# Patient Record
Sex: MALE | Race: WHITE | NOT HISPANIC OR LATINO | Employment: OTHER | URBAN - METROPOLITAN AREA
[De-identification: names, ages, dates, MRNs, and addresses within clinical notes are randomized per-mention and may not be internally consistent; named-entity substitution may affect disease eponyms.]

---

## 2018-01-01 ENCOUNTER — TELEPHONE (OUTPATIENT)
Dept: TRANSPLANT | Facility: CLINIC | Age: 78
End: 2018-01-01

## 2018-01-01 ENCOUNTER — RESEARCH ENCOUNTER (OUTPATIENT)
Dept: RESEARCH | Facility: HOSPITAL | Age: 78
End: 2018-01-01

## 2018-01-01 ENCOUNTER — OFFICE VISIT (OUTPATIENT)
Dept: TRANSPLANT | Facility: CLINIC | Age: 78
End: 2018-01-01
Payer: MEDICARE

## 2018-01-01 ENCOUNTER — LAB VISIT (OUTPATIENT)
Dept: LAB | Facility: HOSPITAL | Age: 78
End: 2018-01-01
Attending: INTERNAL MEDICINE
Payer: MEDICARE

## 2018-01-01 VITALS
HEART RATE: 70 BPM | HEIGHT: 73 IN | WEIGHT: 196.44 LBS | DIASTOLIC BLOOD PRESSURE: 53 MMHG | BODY MASS INDEX: 26.03 KG/M2 | SYSTOLIC BLOOD PRESSURE: 108 MMHG

## 2018-01-01 VITALS
HEIGHT: 74 IN | BODY MASS INDEX: 24.58 KG/M2 | WEIGHT: 191.56 LBS | HEART RATE: 68 BPM | SYSTOLIC BLOOD PRESSURE: 133 MMHG | DIASTOLIC BLOOD PRESSURE: 50 MMHG

## 2018-01-01 VITALS
HEIGHT: 74 IN | HEART RATE: 68 BPM | DIASTOLIC BLOOD PRESSURE: 51 MMHG | WEIGHT: 192.44 LBS | SYSTOLIC BLOOD PRESSURE: 104 MMHG | BODY MASS INDEX: 24.7 KG/M2

## 2018-01-01 DIAGNOSIS — Z95.810 ICD (IMPLANTABLE CARDIOVERTER-DEFIBRILLATOR) IN PLACE: ICD-10-CM

## 2018-01-01 DIAGNOSIS — I47.20 VT (VENTRICULAR TACHYCARDIA): ICD-10-CM

## 2018-01-01 DIAGNOSIS — I50.22 CHRONIC SYSTOLIC CONGESTIVE HEART FAILURE: Primary | ICD-10-CM

## 2018-01-01 DIAGNOSIS — E87.6 HYPOKALEMIA: ICD-10-CM

## 2018-01-01 DIAGNOSIS — I25.5 ISCHEMIC CARDIOMYOPATHY: ICD-10-CM

## 2018-01-01 DIAGNOSIS — I50.22 CHRONIC SYSTOLIC CONGESTIVE HEART FAILURE: ICD-10-CM

## 2018-01-01 DIAGNOSIS — I48.0 PAROXYSMAL ATRIAL FIBRILLATION: ICD-10-CM

## 2018-01-01 DIAGNOSIS — I50.9 ACUTE HEART FAILURE, UNSPECIFIED HEART FAILURE TYPE: Primary | ICD-10-CM

## 2018-01-01 LAB
ALBUMIN SERPL BCP-MCNC: 2.8 G/DL
ALBUMIN SERPL BCP-MCNC: 3.1 G/DL
ALP SERPL-CCNC: 246 U/L
ALP SERPL-CCNC: 327 U/L
ALT SERPL W/O P-5'-P-CCNC: 108 U/L
ALT SERPL W/O P-5'-P-CCNC: 31 U/L
ANION GAP SERPL CALC-SCNC: 10 MMOL/L
ANION GAP SERPL CALC-SCNC: 12 MMOL/L
ANION GAP SERPL CALC-SCNC: 13 MMOL/L
AST SERPL-CCNC: 39 U/L
AST SERPL-CCNC: 83 U/L
BILIRUB SERPL-MCNC: 0.9 MG/DL
BILIRUB SERPL-MCNC: 1.4 MG/DL
BNP SERPL-MCNC: 534 PG/ML
BNP SERPL-MCNC: 792 PG/ML
BUN SERPL-MCNC: 54 MG/DL
BUN SERPL-MCNC: 72 MG/DL
BUN SERPL-MCNC: 83 MG/DL
CALCIUM SERPL-MCNC: 9.2 MG/DL
CALCIUM SERPL-MCNC: 9.7 MG/DL
CALCIUM SERPL-MCNC: 9.7 MG/DL
CHLORIDE SERPL-SCNC: 102 MMOL/L
CHLORIDE SERPL-SCNC: 96 MMOL/L
CHLORIDE SERPL-SCNC: 99 MMOL/L
CO2 SERPL-SCNC: 26 MMOL/L
CO2 SERPL-SCNC: 26 MMOL/L
CO2 SERPL-SCNC: 30 MMOL/L
CREAT SERPL-MCNC: 1.8 MG/DL
CREAT SERPL-MCNC: 2.1 MG/DL
CREAT SERPL-MCNC: 2.6 MG/DL
EST. GFR  (AFRICAN AMERICAN): 26.5 ML/MIN/1.73 M^2
EST. GFR  (AFRICAN AMERICAN): 34.3 ML/MIN/1.73 M^2
EST. GFR  (AFRICAN AMERICAN): 41.3 ML/MIN/1.73 M^2
EST. GFR  (NON AFRICAN AMERICAN): 22.9 ML/MIN/1.73 M^2
EST. GFR  (NON AFRICAN AMERICAN): 29.7 ML/MIN/1.73 M^2
EST. GFR  (NON AFRICAN AMERICAN): 35.8 ML/MIN/1.73 M^2
GLUCOSE SERPL-MCNC: 117 MG/DL
GLUCOSE SERPL-MCNC: 259 MG/DL
GLUCOSE SERPL-MCNC: 335 MG/DL
POTASSIUM SERPL-SCNC: 3.5 MMOL/L
POTASSIUM SERPL-SCNC: 4.4 MMOL/L
POTASSIUM SERPL-SCNC: 4.9 MMOL/L
PROT SERPL-MCNC: 6.7 G/DL
PROT SERPL-MCNC: 7.2 G/DL
SODIUM SERPL-SCNC: 137 MMOL/L
SODIUM SERPL-SCNC: 138 MMOL/L
SODIUM SERPL-SCNC: 139 MMOL/L

## 2018-01-01 PROCEDURE — 36415 COLL VENOUS BLD VENIPUNCTURE: CPT

## 2018-01-01 PROCEDURE — 99214 OFFICE O/P EST MOD 30 MIN: CPT | Mod: S$PBB,,, | Performed by: INTERNAL MEDICINE

## 2018-01-01 PROCEDURE — 83880 ASSAY OF NATRIURETIC PEPTIDE: CPT

## 2018-01-01 PROCEDURE — 99999 PR PBB SHADOW E&M-EST. PATIENT-LVL III: CPT | Mod: PBBFAC,,, | Performed by: INTERNAL MEDICINE

## 2018-01-01 PROCEDURE — 80053 COMPREHEN METABOLIC PANEL: CPT

## 2018-01-01 PROCEDURE — 99213 OFFICE O/P EST LOW 20 MIN: CPT | Mod: PBBFAC | Performed by: INTERNAL MEDICINE

## 2018-01-01 PROCEDURE — 80048 BASIC METABOLIC PNL TOTAL CA: CPT

## 2018-01-01 RX ORDER — LEVOFLOXACIN 500 MG/1
1 TABLET, FILM COATED ORAL DAILY
COMMUNITY
Start: 2018-01-01 | End: 2018-01-01

## 2018-01-01 RX ORDER — POTASSIUM CHLORIDE 20 MEQ/1
TABLET, EXTENDED RELEASE ORAL
Qty: 90 TABLET | Refills: 3 | Status: ON HOLD | OUTPATIENT
Start: 2018-01-01 | End: 2019-01-01 | Stop reason: SDUPTHER

## 2018-01-01 RX ORDER — ALBUTEROL SULFATE 90 UG/1
2 AEROSOL, METERED RESPIRATORY (INHALATION) EVERY 4 HOURS PRN
COMMUNITY
Start: 2018-01-01

## 2018-01-01 RX ORDER — BUMETANIDE 1 MG/1
TABLET ORAL
Qty: 90 TABLET | Refills: 11 | Status: SHIPPED | OUTPATIENT
Start: 2018-01-01 | End: 2018-01-01 | Stop reason: SDUPTHER

## 2018-01-01 RX ORDER — FLUTICASONE FUROATE, UMECLIDINIUM BROMIDE AND VILANTEROL TRIFENATATE 100; 62.5; 25 UG/1; UG/1; UG/1
POWDER RESPIRATORY (INHALATION) NIGHTLY
COMMUNITY
Start: 2018-01-01

## 2018-01-01 RX ORDER — POTASSIUM CHLORIDE 20 MEQ/1
TABLET, EXTENDED RELEASE ORAL
Qty: 45 TABLET | Refills: 3 | Status: SHIPPED | OUTPATIENT
Start: 2018-01-01 | End: 2018-01-01 | Stop reason: SDUPTHER

## 2018-01-01 RX ORDER — BUMETANIDE 1 MG/1
TABLET ORAL
Qty: 450 TABLET | Refills: 3 | Status: ON HOLD | OUTPATIENT
Start: 2018-01-01 | End: 2019-01-01 | Stop reason: SDUPTHER

## 2018-03-16 DIAGNOSIS — I50.22 CHRONIC SYSTOLIC HEART FAILURE: Primary | ICD-10-CM

## 2018-03-22 ENCOUNTER — LAB VISIT (OUTPATIENT)
Dept: LAB | Facility: HOSPITAL | Age: 78
End: 2018-03-22
Attending: INTERNAL MEDICINE
Payer: MEDICARE

## 2018-03-22 ENCOUNTER — INITIAL CONSULT (OUTPATIENT)
Dept: TRANSPLANT | Facility: CLINIC | Age: 78
End: 2018-03-22
Payer: MEDICARE

## 2018-03-22 VITALS
DIASTOLIC BLOOD PRESSURE: 65 MMHG | WEIGHT: 218.5 LBS | BODY MASS INDEX: 28.04 KG/M2 | HEART RATE: 93 BPM | SYSTOLIC BLOOD PRESSURE: 148 MMHG | HEIGHT: 74 IN

## 2018-03-22 DIAGNOSIS — E11.8 TYPE 2 DIABETES MELLITUS WITH COMPLICATION, WITH LONG-TERM CURRENT USE OF INSULIN: ICD-10-CM

## 2018-03-22 DIAGNOSIS — Z79.4 TYPE 2 DIABETES MELLITUS WITH COMPLICATION, WITH LONG-TERM CURRENT USE OF INSULIN: ICD-10-CM

## 2018-03-22 DIAGNOSIS — E78.2 MIXED HYPERLIPIDEMIA: ICD-10-CM

## 2018-03-22 DIAGNOSIS — I25.5 ISCHEMIC CARDIOMYOPATHY: ICD-10-CM

## 2018-03-22 DIAGNOSIS — I25.10 CORONARY ARTERY DISEASE INVOLVING NATIVE CORONARY ARTERY OF NATIVE HEART WITHOUT ANGINA PECTORIS: ICD-10-CM

## 2018-03-22 DIAGNOSIS — I50.22 CHRONIC SYSTOLIC CONGESTIVE HEART FAILURE: Primary | ICD-10-CM

## 2018-03-22 DIAGNOSIS — I47.20 VT (VENTRICULAR TACHYCARDIA): ICD-10-CM

## 2018-03-22 DIAGNOSIS — Z95.810 ICD (IMPLANTABLE CARDIOVERTER-DEFIBRILLATOR) IN PLACE: ICD-10-CM

## 2018-03-22 DIAGNOSIS — I50.22 CHRONIC SYSTOLIC HEART FAILURE: ICD-10-CM

## 2018-03-22 LAB
ALBUMIN SERPL BCP-MCNC: 3.4 G/DL
ALP SERPL-CCNC: 125 U/L
ALT SERPL W/O P-5'-P-CCNC: 16 U/L
ANION GAP SERPL CALC-SCNC: 5 MMOL/L
AST SERPL-CCNC: 20 U/L
BASOPHILS # BLD AUTO: 0.06 K/UL
BASOPHILS NFR BLD: 0.6 %
BILIRUB SERPL-MCNC: 1.3 MG/DL
BUN SERPL-MCNC: 25 MG/DL
CALCIUM SERPL-MCNC: 8.9 MG/DL
CHLORIDE SERPL-SCNC: 109 MMOL/L
CO2 SERPL-SCNC: 29 MMOL/L
CREAT SERPL-MCNC: 1.2 MG/DL
DIFFERENTIAL METHOD: ABNORMAL
EOSINOPHIL # BLD AUTO: 0 K/UL
EOSINOPHIL NFR BLD: 0 %
ERYTHROCYTE [DISTWIDTH] IN BLOOD BY AUTOMATED COUNT: 15.8 %
EST. GFR  (AFRICAN AMERICAN): >60 ML/MIN/1.73 M^2
EST. GFR  (NON AFRICAN AMERICAN): 58.8 ML/MIN/1.73 M^2
GLUCOSE SERPL-MCNC: 184 MG/DL
HCT VFR BLD AUTO: 36.4 %
HGB BLD-MCNC: 11.6 G/DL
IMM GRANULOCYTES # BLD AUTO: 0.02 K/UL
IMM GRANULOCYTES NFR BLD AUTO: 0.2 %
LYMPHOCYTES # BLD AUTO: 1.4 K/UL
LYMPHOCYTES NFR BLD: 14.8 %
MAGNESIUM SERPL-MCNC: 2.2 MG/DL
MCH RBC QN AUTO: 28.3 PG
MCHC RBC AUTO-ENTMCNC: 31.9 G/DL
MCV RBC AUTO: 89 FL
MONOCYTES # BLD AUTO: 1.3 K/UL
MONOCYTES NFR BLD: 13 %
NEUTROPHILS # BLD AUTO: 6.9 K/UL
NEUTROPHILS NFR BLD: 71.4 %
NRBC BLD-RTO: 0 /100 WBC
PLATELET # BLD AUTO: 93 K/UL
PMV BLD AUTO: 13.8 FL
POTASSIUM SERPL-SCNC: 3.9 MMOL/L
PROT SERPL-MCNC: 6.7 G/DL
RBC # BLD AUTO: 4.1 M/UL
SODIUM SERPL-SCNC: 143 MMOL/L
WBC # BLD AUTO: 9.62 K/UL

## 2018-03-22 PROCEDURE — 99213 OFFICE O/P EST LOW 20 MIN: CPT | Mod: PBBFAC | Performed by: INTERNAL MEDICINE

## 2018-03-22 PROCEDURE — 80053 COMPREHEN METABOLIC PANEL: CPT

## 2018-03-22 PROCEDURE — 83880 ASSAY OF NATRIURETIC PEPTIDE: CPT

## 2018-03-22 PROCEDURE — 36415 COLL VENOUS BLD VENIPUNCTURE: CPT

## 2018-03-22 PROCEDURE — 99204 OFFICE O/P NEW MOD 45 MIN: CPT | Mod: S$PBB,,, | Performed by: INTERNAL MEDICINE

## 2018-03-22 PROCEDURE — 85025 COMPLETE CBC W/AUTO DIFF WBC: CPT

## 2018-03-22 PROCEDURE — 83735 ASSAY OF MAGNESIUM: CPT

## 2018-03-22 PROCEDURE — 99999 PR PBB SHADOW E&M-EST. PATIENT-LVL III: CPT | Mod: PBBFAC,,, | Performed by: INTERNAL MEDICINE

## 2018-03-22 RX ORDER — LEVOTHYROXINE SODIUM 25 UG/1
1 TABLET ORAL DAILY
Status: ON HOLD | COMMUNITY
Start: 2018-02-28 | End: 2019-01-01 | Stop reason: SDUPTHER

## 2018-03-22 RX ORDER — ROSUVASTATIN CALCIUM 20 MG/1
1 TABLET, COATED ORAL DAILY
COMMUNITY
Start: 2018-03-21 | End: 2018-07-27

## 2018-03-22 RX ORDER — FUROSEMIDE 40 MG/1
40 TABLET ORAL 2 TIMES DAILY
Qty: 90 TABLET | Refills: 3 | Status: ON HOLD | OUTPATIENT
Start: 2018-03-22 | End: 2018-05-21 | Stop reason: HOSPADM

## 2018-03-22 RX ORDER — METOLAZONE 5 MG/1
5 TABLET ORAL WEEKLY
Status: ON HOLD | COMMUNITY
End: 2018-04-09 | Stop reason: HOSPADM

## 2018-03-22 RX ORDER — INSULIN GLARGINE 100 [IU]/ML
50 INJECTION, SOLUTION SUBCUTANEOUS 2 TIMES DAILY
Status: ON HOLD | COMMUNITY
Start: 2018-03-06 | End: 2019-01-01 | Stop reason: HOSPADM

## 2018-03-22 RX ORDER — SACUBITRIL AND VALSARTAN 49; 51 MG/1; MG/1
1 TABLET, FILM COATED ORAL 2 TIMES DAILY
Status: ON HOLD | COMMUNITY
Start: 2018-03-19 | End: 2018-05-21 | Stop reason: HOSPADM

## 2018-03-22 RX ORDER — INSULIN ASPART 100 [IU]/ML
5 INJECTION, SOLUTION INTRAVENOUS; SUBCUTANEOUS 4 TIMES DAILY
Status: ON HOLD | COMMUNITY
Start: 2018-03-06 | End: 2019-01-01 | Stop reason: HOSPADM

## 2018-03-22 RX ORDER — APIXABAN 2.5 MG/1
1 TABLET, FILM COATED ORAL 2 TIMES DAILY
Status: ON HOLD | COMMUNITY
Start: 2018-02-12 | End: 2019-01-01 | Stop reason: HOSPADM

## 2018-03-22 RX ORDER — DIGOXIN 125 MCG
0.5 TABLET ORAL EVERY MORNING
Status: ON HOLD | COMMUNITY
Start: 2018-03-06 | End: 2019-01-01 | Stop reason: HOSPADM

## 2018-03-22 RX ORDER — MULTIVITAMIN
1 TABLET ORAL DAILY
COMMUNITY

## 2018-03-22 RX ORDER — OMEPRAZOLE 40 MG/1
40 CAPSULE, DELAYED RELEASE ORAL DAILY
COMMUNITY

## 2018-03-22 RX ORDER — FUROSEMIDE 40 MG/1
1 TABLET ORAL DAILY
COMMUNITY
Start: 2018-03-06 | End: 2018-03-22 | Stop reason: SDUPTHER

## 2018-03-22 RX ORDER — CLOPIDOGREL BISULFATE 75 MG/1
1 TABLET ORAL DAILY
COMMUNITY
Start: 2018-02-28 | End: 2018-06-11 | Stop reason: SDUPTHER

## 2018-03-22 RX ORDER — LORATADINE 10 MG/1
10 TABLET ORAL DAILY
COMMUNITY

## 2018-03-22 RX ORDER — OMEGA-3-ACID ETHYL ESTERS 1 G/1
2 CAPSULE, LIQUID FILLED ORAL 2 TIMES DAILY
COMMUNITY

## 2018-03-22 NOTE — PROGRESS NOTES
"Subjective:     HPI:  Mr. Solis is a 77 y.o. year old White male with ICMP (EF=20%), CAD s/p PCI, DM and HTN who is referred for evaluation and management of CHF. Clinically reports NYHA class III symptoms. PND and orthopnea.  Most recent angiogram showed patent stents. His HF regimen includes; Sacubitril 49/51 mg BID, digoxin 0.125 mg daily, Lasix 40 mg daily. Was on Sotalol but was discontinued by his local cardiologist.     Past Medical History:   Diagnosis Date    Chronic systolic congestive heart failure 3/22/2018    Coronary artery disease involving native coronary artery of native heart without angina pectoris 3/22/2018    Ischemic cardiomyopathy 3/22/2018    Mixed hyperlipidemia 3/22/2018    VT (ventricular tachycardia) 3/22/2018      History reviewed. No pertinent surgical history.    Social Hx; No smoking. No alcohol       Review of Systems   Constitution: Positive for weight gain. Negative for chills, decreased appetite, diaphoresis, fever, weakness, malaise/fatigue, night sweats and weight loss.   Eyes: Negative.    Cardiovascular: Positive for chest pain, dyspnea on exertion, leg swelling, orthopnea and paroxysmal nocturnal dyspnea. Negative for claudication, cyanosis, irregular heartbeat, near-syncope, palpitations and syncope.   Respiratory: Negative for cough, hemoptysis and shortness of breath.    Endocrine: Negative.    Hematologic/Lymphatic: Negative.    Skin: Negative for color change, dry skin and nail changes.   Musculoskeletal: Negative.    Gastrointestinal: Negative.    Genitourinary: Negative.        Objective:   Blood pressure (!) 148/65, pulse 93, height 6' 2" (1.88 m), weight 99.1 kg (218 lb 7.6 oz).body mass index is 28.05 kg/m².  Physical Exam   Constitutional: He appears well-developed.   BP (!) 148/65   Pulse 93   Ht 6' 2" (1.88 m)   Wt 99.1 kg (218 lb 7.6 oz)   BMI 28.05 kg/m²      HENT:   Head: Normocephalic.   Neck: JVD present. Carotid bruit is not present. "   Cardiovascular: Regular rhythm, normal heart sounds and normal pulses.  PMI is displaced.    No murmur heard.  Pulmonary/Chest: Effort normal and breath sounds normal. No respiratory distress. He has no wheezes. He has no rales.   Abdominal: Soft. Bowel sounds are normal. He exhibits distension. There is no tenderness. There is no rebound.   Musculoskeletal: He exhibits no edema.   Neurological: He is alert.   Skin: Skin is warm.   Vitals reviewed.      Labs:    Chemistry    No results found for: NA, K, CL, CO2, BUN, CREATININE, GLU No results found for: CALCIUM, ALKPHOS, AST, ALT, BILITOT, ESTGFRAFRICA, EGFRNONAA         Assessment:      1. Chronic systolic congestive heart failure    2. Ischemic cardiomyopathy    3. Coronary artery disease involving native coronary artery of native heart without angina pectoris    4. Mixed hyperlipidemia    5. VT (ventricular tachycardia)        Plan:   78 yo male with NYHA class III symptoms. In view of his elavated BP I recommend optimizing his HF regimen.  Recommend increasing his Lasix to 40 mg PO BID  Once euvolemic start Coreg 3.125 mg BID (will start at next visit in 2 weeks)  Continue current dose of Entresto  RHC to assess his hemodynamics and pulmonary pressures  2D echo to reassess his LV size function and RV size and function  Recommend 2 gram sodium restriction and 1500cc fluid restriction.  Encourage physical activity with graded exercise program.  Requested patient to weigh themselves daily, and to notify us if their weight increases by more than 3 lbs in 1 day or 5 lbs in 1 week.   RTC in 2 weeks to discuss RHC results future Tx plans.  Thank you for allowing me to participate in the care of this patient. Please do not hesitate to contact me should you have any questions.     Mela Nails MD

## 2018-03-23 LAB — BNP SERPL-MCNC: 345 PG/ML

## 2018-03-28 DIAGNOSIS — I50.9 ACUTE HEART FAILURE, UNSPECIFIED HEART FAILURE TYPE: Primary | ICD-10-CM

## 2018-03-28 RX ORDER — POTASSIUM CHLORIDE 20 MEQ/1
60 TABLET, EXTENDED RELEASE ORAL DAILY
Qty: 90 TABLET | Refills: 6 | Status: ON HOLD | OUTPATIENT
Start: 2018-03-28 | End: 2018-04-09 | Stop reason: HOSPADM

## 2018-03-28 NOTE — TELEPHONE ENCOUNTER
3/28/18 - Received call from patient stating when he was seen in clinic 3/22/18 his Lasix was increased to 40 mg twice a day.  Patient states he has not lost all of his weight and is still full of fluid and is still having to sleep sitting up in his chair.  Patient states his weight yesterday was 211 lb, but states he weighed last night (Weight at clinic visit was 218).  I informed patient that weight is not an accurate weight and I educated/instructed patient on the proper way to weigh himself from this point forward.  Patient verbalized understanding.  Discussed/Reviewed with NELIA Nails M.D.  VORB: NELIA Nails M.D./JV Gibson RN: Increase Lasix to 80 mg BID x 2 days only, start K-Dur 60 meq daily and repeat BMP Monday 4/2/18.  Above instructions given to patient and patient verbalized understanding to all instructions given.  Medication orders entered in to [employer] and routed to NELIA Nails M.D., for signature and [facility].  Patient request to have Labs drawn at Dr. Jason Hdz's office.  Called his office for fax number and lab order faxed with confirmation slip.  Phone review entered.

## 2018-04-04 DIAGNOSIS — I25.5 ISCHEMIC CARDIOMYOPATHY: ICD-10-CM

## 2018-04-04 DIAGNOSIS — I50.22 CHRONIC SYSTOLIC CHF (CONGESTIVE HEART FAILURE), NYHA CLASS 3: Primary | ICD-10-CM

## 2018-04-05 ENCOUNTER — HOSPITAL ENCOUNTER (INPATIENT)
Facility: HOSPITAL | Age: 78
LOS: 4 days | Discharge: HOME | DRG: 287 | End: 2018-04-09
Attending: INTERNAL MEDICINE | Admitting: INTERNAL MEDICINE
Payer: MEDICARE

## 2018-04-05 ENCOUNTER — DOCUMENTATION ONLY (OUTPATIENT)
Dept: CARDIOLOGY | Facility: CLINIC | Age: 78
End: 2018-04-05

## 2018-04-05 ENCOUNTER — HOSPITAL ENCOUNTER (OUTPATIENT)
Dept: CARDIOLOGY | Facility: CLINIC | Age: 78
Discharge: HOME | DRG: 287 | End: 2018-04-05
Attending: INTERNAL MEDICINE
Payer: MEDICARE

## 2018-04-05 ENCOUNTER — SURGERY (OUTPATIENT)
Age: 78
End: 2018-04-05

## 2018-04-05 DIAGNOSIS — Z95.810 PRESENCE OF AUTOMATIC IMPLANTABLE CARDIOVERTER-DEFIBRILLATOR: ICD-10-CM

## 2018-04-05 DIAGNOSIS — I50.22 CHRONIC SYSTOLIC CONGESTIVE HEART FAILURE: ICD-10-CM

## 2018-04-05 DIAGNOSIS — I50.23 ACUTE ON CHRONIC SYSTOLIC HEART FAILURE, NYHA CLASS 4: ICD-10-CM

## 2018-04-05 DIAGNOSIS — I25.5 ISCHEMIC CARDIOMYOPATHY: Primary | ICD-10-CM

## 2018-04-05 LAB
AORTIC VALVE REGURGITATION: ABNORMAL
ESTIMATED PA SYSTOLIC PRESSURE: 96
MITRAL VALVE REGURGITATION: ABNORMAL
POCT GLUCOSE: 108 MG/DL (ref 70–110)
POCT GLUCOSE: 202 MG/DL (ref 70–110)
RETIRED EF AND QEF - SEE NOTES: 20 (ref 55–65)
TRICUSPID VALVE REGURGITATION: ABNORMAL

## 2018-04-05 PROCEDURE — 4A023N6 MEASUREMENT OF CARDIAC SAMPLING AND PRESSURE, RIGHT HEART, PERCUTANEOUS APPROACH: ICD-10-PCS | Performed by: INTERNAL MEDICINE

## 2018-04-05 PROCEDURE — A4216 STERILE WATER/SALINE, 10 ML: HCPCS | Performed by: NURSE PRACTITIONER

## 2018-04-05 PROCEDURE — 93010 ELECTROCARDIOGRAM REPORT: CPT | Mod: ,,, | Performed by: INTERNAL MEDICINE

## 2018-04-05 PROCEDURE — 63600175 PHARM REV CODE 636 W HCPCS: Performed by: NURSE PRACTITIONER

## 2018-04-05 PROCEDURE — 93306 TTE W/DOPPLER COMPLETE: CPT | Mod: 26,S$PBB,, | Performed by: INTERNAL MEDICINE

## 2018-04-05 PROCEDURE — C8929 TTE W OR WO FOL WCON,DOPPLER: HCPCS | Mod: PBBFAC | Performed by: INTERNAL MEDICINE

## 2018-04-05 PROCEDURE — 93005 ELECTROCARDIOGRAM TRACING: CPT

## 2018-04-05 PROCEDURE — 20600001 HC STEP DOWN PRIVATE ROOM

## 2018-04-05 PROCEDURE — 25000003 PHARM REV CODE 250: Performed by: NURSE PRACTITIONER

## 2018-04-05 RX ORDER — LEVOTHYROXINE SODIUM 25 UG/1
25 TABLET ORAL
Status: DISCONTINUED | OUTPATIENT
Start: 2018-04-06 | End: 2018-04-09 | Stop reason: HOSPADM

## 2018-04-05 RX ORDER — CETIRIZINE HYDROCHLORIDE 5 MG/1
10 TABLET ORAL DAILY
Status: DISCONTINUED | OUTPATIENT
Start: 2018-04-06 | End: 2018-04-09 | Stop reason: HOSPADM

## 2018-04-05 RX ORDER — IBUPROFEN 200 MG
16 TABLET ORAL
Status: DISCONTINUED | OUTPATIENT
Start: 2018-04-05 | End: 2018-04-09 | Stop reason: HOSPADM

## 2018-04-05 RX ORDER — LANOLIN ALCOHOL/MO/W.PET/CERES
400 CREAM (GRAM) TOPICAL 2 TIMES DAILY
Status: DISCONTINUED | OUTPATIENT
Start: 2018-04-05 | End: 2018-04-08

## 2018-04-05 RX ORDER — IBUPROFEN 200 MG
24 TABLET ORAL
Status: DISCONTINUED | OUTPATIENT
Start: 2018-04-05 | End: 2018-04-09 | Stop reason: HOSPADM

## 2018-04-05 RX ORDER — GLUCAGON 1 MG
1 KIT INJECTION
Status: DISCONTINUED | OUTPATIENT
Start: 2018-04-05 | End: 2018-04-09 | Stop reason: HOSPADM

## 2018-04-05 RX ORDER — INSULIN ASPART 100 [IU]/ML
0-5 INJECTION, SOLUTION INTRAVENOUS; SUBCUTANEOUS
Status: DISCONTINUED | OUTPATIENT
Start: 2018-04-05 | End: 2018-04-09 | Stop reason: HOSPADM

## 2018-04-05 RX ORDER — POTASSIUM CHLORIDE 20 MEQ/1
20 TABLET, EXTENDED RELEASE ORAL 2 TIMES DAILY
Status: DISCONTINUED | OUTPATIENT
Start: 2018-04-05 | End: 2018-04-06

## 2018-04-05 RX ORDER — DIGOXIN 125 MCG
0.12 TABLET ORAL EVERY MORNING
Status: DISCONTINUED | OUTPATIENT
Start: 2018-04-06 | End: 2018-04-09 | Stop reason: HOSPADM

## 2018-04-05 RX ORDER — ROSUVASTATIN CALCIUM 20 MG/1
20 TABLET, COATED ORAL DAILY
Status: DISCONTINUED | OUTPATIENT
Start: 2018-04-06 | End: 2018-04-09 | Stop reason: HOSPADM

## 2018-04-05 RX ORDER — SODIUM CHLORIDE 0.9 % (FLUSH) 0.9 %
3 SYRINGE (ML) INJECTION EVERY 8 HOURS
Status: DISCONTINUED | OUTPATIENT
Start: 2018-04-05 | End: 2018-04-09 | Stop reason: HOSPADM

## 2018-04-05 RX ORDER — CLOPIDOGREL BISULFATE 75 MG/1
75 TABLET ORAL DAILY
Status: DISCONTINUED | OUTPATIENT
Start: 2018-04-06 | End: 2018-04-09 | Stop reason: HOSPADM

## 2018-04-05 RX ORDER — FUROSEMIDE 10 MG/ML
80 INJECTION INTRAMUSCULAR; INTRAVENOUS ONCE
Status: COMPLETED | OUTPATIENT
Start: 2018-04-05 | End: 2018-04-05

## 2018-04-05 RX ORDER — HEPARIN SODIUM 5000 [USP'U]/ML
5000 INJECTION, SOLUTION INTRAVENOUS; SUBCUTANEOUS EVERY 8 HOURS
Status: CANCELLED | OUTPATIENT
Start: 2018-04-05

## 2018-04-05 RX ORDER — OMEGA-3-ACID ETHYL ESTERS 1 G/1
2 CAPSULE, LIQUID FILLED ORAL 2 TIMES DAILY
Status: DISCONTINUED | OUTPATIENT
Start: 2018-04-05 | End: 2018-04-09 | Stop reason: HOSPADM

## 2018-04-05 RX ORDER — PANTOPRAZOLE SODIUM 40 MG/1
40 TABLET, DELAYED RELEASE ORAL DAILY
Status: DISCONTINUED | OUTPATIENT
Start: 2018-04-06 | End: 2018-04-09 | Stop reason: HOSPADM

## 2018-04-05 RX ADMIN — FUROSEMIDE 80 MG: 10 INJECTION, SOLUTION INTRAMUSCULAR; INTRAVENOUS at 06:04

## 2018-04-05 RX ADMIN — Medication 400 MG: at 09:04

## 2018-04-05 RX ADMIN — FUROSEMIDE 10 MG/HR: 10 INJECTION, SOLUTION INTRAVENOUS at 06:04

## 2018-04-05 RX ADMIN — POTASSIUM CHLORIDE 20 MEQ: 1500 TABLET, EXTENDED RELEASE ORAL at 09:04

## 2018-04-05 RX ADMIN — SACUBITRIL AND VALSARTAN 1 TABLET: 49; 51 TABLET, FILM COATED ORAL at 09:04

## 2018-04-05 RX ADMIN — Medication 3 ML: at 10:04

## 2018-04-05 RX ADMIN — OMEGA-3-ACID ETHYL ESTERS 2 G: 1 CAPSULE, LIQUID FILLED ORAL at 09:04

## 2018-04-05 NOTE — H&P (VIEW-ONLY)
"Subjective:     HPI:  Mr. Solis is a 77 y.o. year old White male with ICMP (EF=20%), CAD s/p PCI, DM and HTN who is referred for evaluation and management of CHF. Clinically reports NYHA class III symptoms. PND and orthopnea.  Most recent angiogram showed patent stents. His HF regimen includes; Sacubitril 49/51 mg BID, digoxin 0.125 mg daily, Lasix 40 mg daily. Was on Sotalol but was discontinued by his local cardiologist.     Past Medical History:   Diagnosis Date    Chronic systolic congestive heart failure 3/22/2018    Coronary artery disease involving native coronary artery of native heart without angina pectoris 3/22/2018    Ischemic cardiomyopathy 3/22/2018    Mixed hyperlipidemia 3/22/2018    VT (ventricular tachycardia) 3/22/2018      History reviewed. No pertinent surgical history.    Social Hx; No smoking. No alcohol       Review of Systems   Constitution: Positive for weight gain. Negative for chills, decreased appetite, diaphoresis, fever, weakness, malaise/fatigue, night sweats and weight loss.   Eyes: Negative.    Cardiovascular: Positive for chest pain, dyspnea on exertion, leg swelling, orthopnea and paroxysmal nocturnal dyspnea. Negative for claudication, cyanosis, irregular heartbeat, near-syncope, palpitations and syncope.   Respiratory: Negative for cough, hemoptysis and shortness of breath.    Endocrine: Negative.    Hematologic/Lymphatic: Negative.    Skin: Negative for color change, dry skin and nail changes.   Musculoskeletal: Negative.    Gastrointestinal: Negative.    Genitourinary: Negative.        Objective:   Blood pressure (!) 148/65, pulse 93, height 6' 2" (1.88 m), weight 99.1 kg (218 lb 7.6 oz).body mass index is 28.05 kg/m².  Physical Exam   Constitutional: He appears well-developed.   BP (!) 148/65   Pulse 93   Ht 6' 2" (1.88 m)   Wt 99.1 kg (218 lb 7.6 oz)   BMI 28.05 kg/m²      HENT:   Head: Normocephalic.   Neck: JVD present. Carotid bruit is not present. "   Cardiovascular: Regular rhythm, normal heart sounds and normal pulses.  PMI is displaced.    No murmur heard.  Pulmonary/Chest: Effort normal and breath sounds normal. No respiratory distress. He has no wheezes. He has no rales.   Abdominal: Soft. Bowel sounds are normal. He exhibits distension. There is no tenderness. There is no rebound.   Musculoskeletal: He exhibits no edema.   Neurological: He is alert.   Skin: Skin is warm.   Vitals reviewed.      Labs:    Chemistry    No results found for: NA, K, CL, CO2, BUN, CREATININE, GLU No results found for: CALCIUM, ALKPHOS, AST, ALT, BILITOT, ESTGFRAFRICA, EGFRNONAA         Assessment:      1. Chronic systolic congestive heart failure    2. Ischemic cardiomyopathy    3. Coronary artery disease involving native coronary artery of native heart without angina pectoris    4. Mixed hyperlipidemia    5. VT (ventricular tachycardia)        Plan:   76 yo male with NYHA class III symptoms. In view of his elavated BP I recommend optimizing his HF regimen.  Recommend increasing his Lasix to 40 mg PO BID  Once euvolemic start Coreg 3.125 mg BID (will start at next visit in 2 weeks)  Continue current dose of Entresto  RHC to assess his hemodynamics and pulmonary pressures  2D echo to reassess his LV size function and RV size and function  Recommend 2 gram sodium restriction and 1500cc fluid restriction.  Encourage physical activity with graded exercise program.  Requested patient to weigh themselves daily, and to notify us if their weight increases by more than 3 lbs in 1 day or 5 lbs in 1 week.   RTC in 2 weeks to discuss RHC results future Tx plans.  Thank you for allowing me to participate in the care of this patient. Please do not hesitate to contact me should you have any questions.     Mela Nails MD

## 2018-04-05 NOTE — PROGRESS NOTES
Patient identified by 2 identifiers. Denies previous reactions to blood transfusions and allergies reviewed.  Procedure explained & consent obtained.  22 g IV placed to Lt Hand, flushed w/ 10cc NS pre & post contrast administration.  3cc Optison administered, echo images obtained.  Pt tolerated procedure well.  IV D/C'ed, preasure dsg applied.  Pt D/C'ed to home.

## 2018-04-05 NOTE — INTERVAL H&P NOTE
The patient has been examined and the H&P has been reviewed:    I concur with the findings and changes have been noted since the H&P was written:       Mr. Solis is a 77 y.o. year old White male with ICMP (EF=20%), CAD s/p PCI, DM and HTN who is referred for evaluation and management of CHF. Clinically reports NYHA class III symptoms. PND and orthopnea.  Most recent angiogram showed patent stents. His HF regimen includes; Sacubitril 49/51 mg BID, digoxin 0.125 mg daily, Lasix 40 mg daily. Was on Sotalol but was discontinued by his local cardiologist.  He presented today, two weeks after HF clinic visit with Dr. Nails to have RHC.   Results are as follows    D. Hemodynamic Results     BP: 131/67  HR: 70  RSP: 12  PA: 82/26 (46)  AO_SAT: 96  PA_SAT: 58  PW: 40/52 (38)  PW_SAT: 87  RV: 83/5  RVEDP: 17     RA: 16/18 (17)    CONDITION 1 (4/5/2018 11:24:40):  FICKCI: 2.1600  FICKCO: 4.7800  PVR: 201.0000    He was recommended for admission to undergo diuresis and advanced option benjamin. He currently has no complain other than mild discomfort from access site  All his in patient orders including medications have been ordered by my colleague (dre). Pathway benjamin will be started tomorrow.  Plan.  1. Acute on chronic HErEF at 20% . Will continue with lasix bolus and 10mg gtt as ordered. Continue entresto and digoxin. Electrolytes and vital signs reviewed.  2. Mild acute renal failure- Pre- renal and secondary to CHF. Will monitor during stay. Will decide on holding entresto depending on status.        Active Hospital Problems    Diagnosis  POA    *Acute on chronic systolic heart failure, NYHA class 4 [I50.23]  Yes    Ischemic cardiomyopathy [I25.5]  Yes    VT (ventricular tachycardia) [I47.2]  Yes    ICD (implantable cardioverter-defibrillator) in place [Z95.810]  Yes    Coronary artery disease involving native coronary artery of native heart without angina pectoris [I25.10]  Yes    Type 2 diabetes mellitus with complication  [E11.8]  Yes    Mixed hyperlipidemia [E78.2]  Yes      Resolved Hospital Problems    Diagnosis Date Resolved POA   No resolved problems to display.

## 2018-04-06 ENCOUNTER — DOCUMENTATION ONLY (OUTPATIENT)
Dept: ELECTROPHYSIOLOGY | Facility: CLINIC | Age: 78
End: 2018-04-06

## 2018-04-06 PROBLEM — I25.10 CORONARY ARTERY DISEASE INVOLVING NATIVE CORONARY ARTERY OF NATIVE HEART WITHOUT ANGINA PECTORIS: Status: RESOLVED | Noted: 2018-03-22 | Resolved: 2018-04-06

## 2018-04-06 PROBLEM — I48.91 ATRIAL FIBRILLATION: Status: ACTIVE | Noted: 2018-04-06

## 2018-04-06 PROBLEM — E78.2 MIXED HYPERLIPIDEMIA: Status: RESOLVED | Noted: 2018-03-22 | Resolved: 2018-04-06

## 2018-04-06 LAB
ABO + RH BLD: NORMAL
ALBUMIN SERPL BCP-MCNC: 3.5 G/DL
ALP SERPL-CCNC: 114 U/L
ALT SERPL W/O P-5'-P-CCNC: 15 U/L
ANION GAP SERPL CALC-SCNC: 10 MMOL/L
ANION GAP SERPL CALC-SCNC: 10 MMOL/L
AST SERPL-CCNC: 21 U/L
BASOPHILS # BLD AUTO: 0.1 K/UL
BASOPHILS NFR BLD: 1.1 %
BILIRUB SERPL-MCNC: 1.1 MG/DL
BLD GP AB SCN CELLS X3 SERPL QL: NORMAL
BUN SERPL-MCNC: 33 MG/DL
BUN SERPL-MCNC: 33 MG/DL
CALCIUM SERPL-MCNC: 9.2 MG/DL
CALCIUM SERPL-MCNC: 9.2 MG/DL
CHLORIDE SERPL-SCNC: 106 MMOL/L
CHLORIDE SERPL-SCNC: 106 MMOL/L
CO2 SERPL-SCNC: 28 MMOL/L
CO2 SERPL-SCNC: 28 MMOL/L
CREAT SERPL-MCNC: 1.4 MG/DL
CREAT SERPL-MCNC: 1.4 MG/DL
DIFFERENTIAL METHOD: ABNORMAL
DIGOXIN SERPL-MCNC: 0.2 NG/ML
EOSINOPHIL # BLD AUTO: 0 K/UL
EOSINOPHIL NFR BLD: 0 %
ERYTHROCYTE [DISTWIDTH] IN BLOOD BY AUTOMATED COUNT: 15.9 %
EST. GFR  (AFRICAN AMERICAN): 56.4 ML/MIN/1.73 M^2
EST. GFR  (AFRICAN AMERICAN): 56.4 ML/MIN/1.73 M^2
EST. GFR  (NON AFRICAN AMERICAN): 48.8 ML/MIN/1.73 M^2
EST. GFR  (NON AFRICAN AMERICAN): 48.8 ML/MIN/1.73 M^2
ESTIMATED AVG GLUCOSE: 186 MG/DL
FACT X PPP CHRO-ACNC: 0.11 IU/ML
GLUCOSE SERPL-MCNC: 74 MG/DL
GLUCOSE SERPL-MCNC: 74 MG/DL
HBA1C MFR BLD HPLC: 8.1 %
HCT VFR BLD AUTO: 35.7 %
HGB BLD-MCNC: 11.4 G/DL
IMM GRANULOCYTES # BLD AUTO: 0.02 K/UL
IMM GRANULOCYTES NFR BLD AUTO: 0.2 %
LYMPHOCYTES # BLD AUTO: 1.7 K/UL
LYMPHOCYTES NFR BLD: 18.7 %
MAGNESIUM SERPL-MCNC: 2.4 MG/DL
MCH RBC QN AUTO: 28.4 PG
MCHC RBC AUTO-ENTMCNC: 31.9 G/DL
MCV RBC AUTO: 89 FL
MONOCYTES # BLD AUTO: 1.2 K/UL
MONOCYTES NFR BLD: 12.6 %
NEUTROPHILS # BLD AUTO: 6.2 K/UL
NEUTROPHILS NFR BLD: 67.4 %
NRBC BLD-RTO: 0 /100 WBC
PLATELET # BLD AUTO: 110 K/UL
PMV BLD AUTO: 13.9 FL
POCT GLUCOSE: 201 MG/DL (ref 70–110)
POCT GLUCOSE: 230 MG/DL (ref 70–110)
POCT GLUCOSE: 275 MG/DL (ref 70–110)
POCT GLUCOSE: 296 MG/DL (ref 70–110)
POCT GLUCOSE: 80 MG/DL (ref 70–110)
POTASSIUM SERPL-SCNC: 3.7 MMOL/L
POTASSIUM SERPL-SCNC: 3.7 MMOL/L
PROT SERPL-MCNC: 6.7 G/DL
RBC # BLD AUTO: 4.01 M/UL
SODIUM SERPL-SCNC: 144 MMOL/L
SODIUM SERPL-SCNC: 144 MMOL/L
T4 FREE SERPL-MCNC: 0.93 NG/DL
TSH SERPL DL<=0.005 MIU/L-ACNC: 2.46 UIU/ML
WBC # BLD AUTO: 9.26 K/UL

## 2018-04-06 PROCEDURE — 80053 COMPREHEN METABOLIC PANEL: CPT

## 2018-04-06 PROCEDURE — 84439 ASSAY OF FREE THYROXINE: CPT

## 2018-04-06 PROCEDURE — 83735 ASSAY OF MAGNESIUM: CPT

## 2018-04-06 PROCEDURE — 97165 OT EVAL LOW COMPLEX 30 MIN: CPT

## 2018-04-06 PROCEDURE — 36415 COLL VENOUS BLD VENIPUNCTURE: CPT

## 2018-04-06 PROCEDURE — 63600175 PHARM REV CODE 636 W HCPCS: Performed by: NURSE PRACTITIONER

## 2018-04-06 PROCEDURE — 86901 BLOOD TYPING SEROLOGIC RH(D): CPT

## 2018-04-06 PROCEDURE — 85520 HEPARIN ASSAY: CPT

## 2018-04-06 PROCEDURE — 99233 SBSQ HOSP IP/OBS HIGH 50: CPT | Mod: ,,, | Performed by: INTERNAL MEDICINE

## 2018-04-06 PROCEDURE — 25000003 PHARM REV CODE 250: Performed by: NURSE PRACTITIONER

## 2018-04-06 PROCEDURE — 80162 ASSAY OF DIGOXIN TOTAL: CPT

## 2018-04-06 PROCEDURE — 83036 HEMOGLOBIN GLYCOSYLATED A1C: CPT

## 2018-04-06 PROCEDURE — 93284 PRGRMG EVAL IMPLANTABLE DFB: CPT

## 2018-04-06 PROCEDURE — 93284 PRGRMG EVAL IMPLANTABLE DFB: CPT | Mod: 26,,, | Performed by: INTERNAL MEDICINE

## 2018-04-06 PROCEDURE — 20600001 HC STEP DOWN PRIVATE ROOM

## 2018-04-06 PROCEDURE — 85025 COMPLETE CBC W/AUTO DIFF WBC: CPT

## 2018-04-06 PROCEDURE — 84443 ASSAY THYROID STIM HORMONE: CPT

## 2018-04-06 PROCEDURE — A4216 STERILE WATER/SALINE, 10 ML: HCPCS | Performed by: NURSE PRACTITIONER

## 2018-04-06 RX ORDER — POTASSIUM CHLORIDE 20 MEQ/1
40 TABLET, EXTENDED RELEASE ORAL 2 TIMES DAILY
Status: DISCONTINUED | OUTPATIENT
Start: 2018-04-06 | End: 2018-04-08

## 2018-04-06 RX ORDER — HEPARIN SODIUM,PORCINE/D5W 25000/250
18 INTRAVENOUS SOLUTION INTRAVENOUS CONTINUOUS
Status: DISPENSED | OUTPATIENT
Start: 2018-04-06 | End: 2018-04-09

## 2018-04-06 RX ADMIN — FUROSEMIDE 10 MG/HR: 10 INJECTION, SOLUTION INTRAVENOUS at 07:04

## 2018-04-06 RX ADMIN — DIGOXIN 0.12 MG: 125 TABLET ORAL at 06:04

## 2018-04-06 RX ADMIN — SACUBITRIL AND VALSARTAN 1 TABLET: 49; 51 TABLET, FILM COATED ORAL at 09:04

## 2018-04-06 RX ADMIN — POTASSIUM CHLORIDE 40 MEQ: 1500 TABLET, EXTENDED RELEASE ORAL at 09:04

## 2018-04-06 RX ADMIN — Medication 3 ML: at 04:04

## 2018-04-06 RX ADMIN — Medication 400 MG: at 09:04

## 2018-04-06 RX ADMIN — INSULIN ASPART 1 UNITS: 100 INJECTION, SOLUTION INTRAVENOUS; SUBCUTANEOUS at 10:04

## 2018-04-06 RX ADMIN — INSULIN ASPART 2 UNITS: 100 INJECTION, SOLUTION INTRAVENOUS; SUBCUTANEOUS at 11:04

## 2018-04-06 RX ADMIN — HEPARIN SODIUM 18 UNITS/KG/HR: 10000 INJECTION, SOLUTION INTRAVENOUS at 04:04

## 2018-04-06 RX ADMIN — FUROSEMIDE 10 MG/HR: 10 INJECTION, SOLUTION INTRAVENOUS at 10:04

## 2018-04-06 RX ADMIN — ROSUVASTATIN CALCIUM 20 MG: 20 TABLET, FILM COATED ORAL at 09:04

## 2018-04-06 RX ADMIN — CLOPIDOGREL 75 MG: 75 TABLET, FILM COATED ORAL at 09:04

## 2018-04-06 RX ADMIN — CETIRIZINE HYDROCHLORIDE 10 MG: 5 TABLET, FILM COATED ORAL at 09:04

## 2018-04-06 RX ADMIN — LEVOTHYROXINE SODIUM 25 MCG: 25 TABLET ORAL at 06:04

## 2018-04-06 RX ADMIN — OMEGA-3-ACID ETHYL ESTERS 2 G: 1 CAPSULE, LIQUID FILLED ORAL at 07:04

## 2018-04-06 RX ADMIN — INSULIN ASPART 2 UNITS: 100 INJECTION, SOLUTION INTRAVENOUS; SUBCUTANEOUS at 06:04

## 2018-04-06 RX ADMIN — PANTOPRAZOLE SODIUM 40 MG: 40 TABLET, DELAYED RELEASE ORAL at 09:04

## 2018-04-06 RX ADMIN — SPIRONOLACTONE 12.5 MG: 25 TABLET, FILM COATED ORAL at 09:04

## 2018-04-06 RX ADMIN — THERA TABS 1 TABLET: TAB at 09:04

## 2018-04-06 RX ADMIN — OMEGA-3-ACID ETHYL ESTERS 2 G: 1 CAPSULE, LIQUID FILLED ORAL at 09:04

## 2018-04-06 NOTE — NURSING TRANSFER
Nursing Transfer Note      4/6/2018     Transfer To: CT Scan    Transfer via stretcher    Transfer with cardiac monitoring    Transported by Kerrie Love    Medicines sent: Furosemide    Chart send with patient: No    Notified: spouse

## 2018-04-06 NOTE — PLAN OF CARE
Problem: Patient Care Overview  Goal: Plan of Care Review  Outcome: Ongoing (interventions implemented as appropriate)  Patient verbalizes no complaints overnight; denies chest pain, SOB, or other discomfort. Lasix gtt infusing as ordered. Pt remains free of falls or injuries. Pt verbalizes complete understanding of plan of care. Will continue to monitor.

## 2018-04-06 NOTE — ASSESSMENT & PLAN NOTE
- Has [employer] ICD  - V paces with LBBB. Consider seeing what underlying QRS is without pacing - maybe benefit from CRT upgrade?

## 2018-04-06 NOTE — PLAN OF CARE
Problem: Patient Care Overview  Goal: Plan of Care Review  Outcome: Ongoing (interventions implemented as appropriate)  Reviewed plan of care with patient and spouse.  Lasix gtt @ 10 mg/hr.  Heparin gtt @ 18 u/kg/hr.  Anti-Xa will be drawn q6h until therapeutic or discontinued.  Timed Anti-Xa for 2230 hours.  CT Head without contrast and CT Head Abd Pelvis without contrast completed pending results.  ICD programming to interrogate device is completed. Type and screen completed pending results.  Blood glucose monitoring will be completed 4 times daily before meals and at bedtime.  Patient will remain free of fall/trauma/injury by using appropriate lighting, nonskid socks, and by keeping area free of debris.  Patient and family verbalized understanding of all instructions.

## 2018-04-06 NOTE — ASSESSMENT & PLAN NOTE
- Eliquis on hold 2/2 VAD w/u (on SC Heparin for DVT prophylaxis)  - Will have ICD interrogated for A fib burden

## 2018-04-06 NOTE — ASSESSMENT & PLAN NOTE
- Net -ve 3.4L since admit, but remains volume overloaded. Continue Lasix at 10 mg/hr  - GDMT: Continue Dig, Entresto. Will add low dose Aldactone  - Pathway for VAD only initiated. Advance as appropriate  - Also interested in Cardiomems

## 2018-04-06 NOTE — NURSING
Patient arrived to floor as a direct admit from the Cath Lab.  Patient arrived via wheelchair.  Placed on cardiac monitoring (TTX 0074) and called telemetry to add to continuous monitoring system.  ANASTASIA Ospina. notified of patient's arrival.  Vitals and admit assessment completed.  VSS.  BS= 202.  Furosemide gtt @ 10 mg/hr.  Furosemide 80 mg INJ once administered at 1805 hours.  Patient and spouse oriented to room.  Will continue to monitor.

## 2018-04-06 NOTE — PLAN OF CARE
Problem: Occupational Therapy Goal  Goal: Occupational Therapy Goal  Pt is not currently displaying a need for acute OT services. D/C acute OT services and recommend pt D/C home.  D/C acute OT services     Comments: Ajay Sanchez OTR/L  4/6/2018

## 2018-04-06 NOTE — HPI
Mr. Solis is a 77 y.o. year old White male with ICMP (EF=20%), CAD s/p PCI, S/P St. Kelvin ICD, PAF, on Eliquis, was on Sotalol but was discontinued by his local cardiologist DM and HTN who is referred for evaluation and management of CHF. Clinically reports NYHA class III symptoms. PND and orthopnea.  Most recent angiogram showed patent stents. His HF regimen includes; Sacubitril 49/51 mg BID, digoxin 0.125 mg daily, Lasix 40 mg daily.  He was admitted after RHC 4/5/18 showed increased right and left-sided filling pressures, severe Pulmonary Hypertension in the setting of markedly increased PCWP and normal Cardiac output / index for diuresis and consideration for VAD/Cardiomems

## 2018-04-06 NOTE — PROGRESS NOTES
[employer] ICD interrogation shows that he is actually BiV paced already, and AF burden 100%. Will stop SC Heparin and start Heparin gtt. Continue holding Eliquis (last dose 3 days ago) while VAD w/u in progress

## 2018-04-06 NOTE — NURSING
Called [facility] [phone], to inquire about CT head without contrast and CT Chest Abd Pelvis without contrast appointment.  She will call Radiologist to determine contrast protocol and call back.

## 2018-04-06 NOTE — PT/OT/SLP EVAL
Occupational Therapy   Evaluation    Name: Jerry Solis  MRN: [id number]  Admitting Diagnosis:  Acute on chronic systolic heart failure, NYHA class 4      Recommendations:     Discharge Recommendations: home  Discharge Equipment Recommendations:  none  Barriers to discharge:  None    History:     Occupational Profile:  Living Environment: Pt lives in a ssh w/ spouse w/ 4 steps to enter w/ B/L HR and pt has ramp access.   Previous level of function: Indep   Roles and Routines: N/A  Equipment Owned:  none  Assistance upon Discharge: Pt has assistance upon D/C.    Past Medical History:   Diagnosis Date    Chronic systolic congestive heart failure 3/22/2018    Coronary artery disease involving native coronary artery of native heart without angina pectoris 3/22/2018    ICD (implantable cardioverter-defibrillator) in place 3/22/2018    Ischemic cardiomyopathy 3/22/2018    Mixed hyperlipidemia 3/22/2018    Type 2 diabetes mellitus with complication 3/22/2018    VT (ventricular tachycardia) 3/22/2018       History reviewed. No pertinent surgical history.    Subjective     Chief Complaint: no complaints   Patient/Family stated goals: return home  Communicated with: RN prior to session.  Pain/Comfort:  · Pain Rating 1: 0/10  · Pain Rating Post-Intervention 1: 0/10    Patients cultural, spiritual, Amish conflicts given the current situation:      Objective:     Patient found with: peripheral IV    General Precautions: Standard, fall   Orthopedic Precautions:N/A   Braces: N/A     Occupational Performance:    · Pt received seated EOB eating.    Functional Mobility/Transfers:  · Patient completed Sit <> Stand Transfer with supervision  with  no assistive device   · Patient completed Toilet Transfer Stand Pivot technique with supervision with  no AD  · Functional Mobility: Pt ambulated ~100 ft at sba w/o AD.    Activities of Daily Living:  · Feeding:  independence  · Toileting: independence vodining in  "standing    Cognitive/Visual Perceptual:  Cognitive/Psychosocial Skills:     -       Oriented to: Person, Place, Time and Situation   -       Follows Commands/attention:Follows multistep  commands  -       Communication: clear/fluent  -       Memory: No Deficits noted  -       Safety awareness/insight to disability: intact   -       Mood/Affect/Coping skills/emotional control: Appropriate to situation  Visual/Perceptual:      -Intact    Physical Exam:  Balance:    -       Pt displayed good overall balance   Postural examination/scapula alignment:    -       No postural abnormalities identified  Upper Extremity Range of Motion:     -       Right Upper Extremity: WFL  -       Left Upper Extremity: WFL  Upper Extremity Strength:    -       Right Upper Extremity: WFL  -       Left Upper Extremity: WFL   Strength:    -       Right Upper Extremity: WFL  -       Left Upper Extremity: WFL  Fine Motor Coordination:    -       Intact  Gross motor coordination:   WFL    Patient left seated EOB with all lines intact, call button in reach and RN notified    AMPA 6 Click:  AMPA Total Score: 24    Treatment & Education:  Pt educated on POC.  Education:    Assessment:     Jerry Solis is a 77 y.o. male with a medical diagnosis of Acute on chronic systolic heart failure, NYHA class 4. Pt is not currently displaying a need for acute OT services. D/C acute OT services and recommend pt D/C *home.    Rehab Prognosis:  good; patient would benefit from acute skilled OT services to address these deficits and reach maximum level of function.         Clinical Decision Makin.  OT Low:  "Pt evaluation falls under low complexity for evaluation coding due to performance deficits noted in 1-3 areas as stated above and 0 co-morbities affecting current functional status. Data obtained from problem focused assessments. No modifications or assistance was required for completion of evaluation. Only brief occupational profile and " "history review completed."     Plan:     Patient to be seen  (D/C acute OT services ) to address the above listed problems via    · Plan of Care Expires: 05/06/18  · Plan of Care Reviewed with: patient    This Plan of care has been discussed with the patient who was involved in its development and understands and is in agreement with the identified goals and treatment plan    GOALS:    Occupational Therapy Goals        Problem: Occupational Therapy Goal    Goal Priority Disciplines Outcome Interventions   Occupational Therapy Goal     OT, PT/OT     Description:  Pt is not currently displaying a need for acute OT services. D/C acute OT services and recommend pt D/C home.                    Time Tracking:     OT Date of Treatment: 04/06/18  OT Start Time: 1517  OT Stop Time: 1527  OT Total Time (min): 10 min    Billable Minutes:Evaluation 10 mintues    Ajay Sanchez, OT  4/6/2018    "

## 2018-04-06 NOTE — PROGRESS NOTES
At the request of Dr. Guerin, I have been asked to meet patient and provide VAD education. Introduced self and reason for visit. Pt AAAO, no family at bedside.  Provided written VAD education (red folder). Included in folder is the following: VAD education, wellness contract, acknowledgement of being evaluated for VAD, support group flier, ICU visitation hours, VAD newsletter, Intermacs information, picture of VAD  In body, [employer] pamphlet, Living with HM II DVD, There is hope pamphlet, Tomorrow depends on the decision we make today patient education pack (HM II), [employer] information, and business cards for all VAD coordinators. Explained that we use 3 different types of pumps here and information on both pumps is in the red folder. I explained the work up process as well.     Explained to look over the entire contents and read the wellness contract, caregiver agreement and acknowledgement form.  Also explained they should bring this folder with them to all clinic visits and if they are admitted to the hospital so we can continue education as needed. Should there be any questions, please write them down and bring with you or feel free to call and we can talk on the phone. All questions answered to his satisfaction as evidence by verbal acknowledgement.

## 2018-04-06 NOTE — PROGRESS NOTES
Arrhythmia clinic  Order received to interrogate ICD for AF burden.   Programming done at bedside.  AF with slow ventricular response  BiV paces 91%  AF burden 100%. Pt states he was prescribed Eliquis. Has not taken for 2 days.  No vetnricular arrhythmias.  Device and leads wnl.  2.5 years on [employer].  Pt is followed Dr. Dr. Willis

## 2018-04-06 NOTE — PT/OT/SLP PROGRESS
Occupational Therapy      Patient Name:  Jerry Solis   MRN:  [id number]    Patient not seen today secondary to CT/MRI. Writing therapist attempted pt in PM, but pt off the floor at CT scan. Will follow-up as scheduled.    Ajay Sanchez, OT  4/6/2018

## 2018-04-06 NOTE — SUBJECTIVE & OBJECTIVE
Interval History: Describes gradual decline over the past 6 months, more SOB when volume overloaded. For a long time, his fluid was controlled with monthly outpatient outpatient IVP or Lasix gtts, but more recently this has not worked. Still works full time as an  in the oil field. Wants to return to an active lifestyle. Wants more info about LVAD. Also interested in Cardiomems    Continuous Infusions:   furosemide (LASIX) 2 mg/mL infusion (non-titrating) 10 mg/hr (04/06/18 0741)     Scheduled Meds:   cetirizine  10 mg Oral Daily    clopidogrel  75 mg Oral Daily    digoxin  0.125 mg Oral QAM    levothyroxine  25 mcg Oral Before breakfast    magnesium oxide  400 mg Oral BID    multivitamin  1 tablet Oral Daily    omega-3 acid ethyl esters  2 g Oral BID    pantoprazole  40 mg Oral Daily    potassium chloride SA  40 mEq Oral BID    rosuvastatin  20 mg Oral Daily    sacubitril-valsartan  1 tablet Oral BID    sodium chloride 0.9%  3 mL Intravenous Q8H     PRN Meds:dextrose 50%, dextrose 50%, glucagon (human recombinant), glucose, glucose, insulin aspart U-100    Review of patient's allergies indicates:   Allergen Reactions    Ace inhibitors     Adhesive     Codeine     Lipitor [atorvastatin]      Objective:     Vital Signs (Most Recent):  Temp: 98.1 °F (36.7 °C) (04/06/18 0743)  Pulse: 74 (04/06/18 0800)  Resp: 18 (04/06/18 0743)  BP: (!) 99/56 (04/06/18 0743)  SpO2: (!) 93 % (04/06/18 0743) Vital Signs (24h Range):  Temp:  [97.7 °F (36.5 °C)-98.1 °F (36.7 °C)] 98.1 °F (36.7 °C)  Pulse:  [65-74] 74  Resp:  [16-18] 18  SpO2:  [93 %-96 %] 93 %  BP: ()/(54-60) 99/56     Patient Vitals for the past 72 hrs (Last 3 readings):   Weight   04/06/18 0800 88.1 kg (194 lb 3.6 oz)   04/05/18 1701 91.7 kg (202 lb 2.6 oz)     Body mass index is 25.62 kg/m².      Intake/Output Summary (Last 24 hours) at 04/06/18 0918  Last data filed at 04/06/18 0600   Gross per 24 hour   Intake              240 ml    Output             4275 ml   Net            -4035 ml       Hemodynamic Parameters:       Telemetry: V paced with LBBB    Physical Exam   Constitutional: He is oriented to person, place, and time. He appears well-developed and well-nourished.   HENT:   Head: Normocephalic and atraumatic.   Eyes: Conjunctivae and EOM are normal. Pupils are equal, round, and reactive to light.   Neck: Normal range of motion. Neck supple. No thyromegaly present.   JVP at jaw   Cardiovascular: Normal rate and regular rhythm.    Frequent ectopi   Pulmonary/Chest: Effort normal and breath sounds normal.   Abdominal: Soft. Bowel sounds are normal.   Musculoskeletal: Normal range of motion. He exhibits no edema.   Neurological: He is alert and oriented to person, place, and time.   Skin: Skin is warm and dry. Capillary refill takes 2 to 3 seconds.   Psychiatric: He has a normal mood and affect. His behavior is normal. Judgment and thought content normal.       Significant Labs:  CBC:    Recent Labs  Lab 04/05/18  0750 04/06/18  0449   WBC 8.61 9.26   RBC 4.29* 4.01*   HGB 11.9* 11.4*   HCT 39.0* 35.7*   * 110*   MCV 91 89   MCH 27.7 28.4   MCHC 30.5* 31.9*     BNP:  No results for input(s): BNP in the last 168 hours.    Invalid input(s): BNPTRIAGELBLO  CMP:    Recent Labs  Lab 04/05/18  0750 04/06/18  0449   * 74  74   CALCIUM 9.4 9.2  9.2   ALBUMIN  --  3.5   PROT  --  6.7    144  144   K 4.5 3.7  3.7   CO2 28 28  28    106  106   BUN 34* 33*  33*   CREATININE 1.5* 1.4  1.4   ALKPHOS  --  114   ALT  --  15   AST  --  21   BILITOT  --  1.1*      Coagulation:     Recent Labs  Lab 04/05/18  0750   INR 1.1     LDH:  No results for input(s): LDH in the last 72 hours.  Microbiology:  Microbiology Results (last 7 days)     ** No results found for the last 168 hours. **          I have reviewed all pertinent labs within the past 24 hours.    Estimated Creatinine Clearance: 51.5 mL/min (based on SCr of 1.4  mg/dL).    Diagnostic Results:  I have reviewed all pertinent imaging results/findings within the past 24 hours.

## 2018-04-06 NOTE — PROGRESS NOTES
[facility]  Heart Transplant  Progress Note    Patient Name: Jerry Solis  MRN: [id number]  Admission Date: 4/5/2018  Hospital Length of Stay: 1 days  Attending Physician: Mela Nails MD  Primary Care Provider: Primary Doctor No  Principal Problem:Acute on chronic systolic heart failure, NYHA class 4    Subjective:     Interval History: Describes gradual decline over the past 6 months, more SOB when volume overloaded. For a long time, his fluid was controlled with monthly outpatient outpatient IVP or Lasix gtts, but more recently this has not worked. Still works full time as an  in the oil field. Wants to return to an active lifestyle. Wants more info about LVAD. Also interested in Cardiomems    Continuous Infusions:   furosemide (LASIX) 2 mg/mL infusion (non-titrating) 10 mg/hr (04/06/18 0741)     Scheduled Meds:   cetirizine  10 mg Oral Daily    clopidogrel  75 mg Oral Daily    digoxin  0.125 mg Oral QAM    levothyroxine  25 mcg Oral Before breakfast    magnesium oxide  400 mg Oral BID    multivitamin  1 tablet Oral Daily    omega-3 acid ethyl esters  2 g Oral BID    pantoprazole  40 mg Oral Daily    potassium chloride SA  40 mEq Oral BID    rosuvastatin  20 mg Oral Daily    sacubitril-valsartan  1 tablet Oral BID    sodium chloride 0.9%  3 mL Intravenous Q8H     PRN Meds:dextrose 50%, dextrose 50%, glucagon (human recombinant), glucose, glucose, insulin aspart U-100    Review of patient's allergies indicates:   Allergen Reactions    Ace inhibitors     Adhesive     Codeine     Lipitor [atorvastatin]      Objective:     Vital Signs (Most Recent):  Temp: 98.1 °F (36.7 °C) (04/06/18 0743)  Pulse: 74 (04/06/18 0800)  Resp: 18 (04/06/18 0743)  BP: (!) 99/56 (04/06/18 0743)  SpO2: (!) 93 % (04/06/18 0743) Vital Signs (24h Range):  Temp:  [97.7 °F (36.5 °C)-98.1 °F (36.7 °C)] 98.1 °F (36.7 °C)  Pulse:  [65-74] 74  Resp:  [16-18] 18  SpO2:  [93 %-96 %] 93 %  BP:  ()/(54-60) 99/56     Patient Vitals for the past 72 hrs (Last 3 readings):   Weight   04/06/18 0800 88.1 kg (194 lb 3.6 oz)   04/05/18 1701 91.7 kg (202 lb 2.6 oz)     Body mass index is 25.62 kg/m².      Intake/Output Summary (Last 24 hours) at 04/06/18 0918  Last data filed at 04/06/18 0600   Gross per 24 hour   Intake              240 ml   Output             4275 ml   Net            -4035 ml       Hemodynamic Parameters:       Telemetry: V paced with LBBB    Physical Exam   Constitutional: He is oriented to person, place, and time. He appears well-developed and well-nourished.   HENT:   Head: Normocephalic and atraumatic.   Eyes: Conjunctivae and EOM are normal. Pupils are equal, round, and reactive to light.   Neck: Normal range of motion. Neck supple. No thyromegaly present.   JVP at jaw   Cardiovascular: Normal rate and regular rhythm.    Frequent ectopi   Pulmonary/Chest: Effort normal and breath sounds normal.   Abdominal: Soft. Bowel sounds are normal.   Musculoskeletal: Normal range of motion. He exhibits no edema.   Neurological: He is alert and oriented to person, place, and time.   Skin: Skin is warm and dry. Capillary refill takes 2 to 3 seconds.   Psychiatric: He has a normal mood and affect. His behavior is normal. Judgment and thought content normal.       Significant Labs:  CBC:    Recent Labs  Lab 04/05/18 0750 04/06/18 0449   WBC 8.61 9.26   RBC 4.29* 4.01*   HGB 11.9* 11.4*   HCT 39.0* 35.7*   * 110*   MCV 91 89   MCH 27.7 28.4   MCHC 30.5* 31.9*     BNP:  No results for input(s): BNP in the last 168 hours.    Invalid input(s): BNPTRIAGELBLO  CMP:    Recent Labs  Lab 04/05/18  0750 04/06/18  0449   * 74  74   CALCIUM 9.4 9.2  9.2   ALBUMIN  --  3.5   PROT  --  6.7    144  144   K 4.5 3.7  3.7   CO2 28 28  28    106  106   BUN 34* 33*  33*   CREATININE 1.5* 1.4  1.4   ALKPHOS  --  114   ALT  --  15   AST  --  21   BILITOT  --  1.1*      Coagulation:      Recent Labs  Lab 04/05/18  0750   INR 1.1     LDH:  No results for input(s): LDH in the last 72 hours.  Microbiology:  Microbiology Results (last 7 days)     ** No results found for the last 168 hours. **          I have reviewed all pertinent labs within the past 24 hours.    Estimated Creatinine Clearance: 51.5 mL/min (based on SCr of 1.4 mg/dL).    Diagnostic Results:  I have reviewed all pertinent imaging results/findings within the past 24 hours.    Assessment and Plan:     Mr. Solis is a 77 y.o. year old White male with ICMP (EF=20%), CAD s/p PCI, S/P [employer] Kelvin ICD, PAF, on Eliquis, was on Sotalol but was discontinued by his local cardiologist DM and HTN who is referred for evaluation and management of CHF. Clinically reports NYHA class III symptoms. PND and orthopnea.  Most recent angiogram showed patent stents. His HF regimen includes; Sacubitril 49/51 mg BID, digoxin 0.125 mg daily, Lasix 40 mg daily.  He  was admitted after [facility] 4/5/18 showed increased right and left-sided filling pressures, severe Pulmonary Hypertension in the setting of markedly increased PCWP and normal Cardiac output / index for diuresis and consideration for VAD/Cardiomems    * Acute on chronic systolic heart failure, NYHA class 4    - Net -ve 3.4L since admit, but remains volume overloaded. Continue Lasix at 10 mg/hr  - GDMT: Continue Dig, Entresto. Will add low dose Aldactone  - Pathway for VAD only initiated. Advance as appropriate  - Also interested in Cardiomems        Ischemic cardiomyopathy    - See above  - Continue Plavix, Crestor. Not on ASA as he is also on Eliquis        Atrial fibrillation    - Eliquis on hold 2/2 VAD w/u (on SC Heparin for DVT prophylaxis)  - Will have ICD interrogated for A fib burden          VT (ventricular tachycardia)    - Has ICD  - Keep K+ > 4.0 and Mg+ > 2.0        ICD (implantable cardioverter-defibrillator) in place    - Has St. Kelvin ICD  - V paces with LBBB. Consider seeing what underlying  QRS is without pacing - maybe benefit from CRT upgrade?        Type 2 diabetes mellitus with complication    - HgA1C 8.1  - Continue SSI  - Likely consult Endocrine with w/u            Dionna Lora NP [id number]  Heart Transplant  [facility]

## 2018-04-06 NOTE — PROGRESS NOTES
Admit Note     Met with patient and wife to assess needs. Patient is a 77 y.o.  male, admitted for Acute on chronic systolic heart failure, NYHA class 4 [I50.23] per medical record.      Patient admitted from procedure room on 4/5/2018.  At this time, patient presents as alert and oriented x 4, pleasant, good eye contact, calm, communicative, cooperative and asking and answering questions appropriatelyAt this time, patients caregiver presents as alert and oriented x 4, pleasant, good eye contact, calm, communicative, cooperative and asking and answering questions appropriately.    Household/Family Systems     Patient resides with patient's wife, at     [address]  [place] [address]    [address]  [place] [address]    Pt cell [phone]  Wife cell [phone]   Home [phone]    Support system includes wife and 3 adult children.  Patient does not have dependents that are need of being cared for.     Patients primary caregiver is wife Mague.    During admission, patient's caregiver plans to stay in patient's room. Confirmed patient and patients caregivers do have access to reliable transportation.    Cognitive Status/Learning     Patient reports reading ability as 12th grade and states patient does have difficulty with blurry vision, hearing loss (does not have hearing aids due to cost), and short term memory loss. Patient reports patient learns best by hands-on.   Needed: No.   Highest education level: High School (9-12) or GED    Vocation/Disability   .  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    Adherence     Patient reports a medium level of adherence to patients health care regimen. Pt reports did not pay attention to dietary restrictions but plans to do better in the future. Adherence counseling and education provided. Patient verbalizes  understanding.    Substance Use    Patient reports the following substance usage.    Tobacco: pt reports quit smoking in  and reports use was 2-3 ppd.  Alcohol: pt reports no use for around 10 years.  Illicit Drugs/Non-prescribed Medications: none, patient denies any use.  Patient states clear understanding of the potential impact of substance use.  Substance abstinence/cessation counseling, education and resources provided and reviewed.     Services Utilizing/ADLS    Infusion Service: Prior to admission, patient utilizing? no  Home Health: Prior to admission, patient utilizing? no  DME: Prior to admission, no  Pulmonary/Cardiac Rehab: Prior to admission, no  Dialysis:  Prior to admission, no  Transplant Specialty Pharmacy:  Prior to admission, no.    Prior to admission, patient reports patient was independent with ADLS and was driving.  Patient reports patient is able to care for self at this time..  Patient indicates a willingness to care for self once medically cleared to do so.    Insurance/Medications    Insured by   Payor/Plan Subscr  Sex Relation Sub. Ins. ID Effective Group Num   1. [employer]* IVY LUGO 1940 Male  [id number]                                    [address]   2. [place]* IVY LUGO 1940 Male  [id number] 10[birth year]/07                                    [address]      Primary Insurance (for UNOS reporting): Private Insurance  Secondary Insurance (for UNOS reporting): Public Insurance - Medicare FFS (Fee For Service)    Patient reports patient is able to obtain and afford medications at this time and at time of discharge.    Living Will/Healthcare Power of     Patient states patient does not have a LW and/or HCPA.   provided education regarding LW and HCPA and the completion of forms.    Coping/Mental Health    Patient is coping well with the aid of  family members and hillary. Patient denies mental health difficulties.      Discharge Planning    At time of discharge, patient plans to return to patient's home under the care of self and wife.  Patients wife will transport patient.  Per rounds today, expected discharge date has not been medically determined at this time. Patient and patients caregiver verbalize understanding and are involved in treatment planning and discharge process.    Additional Concerns    Patient is being followed for needs, education, resources, information, emotional support, supportive counseling, and for supportive and skilled discharge plan of care.  providing ongoing psychosocial support, education, resources and d/c planning as needed.  SW remains available. Patient denies additional needs and/or concerns at this time. Patient verbalizes understanding and agreement with information reviewed, social work availability, and how to access available resources as needed.

## 2018-04-07 PROBLEM — N17.9 AKI (ACUTE KIDNEY INJURY): Status: ACTIVE | Noted: 2018-04-07

## 2018-04-07 LAB
ALBUMIN SERPL BCP-MCNC: 3.4 G/DL
ALP SERPL-CCNC: 124 U/L
ALT SERPL W/O P-5'-P-CCNC: 13 U/L
ANION GAP SERPL CALC-SCNC: 12 MMOL/L
AST SERPL-CCNC: 18 U/L
BASOPHILS # BLD AUTO: 0.1 K/UL
BASOPHILS NFR BLD: 1.1 %
BILIRUB SERPL-MCNC: 1.2 MG/DL
BILIRUB UR QL STRIP: NEGATIVE
BUN SERPL-MCNC: 38 MG/DL
CALCIUM SERPL-MCNC: 9.3 MG/DL
CHLORIDE SERPL-SCNC: 101 MMOL/L
CLARITY UR REFRACT.AUTO: CLEAR
CO2 SERPL-SCNC: 28 MMOL/L
COLOR UR AUTO: YELLOW
CREAT SERPL-MCNC: 1.9 MG/DL
DIFFERENTIAL METHOD: ABNORMAL
EOSINOPHIL # BLD AUTO: 0 K/UL
EOSINOPHIL NFR BLD: 0 %
ERYTHROCYTE [DISTWIDTH] IN BLOOD BY AUTOMATED COUNT: 15.7 %
EST. GFR  (AFRICAN AMERICAN): 39 ML/MIN/1.73 M^2
EST. GFR  (NON AFRICAN AMERICAN): 33.7 ML/MIN/1.73 M^2
FACT X PPP CHRO-ACNC: 0.39 IU/ML
FACT X PPP CHRO-ACNC: 0.66 IU/ML
GLUCOSE SERPL-MCNC: 283 MG/DL
GLUCOSE UR QL STRIP: NEGATIVE
HCT VFR BLD AUTO: 38 %
HGB BLD-MCNC: 12.1 G/DL
HGB UR QL STRIP: NEGATIVE
IMM GRANULOCYTES # BLD AUTO: 0.03 K/UL
IMM GRANULOCYTES NFR BLD AUTO: 0.3 %
KETONES UR QL STRIP: NEGATIVE
LEUKOCYTE ESTERASE UR QL STRIP: NEGATIVE
LYMPHOCYTES # BLD AUTO: 2.1 K/UL
LYMPHOCYTES NFR BLD: 22.7 %
MAGNESIUM SERPL-MCNC: 2.4 MG/DL
MCH RBC QN AUTO: 28.3 PG
MCHC RBC AUTO-ENTMCNC: 31.8 G/DL
MCV RBC AUTO: 89 FL
MONOCYTES # BLD AUTO: 1.3 K/UL
MONOCYTES NFR BLD: 13.9 %
NEUTROPHILS # BLD AUTO: 5.8 K/UL
NEUTROPHILS NFR BLD: 62 %
NITRITE UR QL STRIP: NEGATIVE
NRBC BLD-RTO: 0 /100 WBC
PH UR STRIP: 6 [PH] (ref 5–8)
PLATELET # BLD AUTO: 116 K/UL
PMV BLD AUTO: 14.2 FL
POCT GLUCOSE: 266 MG/DL (ref 70–110)
POCT GLUCOSE: 330 MG/DL (ref 70–110)
POCT GLUCOSE: 346 MG/DL (ref 70–110)
POCT GLUCOSE: 421 MG/DL (ref 70–110)
POCT GLUCOSE: 444 MG/DL (ref 70–110)
POTASSIUM SERPL-SCNC: 4.4 MMOL/L
PROT SERPL-MCNC: 6.8 G/DL
PROT UR QL STRIP: NEGATIVE
RBC # BLD AUTO: 4.27 M/UL
SODIUM SERPL-SCNC: 141 MMOL/L
SP GR UR STRIP: 1 (ref 1–1.03)
URN SPEC COLLECT METH UR: NORMAL
UROBILINOGEN UR STRIP-ACNC: NEGATIVE EU/DL
WBC # BLD AUTO: 9.4 K/UL

## 2018-04-07 PROCEDURE — A4216 STERILE WATER/SALINE, 10 ML: HCPCS | Performed by: NURSE PRACTITIONER

## 2018-04-07 PROCEDURE — 25000003 PHARM REV CODE 250: Performed by: NURSE PRACTITIONER

## 2018-04-07 PROCEDURE — 99233 SBSQ HOSP IP/OBS HIGH 50: CPT | Mod: ,,, | Performed by: INTERNAL MEDICINE

## 2018-04-07 PROCEDURE — 81003 URINALYSIS AUTO W/O SCOPE: CPT

## 2018-04-07 PROCEDURE — 63600175 PHARM REV CODE 636 W HCPCS: Performed by: NURSE PRACTITIONER

## 2018-04-07 PROCEDURE — 36415 COLL VENOUS BLD VENIPUNCTURE: CPT

## 2018-04-07 PROCEDURE — 83735 ASSAY OF MAGNESIUM: CPT

## 2018-04-07 PROCEDURE — 85520 HEPARIN ASSAY: CPT

## 2018-04-07 PROCEDURE — 85025 COMPLETE CBC W/AUTO DIFF WBC: CPT

## 2018-04-07 PROCEDURE — 20600001 HC STEP DOWN PRIVATE ROOM

## 2018-04-07 PROCEDURE — 80053 COMPREHEN METABOLIC PANEL: CPT

## 2018-04-07 RX ADMIN — OMEGA-3-ACID ETHYL ESTERS 2 G: 1 CAPSULE, LIQUID FILLED ORAL at 08:04

## 2018-04-07 RX ADMIN — OMEGA-3-ACID ETHYL ESTERS 2 G: 1 CAPSULE, LIQUID FILLED ORAL at 09:04

## 2018-04-07 RX ADMIN — LEVOTHYROXINE SODIUM 25 MCG: 25 TABLET ORAL at 06:04

## 2018-04-07 RX ADMIN — PANTOPRAZOLE SODIUM 40 MG: 40 TABLET, DELAYED RELEASE ORAL at 09:04

## 2018-04-07 RX ADMIN — Medication 3 ML: at 02:04

## 2018-04-07 RX ADMIN — CLOPIDOGREL 75 MG: 75 TABLET, FILM COATED ORAL at 09:04

## 2018-04-07 RX ADMIN — THERA TABS 1 TABLET: TAB at 09:04

## 2018-04-07 RX ADMIN — Medication 3 ML: at 06:04

## 2018-04-07 RX ADMIN — ROSUVASTATIN CALCIUM 20 MG: 20 TABLET, FILM COATED ORAL at 09:04

## 2018-04-07 RX ADMIN — POTASSIUM CHLORIDE 40 MEQ: 1500 TABLET, EXTENDED RELEASE ORAL at 09:04

## 2018-04-07 RX ADMIN — INSULIN ASPART 4 UNITS: 100 INJECTION, SOLUTION INTRAVENOUS; SUBCUTANEOUS at 06:04

## 2018-04-07 RX ADMIN — DIGOXIN 0.12 MG: 125 TABLET ORAL at 06:04

## 2018-04-07 RX ADMIN — INSULIN ASPART 5 UNITS: 100 INJECTION, SOLUTION INTRAVENOUS; SUBCUTANEOUS at 10:04

## 2018-04-07 RX ADMIN — INSULIN ASPART 4 UNITS: 100 INJECTION, SOLUTION INTRAVENOUS; SUBCUTANEOUS at 01:04

## 2018-04-07 RX ADMIN — POTASSIUM CHLORIDE 40 MEQ: 1500 TABLET, EXTENDED RELEASE ORAL at 08:04

## 2018-04-07 RX ADMIN — CETIRIZINE HYDROCHLORIDE 10 MG: 5 TABLET, FILM COATED ORAL at 09:04

## 2018-04-07 RX ADMIN — INSULIN ASPART 3 UNITS: 100 INJECTION, SOLUTION INTRAVENOUS; SUBCUTANEOUS at 09:04

## 2018-04-07 RX ADMIN — HEPARIN SODIUM 18 UNITS/KG/HR: 10000 INJECTION, SOLUTION INTRAVENOUS at 06:04

## 2018-04-07 RX ADMIN — Medication 400 MG: at 09:04

## 2018-04-07 RX ADMIN — Medication 400 MG: at 08:04

## 2018-04-07 NOTE — ASSESSMENT & PLAN NOTE
- Net -ve 6.7L since admit, but appears hypovolemic. Discontinue lasix  - GDMT: Continue Dig. Hold entresto and spirinolactone due to TREY  - Pathway for VAD only initiated. Advance as appropriate  - Also interested in Cardiomems

## 2018-04-07 NOTE — HPI
Mr. Solis is a 77 y.o. year old White male with ICMP (EF=20%), CAD s/p PCI, DM and HTN who is referred for evaluation and management of CHF. Clinically reports NYHA class III symptoms. PND and orthopnea.  Most recent angiogram showed patent stents. His HF regimen includes; Sacubitril 49/51 mg BID, digoxin 0.125 mg daily, Lasix 40 mg daily. Was on Sotalol but was discontinued by his local cardiologist.  CTS is consulted for evaluation of advanced options for heart failure.

## 2018-04-07 NOTE — PLAN OF CARE
Problem: Patient Care Overview  Goal: Plan of Care Review  Outcome: Revised  Pt free of falls/trauma/injuries.  Denies c/o SOB, CP, or discomfort.  Generalized skin remains CDI; Mild edema noted.  Pt being diuresed with IV  Lasix that will change to PO on 4/8/18 ; diuresing well.   Wt decreased since yesterday. BG monitoring and treated with supplement insulin.  Pt tolerating plan of care.

## 2018-04-07 NOTE — PLAN OF CARE
Problem: Patient Care Overview  Goal: Plan of Care Review  Outcome: Ongoing (interventions implemented as appropriate)  Patient verbalizes no complaints overnight; denies chest pain, SOB, or other discomfort. Lasix and Heparin gtts infusing as ordered. Pt remains free of falls or injuries. Pt verbalizes complete understanding of plan of care. Will continue to monitor.

## 2018-04-07 NOTE — ASSESSMENT & PLAN NOTE
This is a 78yo male with ischemic cardiomyopathy and acute on chronic systolic heart failure with an EF of 20 and LVEDD of 6.7cm and TAPSE of 1.2.  He can proceed with workup for further advanced mechanical options, but he will need additional medical optimization because as of now he may very well need RVAD support as well.  Given age, he is not a candidate for transplant.

## 2018-04-07 NOTE — HPI
Mr. Solis is a 77 y.o. year old White male with ICMP (EF=20%), CAD s/p PCI, DM and HTN who is referred for evaluation and management of CHF. Clinically reports NYHA class III symptoms. PND and orthopnea.  Most recent angiogram showed patent stents. His HF regimen includes; Sacubitril 49/51 mg BID, digoxin 0.125 mg daily, Lasix 40 mg daily. Was on Sotalol but was discontinued by his local cardiologist.  He notes increasing shortness of breath with exertion over the last month.  Prior to that he was able to walk 1/2 mile.  He states he can still do some house and yard work.  CTS is consulted for advanced surgical options for heart failure.

## 2018-04-07 NOTE — SUBJECTIVE & OBJECTIVE
Interval History: patient has no complaints. He has been ambulating through the halls independently. Renal function worsened today.    Continuous Infusions:   heparin (porcine) in D5W 18 Units/kg/hr (04/07/18 0619)     Scheduled Meds:   cetirizine  10 mg Oral Daily    clopidogrel  75 mg Oral Daily    digoxin  0.125 mg Oral QAM    levothyroxine  25 mcg Oral Before breakfast    magnesium oxide  400 mg Oral BID    multivitamin  1 tablet Oral Daily    omega-3 acid ethyl esters  2 g Oral BID    pantoprazole  40 mg Oral Daily    potassium chloride SA  40 mEq Oral BID    rosuvastatin  20 mg Oral Daily    sodium chloride 0.9%  3 mL Intravenous Q8H     PRN Meds:dextrose 50%, dextrose 50%, glucagon (human recombinant), glucose, glucose, insulin aspart U-100    Review of patient's allergies indicates:   Allergen Reactions    Ace inhibitors     Adhesive     Codeine     Lipitor [atorvastatin]      Objective:     Vital Signs (Most Recent):  Temp: 97.4 °F (36.3 °C) (04/07/18 0914)  Pulse: 70 (04/07/18 0914)  Resp: 16 (04/07/18 0914)  BP: (!) 108/53 (04/07/18 0914)  SpO2: 96 % (04/07/18 0914) Vital Signs (24h Range):  Temp:  [96.9 °F (36.1 °C)-98.2 °F (36.8 °C)] 97.4 °F (36.3 °C)  Pulse:  [62-74] 70  Resp:  [16-18] 16  SpO2:  [92 %-98 %] 96 %  BP: (101-132)/(47-75) 108/53     Patient Vitals for the past 72 hrs (Last 3 readings):   Weight   04/07/18 0700 86.2 kg (190 lb 0.6 oz)   04/06/18 0800 88.1 kg (194 lb 3.6 oz)   04/05/18 1701 91.7 kg (202 lb 2.6 oz)     Body mass index is 25.07 kg/m².      Intake/Output Summary (Last 24 hours) at 04/07/18 1017  Last data filed at 04/07/18 0900   Gross per 24 hour   Intake          1373.18 ml   Output             4300 ml   Net         -2926.82 ml       Hemodynamic Parameters:       Telemetry: no events    Physical Exam   Constitutional: Vital signs are normal. He appears well-developed and well-nourished. He is active and cooperative. No distress.   HENT:   Head: Normocephalic  and atraumatic.   Right Ear: Hearing and external ear normal.   Left Ear: Hearing and external ear normal.   Nose: Nose normal.   Neck: Trachea normal. No tracheal tenderness present. No thyroid mass and no thyromegaly present.   Cardiovascular: Normal rate, regular rhythm, S1 normal, S2 normal, normal heart sounds and normal pulses.  Exam reveals no gallop.    No murmur heard.  Pulmonary/Chest: Effort normal and breath sounds normal. No respiratory distress. He has no decreased breath sounds. He has no wheezes. He has no rhonchi. He has no rales.   Abdominal: Soft. Normal appearance.   Skin: Skin is warm, dry and intact.       Significant Labs:  CBC:    Recent Labs  Lab 04/05/18 0750 04/06/18 0449 04/07/18 0451   WBC 8.61 9.26 9.40   RBC 4.29* 4.01* 4.27*   HGB 11.9* 11.4* 12.1*   HCT 39.0* 35.7* 38.0*   * 110* 116*   MCV 91 89 89   MCH 27.7 28.4 28.3   MCHC 30.5* 31.9* 31.8*     BNP:  No results for input(s): BNP in the last 168 hours.    Invalid input(s): BNPTRIAGELBLO  CMP:    Recent Labs  Lab 04/05/18 0750 04/06/18 0449 04/07/18 0451   * 74  74 283*   CALCIUM 9.4 9.2  9.2 9.3   ALBUMIN  --  3.5 3.4*   PROT  --  6.7 6.8    144  144 141   K 4.5 3.7  3.7 4.4   CO2 28 28  28 28    106  106 101   BUN 34* 33*  33* 38*   CREATININE 1.5* 1.4  1.4 1.9*   ALKPHOS  --  114 124   ALT  --  15 13   AST  --  21 18   BILITOT  --  1.1* 1.2*      Coagulation:     Recent Labs  Lab 04/05/18 0750   INR 1.1     LDH:  No results for input(s): LDH in the last 72 hours.  Microbiology:  Microbiology Results (last 7 days)     ** No results found for the last 168 hours. **          I have reviewed all pertinent labs within the past 24 hours.    Estimated Creatinine Clearance: 38 mL/min (A) (based on SCr of 1.9 mg/dL (H)).    Diagnostic Results:  I have reviewed all pertinent imaging results/findings within the past 24 hours.

## 2018-04-07 NOTE — CONSULTS
[facility]  Cardiothoracic Surgery  Consult Note    Patient Name: Jerry Solis  MRN: [id number]  Admission Date: 4/5/2018  Attending Physician: Mela Nails MD  Referring Provider: Mela Nails MD    Patient information was obtained from patient and past medical records.     Inpatient consult to Cardiothoracic Surgery  Consult performed by: LUIS DURANT  Consult ordered by: DONG JUAREZ        Subjective:     Principal Problem: Acute on chronic systolic heart failure, NYHA class 4    History of Present Illness: Mr. Solis is a 77 y.o. year old White male with ICMP (EF=20%), CAD s/p PCI, DM and HTN who is referred for evaluation and management of CHF. Clinically reports NYHA class III symptoms. PND and orthopnea.  Most recent angiogram showed patent stents. His HF regimen includes; Sacubitril 49/51 mg BID, digoxin 0.125 mg daily, Lasix 40 mg daily. Was on Sotalol but was discontinued by his local cardiologist.  He notes increasing shortness of breath with exertion over the last month.  Prior to that he was able to walk 1/2 mile.  He states he can still do some house and yard work.  [facility] is consulted for advanced surgical options for heart failure.    No current facility-administered medications on file prior to encounter.      Current Outpatient Prescriptions on File Prior to Encounter   Medication Sig    clopidogrel (PLAVIX) 75 mg tablet Take 1 tablet by mouth once daily.    digoxin (LANOXIN) 125 mcg tablet Take 0.5 tablets by mouth every morning.    ELIQUIS 2.5 mg Tab Take 1 tablet by mouth 2 (two) times daily.    ENTRESTO 49-51 mg per tablet Take 1 tablet by mouth 2 (two) times daily.    furosemide (LASIX) 40 MG tablet Take 1 tablet (40 mg total) by mouth 2 (two) times daily.    LANTUS U-100 INSULIN 100 unit/mL injection Inject 50 Units into the skin 2 (two) times daily.    levothyroxine (SYNTHROID) 25 MCG tablet Take 1 tablet by mouth once daily.    loratadine  (CLARITIN) 10 mg tablet Take 10 mg by mouth once daily.    metOLazone (ZAROXOLYN) 5 MG tablet Take 5 mg by mouth once a week.    multivitamin (THERAGRAN) per tablet Take 1 tablet by mouth once daily.    NOVOLOG U-100 INSULIN ASPART 100 unit/mL injection Inject 5 Units into the skin 4 (four) times daily.    omega-3 acid ethyl esters (LOVAZA) 1 gram capsule Take 2 g by mouth 2 (two) times daily.    omeprazole (PRILOSEC) 40 MG capsule Take 40 mg by mouth once daily.    potassium chloride SA (K-DUR,KLOR-CON) 20 MEQ tablet Take 3 tablets (60 mEq total) by mouth once daily.    rosuvastatin (CRESTOR) 20 MG tablet Take 1 tablet by mouth once daily.       Review of patient's allergies indicates:   Allergen Reactions    Ace inhibitors     Adhesive     Codeine     Lipitor [atorvastatin]        Past Medical History:   Diagnosis Date    Chronic systolic congestive heart failure 3/22/2018    Coronary artery disease involving native coronary artery of native heart without angina pectoris 3/22/2018    ICD (implantable cardioverter-defibrillator) in place 3/22/2018    Ischemic cardiomyopathy 3/22/2018    Mixed hyperlipidemia 3/22/2018    Type 2 diabetes mellitus with complication 3/22/2018    VT (ventricular tachycardia) 3/22/2018     History reviewed. No pertinent surgical history.  Family History     None        Social History Main Topics    Smoking status: Former Smoker    Smokeless tobacco: Never Used    Alcohol use No    Drug use: No    Sexual activity: Not on file     Review of Systems   Constitutional: Positive for unexpected weight change.   HENT: Negative.    Eyes: Negative.    Respiratory: Positive for shortness of breath.    Cardiovascular:        Dyspnea on exertion, PND, orthopnea   Gastrointestinal: Negative.    Endocrine: Negative.    Genitourinary: Negative.    Allergic/Immunologic: Negative.    Neurological: Negative.    Psychiatric/Behavioral: Negative.      Objective:     Vital Signs (Most  Recent):  Temp: 96.9 °F (36.1 °C) (04/07/18 0335)  Pulse: 73 (04/07/18 0700)  Resp: 16 (04/07/18 0335)  BP: (!) 101/51 (04/07/18 0335)  SpO2: 95 % (04/07/18 0335) Vital Signs (24h Range):  Temp:  [96.9 °F (36.1 °C)-98.2 °F (36.8 °C)] 96.9 °F (36.1 °C)  Pulse:  [62-74] 73  Resp:  [16-18] 16  SpO2:  [92 %-98 %] 95 %  BP: (101-132)/(47-75) 101/51     Weight: 86.2 kg (190 lb 0.6 oz)  Body mass index is 25.07 kg/m².    SpO2: 95 %  O2 Device (Oxygen Therapy): room air     Intake/Output - Last 3 Shifts       04/05 0700 - 04/06 0659 04/06 0700 - 04/07 0659 04/07 0700 - 04/08 0659    P.O. 240 900 230    I.V. (mL/kg)  329.8 (3.7) 93.4 (1.1)    Total Intake(mL/kg) 240 (2.6) 1229.8 (14) 323.4 (3.8)    Urine (mL/kg/hr) 4275 3900 (1.8) 400 (2.2)    Stool 0 0 (0)     Total Output 4275 3900 400    Net -4035 -2670.2 -76.7           Stool Occurrence 1 x 0 x            Lines/Drains/Airways          No matching active lines, drains, or airways           STS Risk Score: N/A    Physical Exam   Constitutional: He is oriented to person, place, and time. He appears well-developed and well-nourished. No distress.   HENT:   Head: Normocephalic and atraumatic.   Neck: Normal range of motion.   Cardiovascular: Normal rate.    Pulmonary/Chest: Effort normal.   Abdominal: He exhibits no distension.   Musculoskeletal: Normal range of motion.   Neurological: He is alert and oriented to person, place, and time.   Skin: He is not diaphoretic.   Psychiatric: He has a normal mood and affect. His behavior is normal. Thought content normal.       Significant Labs:  ABGs: No results for input(s): PH, PCO2, PO2, HCO3, POCSATURATED, BE in the last 48 hours.  Amylase: No results for input(s): AMYLASE in the last 48 hours.  BMP:   Recent Labs  Lab 04/07/18  0451   *      K 4.4      CO2 28   BUN 38*   CREATININE 1.9*   CALCIUM 9.3   MG 2.4     Cardiac markers: No results for input(s): CKMB, CPKMB, TROPONINT, TROPONINI, MYOGLOBIN in the last  48 hours.  CBC:   Recent Labs  Lab 04/07/18 0451   WBC 9.40   RBC 4.27*   HGB 12.1*   HCT 38.0*   *   MCV 89   MCH 28.3   MCHC 31.8*     CMP:   Recent Labs  Lab 04/07/18  0451   *   CALCIUM 9.3   ALBUMIN 3.4*   PROT 6.8      K 4.4   CO2 28      BUN 38*   CREATININE 1.9*   ALKPHOS 124   ALT 13   AST 18   BILITOT 1.2*     Coagulation: No results for input(s): PT, INR, APTT in the last 48 hours.  Lactic Acid: No results for input(s): LACTATE in the last 48 hours.  LFTs:   Recent Labs  Lab 04/07/18  0451   ALT 13   AST 18   ALKPHOS 124   BILITOT 1.2*   PROT 6.8   ALBUMIN 3.4*     Lipase: No results for input(s): LIPASE in the last 48 hours.    Significant Diagnostics:  I have reviewed and interpreted all pertinent imaging results/findings within the past 24 hours.  CT scan without findings to prevent advanced options.     Echo with LVEDD of 6.7cm, EF of 20%, TAPSE of 1.2, RVEDD of 5.1cm, trivial AI, moderate MR, moderate TR, PA pressure of 96        Assessment/Plan:     NYHA Score: NYHA IV: inability to carry on any physical activity without discomfort    * Acute on chronic systolic heart failure, NYHA class 4    This is a 78yo male with ischemic cardiomyopathy and acute on chronic systolic heart failure with an EF of 20 and LVEDD of 6.7cm and TAPSE of 1.2.  He can proceed with workup for further advanced mechanical options, but he will need additional medical optimization because as of now he may very well need RVAD support as well.  Given age, he is not a candidate for transplant.          Tyrone Cooper MD  Thoracic Surgery Resident, PGY6  Cardiothoracic Surgery  [facility]  [facility] Attending Note:    I have personally taken the history and agree with the resident's note as stated above. Concerns for MCS include age, Bi ventricular failure and advanced kidney disease.

## 2018-04-07 NOTE — SUBJECTIVE & OBJECTIVE
No current facility-administered medications on file prior to encounter.      Current Outpatient Prescriptions on File Prior to Encounter   Medication Sig    clopidogrel (PLAVIX) 75 mg tablet Take 1 tablet by mouth once daily.    digoxin (LANOXIN) 125 mcg tablet Take 0.5 tablets by mouth every morning.    ELIQUIS 2.5 mg Tab Take 1 tablet by mouth 2 (two) times daily.    ENTRESTO 49-51 mg per tablet Take 1 tablet by mouth 2 (two) times daily.    furosemide (LASIX) 40 MG tablet Take 1 tablet (40 mg total) by mouth 2 (two) times daily.    LANTUS U-100 INSULIN 100 unit/mL injection Inject 50 Units into the skin 2 (two) times daily.    levothyroxine (SYNTHROID) 25 MCG tablet Take 1 tablet by mouth once daily.    loratadine (CLARITIN) 10 mg tablet Take 10 mg by mouth once daily.    metOLazone (ZAROXOLYN) 5 MG tablet Take 5 mg by mouth once a week.    multivitamin (THERAGRAN) per tablet Take 1 tablet by mouth once daily.    NOVOLOG U-100 INSULIN ASPART 100 unit/mL injection Inject 5 Units into the skin 4 (four) times daily.    omega-3 acid ethyl esters (LOVAZA) 1 gram capsule Take 2 g by mouth 2 (two) times daily.    omeprazole (PRILOSEC) 40 MG capsule Take 40 mg by mouth once daily.    potassium chloride SA (K-DUR,KLOR-CON) 20 MEQ tablet Take 3 tablets (60 mEq total) by mouth once daily.    rosuvastatin (CRESTOR) 20 MG tablet Take 1 tablet by mouth once daily.       Review of patient's allergies indicates:   Allergen Reactions    Ace inhibitors     Adhesive     Codeine     Lipitor [atorvastatin]        Past Medical History:   Diagnosis Date    Chronic systolic congestive heart failure 3/22/2018    Coronary artery disease involving native coronary artery of native heart without angina pectoris 3/22/2018    ICD (implantable cardioverter-defibrillator) in place 3/22/2018    Ischemic cardiomyopathy 3/22/2018    Mixed hyperlipidemia 3/22/2018    Type 2 diabetes mellitus with complication 3/22/2018     VT (ventricular tachycardia) 3/22/2018     History reviewed. No pertinent surgical history.  Family History     None        Social History Main Topics    Smoking status: Former Smoker    Smokeless tobacco: Never Used    Alcohol use No    Drug use: No    Sexual activity: Not on file     Review of Systems   Constitutional: Positive for unexpected weight change.   HENT: Negative.    Eyes: Negative.    Respiratory: Positive for shortness of breath.    Cardiovascular:        Dyspnea on exertion, PND, orthopnea   Gastrointestinal: Negative.    Endocrine: Negative.    Genitourinary: Negative.    Allergic/Immunologic: Negative.    Neurological: Negative.    Psychiatric/Behavioral: Negative.      Objective:     Vital Signs (Most Recent):  Temp: 96.9 °F (36.1 °C) (04/07/18 0335)  Pulse: 73 (04/07/18 0700)  Resp: 16 (04/07/18 0335)  BP: (!) 101/51 (04/07/18 0335)  SpO2: 95 % (04/07/18 0335) Vital Signs (24h Range):  Temp:  [96.9 °F (36.1 °C)-98.2 °F (36.8 °C)] 96.9 °F (36.1 °C)  Pulse:  [62-74] 73  Resp:  [16-18] 16  SpO2:  [92 %-98 %] 95 %  BP: (101-132)/(47-75) 101/51     Weight: 86.2 kg (190 lb 0.6 oz)  Body mass index is 25.07 kg/m².    SpO2: 95 %  O2 Device (Oxygen Therapy): room air     Intake/Output - Last 3 Shifts       04/05 0700 - 04/06 0659 04/06 0700 - 04/07 0659 04/07 0700 - 04/08 0659    P.O. 240 900 230    I.V. (mL/kg)  329.8 (3.7) 93.4 (1.1)    Total Intake(mL/kg) 240 (2.6) 1229.8 (14) 323.4 (3.8)    Urine (mL/kg/hr) 4275 3900 (1.8) 400 (2.2)    Stool 0 0 (0)     Total Output 4275 3900 400    Net -4035 -2670.2 -76.7           Stool Occurrence 1 x 0 x            Lines/Drains/Airways          No matching active lines, drains, or airways           STS Risk Score: N/A    Physical Exam   Constitutional: He is oriented to person, place, and time. He appears well-developed and well-nourished. No distress.   HENT:   Head: Normocephalic and atraumatic.   Neck: Normal range of motion.   Cardiovascular: Normal rate.     Pulmonary/Chest: Effort normal.   Abdominal: He exhibits no distension.   Musculoskeletal: Normal range of motion.   Neurological: He is alert and oriented to person, place, and time.   Skin: He is not diaphoretic.   Psychiatric: He has a normal mood and affect. His behavior is normal. Thought content normal.       Significant Labs:  ABGs: No results for input(s): PH, PCO2, PO2, HCO3, POCSATURATED, BE in the last 48 hours.  Amylase: No results for input(s): AMYLASE in the last 48 hours.  BMP:   Recent Labs  Lab 04/07/18  0451   *      K 4.4      CO2 28   BUN 38*   CREATININE 1.9*   CALCIUM 9.3   MG 2.4     Cardiac markers: No results for input(s): CKMB, CPKMB, TROPONINT, TROPONINI, MYOGLOBIN in the last 48 hours.  CBC:   Recent Labs  Lab 04/07/18 0451   WBC 9.40   RBC 4.27*   HGB 12.1*   HCT 38.0*   *   MCV 89   MCH 28.3   MCHC 31.8*     CMP:   Recent Labs  Lab 04/07/18  0451   *   CALCIUM 9.3   ALBUMIN 3.4*   PROT 6.8      K 4.4   CO2 28      BUN 38*   CREATININE 1.9*   ALKPHOS 124   ALT 13   AST 18   BILITOT 1.2*     Coagulation: No results for input(s): PT, INR, APTT in the last 48 hours.  Lactic Acid: No results for input(s): LACTATE in the last 48 hours.  LFTs:   Recent Labs  Lab 04/07/18  0451   ALT 13   AST 18   ALKPHOS 124   BILITOT 1.2*   PROT 6.8   ALBUMIN 3.4*     Lipase: No results for input(s): LIPASE in the last 48 hours.    Significant Diagnostics:  I have reviewed and interpreted all pertinent imaging results/findings within the past 24 hours.  CT scan without findings to prevent advanced options.     Echo with LVEDD of 6.7cm, EF of 20%, TAPSE of 1.2, RVEDD of 5.1cm, trivial AI, moderate MR, moderate TR, PA pressure of 96

## 2018-04-07 NOTE — ASSESSMENT & PLAN NOTE
- Creatinine jumped to 1.9 from 1.4 yesterday  - likely due to hypovolemia and overdiuresis with lasix  - will hold entresto, spirinolactone and lasix

## 2018-04-08 LAB
ALBUMIN SERPL BCP-MCNC: 3.5 G/DL
ALP SERPL-CCNC: 124 U/L
ALT SERPL W/O P-5'-P-CCNC: 15 U/L
ANION GAP SERPL CALC-SCNC: 7 MMOL/L
AST SERPL-CCNC: 14 U/L
BASOPHILS # BLD AUTO: 0.11 K/UL
BASOPHILS NFR BLD: 1.1 %
BILIRUB SERPL-MCNC: 1.1 MG/DL
BUN SERPL-MCNC: 39 MG/DL
CALCIUM SERPL-MCNC: 9.4 MG/DL
CHLORIDE SERPL-SCNC: 101 MMOL/L
CO2 SERPL-SCNC: 30 MMOL/L
CREAT SERPL-MCNC: 1.7 MG/DL
DIFFERENTIAL METHOD: ABNORMAL
EOSINOPHIL # BLD AUTO: 0 K/UL
EOSINOPHIL NFR BLD: 0.1 %
ERYTHROCYTE [DISTWIDTH] IN BLOOD BY AUTOMATED COUNT: 15.6 %
EST. GFR  (AFRICAN AMERICAN): 44.6 ML/MIN/1.73 M^2
EST. GFR  (NON AFRICAN AMERICAN): 38.6 ML/MIN/1.73 M^2
FACT X PPP CHRO-ACNC: 0.81 IU/ML
FACT X PPP CHRO-ACNC: 0.85 IU/ML
GLUCOSE SERPL-MCNC: 389 MG/DL
HCT VFR BLD AUTO: 39.2 %
HGB BLD-MCNC: 12.2 G/DL
IMM GRANULOCYTES # BLD AUTO: 0.02 K/UL
IMM GRANULOCYTES NFR BLD AUTO: 0.2 %
LYMPHOCYTES # BLD AUTO: 2.1 K/UL
LYMPHOCYTES NFR BLD: 21.5 %
MAGNESIUM SERPL-MCNC: 2.8 MG/DL
MCH RBC QN AUTO: 27.7 PG
MCHC RBC AUTO-ENTMCNC: 31.1 G/DL
MCV RBC AUTO: 89 FL
MONOCYTES # BLD AUTO: 1.3 K/UL
MONOCYTES NFR BLD: 13.8 %
NEUTROPHILS # BLD AUTO: 6.2 K/UL
NEUTROPHILS NFR BLD: 63.3 %
NRBC BLD-RTO: 0 /100 WBC
PLATELET # BLD AUTO: 108 K/UL
PMV BLD AUTO: 13.7 FL
POCT GLUCOSE: 329 MG/DL (ref 70–110)
POCT GLUCOSE: 331 MG/DL (ref 70–110)
POCT GLUCOSE: 349 MG/DL (ref 70–110)
POCT GLUCOSE: 408 MG/DL (ref 70–110)
POTASSIUM SERPL-SCNC: 5 MMOL/L
PROT SERPL-MCNC: 6.7 G/DL
RBC # BLD AUTO: 4.4 M/UL
SODIUM SERPL-SCNC: 138 MMOL/L
WBC # BLD AUTO: 9.74 K/UL

## 2018-04-08 PROCEDURE — G8978 MOBILITY CURRENT STATUS: HCPCS | Mod: CH

## 2018-04-08 PROCEDURE — G8979 MOBILITY GOAL STATUS: HCPCS | Mod: CH

## 2018-04-08 PROCEDURE — A4216 STERILE WATER/SALINE, 10 ML: HCPCS | Performed by: NURSE PRACTITIONER

## 2018-04-08 PROCEDURE — G8980 MOBILITY D/C STATUS: HCPCS | Mod: CH

## 2018-04-08 PROCEDURE — 83735 ASSAY OF MAGNESIUM: CPT

## 2018-04-08 PROCEDURE — 20600001 HC STEP DOWN PRIVATE ROOM

## 2018-04-08 PROCEDURE — 97161 PT EVAL LOW COMPLEX 20 MIN: CPT

## 2018-04-08 PROCEDURE — 63600175 PHARM REV CODE 636 W HCPCS: Performed by: INTERNAL MEDICINE

## 2018-04-08 PROCEDURE — 25000003 PHARM REV CODE 250: Performed by: NURSE PRACTITIONER

## 2018-04-08 PROCEDURE — 63600175 PHARM REV CODE 636 W HCPCS: Performed by: NURSE PRACTITIONER

## 2018-04-08 PROCEDURE — 85520 HEPARIN ASSAY: CPT | Mod: 91

## 2018-04-08 PROCEDURE — 25000003 PHARM REV CODE 250: Performed by: INTERNAL MEDICINE

## 2018-04-08 PROCEDURE — 85025 COMPLETE CBC W/AUTO DIFF WBC: CPT

## 2018-04-08 PROCEDURE — 99233 SBSQ HOSP IP/OBS HIGH 50: CPT | Mod: ,,, | Performed by: INTERNAL MEDICINE

## 2018-04-08 PROCEDURE — 80053 COMPREHEN METABOLIC PANEL: CPT

## 2018-04-08 PROCEDURE — 36415 COLL VENOUS BLD VENIPUNCTURE: CPT

## 2018-04-08 RX ORDER — INSULIN ASPART 100 [IU]/ML
7 INJECTION, SOLUTION INTRAVENOUS; SUBCUTANEOUS ONCE
Status: COMPLETED | OUTPATIENT
Start: 2018-04-08 | End: 2018-04-08

## 2018-04-08 RX ORDER — METOPROLOL TARTRATE 25 MG/1
12.5 TABLET ORAL 2 TIMES DAILY
Status: DISCONTINUED | OUTPATIENT
Start: 2018-04-08 | End: 2018-04-09 | Stop reason: HOSPADM

## 2018-04-08 RX ADMIN — INSULIN ASPART 5 UNITS: 100 INJECTION, SOLUTION INTRAVENOUS; SUBCUTANEOUS at 06:04

## 2018-04-08 RX ADMIN — HEPARIN SODIUM 18 UNITS/KG/HR: 10000 INJECTION, SOLUTION INTRAVENOUS at 12:04

## 2018-04-08 RX ADMIN — THERA TABS 1 TABLET: TAB at 09:04

## 2018-04-08 RX ADMIN — ROSUVASTATIN CALCIUM 20 MG: 20 TABLET, FILM COATED ORAL at 09:04

## 2018-04-08 RX ADMIN — Medication 12.5 MG: at 09:04

## 2018-04-08 RX ADMIN — INSULIN ASPART 2 UNITS: 100 INJECTION, SOLUTION INTRAVENOUS; SUBCUTANEOUS at 10:04

## 2018-04-08 RX ADMIN — INSULIN ASPART 4 UNITS: 100 INJECTION, SOLUTION INTRAVENOUS; SUBCUTANEOUS at 09:04

## 2018-04-08 RX ADMIN — INSULIN ASPART 4 UNITS: 100 INJECTION, SOLUTION INTRAVENOUS; SUBCUTANEOUS at 12:04

## 2018-04-08 RX ADMIN — Medication 3 ML: at 02:04

## 2018-04-08 RX ADMIN — PANTOPRAZOLE SODIUM 40 MG: 40 TABLET, DELAYED RELEASE ORAL at 09:04

## 2018-04-08 RX ADMIN — Medication 12.5 MG: at 08:04

## 2018-04-08 RX ADMIN — HEPARIN SODIUM 14 UNITS/KG/HR: 10000 INJECTION, SOLUTION INTRAVENOUS at 04:04

## 2018-04-08 RX ADMIN — DIGOXIN 0.12 MG: 125 TABLET ORAL at 06:04

## 2018-04-08 RX ADMIN — INSULIN ASPART 7 UNITS: 100 INJECTION, SOLUTION INTRAVENOUS; SUBCUTANEOUS at 06:04

## 2018-04-08 RX ADMIN — LEVOTHYROXINE SODIUM 25 MCG: 25 TABLET ORAL at 06:04

## 2018-04-08 RX ADMIN — OMEGA-3-ACID ETHYL ESTERS 2 G: 1 CAPSULE, LIQUID FILLED ORAL at 09:04

## 2018-04-08 RX ADMIN — OMEGA-3-ACID ETHYL ESTERS 2 G: 1 CAPSULE, LIQUID FILLED ORAL at 08:04

## 2018-04-08 RX ADMIN — POTASSIUM CHLORIDE 40 MEQ: 1500 TABLET, EXTENDED RELEASE ORAL at 09:04

## 2018-04-08 RX ADMIN — HEPARIN SODIUM 14 UNITS/KG/HR: 10000 INJECTION, SOLUTION INTRAVENOUS at 07:04

## 2018-04-08 RX ADMIN — INSULIN DETEMIR 25 UNITS: 100 INJECTION, SOLUTION SUBCUTANEOUS at 06:04

## 2018-04-08 RX ADMIN — CLOPIDOGREL 75 MG: 75 TABLET, FILM COATED ORAL at 09:04

## 2018-04-08 RX ADMIN — CETIRIZINE HYDROCHLORIDE 10 MG: 5 TABLET, FILM COATED ORAL at 09:04

## 2018-04-08 RX ADMIN — Medication 400 MG: at 09:04

## 2018-04-08 NOTE — PLAN OF CARE
Problem: Patient Care Overview  Goal: Plan of Care Review  Outcome: Ongoing (interventions implemented as appropriate)  Patient verbalizes no complaints overnight; denies chest pain, SOB, or other discomfort. Heparin gtt infusing as ordered. Pt remains free of falls or injuries. Pt verbalizes complete understanding of plan of care. Will continue to monitor

## 2018-04-08 NOTE — ASSESSMENT & PLAN NOTE
Acute on Chronic Combined Systolic and Diastolic Heart Failure  -ICM  -NYHA Class III  -LVEF 20-25%  -Last 2D Echo:04/05/2018, LVEDD 6.7 cm  -Euvolemic on examination today  -Current diuretic regimen: no diuretics. Net negative 291cc over the last 24 hours, net 7.3L over hospital stay. Will continue current regimen.  -GDMT with entresto and spirinolactone (held due to TREY). will start metoprolol 12.5 BID today  -Patient is currently being evaluated for OHTx/VAD vs cardiomems  -Pathway Step 2  -2g Na dietary restriction, 1500 mL fluid restriction, strict I/Os

## 2018-04-08 NOTE — PROGRESS NOTES
Results for IVY LUGO (MRN [id number]) as of 4/8/2018 18:30   Ref. Range 4/8/2018 17:31   POCT Glucose Latest Ref Range: 70 - 110 mg/dL 408 (H)       BG continues to be elevated.  Dr. Ospina notified; instructed to administer a total of 12 units Novolog and 25 units Levemir now with dinner.  Levemir added BID per MD.  Orders implemented as instructed.  Will continue to monitor.

## 2018-04-08 NOTE — PROGRESS NOTES
Pt nighttime accucheck read >421. MD Hansen notified. Instructed to give 5 units of Insulin aspart. Orders to be carried out. Will continue to monitor.

## 2018-04-08 NOTE — PROGRESS NOTES
Patient glucose not controlled. He takes lantus 50 BID and meal bolus at home. I started on detemir 25BID. Adjust tomorrow as needed due or call Endocrine

## 2018-04-08 NOTE — ASSESSMENT & PLAN NOTE
- Creatinine improving with holding diuresis. 1.7 today  - continue to hold entresto, spirinolactone and lasix

## 2018-04-08 NOTE — SUBJECTIVE & OBJECTIVE
Interval History: patient has no complaints. He has been ambulating through the halls independently. Lasix was held and renal function is improving.    Continuous Infusions:   heparin (porcine) in D5W 16 Units/kg/hr (04/08/18 0615)     Scheduled Meds:   cetirizine  10 mg Oral Daily    clopidogrel  75 mg Oral Daily    digoxin  0.125 mg Oral QAM    levothyroxine  25 mcg Oral Before breakfast    magnesium oxide  400 mg Oral BID    metoprolol tartrate  12.5 mg Oral BID    multivitamin  1 tablet Oral Daily    omega-3 acid ethyl esters  2 g Oral BID    pantoprazole  40 mg Oral Daily    potassium chloride SA  40 mEq Oral BID    rosuvastatin  20 mg Oral Daily    sodium chloride 0.9%  3 mL Intravenous Q8H     PRN Meds:dextrose 50%, dextrose 50%, glucagon (human recombinant), glucose, glucose, insulin aspart U-100    Review of patient's allergies indicates:   Allergen Reactions    Ace inhibitors     Adhesive     Codeine     Lipitor [atorvastatin]      Objective:     Vital Signs (Most Recent):  Temp: 97.5 °F (36.4 °C) (04/08/18 0510)  Pulse: 69 (04/08/18 0700)  Resp: 16 (04/08/18 0510)  BP: (!) 108/57 (04/08/18 0510)  SpO2: (!) 92 % (04/08/18 0510) Vital Signs (24h Range):  Temp:  [97.3 °F (36.3 °C)-98.1 °F (36.7 °C)] 97.5 °F (36.4 °C)  Pulse:  [] 69  Resp:  [16-18] 16  SpO2:  [92 %-98 %] 92 %  BP: ()/(50-63) 108/57     Patient Vitals for the past 72 hrs (Last 3 readings):   Weight   04/08/18 0700 86.4 kg (190 lb 7.6 oz)   04/07/18 0700 86.2 kg (190 lb 0.6 oz)   04/06/18 0800 88.1 kg (194 lb 3.6 oz)     Body mass index is 25.13 kg/m².      Intake/Output Summary (Last 24 hours) at 04/08/18 0857  Last data filed at 04/08/18 0615   Gross per 24 hour   Intake              950 ml   Output             1326 ml   Net             -376 ml       Hemodynamic Parameters:       Telemetry: no events    Physical Exam   Constitutional: Vital signs are normal. He appears well-developed and well-nourished. He is  active and cooperative. No distress.   HENT:   Head: Normocephalic and atraumatic.   Right Ear: Hearing and external ear normal.   Left Ear: Hearing and external ear normal.   Nose: Nose normal.   Neck: Trachea normal. No JVD present. No tracheal tenderness present. No thyroid mass and no thyromegaly present.   Cardiovascular: Normal rate, regular rhythm, S1 normal, S2 normal, normal heart sounds and normal pulses.  Exam reveals no gallop.    No murmur heard.  Pulmonary/Chest: Effort normal and breath sounds normal. No respiratory distress. He has no decreased breath sounds. He has no wheezes. He has no rhonchi. He has no rales.   Abdominal: Soft. Normal appearance.   Skin: Skin is warm, dry and intact.       Significant Labs:  CBC:    Recent Labs  Lab 04/06/18 0449 04/07/18 0451 04/08/18 0458   WBC 9.26 9.40 9.74   RBC 4.01* 4.27* 4.40*   HGB 11.4* 12.1* 12.2*   HCT 35.7* 38.0* 39.2*   * 116* 108*   MCV 89 89 89   MCH 28.4 28.3 27.7   MCHC 31.9* 31.8* 31.1*     BNP:  No results for input(s): BNP in the last 168 hours.    Invalid input(s): BNPTRIAGELBLO  CMP:    Recent Labs  Lab 04/06/18 0449 04/07/18 0451 04/08/18 0458   GLU 74  74 283* 389*   CALCIUM 9.2  9.2 9.3 9.4   ALBUMIN 3.5 3.4* 3.5   PROT 6.7 6.8 6.7     144 141 138   K 3.7  3.7 4.4 5.0   CO2 28  28 28 30*     106 101 101   BUN 33*  33* 38* 39*   CREATININE 1.4  1.4 1.9* 1.7*   ALKPHOS 114 124 124   ALT 15 13 15   AST 21 18 14   BILITOT 1.1* 1.2* 1.1*      Coagulation:     Recent Labs  Lab 04/05/18  0750   INR 1.1     LDH:  No results for input(s): LDH in the last 72 hours.  Microbiology:  Microbiology Results (last 7 days)     ** No results found for the last 168 hours. **          I have reviewed all pertinent labs within the past 24 hours.    Estimated Creatinine Clearance: 42.4 mL/min (A) (based on SCr of 1.7 mg/dL (H)).    Diagnostic Results:  I have reviewed all pertinent imaging results/findings within the past 24  hours.

## 2018-04-08 NOTE — PT/OT/SLP EVAL
Physical Therapy Evaluation/Discharge    Patient Name:  Jerry Solis   MRN:  [id number]    Recommendations:     Discharge Recommendations:   (home, no needs)   Discharge Equipment Recommendations: none   Barriers to discharge: None    Assessment:     Jerry Solis is a 77 y.o. male admitted with a medical diagnosis of Acute on chronic systolic heart failure, NYHA class 4.  He presents with the following impairments/functional limitations:   (no rehab needs.) pt castillo treatment well and is Independent with mobility and gait. Pt does not need skilled PT and is safe to ambulate in hallways. Pt will be able to discharge home with no therapy or DME needs..    Rehab Prognosis:  great; patient would benefit from acute skilled PT services to address these deficits and reach maximum level of function.      Recent Surgery: * No surgery found *      Plan:     During this hospitalization, patient to be seen  (pt is being discharged from PT) to address the above listed problems via    · Plan of Care Expires:  05/05/18   Plan of Care Reviewed with: patient    Subjective     Communicated with nurse prior to session.  Patient found supine upon PT entry to room, agreeable to evaluation.      Chief Complaint: pt stated that he wanted to go home.   Patient comments/goals: to go home.   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   Prior to admission, patients level of function was Independent .  Patient has the following equipment: none.  DME owned (not currently used): none.  Upon discharge, patient will have assistance from wife..    Objective:     Patient found with: peripheral IV, telemetry     General Precautions: Standard, fall   Orthopedic Precautions:    Braces:       Exams:  · Cognitive Exam:   Patient is oriented to Person, Place, Time and Situation  ·   · RLE ROM: WFL  · RLE Strength: WFL  · LLE ROM: WFL  · LLE Strength: WFL    Functional Mobility:  · Bed Mobility:     · Rolling Right: independence  · Supine to Sit: independence  ·   · Transfers:     · Sit to Stand:  independence with no AD  ·   · Gait: 500 ft Independent  ·   · Stairs:  Pt ascended/descended 4 stair(s) with No Assistive Device with left handrail with Supervision or Set-up Assistance.     AM-PAC 6 CLICK MOBILITY  Total Score:24       Patient left up in chair with all lines intact and call button in reach.    GOALS:    Physical Therapy Goals     Not on file                History:     Past Medical History:   Diagnosis Date    Chronic systolic congestive heart failure 3/22/2018    Coronary artery disease involving native coronary artery of native heart without angina pectoris 3/22/2018    ICD (implantable cardioverter-defibrillator) in place 3/22/2018    Ischemic cardiomyopathy 3/22/2018    Mixed hyperlipidemia 3/22/2018    Type 2 diabetes mellitus with complication 3/22/2018    VT (ventricular tachycardia) 3/22/2018       History reviewed. No pertinent surgical history.    Clinical Decision Making:     History  Co-morbidities and personal factors that may impact the plan of care Examination  Body Structures and Functions, activity limitations and participation restrictions that may impact the plan of care Clinical Presentation   Decision Making/ Complexity Score   Co-morbidities:   [] Time since onset of injury / illness / exacerbation  [] Status of current condition  []Patient's cognitive status and safety concerns    [] Multiple Medical Problems (see med hx)  Personal Factors:   [] Patient's age  [] Prior Level of function   [] Patient's home situation (environment and family support)  [] Patient's level of motivation  [] Expected progression of patient      HISTORY:(criteria)    [] 21304 - no personal factors/history    [] 31696  - has 1-2 personal factor/comorbidity     [] 19124 - has >3 personal factor/comorbidity     Body Regions:  [] Objective examination findings  [] Head     []  Neck  [] Trunk   [] Upper Extremity  [] Lower Extremity    Body Systems:  [] For communication ability, affect, cognition, language, and learning style: the assessment of the ability to make needs known, consciousness, orientation (person, place, and time), expected emotional /behavioral responses, and learning preferences (eg, learning barriers, education  needs)  [] For the neuromuscular system: a general assessment of gross coordinated movement (eg, balance, gait, locomotion, transfers, and transitions) and motor function  (motor control and motor learning)  [] For the musculoskeletal system: the assessment of gross symmetry, gross range of motion, gross strength, height, and weight  [] For the integumentary system: the assessment of pliability(texture), presence of scar formation, skin color, and skin integrity  [] For cardiovascular/pulmonary system: the assessment of heart rate, respiratory rate, blood pressure, and edema     Activity limitations:    [] Patient's cognitive status and saf ety concerns          [] Status of current condition      [] Weight bearing restriction  [] Cardiopulmunary Restriction    Participation Restrictions:   [] Goals and goal agreement with the patient     [] Rehab potential (prognosis) and probable outcome      Examination of Body System: (criteria)    [] 66343 - addressing 1-2 elements    [] 38945 - addressing a total of 3 or more elements     [] 27363 -  Addressing a total of 4 or more elements         Clinical Presentation: (criteria)  Choose one     On examination of body system using standardized tests and measures patient presents with (CHOOSE ONE) elements from any of the following: body structures and functions, activity limitations, and/or participation restrictions.  Leading to a clinical presentation that is  considered (CHOOSE ONE)                              Clinical Decision Making  (Eval Complexity):  Choose One     Time Tracking:     PT Received On: 04/08/18  PT Start Time: 1033     PT Stop Time: 1041  PT Total Time (min): 8 min     Billable Minutes: Evaluation 8 min      Magy Sanderson, PT  04/08/2018

## 2018-04-08 NOTE — PROGRESS NOTES
[facility]  Heart Transplant  Progress Note    Patient Name: Jerry Solis  MRN: [id number]  Admission Date: 4/5/2018  Hospital Length of Stay: 3 days  Attending Physician: Mela Nails MD  Primary Care Provider: Primary Doctor No  Principal Problem:Acute on chronic systolic heart failure, NYHA class 4    Subjective:     Interval History: patient has no complaints. He has been ambulating through the halls independently. Lasix was held and renal function is improving.    Continuous Infusions:   heparin (porcine) in D5W 16 Units/kg/hr (04/08/18 0615)     Scheduled Meds:   cetirizine  10 mg Oral Daily    clopidogrel  75 mg Oral Daily    digoxin  0.125 mg Oral QAM    levothyroxine  25 mcg Oral Before breakfast    magnesium oxide  400 mg Oral BID    metoprolol tartrate  12.5 mg Oral BID    multivitamin  1 tablet Oral Daily    omega-3 acid ethyl esters  2 g Oral BID    pantoprazole  40 mg Oral Daily    potassium chloride SA  40 mEq Oral BID    rosuvastatin  20 mg Oral Daily    sodium chloride 0.9%  3 mL Intravenous Q8H     PRN Meds:dextrose 50%, dextrose 50%, glucagon (human recombinant), glucose, glucose, insulin aspart U-100    Review of patient's allergies indicates:   Allergen Reactions    Ace inhibitors     Adhesive     Codeine     Lipitor [atorvastatin]      Objective:     Vital Signs (Most Recent):  Temp: 97.5 °F (36.4 °C) (04/08/18 0510)  Pulse: 69 (04/08/18 0700)  Resp: 16 (04/08/18 0510)  BP: (!) 108/57 (04/08/18 0510)  SpO2: (!) 92 % (04/08/18 0510) Vital Signs (24h Range):  Temp:  [97.3 °F (36.3 °C)-98.1 °F (36.7 °C)] 97.5 °F (36.4 °C)  Pulse:  [] 69  Resp:  [16-18] 16  SpO2:  [92 %-98 %] 92 %  BP: ()/(50-63) 108/57     Patient Vitals for the past 72 hrs (Last 3 readings):   Weight   04/08/18 0700 86.4 kg (190 lb 7.6 oz)   04/07/18 0700 86.2 kg (190 lb 0.6 oz)   04/06/18 0800 88.1 kg (194 lb 3.6 oz)     Body mass index is 25.13 kg/m².      Intake/Output  Summary (Last 24 hours) at 04/08/18 0857  Last data filed at 04/08/18 0615   Gross per 24 hour   Intake              950 ml   Output             1326 ml   Net             -376 ml       Hemodynamic Parameters:       Telemetry: no events    Physical Exam   Constitutional: Vital signs are normal. He appears well-developed and well-nourished. He is active and cooperative. No distress.   HENT:   Head: Normocephalic and atraumatic.   Right Ear: Hearing and external ear normal.   Left Ear: Hearing and external ear normal.   Nose: Nose normal.   Neck: Trachea normal. No JVD present. No tracheal tenderness present. No thyroid mass and no thyromegaly present.   Cardiovascular: Normal rate, regular rhythm, S1 normal, S2 normal, normal heart sounds and normal pulses.  Exam reveals no gallop.    No murmur heard.  Pulmonary/Chest: Effort normal and breath sounds normal. No respiratory distress. He has no decreased breath sounds. He has no wheezes. He has no rhonchi. He has no rales.   Abdominal: Soft. Normal appearance.   Skin: Skin is warm, dry and intact.       Significant Labs:  CBC:    Recent Labs  Lab 04/06/18 0449 04/07/18 0451 04/08/18 0458   WBC 9.26 9.40 9.74   RBC 4.01* 4.27* 4.40*   HGB 11.4* 12.1* 12.2*   HCT 35.7* 38.0* 39.2*   * 116* 108*   MCV 89 89 89   MCH 28.4 28.3 27.7   MCHC 31.9* 31.8* 31.1*     BNP:  No results for input(s): BNP in the last 168 hours.    Invalid input(s): BNPTRIAGELBLO  CMP:    Recent Labs  Lab 04/06/18 0449 04/07/18 0451 04/08/18 0458   GLU 74  74 283* 389*   CALCIUM 9.2  9.2 9.3 9.4   ALBUMIN 3.5 3.4* 3.5   PROT 6.7 6.8 6.7     144 141 138   K 3.7  3.7 4.4 5.0   CO2 28  28 28 30*     106 101 101   BUN 33*  33* 38* 39*   CREATININE 1.4  1.4 1.9* 1.7*   ALKPHOS 114 124 124   ALT 15 13 15   AST 21 18 14   BILITOT 1.1* 1.2* 1.1*      Coagulation:     Recent Labs  Lab 04/05/18  0750   INR 1.1     LDH:  No results for input(s): LDH in the last 72  hours.  Microbiology:  Microbiology Results (last 7 days)     ** No results found for the last 168 hours. **          I have reviewed all pertinent labs within the past 24 hours.    Estimated Creatinine Clearance: 42.4 mL/min (A) (based on SCr of 1.7 mg/dL (H)).    Diagnostic Results:  I have reviewed all pertinent imaging results/findings within the past 24 hours.    Assessment and Plan:     Mr. Solis is a 77 y.o. year old White male with ICMP (EF=20%), CAD s/p PCI, S/P [employer] Kelvin ICD, PAF, on Eliquis, was on Sotalol but was discontinued by his local cardiologist DM and HTN who is referred for evaluation and management of CHF. Clinically reports NYHA class III symptoms. PND and orthopnea.  Most recent angiogram showed patent stents. His HF regimen includes; Sacubitril 49/51 mg BID, digoxin 0.125 mg daily, Lasix 40 mg daily.  He  was admitted after RHC 4/5/18 showed increased right and left-sided filling pressures, severe Pulmonary Hypertension in the setting of markedly increased PCWP and normal Cardiac output / index for diuresis and consideration for VAD/Cardiomems    * Acute on chronic systolic heart failure, NYHA class 4    Acute on Chronic Combined Systolic and Diastolic Heart Failure  -ICM  -NYHA Class III  -LVEF 20-25%  -Last 2D Echo:04/05/2018, LVEDD 6.7 cm  -Euvolemic on examination today  -Current diuretic regimen: no diuretics. Net negative 291cc over the last 24 hours, net 7.3L over hospital stay. Will continue current regimen.  -GDMT with entresto and spirinolactone (held due to TREY). will start metoprolol 12.5 BID today  -Patient is currently being evaluated for OHTx/VAD vs cardiomems  -Pathway Step 2  -2g Na dietary restriction, 1500 mL fluid restriction, strict I/Os        TREY (acute kidney injury)    - Creatinine improving with holding diuresis. 1.7 today  - continue to hold entresto, spirinolactone and lasix        Atrial fibrillation    - [id number] of 5 on heparin gtt  - Eliquis on hold 2/2 VAD  workup          Type 2 diabetes mellitus with complication    - HgA1C 8.1  - Continue SSI  - Likely consult Endocrine with w/u        ICD (implantable cardioverter-defibrillator) in place    - Has [employer] Kelvin ICD        VT (ventricular tachycardia)    - Has ICD  - Keep K+ > 4.0 and Mg+ > 2.0        Ischemic cardiomyopathy    - See above  - Continue Plavix, Crestor. Not on ASA as he is also on Eliquraghu Lundberg MD  Heart Transplant  [facility]

## 2018-04-09 VITALS
DIASTOLIC BLOOD PRESSURE: 58 MMHG | TEMPERATURE: 98 F | BODY MASS INDEX: 25.27 KG/M2 | HEART RATE: 70 BPM | OXYGEN SATURATION: 96 % | SYSTOLIC BLOOD PRESSURE: 122 MMHG | HEIGHT: 73 IN | WEIGHT: 190.69 LBS | RESPIRATION RATE: 18 BRPM

## 2018-04-09 DIAGNOSIS — I50.9 ACUTE ON CHRONIC CONGESTIVE HEART FAILURE, UNSPECIFIED CONGESTIVE HEART FAILURE TYPE: Primary | ICD-10-CM

## 2018-04-09 LAB
ALBUMIN SERPL BCP-MCNC: 3.4 G/DL
ALP SERPL-CCNC: 116 U/L
ALT SERPL W/O P-5'-P-CCNC: 14 U/L
ANION GAP SERPL CALC-SCNC: 10 MMOL/L
AST SERPL-CCNC: 17 U/L
BASOPHILS # BLD AUTO: 0.12 K/UL
BASOPHILS NFR BLD: 1.1 %
BILIRUB SERPL-MCNC: 1 MG/DL
BNP SERPL-MCNC: 366 PG/ML
BUN SERPL-MCNC: 34 MG/DL
CALCIUM SERPL-MCNC: 9 MG/DL
CHLORIDE SERPL-SCNC: 105 MMOL/L
CO2 SERPL-SCNC: 24 MMOL/L
CREAT SERPL-MCNC: 1.5 MG/DL
DIFFERENTIAL METHOD: ABNORMAL
EOSINOPHIL # BLD AUTO: 0 K/UL
EOSINOPHIL NFR BLD: 0 %
ERYTHROCYTE [DISTWIDTH] IN BLOOD BY AUTOMATED COUNT: 15.6 %
EST. GFR  (AFRICAN AMERICAN): 51.9 ML/MIN/1.73 M^2
EST. GFR  (NON AFRICAN AMERICAN): 44.9 ML/MIN/1.73 M^2
FACT X PPP CHRO-ACNC: 0.51 IU/ML
FACT X PPP CHRO-ACNC: 0.59 IU/ML
GLUCOSE SERPL-MCNC: 211 MG/DL
HCT VFR BLD AUTO: 38.6 %
HGB BLD-MCNC: 12.2 G/DL
IMM GRANULOCYTES # BLD AUTO: 0.04 K/UL
IMM GRANULOCYTES NFR BLD AUTO: 0.4 %
LYMPHOCYTES # BLD AUTO: 2 K/UL
LYMPHOCYTES NFR BLD: 18.9 %
MAGNESIUM SERPL-MCNC: 2.4 MG/DL
MCH RBC QN AUTO: 28 PG
MCHC RBC AUTO-ENTMCNC: 31.6 G/DL
MCV RBC AUTO: 89 FL
MONOCYTES # BLD AUTO: 1.4 K/UL
MONOCYTES NFR BLD: 12.9 %
NEUTROPHILS # BLD AUTO: 7.1 K/UL
NEUTROPHILS NFR BLD: 66.7 %
NRBC BLD-RTO: 0 /100 WBC
PLATELET # BLD AUTO: 110 K/UL
PMV BLD AUTO: ABNORMAL FL
POCT GLUCOSE: 227 MG/DL (ref 70–110)
POTASSIUM SERPL-SCNC: 4.4 MMOL/L
PROT SERPL-MCNC: 6.7 G/DL
RBC # BLD AUTO: 4.36 M/UL
SODIUM SERPL-SCNC: 139 MMOL/L
WBC # BLD AUTO: 10.66 K/UL

## 2018-04-09 PROCEDURE — 36415 COLL VENOUS BLD VENIPUNCTURE: CPT

## 2018-04-09 PROCEDURE — 83880 ASSAY OF NATRIURETIC PEPTIDE: CPT

## 2018-04-09 PROCEDURE — 83735 ASSAY OF MAGNESIUM: CPT

## 2018-04-09 PROCEDURE — 85520 HEPARIN ASSAY: CPT

## 2018-04-09 PROCEDURE — 99238 HOSP IP/OBS DSCHRG MGMT 30/<: CPT | Mod: ,,, | Performed by: INTERNAL MEDICINE

## 2018-04-09 PROCEDURE — 63600175 PHARM REV CODE 636 W HCPCS: Performed by: NURSE PRACTITIONER

## 2018-04-09 PROCEDURE — 80053 COMPREHEN METABOLIC PANEL: CPT

## 2018-04-09 PROCEDURE — A4216 STERILE WATER/SALINE, 10 ML: HCPCS | Performed by: NURSE PRACTITIONER

## 2018-04-09 PROCEDURE — 25000003 PHARM REV CODE 250: Performed by: NURSE PRACTITIONER

## 2018-04-09 PROCEDURE — 85025 COMPLETE CBC W/AUTO DIFF WBC: CPT

## 2018-04-09 PROCEDURE — 25000003 PHARM REV CODE 250: Performed by: INTERNAL MEDICINE

## 2018-04-09 RX ORDER — INSULIN ASPART 100 [IU]/ML
5 INJECTION, SOLUTION INTRAVENOUS; SUBCUTANEOUS
Status: DISCONTINUED | OUTPATIENT
Start: 2018-04-09 | End: 2018-04-09 | Stop reason: HOSPADM

## 2018-04-09 RX ORDER — METOPROLOL TARTRATE 25 MG/1
12.5 TABLET, FILM COATED ORAL 2 TIMES DAILY
Qty: 30 TABLET | Refills: 11 | Status: SHIPPED | OUTPATIENT
Start: 2018-04-09 | End: 2018-04-20 | Stop reason: ALTCHOICE

## 2018-04-09 RX ADMIN — CLOPIDOGREL 75 MG: 75 TABLET, FILM COATED ORAL at 08:04

## 2018-04-09 RX ADMIN — LEVOTHYROXINE SODIUM 25 MCG: 25 TABLET ORAL at 06:04

## 2018-04-09 RX ADMIN — OMEGA-3-ACID ETHYL ESTERS 2 G: 1 CAPSULE, LIQUID FILLED ORAL at 08:04

## 2018-04-09 RX ADMIN — PANTOPRAZOLE SODIUM 40 MG: 40 TABLET, DELAYED RELEASE ORAL at 08:04

## 2018-04-09 RX ADMIN — Medication 3 ML: at 06:04

## 2018-04-09 RX ADMIN — Medication 12.5 MG: at 08:04

## 2018-04-09 RX ADMIN — ROSUVASTATIN CALCIUM 20 MG: 20 TABLET, FILM COATED ORAL at 08:04

## 2018-04-09 RX ADMIN — THERA TABS 1 TABLET: TAB at 08:04

## 2018-04-09 RX ADMIN — CETIRIZINE HYDROCHLORIDE 10 MG: 5 TABLET, FILM COATED ORAL at 08:04

## 2018-04-09 RX ADMIN — INSULIN ASPART 2 UNITS: 100 INJECTION, SOLUTION INTRAVENOUS; SUBCUTANEOUS at 08:04

## 2018-04-09 RX ADMIN — INSULIN ASPART 5 UNITS: 100 INJECTION, SOLUTION INTRAVENOUS; SUBCUTANEOUS at 08:04

## 2018-04-09 RX ADMIN — DIGOXIN 0.12 MG: 125 TABLET ORAL at 06:04

## 2018-04-09 NOTE — ASSESSMENT & PLAN NOTE
Acute on Chronic Combined Systolic and Diastolic Heart Failure  -ICM  -NYHA Class III  -LVEF 20-25%  -Last 2D Echo:04/05/2018, LVEDD 6.7 cm  -Euvolemic on examination today  -Current diuretic regimen: Will resume Lasix at 40 mg po bid upon discharge today. To be seen in the CHF Clinic jeison Woodward tomorrow  -GDMT: Resume Entreso, continue Toprol and Dig. Continue holding Aldactone for now  -Pathway discontinued yesterday at patient's request. He wants Cardiomems - will have him f/u in clinic in 1 week for consideration  -2g Na dietary restriction, 1500 mL fluid restriction

## 2018-04-09 NOTE — PROGRESS NOTES
[facility]  Heart Transplant  Progress Note    Patient Name: Jerry Solis  MRN: [id number]  Admission Date: 4/5/2018  Hospital Length of Stay: 4 days  Attending Physician: No att. providers found  Primary Care Provider: Primary Doctor No  Principal Problem:Acute on chronic systolic heart failure, NYHA class 4    Subjective:     Interval History: Feels great, back to how he felt 6 months ago. Wants to go home.    Continuous Infusions:  Scheduled Meds:   apixaban  2.5 mg Oral BID    cetirizine  10 mg Oral Daily    clopidogrel  75 mg Oral Daily    digoxin  0.125 mg Oral QAM    insulin aspart U-100  5 Units Subcutaneous TIDWM    insulin detemir U-100  50 Units Subcutaneous BID    levothyroxine  25 mcg Oral Before breakfast    metoprolol tartrate  12.5 mg Oral BID    multivitamin  1 tablet Oral Daily    omega-3 acid ethyl esters  2 g Oral BID    pantoprazole  40 mg Oral Daily    rosuvastatin  20 mg Oral Daily    sodium chloride 0.9%  3 mL Intravenous Q8H     PRN Meds:dextrose 50%, dextrose 50%, glucagon (human recombinant), glucose, glucose, insulin aspart U-100    Review of patient's allergies indicates:   Allergen Reactions    Ace inhibitors     Adhesive     Codeine     Lipitor [atorvastatin]      Objective:     Vital Signs (Most Recent):  Temp: 97.9 °F (36.6 °C) (04/09/18 0810)  Pulse: 70 (04/09/18 0810)  Resp: 18 (04/09/18 0810)  BP: (!) 122/58 (04/09/18 0810)  SpO2: 96 % (04/09/18 0810) Vital Signs (24h Range):  Temp:  [97.8 °F (36.6 °C)-98.1 °F (36.7 °C)] 97.9 °F (36.6 °C)  Pulse:  [68-73] 70  Resp:  [16-18] 18  SpO2:  [91 %-96 %] 96 %  BP: (104-122)/(53-58) 122/58     Patient Vitals for the past 72 hrs (Last 3 readings):   Weight   04/09/18 0700 86.5 kg (190 lb 11.2 oz)   04/08/18 0700 86.4 kg (190 lb 7.6 oz)   04/07/18 0700 86.2 kg (190 lb 0.6 oz)     Body mass index is 25.16 kg/m².      Intake/Output Summary (Last 24 hours) at 04/09/18 1039  Last data filed at 04/09/18  0900   Gross per 24 hour   Intake              900 ml   Output             1551 ml   Net             -651 ml       Hemodynamic Parameters:       Telemetry: BiV paced    Physical Exam   Constitutional: He is oriented to person, place, and time. He appears well-developed and well-nourished.   HENT:   Head: Normocephalic and atraumatic.   Eyes: Conjunctivae and EOM are normal. Pupils are equal, round, and reactive to light.   Neck: Normal range of motion. Neck supple. No JVD present. No thyromegaly present.   Cardiovascular: Normal rate, regular rhythm and normal heart sounds.    Pulmonary/Chest: Effort normal and breath sounds normal.   Abdominal: Soft. Bowel sounds are normal.   Musculoskeletal: Normal range of motion. He exhibits no edema.   Neurological: He is alert and oriented to person, place, and time.   Skin: Skin is warm and dry. Capillary refill takes 2 to 3 seconds.   Psychiatric: He has a normal mood and affect. His behavior is normal. Judgment and thought content normal.       Significant Labs:  CBC:    Recent Labs  Lab 04/07/18  0451 04/08/18  0458 04/09/18  0450   WBC 9.40 9.74 10.66   RBC 4.27* 4.40* 4.36*   HGB 12.1* 12.2* 12.2*   HCT 38.0* 39.2* 38.6*   * 108* 110*   MCV 89 89 89   MCH 28.3 27.7 28.0   MCHC 31.8* 31.1* 31.6*     BNP:    Recent Labs  Lab 04/09/18  0739   *     CMP:    Recent Labs  Lab 04/07/18  0451 04/08/18  0458 04/09/18  0450   * 389* 211*   CALCIUM 9.3 9.4 9.0   ALBUMIN 3.4* 3.5 3.4*   PROT 6.8 6.7 6.7    138 139   K 4.4 5.0 4.4   CO2 28 30* 24    101 105   BUN 38* 39* 34*   CREATININE 1.9* 1.7* 1.5*   ALKPHOS 124 124 116   ALT 13 15 14   AST 18 14 17   BILITOT 1.2* 1.1* 1.0      Coagulation:     Recent Labs  Lab 04/05/18  0750   INR 1.1     LDH:  No results for input(s): LDH in the last 72 hours.  Microbiology:  Microbiology Results (last 7 days)     ** No results found for the last 168 hours. **          I have reviewed all pertinent labs within  the past 24 hours.    Estimated Creatinine Clearance: 48.1 mL/min (A) (based on SCr of 1.5 mg/dL (H)).    Diagnostic Results:  I have reviewed all pertinent imaging results/findings within the past 24 hours.    Assessment and Plan:     Mr. Solis is a 77 y.o. year old White male with ICMP (EF=20%), CAD s/p PCI, S/P [employer] Kelvin ICD, PAF, on Eliquis, was on Sotalol but was discontinued by his local cardiologist DM and HTN who is referred for evaluation and management of CHF. Clinically reports NYHA class III symptoms. PND and orthopnea.  Most recent angiogram showed patent stents. His HF regimen includes; Sacubitril 49/51 mg BID, digoxin 0.125 mg daily, Lasix 40 mg daily.  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 To be seen in the CHF Clinic jeison Woodward tomorrow  -GDMT: Resume Entreso, continue Toprol and Dig. Continue holding Aldactone for now  -Pathway discontinued yesterday at patient's request. He wants Cardiomems - will have him f/u in clinic in 1 week for consideration  -2g Na dietary restriction, 1500 mL fluid restriction        Ischemic cardiomyopathy    - See above  - Continue Plavix, Crestor. Not on ASA as he is also on Eliquis        Atrial fibrillation    - [id number] of 5 on heparin gtt. Heparin gtt D/C'd at 9 AM this morning, to resume Eliquis tonight at 9 PM          VT (ventricular tachycardia)    - Has ICD  - Keep K+ > 4.0 and Mg+ > 2.0        ICD (implantable  cardioverter-defibrillator) in place    - Has [employer] Kelvin ICD        Type 2 diabetes mellitus with complication    - HgA1C 8.1  - Resume home insulin regimen        TREY (acute kidney injury)    - Creatinine improving with holding diuresis. 1.5 today  - See above. To have labs repeated tomorrow at the CHF Clinic [place] [id number]  Heart Transplant  [facility]

## 2018-04-09 NOTE — PROGRESS NOTES
DISCHARGED    Pt discharged with no needs before SW could get to pt's room. Unable to assess for coping. Providing psychosocial and counseling support, education, resources, assistance and discharge planning as indicated. SW remains available.

## 2018-04-09 NOTE — ASSESSMENT & PLAN NOTE
- Creatinine improving with holding diuresis. 1.5 today  - See above. To have labs repeated tomorrow at the CHF Clinic in [place]

## 2018-04-09 NOTE — ASSESSMENT & PLAN NOTE
- [id number] of 5 on heparin gtt. Heparin gtt D/C'd at 9 AM this morning, to resume Eliquis tonight at 9 PM

## 2018-04-09 NOTE — DISCHARGE SUMMARY
[facility]  Heart Transplant  Discharge Summary      Patient Name: Jerry Solis  MRN: [id number]  Admission Date: 4/5/2018  Hospital Length of Stay: 4 days  Discharge Date and Time: 04/09/2018 10:49 AM  Attending Physician: Linda att. providers found   Discharging Provider: Dionna Lora NP  Primary Care Provider: Primary Doctor Linda     HPI: Mr. Solis is a 77 y.o. year old White male with ICMP (EF=20%), CAD s/p PCI, S/P St. Kelvin BiV ICD, chronic A fib, DM and HTN who is referred for evaluation and management of CHF. Clinically reports NYHA class III symptoms. PND and orthopnea.  Most recent angiogram showed patent stents. His HF regimen includes; Sacubitril 49/51 mg BID, digoxin 0.125 mg daily, Lasix 40 mg daily. Was on Sotalol but was discontinued by his local cardiologist.  He presented today, two weeks after HF clinic visit with Dr. Nails to have RHC.   Results were as follows     D. Hemodynamic Results     BP: 131/67  HR: 70  RSP: 12  PA: 82/26 (46)  AO_SAT: 96  PA_SAT: 58  PW: 40/52 (38)  PW_SAT: 87  RV: 83/5  RVEDP: 17     RA: 16/18 (17)    CONDITION 1 (4/5/2018 11:24:40):  FICKCI: 2.1600  FICKCO: 4.7800  PVR: 201.0000     * No surgery found *     Hospital Course: He was admitted for diuresis, and pathway for VAD only initiated. He ultimately decided against LVED, so the pathway was discontinued. Cardiomems was discussed with patient and family, and he would like to proceed with same. Creatinine bumped with diuresis to 1.9, so the Lasix gtt was held along with his Entresto and Aldactone, and creatinine upon discharge had come down to 1.5. He was net -ve 7.5L by discharge, with weight upon discharge 86.5 kg. Low dose Toprol was initiated, Entresto and Dig resumed, but Aldactone remains on hold. He was sent home on Lasix 40 mg po bid. He has f/u in the CHF clinic at [facility] in [place] tomorrow (he attends monthly for labs +/- IV Lasix). Advised patient that he will likely not  need IVP Lasix tomorrow.  Eliquis was held and bridged with Heparin for possible w/u studies, but the Heparin was stopped at 9 AM on the day of discharge with plan for patient to resume Eliquis at 9 PM. He reported feeling much better, back to how he felt about 6 months ago. He will f/u with Dr. Nails for Cardiomems consideration within a week.    Consults         Status Ordering Provider     Inpatient consult to Cardiothoracic Surgery  Once     Provider:  (Not yet assigned)    Completed DONG JUAREZ     Inpatient Consult to Pre-VAD Coordinator  Once     Provider:  (Not yet assigned)    Completed DEBBIE BRADFORD     Inpatient Consult to Transplant Coordinator  Once     Provider:  (Not yet assigned)    Completed DEBBIE BRADFORD          Significant Diagnostic Studies: See above    Pending Diagnostic Studies:     None        Final Active Diagnoses:    Diagnosis Date Noted POA    PRINCIPAL PROBLEM:  Acute on chronic systolic heart failure, NYHA class 4 [I50.23] 03/22/2018 Yes    Ischemic cardiomyopathy [I25.5] 03/22/2018 Yes    Atrial fibrillation [I48.91] 04/06/2018 Unknown    VT (ventricular tachycardia) [I47.2] 03/22/2018 Yes    ICD (implantable cardioverter-defibrillator) in place [Z95.810] 03/22/2018 Yes    Type 2 diabetes mellitus with complication [E11.8] 03/22/2018 Yes    TREY (acute kidney injury) [N17.9] 04/07/2018 Unknown      Problems Resolved During this Admission:    Diagnosis Date Noted Date Resolved POA    Coronary artery disease involving native coronary artery of native heart without angina pectoris [I25.10] 03/22/2018 04/06/2018 Yes    Mixed hyperlipidemia [E78.2] 03/22/2018 04/06/2018 Yes      Discharged Condition: stable    Disposition: Home or Self Care    Follow Up:  Follow-up Information     HEART, FAILURE CLINIC In 1 week.    Specialty:  Transplant               Patient Instructions:     Diet Cardiac   Order Comments: 1500 cc fluid restriction     Activity as tolerated      Notify your health care provider if you experience any of the following:  temperature >100.4     Notify your health care provider if you experience any of the following:  persistent nausea and vomiting or diarrhea     Notify your health care provider if you experience any of the following:  severe uncontrolled pain     Notify your health care provider if you experience any of the following:  redness, tenderness, or signs of infection (pain, swelling, redness, odor or green/yellow discharge around incision site)     Notify your health care provider if you experience any of the following:  difficulty breathing or increased cough     Notify your health care provider if you experience any of the following:  severe persistent headache     Notify your health care provider if you experience any of the following:  worsening rash     Notify your health care provider if you experience any of the following:  persistent dizziness, light-headedness, or visual disturbances     Notify your health care provider if you experience any of the following:  increased confusion or weakness       Medications:  Reconciled Home Medications:      Medication List      START taking these medications    metoprolol tartrate 25 MG tablet  Commonly known as:  LOPRESSOR  Take 0.5 tablets (12.5 mg total) by mouth 2 (two) times daily.        CONTINUE taking these medications    clopidogrel 75 mg tablet  Commonly known as:  PLAVIX     digoxin 125 mcg tablet  Commonly known as:  LANOXIN     ELIQUIS 2.5 mg Tab  Generic drug:  apixaban     ENTRESTO 49-51 mg per tablet  Generic drug:  sacubitril-valsartan     furosemide 40 MG tablet  Commonly known as:  LASIX  Take 1 tablet (40 mg total) by mouth 2 (two) times daily.     LANTUS U-100 INSULIN 100 unit/mL injection  Generic drug:  insulin glargine     levothyroxine 25 MCG tablet  Commonly known as:  SYNTHROID     loratadine 10 mg tablet  Commonly known as:  CLARITIN     multivitamin per tablet  Commonly  known as:  THERAGRAN     NovoLOG U-100 Insulin aspart 100 unit/mL injection  Generic drug:  insulin aspart U-100     omega-3 acid ethyl esters 1 gram capsule  Commonly known as:  LOVAZA     omeprazole 40 MG capsule  Commonly known as:  PRILOSEC     rosuvastatin 20 MG tablet  Commonly known as:  CRESTOR        STOP taking these medications    metOLazone 5 MG tablet  Commonly known as:  ZAROXOLYN     potassium chloride SA 20 MEQ tablet  Commonly known as:  K-DUR,KLOR-CON           Where to Get Your Medications      These medications were sent to [facility] [place] - [address] [address]    Phone:  [phone]   · metoprolol tartrate 25 MG tablet         Dionna Lora NP [id number]  Heart Transplant  [facility]

## 2018-04-09 NOTE — SUBJECTIVE & OBJECTIVE
Interval History: Feels great, back to how he felt 6 months ago. Wants to go home.    Continuous Infusions:  Scheduled Meds:   apixaban  2.5 mg Oral BID    cetirizine  10 mg Oral Daily    clopidogrel  75 mg Oral Daily    digoxin  0.125 mg Oral QAM    insulin aspart U-100  5 Units Subcutaneous TIDWM    insulin detemir U-100  50 Units Subcutaneous BID    levothyroxine  25 mcg Oral Before breakfast    metoprolol tartrate  12.5 mg Oral BID    multivitamin  1 tablet Oral Daily    omega-3 acid ethyl esters  2 g Oral BID    pantoprazole  40 mg Oral Daily    rosuvastatin  20 mg Oral Daily    sodium chloride 0.9%  3 mL Intravenous Q8H     PRN Meds:dextrose 50%, dextrose 50%, glucagon (human recombinant), glucose, glucose, insulin aspart U-100    Review of patient's allergies indicates:   Allergen Reactions    Ace inhibitors     Adhesive     Codeine     Lipitor [atorvastatin]      Objective:     Vital Signs (Most Recent):  Temp: 97.9 °F (36.6 °C) (04/09/18 0810)  Pulse: 70 (04/09/18 0810)  Resp: 18 (04/09/18 0810)  BP: (!) 122/58 (04/09/18 0810)  SpO2: 96 % (04/09/18 0810) Vital Signs (24h Range):  Temp:  [97.8 °F (36.6 °C)-98.1 °F (36.7 °C)] 97.9 °F (36.6 °C)  Pulse:  [68-73] 70  Resp:  [16-18] 18  SpO2:  [91 %-96 %] 96 %  BP: (104-122)/(53-58) 122/58     Patient Vitals for the past 72 hrs (Last 3 readings):   Weight   04/09/18 0700 86.5 kg (190 lb 11.2 oz)   04/08/18 0700 86.4 kg (190 lb 7.6 oz)   04/07/18 0700 86.2 kg (190 lb 0.6 oz)     Body mass index is 25.16 kg/m².      Intake/Output Summary (Last 24 hours) at 04/09/18 1039  Last data filed at 04/09/18 0900   Gross per 24 hour   Intake              900 ml   Output             1551 ml   Net             -651 ml       Hemodynamic Parameters:       Telemetry: BiV paced    Physical Exam   Constitutional: He is oriented to person, place, and time. He appears well-developed and well-nourished.   HENT:   Head: Normocephalic and atraumatic.   Eyes: Conjunctivae  and EOM are normal. Pupils are equal, round, and reactive to light.   Neck: Normal range of motion. Neck supple. No JVD present. No thyromegaly present.   Cardiovascular: Normal rate, regular rhythm and normal heart sounds.    Pulmonary/Chest: Effort normal and breath sounds normal.   Abdominal: Soft. Bowel sounds are normal.   Musculoskeletal: Normal range of motion. He exhibits no edema.   Neurological: He is alert and oriented to person, place, and time.   Skin: Skin is warm and dry. Capillary refill takes 2 to 3 seconds.   Psychiatric: He has a normal mood and affect. His behavior is normal. Judgment and thought content normal.       Significant Labs:  CBC:    Recent Labs  Lab 04/07/18  0451 04/08/18  0458 04/09/18  0450   WBC 9.40 9.74 10.66   RBC 4.27* 4.40* 4.36*   HGB 12.1* 12.2* 12.2*   HCT 38.0* 39.2* 38.6*   * 108* 110*   MCV 89 89 89   MCH 28.3 27.7 28.0   MCHC 31.8* 31.1* 31.6*     BNP:    Recent Labs  Lab 04/09/18  0739   *     CMP:    Recent Labs  Lab 04/07/18  0451 04/08/18  0458 04/09/18  0450   * 389* 211*   CALCIUM 9.3 9.4 9.0   ALBUMIN 3.4* 3.5 3.4*   PROT 6.8 6.7 6.7    138 139   K 4.4 5.0 4.4   CO2 28 30* 24    101 105   BUN 38* 39* 34*   CREATININE 1.9* 1.7* 1.5*   ALKPHOS 124 124 116   ALT 13 15 14   AST 18 14 17   BILITOT 1.2* 1.1* 1.0      Coagulation:     Recent Labs  Lab 04/05/18  0750   INR 1.1     LDH:  No results for input(s): LDH in the last 72 hours.  Microbiology:  Microbiology Results (last 7 days)     ** No results found for the last 168 hours. **          I have reviewed all pertinent labs within the past 24 hours.    Estimated Creatinine Clearance: 48.1 mL/min (A) (based on SCr of 1.5 mg/dL (H)).    Diagnostic Results:  I have reviewed all pertinent imaging results/findings within the past 24 hours.

## 2018-04-09 NOTE — PROGRESS NOTES
Pt and wife JOVAN.  Both verbalize they are not interested in the VAD at all.  He thanked me for my time and was very pleasant.  He verbalized in the implant helps for a few years then he is good with that, but he does not want the VAD.  I asked which implant, he said transplant.  I explained I may see him when he RTC to say hello and see if he has any questions.

## 2018-04-09 NOTE — PLAN OF CARE
Problem: Patient Care Overview  Goal: Plan of Care Review  Outcome: Revised  Pt free of falls/trauma/injuries.  Denies c/o SOB, CP, or discomfort.  Generalized skin remains CDI; no edema noted.  Diuretics on hold at this time; pt continues to diurese well.  Wt remains stable. Heparin drip continues to infuse. Pt BG managed with supplemental insulin; Levemir added BID.  Pt tolerating plan of care.

## 2018-04-10 NOTE — PHYSICIAN QUERY
"PT Name: Jerry Solis  MR #: [id number]    Physician Query Form - Heart  Condition Clarification     CDS/: Brandie Peck RN, CCDS              Contact information: [email or web address]  This form is a permanent document in the medical record.     Query Date: April 10, 2018    By submitting this query, we are merely seeking further clarification of documentation. Please utilize your independent clinical judgment when addressing the question(s) below.    The medical record contains the following   Indicators     Supporting Clinical Findings Location in Medical Record   X  4/9 lab   X EF 20-25% 4/5 echo    Radiology findings     X Echo Results Left ventricular   systolic function appears severely depressed. Visually estimated ejection fraction is 20-25%. 4/5 echo    "Ascites" documented      "SOB" or "CORDOBA" documented      "Hypoxia" documented     X Heart Failure documented Acute on chronic systolic HF    Acute on chronic combined systolic/diastolic HF 4/5 h/p, 4/6- 4/9 prog notes    4/9 prog note    "Edema" documented     X Diuretics/Meds Furosemide 80 mg IV x1  Furosemide gtt 4/5 mar  4/5- 4/7 mar    Treatment:      Other:          Provider, please specify diagnosis or diagnoses associated with above clinical findings.                               [  ] Acute on Chronic Systolic Heart Failure ( EF < 40)*  [ x ] Acute on Chronic Combined Systolic and Diastolic Heart Failure  [  ] Other Type of Heart Failure (please specify type): _________________________  [  ] Other (please specify): ___________________________________  [  ] Clinically Undetermined            *American Heart Association                                                                                                          Please document in your progress notes daily for the duration of treatment until resolved and include in your discharge summary.    "

## 2018-04-10 NOTE — PHYSICIAN QUERY
PT Name: Jerry Solis  MR #: [id number]     Physician Query Form - Documentation Clarification      CDS/: Brandie Peck RN, CCDS             Contact information: [email or web address]    This form is a permanent document in the medical record.     Query Date: April 10, 2018    By submitting this query, we are merely seeking further clarification of documentation. Please utilize your independent clinical judgment when addressing the question(s) below.    The Medical record reflects the following:    Supporting Clinical Findings Location in Medical Record     admitted after RHC 4/5/18 showed increased right and left-sided filling pressures, severe Pulmonary Hypertension in the setting of markedly increased PCWP and normal Cardiac output / index for diuresis and consideration for VAD/Cardiomems    Acute on chronic systolic HF   4/6 prog note     Severe Pulmonary Hypertension in the setting of markedly increased PCWP.    Left ventricular   systolic function appears severely depressed. Visually estimated ejection fraction is 20-25%.  The estimated PA systolic pressure is 96 mmHg.    4/5 cath procedure note      4/5 echo                                                                            Doctor, Please specify diagnosis or diagnoses associated with above clinical findings.    Provider Use Only        Please specify the type of Pulmonary Hypertension:    [     ] Primary pulmonary hypertension (Group 1)  [     ] Other Secondary pulmonary hypertension  (Group 5)  [     x] Pulmonary hypertension due to Left  heart disease (Group 2)  [     ] Pulmonary hypertension, unspecified   [     ] Other: ___________________                                                                                                                       [  ] Clinically undetermined

## 2018-04-20 ENCOUNTER — OFFICE VISIT (OUTPATIENT)
Dept: TRANSPLANT | Facility: CLINIC | Age: 78
End: 2018-04-20
Payer: MEDICARE

## 2018-04-20 ENCOUNTER — HOSPITAL ENCOUNTER (OUTPATIENT)
Dept: PULMONOLOGY | Facility: CLINIC | Age: 78
Discharge: HOME | End: 2018-04-20
Payer: MEDICARE

## 2018-04-20 VITALS — WEIGHT: 192 LBS | HEIGHT: 72 IN | BODY MASS INDEX: 26.01 KG/M2

## 2018-04-20 VITALS
WEIGHT: 199.75 LBS | SYSTOLIC BLOOD PRESSURE: 137 MMHG | BODY MASS INDEX: 25.64 KG/M2 | HEIGHT: 74 IN | HEART RATE: 70 BPM | DIASTOLIC BLOOD PRESSURE: 63 MMHG

## 2018-04-20 DIAGNOSIS — I50.23 ACUTE ON CHRONIC SYSTOLIC HEART FAILURE, NYHA CLASS 4: Primary | ICD-10-CM

## 2018-04-20 DIAGNOSIS — I50.22 CHRONIC SYSTOLIC CHF, NYHA CLASS 2 AND ACA/AHA STAGE C: ICD-10-CM

## 2018-04-20 DIAGNOSIS — I50.23 ACUTE ON CHRONIC SYSTOLIC HEART FAILURE, NYHA CLASS 4: ICD-10-CM

## 2018-04-20 PROCEDURE — 99999 PR PBB SHADOW E&M-EST. PATIENT-LVL III: CPT | Mod: PBBFAC,,, | Performed by: INTERNAL MEDICINE

## 2018-04-20 PROCEDURE — 99214 OFFICE O/P EST MOD 30 MIN: CPT | Mod: S$PBB,,, | Performed by: INTERNAL MEDICINE

## 2018-04-20 PROCEDURE — 99213 OFFICE O/P EST LOW 20 MIN: CPT | Mod: PBBFAC,25 | Performed by: INTERNAL MEDICINE

## 2018-04-20 PROCEDURE — 94618 PULMONARY STRESS TESTING: CPT | Mod: 26,S$PBB,, | Performed by: INTERNAL MEDICINE

## 2018-04-20 PROCEDURE — 94618 PULMONARY STRESS TESTING: CPT | Mod: PBBFAC | Performed by: INTERNAL MEDICINE

## 2018-04-20 RX ORDER — METOPROLOL SUCCINATE 25 MG/1
25 TABLET, EXTENDED RELEASE ORAL NIGHTLY
Qty: 30 TABLET | Refills: 11 | Status: ON HOLD | OUTPATIENT
Start: 2018-04-20 | End: 2019-01-01 | Stop reason: HOSPADM

## 2018-04-20 NOTE — PROCEDURES
Jerry Solis is a 77 y.o.  male patient, who presents for a 6 minute walk test ordered by Shara Baron MD.  The diagnosis is Congestive Heart Failure.  The patient's BMI is 26.1 kg/m2.  Predicted distance (lower limit of normal) is 320.08 meters.      Test Results:    The test was completed without stopping.  The total time walked was 360 seconds.  During walking, the patient reported:  Lightheadedness.  The patient used no assistive devices during testing.     04/20/2018---------Distance: 390.14 meters (1280 feet)     O2 Sat % Supplemental Oxygen Heart Rate Blood Pressure Nick Scale   Pre-exercise  (Resting) 97 % Room Air 70 bpm 120/66 mmHg 0   During Exercise 98 % Room Air 88 bpm 120/58 mmHg 3   Post-exercise  (Recovery) 97 % Room Air  73 bpm       Recovery Time:  62 seconds    Performing nurse/tech:  MOHINDER Felipe RRT      PREVIOUS STUDY:   The patient has not had a previous study.      CLINICAL INTERPRETATION:  Six minute walk distance is 390.14 meters (1280 feet) with moderate dyspnea.  During exercise, there was no desaturation while breathing room air.  Both blood pressure and heart rate remained stable with walking.  The patient reported non-pulmonary symptoms during exercise.  No previous study performed.  Based upon age and body mass index, exercise capacity is normal.

## 2018-04-22 PROBLEM — I50.23 ACUTE ON CHRONIC SYSTOLIC HEART FAILURE, NYHA CLASS 4: Status: RESOLVED | Noted: 2018-03-22 | Resolved: 2018-04-22

## 2018-04-22 NOTE — PROGRESS NOTES
"Subjective: class 2 after optimization     Patient ID:  Jerry Solis is a 77 y.o. male who presents for follow-up of Congestive Heart Failure      HPI   ICMP (EF=20%), CAD s/p PCI, S/P St. Kelvin BiV ICD, chronic A fib, DM and HTN who is referred for evaluation and management of CHF  Comes today s/p discharge early April 2018 after CHF optimization / Cardiomems consideration    Interval history  Since discharge seems to be doing well.  Able to walk two blocks.  No edema today with a 9 kg decrease in clinic weight.  Able to carry 40 lb of feed from truck to house ( 100 ft)  Gets fatigued after eight hrs of work.    Six minute walk  04/20/2018---------Distance: 390.14 meters (1280 feet)  Based upon age and body mass index, exercise capacity is normal.     O2 Sat % Supplemental Oxygen Heart Rate Blood Pressure Nick   Scale   Pre-exercise  (Resting) 97 % Room Air 70 bpm 120/66 mmHg 0   During Exercise 98 % Room Air 88 bpm 120/58 mmHg 3   Post-exercise  (Recovery) 97 % Room Air  73 bpm         Review of Systems   Constitution: Negative for decreased appetite, weight gain and weight loss.   Cardiovascular: Negative for chest pain, dyspnea on exertion, leg swelling, near-syncope, orthopnea and palpitations.   Respiratory: Negative for cough and shortness of breath.    Musculoskeletal: Negative for myalgias.   Gastrointestinal: Negative for jaundice.        Objective:    Physical Exam   Constitutional: He is oriented to person, place, and time. He appears well-developed and well-nourished. He is active. He is not intubated.   /63 (BP Location: Left arm, Patient Position: Sitting, BP Method: Medium (Automatic))   Pulse 70   Ht 6' 2" (1.88 m)   Wt 90.6 kg (199 lb 11.8 oz)   BMI 25.64 kg/m²      HENT:   Head: Normocephalic and atraumatic. Hair is normal.   Right Ear: External ear normal.   Left Ear: External ear normal.   Nose: Nose normal. No nasal deformity. No epistaxis.  No foreign bodies.   Mouth/Throat: Mucous " membranes are normal. Mucous membranes are not cyanotic. No oropharyngeal exudate.   Eyes: Conjunctivae and EOM are normal. Pupils are equal, round, and reactive to light.   Neck: Neck supple. No hepatojugular reflux and no JVD (JVP < 5 ) present.   Cardiovascular: Normal rate, regular rhythm, normal heart sounds and normal pulses.  Exam reveals no gallop.    Pulmonary/Chest: Effort normal and breath sounds normal. No apnea and no tachypnea. He is not intubated. No respiratory distress. He exhibits no tenderness.   Abdominal: Soft. Normal appearance and bowel sounds are normal. There is no tenderness. No hernia.   Musculoskeletal: Normal range of motion.   Neurological: He is alert and oriented to person, place, and time. He displays no seizure activity.   Skin: Skin is warm, dry and intact. No rash noted. No pallor.   Psychiatric: He has a normal mood and affect. His speech is normal and behavior is normal. Thought content normal. Cognition and memory are normal.     Lab Results   Component Value Date     (H) 04/09/2018     04/20/2018    K 4.3 04/20/2018    MG 2.4 04/20/2018     04/20/2018    CO2 27 04/20/2018    BUN 42 (H) 04/20/2018    CREATININE 1.6 (H) 04/20/2018     (H) 04/20/2018    HGBA1C 8.1 (H) 04/06/2018    AST 17 04/09/2018    ALT 14 04/09/2018    ALBUMIN 3.4 (L) 04/09/2018    PROT 6.7 04/09/2018    BILITOT 1.0 04/09/2018         BNP   Date Value Ref Range Status   04/09/2018 366 (H) 0 - 99 pg/mL Final     Comment:     Values of less than 100 pg/ml are consistent with non-CHF populations.   03/22/2018 345 (H) 0 - 99 pg/mL Final     Comment:     Values of less than 100 pg/ml are consistent with non-CHF populations.               Assessment:           2. Chronic systolic CHF, NYHA class 2 and FABIAN/AHA stage C         Plan:   Chronic systolic CHF, NYHA class 2 and FABIAN/AHA stage C  Continue Lasix at 40 mg po bid .   To be seen in the CHF Clinic at [facility] monthly Will ask  them to help with titratation GDMT   -GDMT: on Entreso and Dig.  Change lopressor 12.5 BID  to toprol 25 qhs Suspect he can get up to 100 mg qhs   Not clear that hemoptysis previously reported related to entresto 97/ 103 [place] rechallenging once details of prior issues cleared up   Continue holding Aldactone til creatine reaches kiko (hopefully around 1.4 as this what is was previously)      Follow-up in about 1 month (around 5/20/2018).  Orders Placed This Encounter    Basic metabolic panel    Brain natriuretic peptide    Stress test, pulmonary    metoprolol succinate (TOPROL-XL) 25 MG 24 hr tablet

## 2018-04-22 NOTE — ASSESSMENT & PLAN NOTE
Continue Lasix at 40 mg po bid .   To be seen in the CHF Clinic at [facility] monthly Will ask them to help with titratation GDMT   -GDMT: on Entreso and Dig.  Change lopressor 12.5 BID  to toprol 25 qhs Suspect he can get up to 100 mg qhs   Not clear that hemoptysis previously reported related to entresto 97/ 103 [place] rechallenging once details of prior issues cleared up   Continue holding Aldactone til creatine reaches kiko (hopefully around 1.4 as this what is was previously)

## 2018-04-27 ENCOUNTER — RESEARCH ENCOUNTER (OUTPATIENT)
Dept: RESEARCH | Facility: HOSPITAL | Age: 78
End: 2018-04-27

## 2018-04-27 NOTE — PROGRESS NOTES
Patient contacted per Dr. Nails's request due to patient's interest in participating in the GUIDE-HF research study. Discussed device, trial requirements, randomization, and implant procedure briefly with patient. Patient expressed continued interest in participating after discussing study information. Instructed patient that next steps would be to come into clinic to review and sign consent and perform screening/baseline procedures. Understanding verbalized. Patient in agreement with signing informed consent and performing visit on Thursday 5/10/2018 starting at 7:30 AM. Instructed patient that informed consent will be signed before any study procedures are performed. Following consent process, inclusion/exclusion will be verified and QOL questionnaires, labs, heart failure exam with NYHA assessment, and 6 minute juarez walk test will be performed. Patient verbalized complete understanding and in agreement with all discussed plans. Contact information given to patient, and patient instructed to call if any questions arise before visit. Understanding verbalized. Will follow.

## 2018-05-09 ENCOUNTER — RESEARCH ENCOUNTER (OUTPATIENT)
Dept: RESEARCH | Facility: HOSPITAL | Age: 78
End: 2018-05-09

## 2018-05-09 NOTE — PROGRESS NOTES
Patient reached to confirm appointments tomorrow 5/10/2018 starting at 8:00 AM for the GUIDE-HF research study consent and baseline visit. Patient instructed that ICF will be reviewed and signed first followed by QOL questionnaires, VS/HF assessment with NYHA, medication review, labs, and 6MW test. Patient in agreement with plan and denies any questions or concerns at this time. Patient instructed to call if any issues arise. Understanding verbalized. Will follow.

## 2018-05-10 ENCOUNTER — RESEARCH ENCOUNTER (OUTPATIENT)
Dept: RESEARCH | Facility: HOSPITAL | Age: 78
End: 2018-05-10

## 2018-05-10 ENCOUNTER — OFFICE VISIT (OUTPATIENT)
Dept: TRANSPLANT | Facility: CLINIC | Age: 78
End: 2018-05-10
Payer: MEDICARE

## 2018-05-10 VITALS
SYSTOLIC BLOOD PRESSURE: 123 MMHG | WEIGHT: 207.44 LBS | DIASTOLIC BLOOD PRESSURE: 59 MMHG | BODY MASS INDEX: 27.49 KG/M2 | HEIGHT: 73 IN | HEART RATE: 68 BPM

## 2018-05-10 DIAGNOSIS — I25.10 CORONARY ARTERY DISEASE INVOLVING NATIVE CORONARY ARTERY OF NATIVE HEART WITHOUT ANGINA PECTORIS: ICD-10-CM

## 2018-05-10 DIAGNOSIS — I50.22 CHRONIC SYSTOLIC CHF, NYHA CLASS 2 AND ACA/AHA STAGE C: Primary | ICD-10-CM

## 2018-05-10 DIAGNOSIS — Z95.810 ICD (IMPLANTABLE CARDIOVERTER-DEFIBRILLATOR) IN PLACE: ICD-10-CM

## 2018-05-10 DIAGNOSIS — I25.5 ISCHEMIC CARDIOMYOPATHY: ICD-10-CM

## 2018-05-10 PROCEDURE — 99999 PR PBB SHADOW E&M-EST. PATIENT-LVL III: CPT | Mod: PBBFAC,,, | Performed by: INTERNAL MEDICINE

## 2018-05-10 PROCEDURE — 99214 OFFICE O/P EST MOD 30 MIN: CPT | Mod: S$PBB,,, | Performed by: INTERNAL MEDICINE

## 2018-05-10 PROCEDURE — 99213 OFFICE O/P EST LOW 20 MIN: CPT | Mod: PBBFAC | Performed by: INTERNAL MEDICINE

## 2018-05-10 NOTE — PROGRESS NOTES
"Subjective:     HPI:  Mr. Solis is a 77 y.o. year old White male with ICMP (EF=20%), CAD s/p PCI, DM and HTN who comes for a F/U visit. Doing well today. Clinically reports NYHA class II symptoms with a recent HF admission. Denies any PND and orthopnea.  Most recent angiogram showed patent stents. Labs are pending.     Past Medical History:   Diagnosis Date    Chronic systolic congestive heart failure 3/22/2018    Coronary artery disease involving native coronary artery of native heart without angina pectoris 3/22/2018    ICD (implantable cardioverter-defibrillator) in place 3/22/2018    Ischemic cardiomyopathy 3/22/2018    Mixed hyperlipidemia 3/22/2018    Type 2 diabetes mellitus with complication 3/22/2018    VT (ventricular tachycardia) 3/22/2018       Review of Systems   Constitution: Negative. Negative for chills, decreased appetite, diaphoresis, fever, weakness, malaise/fatigue, night sweats, weight gain and weight loss.   Eyes: Negative.    Cardiovascular: Positive for dyspnea on exertion. Negative for chest pain, claudication, cyanosis, irregular heartbeat, leg swelling, near-syncope, orthopnea, palpitations, paroxysmal nocturnal dyspnea and syncope.   Respiratory: Negative for cough, hemoptysis and shortness of breath.    Endocrine: Negative.    Hematologic/Lymphatic: Negative.    Skin: Negative for color change, dry skin and nail changes.   Musculoskeletal: Negative.    Gastrointestinal: Negative.    Genitourinary: Negative.        Objective:   Blood pressure (!) 123/59, pulse 68, height 6' 1" (1.854 m), weight 94.1 kg (207 lb 7.3 oz).    Physical Exam   Constitutional: He appears well-developed.   BP (!) 123/59 (BP Location: Left arm, Patient Position: Sitting, BP Method: Medium (Automatic))   Pulse 68      HENT:   Head: Normocephalic.   Neck: No JVD present. Carotid bruit is not present.   Cardiovascular: Regular rhythm, normal heart sounds and normal pulses.  PMI is displaced.  Exam reveals no " S3.    No murmur heard.  Pulmonary/Chest: Effort normal and breath sounds normal. No respiratory distress. He has no wheezes. He has no rales.   Abdominal: Soft. Bowel sounds are normal. He exhibits no distension. There is no tenderness. There is no rebound.   Musculoskeletal: He exhibits no edema.   Warm extremities   Neurological: He is alert.   Skin: Skin is warm.   Vitals reviewed.    Labs:    Chemistry        Component Value Date/Time     04/20/2018 1135    K 4.3 04/20/2018 1135     04/20/2018 1135    CO2 27 04/20/2018 1135    BUN 42 (H) 04/20/2018 1135    CREATININE 1.6 (H) 04/20/2018 1135     (H) 04/20/2018 1135        Component Value Date/Time    CALCIUM 9.5 04/20/2018 1135    ALKPHOS 116 04/09/2018 0450    AST 17 04/09/2018 0450    ALT 14 04/09/2018 0450    BILITOT 1.0 04/09/2018 0450    ESTGFRAFRICA 48.0 (A) 04/20/2018 1135    EGFRNONAA 41.5 (A) 04/20/2018 1135          Magnesium   Date Value Ref Range Status   04/20/2018 2.4 1.6 - 2.6 mg/dL Final     Lab Results   Component Value Date    WBC 10.66 04/09/2018    HGB 12.2 (L) 04/09/2018    HCT 38.6 (L) 04/09/2018     (L) 04/09/2018     Lab Results   Component Value Date    INR 1.1 04/05/2018     BNP   Date Value Ref Range Status   04/09/2018 366 (H) 0 - 99 pg/mL Final     Comment:     Values of less than 100 pg/ml are consistent with non-CHF populations.   03/22/2018 345 (H) 0 - 99 pg/mL Final     Comment:     Values of less than 100 pg/ml are consistent with non-CHF populations.       Assessment:      1. Chronic systolic CHF, NYHA class 2 and FABIAN/AHA stage C    2. Coronary artery disease involving native coronary artery of native heart without angina pectoris    3. Ischemic cardiomyopathy    4. ICD (implantable cardioverter-defibrillator) in place        Plan:   NYHA class II symptoms. Had one admission recently. Remains at high risk for HF readmissions.   He does meet criteria for the Guide-HF study.   Had a long conversation  with him about the risks and benefits of the Cardiomems device in view of his NYHA class II symptoms and recent HF admission. He is interested.   Our research coordinator Bisi Chris has talked to him today. Consents were signed.  He is scheduled for his procedure on Thursday May 17 at 8 am. Hold Eliquis on Tuesday (last dose on Monday)  Recommend 2 gram sodium restriction and 1500cc fluid restriction.  Encourage physical activity with graded exercise program.  Requested patient to weigh themselves daily, and to notify us if their weight increases by more than 3 lbs in 1 day or 5 lbs in 1 week.   RTC in 1 month with labs.      Mela Nails MD

## 2018-05-10 NOTE — PROGRESS NOTES
Date Consent signed: 5/10/2018    Sponsor: [employer]    Study Title/IRB Number: [id number]    : Dr. Mela Nails    Present for Discussion: Patient, patient's wife, CRC     Is LAR Consenting for Subject: No    Prior to the Informed Consent (IC) being signed, or any study protocol required data collection, testing, procedure, or intervention being performed, the following was done and/or discussed:   Patient was given a copy of the IC for review    Purpose of the study and qualifications to participate    Study design, Follow up schedule, and tests or procedures done at each visit   Confidentiality and HIPAA Authorization for Release of Medical Records for the research trial/ subject's rights/research related injury   Risk, Benefits, Alternative Treatments, Compensation and Costs   Participation in the research trial is voluntary and patient may withdraw at anytime   Contact information for study related questions    Patient verbalizes understanding of the above: Yes  Contact information for CRC and PI given to patient: Yes  Patient able to adequately summarize: the purpose of the study, the risks associated with the study, and all procedures, testing, and follow-ups associated with the study: Yes    Mr. Solis signed the informed consent form for the GUIDE-HF research study with an IRB approval date of 4/3/2018.  Each page of the consent form was reviewed with patient and patient's wife and all questions answered satisfactorily. He signed the consent form and received a copy of same. The original consent was scanned into electronic medical records (EPIC) and filed into the subject's research study binder.    Screening/baseline procedures performed per protocol following obtaining consent. EQ-5D-5L and KCCQ questionnaires completer prior to all screening/baseline procedures per protocol. Inclusion/exclusion reviewed and confirmed. Physical exam and NYHA assessment performed per   Jaqui. Medical history and heart failure medications reviewed and confirmed. Vital signs and labs performed per protocol. 6 minute juarez walk test conducted by CRC per protocol. Patient walked a total of 357 meters with no stops noted. Instructed patient that screening results as well as inclusion/exclusion criteria will be reviewed by Rutledge to determine if patient will participate in study. Understanding verbalized. Patient instructed that CRC will contact patient once Rutledge reviews all information and scheduled implant procedure as appropriate. Patient in agreement with plan and understanding verbalized. CRC contact information given to patient. Instructed patient to call with any questions or concerns. Understanding verbalized. Will follow.

## 2018-05-11 DIAGNOSIS — I50.9 ACUTE HEART FAILURE, UNSPECIFIED HEART FAILURE TYPE: Primary | ICD-10-CM

## 2018-05-16 ENCOUNTER — RESEARCH ENCOUNTER (OUTPATIENT)
Dept: RESEARCH | Facility: HOSPITAL | Age: 78
End: 2018-05-16

## 2018-05-16 DIAGNOSIS — I50.22 CHRONIC SYSTOLIC CHF, NYHA CLASS 2 AND ACA/AHA STAGE C: Primary | ICD-10-CM

## 2018-05-16 DIAGNOSIS — I25.5 ISCHEMIC CARDIOMYOPATHY: ICD-10-CM

## 2018-05-16 NOTE — PROGRESS NOTES
Contacted patient regarding GUIDE-HF CardioMEMS implant scheduled for tomorrow 5/17/2018. Patient instructions given per Dr. Nails's orders. Patient instructed to check in for SSCU at the desk in front of the hospital on the 3rd floor at 10:00 AM for RHC/CardioMEMS implant scheduled for 2:00 PM. Per Dr. Nails, patient may have a light breakfast then must fast starting at 5:00 AM before the procedure. Instructed patient to continue holding Eliquis (also confirmed that patient has held Eliquis on 5/15/2018 and 5/16/2018 as previously instructed by Dr. Nails) and to take all other medications per his normal routine. Patient is diabetic and was instructed to hold all diabetes medication since he will be fasting after 5:00 AM. Labs to be obtained in SSCU before procedure. Patient verbalized complete understanding of all of the above instructions. Patient denies any questions or concerns at this time. Instructed patient to contact CRC if any questions arise. Understanding verbalized. Will follow.

## 2018-05-17 ENCOUNTER — HOSPITAL ENCOUNTER (INPATIENT)
Facility: HOSPITAL | Age: 78
LOS: 4 days | Discharge: HOME | DRG: 264 | End: 2018-05-21
Attending: INTERNAL MEDICINE | Admitting: INTERNAL MEDICINE
Payer: MEDICARE

## 2018-05-17 ENCOUNTER — RESEARCH ENCOUNTER (OUTPATIENT)
Dept: RESEARCH | Facility: HOSPITAL | Age: 78
End: 2018-05-17

## 2018-05-17 DIAGNOSIS — I50.9 CHF (CONGESTIVE HEART FAILURE): ICD-10-CM

## 2018-05-17 DIAGNOSIS — I50.23 ACUTE ON CHRONIC SYSTOLIC CHF, NYHA CLASS 2 AND ACC/AHA STAGE C: ICD-10-CM

## 2018-05-17 LAB
ANION GAP SERPL CALC-SCNC: 8 MMOL/L
BASOPHILS # BLD AUTO: 0.08 K/UL
BASOPHILS NFR BLD: 0.9 %
BNP SERPL-MCNC: 1170 PG/ML
BUN SERPL-MCNC: 34 MG/DL
CALCIUM SERPL-MCNC: 9.6 MG/DL
CHLORIDE SERPL-SCNC: 103 MMOL/L
CO2 SERPL-SCNC: 29 MMOL/L
CREAT SERPL-MCNC: 1.6 MG/DL
DIFFERENTIAL METHOD: ABNORMAL
EOSINOPHIL # BLD AUTO: 0 K/UL
EOSINOPHIL NFR BLD: 0 %
ERYTHROCYTE [DISTWIDTH] IN BLOOD BY AUTOMATED COUNT: 16.8 %
EST. GFR  (AFRICAN AMERICAN): 48 ML/MIN/1.73 M^2
EST. GFR  (NON AFRICAN AMERICAN): 41.5 ML/MIN/1.73 M^2
GLUCOSE SERPL-MCNC: 381 MG/DL
HCT VFR BLD AUTO: 38.7 %
HGB BLD-MCNC: 12 G/DL
IMM GRANULOCYTES # BLD AUTO: 0.04 K/UL
IMM GRANULOCYTES NFR BLD AUTO: 0.5 %
INR PPP: 1.1
LYMPHOCYTES # BLD AUTO: 1.3 K/UL
LYMPHOCYTES NFR BLD: 14.8 %
MAGNESIUM SERPL-MCNC: 2.2 MG/DL
MCH RBC QN AUTO: 28.4 PG
MCHC RBC AUTO-ENTMCNC: 31 G/DL
MCV RBC AUTO: 92 FL
MONOCYTES # BLD AUTO: 1 K/UL
MONOCYTES NFR BLD: 11.8 %
NEUTROPHILS # BLD AUTO: 6.2 K/UL
NEUTROPHILS NFR BLD: 72 %
NRBC BLD-RTO: 0 /100 WBC
PLATELET # BLD AUTO: 95 K/UL
PMV BLD AUTO: ABNORMAL FL
POCT GLUCOSE: 312 MG/DL (ref 70–110)
POCT GLUCOSE: 318 MG/DL (ref 70–110)
POCT GLUCOSE: 358 MG/DL (ref 70–110)
POTASSIUM SERPL-SCNC: 4.7 MMOL/L
PROTHROMBIN TIME: 11.7 SEC
RBC # BLD AUTO: 4.23 M/UL
SODIUM SERPL-SCNC: 140 MMOL/L
WBC # BLD AUTO: 8.59 K/UL

## 2018-05-17 PROCEDURE — C2624 WIRELESS PRESSURE SENSOR: HCPCS

## 2018-05-17 PROCEDURE — 63600175 PHARM REV CODE 636 W HCPCS: Performed by: PHYSICIAN ASSISTANT

## 2018-05-17 PROCEDURE — 02HR30Z INSERTION OF PRESSURE SENSOR MONITORING DEVICE INTO LEFT PULMONARY ARTERY, PERCUTANEOUS APPROACH: ICD-10-PCS | Performed by: INTERNAL MEDICINE

## 2018-05-17 PROCEDURE — 25000003 PHARM REV CODE 250: Performed by: PHYSICIAN ASSISTANT

## 2018-05-17 PROCEDURE — 85025 COMPLETE CBC W/AUTO DIFF WBC: CPT

## 2018-05-17 PROCEDURE — 80048 BASIC METABOLIC PNL TOTAL CA: CPT

## 2018-05-17 PROCEDURE — A4216 STERILE WATER/SALINE, 10 ML: HCPCS | Performed by: PHYSICIAN ASSISTANT

## 2018-05-17 PROCEDURE — 93451 RIGHT HEART CATH: CPT | Mod: 26,Q0,, | Performed by: INTERNAL MEDICINE

## 2018-05-17 PROCEDURE — 63600175 PHARM REV CODE 636 W HCPCS

## 2018-05-17 PROCEDURE — 25000003 PHARM REV CODE 250

## 2018-05-17 PROCEDURE — 25000003 PHARM REV CODE 250: Performed by: INTERNAL MEDICINE

## 2018-05-17 PROCEDURE — 83735 ASSAY OF MAGNESIUM: CPT

## 2018-05-17 PROCEDURE — 20600001 HC STEP DOWN PRIVATE ROOM

## 2018-05-17 PROCEDURE — 82962 GLUCOSE BLOOD TEST: CPT

## 2018-05-17 PROCEDURE — 99152 MOD SED SAME PHYS/QHP 5/>YRS: CPT | Mod: ,,, | Performed by: INTERNAL MEDICINE

## 2018-05-17 PROCEDURE — 99152 MOD SED SAME PHYS/QHP 5/>YRS: CPT

## 2018-05-17 PROCEDURE — 4A033B3 MEASUREMENT OF ARTERIAL PRESSURE, PULMONARY, PERCUTANEOUS APPROACH: ICD-10-PCS | Performed by: INTERNAL MEDICINE

## 2018-05-17 PROCEDURE — 85610 PROTHROMBIN TIME: CPT

## 2018-05-17 PROCEDURE — 36415 COLL VENOUS BLD VENIPUNCTURE: CPT

## 2018-05-17 PROCEDURE — 93568 NJX CAR CTH NSLC P-ART ANGRP: CPT | Mod: Q0,,, | Performed by: INTERNAL MEDICINE

## 2018-05-17 PROCEDURE — 4A023N6 MEASUREMENT OF CARDIAC SAMPLING AND PRESSURE, RIGHT HEART, PERCUTANEOUS APPROACH: ICD-10-PCS | Performed by: INTERNAL MEDICINE

## 2018-05-17 PROCEDURE — 93799 UNLISTED CV SVC/PROCEDURE: CPT | Mod: Q0,,, | Performed by: INTERNAL MEDICINE

## 2018-05-17 PROCEDURE — B31TYZZ FLUOROSCOPY OF LEFT PULMONARY ARTERY USING OTHER CONTRAST: ICD-10-PCS | Performed by: INTERNAL MEDICINE

## 2018-05-17 PROCEDURE — 83880 ASSAY OF NATRIURETIC PEPTIDE: CPT

## 2018-05-17 RX ORDER — GLUCAGON 1 MG
1 KIT INJECTION
Status: DISCONTINUED | OUTPATIENT
Start: 2018-05-17 | End: 2018-05-21 | Stop reason: HOSPADM

## 2018-05-17 RX ORDER — SODIUM CHLORIDE 0.9 % (FLUSH) 0.9 %
3 SYRINGE (ML) INJECTION EVERY 8 HOURS
Status: DISCONTINUED | OUTPATIENT
Start: 2018-05-17 | End: 2018-05-21 | Stop reason: HOSPADM

## 2018-05-17 RX ORDER — IBUPROFEN 200 MG
24 TABLET ORAL
Status: DISCONTINUED | OUTPATIENT
Start: 2018-05-17 | End: 2018-05-21 | Stop reason: HOSPADM

## 2018-05-17 RX ORDER — CLOPIDOGREL BISULFATE 75 MG/1
75 TABLET ORAL DAILY
Status: DISCONTINUED | OUTPATIENT
Start: 2018-05-18 | End: 2018-05-21 | Stop reason: HOSPADM

## 2018-05-17 RX ORDER — METOPROLOL SUCCINATE 25 MG/1
25 TABLET, EXTENDED RELEASE ORAL NIGHTLY
Status: DISCONTINUED | OUTPATIENT
Start: 2018-05-17 | End: 2018-05-21 | Stop reason: HOSPADM

## 2018-05-17 RX ORDER — LEVOTHYROXINE SODIUM 25 UG/1
25 TABLET ORAL DAILY
Status: DISCONTINUED | OUTPATIENT
Start: 2018-05-18 | End: 2018-05-21 | Stop reason: HOSPADM

## 2018-05-17 RX ORDER — INSULIN ASPART 100 [IU]/ML
0-5 INJECTION, SOLUTION INTRAVENOUS; SUBCUTANEOUS
Status: DISCONTINUED | OUTPATIENT
Start: 2018-05-17 | End: 2018-05-21 | Stop reason: HOSPADM

## 2018-05-17 RX ORDER — INSULIN ASPART 100 [IU]/ML
3 INJECTION, SOLUTION INTRAVENOUS; SUBCUTANEOUS
Status: DISCONTINUED | OUTPATIENT
Start: 2018-05-17 | End: 2018-05-21 | Stop reason: HOSPADM

## 2018-05-17 RX ORDER — ROSUVASTATIN CALCIUM 20 MG/1
20 TABLET, COATED ORAL DAILY
Status: DISCONTINUED | OUTPATIENT
Start: 2018-05-18 | End: 2018-05-21 | Stop reason: HOSPADM

## 2018-05-17 RX ORDER — FUROSEMIDE 10 MG/ML
80 INJECTION INTRAMUSCULAR; INTRAVENOUS ONCE
Status: COMPLETED | OUTPATIENT
Start: 2018-05-17 | End: 2018-05-17

## 2018-05-17 RX ORDER — IBUPROFEN 200 MG
16 TABLET ORAL
Status: DISCONTINUED | OUTPATIENT
Start: 2018-05-17 | End: 2018-05-21 | Stop reason: HOSPADM

## 2018-05-17 RX ORDER — OMEGA-3-ACID ETHYL ESTERS 1 G/1
2 CAPSULE, LIQUID FILLED ORAL 2 TIMES DAILY
Status: DISCONTINUED | OUTPATIENT
Start: 2018-05-17 | End: 2018-05-21 | Stop reason: HOSPADM

## 2018-05-17 RX ORDER — PANTOPRAZOLE SODIUM 40 MG/1
40 TABLET, DELAYED RELEASE ORAL DAILY
Status: DISCONTINUED | OUTPATIENT
Start: 2018-05-18 | End: 2018-05-21 | Stop reason: HOSPADM

## 2018-05-17 RX ORDER — DIGOXIN 0.05 MG/ML
0.06 SOLUTION ORAL EVERY MORNING
Status: DISCONTINUED | OUTPATIENT
Start: 2018-05-18 | End: 2018-05-21 | Stop reason: HOSPADM

## 2018-05-17 RX ORDER — DIPHENHYDRAMINE HCL 50 MG
50 CAPSULE ORAL ONCE
Status: COMPLETED | OUTPATIENT
Start: 2018-05-17 | End: 2018-05-17

## 2018-05-17 RX ADMIN — INSULIN ASPART 3 UNITS: 100 INJECTION, SOLUTION INTRAVENOUS; SUBCUTANEOUS at 05:05

## 2018-05-17 RX ADMIN — INSULIN ASPART 3 UNITS: 100 INJECTION, SOLUTION INTRAVENOUS; SUBCUTANEOUS at 10:05

## 2018-05-17 RX ADMIN — Medication 3 ML: at 10:05

## 2018-05-17 RX ADMIN — FUROSEMIDE 80 MG: 10 INJECTION, SOLUTION INTRAMUSCULAR; INTRAVENOUS at 05:05

## 2018-05-17 RX ADMIN — FUROSEMIDE 10 MG/HR: 10 INJECTION, SOLUTION INTRAMUSCULAR; INTRAVENOUS at 05:05

## 2018-05-17 RX ADMIN — OMEGA-3-ACID ETHYL ESTERS 2 G: 1 CAPSULE, LIQUID FILLED ORAL at 10:05

## 2018-05-17 RX ADMIN — DIPHENHYDRAMINE HYDROCHLORIDE 50 MG: 50 CAPSULE ORAL at 01:05

## 2018-05-17 RX ADMIN — INSULIN DETEMIR 35 UNITS: 100 INJECTION, SOLUTION SUBCUTANEOUS at 10:05

## 2018-05-17 RX ADMIN — INSULIN ASPART 4 UNITS: 100 INJECTION, SOLUTION INTRAVENOUS; SUBCUTANEOUS at 05:05

## 2018-05-17 RX ADMIN — METOPROLOL SUCCINATE 25 MG: 25 TABLET, EXTENDED RELEASE ORAL at 10:05

## 2018-05-17 NOTE — PROGRESS NOTES
Met with patient today 5/17/2018 after successful GUIDE-HF CardioMEMS implant. Patient entered into [facility] [employer] [facility] during implant and was successfully uploaded into the [employer] [facility] site. Clinic assignment changed to study protocol monitoring. Patient education regarding the [employer] performed post-implant by Mohsen García with [employer]. Initial patient transmission from [employer] successfully transmitted to GUIDE-HF [facility] site. CRC performed education regarding the study protocol requirements. Information regarding site contact (phone follow-ups and clinic visits) was reviewed. Informed patient to contact CRC or other study personnel if any questions, concerns, or symptoms arise during the study as well as if any medication changes occur (if patient is unable to take medication, forgets to take medication, or medications are changed by another provider outside of the study). Patient instructed to bring Patient [employer] System with them to the hospital if they are ever admitted as the [facility] [employer] [facility] will not be able to be used. Study ID card given to patient with device information and all study contacts' information (implanting physician, CRC, and FCE), and patient instructed to present study ID card whenever presenting to the ED, outpatient appointments, or if any medications are being performed by a provider. Patient's contact information verified, and patient prefers a call to the number provided for communication. Patient verbalized complete understanding of all study instructions and education provided. Patient denies any questions or concerns at this time. Patient to perform readings in the morning while hospitalized. CRC to follow-up with patient before discharge to assure successful transmissions, answer any questions, and confirm discharge medications. Understanding verbalized. Following patient education, patient randomized and randomization  arm entered into the GUIDE-HF [facility] site. Will follow up with patient tomorrow morning 5/18/2018.

## 2018-05-17 NOTE — INTERVAL H&P NOTE
Patient seen and examined. No interval change since last H&P. Here for a RHC and cardioMeMs implant  Access via the right CFV, left CFV  11 Fr venous sheath  Risks and benefits of the procedure were explained to the patient. All questions were answered.  Consents were signed and in the chart.     Mela Nails MD

## 2018-05-17 NOTE — BRIEF OP NOTE
[facility]  Brief Operative Note  Cardiology    SUMMARY     Surgery Date: 5/17/2018     Surgeon(s) and Role:     * Mela Nails MD - Primary    Assisting Surgeon: None    Pre-op Diagnosis:  Acute on chronic combined systolic and diastolic CHF, NYHA class 3 [I50.43]     Post-op Diagnosis: Post-Op Diagnosis Codes:     * Acute on chronic combined systolic and diastolic CHF, NYHA class 3 [I50.43]     Procedure Performed: RHC, PA angiogram and CardioMeMs implant     Description of the findings of the procedure: RHC and CardioMeMs implant performed via the right CFV.  Significantly elevated left and right sided filling pressures. Severe pulmonary HTN. Mildly decreased cardiac index and output. Pulmonary angiogram performed in the left pulmonary artery. Under fluoro guidance a cardioMeMs device was deployed. Position confirmed under fluoro.  Patient tolerated the procedure well. Device calibrated in the cathlab.      Etimated Blood Loss: < 10 cc     Plan:   - Routine post-cath care  - Bed rest for 4 hours  - Start Lasix drip  - S/p Cardiomems implant. Continue monitoring his hemodynamics via the cardiomems and tailor his diuretic therapy based on his PA diastolic.   Post implant monitoring will be done by Claudia Mendoza PA-C and Chris Seals  - Patient is enrolled in the Guide-HF study     Mela Nails MD

## 2018-05-17 NOTE — H&P (VIEW-ONLY)
"Subjective: class 2 after optimization     Patient ID:  Jerry Solis is a 77 y.o. male who presents for follow-up of Congestive Heart Failure      HPI   ICMP (EF=20%), CAD s/p PCI, S/P [place] [place] BiV ICD, chronic A fib, DM and HTN who is referred for evaluation and management of CHF  Comes today s/p discharge early April 2018 after CHF optimization / Cardiomems consideration    Interval history  Since discharge seems to be doing well.  Able to walk two blocks.  No edema today with a 9 kg decrease in clinic weight.  Able to carry 40 lb of feed from truck to house ( 100 ft)  Gets fatigued after eight hrs of work.    Six minute walk  04/20/2018---------Distance: 390.14 meters (1280 feet)  Based upon age and body mass index, exercise capacity is normal.     O2 Sat % Supplemental Oxygen Heart Rate Blood Pressure Nick   Scale   Pre-exercise  (Resting) 97 % Room Air 70 bpm 120/66 mmHg 0   During Exercise 98 % Room Air 88 bpm 120/58 mmHg 3   Post-exercise  (Recovery) 97 % Room Air  73 bpm         Review of Systems   Constitution: Negative for decreased appetite, weight gain and weight loss.   Cardiovascular: Negative for chest pain, dyspnea on exertion, leg swelling, near-syncope, orthopnea and palpitations.   Respiratory: Negative for cough and shortness of breath.    Musculoskeletal: Negative for myalgias.   Gastrointestinal: Negative for jaundice.        Objective:    Physical Exam   Constitutional: He is oriented to person, place, and time. He appears well-developed and well-nourished. He is active. He is not intubated.   /63 (BP Location: Left arm, Patient Position: Sitting, BP Method: Medium (Automatic))   Pulse 70   Ht 6' 2" (1.88 m)   Wt 90.6 kg (199 lb 11.8 oz)   BMI 25.64 kg/m²      HENT:   Head: Normocephalic and atraumatic. Hair is normal.   Right Ear: External ear normal.   Left Ear: External ear normal.   Nose: Nose normal. No nasal deformity. No epistaxis.  No foreign bodies.   Mouth/Throat: Mucous " membranes are normal. Mucous membranes are not cyanotic. No oropharyngeal exudate.   Eyes: Conjunctivae and EOM are normal. Pupils are equal, round, and reactive to light.   Neck: Neck supple. No hepatojugular reflux and no JVD (JVP < 5 ) present.   Cardiovascular: Normal rate, regular rhythm, normal heart sounds and normal pulses.  Exam reveals no gallop.    Pulmonary/Chest: Effort normal and breath sounds normal. No apnea and no tachypnea. He is not intubated. No respiratory distress. He exhibits no tenderness.   Abdominal: Soft. Normal appearance and bowel sounds are normal. There is no tenderness. No hernia.   Musculoskeletal: Normal range of motion.   Neurological: He is alert and oriented to person, place, and time. He displays no seizure activity.   Skin: Skin is warm, dry and intact. No rash noted. No pallor.   Psychiatric: He has a normal mood and affect. His speech is normal and behavior is normal. Thought content normal. Cognition and memory are normal.     Lab Results   Component Value Date     (H) 04/09/2018     04/20/2018    K 4.3 04/20/2018    MG 2.4 04/20/2018     04/20/2018    CO2 27 04/20/2018    BUN 42 (H) 04/20/2018    CREATININE 1.6 (H) 04/20/2018     (H) 04/20/2018    HGBA1C 8.1 (H) 04/06/2018    AST 17 04/09/2018    ALT 14 04/09/2018    ALBUMIN 3.4 (L) 04/09/2018    PROT 6.7 04/09/2018    BILITOT 1.0 04/09/2018         BNP   Date Value Ref Range Status   04/09/2018 366 (H) 0 - 99 pg/mL Final     Comment:     Values of less than 100 pg/ml are consistent with non-CHF populations.   03/22/2018 345 (H) 0 - 99 pg/mL Final     Comment:     Values of less than 100 pg/ml are consistent with non-CHF populations.               Assessment:           2. Chronic systolic CHF, NYHA class 2 and FABIAN/AHA stage C         Plan:   Chronic systolic CHF, NYHA class 2 and FABIAN/AHA stage C  Continue Lasix at 40 mg po bid .   To be seen in the CHF Clinic at [facility] monthly Will ask  them to help with titratation GDMT   -GDMT: on Entreso and Dig.  Change lopressor 12.5 BID  to toprol 25 qhs Suspect he can get up to 100 mg qhs   Not clear that hemoptysis previously reported related to entresto 97/ 103 [place] rechallenging once details of prior issues cleared up   Continue holding Aldactone til creatine reaches kiko (hopefully around 1.4 as this what is was previously)      Follow-up in about 1 month (around 5/20/2018).  Orders Placed This Encounter    Basic metabolic panel    Brain natriuretic peptide    Stress test, pulmonary    metoprolol succinate (TOPROL-XL) 25 MG 24 hr tablet

## 2018-05-18 ENCOUNTER — RESEARCH ENCOUNTER (OUTPATIENT)
Dept: RESEARCH | Facility: HOSPITAL | Age: 78
End: 2018-05-18

## 2018-05-18 PROBLEM — I50.23: Status: ACTIVE | Noted: 2018-05-18

## 2018-05-18 PROBLEM — E87.6 HYPOKALEMIA: Status: ACTIVE | Noted: 2018-05-18

## 2018-05-18 LAB
ALBUMIN SERPL BCP-MCNC: 3.5 G/DL
ALP SERPL-CCNC: 133 U/L
ALT SERPL W/O P-5'-P-CCNC: 18 U/L
ANION GAP SERPL CALC-SCNC: 11 MMOL/L
AST SERPL-CCNC: 17 U/L
BASOPHILS # BLD AUTO: 0.11 K/UL
BASOPHILS NFR BLD: 1 %
BILIRUB SERPL-MCNC: 1.5 MG/DL
BUN SERPL-MCNC: 31 MG/DL
CALCIUM SERPL-MCNC: 9.8 MG/DL
CHLORIDE SERPL-SCNC: 101 MMOL/L
CO2 SERPL-SCNC: 30 MMOL/L
CREAT SERPL-MCNC: 1.5 MG/DL
DIFFERENTIAL METHOD: ABNORMAL
EOSINOPHIL # BLD AUTO: 0 K/UL
EOSINOPHIL NFR BLD: 0.1 %
ERYTHROCYTE [DISTWIDTH] IN BLOOD BY AUTOMATED COUNT: 16.4 %
EST. GFR  (AFRICAN AMERICAN): 51.9 ML/MIN/1.73 M^2
EST. GFR  (NON AFRICAN AMERICAN): 44.9 ML/MIN/1.73 M^2
GLUCOSE SERPL-MCNC: 307 MG/DL
HCT VFR BLD AUTO: 39.3 %
HGB BLD-MCNC: 12.6 G/DL
IMM GRANULOCYTES # BLD AUTO: 0.03 K/UL
IMM GRANULOCYTES NFR BLD AUTO: 0.3 %
LYMPHOCYTES # BLD AUTO: 1.5 K/UL
LYMPHOCYTES NFR BLD: 13.7 %
MCH RBC QN AUTO: 28.9 PG
MCHC RBC AUTO-ENTMCNC: 32.1 G/DL
MCV RBC AUTO: 90 FL
MONOCYTES # BLD AUTO: 1.3 K/UL
MONOCYTES NFR BLD: 12 %
NEUTROPHILS # BLD AUTO: 8.1 K/UL
NEUTROPHILS NFR BLD: 72.9 %
NRBC BLD-RTO: 0 /100 WBC
PLATELET # BLD AUTO: 113 K/UL
PMV BLD AUTO: ABNORMAL FL
POCT GLUCOSE: 241 MG/DL (ref 70–110)
POCT GLUCOSE: 317 MG/DL (ref 70–110)
POCT GLUCOSE: 336 MG/DL (ref 70–110)
POCT GLUCOSE: 374 MG/DL (ref 70–110)
POTASSIUM SERPL-SCNC: 3.7 MMOL/L
PROT SERPL-MCNC: 7.1 G/DL
RBC # BLD AUTO: 4.36 M/UL
SODIUM SERPL-SCNC: 142 MMOL/L
WBC # BLD AUTO: 11.13 K/UL

## 2018-05-18 PROCEDURE — 85025 COMPLETE CBC W/AUTO DIFF WBC: CPT

## 2018-05-18 PROCEDURE — 63600175 PHARM REV CODE 636 W HCPCS: Performed by: PHYSICIAN ASSISTANT

## 2018-05-18 PROCEDURE — 36415 COLL VENOUS BLD VENIPUNCTURE: CPT

## 2018-05-18 PROCEDURE — 25000003 PHARM REV CODE 250: Performed by: PHYSICIAN ASSISTANT

## 2018-05-18 PROCEDURE — 80053 COMPREHEN METABOLIC PANEL: CPT

## 2018-05-18 PROCEDURE — A4216 STERILE WATER/SALINE, 10 ML: HCPCS | Performed by: PHYSICIAN ASSISTANT

## 2018-05-18 PROCEDURE — 99232 SBSQ HOSP IP/OBS MODERATE 35: CPT | Mod: ,,, | Performed by: INTERNAL MEDICINE

## 2018-05-18 PROCEDURE — 20600001 HC STEP DOWN PRIVATE ROOM

## 2018-05-18 PROCEDURE — 25000003 PHARM REV CODE 250: Performed by: INTERNAL MEDICINE

## 2018-05-18 RX ORDER — POTASSIUM CHLORIDE 750 MG/1
40 CAPSULE, EXTENDED RELEASE ORAL ONCE
Status: COMPLETED | OUTPATIENT
Start: 2018-05-18 | End: 2018-05-18

## 2018-05-18 RX ORDER — FUROSEMIDE 10 MG/ML
80 INJECTION INTRAMUSCULAR; INTRAVENOUS ONCE
Status: COMPLETED | OUTPATIENT
Start: 2018-05-18 | End: 2018-05-18

## 2018-05-18 RX ADMIN — FUROSEMIDE 20 MG/HR: 10 INJECTION, SOLUTION INTRAMUSCULAR; INTRAVENOUS at 06:05

## 2018-05-18 RX ADMIN — OMEGA-3-ACID ETHYL ESTERS 2 G: 1 CAPSULE, LIQUID FILLED ORAL at 09:05

## 2018-05-18 RX ADMIN — INSULIN ASPART 3 UNITS: 100 INJECTION, SOLUTION INTRAVENOUS; SUBCUTANEOUS at 09:05

## 2018-05-18 RX ADMIN — DIGOXIN 0.06 MG: 0.05 SOLUTION ORAL at 11:05

## 2018-05-18 RX ADMIN — INSULIN ASPART 4 UNITS: 100 INJECTION, SOLUTION INTRAVENOUS; SUBCUTANEOUS at 06:05

## 2018-05-18 RX ADMIN — Medication 3 ML: at 02:05

## 2018-05-18 RX ADMIN — PANTOPRAZOLE SODIUM 40 MG: 40 TABLET, DELAYED RELEASE ORAL at 09:05

## 2018-05-18 RX ADMIN — FUROSEMIDE 80 MG: 10 INJECTION, SOLUTION INTRAMUSCULAR; INTRAVENOUS at 09:05

## 2018-05-18 RX ADMIN — Medication 3 ML: at 06:05

## 2018-05-18 RX ADMIN — FUROSEMIDE 10 MG/HR: 10 INJECTION, SOLUTION INTRAMUSCULAR; INTRAVENOUS at 08:05

## 2018-05-18 RX ADMIN — LEVOTHYROXINE SODIUM 25 MCG: 25 TABLET ORAL at 09:05

## 2018-05-18 RX ADMIN — APIXABAN 2.5 MG: 2.5 TABLET, FILM COATED ORAL at 09:05

## 2018-05-18 RX ADMIN — INSULIN DETEMIR 35 UNITS: 100 INJECTION, SOLUTION SUBCUTANEOUS at 09:05

## 2018-05-18 RX ADMIN — POTASSIUM CHLORIDE 40 MEQ: 750 CAPSULE, EXTENDED RELEASE ORAL at 09:05

## 2018-05-18 RX ADMIN — INSULIN ASPART 2 UNITS: 100 INJECTION, SOLUTION INTRAVENOUS; SUBCUTANEOUS at 09:05

## 2018-05-18 RX ADMIN — ROSUVASTATIN CALCIUM 20 MG: 20 TABLET, FILM COATED ORAL at 09:05

## 2018-05-18 RX ADMIN — INSULIN ASPART 4 UNITS: 100 INJECTION, SOLUTION INTRAVENOUS; SUBCUTANEOUS at 02:05

## 2018-05-18 RX ADMIN — METOPROLOL SUCCINATE 25 MG: 25 TABLET, EXTENDED RELEASE ORAL at 09:05

## 2018-05-18 RX ADMIN — INSULIN DETEMIR 35 UNITS: 100 INJECTION, SOLUTION SUBCUTANEOUS at 11:05

## 2018-05-18 RX ADMIN — INSULIN ASPART 3 UNITS: 100 INJECTION, SOLUTION INTRAVENOUS; SUBCUTANEOUS at 02:05

## 2018-05-18 RX ADMIN — INSULIN ASPART 3 UNITS: 100 INJECTION, SOLUTION INTRAVENOUS; SUBCUTANEOUS at 06:05

## 2018-05-18 RX ADMIN — CLOPIDOGREL 75 MG: 75 TABLET, FILM COATED ORAL at 09:05

## 2018-05-18 NOTE — PROGRESS NOTES
Met with patient today 5/18/2018 post-CardioMEMS implant yesterday 5/17/2018 for the GUIDE-HF trial. No issues or complaints noted overnight. Initial DARIEN report sent to [employer] due to prolonged hospitalization for the treatment of CHF with IV diuresis. CRC met with patient to address any questions or concerns. Additional teaching regarding CardioMEMS transmissions provided. After troubleshooting, patient successfully transmitted reading this morning and again this afternoon with some positioning assistance. Patient restarted on Eliquis this morning and remains on Plavix at this time. Patient instructed that blinded CRC will begin bi-monthly phone contacts for the first three months post-implant. Also instructed patient to contact this CRC with any symptoms, questions, concerns, or changes in medications or medical care throughout study. Back-up CRC contact information given as well. Patient verbalized complete understanding of all above instructions. Patient to remain hospitalized over the weekend for diuresis. CRC to follow-up with patient next week prior to discharge to confirm discharge medications and assure successful transmissions over the weekend. Will follow.

## 2018-05-18 NOTE — ASSESSMENT & PLAN NOTE
-ICM  -Last 2D Echo 4/5/2018: LVEF 20-25%, LVEDD 6.7 cm   -Elevated filling pressures on admit: RA: 25, PCWP: 46  -More euvolemic on exam today and creat bum from 1.5-1.9.  I/O's not accurate and weight is down significantly.  -Diuresed on Lasix drip; will stop drip today and try Bumex 2 mg BID starting tonight   -GDMT with BB; Entresto held on admit secondary to fluctuating renal function; will resume when renal function stable (baseline appears to be 1.4-1.5)  -Patient is not currently being evaluated for OHTx/VAD  -Heart failure pathway was not initiated   -s/p CardioMEMS implant 5/17/2018 as part of GUIDE-HF trial. Patient to lay on his pillow to transmit readings daily.   -2g Na dietary restriction, 1500 mL fluid restriction, strict I/Os

## 2018-05-18 NOTE — PLAN OF CARE
Problem: Patient Care Overview  Goal: Plan of Care Review  Outcome: Ongoing (interventions implemented as appropriate)  Patient remains free from falls and injuries through out shift. Patient AAO and VSS. Patient denies chest pain and SOB. Patient's family at bedside. Patient Rt groin site is CDI and has no swelling nor hematoma formation. Lasix gtt 10mg/hr infusing at 5mL/hr. Patient maintained bedrest until 2100 and then HOB was gradually elevated. Patient is accucheck ACHS that is managed through insulin and meals, last . Patient tolerated gradual HOB elevation well.Post op frequents completed and documented. Plan of care reviewed with patient. Patient verbalizes understanding of plan.  Will continue to monitor.

## 2018-05-18 NOTE — ASSESSMENT & PLAN NOTE
-ICM  -Last 2D Echo 4/5/2018: LVEF 20-25%, LVEDD 6.7 cm  -Hypervolemic on examination today  -Current diuretic regimen: Furosemide gtt. Net negative over the last 24 hours. Will increase gtt today.  -GDMT with BB  -Patient is not currently being evaluated for OHTx/VAD  -Heart failure pathway was not initiated   -s/p CardioMEMS implant 5/17/2018 as part of GUIDE-HF trial. Patient to lay on his pillow to transmit readings daily.   -2g Na dietary restriction, 1500 mL fluid restriction, strict I/Os

## 2018-05-18 NOTE — PROGRESS NOTES
[facility]  Heart Transplant  Progress Note    Patient Name: Jerry Solis  MRN: [id number]  Admission Date: 5/17/2018  Hospital Length of Stay: 1 days  Attending Physician: Mela Nails MD  Primary Care Provider: Primary Doctor No  Principal Problem:Acute on chronic systolic CHF, NYHA class 2 and ACC/AHA stage C    Subjective:     Interval History: No complaints this morning. Wife at bedside. Reports he did not have hemoptysis, only tiny amount of blood tinged expectorated when coughing last night. This has happened at home before he reports and resolved.    Continuous Infusions:   furosemide (LASIX) 2 mg/mL infusion (non-titrating) 20 mg/hr (05/18/18 0931)     Scheduled Meds:   apixaban  2.5 mg Oral BID    clopidogrel  75 mg Oral Daily    digoxin  0.0625 mg Oral QAM    insulin aspart U-100  3 Units Subcutaneous TIDWM    insulin detemir U-100  35 Units Subcutaneous BID    levothyroxine  25 mcg Oral Daily    metoprolol succinate  25 mg Oral QHS    omega-3 acid ethyl esters  2 g Oral BID    pantoprazole  40 mg Oral Daily    rosuvastatin  20 mg Oral Daily    sodium chloride 0.9%  3 mL Intravenous Q8H     PRN Meds:dextrose 50%, dextrose 50%, glucagon (human recombinant), glucose, glucose, insulin aspart U-100    Review of patient's allergies indicates:   Allergen Reactions    Adhesive Other (See Comments)     blisters    Codeine Other (See Comments)     Blurred vision    Latex Hives    Ace inhibitors     Lipitor [atorvastatin] Other (See Comments)     Muscle spasms     Objective:     Vital Signs (Most Recent):  Temp: 98 °F (36.7 °C) (05/18/18 0756)  Pulse: 68 (05/18/18 0756)  Resp: 14 (05/18/18 0756)  BP: (!) 109/55 (05/18/18 0756)  SpO2: (!) 91 % (05/18/18 0756) Vital Signs (24h Range):  Temp:  [96.8 °F (36 °C)-98.2 °F (36.8 °C)] 98 °F (36.7 °C)  Pulse:  [68-72] 68  Resp:  [14-18] 14  SpO2:  [90 %-95 %] 91 %  BP: (109-141)/(55-83) 109/55     Patient Vitals for the past 72 hrs  (Last 3 readings):   Weight   05/18/18 0500 85.9 kg (189 lb 6 oz)   05/17/18 1658 91.4 kg (201 lb 6.6 oz)   05/17/18 1050 94.1 kg (207 lb 7.3 oz)     Body mass index is 24.99 kg/m².      Intake/Output Summary (Last 24 hours) at 05/18/18 1058  Last data filed at 05/18/18 0500   Gross per 24 hour   Intake              480 ml   Output             1900 ml   Net            -1420 ml       Hemodynamic Parameters:       Telemetry: NAEO    Physical Exam   Constitutional: He is oriented to person, place, and time. He appears well-developed and well-nourished.   HENT:   Head: Normocephalic and atraumatic.   Eyes: EOM are normal. Pupils are equal, round, and reactive to light.   Neck: Normal range of motion. Neck supple. JVD present.   Cardiovascular: Normal rate and regular rhythm.    Right groin site dressed which is C/D/I and no hematoma present    Pulmonary/Chest: Breath sounds normal. No respiratory distress. He has no wheezes.   Abdominal: Soft. Bowel sounds are normal. He exhibits no distension. There is no tenderness.   Musculoskeletal: He exhibits no edema.   Neurological: He is alert and oriented to person, place, and time.   Skin: Skin is warm and dry.   Nursing note and vitals reviewed.      Significant Labs:  CBC:    Recent Labs  Lab 05/17/18  1117 05/18/18  0428   WBC 8.59 11.13   RBC 4.23* 4.36*   HGB 12.0* 12.6*   HCT 38.7* 39.3*   PLT 95* 113*   MCV 92 90   MCH 28.4 28.9   MCHC 31.0* 32.1     BNP:    Recent Labs  Lab 05/17/18  1827   BNP 1,170*     CMP:    Recent Labs  Lab 05/17/18  1117 05/18/18  0428   * 307*   CALCIUM 9.6 9.8   ALBUMIN  --  3.5   PROT  --  7.1    142   K 4.7 3.7   CO2 29 30*    101   BUN 34* 31*   CREATININE 1.6* 1.5*   ALKPHOS  --  133   ALT  --  18   AST  --  17   BILITOT  --  1.5*      Coagulation:     Recent Labs  Lab 05/17/18  1154   INR 1.1     LDH:  No results for input(s): LDH in the last 72 hours.  Microbiology:  Microbiology Results (last 7 days)     ** No  results found for the last 168 hours. **          I have reviewed all pertinent labs within the past 24 hours.    Estimated Creatinine Clearance: 48.1 mL/min (A) (based on SCr of 1.5 mg/dL (H)).    Diagnostic Results:  I have reviewed and interpreted all pertinent imaging results/findings within the past 24 hours.    Assessment and Plan:     No notes on file    * Acute on chronic systolic CHF, NYHA class 2 and ACC/AHA stage C    -ICM  -Last 2D Echo 4/5/2018: LVEF 20-25%, LVEDD 6.7 cm  -Hypervolemic on examination today  -Current diuretic regimen: Furosemide gtt. Net negative over the last 24 hours. Will increase gtt today.  -GDMT with BB  -Patient is not currently being evaluated for OHTx/VAD  -Heart failure pathway was not initiated   -s/p CardioMEMS implant 5/17/2018 as part of GUIDE-HF trial. Patient to lay on his pillow to transmit readings daily.   -2g Na dietary restriction, 1500 mL fluid restriction, strict I/Os          Hypokalemia    -Replete K PRN to maintain K>4.0        Atrial fibrillation    -continue apixaban        Ischemic cardiomyopathy    -continue home medications            Claudia Mendoza PA-C  Heart Transplant  [facility]

## 2018-05-18 NOTE — SUBJECTIVE & OBJECTIVE
Interval History: No complaints this morning. Wife at bedside. Reports he did not have hemoptysis, only tiny amount of blood tinged expectorated when coughing last night. This has happened at home before he reports and resolved.    Continuous Infusions:   furosemide (LASIX) 2 mg/mL infusion (non-titrating) 20 mg/hr (05/18/18 0931)     Scheduled Meds:   apixaban  2.5 mg Oral BID    clopidogrel  75 mg Oral Daily    digoxin  0.0625 mg Oral QAM    insulin aspart U-100  3 Units Subcutaneous TIDWM    insulin detemir U-100  35 Units Subcutaneous BID    levothyroxine  25 mcg Oral Daily    metoprolol succinate  25 mg Oral QHS    omega-3 acid ethyl esters  2 g Oral BID    pantoprazole  40 mg Oral Daily    rosuvastatin  20 mg Oral Daily    sodium chloride 0.9%  3 mL Intravenous Q8H     PRN Meds:dextrose 50%, dextrose 50%, glucagon (human recombinant), glucose, glucose, insulin aspart U-100    Review of patient's allergies indicates:   Allergen Reactions    Adhesive Other (See Comments)     blisters    Codeine Other (See Comments)     Blurred vision    Latex Hives    Ace inhibitors     Lipitor [atorvastatin] Other (See Comments)     Muscle spasms     Objective:     Vital Signs (Most Recent):  Temp: 98 °F (36.7 °C) (05/18/18 0756)  Pulse: 68 (05/18/18 0756)  Resp: 14 (05/18/18 0756)  BP: (!) 109/55 (05/18/18 0756)  SpO2: (!) 91 % (05/18/18 0756) Vital Signs (24h Range):  Temp:  [96.8 °F (36 °C)-98.2 °F (36.8 °C)] 98 °F (36.7 °C)  Pulse:  [68-72] 68  Resp:  [14-18] 14  SpO2:  [90 %-95 %] 91 %  BP: (109-141)/(55-83) 109/55     Patient Vitals for the past 72 hrs (Last 3 readings):   Weight   05/18/18 0500 85.9 kg (189 lb 6 oz)   05/17/18 1658 91.4 kg (201 lb 6.6 oz)   05/17/18 1050 94.1 kg (207 lb 7.3 oz)     Body mass index is 24.99 kg/m².      Intake/Output Summary (Last 24 hours) at 05/18/18 1058  Last data filed at 05/18/18 0500   Gross per 24 hour   Intake              480 ml   Output             1900 ml   Net             -1420 ml       Hemodynamic Parameters:       Telemetry: NAEO    Physical Exam   Constitutional: He is oriented to person, place, and time. He appears well-developed and well-nourished.   HENT:   Head: Normocephalic and atraumatic.   Eyes: EOM are normal. Pupils are equal, round, and reactive to light.   Neck: Normal range of motion. Neck supple. JVD present.   Cardiovascular: Normal rate and regular rhythm.    Right groin site dressed which is C/D/I and no hematoma present    Pulmonary/Chest: Breath sounds normal. No respiratory distress. He has no wheezes.   Abdominal: Soft. Bowel sounds are normal. He exhibits no distension. There is no tenderness.   Musculoskeletal: He exhibits no edema.   Neurological: He is alert and oriented to person, place, and time.   Skin: Skin is warm and dry.   Nursing note and vitals reviewed.      Significant Labs:  CBC:    Recent Labs  Lab 05/17/18  1117 05/18/18  0428   WBC 8.59 11.13   RBC 4.23* 4.36*   HGB 12.0* 12.6*   HCT 38.7* 39.3*   PLT 95* 113*   MCV 92 90   MCH 28.4 28.9   MCHC 31.0* 32.1     BNP:    Recent Labs  Lab 05/17/18  1827   BNP 1,170*     CMP:    Recent Labs  Lab 05/17/18  1117 05/18/18  0428   * 307*   CALCIUM 9.6 9.8   ALBUMIN  --  3.5   PROT  --  7.1    142   K 4.7 3.7   CO2 29 30*    101   BUN 34* 31*   CREATININE 1.6* 1.5*   ALKPHOS  --  133   ALT  --  18   AST  --  17   BILITOT  --  1.5*      Coagulation:     Recent Labs  Lab 05/17/18  1154   INR 1.1     LDH:  No results for input(s): LDH in the last 72 hours.  Microbiology:  Microbiology Results (last 7 days)     ** No results found for the last 168 hours. **          I have reviewed all pertinent labs within the past 24 hours.    Estimated Creatinine Clearance: 48.1 mL/min (A) (based on SCr of 1.5 mg/dL (H)).    Diagnostic Results:  I have reviewed and interpreted all pertinent imaging results/findings within the past 24 hours.

## 2018-05-18 NOTE — PROGRESS NOTES
"                                                              Post-OP follow-up note      Surgery Date: 5/17/2018         Procedure Performed: RHC, PA angiogram and CardioMeMs implant      Patient had a CardioMeMs implant yesterday. Tolerated the procedure well. No acute events overnight. Has no complaint. Left and right filling pressures were significantly elevated. Started on Lasix drip at 10 mg/Hr. Net negative ~1.4 L. Access site (right groin) soft to palpation with no hematoma.      Physical Exam   Constitutional: He is oriented to person, place, and time. He appears well-developed and well-nourished. He is active. He is not intubated.   BP (!) 109/55 (BP Location: Left arm, Patient Position: Lying)   Pulse 68   Temp 98 °F (36.7 °C) (Oral)   Resp 14   Ht 6' 1" (1.854 m)   Wt 85.9 kg (189 lb 6 oz)   SpO2 (!) 91%   BMI 24.99 kg/m²      HENT:   Head: Normocephalic and atraumatic. Hair is normal.   Neck: JVD  Cardiovascular: Normal rate, regular rhythm, normal heart sounds and normal pulses.  Exam reveals no gallop.    Pulmonary/Chest: Effort normal and breath sounds normal. No apnea and no tachypnea. He is not intubated. No respiratory distress. He exhibits no tenderness.   Abdominal: Soft. Normal appearance and bowel sounds are normal. There is no tenderness. No hernia.   Musculoskeletal: Normal range of motion.   Neurological: He is alert and oriented to person, place, and time. He displays no seizure activity.   Skin: Skin is warm, dry and intact. No rash noted. No pallor.   Psychiatric: He has a normal mood and affect. His speech is normal and behavior is normal. Thought content normal. Cognition and memory are normal.   Ext: Edema    Plan:   - Continue IV diuresis as per HTS team  - S/p Cardiomems implant.  - Patient is enrolled in the Guide-HF study  - Post implant monitoring will be done by Bisi Chris (CRC for Guide-HF), Claudia Mendoza PA-C and FARIDEH Malagon MD       "

## 2018-05-18 NOTE — PLAN OF CARE
Problem: Patient Care Overview  Goal: Plan of Care Review  Outcome: Ongoing (interventions implemented as appropriate)  Pt AA0x3 and VSS.  Two side rails up, bed locked, call light within reach. No falls noted as these precautions remain. Pt free of skin breakdown as the pt moves well independently. Hourly rounds made and no complaints at this time noted. No adverse events during shift,  will resume with plan of care and continue to monitor.

## 2018-05-19 LAB
ALBUMIN SERPL BCP-MCNC: 3.6 G/DL
ALP SERPL-CCNC: 147 U/L
ALT SERPL W/O P-5'-P-CCNC: 16 U/L
ANION GAP SERPL CALC-SCNC: 15 MMOL/L
AST SERPL-CCNC: 16 U/L
BASOPHILS # BLD AUTO: 0.12 K/UL
BASOPHILS NFR BLD: 1.1 %
BILIRUB SERPL-MCNC: 1.2 MG/DL
BUN SERPL-MCNC: 41 MG/DL
CALCIUM SERPL-MCNC: 10.1 MG/DL
CHLORIDE SERPL-SCNC: 95 MMOL/L
CO2 SERPL-SCNC: 32 MMOL/L
CREAT SERPL-MCNC: 1.9 MG/DL
DIFFERENTIAL METHOD: ABNORMAL
EOSINOPHIL # BLD AUTO: 0 K/UL
EOSINOPHIL NFR BLD: 0 %
ERYTHROCYTE [DISTWIDTH] IN BLOOD BY AUTOMATED COUNT: 16 %
EST. GFR  (AFRICAN AMERICAN): 39 ML/MIN/1.73 M^2
EST. GFR  (NON AFRICAN AMERICAN): 33.7 ML/MIN/1.73 M^2
GLUCOSE SERPL-MCNC: 393 MG/DL
HCT VFR BLD AUTO: 42.3 %
HGB BLD-MCNC: 13.4 G/DL
IMM GRANULOCYTES # BLD AUTO: 0.03 K/UL
IMM GRANULOCYTES NFR BLD AUTO: 0.3 %
LYMPHOCYTES # BLD AUTO: 1.7 K/UL
LYMPHOCYTES NFR BLD: 16.3 %
MCH RBC QN AUTO: 28.3 PG
MCHC RBC AUTO-ENTMCNC: 31.7 G/DL
MCV RBC AUTO: 89 FL
MONOCYTES # BLD AUTO: 1.4 K/UL
MONOCYTES NFR BLD: 13.3 %
NEUTROPHILS # BLD AUTO: 7.2 K/UL
NEUTROPHILS NFR BLD: 69 %
NRBC BLD-RTO: 0 /100 WBC
PLATELET # BLD AUTO: 113 K/UL
PMV BLD AUTO: 13.7 FL
POCT GLUCOSE: 294 MG/DL (ref 70–110)
POCT GLUCOSE: 349 MG/DL (ref 70–110)
POCT GLUCOSE: 366 MG/DL (ref 70–110)
POCT GLUCOSE: 420 MG/DL (ref 70–110)
POTASSIUM SERPL-SCNC: 3.8 MMOL/L
PROT SERPL-MCNC: 7.4 G/DL
RBC # BLD AUTO: 4.73 M/UL
SODIUM SERPL-SCNC: 142 MMOL/L
WBC # BLD AUTO: 10.47 K/UL

## 2018-05-19 PROCEDURE — 20600001 HC STEP DOWN PRIVATE ROOM

## 2018-05-19 PROCEDURE — 80053 COMPREHEN METABOLIC PANEL: CPT

## 2018-05-19 PROCEDURE — 94761 N-INVAS EAR/PLS OXIMETRY MLT: CPT

## 2018-05-19 PROCEDURE — 25000003 PHARM REV CODE 250: Performed by: INTERNAL MEDICINE

## 2018-05-19 PROCEDURE — 99232 SBSQ HOSP IP/OBS MODERATE 35: CPT | Mod: ,,, | Performed by: INTERNAL MEDICINE

## 2018-05-19 PROCEDURE — 25000003 PHARM REV CODE 250: Performed by: PHYSICIAN ASSISTANT

## 2018-05-19 PROCEDURE — A4216 STERILE WATER/SALINE, 10 ML: HCPCS | Performed by: PHYSICIAN ASSISTANT

## 2018-05-19 PROCEDURE — 36415 COLL VENOUS BLD VENIPUNCTURE: CPT

## 2018-05-19 PROCEDURE — 85025 COMPLETE CBC W/AUTO DIFF WBC: CPT

## 2018-05-19 PROCEDURE — 63600175 PHARM REV CODE 636 W HCPCS: Performed by: PHYSICIAN ASSISTANT

## 2018-05-19 RX ORDER — BUMETANIDE 1 MG/1
2 TABLET ORAL 2 TIMES DAILY
Status: DISCONTINUED | OUTPATIENT
Start: 2018-05-19 | End: 2018-05-20

## 2018-05-19 RX ORDER — POTASSIUM CHLORIDE 20 MEQ/1
40 TABLET, EXTENDED RELEASE ORAL ONCE
Status: COMPLETED | OUTPATIENT
Start: 2018-05-19 | End: 2018-05-19

## 2018-05-19 RX ADMIN — CLOPIDOGREL 75 MG: 75 TABLET, FILM COATED ORAL at 09:05

## 2018-05-19 RX ADMIN — OMEGA-3-ACID ETHYL ESTERS 2 G: 1 CAPSULE, LIQUID FILLED ORAL at 09:05

## 2018-05-19 RX ADMIN — INSULIN DETEMIR 50 UNITS: 100 INJECTION, SOLUTION SUBCUTANEOUS at 05:05

## 2018-05-19 RX ADMIN — INSULIN ASPART 3 UNITS: 100 INJECTION, SOLUTION INTRAVENOUS; SUBCUTANEOUS at 09:05

## 2018-05-19 RX ADMIN — INSULIN ASPART 10 UNITS: 100 INJECTION, SOLUTION INTRAVENOUS; SUBCUTANEOUS at 05:05

## 2018-05-19 RX ADMIN — OMEGA-3-ACID ETHYL ESTERS 2 G: 1 CAPSULE, LIQUID FILLED ORAL at 08:05

## 2018-05-19 RX ADMIN — INSULIN ASPART 4 UNITS: 100 INJECTION, SOLUTION INTRAVENOUS; SUBCUTANEOUS at 01:05

## 2018-05-19 RX ADMIN — PANTOPRAZOLE SODIUM 40 MG: 40 TABLET, DELAYED RELEASE ORAL at 09:05

## 2018-05-19 RX ADMIN — POTASSIUM CHLORIDE 40 MEQ: 1500 TABLET, EXTENDED RELEASE ORAL at 01:05

## 2018-05-19 RX ADMIN — INSULIN ASPART 5 UNITS: 100 INJECTION, SOLUTION INTRAVENOUS; SUBCUTANEOUS at 11:05

## 2018-05-19 RX ADMIN — METOPROLOL SUCCINATE 25 MG: 25 TABLET, EXTENDED RELEASE ORAL at 08:05

## 2018-05-19 RX ADMIN — INSULIN ASPART 3 UNITS: 100 INJECTION, SOLUTION INTRAVENOUS; SUBCUTANEOUS at 01:05

## 2018-05-19 RX ADMIN — INSULIN DETEMIR 35 UNITS: 100 INJECTION, SOLUTION SUBCUTANEOUS at 09:05

## 2018-05-19 RX ADMIN — ROSUVASTATIN CALCIUM 20 MG: 20 TABLET, FILM COATED ORAL at 09:05

## 2018-05-19 RX ADMIN — Medication 3 ML: at 01:05

## 2018-05-19 RX ADMIN — LEVOTHYROXINE SODIUM 25 MCG: 25 TABLET ORAL at 09:05

## 2018-05-19 RX ADMIN — BUMETANIDE 2 MG: 1 TABLET ORAL at 08:05

## 2018-05-19 RX ADMIN — APIXABAN 2.5 MG: 2.5 TABLET, FILM COATED ORAL at 08:05

## 2018-05-19 RX ADMIN — FUROSEMIDE 20 MG/HR: 10 INJECTION, SOLUTION INTRAMUSCULAR; INTRAVENOUS at 04:05

## 2018-05-19 RX ADMIN — APIXABAN 2.5 MG: 2.5 TABLET, FILM COATED ORAL at 09:05

## 2018-05-19 RX ADMIN — DIGOXIN 0.06 MG: 0.05 SOLUTION ORAL at 07:05

## 2018-05-19 NOTE — SUBJECTIVE & OBJECTIVE
Interval History: Patient with no complaints this morning.  I/O's are not accurate because he was not measuring for most of the day yesterday.  His weight is down to 184lbs from 207 on admission.    Continuous Infusions:    Scheduled Meds:   apixaban  2.5 mg Oral BID    bumetanide  2 mg Oral BID    clopidogrel  75 mg Oral Daily    digoxin  0.0625 mg Oral QAM    insulin aspart U-100  3 Units Subcutaneous TIDWM    insulin detemir U-100  35 Units Subcutaneous BID    levothyroxine  25 mcg Oral Daily    metoprolol succinate  25 mg Oral QHS    omega-3 acid ethyl esters  2 g Oral BID    pantoprazole  40 mg Oral Daily    rosuvastatin  20 mg Oral Daily    sodium chloride 0.9%  3 mL Intravenous Q8H     PRN Meds:dextrose 50%, dextrose 50%, glucagon (human recombinant), glucose, glucose, insulin aspart U-100    Review of patient's allergies indicates:   Allergen Reactions    Adhesive Other (See Comments)     blisters    Codeine Other (See Comments)     Blurred vision    Latex Hives    Ace inhibitors     Lipitor [atorvastatin] Other (See Comments)     Muscle spasms     Objective:     Vital Signs (Most Recent):  Temp: 96.5 °F (35.8 °C) (05/19/18 0910)  Pulse: 70 (05/19/18 0910)  Resp: 16 (05/19/18 0910)  BP: (!) 91/53 (05/19/18 0910)  SpO2: 97 % (05/19/18 0910) Vital Signs (24h Range):  Temp:  [96.5 °F (35.8 °C)-97.9 °F (36.6 °C)] 96.5 °F (35.8 °C)  Pulse:  [66-78] 70  Resp:  [14-18] 16  SpO2:  [91 %-97 %] 97 %  BP: ()/(48-55) 91/53     Patient Vitals for the past 72 hrs (Last 3 readings):   Weight   05/19/18 0600 83.6 kg (184 lb 4.9 oz)   05/18/18 0500 85.9 kg (189 lb 6 oz)   05/17/18 1658 91.4 kg (201 lb 6.6 oz)     Body mass index is 24.32 kg/m².      Intake/Output Summary (Last 24 hours) at 05/19/18 1114  Last data filed at 05/19/18 0600   Gross per 24 hour   Intake              970 ml   Output             1975 ml   Net            -1005 ml       Hemodynamic Parameters:       Telemetry: NAEO    Physical  Exam   Constitutional: He is oriented to person, place, and time. He appears well-developed and well-nourished.   HENT:   Head: Normocephalic and atraumatic.   Eyes: EOM are normal. Pupils are equal, round, and reactive to light.   Neck: Normal range of motion. Neck supple. JVD (just above the clavicle with patient sitting on side of bed ) present.   Cardiovascular: Normal rate and regular rhythm.    Right groin site dressed which is C/D/I and no hematoma present    Pulmonary/Chest: Breath sounds normal. No respiratory distress. He has no wheezes.   Abdominal: Soft. Bowel sounds are normal. He exhibits no distension. There is no tenderness.   Musculoskeletal: He exhibits no edema.   Neurological: He is alert and oriented to person, place, and time.   Skin: Skin is warm and dry.   Nursing note and vitals reviewed.      Significant Labs:  CBC:    Recent Labs  Lab 05/17/18  1117 05/18/18  0428 05/19/18  0531   WBC 8.59 11.13 10.47   RBC 4.23* 4.36* 4.73   HGB 12.0* 12.6* 13.4*   HCT 38.7* 39.3* 42.3   PLT 95* 113* 113*   MCV 92 90 89   MCH 28.4 28.9 28.3   MCHC 31.0* 32.1 31.7*     BNP:    Recent Labs  Lab 05/17/18  1827   BNP 1,170*     CMP:    Recent Labs  Lab 05/17/18  1117 05/18/18  0428 05/19/18  0531   * 307* 393*   CALCIUM 9.6 9.8 10.1   ALBUMIN  --  3.5 3.6   PROT  --  7.1 7.4    142 142   K 4.7 3.7 3.8   CO2 29 30* 32*    101 95   BUN 34* 31* 41*   CREATININE 1.6* 1.5* 1.9*   ALKPHOS  --  133 147*   ALT  --  18 16   AST  --  17 16   BILITOT  --  1.5* 1.2*      Coagulation:     Recent Labs  Lab 05/17/18  1154   INR 1.1     LDH:  No results for input(s): LDH in the last 72 hours.  Microbiology:  Microbiology Results (last 7 days)     ** No results found for the last 168 hours. **          I have reviewed all pertinent labs within the past 24 hours.    Estimated Creatinine Clearance: 38 mL/min (A) (based on SCr of 1.9 mg/dL (H)).    Diagnostic Results:  I have reviewed and interpreted all  pertinent imaging results/findings within the past 24 hours.

## 2018-05-19 NOTE — PROGRESS NOTES
[facility]  Heart Transplant  Progress Note    Patient Name: Jerry Solis  MRN: [id number]  Admission Date: 5/17/2018  Hospital Length of Stay: 2 days  Attending Physician: Mela Nails MD  Primary Care Provider: Primary Doctor No  Principal Problem:Acute on chronic systolic CHF, NYHA class 2 and ACC/AHA stage C    Subjective:     Interval History: Patient with no complaints this morning.  I/O's are not accurate because he was not measuring for most of the day yesterday.  His weight is down to 184lbs from 207 on admission.    Continuous Infusions:    Scheduled Meds:   apixaban  2.5 mg Oral BID    bumetanide  2 mg Oral BID    clopidogrel  75 mg Oral Daily    digoxin  0.0625 mg Oral QAM    insulin aspart U-100  3 Units Subcutaneous TIDWM    insulin detemir U-100  35 Units Subcutaneous BID    levothyroxine  25 mcg Oral Daily    metoprolol succinate  25 mg Oral QHS    omega-3 acid ethyl esters  2 g Oral BID    pantoprazole  40 mg Oral Daily    rosuvastatin  20 mg Oral Daily    sodium chloride 0.9%  3 mL Intravenous Q8H     PRN Meds:dextrose 50%, dextrose 50%, glucagon (human recombinant), glucose, glucose, insulin aspart U-100    Review of patient's allergies indicates:   Allergen Reactions    Adhesive Other (See Comments)     blisters    Codeine Other (See Comments)     Blurred vision    Latex Hives    Ace inhibitors     Lipitor [atorvastatin] Other (See Comments)     Muscle spasms     Objective:     Vital Signs (Most Recent):  Temp: 96.5 °F (35.8 °C) (05/19/18 0910)  Pulse: 70 (05/19/18 0910)  Resp: 16 (05/19/18 0910)  BP: (!) 91/53 (05/19/18 0910)  SpO2: 97 % (05/19/18 0910) Vital Signs (24h Range):  Temp:  [96.5 °F (35.8 °C)-97.9 °F (36.6 °C)] 96.5 °F (35.8 °C)  Pulse:  [66-78] 70  Resp:  [14-18] 16  SpO2:  [91 %-97 %] 97 %  BP: ()/(48-55) 91/53     Patient Vitals for the past 72 hrs (Last 3 readings):   Weight   05/19/18 0600 83.6 kg (184 lb 4.9 oz)   05/18/18 0500 85.9  kg (189 lb 6 oz)   05/17/18 1658 91.4 kg (201 lb 6.6 oz)     Body mass index is 24.32 kg/m².      Intake/Output Summary (Last 24 hours) at 05/19/18 1114  Last data filed at 05/19/18 0600   Gross per 24 hour   Intake              970 ml   Output             1975 ml   Net            -1005 ml       Hemodynamic Parameters:       Telemetry: NAEO    Physical Exam   Constitutional: He is oriented to person, place, and time. He appears well-developed and well-nourished.   HENT:   Head: Normocephalic and atraumatic.   Eyes: EOM are normal. Pupils are equal, round, and reactive to light.   Neck: Normal range of motion. Neck supple. JVD (just above the clavicle with patient sitting on side of bed ) present.   Cardiovascular: Normal rate and regular rhythm.    Right groin site dressed which is C/D/I and no hematoma present    Pulmonary/Chest: Breath sounds normal. No respiratory distress. He has no wheezes.   Abdominal: Soft. Bowel sounds are normal. He exhibits no distension. There is no tenderness.   Musculoskeletal: He exhibits no edema.   Neurological: He is alert and oriented to person, place, and time.   Skin: Skin is warm and dry.   Nursing note and vitals reviewed.      Significant Labs:  CBC:    Recent Labs  Lab 05/17/18 1117 05/18/18  0428 05/19/18  0531   WBC 8.59 11.13 10.47   RBC 4.23* 4.36* 4.73   HGB 12.0* 12.6* 13.4*   HCT 38.7* 39.3* 42.3   PLT 95* 113* 113*   MCV 92 90 89   MCH 28.4 28.9 28.3   MCHC 31.0* 32.1 31.7*     BNP:    Recent Labs  Lab 05/17/18  1827   BNP 1,170*     CMP:    Recent Labs  Lab 05/17/18 1117 05/18/18  0428 05/19/18  0531   * 307* 393*   CALCIUM 9.6 9.8 10.1   ALBUMIN  --  3.5 3.6   PROT  --  7.1 7.4    142 142   K 4.7 3.7 3.8   CO2 29 30* 32*    101 95   BUN 34* 31* 41*   CREATININE 1.6* 1.5* 1.9*   ALKPHOS  --  133 147*   ALT  --  18 16   AST  --  17 16   BILITOT  --  1.5* 1.2*      Coagulation:     Recent Labs  Lab 05/17/18  1154   INR 1.1     LDH:  No results for  input(s): LDH in the last 72 hours.  Microbiology:  Microbiology Results (last 7 days)     ** No results found for the last 168 hours. **          I have reviewed all pertinent labs within the past 24 hours.    Estimated Creatinine Clearance: 38 mL/min (A) (based on SCr of 1.9 mg/dL (H)).    Diagnostic Results:  I have reviewed and interpreted all pertinent imaging results/findings within the past 24 hours.    Assessment and Plan:     No notes on file    * Acute on chronic systolic CHF, NYHA class 2 and ACC/AHA stage C    -ICM  -Last 2D Echo 4/5/2018: LVEF 20-25%, LVEDD 6.7 cm   -Elevated filling pressures on admit: RA: 25, PCWP: 46  -More euvolemic on exam today and creat bum from 1.5-1.9.  I/O's not accurate and weight is down significantly.  -Diuresed on Lasix drip; will stop drip today and try Bumex 2 mg BID starting tonight   -GDMT with BB; Entresto held on admit secondary to fluctuating renal function; will resume when renal function stable (baseline appears to be 1.4-1.5)  -Patient is not currently being evaluated for OHTx/VAD  -Heart failure pathway was not initiated   -s/p CardioMEMS implant 5/17/2018 as part of GUIDE-HF trial. Patient to lay on his pillow to transmit readings daily.   -2g Na dietary restriction, 1500 mL fluid restriction, strict I/Os          Atrial fibrillation    -continue apixaban        Ischemic cardiomyopathy    -continue home medications        Hypokalemia    -Replete K PRN to maintain K>4.0        Type 2 diabetes mellitus with complication    -coverage with insulin and ISS            STAR Mahan  Heart Transplant  [facility]

## 2018-05-19 NOTE — PLAN OF CARE
Problem: Patient Care Overview  Goal: Plan of Care Review  Outcome: Ongoing (interventions implemented as appropriate)  Lasix drip infusing at MD order rate, pt. Continues to diurese. Pt remained free from falls/trauma/injury overnight. Turns/repositions independently in bed. POC reviewed with Pt, all questions answered. Pt denies SOB, CP, and palpitations. VSS, NADN. Will continue to monitor.

## 2018-05-19 NOTE — PLAN OF CARE
Problem: Patient Care Overview  Goal: Plan of Care Review  Lasix gtt was stopped this AM, patient was able to remove the groin dressing per doctor, patient up and ambulated in the hallway, No adverse events during shift, will continue to monitor

## 2018-05-20 LAB
ALBUMIN SERPL BCP-MCNC: 3.6 G/DL
ALP SERPL-CCNC: 137 U/L
ALT SERPL W/O P-5'-P-CCNC: 15 U/L
ANION GAP SERPL CALC-SCNC: 15 MMOL/L
AST SERPL-CCNC: 16 U/L
BASOPHILS # BLD AUTO: 0.12 K/UL
BASOPHILS NFR BLD: 1 %
BILIRUB SERPL-MCNC: 1 MG/DL
BUN SERPL-MCNC: 47 MG/DL
CALCIUM SERPL-MCNC: 9.9 MG/DL
CHLORIDE SERPL-SCNC: 96 MMOL/L
CO2 SERPL-SCNC: 31 MMOL/L
CREAT SERPL-MCNC: 1.9 MG/DL
DIFFERENTIAL METHOD: ABNORMAL
EOSINOPHIL # BLD AUTO: 0 K/UL
EOSINOPHIL NFR BLD: 0 %
ERYTHROCYTE [DISTWIDTH] IN BLOOD BY AUTOMATED COUNT: 16 %
EST. GFR  (AFRICAN AMERICAN): 39 ML/MIN/1.73 M^2
EST. GFR  (NON AFRICAN AMERICAN): 33.7 ML/MIN/1.73 M^2
GLUCOSE SERPL-MCNC: 137 MG/DL
HCT VFR BLD AUTO: 43.1 %
HGB BLD-MCNC: 14.1 G/DL
IMM GRANULOCYTES # BLD AUTO: 0.04 K/UL
IMM GRANULOCYTES NFR BLD AUTO: 0.3 %
LYMPHOCYTES # BLD AUTO: 2.4 K/UL
LYMPHOCYTES NFR BLD: 19.4 %
MCH RBC QN AUTO: 28.9 PG
MCHC RBC AUTO-ENTMCNC: 32.7 G/DL
MCV RBC AUTO: 88 FL
MONOCYTES # BLD AUTO: 1.4 K/UL
MONOCYTES NFR BLD: 11.6 %
NEUTROPHILS # BLD AUTO: 8.4 K/UL
NEUTROPHILS NFR BLD: 67.7 %
NRBC BLD-RTO: 0 /100 WBC
PLATELET # BLD AUTO: 130 K/UL
PMV BLD AUTO: 13.6 FL
POCT GLUCOSE: 119 MG/DL (ref 70–110)
POCT GLUCOSE: 155 MG/DL (ref 70–110)
POCT GLUCOSE: 289 MG/DL (ref 70–110)
POTASSIUM SERPL-SCNC: 3.5 MMOL/L
PROT SERPL-MCNC: 7.4 G/DL
RBC # BLD AUTO: 4.88 M/UL
SODIUM SERPL-SCNC: 142 MMOL/L
WBC # BLD AUTO: 12.37 K/UL

## 2018-05-20 PROCEDURE — 63600175 PHARM REV CODE 636 W HCPCS: Performed by: INTERNAL MEDICINE

## 2018-05-20 PROCEDURE — 25000003 PHARM REV CODE 250: Performed by: PHYSICIAN ASSISTANT

## 2018-05-20 PROCEDURE — 25000003 PHARM REV CODE 250: Performed by: INTERNAL MEDICINE

## 2018-05-20 PROCEDURE — A4216 STERILE WATER/SALINE, 10 ML: HCPCS | Performed by: PHYSICIAN ASSISTANT

## 2018-05-20 PROCEDURE — 99232 SBSQ HOSP IP/OBS MODERATE 35: CPT | Mod: ,,, | Performed by: INTERNAL MEDICINE

## 2018-05-20 PROCEDURE — 85025 COMPLETE CBC W/AUTO DIFF WBC: CPT

## 2018-05-20 PROCEDURE — 36415 COLL VENOUS BLD VENIPUNCTURE: CPT

## 2018-05-20 PROCEDURE — 80053 COMPREHEN METABOLIC PANEL: CPT

## 2018-05-20 PROCEDURE — 20600001 HC STEP DOWN PRIVATE ROOM

## 2018-05-20 RX ORDER — POTASSIUM CHLORIDE 20 MEQ/1
60 TABLET, EXTENDED RELEASE ORAL ONCE
Status: COMPLETED | OUTPATIENT
Start: 2018-05-20 | End: 2018-05-20

## 2018-05-20 RX ORDER — BUMETANIDE 1 MG/1
1 TABLET ORAL 2 TIMES DAILY
Status: DISCONTINUED | OUTPATIENT
Start: 2018-05-20 | End: 2018-05-21 | Stop reason: HOSPADM

## 2018-05-20 RX ADMIN — BUMETANIDE 2 MG: 1 TABLET ORAL at 09:05

## 2018-05-20 RX ADMIN — BUMETANIDE 1 MG: 1 TABLET ORAL at 09:05

## 2018-05-20 RX ADMIN — ROSUVASTATIN CALCIUM 20 MG: 20 TABLET, FILM COATED ORAL at 09:05

## 2018-05-20 RX ADMIN — Medication 3 ML: at 09:05

## 2018-05-20 RX ADMIN — INSULIN ASPART 3 UNITS: 100 INJECTION, SOLUTION INTRAVENOUS; SUBCUTANEOUS at 06:05

## 2018-05-20 RX ADMIN — LEVOTHYROXINE SODIUM 25 MCG: 25 TABLET ORAL at 09:05

## 2018-05-20 RX ADMIN — DIGOXIN 0.06 MG: 0.05 SOLUTION ORAL at 06:05

## 2018-05-20 RX ADMIN — OMEGA-3-ACID ETHYL ESTERS 2 G: 1 CAPSULE, LIQUID FILLED ORAL at 09:05

## 2018-05-20 RX ADMIN — CLOPIDOGREL 75 MG: 75 TABLET, FILM COATED ORAL at 09:05

## 2018-05-20 RX ADMIN — INSULIN ASPART 5 UNITS: 100 INJECTION, SOLUTION INTRAVENOUS; SUBCUTANEOUS at 10:05

## 2018-05-20 RX ADMIN — POTASSIUM CHLORIDE 60 MEQ: 1500 TABLET, EXTENDED RELEASE ORAL at 09:05

## 2018-05-20 RX ADMIN — INSULIN DETEMIR 50 UNITS: 100 INJECTION, SOLUTION SUBCUTANEOUS at 10:05

## 2018-05-20 RX ADMIN — Medication 3 ML: at 01:05

## 2018-05-20 RX ADMIN — METOPROLOL SUCCINATE 25 MG: 25 TABLET, EXTENDED RELEASE ORAL at 09:05

## 2018-05-20 RX ADMIN — INSULIN ASPART 3 UNITS: 100 INJECTION, SOLUTION INTRAVENOUS; SUBCUTANEOUS at 09:05

## 2018-05-20 RX ADMIN — INSULIN ASPART 3 UNITS: 100 INJECTION, SOLUTION INTRAVENOUS; SUBCUTANEOUS at 01:05

## 2018-05-20 RX ADMIN — INSULIN DETEMIR 50 UNITS: 100 INJECTION, SOLUTION SUBCUTANEOUS at 09:05

## 2018-05-20 RX ADMIN — APIXABAN 2.5 MG: 2.5 TABLET, FILM COATED ORAL at 09:05

## 2018-05-20 RX ADMIN — PANTOPRAZOLE SODIUM 40 MG: 40 TABLET, DELAYED RELEASE ORAL at 09:05

## 2018-05-20 NOTE — ASSESSMENT & PLAN NOTE
-ICM  -Last 2D Echo 4/5/2018: LVEF 20-25%, LVEDD 6.7 cm   -Elevated filling pressures on admit: RA: 25, PCWP: 46  -Euvolemic on exam today. Net negative 1.2L over last 24 hours.  IV Lasix drip changed to po Bumex yesterday.   -GDMT with BB; Entresto held on admit secondary to fluctuating renal function; will resume when renal function stable (baseline appears to be 1.4-1.5)  -Patient is not currently being evaluated for OHTx/VAD  -Heart failure pathway was not initiated   -s/p CardioMEMS implant 5/17/2018 as part of GUIDE-HF trial. Patient to lay on his pillow to transmit readings daily.   -2g Na dietary restriction, 1500 mL fluid restriction, strict I/Os

## 2018-05-20 NOTE — PROGRESS NOTES
Results for IVY LUGO (MRN [id number]) as of 5/19/2018 23:09   Ref. Range 5/19/2018 23:02   POCT Glucose Latest Ref Range: 70 - 110 mg/dL 366 (H)     Pt has had elevated CBGs during admit. Insulin adjusted during day shift. Per sliding scale, Pt to receive 3U Novolog SQ. MD Christiano notified of CBG. Ordered to give 5U of Novolog SQ. TORB confirmed. Will follow orders, and continue to monitor.

## 2018-05-20 NOTE — PLAN OF CARE
Problem: Patient Care Overview  Goal: Plan of Care Review  Outcome: Ongoing (interventions implemented as appropriate)  CBG elevated. PO Bumex admin per MD order. Pt voiding without difficulty. Pt remained free from falls/trauma/injury overnight. Turns/repositions independently in bed. POC reviewed with Pt, all questions answered. Pt denies SOB, CP, and palpitations. VSS, NADN. Will continue to monitor.

## 2018-05-20 NOTE — SUBJECTIVE & OBJECTIVE
Interval History: Changed IV Lasix to po Bumex yesterday.  He had no complaints this morning.  He is net negative 1.2L over the last 24 hours.     Continuous Infusions:    Scheduled Meds:   apixaban  2.5 mg Oral BID    bumetanide  2 mg Oral BID    clopidogrel  75 mg Oral Daily    digoxin  0.0625 mg Oral QAM    insulin aspart U-100  3 Units Subcutaneous TIDWM    insulin detemir U-100  50 Units Subcutaneous BID    levothyroxine  25 mcg Oral Daily    metoprolol succinate  25 mg Oral QHS    omega-3 acid ethyl esters  2 g Oral BID    pantoprazole  40 mg Oral Daily    potassium chloride  60 mEq Oral Once    rosuvastatin  20 mg Oral Daily    sodium chloride 0.9%  3 mL Intravenous Q8H     PRN Meds:dextrose 50%, dextrose 50%, glucagon (human recombinant), glucose, glucose, insulin aspart U-100    Review of patient's allergies indicates:   Allergen Reactions    Adhesive Other (See Comments)     blisters    Codeine Other (See Comments)     Blurred vision    Latex Hives    Ace inhibitors     Lipitor [atorvastatin] Other (See Comments)     Muscle spasms     Objective:     Vital Signs (Most Recent):  Temp: 98.1 °F (36.7 °C) (05/20/18 0527)  Pulse: 69 (05/20/18 0700)  Resp: 20 (05/20/18 0527)  BP: (!) 109/58 (05/20/18 0527)  SpO2: (!) 91 % (05/20/18 0527) Vital Signs (24h Range):  Temp:  [96.5 °F (35.8 °C)-98.2 °F (36.8 °C)] 98.1 °F (36.7 °C)  Pulse:  [60-81] 69  Resp:  [16-20] 20  SpO2:  [90 %-98 %] 91 %  BP: ()/(51-60) 109/58     Patient Vitals for the past 72 hrs (Last 3 readings):   Weight   05/19/18 0600 83.6 kg (184 lb 4.9 oz)   05/18/18 0500 85.9 kg (189 lb 6 oz)   05/17/18 1658 91.4 kg (201 lb 6.6 oz)     Body mass index is 24.32 kg/m².      Intake/Output Summary (Last 24 hours) at 05/20/18 0832  Last data filed at 05/20/18 0715   Gross per 24 hour   Intake              640 ml   Output             3025 ml   Net            -2385 ml       Hemodynamic Parameters:       Telemetry: NAEO    Physical Exam    Constitutional: He is oriented to person, place, and time. He appears well-developed and well-nourished.   HENT:   Head: Normocephalic and atraumatic.   Eyes: EOM are normal. Pupils are equal, round, and reactive to light.   Neck: Normal range of motion. Neck supple. JVD (just above the clavicle with patient sitting in chair) present.   Cardiovascular: Normal rate and regular rhythm.    Right groin site dressed which is C/D/I and no hematoma present    Pulmonary/Chest: Breath sounds normal. No respiratory distress. He has no wheezes.   Abdominal: Soft. Bowel sounds are normal. He exhibits no distension. There is no tenderness.   Musculoskeletal: He exhibits no edema.   Neurological: He is alert and oriented to person, place, and time.   Skin: Skin is warm and dry.   Nursing note and vitals reviewed.      Significant Labs:  CBC:    Recent Labs  Lab 05/18/18  0428 05/19/18  0531 05/20/18  0448   WBC 11.13 10.47 12.37   RBC 4.36* 4.73 4.88   HGB 12.6* 13.4* 14.1   HCT 39.3* 42.3 43.1   * 113* 130*   MCV 90 89 88   MCH 28.9 28.3 28.9   MCHC 32.1 31.7* 32.7     BNP:    Recent Labs  Lab 05/17/18  1827   BNP 1,170*     CMP:    Recent Labs  Lab 05/18/18  0428 05/19/18  0531 05/20/18  0448   * 393* 137*   CALCIUM 9.8 10.1 9.9   ALBUMIN 3.5 3.6 3.6   PROT 7.1 7.4 7.4    142 142   K 3.7 3.8 3.5   CO2 30* 32* 31*    95 96   BUN 31* 41* 47*   CREATININE 1.5* 1.9* 1.9*   ALKPHOS 133 147* 137*   ALT 18 16 15   AST 17 16 16   BILITOT 1.5* 1.2* 1.0      Coagulation:     Recent Labs  Lab 05/17/18  1154   INR 1.1     LDH:  No results for input(s): LDH in the last 72 hours.  Microbiology:  Microbiology Results (last 7 days)     ** No results found for the last 168 hours. **          I have reviewed all pertinent labs within the past 24 hours.    Estimated Creatinine Clearance: 38 mL/min (A) (based on SCr of 1.9 mg/dL (H)).    Diagnostic Results:  I have reviewed and interpreted all pertinent imaging  results/findings within the past 24 hours.

## 2018-05-20 NOTE — PROGRESS NOTES
[facility]  Heart Transplant  Progress Note    Patient Name: Jerry Solis  MRN: [id number]  Admission Date: 5/17/2018  Hospital Length of Stay: 3 days  Attending Physician: Mela Nails MD  Primary Care Provider: Primary Doctor No  Principal Problem:Acute on chronic systolic CHF, NYHA class 2 and ACC/AHA stage C    Subjective:     Interval History: Changed IV Lasix to po Bumex yesterday.  He had no complaints this morning.  He is net negative 1.2L over the last 24 hours.     Continuous Infusions:    Scheduled Meds:   apixaban  2.5 mg Oral BID    bumetanide  2 mg Oral BID    clopidogrel  75 mg Oral Daily    digoxin  0.0625 mg Oral QAM    insulin aspart U-100  3 Units Subcutaneous TIDWM    insulin detemir U-100  50 Units Subcutaneous BID    levothyroxine  25 mcg Oral Daily    metoprolol succinate  25 mg Oral QHS    omega-3 acid ethyl esters  2 g Oral BID    pantoprazole  40 mg Oral Daily    potassium chloride  60 mEq Oral Once    rosuvastatin  20 mg Oral Daily    sodium chloride 0.9%  3 mL Intravenous Q8H     PRN Meds:dextrose 50%, dextrose 50%, glucagon (human recombinant), glucose, glucose, insulin aspart U-100    Review of patient's allergies indicates:   Allergen Reactions    Adhesive Other (See Comments)     blisters    Codeine Other (See Comments)     Blurred vision    Latex Hives    Ace inhibitors     Lipitor [atorvastatin] Other (See Comments)     Muscle spasms     Objective:     Vital Signs (Most Recent):  Temp: 98.1 °F (36.7 °C) (05/20/18 0527)  Pulse: 69 (05/20/18 0700)  Resp: 20 (05/20/18 0527)  BP: (!) 109/58 (05/20/18 0527)  SpO2: (!) 91 % (05/20/18 0527) Vital Signs (24h Range):  Temp:  [96.5 °F (35.8 °C)-98.2 °F (36.8 °C)] 98.1 °F (36.7 °C)  Pulse:  [60-81] 69  Resp:  [16-20] 20  SpO2:  [90 %-98 %] 91 %  BP: ()/(51-60) 109/58     Patient Vitals for the past 72 hrs (Last 3 readings):   Weight   05/19/18 0600 83.6 kg (184 lb 4.9 oz)   05/18/18 0500 85.9 kg  (189 lb 6 oz)   05/17/18 1658 91.4 kg (201 lb 6.6 oz)     Body mass index is 24.32 kg/m².      Intake/Output Summary (Last 24 hours) at 05/20/18 0832  Last data filed at 05/20/18 0715   Gross per 24 hour   Intake              640 ml   Output             3025 ml   Net            -2385 ml       Hemodynamic Parameters:       Telemetry: NAEO    Physical Exam   Constitutional: He is oriented to person, place, and time. He appears well-developed and well-nourished.   HENT:   Head: Normocephalic and atraumatic.   Eyes: EOM are normal. Pupils are equal, round, and reactive to light.   Neck: Normal range of motion. Neck supple. JVD (just above the clavicle with patient sitting in chair) present.   Cardiovascular: Normal rate and regular rhythm.    Right groin site dressed which is C/D/I and no hematoma present    Pulmonary/Chest: Breath sounds normal. No respiratory distress. He has no wheezes.   Abdominal: Soft. Bowel sounds are normal. He exhibits no distension. There is no tenderness.   Musculoskeletal: He exhibits no edema.   Neurological: He is alert and oriented to person, place, and time.   Skin: Skin is warm and dry.   Nursing note and vitals reviewed.      Significant Labs:  CBC:    Recent Labs  Lab 05/18/18 0428 05/19/18  0531 05/20/18  0448   WBC 11.13 10.47 12.37   RBC 4.36* 4.73 4.88   HGB 12.6* 13.4* 14.1   HCT 39.3* 42.3 43.1   * 113* 130*   MCV 90 89 88   MCH 28.9 28.3 28.9   MCHC 32.1 31.7* 32.7     BNP:    Recent Labs  Lab 05/17/18  1827   BNP 1,170*     CMP:    Recent Labs  Lab 05/18/18 0428 05/19/18  0531 05/20/18  0448   * 393* 137*   CALCIUM 9.8 10.1 9.9   ALBUMIN 3.5 3.6 3.6   PROT 7.1 7.4 7.4    142 142   K 3.7 3.8 3.5   CO2 30* 32* 31*    95 96   BUN 31* 41* 47*   CREATININE 1.5* 1.9* 1.9*   ALKPHOS 133 147* 137*   ALT 18 16 15   AST 17 16 16   BILITOT 1.5* 1.2* 1.0      Coagulation:     Recent Labs  Lab 05/17/18  1154   INR 1.1     LDH:  No results for input(s): LDH in  the last 72 hours.  Microbiology:  Microbiology Results (last 7 days)     ** No results found for the last 168 hours. **          I have reviewed all pertinent labs within the past 24 hours.    Estimated Creatinine Clearance: 38 mL/min (A) (based on SCr of 1.9 mg/dL (H)).    Diagnostic Results:  I have reviewed and interpreted all pertinent imaging results/findings within the past 24 hours.    Assessment and Plan:     No notes on file    * Acute on chronic systolic CHF, NYHA class 2 and ACC/AHA stage C    -ICM  -Last 2D Echo 4/5/2018: LVEF 20-25%, LVEDD 6.7 cm   -Elevated filling pressures on admit: RA: 25, PCWP: 46  -Euvolemic on exam today. Net negative 1.2L over last 24 hours.  IV Lasix drip changed to po Bumex yesterday.   -GDMT with BB; Entresto held on admit secondary to fluctuating renal function; will resume when renal function stable (baseline appears to be 1.4-1.5)  -Patient is not currently being evaluated for OHTx/VAD  -Heart failure pathway was not initiated   -s/p CardioMEMS implant 5/17/2018 as part of GUIDE-HF trial. Patient to lay on his pillow to transmit readings daily.   -2g Na dietary restriction, 1500 mL fluid restriction, strict I/Os          Atrial fibrillation    -continue apixaban        Ischemic cardiomyopathy    -continue home medications        Hypokalemia    -Replete K PRN to maintain K>4.0        Type 2 diabetes mellitus with complication    -coverage with insulin and ISS            STAR Mahan  Heart Transplant  [facility]

## 2018-05-21 ENCOUNTER — RESEARCH ENCOUNTER (OUTPATIENT)
Dept: RESEARCH | Facility: HOSPITAL | Age: 78
End: 2018-05-21

## 2018-05-21 VITALS
SYSTOLIC BLOOD PRESSURE: 117 MMHG | OXYGEN SATURATION: 95 % | RESPIRATION RATE: 18 BRPM | DIASTOLIC BLOOD PRESSURE: 56 MMHG | HEIGHT: 73 IN | HEART RATE: 69 BPM | BODY MASS INDEX: 24.72 KG/M2 | TEMPERATURE: 97 F | WEIGHT: 186.5 LBS

## 2018-05-21 LAB
ALBUMIN SERPL BCP-MCNC: 3.4 G/DL
ALP SERPL-CCNC: 127 U/L
ALT SERPL W/O P-5'-P-CCNC: 16 U/L
ANION GAP SERPL CALC-SCNC: 15 MMOL/L
AST SERPL-CCNC: 20 U/L
BASOPHILS # BLD AUTO: 0.1 K/UL
BASOPHILS NFR BLD: 0.9 %
BILIRUB SERPL-MCNC: 0.9 MG/DL
BUN SERPL-MCNC: 49 MG/DL
CALCIUM SERPL-MCNC: 9.5 MG/DL
CHLORIDE SERPL-SCNC: 95 MMOL/L
CO2 SERPL-SCNC: 28 MMOL/L
CREAT SERPL-MCNC: 1.9 MG/DL
DIFFERENTIAL METHOD: ABNORMAL
EOSINOPHIL # BLD AUTO: 0 K/UL
EOSINOPHIL NFR BLD: 0 %
ERYTHROCYTE [DISTWIDTH] IN BLOOD BY AUTOMATED COUNT: 15.8 %
EST. GFR  (AFRICAN AMERICAN): 39 ML/MIN/1.73 M^2
EST. GFR  (NON AFRICAN AMERICAN): 33.7 ML/MIN/1.73 M^2
GLUCOSE SERPL-MCNC: 237 MG/DL
HCT VFR BLD AUTO: 41.5 %
HGB BLD-MCNC: 13.4 G/DL
IMM GRANULOCYTES # BLD AUTO: 0.02 K/UL
IMM GRANULOCYTES NFR BLD AUTO: 0.2 %
LYMPHOCYTES # BLD AUTO: 2.2 K/UL
LYMPHOCYTES NFR BLD: 19.7 %
MCH RBC QN AUTO: 28.6 PG
MCHC RBC AUTO-ENTMCNC: 32.3 G/DL
MCV RBC AUTO: 89 FL
MONOCYTES # BLD AUTO: 1.5 K/UL
MONOCYTES NFR BLD: 13.7 %
NEUTROPHILS # BLD AUTO: 7.2 K/UL
NEUTROPHILS NFR BLD: 65.5 %
NRBC BLD-RTO: 0 /100 WBC
PLATELET # BLD AUTO: 118 K/UL
PMV BLD AUTO: 13.5 FL
POCT GLUCOSE: 160 MG/DL (ref 70–110)
POCT GLUCOSE: 319 MG/DL (ref 70–110)
POCT GLUCOSE: 387 MG/DL (ref 70–110)
POTASSIUM SERPL-SCNC: 3.7 MMOL/L
PROT SERPL-MCNC: 7.1 G/DL
RBC # BLD AUTO: 4.68 M/UL
SODIUM SERPL-SCNC: 138 MMOL/L
WBC # BLD AUTO: 10.99 K/UL

## 2018-05-21 PROCEDURE — 25000003 PHARM REV CODE 250: Performed by: PHYSICIAN ASSISTANT

## 2018-05-21 PROCEDURE — 80053 COMPREHEN METABOLIC PANEL: CPT

## 2018-05-21 PROCEDURE — 85025 COMPLETE CBC W/AUTO DIFF WBC: CPT

## 2018-05-21 PROCEDURE — 99233 SBSQ HOSP IP/OBS HIGH 50: CPT | Mod: ,,, | Performed by: INTERNAL MEDICINE

## 2018-05-21 PROCEDURE — 36415 COLL VENOUS BLD VENIPUNCTURE: CPT

## 2018-05-21 PROCEDURE — 25000003 PHARM REV CODE 250: Performed by: INTERNAL MEDICINE

## 2018-05-21 PROCEDURE — 63600175 PHARM REV CODE 636 W HCPCS: Performed by: INTERNAL MEDICINE

## 2018-05-21 RX ORDER — BUMETANIDE 1 MG/1
1 TABLET ORAL 2 TIMES DAILY
Qty: 60 TABLET | Refills: 11 | Status: SHIPPED | OUTPATIENT
Start: 2018-05-21 | End: 2018-06-11 | Stop reason: SDUPTHER

## 2018-05-21 RX ORDER — POTASSIUM CHLORIDE 750 MG/1
30 CAPSULE, EXTENDED RELEASE ORAL ONCE
Status: COMPLETED | OUTPATIENT
Start: 2018-05-21 | End: 2018-05-21

## 2018-05-21 RX ADMIN — BUMETANIDE 1 MG: 1 TABLET ORAL at 10:05

## 2018-05-21 RX ADMIN — APIXABAN 2.5 MG: 2.5 TABLET, FILM COATED ORAL at 09:05

## 2018-05-21 RX ADMIN — INSULIN DETEMIR 50 UNITS: 100 INJECTION, SOLUTION SUBCUTANEOUS at 09:05

## 2018-05-21 RX ADMIN — INSULIN ASPART 4 UNITS: 100 INJECTION, SOLUTION INTRAVENOUS; SUBCUTANEOUS at 12:05

## 2018-05-21 RX ADMIN — CLOPIDOGREL 75 MG: 75 TABLET, FILM COATED ORAL at 09:05

## 2018-05-21 RX ADMIN — INSULIN ASPART 3 UNITS: 100 INJECTION, SOLUTION INTRAVENOUS; SUBCUTANEOUS at 09:05

## 2018-05-21 RX ADMIN — OMEGA-3-ACID ETHYL ESTERS 2 G: 1 CAPSULE, LIQUID FILLED ORAL at 09:05

## 2018-05-21 RX ADMIN — ROSUVASTATIN CALCIUM 20 MG: 20 TABLET, FILM COATED ORAL at 09:05

## 2018-05-21 RX ADMIN — POTASSIUM CHLORIDE 30 MEQ: 750 CAPSULE, EXTENDED RELEASE ORAL at 09:05

## 2018-05-21 RX ADMIN — LEVOTHYROXINE SODIUM 25 MCG: 25 TABLET ORAL at 09:05

## 2018-05-21 RX ADMIN — DIGOXIN 0.06 MG: 0.05 SOLUTION ORAL at 06:05

## 2018-05-21 RX ADMIN — PANTOPRAZOLE SODIUM 40 MG: 40 TABLET, DELAYED RELEASE ORAL at 09:05

## 2018-05-21 NOTE — NURSING
Patient dc per md orders. IV and tele removed. Spouse at beside, patient verbalized complete understanding of home care instructions and follow up care. Pamphlet on home care instructions given to patient. Informed patient to start taking Bumex tomorrow. Awaiting escort.

## 2018-05-21 NOTE — PLAN OF CARE
Problem: Patient Care Overview  Goal: Plan of Care Review  Outcome: Ongoing (interventions implemented as appropriate)   mg/dl at HS see note for details.  Pt remained free from falls/trauma/injury overnight. Turns/repositions independently in bed.Ambulates  POC reviewed with Pt, all questions answered. Pt denies SOB, CP, and palpitations. VSS, NADN.Uneventful shift. Will continue to monitor.

## 2018-05-21 NOTE — NURSING
Pt AA0x3 and VSS.  Two side rails up, bed locked, call light within reach, No falls noted, Pt free of skin breakdown as the pt moves well independently.  Hourly rounds made and no complaints at this time, will resume with Plan of Care

## 2018-05-21 NOTE — ASSESSMENT & PLAN NOTE
-ICM  -Last 2D Echo 4/5/2018: LVEF 20-25%, LVEDD 6.7 cm   -Elevated filling pressures on admit: RA: 25, PCWP: 46  -Euvolemic on exam today. Net negative 1.9L over last 24 hours.  IV Lasix drip changed to po Bumex and will discharge on 1mg BID.  Will get labs done at primary cardiologist in 3 days.    -GDMT with BB; Entresto held on admit secondary to fluctuating renal function; will resume when renal function stable (baseline appears to be 1.4-1.5)  -Patient is not currently being evaluated for OHTx/VAD  -Heart failure pathway was not initiated   -s/p CardioMEMS implant 5/17/2018 as part of GUIDE-HF trial. Patient to lay on his pillow to transmit readings daily.   -2g Na dietary restriction, 1500 mL fluid restriction, strict I/Os

## 2018-05-21 NOTE — DISCHARGE SUMMARY
[facility]  Heart Transplant  Discharge Summary      Patient Name: Jerry Solis  MRN: [id number]  Admission Date: 5/17/2018  Hospital Length of Stay: 4 days  Discharge Date and Time: 05/21/2018 11:33 AM  Attending Physician: Mela Nails MD   Discharging Provider: STAR Mahan  Primary Care Provider: Primary Doctor No     HPI: ICMP (EF=20%), CAD s/p PCI, S/P [place] BiV ICD, chronic A fib, DM and HTN who is referred for evaluation and management of CHF  Comes today s/p discharge early April 2018 after CHF optimization / Cardiomems consideration.  Patient was admitted for Cardiomems insertion and remained post procedure for diuresis.        Procedure(s) (LRB):  HEART CATH-RIGHT - Cardio Mems (Right)     Hospital Course: Patient was admitted to the [facility] [place] service and placed on 24 hour telemetry  Patient presented for Cardiomems inplant; RHC done at time of procedure demonstrated significantly elevated left and right Filling pressures; severe pulm HTN and mildly decreased cardiac output and index.  RA: (25), PW:(46), CO: (3.35) CI: (2.15).  Prior to admission; patient was treated with Lasix, Digoxin and Entrestro.  Given RHC resulted; patient was started on Lasix infusion for diuresis and Entresto was stopped secondary to fluctuating renal function.  He had good response on IV Lasix and was transitioned to po Bumes at 2mg BID when his creatinine elevated to 1.9 from 1.6.  His creat was 1.9 on day of discharge so we dropped the Bumex dose to 1mg BID prior to discharge.     He is net negative 5.2L throughout hospital stay and weight on day of discharge is 186lbs down from 207 on admission.  He will be discharged on Bumex 1mg BID for diuretic therapy.  We did not resume Entresto prior to discharge secondary to fluctuating renal function.  He is scheduled to get labs done with Dr. Perez in 3 days and if renal function stable would resume Entresto and titrate as tolerated.  Furthermore, We  recommended increasing his infusion visits from once a month to every 2 weeks.     He is discharged in stable condition.  He will have CMP and BNP drawn on 5/24 with results faxed to our office.  He has scheduled follow up with Dr. Nails on 6/11 and with Dr. Perze in August.  A copy of the discharge summary has been faxed to Dr. Perez's office [phone]    Significant Diagnostic Studies: See report for full details:  RHC and Cardiomems insertion: 5/17/18  D. Hemodynamic Results  BP: 140/80  HR: 70  CONDITION 1:  RA:  (25)  CONDITION 1:  LPW:  (46)  CONDITION 1:  LPA: 88/33 (55)  CONDITION 2:  FICKCI_POST: 2.15  CONDITION 2:  FICKCO_POST: 3.35  CXR: 5/17/18  There are perihilar lung opacities with indistinct pulmonary vasculature.  The left cardiac pacer lead tips unchanged.  No gross pneumothorax or pleural effusions.    Pending Diagnostic Studies:     None        Final Active Diagnoses:    Diagnosis Date Noted POA    PRINCIPAL PROBLEM:  Acute on chronic systolic CHF, NYHA class 2 and ACC/AHA stage C [I50.23] 05/18/2018 Yes    Atrial fibrillation [I48.91] 04/06/2018 Yes    Ischemic cardiomyopathy [I25.5] 03/22/2018 Yes    Hypokalemia [E87.6] 05/18/2018 No    Type 2 diabetes mellitus with complication [E11.8] 03/22/2018 Yes    CHF (congestive heart failure) [I50.9] 05/17/2018 Yes    VT (ventricular tachycardia) [I47.2] 03/22/2018 Yes      Problems Resolved During this Admission:    Diagnosis Date Noted Date Resolved POA      Discharged Condition: stable    Disposition:     Follow Up:  Follow-up Information     cmp, bnp with Dr. Jagjit Perez On 5/24/2018.           Mela Nails MD On 6/11/2018.    Specialties:  Transplant, Cardiology  Why:  as scheduled at 1300  Contact information:  [address] [place] [id number] [phone]                 Patient Instructions:     Diet Cardiac     Activity as tolerated     Notify your health care provider if you experience any of the following:   temperature >100.4     Notify your health care provider if you experience any of the following:  redness, tenderness, or signs of infection (pain, swelling, redness, odor or green/yellow discharge around incision site)     No dressing needed       Medications:  Reconciled Home Medications:      Medication List      START taking these medications    bumetanide 1 MG tablet  Commonly known as:  BUMEX  Take 1 tablet (1 mg total) by mouth 2 (two) times daily.        CONTINUE taking these medications    clopidogrel 75 mg tablet  Commonly known as:  PLAVIX  Take 1 tablet by mouth once daily.     digoxin 125 mcg tablet  Commonly known as:  LANOXIN  Take 0.5 tablets by mouth every morning.     ELIQUIS 2.5 mg Tab  Generic drug:  apixaban  Take 1 tablet by mouth 2 (two) times daily.     LANTUS U-100 INSULIN 100 unit/mL injection  Generic drug:  insulin glargine  Inject 50 Units into the skin 2 (two) times daily.     levothyroxine 25 MCG tablet  Commonly known as:  SYNTHROID  Take 1 tablet by mouth once daily.     loratadine 10 mg tablet  Commonly known as:  CLARITIN  Take 10 mg by mouth once daily.     metoprolol succinate 25 MG 24 hr tablet  Commonly known as:  TOPROL-XL  Take 1 tablet (25 mg total) by mouth every evening.     multivitamin per tablet  Commonly known as:  THERAGRAN  Take 1 tablet by mouth once daily.     NovoLOG U-100 Insulin aspart 100 unit/mL injection  Generic drug:  insulin aspart U-100  Inject 5 Units into the skin 4 (four) times daily.     omega-3 acid ethyl esters 1 gram capsule  Commonly known as:  LOVAZA  Take 2 g by mouth 2 (two) times daily.     omeprazole 40 MG capsule  Commonly known as:  PRILOSEC  Take 40 mg by mouth once daily.     rosuvastatin 20 MG tablet  Commonly known as:  CRESTOR  Take 1 tablet by mouth once daily.        STOP taking these medications    ENTRESTO 49-51 mg per tablet  Generic drug:  sacubitril-valsartan     furosemide 40 MG tablet  Commonly known as:  LASIX             STAR Mahan  Heart Transplant  [facility]

## 2018-05-21 NOTE — PROGRESS NOTES
Pt  mg/dl. Per sliding scale, pt to recieve 3 units Novolog SQ. MD Christiano notified of CBG. Ordered to give 5 Units of Novolog SQ, TORB confirmed. Will follow orders, and continue to monitor.

## 2018-05-21 NOTE — PHYSICIAN QUERY
PT Name: Jerry Solis  MR #: [id number]     Physician Query Form - Documentation Clarification      CDS/: Brandie Peck RN, CCDS              Contact information: [email or web address]    This form is a permanent document in the medical record.     Query Date: May 21, 2018    By submitting this query, we are merely seeking further clarification of documentation. Please utilize your independent clinical judgment when addressing the question(s) below.    The Medical record reflects the following:    Supporting Clinical Findings Location in Medical Record     Significantly elevated left and right sided filling pressures. Severe pulmonary HTN. Mildly decreased cardiac index and output    Acute on chronic systolic HF  ICM   5/17 RHC           5/18 prog note                                                                                      Doctor, Please specify diagnosis or diagnoses associated with above clinical findings.    Provider Use Only      Please specify the type of Pulmonary Hypertension:    [     ] Primary pulmonary hypertension (Group 1)  [     ] Other Secondary pulmonary hypertension  (Group 5)  [  x   ] Pulmonary hypertension due to Left  heart disease (Group 2)  [     ] Pulmonary hypertension, unspecified   [     ] Other: ___________________                                                                                                                         [  ] Clinically undetermined

## 2018-05-21 NOTE — SUBJECTIVE & OBJECTIVE
Interval History: Bumex was decreased yesterday to 1mg BID as creat remained elevated.  Patient reports he is still having good urine output.  Her reports he feels well and is ready to go home.  He is laying on his Cardiomems pillow daily.      Continuous Infusions:    Scheduled Meds:   apixaban  2.5 mg Oral BID    bumetanide  1 mg Oral BID    clopidogrel  75 mg Oral Daily    digoxin  0.0625 mg Oral QAM    insulin aspart U-100  3 Units Subcutaneous TIDWM    insulin detemir U-100  50 Units Subcutaneous BID    levothyroxine  25 mcg Oral Daily    metoprolol succinate  25 mg Oral QHS    omega-3 acid ethyl esters  2 g Oral BID    pantoprazole  40 mg Oral Daily    rosuvastatin  20 mg Oral Daily    sodium chloride 0.9%  3 mL Intravenous Q8H     PRN Meds:dextrose 50%, dextrose 50%, glucagon (human recombinant), glucose, glucose, insulin aspart U-100    Review of patient's allergies indicates:   Allergen Reactions    Adhesive Other (See Comments)     blisters    Codeine Other (See Comments)     Blurred vision    Latex Hives    Ace inhibitors     Lipitor [atorvastatin] Other (See Comments)     Muscle spasms     Objective:     Vital Signs (Most Recent):  Temp: 97.6 °F (36.4 °C) (05/21/18 0807)  Pulse: 70 (05/21/18 0807)  Resp: 18 (05/21/18 0601)  BP: (!) 103/55 (05/21/18 0807)  SpO2: (!) 93 % (05/21/18 0807) Vital Signs (24h Range):  Temp:  [97.5 °F (36.4 °C)-97.9 °F (36.6 °C)] 97.6 °F (36.4 °C)  Pulse:  [68-81] 70  Resp:  [16-18] 18  SpO2:  [93 %-100 %] 93 %  BP: ()/(53-62) 103/55     Patient Vitals for the past 72 hrs (Last 3 readings):   Weight   05/21/18 0600 84.6 kg (186 lb 8.2 oz)   05/19/18 0600 83.6 kg (184 lb 4.9 oz)     Body mass index is 24.61 kg/m².      Intake/Output Summary (Last 24 hours) at 05/21/18 1121  Last data filed at 05/21/18 0600   Gross per 24 hour   Intake              780 ml   Output             2101 ml   Net            -1321 ml       Telemetry: NAEO    Physical Exam    Constitutional: He is oriented to person, place, and time. He appears well-developed and well-nourished.   HENT:   Head: Normocephalic and atraumatic.   Eyes: EOM are normal. Pupils are equal, round, and reactive to light.   Neck: Normal range of motion. Neck supple. JVD (just above the clavicle with patient sitting in chair) present.   Cardiovascular: Normal rate and regular rhythm.    Right groin site dressed which is C/D/I and no hematoma present    Pulmonary/Chest: Breath sounds normal. No respiratory distress. He has no wheezes.   Abdominal: Soft. Bowel sounds are normal. He exhibits no distension. There is no tenderness.   Musculoskeletal: He exhibits no edema.   Neurological: He is alert and oriented to person, place, and time.   Skin: Skin is warm and dry.   Nursing note and vitals reviewed.      Significant Labs:  CBC:    Recent Labs  Lab 05/19/18  0531 05/20/18  0448 05/21/18  0441   WBC 10.47 12.37 10.99   RBC 4.73 4.88 4.68   HGB 13.4* 14.1 13.4*   HCT 42.3 43.1 41.5   * 130* 118*   MCV 89 88 89   MCH 28.3 28.9 28.6   MCHC 31.7* 32.7 32.3     BNP:    Recent Labs  Lab 05/17/18  1827   BNP 1,170*     CMP:    Recent Labs  Lab 05/19/18  0531 05/20/18 0448 05/21/18  0441   * 137* 237*   CALCIUM 10.1 9.9 9.5   ALBUMIN 3.6 3.6 3.4*   PROT 7.4 7.4 7.1    142 138   K 3.8 3.5 3.7   CO2 32* 31* 28   CL 95 96 95   BUN 41* 47* 49*   CREATININE 1.9* 1.9* 1.9*   ALKPHOS 147* 137* 127   ALT 16 15 16   AST 16 16 20   BILITOT 1.2* 1.0 0.9      Coagulation:     Recent Labs  Lab 05/17/18  1154   INR 1.1     LDH:  No results for input(s): LDH in the last 72 hours.  Microbiology:  Microbiology Results (last 7 days)     ** No results found for the last 168 hours. **          I have reviewed all pertinent labs within the past 24 hours.    Estimated Creatinine Clearance: 38 mL/min (A) (based on SCr of 1.9 mg/dL (H)).    Diagnostic Results:  I have reviewed and interpreted all pertinent imaging  results/findings within the past 24 hours.

## 2018-05-21 NOTE — PROGRESS NOTES
D/C note:    SW to pt's room for D/C. Pt aaox4, calm, and pleasant. Multiple family members present. Pt reports in agreement with plan to dc home today with no needs from SW. Pt reprots family to provide transport. Pt reports coping well at this time. SW providing psychosocial and counseling support, education, assistance, resources, and D/C planning as indicated. SW remains available.

## 2018-05-21 NOTE — PHYSICIAN QUERY
PT Name: Jerry Solis  MR #: [id number]  Physician Query Form - Renal Clarification     CDS/: Brandie Peck RN, CCDS             Contact information: [email or web address]    This form is a permanent document in the medical record.     QueryDate: May 21, 2018    By submitting this query, we are merely seeking further clarification of documentation. Please utilize your independent clinical judgment when addressing the question(s) below.    The Medical record contains the following:   Indicator Supporting Clinical Findings Location in Medical Record    Kidney (Renal) Insufficiency      Kidney (Renal) Failure / Injury      Nephrotoxic Agents     X BUN/Creatinine GFR Cr 1.6-->1.5-->1.9  gfr 41.5-->44.9-->33.7 5/17- 5/21 lab    Urine: Casts         Eosinophils      Dehydration      Nausea/Vomiting      Dialysis/CRRT     X Treatment: Will reduce bumex to 1 mg BID from 2 mg BID since Cr 2 again today and reassess tomorrow his dosing needs-- 5/20 prog note   X Other:  Entresto held on admit secondary to fluctuating renal function; will resume when renal function stable (baseline appears to be 1.4-1.5) 5/18-5/20 prog notes       Provider, please specify the diagnosis or diagnoses associated with above clinical findings.      [x ] Acute on Chronic Renal Insufficiency (please specify stage*): _____________  [ ] Acute on Chronic Renal Failure (please specify stage*): _____________  [ ] Chronic Renal Insufficiency (please specify stage*): _____________  [ ] Chronic Kidney Disease (CKD) (please specify stage* below):      *National Kid[employer] Foundation Definitions:   Stage Description      eGFR (mL/min)   [ ] I  Slight kidney damage with normal or increased filtration  90+   [ ] II  Mildly reduced kidney function     60-89   [ ] III  Moderately reduced kidney function    30-59           [ ] Other (please specify): _______________________________  [ ] Clinically Undetermined    Please document in your progress notes daily for  the duration of treatment, until resolved, and include in your discharge summary.

## 2018-05-21 NOTE — PROGRESS NOTES
Met with patient this morning 5/21/2018 prior to hospital discharge. CRC met with patient to address any questions or concerns before discharge. Patient reports successful transmissions over the weekend with no difficulties noted. Patient continues to take Eliquis and Plavix and will continue these medications after discharge. Changes made to patient's medication regimen due to renal function and diuresis adjustments. Changes noted in patient's medical record and confirmed. Patient instructed that blinded CRC will begin bi-monthly phone contacts for the first three months post-implant. Also instructed patient to contact this CRC with any symptoms, questions, concerns, or changes in medications or medical care throughout study. Back-up CRC contact information given as well. Patient verbalized complete understanding of all above instructions and is comfortable with discharge at this time. Will follow.

## 2018-05-21 NOTE — PROGRESS NOTES
[facility]  Heart Transplant  Progress Note    Patient Name: Jerry Solis  MRN: [id number]  Admission Date: 5/17/2018  Hospital Length of Stay: 4 days  Attending Physician: Mela Nails MD  Primary Care Provider: Primary Doctor No  Principal Problem:Acute on chronic systolic CHF, NYHA class 2 and ACC/AHA stage C    Subjective:     Interval History: Bumex was decreased yesterday to 1mg BID as creat remained elevated.  Patient reports he is still having good urine output.  Her reports he feels well and is ready to go home.  He is laying on his Cardiomems pillow daily.      Continuous Infusions:    Scheduled Meds:   apixaban  2.5 mg Oral BID    bumetanide  1 mg Oral BID    clopidogrel  75 mg Oral Daily    digoxin  0.0625 mg Oral QAM    insulin aspart U-100  3 Units Subcutaneous TIDWM    insulin detemir U-100  50 Units Subcutaneous BID    levothyroxine  25 mcg Oral Daily    metoprolol succinate  25 mg Oral QHS    omega-3 acid ethyl esters  2 g Oral BID    pantoprazole  40 mg Oral Daily    rosuvastatin  20 mg Oral Daily    sodium chloride 0.9%  3 mL Intravenous Q8H     PRN Meds:dextrose 50%, dextrose 50%, glucagon (human recombinant), glucose, glucose, insulin aspart U-100    Review of patient's allergies indicates:   Allergen Reactions    Adhesive Other (See Comments)     blisters    Codeine Other (See Comments)     Blurred vision    Latex Hives    Ace inhibitors     Lipitor [atorvastatin] Other (See Comments)     Muscle spasms     Objective:     Vital Signs (Most Recent):  Temp: 97.6 °F (36.4 °C) (05/21/18 0807)  Pulse: 70 (05/21/18 0807)  Resp: 18 (05/21/18 0601)  BP: (!) 103/55 (05/21/18 0807)  SpO2: (!) 93 % (05/21/18 0807) Vital Signs (24h Range):  Temp:  [97.5 °F (36.4 °C)-97.9 °F (36.6 °C)] 97.6 °F (36.4 °C)  Pulse:  [68-81] 70  Resp:  [16-18] 18  SpO2:  [93 %-100 %] 93 %  BP: ()/(53-62) 103/55     Patient Vitals for the past 72 hrs (Last 3 readings):   Weight    05/21/18 0600 84.6 kg (186 lb 8.2 oz)   05/19/18 0600 83.6 kg (184 lb 4.9 oz)     Body mass index is 24.61 kg/m².      Intake/Output Summary (Last 24 hours) at 05/21/18 1121  Last data filed at 05/21/18 0600   Gross per 24 hour   Intake              780 ml   Output             2101 ml   Net            -1321 ml       Telemetry: NAEO    Physical Exam   Constitutional: He is oriented to person, place, and time. He appears well-developed and well-nourished.   HENT:   Head: Normocephalic and atraumatic.   Eyes: EOM are normal. Pupils are equal, round, and reactive to light.   Neck: Normal range of motion. Neck supple. JVD (just above the clavicle with patient sitting in chair) present.   Cardiovascular: Normal rate and regular rhythm.    Right groin site dressed which is C/D/I and no hematoma present    Pulmonary/Chest: Breath sounds normal. No respiratory distress. He has no wheezes.   Abdominal: Soft. Bowel sounds are normal. He exhibits no distension. There is no tenderness.   Musculoskeletal: He exhibits no edema.   Neurological: He is alert and oriented to person, place, and time.   Skin: Skin is warm and dry.   Nursing note and vitals reviewed.      Significant Labs:  CBC:    Recent Labs  Lab 05/19/18  0531 05/20/18 0448 05/21/18 0441   WBC 10.47 12.37 10.99   RBC 4.73 4.88 4.68   HGB 13.4* 14.1 13.4*   HCT 42.3 43.1 41.5   * 130* 118*   MCV 89 88 89   MCH 28.3 28.9 28.6   MCHC 31.7* 32.7 32.3     BNP:    Recent Labs  Lab 05/17/18  1827   BNP 1,170*     CMP:    Recent Labs  Lab 05/19/18  0531 05/20/18 0448 05/21/18  0441   * 137* 237*   CALCIUM 10.1 9.9 9.5   ALBUMIN 3.6 3.6 3.4*   PROT 7.4 7.4 7.1    142 138   K 3.8 3.5 3.7   CO2 32* 31* 28   CL 95 96 95   BUN 41* 47* 49*   CREATININE 1.9* 1.9* 1.9*   ALKPHOS 147* 137* 127   ALT 16 15 16   AST 16 16 20   BILITOT 1.2* 1.0 0.9      Coagulation:     Recent Labs  Lab 05/17/18  1154   INR 1.1     LDH:  No results for input(s): LDH in the last  72 hours.  Microbiology:  Microbiology Results (last 7 days)     ** No results found for the last 168 hours. **          I have reviewed all pertinent labs within the past 24 hours.    Estimated Creatinine Clearance: 38 mL/min (A) (based on SCr of 1.9 mg/dL (H)).    Diagnostic Results:  I have reviewed and interpreted all pertinent imaging results/findings within the past 24 hours.    Assessment and Plan:     No notes on file    * Acute on chronic systolic CHF, NYHA class 2 and ACC/AHA stage C    -ICM  -Last 2D Echo 4/5/2018: LVEF 20-25%, LVEDD 6.7 cm   -Elevated filling pressures on admit: RA: 25, PCWP: 46  -Euvolemic on exam today. Net negative 1.9L over last 24 hours.  IV Lasix drip changed to po Bumex and will discharge on 1mg BID.  Will get labs done at primary cardiologist in 3 days.    -GDMT with BB; Entresto held on admit secondary to fluctuating renal function; will resume when renal function stable (baseline appears to be 1.4-1.5)  -Patient is not currently being evaluated for OHTx/VAD  -Heart failure pathway was not initiated   -s/p CardioMEMS implant 5/17/2018 as part of GUIDE-HF trial. Patient to lay on his pillow to transmit readings daily.   -2g Na dietary restriction, 1500 mL fluid restriction, strict I/Os          Atrial fibrillation    -continue apixaban        Ischemic cardiomyopathy    -continue home medications        Hypokalemia    -Replete K PRN to maintain K>4.0        Type 2 diabetes mellitus with complication    -coverage with insulin and ISS            [facility]  Heart Transplant  [facility]

## 2018-05-21 NOTE — PLAN OF CARE
Problem: Patient Care Overview  Goal: Plan of Care Review  Outcome: Ongoing (interventions implemented as appropriate)  No falls, patient on po Bumex, pending dc today.

## 2018-05-24 ENCOUNTER — RESEARCH ENCOUNTER (OUTPATIENT)
Dept: RESEARCH | Facility: HOSPITAL | Age: 78
End: 2018-05-24

## 2018-05-24 NOTE — PROGRESS NOTES
Study: GUIDE-HF  Sponsor: [employer]  Follow-up Visit: Bi-Monthly Phone Contact #1  Date of Visit: 5/24/2018    Patient wishes to continue in study: Yes  All study protocol required CRFs completed: Yes    Mr. Soils was contacted today 5/24/2018 by the blinded  Lamont Hayes for his first bi-monthly phone contact post-implant for the GUIDE-HF study. Subject contact sheet completed by this CRC and given to blinded coordinator. Patient has been compliant with daily pressure transmissions. Patient reviewed by Claudia Mendoza PA-C, and no medication changes are being made at this time. No additional instructions were given by study staff. Patient encouraged to continue taking daily pressure measurements. Patient denied any questions, concerns, or symptoms at this time. Will follow.

## 2018-06-04 ENCOUNTER — RESEARCH ENCOUNTER (OUTPATIENT)
Dept: RESEARCH | Facility: HOSPITAL | Age: 78
End: 2018-06-04

## 2018-06-04 NOTE — PROGRESS NOTES
"Contacted by patient today 6/4/2018 to report worsening CHF symptoms. Patient is currently participating in the GUIDE-HF research study. Patient reported weight gain with weight up to 195 lbs per the patient's home scale (patient reports not knowing his home weight on recent hospital discharge, but knew he was about 185 lbs on the hospital scale). Patient also reported increasing shortness of breath ("not too bad, but it's more than usual"). Patient noted to be taking Bumex 1 mg PO BID at this time. Dr. Nails notified of patient's symptoms and current medication regimen. Per Dr. Nails's orders, patient is to increase Bumex to 2 mg Monday (6/4/2018) evening, Tuesday (6/5/2018) morning and evening, and Wednesday (6/6/2018) morning. BMP and BNP to be drawn on Wednesday 6/6/2018 at patient's local lab. Once results are reviewed, medication instructions will be given. CHF nurse Lila Son notified of Dr. Nails's orders for labs per MD request, and patient will be contacted to schedule lab appointment. Per GUIDE-HF guidance, Subject Contact Worksheet completed with all MD instructions regarding medication changes and ordered labs. Blinded coordinator Lamont Hayes contacted patient and relayed information to patient via Subject Contact Worksheet. Patient denied any additional questions or concerns at this time, and complete understanding of information given verbalized. Will follow-up with patient Wednesday 6/6/2018 following lab work review. Will follow.   "

## 2018-06-06 ENCOUNTER — RESEARCH ENCOUNTER (OUTPATIENT)
Dept: RESEARCH | Facility: HOSPITAL | Age: 78
End: 2018-06-06

## 2018-06-06 NOTE — PROGRESS NOTES
6/6/2018 4:00 PM: Patient's labs not received from outside hospital at this time. Notified Dr. Nails that patient is awaiting diuretic medication instructions pending lab results.VORB: Mela Nails M.D./Bisi Chris RN: Patient to continue taking Bumex 2 mg PO this evening (6/6/2018) and tomorrow morning (6/7/2018). Lab results to be reviewed and further instructions to be given to patient upon receipt. Patient contacted and given medication instructions. Patient repeated back correct dosing instructions and denied any questions or concerns at this time. Instructed patient that HF clinic representative or CRC will call patient once labs are received and reviewed. Complete understanding verbalized. Will follow.      6/7/2018 10:30 AM: BMP and BNP results received and reviewed by Dr. Nails. Patient's creatinine much improved to 1.37 mg/dL from 1.9 mg/dL following hospital discharge at the end of May. BNP elevated at 3229 pg/mL. VORB: Mela Nails M.D./Bisi Chris RN: Patient to continue taking Bumex 2 mg PO BID at this time. Repeat BMP and BNP on Friday (6/8/2018) at patient's local lab. SAM Mayesia in HF clinic notified of Dr. Nails's orders for labs per MD request, and patient will be contacted to schedule lab appointment. Patient contacted and notified of all of the above instructions. Patient correctly repeated back all instructions given. Patient denied any additional questions or concerns at this time, and complete understanding of information given verbalized. Will follow-up with patient Friday 6/8/2018 following lab work review. Will follow.     6/8/2018 3:30 PM: BMP and BNP results received and reviewed by Dr. Nails. Patient's creatinine increased from 1.37 mg/dL on 6/6/2018 to 1.60 mg/dL today 6/8/2018. BNP also increased from 3229 pg/mL 6/6/2018 to 3585 pg/mL today 6/8/2018. Potassium level is also low normal at 3.4 mmol/L. Per patient report, patient was experiencing shortness of breath yesterday and had had  abdominal swelling earlier in the week. Patient reported that both shortness of breath and abdominal swelling have resolved today. Patient unable to report exact daily weights, but reports weight has stayed around 195 lbs this week, with about a 2 pound fluctuation up or down. All lab results and symptoms reviewed by Dr. Nails. VORB: Mela Nails M.D./Bisi Chris RN: Patient to take K Dur 80 meq PO x1 today then K Dur 20 meq PO daily starting tomorrow 6/9/2018. Patient also to take Metolozone 5 mg PO x1 today only. Continue Bumex 2 mg PO BID. Patient to get labs as previously scheduled before clinic visit with Dr. Nails Monday 6/11/2018. Patient and patient's son contacted and notified of all of the above instructions. Patient correctly repeated back all instructions given. Patient and patient's son notified that new prescriptions for K Dur and Metolozone were being sent to [facility] in [place]. Patient confirmed that this is correct and knows to  prescription as soon as possible so medications can be taken today. Patient denied any additional questions or concerns at this time, and complete understanding of information given verbalized. Patient to follow-up in CHF clinic on Monday 6/11/2018.

## 2018-06-08 DIAGNOSIS — I50.22 CHRONIC SYSTOLIC CONGESTIVE HEART FAILURE: ICD-10-CM

## 2018-06-08 DIAGNOSIS — E87.6 HYPOKALEMIA: Primary | ICD-10-CM

## 2018-06-08 RX ORDER — POTASSIUM CHLORIDE 20 MEQ/1
TABLET, EXTENDED RELEASE ORAL
Qty: 45 TABLET | Refills: 3 | Status: SHIPPED | OUTPATIENT
Start: 2018-06-08 | End: 2018-01-01 | Stop reason: SDUPTHER

## 2018-06-08 RX ORDER — METOLAZONE 5 MG/1
TABLET ORAL
Qty: 20 TABLET | Refills: 1 | Status: ON HOLD | OUTPATIENT
Start: 2018-06-08 | End: 2019-01-01 | Stop reason: HOSPADM

## 2018-06-11 ENCOUNTER — OFFICE VISIT (OUTPATIENT)
Dept: TRANSPLANT | Facility: CLINIC | Age: 78
End: 2018-06-11
Payer: MEDICARE

## 2018-06-11 ENCOUNTER — LAB VISIT (OUTPATIENT)
Dept: LAB | Facility: HOSPITAL | Age: 78
End: 2018-06-11
Attending: INTERNAL MEDICINE
Payer: MEDICARE

## 2018-06-11 VITALS
SYSTOLIC BLOOD PRESSURE: 132 MMHG | HEART RATE: 69 BPM | WEIGHT: 189.63 LBS | DIASTOLIC BLOOD PRESSURE: 76 MMHG | BODY MASS INDEX: 25.13 KG/M2 | HEIGHT: 73 IN

## 2018-06-11 DIAGNOSIS — I50.22 CHRONIC SYSTOLIC CONGESTIVE HEART FAILURE: Primary | ICD-10-CM

## 2018-06-11 DIAGNOSIS — I48.0 PAROXYSMAL ATRIAL FIBRILLATION: ICD-10-CM

## 2018-06-11 DIAGNOSIS — I25.5 ISCHEMIC CARDIOMYOPATHY: ICD-10-CM

## 2018-06-11 DIAGNOSIS — I50.9 ACUTE HEART FAILURE, UNSPECIFIED HEART FAILURE TYPE: ICD-10-CM

## 2018-06-11 DIAGNOSIS — I50.22 CHRONIC SYSTOLIC CONGESTIVE HEART FAILURE: ICD-10-CM

## 2018-06-11 DIAGNOSIS — Z95.810 ICD (IMPLANTABLE CARDIOVERTER-DEFIBRILLATOR) IN PLACE: ICD-10-CM

## 2018-06-11 LAB
ALBUMIN SERPL BCP-MCNC: 3.8 G/DL
ALP SERPL-CCNC: 153 U/L
ALT SERPL W/O P-5'-P-CCNC: 22 U/L
ANION GAP SERPL CALC-SCNC: 14 MMOL/L
AST SERPL-CCNC: 22 U/L
BILIRUB SERPL-MCNC: 1 MG/DL
BNP SERPL-MCNC: 295 PG/ML
BUN SERPL-MCNC: 56 MG/DL
CALCIUM SERPL-MCNC: 10.4 MG/DL
CHLORIDE SERPL-SCNC: 91 MMOL/L
CO2 SERPL-SCNC: 34 MMOL/L
CREAT SERPL-MCNC: 1.9 MG/DL
EST. GFR  (AFRICAN AMERICAN): 39 ML/MIN/1.73 M^2
EST. GFR  (NON AFRICAN AMERICAN): 33.7 ML/MIN/1.73 M^2
GLUCOSE SERPL-MCNC: 339 MG/DL
MAGNESIUM SERPL-MCNC: 2.2 MG/DL
POTASSIUM SERPL-SCNC: 3.9 MMOL/L
PROT SERPL-MCNC: 8 G/DL
SODIUM SERPL-SCNC: 139 MMOL/L

## 2018-06-11 PROCEDURE — 99214 OFFICE O/P EST MOD 30 MIN: CPT | Mod: S$PBB,,, | Performed by: INTERNAL MEDICINE

## 2018-06-11 PROCEDURE — 99999 PR PBB SHADOW E&M-EST. PATIENT-LVL III: CPT | Mod: PBBFAC,,, | Performed by: INTERNAL MEDICINE

## 2018-06-11 PROCEDURE — 36415 COLL VENOUS BLD VENIPUNCTURE: CPT

## 2018-06-11 PROCEDURE — 99213 OFFICE O/P EST LOW 20 MIN: CPT | Mod: PBBFAC | Performed by: INTERNAL MEDICINE

## 2018-06-11 PROCEDURE — 83735 ASSAY OF MAGNESIUM: CPT

## 2018-06-11 PROCEDURE — 83880 ASSAY OF NATRIURETIC PEPTIDE: CPT

## 2018-06-11 PROCEDURE — 80053 COMPREHEN METABOLIC PANEL: CPT

## 2018-06-11 RX ORDER — CLOPIDOGREL BISULFATE 75 MG/1
75 TABLET ORAL DAILY
Qty: 90 TABLET | Refills: 1 | Status: SHIPPED | OUTPATIENT
Start: 2018-06-11 | End: 2018-08-17 | Stop reason: SDUPTHER

## 2018-06-11 RX ORDER — BUMETANIDE 1 MG/1
TABLET ORAL
Qty: 60 TABLET | Refills: 11 | Status: SHIPPED | OUTPATIENT
Start: 2018-06-11 | End: 2018-06-11 | Stop reason: SDUPTHER

## 2018-06-11 RX ORDER — BUMETANIDE 1 MG/1
TABLET ORAL
Qty: 90 TABLET | Refills: 11 | Status: SHIPPED | OUTPATIENT
Start: 2018-06-11 | End: 2018-01-01 | Stop reason: SDUPTHER

## 2018-06-11 NOTE — PROGRESS NOTES
"Subjective:     HPI:  Mr. Solis is a 77 y.o. year old White male with ICMP (EF=20%), CAD s/p PCI, DM and HTN who comes for a F/U visit. Underwent CardioMeMs implant last month. Was enrolled in the Guide-HF study. Had to increase his diuretic dose last week. Feels better now. Clinically reports NYHA class II-III symptoms. Denies any PND and orthopnea.  Labs reviewed.     Past Medical History:   Diagnosis Date    Chronic systolic congestive heart failure 3/22/2018    Coronary artery disease involving native coronary artery of native heart without angina pectoris 3/22/2018    ICD (implantable cardioverter-defibrillator) in place 3/22/2018    Ischemic cardiomyopathy 3/22/2018    Mixed hyperlipidemia 3/22/2018    Type 2 diabetes mellitus with complication 3/22/2018    VT (ventricular tachycardia) 3/22/2018     History reviewed. No pertinent surgical history.    Review of Systems   Constitution: Negative. Negative for chills, decreased appetite, diaphoresis, fever, weakness, malaise/fatigue, night sweats, weight gain and weight loss.   Eyes: Negative.    Cardiovascular: Positive for dyspnea on exertion and leg swelling. Negative for chest pain, claudication, cyanosis, irregular heartbeat, near-syncope, orthopnea, palpitations, paroxysmal nocturnal dyspnea and syncope.   Respiratory: Negative for cough, hemoptysis and shortness of breath.    Endocrine: Negative.    Hematologic/Lymphatic: Negative.    Skin: Negative for color change, dry skin and nail changes.   Musculoskeletal: Negative.    Gastrointestinal: Negative.    Genitourinary: Negative.        Objective:   Blood pressure 132/76, pulse 69, height 6' 1" (1.854 m), weight 86 kg (189 lb 9.5 oz).body mass index is 25.01 kg/m².  Physical Exam   Constitutional: He appears well-developed.   /76   Pulse 69   Ht 6' 1" (1.854 m)   Wt 86 kg (189 lb 9.5 oz)   BMI 25.01 kg/m²      HENT:   Head: Normocephalic.   Neck: No JVD present. Carotid bruit is not " present.   Cardiovascular: Regular rhythm, normal heart sounds and normal pulses.  PMI is displaced.    No murmur heard.  Pulmonary/Chest: Effort normal and breath sounds normal. No respiratory distress. He has no wheezes. He has no rales.   Abdominal: Soft. Bowel sounds are normal. He exhibits no distension. There is no tenderness. There is no rebound.   Musculoskeletal: He exhibits edema.   Neurological: He is alert.   Skin: Skin is warm.   Vitals reviewed.      Labs:    Chemistry        Component Value Date/Time     06/11/2018 1225    K 3.9 06/11/2018 1225    CL 91 (L) 06/11/2018 1225    CO2 34 (H) 06/11/2018 1225    BUN 56 (H) 06/11/2018 1225    CREATININE 1.9 (H) 06/11/2018 1225     (H) 06/11/2018 1225        Component Value Date/Time    CALCIUM 10.4 06/11/2018 1225    ALKPHOS 153 (H) 06/11/2018 1225    AST 22 06/11/2018 1225    ALT 22 06/11/2018 1225    BILITOT 1.0 06/11/2018 1225    ESTGFRAFRICA 39.0 (A) 06/11/2018 1225    EGFRNONAA 33.7 (A) 06/11/2018 1225          Magnesium   Date Value Ref Range Status   06/11/2018 2.2 1.6 - 2.6 mg/dL Final     Lab Results   Component Value Date    WBC 10.99 05/21/2018    HGB 13.4 (L) 05/21/2018    HCT 41.5 05/21/2018     (L) 05/21/2018     Lab Results   Component Value Date    INR 1.1 05/17/2018    INR 1.1 04/05/2018     BNP   Date Value Ref Range Status   06/11/2018 295 (H) 0 - 99 pg/mL Final     Comment:     Values of less than 100 pg/ml are consistent with non-CHF populations.   05/17/2018 1,170 (H) 0 - 99 pg/mL Final     Comment:     Values of less than 100 pg/ml are consistent with non-CHF populations.   05/10/2018 864 (H) 0 - 99 pg/mL Final     Comment:     Values of less than 100 pg/ml are consistent with non-CHF populations.       Assessment:      1. Chronic systolic congestive heart failure    2. Ischemic cardiomyopathy    3. Paroxysmal atrial fibrillation    4. ICD (implantable cardioverter-defibrillator) in place        Plan:   NYHA class II  symptoms. Had one admission recently. Remains at high risk for HF readmissions.   Patient enrolled in the Guide-HF study.    Resume Entresto at a lower dose 21/24 mg BID  Change Bumex to 2 mg q am and 1 mg q pm  BMP next Thursday  Met with our research coordinator Bisi Chris today.   Recommend 2 gram sodium restriction and 1500cc fluid restriction.  Encourage physical activity with graded exercise program.  Requested patient to weigh themselves daily, and to notify us if their weight increases by more than 3 lbs in 1 day or 5 lbs in 1 week.   RTC in 2 months with doug Nails MD

## 2018-06-11 NOTE — TELEPHONE ENCOUNTER
You did the refill for QTY. Of 60 tablets and it should have been 90, to cover the 2 qam and 1 qpm

## 2018-06-14 ENCOUNTER — TELEPHONE (OUTPATIENT)
Dept: TRANSPLANT | Facility: CLINIC | Age: 78
End: 2018-06-14

## 2018-06-26 ENCOUNTER — RESEARCH ENCOUNTER (OUTPATIENT)
Dept: RESEARCH | Facility: HOSPITAL | Age: 78
End: 2018-06-26

## 2018-06-26 NOTE — PROGRESS NOTES
Contacted by patient today 6/26/2018 regarding weight gain. Per patient, he has gained about 8 pounds since last Metolazone dose (6/8/2018). Reports that he has been working outside in the heat all day and has been drinking more water due to this. Patient denies any other symptoms at this time. Claudia Mendoza PA-C notified of the above patient complaints. VORB: Claudia Mendoza PA-C/Bisi Chris RN: Take Metolazone 5 mg PO x1 and K Dur 80 meq PO x1 today only. Get labs (BMP, BNP, Magnesium) drawn on Thursday 6/28/2018 at local lab. CHF nurse Lila Son notified of labs ordered by RANDY and will schedule patient. Patient contacted and notified of all of the above instructions. Patient correctly repeated back all instructions given. Patient denied any additional questions or concerns at this time, and complete understanding of information given verbalized. Will follow.

## 2018-06-28 ENCOUNTER — TELEPHONE (OUTPATIENT)
Dept: TRANSPLANT | Facility: CLINIC | Age: 78
End: 2018-06-28

## 2018-06-28 DIAGNOSIS — I50.22 CHRONIC SYSTOLIC CONGESTIVE HEART FAILURE: Primary | ICD-10-CM

## 2018-06-28 NOTE — TELEPHONE ENCOUNTER
6/28/18 - Received lab results Cr - 1.9, BUN - 37, K+ 3.6, Na - 139.  Patient is currently taking Bumex 2 mg AM and 1 mg PM.  Discussed/Reviewed with NELIA Nails M.D.  VORZURDO: NELIA Nails M.D./JV Gibson RN: Continue same medical regimen with Bumex 2 mg AM 1 mg PM and repeat BMP, BNP Monday.  Called patient and instructed patient of above.  Patient verbalized understanding to all instructions given.  Lab slip will be faxed to [facility] and phone review entered.

## 2018-06-29 ENCOUNTER — RESEARCH ENCOUNTER (OUTPATIENT)
Dept: RESEARCH | Facility: HOSPITAL | Age: 78
End: 2018-06-29

## 2018-06-30 NOTE — PROGRESS NOTES
Study: GUIDE-HF  Sponsor: [employer]  Follow-up Visit: Bi-Monthly Phone Contact #3  Date of Visit: 6/29/2018     Patient wishes to continue in study: Yes  All study protocol required CRFs completed: Yes     Mr. Solis was contacted today 6/29/2018 by the blinded  Lamont Hayes for his third bi-monthly phone contact post-implant for the GUIDE-HF study. Subject contact sheet completed by this CRC and given to blinded coordinator. Patient has been compliant with daily pressure transmissions. Patient reviewed by Mela Nails MD and no medication changes are being made at this time. No additional instructions were given by study staff. Patient encouraged to continue taking daily pressure measurements. Patient denied any questions, concerns, or symptoms at this time. Will follow.

## 2018-07-02 ENCOUNTER — RESEARCH ENCOUNTER (OUTPATIENT)
Dept: RESEARCH | Facility: HOSPITAL | Age: 78
End: 2018-07-02

## 2018-07-02 ENCOUNTER — LAB VISIT (OUTPATIENT)
Dept: LAB | Facility: HOSPITAL | Age: 78
End: 2018-07-02
Attending: INTERNAL MEDICINE
Payer: MEDICARE

## 2018-07-02 DIAGNOSIS — I50.22 CHRONIC SYSTOLIC CONGESTIVE HEART FAILURE: ICD-10-CM

## 2018-07-02 LAB
ANION GAP SERPL CALC-SCNC: 11 MMOL/L
BNP SERPL-MCNC: 341 PG/ML
BUN SERPL-MCNC: 55 MG/DL
CALCIUM SERPL-MCNC: 9.8 MG/DL
CHLORIDE SERPL-SCNC: 99 MMOL/L
CO2 SERPL-SCNC: 31 MMOL/L
CREAT SERPL-MCNC: 1.7 MG/DL
EST. GFR  (AFRICAN AMERICAN): 44.6 ML/MIN/1.73 M^2
EST. GFR  (NON AFRICAN AMERICAN): 38.6 ML/MIN/1.73 M^2
GLUCOSE SERPL-MCNC: 195 MG/DL
MAGNESIUM SERPL-MCNC: 2.1 MG/DL
POTASSIUM SERPL-SCNC: 3.2 MMOL/L
SODIUM SERPL-SCNC: 141 MMOL/L

## 2018-07-02 PROCEDURE — 36415 COLL VENOUS BLD VENIPUNCTURE: CPT

## 2018-07-02 PROCEDURE — 80048 BASIC METABOLIC PNL TOTAL CA: CPT

## 2018-07-02 PROCEDURE — 83880 ASSAY OF NATRIURETIC PEPTIDE: CPT

## 2018-07-02 PROCEDURE — 83735 ASSAY OF MAGNESIUM: CPT

## 2018-07-03 NOTE — PROGRESS NOTES
Contacted patient on 6/29/2018 regarding [facility] Error 5 Urgent Medical Device Recall involving GUIDE-HF patients at [facility]. Explained to patient the reason for the recall and that this recall does not affect the patient's sensor nor is a safety concern. Patient instructed that due to the low possibility of receiving an Error 5 message, all affected units are being replaced by [employer]. Patient reports that he has not had any issues with this error or transmissions. Instructed patient to notify CRC immediately if any issues occur so that replacement could be expedited, as it will take several weeks to replace all affected pillows. Complete understanding verbalized. Patient reported that he will be at [facility] on Monday 7/2/2018 for his son's surgery and would be willing to meet CRC to review recall letters and sign acknowledgement form for electronics replacement. CRC met with patient on 7/2/2018. Urgent Medical Device Recall Patient Letter as well as Error 5 Patient FAQ given and explained to patient. Patient verbalized complete understanding. [facility] Error 5 Urgent Medical Device Recall Acknowledgement and Contact Form completed with patient. Serial number and all patient contact information confirmed with patient. Patient signed form acknowledging that he has received [employer] communications and letters regarding recall. Patient informed that CRC will send form to [employer]. Patient will be contacted by [employer] to discuss replacement and return of electronics system. Patient verbalized complete understanding. Patient denies any questions or concerns at this time. Completed form e-mailed to [employer] by CRC. Will follow.

## 2018-07-05 ENCOUNTER — TELEPHONE (OUTPATIENT)
Dept: TRANSPLANT | Facility: CLINIC | Age: 78
End: 2018-07-05

## 2018-07-05 NOTE — TELEPHONE ENCOUNTER
1230 pm:  F/U on today's nurse lab phone review:  See NN by FLORENTINO Ahn 6/28/18.  Just received lab resutls BMP: from today's [facility] collection:  K+ has improved to 3.8  Cr stable @ 1.7 glucose is 340 which seems from previous lab draws glucose runs high.  Called pt at this time for HF assessment -NA, LVM asking pt return my call. Left my name, reason for call, office with, day, date, time and call back phone number  1235 pm: Called and spoke with FARIDEH Gongora as per Nurse review instructions. Informed him pts K+ today of 3.8 -was 3.2 last week . Also informed him there were med changes and I called pt but na for HF assessment. No new orders at this time.

## 2018-07-10 ENCOUNTER — TELEPHONE (OUTPATIENT)
Dept: TRANSPLANT | Facility: CLINIC | Age: 78
End: 2018-07-10

## 2018-07-10 ENCOUNTER — DOCUMENTATION ONLY (OUTPATIENT)
Dept: TRANSPLANT | Facility: CLINIC | Age: 78
End: 2018-07-10

## 2018-07-10 DIAGNOSIS — I50.43 ACUTE ON CHRONIC COMBINED SYSTOLIC AND DIASTOLIC CHF, NYHA CLASS 3: Primary | ICD-10-CM

## 2018-07-10 NOTE — PROGRESS NOTES
Weight up per voicemail left by patient to Bisi Chris, research coordinator.     Will have patient take metolazone 5 mg PO x 1 and extra 80 mEq of potassium x 1. Will order BMP for patient to have drawn Thursday to ensure potassium adequately supplemented.     Discussed with Dr. Nails.    MARIO Mendoza PA-C

## 2018-07-10 NOTE — TELEPHONE ENCOUNTER
7/10/18 - Received message that patient reported a weight increase via voicemail to ALBINA Chris RN Research Coordinator.  Written orders received from STAR Lazaro, to have patient take Metolazone 5 mg x 1 and extra Potassium Chloride 80 meq x 1 and draw BMP Thursday.  Called and instructed patient of above.  Patient stated he already took Metolazone 5 mg this morning before he left a message for ALBINA Chris RN, but will take Potassium Chloride 80 meq now and will have labs drawn Thursday morning at [facility].  Orders for lab draw faxed over and phone review entered.

## 2018-07-12 ENCOUNTER — RESEARCH ENCOUNTER (OUTPATIENT)
Dept: RESEARCH | Facility: HOSPITAL | Age: 78
End: 2018-07-12

## 2018-07-12 NOTE — PROGRESS NOTES
Study: GUIDE-HF  Sponsor: [employer]  Follow-up Visit: Bi-Monthly Phone Contact #4  Date of Visit: 7/12/2018     Patient wishes to continue in study: Yes  All study protocol required CRFs completed: Yes     Mr. Solis was contacted today 7/12/2018 by the blinded  Lamont Hayes for his fourth bi-monthly phone contact post-implant for the GUIDE-HF study. Subject contact sheet completed by this CRC and given to blinded coordinator. Patient has been compliant with daily pressure transmissions. Patient reviewed by Claudia Mendoza PA-C, and no medication changes are being made at this time. No additional instructions were given by study staff. Patient encouraged to continue taking daily pressure measurements. Patient denied any questions, concerns, or symptoms at this time. Will follow.

## 2018-07-26 ENCOUNTER — RESEARCH ENCOUNTER (OUTPATIENT)
Dept: RESEARCH | Facility: HOSPITAL | Age: 78
End: 2018-07-26

## 2018-07-26 ENCOUNTER — TELEPHONE (OUTPATIENT)
Dept: TRANSPLANT | Facility: CLINIC | Age: 78
End: 2018-07-26

## 2018-07-26 DIAGNOSIS — I50.43 ACUTE ON CHRONIC COMBINED SYSTOLIC AND DIASTOLIC CHF, NYHA CLASS 3: Primary | ICD-10-CM

## 2018-07-26 DIAGNOSIS — I50.9 ACUTE HEART FAILURE, UNSPECIFIED HEART FAILURE TYPE: Primary | ICD-10-CM

## 2018-07-26 NOTE — TELEPHONE ENCOUNTER
"7/26/18 - Patient's wife contacted ALBINA Chris RN, stating patient had a 15 lb weight gain, is SOB and not feeling very well.  STAR Lazaro contacted me with EZEKIEL: to schedule patient for appt tomorrow and have patient take Metolazone 5mg today and Potassium Chloride 80 meq today only.  Called patient who stated he made a mistake on his weight.  He is now 196 which is 6 lb up from his "normal" 190.  Patient did state his has been drinking more water due to working outside.  Patient and wife agreed to come in for appt tomorrow for 2:00 and with labs at 1:00.  Instructed patient to take Metolazone 5 mg today and Potassium Chloride 80 meq today.  Patient and wife verbalized understanding to all instructions given.    "

## 2018-07-27 ENCOUNTER — DOCUMENTATION ONLY (OUTPATIENT)
Dept: TRANSPLANT | Facility: CLINIC | Age: 78
End: 2018-07-27
Payer: MEDICARE

## 2018-07-27 ENCOUNTER — OFFICE VISIT (OUTPATIENT)
Dept: TRANSPLANT | Facility: CLINIC | Age: 78
End: 2018-07-27
Payer: MEDICARE

## 2018-07-27 ENCOUNTER — LAB VISIT (OUTPATIENT)
Dept: LAB | Facility: HOSPITAL | Age: 78
End: 2018-07-27
Payer: MEDICARE

## 2018-07-27 VITALS
SYSTOLIC BLOOD PRESSURE: 116 MMHG | WEIGHT: 201.94 LBS | DIASTOLIC BLOOD PRESSURE: 66 MMHG | HEIGHT: 74 IN | BODY MASS INDEX: 25.92 KG/M2 | HEART RATE: 68 BPM

## 2018-07-27 DIAGNOSIS — I50.43 ACUTE ON CHRONIC COMBINED SYSTOLIC AND DIASTOLIC CHF, NYHA CLASS 3: Primary | ICD-10-CM

## 2018-07-27 DIAGNOSIS — Z95.810 ICD (IMPLANTABLE CARDIOVERTER-DEFIBRILLATOR) IN PLACE: ICD-10-CM

## 2018-07-27 DIAGNOSIS — I25.5 ISCHEMIC CARDIOMYOPATHY: ICD-10-CM

## 2018-07-27 DIAGNOSIS — I48.0 PAROXYSMAL ATRIAL FIBRILLATION: ICD-10-CM

## 2018-07-27 DIAGNOSIS — I50.43 ACUTE ON CHRONIC COMBINED SYSTOLIC AND DIASTOLIC CHF, NYHA CLASS 3: ICD-10-CM

## 2018-07-27 LAB
ALBUMIN SERPL BCP-MCNC: 3.6 G/DL
ALP SERPL-CCNC: 139 U/L
ALT SERPL W/O P-5'-P-CCNC: 28 U/L
ANION GAP SERPL CALC-SCNC: 10 MMOL/L
AST SERPL-CCNC: 29 U/L
BILIRUB SERPL-MCNC: 1.2 MG/DL
BNP SERPL-MCNC: 580 PG/ML
BUN SERPL-MCNC: 42 MG/DL
CALCIUM SERPL-MCNC: 10.1 MG/DL
CHLORIDE SERPL-SCNC: 98 MMOL/L
CO2 SERPL-SCNC: 30 MMOL/L
CREAT SERPL-MCNC: 1.7 MG/DL
EST. GFR  (AFRICAN AMERICAN): 44.6 ML/MIN/1.73 M^2
EST. GFR  (NON AFRICAN AMERICAN): 38.6 ML/MIN/1.73 M^2
GLUCOSE SERPL-MCNC: 225 MG/DL
MAGNESIUM SERPL-MCNC: 2.3 MG/DL
POTASSIUM SERPL-SCNC: 3.8 MMOL/L
PROT SERPL-MCNC: 7.3 G/DL
SODIUM SERPL-SCNC: 138 MMOL/L

## 2018-07-27 PROCEDURE — 36415 COLL VENOUS BLD VENIPUNCTURE: CPT

## 2018-07-27 PROCEDURE — 83880 ASSAY OF NATRIURETIC PEPTIDE: CPT

## 2018-07-27 PROCEDURE — 83735 ASSAY OF MAGNESIUM: CPT

## 2018-07-27 PROCEDURE — 99214 OFFICE O/P EST MOD 30 MIN: CPT | Mod: S$PBB,,, | Performed by: INTERNAL MEDICINE

## 2018-07-27 PROCEDURE — 99213 OFFICE O/P EST LOW 20 MIN: CPT | Mod: PBBFAC | Performed by: INTERNAL MEDICINE

## 2018-07-27 PROCEDURE — 80053 COMPREHEN METABOLIC PANEL: CPT

## 2018-07-27 PROCEDURE — 99999 PR PBB SHADOW E&M-EST. PATIENT-LVL III: CPT | Mod: PBBFAC,,, | Performed by: INTERNAL MEDICINE

## 2018-07-27 RX ORDER — FUROSEMIDE 10 MG/ML
60 INJECTION INTRAMUSCULAR; INTRAVENOUS
Status: COMPLETED | OUTPATIENT
Start: 2018-07-27 | End: 2018-07-27

## 2018-07-27 RX ORDER — NYSTATIN 100000 [USP'U]/ML
SUSPENSION ORAL
COMMUNITY
Start: 2018-07-25 | End: 2018-01-01

## 2018-07-27 RX ADMIN — FUROSEMIDE 60 MG: 10 INJECTION INTRAMUSCULAR; INTRAVENOUS at 02:07

## 2018-07-27 NOTE — PROGRESS NOTES
Study: GUIDE-HF  Sponsor: [employer]  Follow-up Visit: Bi-Monthly Phone Contact #5  Date of Visit: 7/26/2018     Patient wishes to continue in study: Yes  All study protocol required CRFs completed: Yes     Mr. Solis was contacted today 7/26/2018 by the blinded  Lamont Hayes for his fifth bi-monthly phone contact post-implant for the GUIDE-HF study. Subject contact sheet completed by this CRC and given to blinded coordinator. Patient has been compliant with daily pressure transmissions. Patient reviewed by Claudia Mendoza PA-C, and no medication changes are being made at this time. No additional instructions were given by study staff. Patient encouraged to continue taking daily pressure measurements. Patient denied any questions, concerns, or symptoms at this time. Will follow.

## 2018-07-27 NOTE — PATIENT INSTRUCTIONS
Take 60 meq of potassium (tonight)  Take 5 mg of Metolazone 30 min before pm dose of Bumex tomorrow night along with 80 meq of KDUR   BMP on Monday  Weight yourself every morning

## 2018-07-27 NOTE — PROGRESS NOTES
"Subjective:     HPI:  Mr. Solis is a very pleasant 78y/o male with HFrEF, ICMP (EF=20%), NYHA class III symptoms  S/p cardioMEMS implant (enrolled in GUIDE-HF study) who comes for an urgent clinic visit. Has merly reporting worsening CORDOBA, 15 LBS weight gain.  Was given 5mg of Metolazone yesterday which helped according the patient. His dry weight is ~190 LBS. Today, he weighs 201 LBS. Labs today were reviewed.     Past Medical History:   Diagnosis Date    Chronic systolic congestive heart failure 3/22/2018    Coronary artery disease involving native coronary artery of native heart without angina pectoris 3/22/2018    ICD (implantable cardioverter-defibrillator) in place 3/22/2018    Ischemic cardiomyopathy 3/22/2018    Mixed hyperlipidemia 3/22/2018    Type 2 diabetes mellitus with complication 3/22/2018    VT (ventricular tachycardia) 3/22/2018       Review of Systems   Constitution: Positive for weight gain. Negative for chills, decreased appetite, diaphoresis, fever, weakness, malaise/fatigue, night sweats and weight loss.   Eyes: Negative.    Cardiovascular: Positive for dyspnea on exertion, leg swelling, orthopnea and paroxysmal nocturnal dyspnea. Negative for chest pain, claudication, cyanosis, irregular heartbeat, near-syncope, palpitations and syncope.   Respiratory: Negative for cough, hemoptysis and shortness of breath.    Endocrine: Negative.    Hematologic/Lymphatic: Negative.    Skin: Negative for color change, dry skin and nail changes.   Musculoskeletal: Negative.    Gastrointestinal: Negative.    Genitourinary: Negative.        Objective:   Blood pressure 116/66, pulse 68, height 6' 2" (1.88 m), weight 91.6 kg (201 lb 15.1 oz).body mass index is 25.93 kg/m².  Physical Exam   Constitutional: He appears well-developed.   /66 (BP Location: Left arm, Patient Position: Sitting, BP Method: Medium (Automatic))   Pulse 68   Ht 6' 2" (1.88 m)   Wt 91.6 kg (201 lb 15.1 oz)   BMI 25.93 kg/m²    "   HENT:   Head: Normocephalic.   Neck: JVD present. Carotid bruit is not present.   Cardiovascular: Regular rhythm, normal heart sounds and normal pulses.  PMI is displaced.    No murmur heard.  Pulmonary/Chest: Effort normal and breath sounds normal. No respiratory distress. He has no wheezes. He has no rales.   Abdominal: Soft. Bowel sounds are normal. He exhibits no distension. There is no tenderness. There is no rebound.   Musculoskeletal: He exhibits edema.   Neurological: He is alert.   Skin: Skin is warm.   Vitals reviewed.      Labs:    Chemistry        Component Value Date/Time     07/27/2018 1235    K 3.8 07/27/2018 1235    CL 98 07/27/2018 1235    CO2 30 (H) 07/27/2018 1235    BUN 42 (H) 07/27/2018 1235    CREATININE 1.7 (H) 07/27/2018 1235     (H) 07/27/2018 1235        Component Value Date/Time    CALCIUM 10.1 07/27/2018 1235    ALKPHOS 139 (H) 07/27/2018 1235    AST 29 07/27/2018 1235    ALT 28 07/27/2018 1235    BILITOT 1.2 (H) 07/27/2018 1235    ESTGFRAFRICA 44.6 (A) 07/27/2018 1235    EGFRNONAA 38.6 (A) 07/27/2018 1235          Magnesium   Date Value Ref Range Status   07/27/2018 2.3 1.6 - 2.6 mg/dL Final     Lab Results   Component Value Date    WBC 10.99 05/21/2018    HGB 13.4 (L) 05/21/2018    HCT 41.5 05/21/2018     (L) 05/21/2018     Lab Results   Component Value Date    INR 1.1 05/17/2018    INR 1.1 04/05/2018     BNP   Date Value Ref Range Status   07/27/2018 580 (H) 0 - 99 pg/mL Final     Comment:     Values of less than 100 pg/ml are consistent with non-CHF populations.   07/02/2018 341 (H) 0 - 99 pg/mL Final     Comment:     Values of less than 100 pg/ml are consistent with non-CHF populations.   06/11/2018 295 (H) 0 - 99 pg/mL Final     Comment:     Values of less than 100 pg/ml are consistent with non-CHF populations.         Assessment:      1. Acute on chronic combined systolic and diastolic CHF, NYHA class 3    2. Ischemic cardiomyopathy    3. Paroxysmal atrial  fibrillation    4. ICD (implantable cardioverter-defibrillator) in place        Plan:   NYHA class III symptoms with 15 LBS weight gain.  S/p Cardiomems implant. Enrolled in our GUIDE-HF study.   Will give 60 mg IV lasix x1 in clinic with 60 meq of KDUR  Also take 5 mg of Metolazone 30 min before pm dose of Bumex tomorrow night along with 80 meq of KDUR   BMP on Monday  Recommend 2 gram sodium restriction and 1500cc fluid restriction.  Encourage physical activity with graded exercise program.  Requested patient to weigh themselves daily, and to notify us if their weight increases by more than 3 lbs in 1 day or 5 lbs in 1 week.   RTC in 1 month with labs.      Mela Nails MD

## 2018-08-02 ENCOUNTER — TELEPHONE (OUTPATIENT)
Dept: TRANSPLANT | Facility: CLINIC | Age: 78
End: 2018-08-02

## 2018-08-02 NOTE — TELEPHONE ENCOUNTER
Reviewed labs received today that were drawn this am [facility] to recheck K. K today is 3.0. I confirmed with pt and wife that pt did take 80meq of potassium 2 days ago as instructed, and pt is taking k 20meq daily. Per Dr. NICKI Baron pts to take k 80meq, now then K 40 meq daily, repeat bmp in am. Pts only complaint at this time is unusual fatigue, but denies any other complaints. Pt says he's out eating with his wife , and will go home to take potassium as soon as they are done and has a 50 mile ride. I asked pt to have a banana or 2 now, and stat BMP in am at [facility]. Pt verbalizes understanding.

## 2018-08-03 ENCOUNTER — TELEPHONE (OUTPATIENT)
Dept: TRANSPLANT | Facility: CLINIC | Age: 78
End: 2018-08-03

## 2018-08-03 NOTE — TELEPHONE ENCOUNTER
Received pts labs today. K is now 3.3. Pt confirms that the did take kcl 80meq as instructed yesterday. Asked pt to take 60meq today total, then 40meq(2 of the 20meq tabs) once daily. Pt verbalized understanding

## 2018-08-09 ENCOUNTER — RESEARCH ENCOUNTER (OUTPATIENT)
Dept: RESEARCH | Facility: HOSPITAL | Age: 78
End: 2018-08-09

## 2018-08-10 ENCOUNTER — TELEPHONE (OUTPATIENT)
Dept: TRANSPLANT | Facility: CLINIC | Age: 78
End: 2018-08-10

## 2018-08-10 NOTE — TELEPHONE ENCOUNTER
"Per message from MARIO GRAVES. I contacted patient and after reviewing weights pts has recorded from 8/1/18. pts weights up from 186lbs to 196lbs as of today. Pt report bad leg cramping last night and says began having leg cramping 2 days ago.  CHF phones assess: pt does report abdominal distention"pants fitting tighter at waist", increase in sob, no nocturnal dyspnea, or orthopnea, no coughing or wheezing, and denies any distress. Per Dr. Nails pts to take bumex 2mg in am and 2mg in pm 2 days, take an extra 80meq of potassium in addition to daily 40meq of potassium x 2 days, also take metolazone 5mg today, tomorrow am only (30min before am dose of bumex). Pt denies eating increased sodium but admits he's drinking "lots more fluid than I should have", and says he drinks more than 2000ml, because he's thirsty working out in the heat daily. Informed pt that legs cramps arent due to low potassium and suggested he see his pcp about the leg cramping, but informed him that I'd inform Dr. Nails. Bmp Bnp Monday at [facility].   "

## 2018-08-15 DIAGNOSIS — I50.9 ACUTE HEART FAILURE, UNSPECIFIED HEART FAILURE TYPE: Primary | ICD-10-CM

## 2018-08-17 ENCOUNTER — LAB VISIT (OUTPATIENT)
Dept: LAB | Facility: HOSPITAL | Age: 78
End: 2018-08-17
Attending: INTERNAL MEDICINE
Payer: MEDICARE

## 2018-08-17 ENCOUNTER — OFFICE VISIT (OUTPATIENT)
Dept: TRANSPLANT | Facility: CLINIC | Age: 78
End: 2018-08-17
Payer: MEDICARE

## 2018-08-17 VITALS
BODY MASS INDEX: 24.1 KG/M2 | DIASTOLIC BLOOD PRESSURE: 69 MMHG | WEIGHT: 187.81 LBS | HEIGHT: 74 IN | HEART RATE: 69 BPM | SYSTOLIC BLOOD PRESSURE: 112 MMHG

## 2018-08-17 DIAGNOSIS — I25.5 ISCHEMIC CARDIOMYOPATHY: ICD-10-CM

## 2018-08-17 DIAGNOSIS — I25.10 CORONARY ARTERY DISEASE INVOLVING NATIVE CORONARY ARTERY OF NATIVE HEART WITHOUT ANGINA PECTORIS: ICD-10-CM

## 2018-08-17 DIAGNOSIS — Z95.810 ICD (IMPLANTABLE CARDIOVERTER-DEFIBRILLATOR) IN PLACE: ICD-10-CM

## 2018-08-17 DIAGNOSIS — E78.2 MIXED HYPERLIPIDEMIA: ICD-10-CM

## 2018-08-17 DIAGNOSIS — I50.9 ACUTE HEART FAILURE, UNSPECIFIED HEART FAILURE TYPE: ICD-10-CM

## 2018-08-17 DIAGNOSIS — I48.0 PAROXYSMAL ATRIAL FIBRILLATION: ICD-10-CM

## 2018-08-17 DIAGNOSIS — I50.22 CHRONIC SYSTOLIC CONGESTIVE HEART FAILURE: Primary | ICD-10-CM

## 2018-08-17 LAB
ALBUMIN SERPL BCP-MCNC: 3.9 G/DL
ALP SERPL-CCNC: 130 U/L
ALT SERPL W/O P-5'-P-CCNC: 17 U/L
ANION GAP SERPL CALC-SCNC: 16 MMOL/L
AST SERPL-CCNC: 25 U/L
BILIRUB SERPL-MCNC: 1.2 MG/DL
BNP SERPL-MCNC: 431 PG/ML
BUN SERPL-MCNC: 88 MG/DL
CALCIUM SERPL-MCNC: 10.3 MG/DL
CHLORIDE SERPL-SCNC: 89 MMOL/L
CO2 SERPL-SCNC: 34 MMOL/L
CREAT SERPL-MCNC: 2.1 MG/DL
EST. GFR  (AFRICAN AMERICAN): 34.6 ML/MIN/1.73 M^2
EST. GFR  (NON AFRICAN AMERICAN): 29.9 ML/MIN/1.73 M^2
GLUCOSE SERPL-MCNC: 356 MG/DL
MAGNESIUM SERPL-MCNC: 2.2 MG/DL
POTASSIUM SERPL-SCNC: 3.4 MMOL/L
PROT SERPL-MCNC: 8.1 G/DL
SODIUM SERPL-SCNC: 139 MMOL/L

## 2018-08-17 PROCEDURE — 83880 ASSAY OF NATRIURETIC PEPTIDE: CPT

## 2018-08-17 PROCEDURE — 80053 COMPREHEN METABOLIC PANEL: CPT

## 2018-08-17 PROCEDURE — 99999 PR PBB SHADOW E&M-EST. PATIENT-LVL III: CPT | Mod: PBBFAC,,, | Performed by: INTERNAL MEDICINE

## 2018-08-17 PROCEDURE — 99214 OFFICE O/P EST MOD 30 MIN: CPT | Mod: S$PBB,,, | Performed by: INTERNAL MEDICINE

## 2018-08-17 PROCEDURE — 99213 OFFICE O/P EST LOW 20 MIN: CPT | Mod: PBBFAC | Performed by: INTERNAL MEDICINE

## 2018-08-17 PROCEDURE — 36415 COLL VENOUS BLD VENIPUNCTURE: CPT

## 2018-08-17 PROCEDURE — 83735 ASSAY OF MAGNESIUM: CPT

## 2018-08-17 RX ORDER — CLOPIDOGREL BISULFATE 75 MG/1
75 TABLET ORAL DAILY
Qty: 90 TABLET | Refills: 1 | Status: SHIPPED | OUTPATIENT
Start: 2018-08-17

## 2018-08-17 RX ORDER — ROSUVASTATIN CALCIUM 20 MG/1
20 TABLET, COATED ORAL NIGHTLY
Qty: 30 TABLET | Refills: 11 | Status: SHIPPED | OUTPATIENT
Start: 2018-08-17

## 2018-08-17 NOTE — PROGRESS NOTES
"Subjective:     HPI:  Mr. Solis is a very pleasant 76y/o male with HFrEF, ICMP (EF=20%), NYHA class III symptoms  S/p cardioMEMS implant (enrolled in GUIDE-HF study) who comes for an urgent clinic visit. Had merly reporting worsening CORDOBA, 8 LBS weight gain.  His bumex was increased to 2 mg BID and was also given 5mg of Metolazone last week x2 which helped according the patient. His dry weight is ~188 LBS. Today, he weighs 187 LBS. Labs today are pending. Clinically feels much better. Reporst NYHA class II with occasional III symptoms.     Past Medical History:   Diagnosis Date    Chronic systolic congestive heart failure 3/22/2018    Coronary artery disease involving native coronary artery of native heart without angina pectoris 3/22/2018    ICD (implantable cardioverter-defibrillator) in place 3/22/2018    Ischemic cardiomyopathy 3/22/2018    Mixed hyperlipidemia 3/22/2018    Type 2 diabetes mellitus with complication 3/22/2018    VT (ventricular tachycardia) 3/22/2018     History reviewed. No pertinent surgical history.    Review of Systems   Constitution: Positive for malaise/fatigue. Negative for chills, decreased appetite, diaphoresis, fever, weakness, night sweats, weight gain and weight loss.   Eyes: Negative.    Cardiovascular: Positive for dyspnea on exertion. Negative for chest pain, claudication, cyanosis, irregular heartbeat, leg swelling, near-syncope, orthopnea, palpitations, paroxysmal nocturnal dyspnea and syncope.   Respiratory: Negative for cough, hemoptysis and shortness of breath.    Endocrine: Negative.    Hematologic/Lymphatic: Negative.    Skin: Negative for color change, dry skin and nail changes.   Musculoskeletal: Negative.    Gastrointestinal: Negative.    Genitourinary: Negative.        Objective:   Blood pressure 112/69, pulse 69, height 6' 2" (1.88 m), weight 85.2 kg (187 lb 13.3 oz).body mass index is 24.12 kg/m².  Physical Exam   Constitutional: He appears well-developed.   BP " "112/69 (BP Location: Left arm, Patient Position: Sitting, BP Method: Large (Automatic))   Pulse 69   Ht 6' 2" (1.88 m)   Wt 85.2 kg (187 lb 13.3 oz)   BMI 24.12 kg/m²      HENT:   Head: Normocephalic.   Neck: No JVD present. Carotid bruit is not present.   Cardiovascular: Regular rhythm, normal heart sounds and normal pulses. PMI is displaced.   No murmur heard.  Pulmonary/Chest: Effort normal and breath sounds normal. No respiratory distress. He has no wheezes. He has no rales.   Abdominal: Soft. Bowel sounds are normal. He exhibits no distension. There is no tenderness. There is no rebound.   Musculoskeletal: He exhibits no edema.   Neurological: He is alert.   Skin: Skin is warm.   Vitals reviewed.      Labs:    Chemistry        Component Value Date/Time     07/27/2018 1235    K 3.8 07/27/2018 1235    CL 98 07/27/2018 1235    CO2 30 (H) 07/27/2018 1235    BUN 42 (H) 07/27/2018 1235    CREATININE 1.7 (H) 07/27/2018 1235     (H) 07/27/2018 1235        Component Value Date/Time    CALCIUM 10.1 07/27/2018 1235    ALKPHOS 139 (H) 07/27/2018 1235    AST 29 07/27/2018 1235    ALT 28 07/27/2018 1235    BILITOT 1.2 (H) 07/27/2018 1235    ESTGFRAFRICA 44.6 (A) 07/27/2018 1235    EGFRNONAA 38.6 (A) 07/27/2018 1235          Magnesium   Date Value Ref Range Status   07/27/2018 2.3 1.6 - 2.6 mg/dL Final     Lab Results   Component Value Date    WBC 10.99 05/21/2018    HGB 13.4 (L) 05/21/2018    HCT 41.5 05/21/2018     (L) 05/21/2018     Lab Results   Component Value Date    INR 1.1 05/17/2018    INR 1.1 04/05/2018     BNP   Date Value Ref Range Status   07/27/2018 580 (H) 0 - 99 pg/mL Final     Comment:     Values of less than 100 pg/ml are consistent with non-CHF populations.   07/02/2018 341 (H) 0 - 99 pg/mL Final     Comment:     Values of less than 100 pg/ml are consistent with non-CHF populations.   06/11/2018 295 (H) 0 - 99 pg/mL Final     Comment:     Values of less than 100 pg/ml are consistent " with non-CHF populations.       Assessment:      1. Chronic systolic congestive heart failure    2. Ischemic cardiomyopathy    3. Coronary artery disease involving native coronary artery of native heart without angina pectoris    4. ICD (implantable cardioverter-defibrillator) in place    5. Paroxysmal atrial fibrillation        Plan:   NYHA class II-III symptoms with recent 8 LBS weight gain but responded well to increase in Bumex and 2 doses of Metolazone.  S/p Cardiomems implant. Enrolled in our GUIDE-HF study.   Will review his labs today and give further recs. I have asked him not to take Metolazone for now.   Recommend 2 gram sodium restriction and 1500cc fluid restriction.  Encourage physical activity with graded exercise program.  Requested patient to weigh themselves daily, and to notify us if their weight increases by more than 3 lbs in 1 day or 5 lbs in 1 week.   RTC in 2 month with labs.      Mela Nails MD

## 2018-08-27 NOTE — PROGRESS NOTES
Study: GUIDE-HF  Sponsor: [employer]  Follow-up Visit: Bi-Monthly Phone Contact #6  Date of Visit: 8/09/2018     Patient wishes to continue in study: Yes  All study protocol required CRFs completed: Yes     Mr. Solis was contacted today 8/09/2018 by the blinded  Lamont Hayes for his sixth bi-monthly phone contact post-implant for the GUIDE-HF study. Subject contact sheet completed by this CRC and given to blinded coordinator. Patient has been compliant with daily pressure transmissions, however, patient reminded to take readings at the same time every day. Patient reviewed by Claudia Mendoza PA-C, and no medication changes are being made at this time. No additional instructions were given by study staff. Patient encouraged to continue taking daily pressure measurements. Patient reported a 5-6 pound weight gain today 8/9/2018 and that patient has an appointment in clinic on 8/17/2018. Patient also reported that the time of his readings vary due to patient's daily schedule variations. Patient denied any questions, concerns, or other symptoms at this time. Claudia Mendoza PA-C notified of patient reported symptoms. Care to be managed per CHF nursing.  Will follow.

## 2018-09-06 ENCOUNTER — TELEPHONE (OUTPATIENT)
Dept: TRANSPLANT | Facility: CLINIC | Age: 78
End: 2018-09-06

## 2018-09-06 NOTE — TELEPHONE ENCOUNTER
9/6/18 - Received call from patient stating he has had a 4 lb weight gain over past 4 days.  Patient weighed 189 lb 9/2 and today's weight 193.8 lb.  Patient stated he has increasing SOB and some edema.  Patient currently takes Bumex 2 mg twice a day.  Discussed/Reviewed with NELIA Nails M.D.  VORB: NELIA Nails M.D./JV Gibson RN: Get STAT BMP, if labs ok will have patient take Metolazone 5 mg thirty minutes before Bumex today only and take extra Potassium Chloride 60 meq today only.  Called and instructed patient we will need to get STAT BMP.  Patient verbalized understanding and went to [facility] to have labs drawn.  Orders faxed to [facility].  Instructed patient once we receive labs we will contact him back.  Patient verbalized understanding.    1310 - Received lab results Na - 141, K+ 4.3, BUN - 33, Cr - 1.5.  Reviewed with NELIA Nails M.D.  Will proceed with above orders for patient to take Metolazone 5mg today only and extra Potassium Chloride 60 meq today only.  Notified patient of lab results and orders.  Patient verbalized understanding to all instructions given.

## 2018-09-07 ENCOUNTER — RESEARCH ENCOUNTER (OUTPATIENT)
Dept: RESEARCH | Facility: HOSPITAL | Age: 78
End: 2018-09-07

## 2018-09-17 NOTE — PROGRESS NOTES
Study: GUIDE-HF  Sponsor: [employer]  Follow-up Visit: Monthly Phone Contact #1  Date of Visit: 9/7/2018     Patient wishes to continue in study: Yes  All study protocol required CRFs completed: Yes     Mr. Solis was contacted today 9/7/2018 by the blinded  Lamont Hayes for his first monthly phone contact post-implant for the GUIDE-HF study. Subject contact sheet completed by this CRC and given to blinded coordinator. Patient has been compliant with daily pressure transmissions. Patient reviewed by Mela Nails MD, and no medication changes are being made at this time. No additional instructions were given by study staff. Patient encouraged to continue taking daily pressure measurements. Patient denied any questions, concerns, or symptoms at this time. Will follow.

## 2018-09-18 NOTE — TELEPHONE ENCOUNTER
9/18/18 - Received call from patient stating his weight is up 6 lb's in 4 days and he is more SOB.  Patient admits eating gumbo over the weekend and has been drinking extra water to stay hydrated, as he still works outdoors.  Patient currently takes Bumex 2 mg twice a day and Potassium Chloride 20 meq two tablets (40 meq) daily.  Discussed/Reviewed with NELIA Nails M.D.  VORB: NELIA Nails M.D./JV Gibson RN: Take Metolazone 5 mg and extra 60 meq Potassium Chloride today and tomorrow and repeat BMP Thursday.  Above instructions given to patient along with importance of following a low sodium diet, fluid restriction and staying out of the heat during the hottest part of the day.  Patient verbalized understanding to all instructions given.  Lab order faxed to [facility] per DEUCE Butler MA with phone review entered.

## 2018-10-02 NOTE — TELEPHONE ENCOUNTER
10/1/18 - Received call from patient stating he is more SOB and his weight is up to 195 lb.  Patient stated he thinks what got him in to trouble was having to do a CT scan with contrast.  He was instructed to drink plenty of water to flush out the contrast afterwards.  Patient currently takes Bumex 2 mg BID and Potassium Chloride 20 meq once a day.  Discussed/Reviewed with VICENTE AmaroB: NELIA Nails M.D./JV Gibson RN: Take Metolazone 5 mg today and extra 80 meq Potassium Chloride and contact us in the morning.  Above instructions given to patient and patient verbalized understanding.    10/2/18 - Patient called to report weight today 193, but is still SOB.  Discussed/Reviewed with VICENTE Amaro: NELIA Nails M.D./JV Gibson RN: STAT labs BMP and BNP and review results.  Instructed patient to have labs drawn now and patient verbalized understanding.  Orders faxed to [facility].  Received lab results and reviewed with VICENTE AmaroB: NELIA Nails M.D./JV Gibson RN: Take Bumex 3 mg AM and 2 mg PM along with extra 80 meq Potassium Chloride today, tomorrow and Thursday and call office Thursday morning with weight.  Instruct patient goal weight is 188.  Above instructions called to patient and patient's wife.  Patient wrote all instructions down and verbalized understanding.

## 2018-10-09 NOTE — TELEPHONE ENCOUNTER
10/9/18 - Received call from patient who stated his weight was up to 191 lb today and is SOB.  Discussed with patient and patient's wife about the foods patient has been eating.  Patient's wife states she uses dry brown gravy mix in a packet and she makes mustard greens with 1 slice of roa.  Instructed wife that patient is getting too much salt and instructed them on a low sodium diet along with fluid restrictions.  Patient is currently taking Bumex 2 mg BID and Potassium Chloride 40 meq daily.  Discussed above with NELIA Nails M.D.  VORB: NELIA Nails M.D./JV Gibson RN: Take extra Bumex 3 mg AM and 2 mg PM along with extra 20 meq Potassium Chloride x 3 days and recheck BMP Friday.  Above instructions given to patient and patient's wife.  Wife wrote all instructions down and repeated them back.

## 2018-10-09 NOTE — TELEPHONE ENCOUNTER
10/5/18  Received message stating pt was returning Janells call. Spoke to pt and pt says he's currently taking Bumex 2mg in am and 2mg in pm  that as of 10/4 his wt was 187lbs and over night he gained 3lbs so that on 10/5 wt was 190lbs.  Pt stated he was a bit more sob than usual. Reviewed low na diet and fluid restrictions with patient and wife. Per Dr. Mela Nails on 10/5 pm dose patients to take bumex 3mg, Saturday Bumex 3mg in am 2mg in pm, Sunday  m gin am 2mg in pm, and Monday 3mg in am. Wife repeated instructions correctly, and stated she made note of instructions.  Bmp in am at local lab. Await CHF to receive lab results, and review them with MD. Informed pt that chf will contact him with to phone assess. Pt verbalized understanding.

## 2018-10-16 NOTE — TELEPHONE ENCOUNTER
10/15/18 - Received lab results yesterday Cr - 2.1 which is up from 10/4/18 Cr - 1.7.  Called and spoke with patient and asked how he was feeling and how much Bumex he was taking.  Patient stated he is taking Bumex 3 mg AM and 2 mg PM and Potassium Chloride 60 meq daily.  I instructed patient he was only suppose to take the increased dose x 3 days.  Patient stated he didn't realize it was for only 3 days despite writing instructions down.  Patient also stated he is still SOB.  I asked patient his weight today and patient stated he weighs 193 lb.  Patient is still up 5 lb from his baseline despite continuing the increased dose of Bumex 3 mg AM and 2 mg PM.  Discussed/Reveiwed with VICENTE Amaro: NELIA Nails M.D./JV Gibson RN: Take Metolazone 5 mg tonight and extra Potassium Chloride 40 meq tonight and continue with Bumex 3 mg AM and 2 mg PM and draw BMP and BNP Wednesday.  Have patient call tomorrow morning to report weight.  Above instructions given to patient and patient wrote all orders down and read them back correctly. Will fax lab order to [facility] with phone review for Wednesday.    10/16/18 - Received message from patient this morning to call.  Returned call and spoke with patient's wife who stated patient's weight today is 189 lb, down 4 lb's from yesterday and patient stated he is breathing better (I heard patient answer his wife in the background).  Discussed with VICENTE Amaro: NELIA Nails M.D./JV Gibson, FLORENTINO: Continue taking Bumex 3 mg AM and 2 mg PM along with Potassium Chloride 60 meq daily and will review lab results tomorrow.  Patient also scheduled for appt Friday 10/19/18 with Dr. Nails along with labs.  Above instructions given to patient's wife and patient.  Patient also requested an appt with a dietician.  NELIA Nails M.D., will put referral in Epic.

## 2018-10-18 NOTE — TELEPHONE ENCOUNTER
10/17/18 - Received labs Cr - 1.7.  Patient taking Bumex 3 mg AM and 2 mg PM and Potassium Chloride 20 meq 3 tablets daily.  Discussed/Reveiwed with VICENTE Amaro: Continue taking Bumex 3 mg AM and 2 mg PM along with Potassium Chloride 20 meq 3 tablets daily and will follow up at clinic visit this Friday with labs.  Above instructions called to patient and patient wrote all instructions down and verbalized understanding.

## 2018-10-19 NOTE — PROGRESS NOTES
"Subjective:     HPI:  Mr. Solis is a very pleasant 78y/o male with HFrEF, ICMP (EF=20%), NYHA class III symptoms  S/p cardioMEMS implant (enrolled in GUIDE-HF study) who comes for a follow-up visit. Had been reporting worsening CORDOBA, 8 LBS weight gain.  His bumex was increased to 3 mg in am and 2 mg at night which helped according the patient. His dry weight is ~188 LBS. Today, he weighs 191 LBS (however this am at home he weighed 187 LBS). Labs today were reviewed. Reporst NYHA class II with occasional III symptoms.      Past Medical History:   Diagnosis Date    Chronic systolic congestive heart failure 3/22/2018    Coronary artery disease involving native coronary artery of native heart without angina pectoris 3/22/2018    ICD (implantable cardioverter-defibrillator) in place 3/22/2018    Ischemic cardiomyopathy 3/22/2018    Mixed hyperlipidemia 3/22/2018    Type 2 diabetes mellitus with complication 3/22/2018    VT (ventricular tachycardia) 3/22/2018     Past Surgical History:   Procedure Laterality Date    HEART CATH-RIGHT Right 4/5/2018    Performed by Elizabeth Wise MD at [facility] CATH LAB       Review of Systems   Constitution: Negative. Negative for chills, decreased appetite, diaphoresis, fever, weakness, malaise/fatigue, night sweats, weight gain and weight loss.   Eyes: Negative.    Cardiovascular: Positive for dyspnea on exertion and orthopnea. Negative for chest pain, claudication, cyanosis, irregular heartbeat, leg swelling, near-syncope, palpitations, paroxysmal nocturnal dyspnea and syncope.   Respiratory: Negative for cough, hemoptysis and shortness of breath.    Endocrine: Negative.    Hematologic/Lymphatic: Negative.    Skin: Negative for color change, dry skin and nail changes.   Musculoskeletal: Negative.    Gastrointestinal: Negative.    Genitourinary: Negative.        Objective:   Blood pressure (!) 133/50, pulse 68, height 6' 2" (1.88 m), weight 86.9 kg (191 lb 9.3 oz).body mass index " "is 24.6 kg/m².  Physical Exam   Constitutional: He appears well-developed.   BP (!) 133/50 (BP Location: Left arm, Patient Position: Sitting, BP Method: Medium (Automatic))   Pulse 68   Ht 6' 2" (1.88 m)   Wt 86.9 kg (191 lb 9.3 oz)   BMI 24.60 kg/m²      HENT:   Head: Normocephalic.   Neck: No JVD present. Carotid bruit is not present.   Cardiovascular: Regular rhythm, normal heart sounds and normal pulses. PMI is displaced.   No murmur heard.  Pulmonary/Chest: Effort normal and breath sounds normal. No respiratory distress. He has no wheezes. He has no rales.   Abdominal: Soft. Bowel sounds are normal. He exhibits no distension. There is no tenderness. There is no rebound.   Musculoskeletal: He exhibits no edema.   Neurological: He is alert.   Skin: Skin is warm.   Vitals reviewed.      Labs:    Chemistry        Component Value Date/Time     10/19/2018 1145    K 3.5 10/19/2018 1145    CL 96 10/19/2018 1145    CO2 30 (H) 10/19/2018 1145    BUN 54 (H) 10/19/2018 1145    CREATININE 1.8 (H) 10/19/2018 1145     (H) 10/19/2018 1145        Component Value Date/Time    CALCIUM 9.7 10/19/2018 1145    ALKPHOS 130 08/17/2018 1057    AST 25 08/17/2018 1057    ALT 17 08/17/2018 1057    BILITOT 1.2 (H) 08/17/2018 1057    ESTGFRAFRICA 41.3 (A) 10/19/2018 1145    EGFRNONAA 35.8 (A) 10/19/2018 1145          Magnesium   Date Value Ref Range Status   08/17/2018 2.2 1.6 - 2.6 mg/dL Final     Lab Results   Component Value Date    WBC 10.99 05/21/2018    HGB 13.4 (L) 05/21/2018    HCT 41.5 05/21/2018     (L) 05/21/2018     Lab Results   Component Value Date    INR 1.1 05/17/2018    INR 1.1 04/05/2018     BNP   Date Value Ref Range Status   10/19/2018 534 (H) 0 - 99 pg/mL Final     Comment:     Values of less than 100 pg/ml are consistent with non-CHF populations.   08/17/2018 431 (H) 0 - 99 pg/mL Final     Comment:     Values of less than 100 pg/ml are consistent with non-CHF populations.   07/27/2018 580 (H) 0 " - 99 pg/mL Final     Comment:     Values of less than 100 pg/ml are consistent with non-CHF populations.     Assessment:      1. Chronic systolic congestive heart failure    2. Ischemic cardiomyopathy    3. ICD (implantable cardioverter-defibrillator) in place    4. Paroxysmal atrial fibrillation    5. Hypokalemia        Plan:   NYHA class II-III symptoms with recent 8 LBS weight gain but responded well to increase in Bumex. Back to his dry weight now.  S/p Cardiomems implant. Enrolled in our GUIDE-HF study.   In view of his improved creatinine, I will resume his Entresto today  BMP next week.   Recommend 2 gram sodium restriction and 1500cc fluid restriction.  Encourage physical activity with graded exercise program.  Requested patient to weigh themselves daily, and to notify us if their weight increases by more than 3 lbs in 1 day or 5 lbs in 1 week.   RTC in 3 weeks with labs.      Mela Nails MD

## 2018-10-22 NOTE — PROGRESS NOTES
Study: GUIDE-HF  Sponsor: [employer]  Follow-up Visit: Monthly Phone Contact #2  Date of Visit: 10/17/2018     Patient wishes to continue in study: Yes  All study protocol required CRFs completed: Yes     Mr. Solis was contacted today 10/17/2018 by the blinded  Bossman Kent for his second monthly phone contact post-implant for the GUIDE-HF study. Subject contact sheet completed by this CRC and given to blinded coordinator. Patient has been compliant with daily pressure transmissions, but reminded to take readings every day. Patient reviewed by Mela Nails MD, and no medication changes are being made at this time. Patient instructed to continue to follow the instructions of the Heart Failure Clinic. Patient encouraged to continue taking daily pressure measurements. Patient denied any questions, concerns, or symptoms at this time. Will follow.

## 2018-10-25 NOTE — TELEPHONE ENCOUNTER
10/24/18 - Received lab results Cr - 1.7, K+ 4.8.  Attempted to contact patient left v-mail to return call.     10/25/18 - Called patient this am.  Discussed lab results with patient.  Patient stated he has been eating banana's and drinking OJ consistently, weight today 193 lb, but stated he feels his SOB has improved since starting Entresto.  Patient currently taking Bumex 3 mg AM and 2 mg PM and Potassium Chloride 60 meq daily.  Patient stated he is leaving to go to [place] tomorrow to visit his brother for a week or two.  Discussed/Reveiwed with NELIA Nails M.D.  VORB: NELIA Nails M.D./JV Gibson RN: Increase Bumex to 3 mg AM and 3 mg PM today, tomorrow and Saturday, then go back to Bumex 3 mg AM and 2 mg PM, continue with Potassium Chloride 60 meq daily and limit banana's and OJ, repeat BMP Monday.  Above instructions given to patient.  Patient read back and verbalized understanding.  Instructed patient to call me Monday morning so we can set up his blood work.  Patient verbalized understanding.

## 2018-10-30 NOTE — TELEPHONE ENCOUNTER
10/30/18 - Received lab results from yesterday K+ 4.0, Cr - 1.4.  Patient's weight yesterday was 188 lb and today is 189 lb.  Patient stated he did go back to taking Bumex 3 mg AM and 2 mg PM Sunday.  Reviewed/Discussed with NELIA Nails M.D.  VORB: NELIA Nails M.D./JV Gibson RN: Continue taking Bumex 3 mg AM and 2 mg PM.  Instructed patient of above orders and for patient to contact our office if he should develop any issues.  Patient verbalized understanding to all instructions given.

## 2018-11-09 NOTE — PROGRESS NOTES
Study: GUIDE-HF  Sponsor: [employer]  Follow-up Visit: Month 6 Visit  Date of Visit: 11/9/2018     Patient wishes to continue in study: Yes  All study protocol required CRFs completed: Yes     Mr. Solis is here for the Month 6 follow up visit. EQ-5D-5L and KCCQ questionnaires completed prior to all other study procedures per protocol. Physical exam and NYHA assessment performed per Dr. Nails. Heart failure medications reviewed and confirmed. Labs performed per protocol. 6 minute juarez walk test conducted by CRC per protocol. Patient walked a total of 318 meters with no stops noted. Vital signs remained stable at patient's baseline (Before: /67 HR 71 SaO2 97% RA; After: BP 117/70 HR 70 SaO2 98% RA). Patient denies any hospitalizations, ED visits, or any other adverse events since previous study follow-up. Patient has been compliant with daily pressure transmissions. Patient encouraged to continue taking daily pressure measurements. All questions answered to patient's satisfaction and patient will contact research staff if he has any questions or concerns. Next follow up visit is in 6 months, however, patient instructed that monthly phone call visits will occur until Month 12 follow up visit. Complete understanding verbalized. Patient denied any questions, concerns, or symptoms at this time. Will follow.

## 2018-11-09 NOTE — PROGRESS NOTES
Subjective:     HPI:  Mr. Solis is a very pleasant 76y/o male with HFrEF, ICMP (EF=20%), NYHA class III symptoms  S/p cardioMEMS implant (enrolled in GUIDE-HF study) who comes for a follow-up visit. Today it's his 6th month post-cardioMEMS implant check-up. He just came back from a trip in [place]. Unfortunately got ill with GI symptoms including diarrhea and vomiting. States that his symptoms lasted about a week after eating at [employer]. He was given Levaquin by his PCP. Now his symptoms have resolved since Wednesday (2 days ago).   His diuretic regimen includes Bumex 3 mg in am and 2 mg at night. Today he reports NYHA class III symptoms. No PND or orthopnea. Labs were reviewed today and his creatinine has bumped to 2.6 (baseline is 1.6)     Past Medical History:   Diagnosis Date    Chronic systolic congestive heart failure 3/22/2018    Coronary artery disease involving native coronary artery of native heart without angina pectoris 3/22/2018    ICD (implantable cardioverter-defibrillator) in place 3/22/2018    Ischemic cardiomyopathy 3/22/2018    Mixed hyperlipidemia 3/22/2018    Type 2 diabetes mellitus with complication 3/22/2018    VT (ventricular tachycardia) 3/22/2018     Past Surgical History:   Procedure Laterality Date    HEART CATH-RIGHT Right 4/5/2018    Performed by Elizabeth Wise MD at [facility] CATH LAB       Review of Systems   Constitution: Negative. Negative for chills, decreased appetite, diaphoresis, fever, weakness, malaise/fatigue, night sweats, weight gain and weight loss.   Eyes: Negative.    Cardiovascular: Positive for dyspnea on exertion. Negative for chest pain, claudication, cyanosis, irregular heartbeat, leg swelling, near-syncope, orthopnea, palpitations, paroxysmal nocturnal dyspnea and syncope.   Respiratory: Negative for cough, hemoptysis and shortness of breath.    Endocrine: Negative.    Hematologic/Lymphatic: Negative.    Skin: Negative for color change, dry skin and nail  "changes.   Musculoskeletal: Negative.    Gastrointestinal: Positive for abdominal pain, diarrhea and vomiting.   Genitourinary: Negative.        Objective:   Blood pressure (!) 104/51, pulse 68, height 6' 2" (1.88 m), weight 87.3 kg (192 lb 7.4 oz).body mass index is 24.71 kg/m².  Physical Exam   Constitutional: He appears well-developed.   BP (!) 104/51   Pulse 68   Ht 6' 2" (1.88 m)   Wt 87.3 kg (192 lb 7.4 oz)   BMI 24.71 kg/m²      HENT:   Head: Normocephalic.   Neck: No JVD present. Carotid bruit is not present.   Cardiovascular: Regular rhythm, normal heart sounds and normal pulses. PMI is displaced.   No murmur heard.  Pulmonary/Chest: Effort normal and breath sounds normal. No respiratory distress. He has no wheezes. He has no rales.   Abdominal: Soft. Bowel sounds are normal. He exhibits no distension. There is no tenderness. There is no rebound.   Musculoskeletal: He exhibits no edema.   Neurological: He is alert.   Skin: Skin is warm.   Vitals reviewed.      Labs:    Chemistry        Component Value Date/Time     11/09/2018 1149    K 4.4 11/09/2018 1149     11/09/2018 1149    CO2 26 11/09/2018 1149    BUN 83 (H) 11/09/2018 1149    CREATININE 2.6 (H) 11/09/2018 1149     (H) 11/09/2018 1149        Component Value Date/Time    CALCIUM 9.2 11/09/2018 1149    ALKPHOS 327 (H) 11/09/2018 1149    AST 83 (H) 11/09/2018 1149     (H) 11/09/2018 1149    BILITOT 0.9 11/09/2018 1149    ESTGFRAFRICA 26.5 (A) 11/09/2018 1149    EGFRNONAA 22.9 (A) 11/09/2018 1149          Magnesium   Date Value Ref Range Status   08/17/2018 2.2 1.6 - 2.6 mg/dL Final     Lab Results   Component Value Date    WBC 10.99 05/21/2018    HGB 13.4 (L) 05/21/2018    HCT 41.5 05/21/2018     (L) 05/21/2018     Lab Results   Component Value Date    INR 1.1 05/17/2018    INR 1.1 04/05/2018     BNP   Date Value Ref Range Status   11/09/2018 792 (H) 0 - 99 pg/mL Final     Comment:     Values of less than 100 pg/ml " are consistent with non-CHF populations.   10/19/2018 534 (H) 0 - 99 pg/mL Final     Comment:     Values of less than 100 pg/ml are consistent with non-CHF populations.   08/17/2018 431 (H) 0 - 99 pg/mL Final     Comment:     Values of less than 100 pg/ml are consistent with non-CHF populations.       Assessment:      1. Chronic systolic congestive heart failure    2. Ischemic cardiomyopathy    3. ICD (implantable cardioverter-defibrillator) in place    4. Paroxysmal atrial fibrillation        Plan:   NYHA class III. Euvolemic on exam. In view of his worsening creatinine and recent diarrhea and vomiting, will decrease his Bumex to 3 mg daily x3 days. Repeat BMP on Tuesday  S/p Cardiomems implant. Enrolled in our GUIDE-HF study. Completed his 6 month questionnaire today.  Recommend 2 gram sodium restriction and 1500cc fluid restriction.  Encourage physical activity with graded exercise program.  Requested patient to weigh themselves daily, and to notify us if their weight increases by more than 3 lbs in 1 day or 5 lbs in 1 week.   RTC in 1 month with labs.      Mela Nails MD

## 2018-11-12 NOTE — TELEPHONE ENCOUNTER
11/12/18 - Received lab results Cr - 1.7, K+ 4.8 and reviewed with NELIA Nails M.D.  Called and spoke with patient who stated his weight is 187 lb today and patient states his SOB is no worse than usual.  Discussed above with NELIA Nails M.D.  VORB: NELIA Nails M.D./JV Gibson RN: Continue taking Bumex 3 mg daily and decrease Potassium Chloride to 40 meq daily.  Above instructions given to patient and patient repeated all instructions back correctly and verbalized understanding.

## 2018-11-27 NOTE — PROGRESS NOTES
Study: GUIDE-HF  Sponsor: [employer]  Follow-up Visit: Monthly Phone Contact #3  Date of Visit: 11/27/2018     Patient wishes to continue in study: Yes  All study protocol required CRFs completed: Yes     Mr. Solis was contacted today 11/27/2018 by the blinded  Bossman Kent for his third monthly phone contact post-implant for the GUIDE-HF study. Subject contact sheet completed by this CRC and given to blinded coordinator. Patient has been compliant with daily pressure transmissions. Patient reviewed by Mela Nails MD, and no medication changes are being made at this time. No additional instructions were given by study staff. Patient encouraged to continue taking daily pressure measurements. Patient reported weight gain today 11/27/2018 as well as some shortness of breath. He believes that this may be due to recent holiday and dietary non compliance. Patient denied any questions, concerns, or other symptoms at this time. Mela Nails MD notified of patient reported symptoms. Care to be managed per CHF nursing.  Will follow.

## 2018-11-27 NOTE — TELEPHONE ENCOUNTER
11/27/18 - Received call from patient's wife stating patient has had an 8 lb weight gain and is more SOB.  Patient's wife confirmed patient is taking Bumex 3 mg daily and Potassium Chloride 40 meq daily.  Last Cr - 1.7 on 11/12/18.  Discussed/Reveiwed with NELIA Nails M.D.  VORB: NELIA Nails M.D./JV Gibson RN: Take Bumex 3 mg AM, 2 mg PM and Potassium Chloride 60 meq daily x 3 days only and BMP Friday.  Above instructions given to patient's wife.  Wife wrote all instructions down and repeated all instructions back correctly.  Order for BMP faxed to [facility] and phone review entered.

## 2018-12-03 NOTE — TELEPHONE ENCOUNTER
12/3/18 - Returned patient's call.  Patient states his weight is up 10 lb's from baseline.  Today's weight is 197 lb.  Patient stated he had been taking Bumex 3 mg daily.  I instructed patient he was suppose to be taking Bumex 3 mg AM and 2 mg PM (See note 11/30/18).  Patient states his wife must have gotten the instructions wrong.  Discussed/Reviewed with NELIA Nails M.D.  VORB: NELIA Nails M.D./JV Gibson RN: Increase Bumex 3 mg AM 2 mg PM with Potassium Chloride 60 meq daily today through Thursday and repeat BMP Thursday morning.  Above instructions given to patient and patient verbalized understanding to all instructions given.  DEUCE Butler M.A., to send lab order to [facility] and enter phone review.

## 2018-12-07 NOTE — TELEPHONE ENCOUNTER
Received labs on 12/6/18. K 4.3, bun/cr 68/2.0. Pt hadnt weighed and said his wife wasn't homes and he didn't really remember what  His weight was they day before. Pt says he wasn't feeling much better, and denies feeling worse, says he notices by the creases in his socks that he still has the swelling, and sob is the same. Pt says he'd just got finished washing a horse. Asked pt to weigh in the am and told him I'd call him to get his weight.     Called pt this am, pt says wt this am is 194lbs. Therefore wt is down 3lbs. Informed Dr. Nails. Per Dr. Nails I instructed pt to continue bumex 3mg in am/ 2mg in pm, and potassium 60meq daily with bmp Monday. Pt verbalized understanding. Message sent to [facility] to schedule labs.

## 2018-12-10 NOTE — PROGRESS NOTES
Subjective:     HPI:  Mr. Solis is a very pleasant 76y/o male with HFrEF, ICMP (EF=20%), NYHA class III symptoms  S/p cardioMEMS implant (enrolled in GUIDE-HF study) who comes for a follow-up visit. Today it's his 6th month post-cardioMEMS implant check-up. He just came back from a trip in [place]. Unfortunately got ill with GI symptoms including diarrhea and vomiting. States that his symptoms lasted about a week after eating at [employer]. He was given Levaquin by his PCP. Now his symptoms have resolved since Wednesday (2 days ago).   His diuretic regimen includes Bumex 3 mg in am and 2 mg at night. Today he reports NYHA class III symptoms. No PND or orthopnea. Labs were reviewed today and his creatinine is 2.1.     Past Medical History:   Diagnosis Date    Chronic systolic congestive heart failure 3/22/2018    Coronary artery disease involving native coronary artery of native heart without angina pectoris 3/22/2018    ICD (implantable cardioverter-defibrillator) in place 3/22/2018    Ischemic cardiomyopathy 3/22/2018    Mixed hyperlipidemia 3/22/2018    Type 2 diabetes mellitus with complication 3/22/2018    VT (ventricular tachycardia) 3/22/2018     Past Surgical History:   Procedure Laterality Date    HEART CATH-RIGHT Right 4/5/2018    Performed by Elizabeth Wise MD at [facility] CATH LAB       Review of Systems   Constitution: Negative. Negative for chills, decreased appetite, diaphoresis, fever, weakness, malaise/fatigue, night sweats, weight gain and weight loss.   Eyes: Negative.    Cardiovascular: Positive for dyspnea on exertion. Negative for chest pain, claudication, cyanosis, irregular heartbeat, leg swelling, near-syncope, orthopnea, palpitations, paroxysmal nocturnal dyspnea and syncope.   Respiratory: Negative for cough, hemoptysis and shortness of breath.    Endocrine: Negative.    Hematologic/Lymphatic: Negative.    Skin: Negative for color change, dry skin and nail changes.   Musculoskeletal:  "Negative.    Gastrointestinal: Negative.    Genitourinary: Negative.        Objective:   Blood pressure (!) 108/53, pulse 70, height 6' 1" (1.854 m), weight 89.1 kg (196 lb 6.9 oz).body mass index is 25.92 kg/m².  Physical Exam   Constitutional: He appears well-developed.   BP (!) 108/53 (BP Location: Left arm, Patient Position: Sitting, BP Method: Medium (Automatic))   Pulse 70   Ht 6' 1" (1.854 m)   Wt 89.1 kg (196 lb 6.9 oz)   BMI 25.92 kg/m²      HENT:   Head: Normocephalic.   Neck: No JVD present. Carotid bruit is not present.   Cardiovascular: Regular rhythm and normal heart sounds. PMI is displaced.   No murmur heard.  Pulmonary/Chest: Effort normal and breath sounds normal. No respiratory distress. He has no wheezes. He has no rales.   Abdominal: Soft. Bowel sounds are normal. He exhibits no distension. There is no tenderness. There is no rebound.   Musculoskeletal: He exhibits no edema.   Neurological: He is alert.   Skin: Skin is warm.   Vitals reviewed.      Labs:    Chemistry        Component Value Date/Time     12/10/2018 1215    K 4.9 12/10/2018 1215    CL 99 12/10/2018 1215    CO2 26 12/10/2018 1215    BUN 72 (H) 12/10/2018 1215    CREATININE 2.1 (H) 12/10/2018 1215     (H) 12/10/2018 1215        Component Value Date/Time    CALCIUM 9.7 12/10/2018 1215    ALKPHOS 246 (H) 12/10/2018 1215    AST 39 12/10/2018 1215    ALT 31 12/10/2018 1215    BILITOT 1.4 (H) 12/10/2018 1215    ESTGFRAFRICA 34.3 (A) 12/10/2018 1215    EGFRNONAA 29.7 (A) 12/10/2018 1215          Magnesium   Date Value Ref Range Status   08/17/2018 2.2 1.6 - 2.6 mg/dL Final     Lab Results   Component Value Date    WBC 10.99 05/21/2018    HGB 13.4 (L) 05/21/2018    HCT 41.5 05/21/2018     (L) 05/21/2018     Lab Results   Component Value Date    INR 1.1 05/17/2018    INR 1.1 04/05/2018     BNP   Date Value Ref Range Status   11/09/2018 792 (H) 0 - 99 pg/mL Final     Comment:     Values of less than 100 pg/ml are " consistent with non-CHF populations.   10/19/2018 534 (H) 0 - 99 pg/mL Final     Comment:     Values of less than 100 pg/ml are consistent with non-CHF populations.   08/17/2018 431 (H) 0 - 99 pg/mL Final     Comment:     Values of less than 100 pg/ml are consistent with non-CHF populations.       Assessment:      1. Chronic systolic congestive heart failure    2. Ischemic cardiomyopathy    3. ICD (implantable cardioverter-defibrillator) in place    4. Paroxysmal atrial fibrillation    5. VT (ventricular tachycardia)        Plan   NYHA class III. Euvolemic on exam. Continue current HF regimen.   S/p Cardiomems implant. Enrolled in our GUIDE-HF study.   Recommend 2 gram sodium restriction and 1500cc fluid restriction.  Encourage physical activity with graded exercise program.  Requested patient to weigh themselves daily, and to notify us if their weight increases by more than 3 lbs in 1 day or 5 lbs in 1 week.   RTC in 1 month with labs.      Mela Nails MD

## 2019-01-01 ENCOUNTER — DOCUMENTATION ONLY (OUTPATIENT)
Dept: TRANSPLANT | Facility: HOSPITAL | Age: 79
End: 2019-01-01

## 2019-01-01 ENCOUNTER — TELEPHONE (OUTPATIENT)
Dept: TRANSPLANT | Facility: CLINIC | Age: 79
End: 2019-01-01

## 2019-01-01 ENCOUNTER — RESEARCH ENCOUNTER (OUTPATIENT)
Dept: RESEARCH | Facility: HOSPITAL | Age: 79
End: 2019-01-01

## 2019-01-01 ENCOUNTER — NURSE TRIAGE (OUTPATIENT)
Dept: ADMINISTRATIVE | Facility: CLINIC | Age: 79
End: 2019-01-01

## 2019-01-01 ENCOUNTER — OFFICE VISIT (OUTPATIENT)
Dept: TRANSPLANT | Facility: CLINIC | Age: 79
End: 2019-01-01
Payer: MEDICARE

## 2019-01-01 ENCOUNTER — INFUSION (OUTPATIENT)
Dept: INFECTIOUS DISEASES | Facility: HOSPITAL | Age: 79
End: 2019-01-01
Attending: INTERNAL MEDICINE
Payer: MEDICARE

## 2019-01-01 ENCOUNTER — TELEPHONE (OUTPATIENT)
Dept: INFECTIOUS DISEASES | Facility: CLINIC | Age: 79
End: 2019-01-01

## 2019-01-01 ENCOUNTER — ANESTHESIA (OUTPATIENT)
Dept: MEDSURG UNIT | Facility: HOSPITAL | Age: 79
DRG: 226 | End: 2019-01-01
Payer: MEDICARE

## 2019-01-01 ENCOUNTER — TELEPHONE (OUTPATIENT)
Dept: ENDOSCOPY | Facility: HOSPITAL | Age: 79
End: 2019-01-01

## 2019-01-01 ENCOUNTER — PATIENT OUTREACH (OUTPATIENT)
Dept: ADMINISTRATIVE | Facility: CLINIC | Age: 79
End: 2019-01-01

## 2019-01-01 ENCOUNTER — HOSPITAL ENCOUNTER (INPATIENT)
Facility: HOSPITAL | Age: 79
LOS: 31 days | Discharge: HOME HEALTH | DRG: 226 | End: 2019-02-06
Attending: INTERNAL MEDICINE | Admitting: INTERNAL MEDICINE
Payer: MEDICARE

## 2019-01-01 ENCOUNTER — HOSPITAL ENCOUNTER (INPATIENT)
Facility: HOSPITAL | Age: 79
LOS: 5 days | Discharge: HOME | DRG: 292 | End: 2019-04-20
Attending: INTERNAL MEDICINE | Admitting: INTERNAL MEDICINE
Payer: MEDICARE

## 2019-01-01 ENCOUNTER — LAB VISIT (OUTPATIENT)
Dept: LAB | Facility: HOSPITAL | Age: 79
End: 2019-01-01
Attending: INTERNAL MEDICINE
Payer: MEDICARE

## 2019-01-01 ENCOUNTER — DOCUMENTATION ONLY (OUTPATIENT)
Dept: CARDIOLOGY | Facility: HOSPITAL | Age: 79
End: 2019-01-01

## 2019-01-01 ENCOUNTER — ANESTHESIA EVENT (OUTPATIENT)
Dept: MEDSURG UNIT | Facility: HOSPITAL | Age: 79
DRG: 226 | End: 2019-01-01
Payer: MEDICARE

## 2019-01-01 ENCOUNTER — HOSPITAL ENCOUNTER (INPATIENT)
Facility: HOSPITAL | Age: 79
LOS: 6 days | Discharge: HOME | DRG: 683 | End: 2019-05-12
Attending: INTERNAL MEDICINE | Admitting: INTERNAL MEDICINE
Payer: MEDICARE

## 2019-01-01 ENCOUNTER — OFFICE VISIT (OUTPATIENT)
Dept: TRANSPLANT | Facility: CLINIC | Age: 79
DRG: 292 | End: 2019-01-01
Payer: MEDICARE

## 2019-01-01 ENCOUNTER — OFFICE VISIT (OUTPATIENT)
Dept: INFECTIOUS DISEASES | Facility: CLINIC | Age: 79
End: 2019-01-01
Payer: MEDICARE

## 2019-01-01 ENCOUNTER — DOCUMENTATION ONLY (OUTPATIENT)
Dept: TRANSPLANT | Facility: CLINIC | Age: 79
End: 2019-01-01

## 2019-01-01 ENCOUNTER — DOCUMENTATION ONLY (OUTPATIENT)
Dept: ELECTROPHYSIOLOGY | Facility: CLINIC | Age: 79
End: 2019-01-01

## 2019-01-01 ENCOUNTER — CLINICAL SUPPORT (OUTPATIENT)
Dept: CARDIOLOGY | Facility: HOSPITAL | Age: 79
End: 2019-01-01
Attending: INTERNAL MEDICINE
Payer: MEDICARE

## 2019-01-01 ENCOUNTER — TELEPHONE (OUTPATIENT)
Dept: ELECTROPHYSIOLOGY | Facility: CLINIC | Age: 79
End: 2019-01-01

## 2019-01-01 ENCOUNTER — OFFICE VISIT (OUTPATIENT)
Dept: ELECTROPHYSIOLOGY | Facility: CLINIC | Age: 79
End: 2019-01-01
Payer: MEDICARE

## 2019-01-01 ENCOUNTER — DOCUMENTATION ONLY (OUTPATIENT)
Dept: TRANSPLANT | Facility: CLINIC | Age: 79
End: 2019-01-01
Payer: MEDICARE

## 2019-01-01 VITALS
DIASTOLIC BLOOD PRESSURE: 57 MMHG | BODY MASS INDEX: 25.1 KG/M2 | WEIGHT: 189.38 LBS | HEART RATE: 60 BPM | SYSTOLIC BLOOD PRESSURE: 94 MMHG | HEIGHT: 73 IN

## 2019-01-01 VITALS
RESPIRATION RATE: 18 BRPM | BODY MASS INDEX: 23.62 KG/M2 | OXYGEN SATURATION: 98 % | HEART RATE: 80 BPM | HEIGHT: 74 IN | WEIGHT: 184.06 LBS | DIASTOLIC BLOOD PRESSURE: 50 MMHG | SYSTOLIC BLOOD PRESSURE: 92 MMHG | TEMPERATURE: 97 F

## 2019-01-01 VITALS
HEIGHT: 73 IN | BODY MASS INDEX: 24.95 KG/M2 | WEIGHT: 188.25 LBS | HEART RATE: 59 BPM | SYSTOLIC BLOOD PRESSURE: 92 MMHG | DIASTOLIC BLOOD PRESSURE: 50 MMHG

## 2019-01-01 VITALS
HEART RATE: 62 BPM | DIASTOLIC BLOOD PRESSURE: 58 MMHG | OXYGEN SATURATION: 97 % | HEIGHT: 73 IN | TEMPERATURE: 98 F | RESPIRATION RATE: 18 BRPM | SYSTOLIC BLOOD PRESSURE: 104 MMHG | WEIGHT: 174.38 LBS | BODY MASS INDEX: 23.11 KG/M2

## 2019-01-01 VITALS
HEIGHT: 73 IN | BODY MASS INDEX: 24.92 KG/M2 | SYSTOLIC BLOOD PRESSURE: 94 MMHG | DIASTOLIC BLOOD PRESSURE: 50 MMHG | WEIGHT: 188 LBS

## 2019-01-01 VITALS
HEART RATE: 62 BPM | SYSTOLIC BLOOD PRESSURE: 110 MMHG | HEIGHT: 73 IN | WEIGHT: 191.81 LBS | BODY MASS INDEX: 25.42 KG/M2 | DIASTOLIC BLOOD PRESSURE: 60 MMHG

## 2019-01-01 VITALS
HEART RATE: 80 BPM | DIASTOLIC BLOOD PRESSURE: 52 MMHG | HEIGHT: 73 IN | BODY MASS INDEX: 27.49 KG/M2 | WEIGHT: 207.44 LBS | SYSTOLIC BLOOD PRESSURE: 81 MMHG

## 2019-01-01 VITALS
HEIGHT: 73 IN | HEART RATE: 80 BPM | BODY MASS INDEX: 27.26 KG/M2 | DIASTOLIC BLOOD PRESSURE: 52 MMHG | SYSTOLIC BLOOD PRESSURE: 98 MMHG | WEIGHT: 205.69 LBS

## 2019-01-01 VITALS
WEIGHT: 179.88 LBS | DIASTOLIC BLOOD PRESSURE: 57 MMHG | BODY MASS INDEX: 24.36 KG/M2 | HEART RATE: 80 BPM | TEMPERATURE: 96 F | OXYGEN SATURATION: 93 % | SYSTOLIC BLOOD PRESSURE: 111 MMHG | HEIGHT: 72 IN | RESPIRATION RATE: 20 BRPM

## 2019-01-01 VITALS
HEART RATE: 59 BPM | DIASTOLIC BLOOD PRESSURE: 51 MMHG | BODY MASS INDEX: 26 KG/M2 | WEIGHT: 196.19 LBS | SYSTOLIC BLOOD PRESSURE: 101 MMHG | HEIGHT: 73 IN

## 2019-01-01 VITALS
HEIGHT: 73 IN | HEART RATE: 59 BPM | WEIGHT: 177.25 LBS | SYSTOLIC BLOOD PRESSURE: 105 MMHG | DIASTOLIC BLOOD PRESSURE: 62 MMHG | BODY MASS INDEX: 23.49 KG/M2

## 2019-01-01 DIAGNOSIS — I50.9 CONGESTIVE HEART FAILURE, UNSPECIFIED HF CHRONICITY, UNSPECIFIED HEART FAILURE TYPE: Primary | ICD-10-CM

## 2019-01-01 DIAGNOSIS — I25.5 ISCHEMIC CARDIOMYOPATHY: ICD-10-CM

## 2019-01-01 DIAGNOSIS — N17.9 AKI (ACUTE KIDNEY INJURY): ICD-10-CM

## 2019-01-01 DIAGNOSIS — I50.22 CHRONIC SYSTOLIC CHF (CONGESTIVE HEART FAILURE), NYHA CLASS 3: Primary | ICD-10-CM

## 2019-01-01 DIAGNOSIS — K80.20 GALLSTONES: Primary | ICD-10-CM

## 2019-01-01 DIAGNOSIS — I48.91 ATRIAL FIBRILLATION, UNSPECIFIED TYPE: Primary | ICD-10-CM

## 2019-01-01 DIAGNOSIS — I39 ENDOCARDITIS DUE TO OTHER ORGANISM, UNSPECIFIED CHRONICITY: ICD-10-CM

## 2019-01-01 DIAGNOSIS — Z95.810 PRESENCE OF AUTOMATIC CARDIOVERTER/DEFIBRILLATOR (AICD): Primary | ICD-10-CM

## 2019-01-01 DIAGNOSIS — T82.7XXS INFECTION OF IMPLANTABLE CARDIOVERTER-DEFIBRILLATOR (ICD) LEAD, SEQUELA: ICD-10-CM

## 2019-01-01 DIAGNOSIS — T82.7XXA INFECTION ASSOCIATED WITH CARDIAC DEVICE, INITIAL ENCOUNTER: ICD-10-CM

## 2019-01-01 DIAGNOSIS — E87.6 HYPOKALEMIA: ICD-10-CM

## 2019-01-01 DIAGNOSIS — I48.91 ATRIAL FIBRILLATION: ICD-10-CM

## 2019-01-01 DIAGNOSIS — I50.43 ACUTE ON CHRONIC COMBINED SYSTOLIC AND DIASTOLIC CHF, NYHA CLASS 3: ICD-10-CM

## 2019-01-01 DIAGNOSIS — I50.9 ACUTE DECOMPENSATED HEART FAILURE: ICD-10-CM

## 2019-01-01 DIAGNOSIS — I48.19 PERSISTENT ATRIAL FIBRILLATION: Primary | ICD-10-CM

## 2019-01-01 DIAGNOSIS — T82.7XXD INFECTION ASSOCIATED WITH CARDIAC DEVICE, SUBSEQUENT ENCOUNTER: ICD-10-CM

## 2019-01-01 DIAGNOSIS — T82.7XXA: ICD-10-CM

## 2019-01-01 DIAGNOSIS — B95.2 BACTEREMIA DUE TO ENTEROCOCCUS: Primary | ICD-10-CM

## 2019-01-01 DIAGNOSIS — E03.9 HYPOTHYROIDISM, UNSPECIFIED TYPE: ICD-10-CM

## 2019-01-01 DIAGNOSIS — I49.9 ARRHYTHMIA: ICD-10-CM

## 2019-01-01 DIAGNOSIS — I50.9 CONGESTIVE HEART FAILURE, UNSPECIFIED HF CHRONICITY, UNSPECIFIED HEART FAILURE TYPE: ICD-10-CM

## 2019-01-01 DIAGNOSIS — D72.829 LEUKOCYTOSIS, UNSPECIFIED TYPE: ICD-10-CM

## 2019-01-01 DIAGNOSIS — R78.81 BACTEREMIA DUE TO ENTEROCOCCUS: ICD-10-CM

## 2019-01-01 DIAGNOSIS — N17.9 ACUTE KIDNEY INJURY: ICD-10-CM

## 2019-01-01 DIAGNOSIS — I47.20 VT (VENTRICULAR TACHYCARDIA): Primary | ICD-10-CM

## 2019-01-01 DIAGNOSIS — I50.22 CHRONIC SYSTOLIC CONGESTIVE HEART FAILURE: ICD-10-CM

## 2019-01-01 DIAGNOSIS — I48.19 PERSISTENT ATRIAL FIBRILLATION: ICD-10-CM

## 2019-01-01 DIAGNOSIS — B95.2 BACTEREMIA DUE TO ENTEROCOCCUS: ICD-10-CM

## 2019-01-01 DIAGNOSIS — I50.42 CHRONIC COMBINED SYSTOLIC AND DIASTOLIC CONGESTIVE HEART FAILURE: Primary | ICD-10-CM

## 2019-01-01 DIAGNOSIS — N18.3 ACUTE RENAL FAILURE SUPERIMPOSED ON STAGE 3 CHRONIC KIDNEY DISEASE, UNSPECIFIED ACUTE RENAL FAILURE TYPE: ICD-10-CM

## 2019-01-01 DIAGNOSIS — E11.8 TYPE 2 DIABETES MELLITUS WITH COMPLICATION, WITH LONG-TERM CURRENT USE OF INSULIN: ICD-10-CM

## 2019-01-01 DIAGNOSIS — T82.7XXA INFECTED PACEMAKER: ICD-10-CM

## 2019-01-01 DIAGNOSIS — I47.20 VT (VENTRICULAR TACHYCARDIA): ICD-10-CM

## 2019-01-01 DIAGNOSIS — I50.9 ACUTE HEART FAILURE, UNSPECIFIED HEART FAILURE TYPE: Primary | ICD-10-CM

## 2019-01-01 DIAGNOSIS — I50.43 ACUTE ON CHRONIC COMBINED SYSTOLIC AND DIASTOLIC CHF, NYHA CLASS 3: Primary | ICD-10-CM

## 2019-01-01 DIAGNOSIS — R78.81 BACTEREMIA: ICD-10-CM

## 2019-01-01 DIAGNOSIS — Z95.810 BIVENTRICULAR ICD (IMPLANTABLE CARDIOVERTER-DEFIBRILLATOR) IN PLACE: ICD-10-CM

## 2019-01-01 DIAGNOSIS — R30.0 DYSURIA: ICD-10-CM

## 2019-01-01 DIAGNOSIS — I50.22 CHRONIC SYSTOLIC CHF (CONGESTIVE HEART FAILURE), NYHA CLASS 3: ICD-10-CM

## 2019-01-01 DIAGNOSIS — I48.0 PAROXYSMAL ATRIAL FIBRILLATION: ICD-10-CM

## 2019-01-01 DIAGNOSIS — R00.1 JUNCTIONAL BRADYCARDIA: ICD-10-CM

## 2019-01-01 DIAGNOSIS — Z95.810 ICD (IMPLANTABLE CARDIOVERTER-DEFIBRILLATOR) IN PLACE: Primary | ICD-10-CM

## 2019-01-01 DIAGNOSIS — R78.81 BACTEREMIA DUE TO ENTEROCOCCUS: Primary | ICD-10-CM

## 2019-01-01 DIAGNOSIS — T82.7XXD INFECTION OF IMPLANTABLE CARDIOVERTER-DEFIBRILLATOR (ICD) LEAD, SUBSEQUENT ENCOUNTER: ICD-10-CM

## 2019-01-01 DIAGNOSIS — N17.9 ACUTE RENAL FAILURE SUPERIMPOSED ON STAGE 3 CHRONIC KIDNEY DISEASE, UNSPECIFIED ACUTE RENAL FAILURE TYPE: ICD-10-CM

## 2019-01-01 DIAGNOSIS — I49.8 JUNCTIONAL RHYTHM: ICD-10-CM

## 2019-01-01 DIAGNOSIS — Z95.810 BIVENTRICULAR IMPLANTABLE CARDIOVERTER-DEFIBRILLATOR (ICD) IN SITU: ICD-10-CM

## 2019-01-01 DIAGNOSIS — R53.81 DEBILITY: ICD-10-CM

## 2019-01-01 DIAGNOSIS — B33.21: Primary | ICD-10-CM

## 2019-01-01 DIAGNOSIS — I48.91 ATRIAL FIBRILLATION, UNSPECIFIED TYPE: ICD-10-CM

## 2019-01-01 DIAGNOSIS — I45.9 HEART BLOCK: ICD-10-CM

## 2019-01-01 DIAGNOSIS — Z51.5 PALLIATIVE CARE ENCOUNTER: Primary | ICD-10-CM

## 2019-01-01 DIAGNOSIS — Z95.810 ICD (IMPLANTABLE CARDIOVERTER-DEFIBRILLATOR) IN PLACE: ICD-10-CM

## 2019-01-01 DIAGNOSIS — Z95.810 PRESENCE OF AUTOMATIC CARDIOVERTER/DEFIBRILLATOR (AICD): ICD-10-CM

## 2019-01-01 DIAGNOSIS — R10.84 GENERALIZED ABDOMINAL PAIN: ICD-10-CM

## 2019-01-01 DIAGNOSIS — I38 ENDOCARDITIS: ICD-10-CM

## 2019-01-01 DIAGNOSIS — I48.91 A-FIB: ICD-10-CM

## 2019-01-01 DIAGNOSIS — R13.10 DYSPHAGIA, UNSPECIFIED TYPE: ICD-10-CM

## 2019-01-01 DIAGNOSIS — I50.9 CHF (CONGESTIVE HEART FAILURE): ICD-10-CM

## 2019-01-01 DIAGNOSIS — I50.22 CHRONIC SYSTOLIC CONGESTIVE HEART FAILURE: Primary | ICD-10-CM

## 2019-01-01 DIAGNOSIS — I95.81 POSTPROCEDURAL HYPOTENSION: ICD-10-CM

## 2019-01-01 DIAGNOSIS — T82.7XXA INFECTION OF IMPLANTABLE CARDIOVERTER-DEFIBRILLATOR (ICD) LEAD, INITIAL ENCOUNTER: ICD-10-CM

## 2019-01-01 DIAGNOSIS — R94.31 ABNORMAL EKG: ICD-10-CM

## 2019-01-01 DIAGNOSIS — I38 ENDOCARDITIS, UNSPECIFIED CHRONICITY, UNSPECIFIED ENDOCARDITIS TYPE: ICD-10-CM

## 2019-01-01 DIAGNOSIS — T82.7XXS PACEMAKER INFECTION, SEQUELA: ICD-10-CM

## 2019-01-01 DIAGNOSIS — I25.10 CORONARY ARTERY DISEASE INVOLVING NATIVE CORONARY ARTERY OF NATIVE HEART WITHOUT ANGINA PECTORIS: ICD-10-CM

## 2019-01-01 DIAGNOSIS — I95.9 HYPOTENSION: ICD-10-CM

## 2019-01-01 DIAGNOSIS — I48.21 PERMANENT ATRIAL FIBRILLATION: ICD-10-CM

## 2019-01-01 DIAGNOSIS — E44.0 MODERATE MALNUTRITION: Primary | ICD-10-CM

## 2019-01-01 DIAGNOSIS — D64.9 ANEMIA, UNSPECIFIED TYPE: Primary | ICD-10-CM

## 2019-01-01 DIAGNOSIS — Z79.4 TYPE 2 DIABETES MELLITUS WITH COMPLICATION, WITH LONG-TERM CURRENT USE OF INSULIN: ICD-10-CM

## 2019-01-01 LAB
ABO + RH BLD: NORMAL
ALBUMIN SERPL BCP-MCNC: 1.7 G/DL
ALBUMIN SERPL BCP-MCNC: 1.8 G/DL
ALBUMIN SERPL BCP-MCNC: 1.9 G/DL
ALBUMIN SERPL BCP-MCNC: 2 G/DL
ALBUMIN SERPL BCP-MCNC: 2.2 G/DL
ALBUMIN SERPL BCP-MCNC: 2.3 G/DL
ALBUMIN SERPL BCP-MCNC: 2.4 G/DL
ALBUMIN SERPL BCP-MCNC: 2.4 G/DL (ref 3.5–5.2)
ALBUMIN SERPL BCP-MCNC: 2.5 G/DL
ALBUMIN SERPL BCP-MCNC: 2.5 G/DL
ALBUMIN SERPL BCP-MCNC: 2.5 G/DL (ref 3.5–5.2)
ALBUMIN SERPL BCP-MCNC: 2.6 G/DL
ALBUMIN SERPL BCP-MCNC: 2.7 G/DL
ALBUMIN SERPL BCP-MCNC: 2.7 G/DL (ref 3.5–5.2)
ALBUMIN SERPL BCP-MCNC: 2.7 G/DL (ref 3.5–5.2)
ALBUMIN SERPL BCP-MCNC: 3 G/DL (ref 3.5–5.2)
ALBUMIN SERPL BCP-MCNC: 3 G/DL (ref 3.5–5.2)
ALBUMIN SERPL BCP-MCNC: 3.1 G/DL (ref 3.5–5.2)
ALBUMIN SERPL BCP-MCNC: 3.5 G/DL (ref 3.5–5.2)
ALLENS TEST: ABNORMAL
ALP SERPL-CCNC: 103 U/L (ref 55–135)
ALP SERPL-CCNC: 108 U/L (ref 55–135)
ALP SERPL-CCNC: 113 U/L (ref 55–135)
ALP SERPL-CCNC: 117 U/L
ALP SERPL-CCNC: 117 U/L (ref 55–135)
ALP SERPL-CCNC: 121 U/L (ref 55–135)
ALP SERPL-CCNC: 122 U/L (ref 55–135)
ALP SERPL-CCNC: 125 U/L (ref 55–135)
ALP SERPL-CCNC: 126 U/L
ALP SERPL-CCNC: 127 U/L
ALP SERPL-CCNC: 128 U/L
ALP SERPL-CCNC: 129 U/L
ALP SERPL-CCNC: 129 U/L (ref 55–135)
ALP SERPL-CCNC: 130 U/L
ALP SERPL-CCNC: 132 U/L
ALP SERPL-CCNC: 133 U/L
ALP SERPL-CCNC: 137 U/L
ALP SERPL-CCNC: 138 U/L
ALP SERPL-CCNC: 139 U/L (ref 55–135)
ALP SERPL-CCNC: 140 U/L
ALP SERPL-CCNC: 142 U/L
ALP SERPL-CCNC: 144 U/L
ALP SERPL-CCNC: 145 U/L
ALP SERPL-CCNC: 145 U/L (ref 55–135)
ALP SERPL-CCNC: 146 U/L
ALP SERPL-CCNC: 147 U/L
ALP SERPL-CCNC: 148 U/L
ALP SERPL-CCNC: 152 U/L
ALP SERPL-CCNC: 153 U/L
ALP SERPL-CCNC: 155 U/L
ALP SERPL-CCNC: 155 U/L
ALP SERPL-CCNC: 158 U/L
ALP SERPL-CCNC: 158 U/L (ref 55–135)
ALP SERPL-CCNC: 162 U/L
ALP SERPL-CCNC: 165 U/L
ALP SERPL-CCNC: 169 U/L
ALP SERPL-CCNC: 169 U/L
ALP SERPL-CCNC: 170 U/L
ALP SERPL-CCNC: 172 U/L
ALP SERPL-CCNC: 174 U/L
ALP SERPL-CCNC: 175 U/L
ALP SERPL-CCNC: 176 U/L
ALP SERPL-CCNC: 177 U/L
ALP SERPL-CCNC: 179 U/L
ALP SERPL-CCNC: 179 U/L
ALP SERPL-CCNC: 181 U/L
ALP SERPL-CCNC: 181 U/L (ref 55–135)
ALP SERPL-CCNC: 182 U/L
ALP SERPL-CCNC: 97 U/L (ref 55–135)
ALP SERPL-CCNC: 98 U/L (ref 55–135)
ALT SERPL W/O P-5'-P-CCNC: 10 U/L (ref 10–44)
ALT SERPL W/O P-5'-P-CCNC: 10 U/L (ref 10–44)
ALT SERPL W/O P-5'-P-CCNC: 11 U/L (ref 10–44)
ALT SERPL W/O P-5'-P-CCNC: 12 U/L (ref 10–44)
ALT SERPL W/O P-5'-P-CCNC: 13 U/L
ALT SERPL W/O P-5'-P-CCNC: 13 U/L
ALT SERPL W/O P-5'-P-CCNC: 14 U/L
ALT SERPL W/O P-5'-P-CCNC: 15 U/L
ALT SERPL W/O P-5'-P-CCNC: 15 U/L (ref 10–44)
ALT SERPL W/O P-5'-P-CCNC: 16 U/L
ALT SERPL W/O P-5'-P-CCNC: 17 U/L
ALT SERPL W/O P-5'-P-CCNC: 18 U/L
ALT SERPL W/O P-5'-P-CCNC: 18 U/L
ALT SERPL W/O P-5'-P-CCNC: 18 U/L (ref 10–44)
ALT SERPL W/O P-5'-P-CCNC: 18 U/L (ref 10–44)
ALT SERPL W/O P-5'-P-CCNC: 19 U/L
ALT SERPL W/O P-5'-P-CCNC: 19 U/L
ALT SERPL W/O P-5'-P-CCNC: 19 U/L (ref 10–44)
ALT SERPL W/O P-5'-P-CCNC: 20 U/L
ALT SERPL W/O P-5'-P-CCNC: 20 U/L
ALT SERPL W/O P-5'-P-CCNC: 21 U/L
ALT SERPL W/O P-5'-P-CCNC: 21 U/L (ref 10–44)
ALT SERPL W/O P-5'-P-CCNC: 22 U/L
ALT SERPL W/O P-5'-P-CCNC: 23 U/L
ALT SERPL W/O P-5'-P-CCNC: 25 U/L (ref 10–44)
ALT SERPL W/O P-5'-P-CCNC: 29 U/L
ALT SERPL W/O P-5'-P-CCNC: 37 U/L
ALT SERPL W/O P-5'-P-CCNC: 9 U/L (ref 10–44)
ALT SERPL W/O P-5'-P-CCNC: 9 U/L (ref 10–44)
AMYLASE SERPL-CCNC: 20 U/L (ref 20–110)
ANION GAP SERPL CALC-SCNC: 10 MMOL/L
ANION GAP SERPL CALC-SCNC: 10 MMOL/L (ref 8–16)
ANION GAP SERPL CALC-SCNC: 11 MMOL/L
ANION GAP SERPL CALC-SCNC: 11 MMOL/L (ref 8–16)
ANION GAP SERPL CALC-SCNC: 12 MMOL/L
ANION GAP SERPL CALC-SCNC: 12 MMOL/L (ref 8–16)
ANION GAP SERPL CALC-SCNC: 13 MMOL/L
ANION GAP SERPL CALC-SCNC: 13 MMOL/L (ref 8–16)
ANION GAP SERPL CALC-SCNC: 13 MMOL/L (ref 8–16)
ANION GAP SERPL CALC-SCNC: 14 MMOL/L
ANION GAP SERPL CALC-SCNC: 14 MMOL/L (ref 8–16)
ANION GAP SERPL CALC-SCNC: 14 MMOL/L (ref 8–16)
ANION GAP SERPL CALC-SCNC: 15 MMOL/L
ANION GAP SERPL CALC-SCNC: 15 MMOL/L
ANION GAP SERPL CALC-SCNC: 15 MMOL/L (ref 8–16)
ANION GAP SERPL CALC-SCNC: 16 MMOL/L
ANION GAP SERPL CALC-SCNC: 16 MMOL/L (ref 8–16)
ANION GAP SERPL CALC-SCNC: 16 MMOL/L (ref 8–16)
ANION GAP SERPL CALC-SCNC: 17 MMOL/L
ANION GAP SERPL CALC-SCNC: 17 MMOL/L (ref 8–16)
ANION GAP SERPL CALC-SCNC: 7 MMOL/L
ANION GAP SERPL CALC-SCNC: 8 MMOL/L
ANION GAP SERPL CALC-SCNC: 9 MMOL/L
ANION GAP SERPL CALC-SCNC: 9 MMOL/L (ref 8–16)
ANISOCYTOSIS BLD QL SMEAR: ABNORMAL
ANISOCYTOSIS BLD QL SMEAR: SLIGHT
ANISOCYTOSIS BLD QL SMEAR: SLIGHT
APTT BLDCRRT: 27.4 SEC
APTT BLDCRRT: 28.3 SEC
APTT BLDCRRT: 43.2 SEC
APTT BLDCRRT: 44.2 SEC
APTT BLDCRRT: 46.6 SEC
APTT BLDCRRT: 48.6 SEC
APTT BLDCRRT: 49.2 SEC
APTT BLDCRRT: 52.8 SEC
APTT BLDCRRT: 55 SEC
APTT BLDCRRT: 55 SEC
ASCENDING AORTA: 2.94 CM
ASCENDING AORTA: 3.25 CM
ASCENDING AORTA: 3.73 CM
AST SERPL-CCNC: 15 U/L
AST SERPL-CCNC: 15 U/L
AST SERPL-CCNC: 15 U/L (ref 10–40)
AST SERPL-CCNC: 17 U/L
AST SERPL-CCNC: 17 U/L (ref 10–40)
AST SERPL-CCNC: 17 U/L (ref 10–40)
AST SERPL-CCNC: 18 U/L (ref 10–40)
AST SERPL-CCNC: 19 U/L
AST SERPL-CCNC: 19 U/L
AST SERPL-CCNC: 21 U/L
AST SERPL-CCNC: 22 U/L
AST SERPL-CCNC: 22 U/L
AST SERPL-CCNC: 22 U/L (ref 10–40)
AST SERPL-CCNC: 23 U/L
AST SERPL-CCNC: 24 U/L
AST SERPL-CCNC: 24 U/L
AST SERPL-CCNC: 24 U/L (ref 10–40)
AST SERPL-CCNC: 25 U/L
AST SERPL-CCNC: 26 U/L
AST SERPL-CCNC: 27 U/L
AST SERPL-CCNC: 27 U/L (ref 10–40)
AST SERPL-CCNC: 28 U/L
AST SERPL-CCNC: 28 U/L
AST SERPL-CCNC: 29 U/L
AST SERPL-CCNC: 31 U/L
AST SERPL-CCNC: 32 U/L
AST SERPL-CCNC: 35 U/L
AST SERPL-CCNC: 36 U/L
AST SERPL-CCNC: 37 U/L
AST SERPL-CCNC: 37 U/L (ref 10–40)
AST SERPL-CCNC: 39 U/L
AST SERPL-CCNC: 39 U/L (ref 10–40)
AST SERPL-CCNC: 43 U/L (ref 10–40)
AST SERPL-CCNC: 49 U/L (ref 10–40)
AV INDEX (PROSTH): 0.51
AV INDEX (PROSTH): 0.77
AV INDEX (PROSTH): 0.93
AV MEAN GRADIENT: 2.43 MMHG
AV MEAN GRADIENT: 2.97 MMHG
AV MEAN GRADIENT: 2.97 MMHG
AV PEAK GRADIENT: 3.76 MMHG
AV PEAK GRADIENT: 4.49 MMHG
AV PEAK GRADIENT: 4.58 MMHG
AV VALVE AREA: 1.7 CM2
AV VALVE AREA: 2.7 CM2
AV VALVE AREA: 3.46 CM2
AV VELOCITY RATIO: 0.41
AV VELOCITY RATIO: 0.88
BACTERIA #/AREA URNS AUTO: ABNORMAL /HPF
BACTERIA #/AREA URNS AUTO: ABNORMAL /HPF
BACTERIA #/AREA URNS AUTO: NORMAL /HPF
BACTERIA BLD CULT: NORMAL
BACTERIA CATH TIP CULT: NO GROWTH
BACTERIA SPEC AEROBE CULT: NO GROWTH
BACTERIA SPEC AEROBE CULT: NORMAL
BACTERIA SPEC ANAEROBE CULT: NORMAL
BACTERIA STL CULT: NORMAL
BACTERIA UR CULT: NO GROWTH
BASO STIPL BLD QL SMEAR: ABNORMAL
BASOPHILS # BLD AUTO: 0.03 K/UL
BASOPHILS # BLD AUTO: 0.03 K/UL (ref 0–0.2)
BASOPHILS # BLD AUTO: 0.03 K/UL (ref 0–0.2)
BASOPHILS # BLD AUTO: 0.04 K/UL
BASOPHILS # BLD AUTO: 0.04 K/UL (ref 0–0.2)
BASOPHILS # BLD AUTO: 0.05 K/UL
BASOPHILS # BLD AUTO: 0.05 K/UL (ref 0–0.2)
BASOPHILS # BLD AUTO: 0.05 K/UL (ref 0–0.2)
BASOPHILS # BLD AUTO: 0.06 K/UL
BASOPHILS # BLD AUTO: 0.06 K/UL (ref 0–0.2)
BASOPHILS # BLD AUTO: 0.06 K/UL (ref 0–0.2)
BASOPHILS # BLD AUTO: 0.07 K/UL
BASOPHILS # BLD AUTO: 0.07 K/UL
BASOPHILS # BLD AUTO: 0.08 K/UL
BASOPHILS # BLD AUTO: 0.09 K/UL
BASOPHILS # BLD AUTO: 0.09 K/UL
BASOPHILS # BLD AUTO: 0.1 K/UL
BASOPHILS # BLD AUTO: 0.1 K/UL
BASOPHILS # BLD AUTO: 0.11 K/UL
BASOPHILS # BLD AUTO: 0.13 K/UL
BASOPHILS # BLD AUTO: 0.19 K/UL
BASOPHILS NFR BLD: 0.2 % (ref 0–1.9)
BASOPHILS NFR BLD: 0.3 %
BASOPHILS NFR BLD: 0.3 % (ref 0–1.9)
BASOPHILS NFR BLD: 0.4 %
BASOPHILS NFR BLD: 0.4 % (ref 0–1.9)
BASOPHILS NFR BLD: 0.5 %
BASOPHILS NFR BLD: 0.5 % (ref 0–1.9)
BASOPHILS NFR BLD: 0.5 % (ref 0–1.9)
BASOPHILS NFR BLD: 0.6 %
BASOPHILS NFR BLD: 0.7 %
BASOPHILS NFR BLD: 0.7 % (ref 0–1.9)
BASOPHILS NFR BLD: 0.7 % (ref 0–1.9)
BASOPHILS NFR BLD: 0.8 %
BASOPHILS NFR BLD: 0.8 % (ref 0–1.9)
BASOPHILS NFR BLD: 0.8 % (ref 0–1.9)
BASOPHILS NFR BLD: 0.9 %
BASOPHILS NFR BLD: 0.9 %
BASOPHILS NFR BLD: 1 %
BASOPHILS NFR BLD: 1 % (ref 0–1.9)
BASOPHILS NFR BLD: 1.1 %
BASOPHILS NFR BLD: 1.2 %
BILIRUB DIRECT SERPL-MCNC: 0.7 MG/DL (ref 0.1–0.3)
BILIRUB SERPL-MCNC: 0.4 MG/DL
BILIRUB SERPL-MCNC: 0.5 MG/DL
BILIRUB SERPL-MCNC: 0.6 MG/DL
BILIRUB SERPL-MCNC: 0.6 MG/DL (ref 0.1–1)
BILIRUB SERPL-MCNC: 0.7 MG/DL
BILIRUB SERPL-MCNC: 0.7 MG/DL (ref 0.1–1)
BILIRUB SERPL-MCNC: 0.8 MG/DL
BILIRUB SERPL-MCNC: 0.8 MG/DL (ref 0.1–1)
BILIRUB SERPL-MCNC: 0.9 MG/DL
BILIRUB SERPL-MCNC: 0.9 MG/DL (ref 0.1–1)
BILIRUB SERPL-MCNC: 1 MG/DL
BILIRUB SERPL-MCNC: 1 MG/DL (ref 0.1–1)
BILIRUB SERPL-MCNC: 1.3 MG/DL (ref 0.1–1)
BILIRUB UR QL STRIP: NEGATIVE
BLD GP AB SCN CELLS X3 SERPL QL: NORMAL
BLD PROD TYP BPU: NORMAL
BLOOD UNIT EXPIRATION DATE: NORMAL
BLOOD UNIT TYPE CODE: 5100
BLOOD UNIT TYPE: NORMAL
BNP SERPL-MCNC: 1185 PG/ML (ref 0–99)
BNP SERPL-MCNC: 1219 PG/ML
BNP SERPL-MCNC: 1357 PG/ML
BNP SERPL-MCNC: 1522 PG/ML
BNP SERPL-MCNC: 1607 PG/ML
BNP SERPL-MCNC: 1793 PG/ML (ref 0–99)
BNP SERPL-MCNC: 2076 PG/ML
BNP SERPL-MCNC: 490 PG/ML
BSA FOR ECHO PROCEDURE: 2.09 M2
BSA FOR ECHO PROCEDURE: 2.14 M2
BSA FOR ECHO PROCEDURE: 2.14 M2
BSA FOR ECHO PROCEDURE: 2.15 M2
BSA FOR ECHO PROCEDURE: 2.15 M2
BSA FOR ECHO PROCEDURE: 2.16 M2
BUN SERPL-MCNC: 103 MG/DL (ref 8–23)
BUN SERPL-MCNC: 22 MG/DL
BUN SERPL-MCNC: 24 MG/DL
BUN SERPL-MCNC: 28 MG/DL
BUN SERPL-MCNC: 29 MG/DL
BUN SERPL-MCNC: 31 MG/DL
BUN SERPL-MCNC: 33 MG/DL
BUN SERPL-MCNC: 34 MG/DL
BUN SERPL-MCNC: 35 MG/DL
BUN SERPL-MCNC: 36 MG/DL
BUN SERPL-MCNC: 36 MG/DL
BUN SERPL-MCNC: 37 MG/DL
BUN SERPL-MCNC: 38 MG/DL
BUN SERPL-MCNC: 39 MG/DL
BUN SERPL-MCNC: 40 MG/DL
BUN SERPL-MCNC: 41 MG/DL
BUN SERPL-MCNC: 41 MG/DL
BUN SERPL-MCNC: 42 MG/DL
BUN SERPL-MCNC: 43 MG/DL
BUN SERPL-MCNC: 44 MG/DL
BUN SERPL-MCNC: 45 MG/DL
BUN SERPL-MCNC: 45 MG/DL
BUN SERPL-MCNC: 47 MG/DL
BUN SERPL-MCNC: 48 MG/DL
BUN SERPL-MCNC: 49 MG/DL
BUN SERPL-MCNC: 50 MG/DL
BUN SERPL-MCNC: 53 MG/DL
BUN SERPL-MCNC: 53 MG/DL
BUN SERPL-MCNC: 54 MG/DL
BUN SERPL-MCNC: 55 MG/DL
BUN SERPL-MCNC: 60 MG/DL
BUN SERPL-MCNC: 60 MG/DL (ref 8–23)
BUN SERPL-MCNC: 61 MG/DL
BUN SERPL-MCNC: 66 MG/DL
BUN SERPL-MCNC: 67 MG/DL
BUN SERPL-MCNC: 70 MG/DL
BUN SERPL-MCNC: 71 MG/DL
BUN SERPL-MCNC: 71 MG/DL (ref 8–23)
BUN SERPL-MCNC: 72 MG/DL
BUN SERPL-MCNC: 73 MG/DL
BUN SERPL-MCNC: 74 MG/DL
BUN SERPL-MCNC: 79 MG/DL (ref 8–23)
BUN SERPL-MCNC: 80 MG/DL (ref 8–23)
BUN SERPL-MCNC: 81 MG/DL (ref 8–23)
BUN SERPL-MCNC: 83 MG/DL (ref 8–23)
BUN SERPL-MCNC: 84 MG/DL (ref 8–23)
BUN SERPL-MCNC: 87 MG/DL (ref 8–23)
BUN SERPL-MCNC: 88 MG/DL (ref 8–23)
BUN SERPL-MCNC: 96 MG/DL (ref 8–23)
BUN SERPL-MCNC: 96 MG/DL (ref 8–23)
BUN SERPL-MCNC: 97 MG/DL (ref 8–23)
BUN SERPL-MCNC: 97 MG/DL (ref 8–23)
C3 SERPL-MCNC: 106 MG/DL
C4 SERPL-MCNC: 19 MG/DL
CALCIUM SERPL-MCNC: 7.8 MG/DL
CALCIUM SERPL-MCNC: 8 MG/DL
CALCIUM SERPL-MCNC: 8.2 MG/DL
CALCIUM SERPL-MCNC: 8.2 MG/DL (ref 8.7–10.5)
CALCIUM SERPL-MCNC: 8.3 MG/DL
CALCIUM SERPL-MCNC: 8.3 MG/DL (ref 8.7–10.5)
CALCIUM SERPL-MCNC: 8.3 MG/DL (ref 8.7–10.5)
CALCIUM SERPL-MCNC: 8.4 MG/DL
CALCIUM SERPL-MCNC: 8.4 MG/DL
CALCIUM SERPL-MCNC: 8.4 MG/DL (ref 8.7–10.5)
CALCIUM SERPL-MCNC: 8.5 MG/DL
CALCIUM SERPL-MCNC: 8.6 MG/DL
CALCIUM SERPL-MCNC: 8.7 MG/DL
CALCIUM SERPL-MCNC: 8.7 MG/DL (ref 8.7–10.5)
CALCIUM SERPL-MCNC: 8.8 MG/DL
CALCIUM SERPL-MCNC: 8.9 MG/DL
CALCIUM SERPL-MCNC: 8.9 MG/DL (ref 8.7–10.5)
CALCIUM SERPL-MCNC: 8.9 MG/DL (ref 8.7–10.5)
CALCIUM SERPL-MCNC: 9 MG/DL
CALCIUM SERPL-MCNC: 9 MG/DL (ref 8.7–10.5)
CALCIUM SERPL-MCNC: 9 MG/DL (ref 8.7–10.5)
CALCIUM SERPL-MCNC: 9.1 MG/DL
CALCIUM SERPL-MCNC: 9.1 MG/DL (ref 8.7–10.5)
CALCIUM SERPL-MCNC: 9.2 MG/DL
CALCIUM SERPL-MCNC: 9.2 MG/DL (ref 8.7–10.5)
CALCIUM SERPL-MCNC: 9.2 MG/DL (ref 8.7–10.5)
CALCIUM SERPL-MCNC: 9.3 MG/DL (ref 8.7–10.5)
CALCIUM SERPL-MCNC: 9.4 MG/DL (ref 8.7–10.5)
CALCIUM SERPL-MCNC: 9.5 MG/DL
CALCIUM SERPL-MCNC: 9.5 MG/DL
CHLORIDE SERPL-SCNC: 100 MMOL/L
CHLORIDE SERPL-SCNC: 101 MMOL/L
CHLORIDE SERPL-SCNC: 101 MMOL/L (ref 95–110)
CHLORIDE SERPL-SCNC: 102 MMOL/L
CHLORIDE SERPL-SCNC: 102 MMOL/L (ref 95–110)
CHLORIDE SERPL-SCNC: 103 MMOL/L
CHLORIDE SERPL-SCNC: 104 MMOL/L
CHLORIDE SERPL-SCNC: 104 MMOL/L (ref 95–110)
CHLORIDE SERPL-SCNC: 105 MMOL/L
CHLORIDE SERPL-SCNC: 105 MMOL/L (ref 95–110)
CHLORIDE SERPL-SCNC: 105 MMOL/L (ref 95–110)
CHLORIDE SERPL-SCNC: 107 MMOL/L
CHLORIDE SERPL-SCNC: 107 MMOL/L (ref 95–110)
CHLORIDE SERPL-SCNC: 107 MMOL/L (ref 95–110)
CHLORIDE SERPL-SCNC: 109 MMOL/L (ref 95–110)
CHLORIDE SERPL-SCNC: 110 MMOL/L
CHLORIDE SERPL-SCNC: 112 MMOL/L
CHLORIDE SERPL-SCNC: 89 MMOL/L
CHLORIDE SERPL-SCNC: 91 MMOL/L
CHLORIDE SERPL-SCNC: 92 MMOL/L
CHLORIDE SERPL-SCNC: 93 MMOL/L
CHLORIDE SERPL-SCNC: 94 MMOL/L
CHLORIDE SERPL-SCNC: 94 MMOL/L (ref 95–110)
CHLORIDE SERPL-SCNC: 95 MMOL/L
CHLORIDE SERPL-SCNC: 95 MMOL/L
CHLORIDE SERPL-SCNC: 96 MMOL/L
CHLORIDE SERPL-SCNC: 97 MMOL/L
CHLORIDE SERPL-SCNC: 97 MMOL/L (ref 95–110)
CHLORIDE SERPL-SCNC: 98 MMOL/L
CHLORIDE SERPL-SCNC: 98 MMOL/L (ref 95–110)
CHLORIDE SERPL-SCNC: 99 MMOL/L
CHLORIDE SERPL-SCNC: 99 MMOL/L (ref 95–110)
CLARITY UR REFRACT.AUTO: ABNORMAL
CLARITY UR REFRACT.AUTO: CLEAR
CO2 SERPL-SCNC: 16 MMOL/L (ref 23–29)
CO2 SERPL-SCNC: 17 MMOL/L
CO2 SERPL-SCNC: 18 MMOL/L (ref 23–29)
CO2 SERPL-SCNC: 19 MMOL/L (ref 23–29)
CO2 SERPL-SCNC: 20 MMOL/L (ref 23–29)
CO2 SERPL-SCNC: 21 MMOL/L (ref 23–29)
CO2 SERPL-SCNC: 22 MMOL/L
CO2 SERPL-SCNC: 23 MMOL/L
CO2 SERPL-SCNC: 23 MMOL/L (ref 23–29)
CO2 SERPL-SCNC: 23 MMOL/L (ref 23–29)
CO2 SERPL-SCNC: 24 MMOL/L
CO2 SERPL-SCNC: 24 MMOL/L (ref 23–29)
CO2 SERPL-SCNC: 24 MMOL/L (ref 23–29)
CO2 SERPL-SCNC: 25 MMOL/L
CO2 SERPL-SCNC: 25 MMOL/L (ref 23–29)
CO2 SERPL-SCNC: 26 MMOL/L
CO2 SERPL-SCNC: 26 MMOL/L (ref 23–29)
CO2 SERPL-SCNC: 26 MMOL/L (ref 23–29)
CO2 SERPL-SCNC: 27 MMOL/L
CO2 SERPL-SCNC: 28 MMOL/L
CO2 SERPL-SCNC: 29 MMOL/L
CO2 SERPL-SCNC: 30 MMOL/L
CO2 SERPL-SCNC: 30 MMOL/L
CO2 SERPL-SCNC: 31 MMOL/L
CO2 SERPL-SCNC: 32 MMOL/L
CO2 SERPL-SCNC: 33 MMOL/L
CO2 SERPL-SCNC: 34 MMOL/L
CO2 SERPL-SCNC: 35 MMOL/L
CO2 SERPL-SCNC: 36 MMOL/L
CO2 SERPL-SCNC: 37 MMOL/L
CO2 SERPL-SCNC: 38 MMOL/L
CO2 SERPL-SCNC: 40 MMOL/L
CO2 SERPL-SCNC: 42 MMOL/L
CODING SYSTEM: NORMAL
COLOR UR AUTO: ABNORMAL
COLOR UR AUTO: YELLOW
CREAT SERPL-MCNC: 1.2 MG/DL
CREAT SERPL-MCNC: 1.3 MG/DL
CREAT SERPL-MCNC: 1.4 MG/DL
CREAT SERPL-MCNC: 1.5 MG/DL
CREAT SERPL-MCNC: 1.6 MG/DL
CREAT SERPL-MCNC: 1.7 MG/DL
CREAT SERPL-MCNC: 1.8 MG/DL
CREAT SERPL-MCNC: 1.9 MG/DL
CREAT SERPL-MCNC: 2 MG/DL
CREAT SERPL-MCNC: 2.1 MG/DL
CREAT SERPL-MCNC: 2.2 MG/DL
CREAT SERPL-MCNC: 2.2 MG/DL
CREAT SERPL-MCNC: 2.3 MG/DL
CREAT SERPL-MCNC: 2.5 MG/DL (ref 0.5–1.4)
CREAT SERPL-MCNC: 2.7 MG/DL (ref 0.5–1.4)
CREAT SERPL-MCNC: 2.8 MG/DL (ref 0.5–1.4)
CREAT SERPL-MCNC: 2.9 MG/DL (ref 0.5–1.4)
CREAT SERPL-MCNC: 2.9 MG/DL (ref 0.5–1.4)
CREAT SERPL-MCNC: 3 MG/DL (ref 0.5–1.4)
CREAT SERPL-MCNC: 3.2 MG/DL (ref 0.5–1.4)
CREAT SERPL-MCNC: 3.2 MG/DL (ref 0.5–1.4)
CREAT SERPL-MCNC: 3.3 MG/DL (ref 0.5–1.4)
CREAT SERPL-MCNC: 3.3 MG/DL (ref 0.5–1.4)
CREAT SERPL-MCNC: 3.5 MG/DL (ref 0.5–1.4)
CREAT SERPL-MCNC: 3.5 MG/DL (ref 0.5–1.4)
CREAT SERPL-MCNC: 3.7 MG/DL (ref 0.5–1.4)
CREAT SERPL-MCNC: 4.1 MG/DL
CREAT SERPL-MCNC: 4.6 MG/DL (ref 0.5–1.4)
CREAT UR-MCNC: 84 MG/DL
CRP SERPL-MCNC: 27.9 MG/L
CV ECHO LV RWT: 0.24 CM
CV ECHO LV RWT: 0.24 CM
CV ECHO LV RWT: 0.33 CM
DELSYS: ABNORMAL
DIFFERENTIAL METHOD: ABNORMAL
DIGOXIN SERPL-MCNC: 0.1 NG/ML
DISPENSE STATUS: NORMAL
DOP CALC AO PEAK VEL: 0.97 M/S
DOP CALC AO PEAK VEL: 1.06 M/S
DOP CALC AO PEAK VEL: 1.07 M/S
DOP CALC AO VTI: 17.21 CM
DOP CALC AO VTI: 18.79 CM
DOP CALC AO VTI: 19.75 CM
DOP CALC LVOT AREA: 3.33 CM2
DOP CALC LVOT AREA: 3.49 CM2
DOP CALC LVOT AREA: 3.73 CM2
DOP CALC LVOT DIAMETER: 2.06 CM
DOP CALC LVOT DIAMETER: 2.11 CM
DOP CALC LVOT DIAMETER: 2.18 CM
DOP CALC LVOT PEAK VEL: 0.4 M/S
DOP CALC LVOT PEAK VEL: 0.94 M/S
DOP CALC LVOT STROKE VOLUME: 33.51 CM3
DOP CALC LVOT STROKE VOLUME: 50.82 CM3
DOP CALC LVOT STROKE VOLUME: 59.5 CM3
DOP CALCLVOT PEAK VEL VTI: 10.06 CM
DOP CALCLVOT PEAK VEL VTI: 14.54 CM
DOP CALCLVOT PEAK VEL VTI: 15.95 CM
E COLI SXT1 STL QL IA: NEGATIVE
E COLI SXT2 STL QL IA: NEGATIVE
E/E' RATIO: 27.56
ECHO LV POSTERIOR WALL: 0.81 CM (ref 0.6–1.1)
ECHO LV POSTERIOR WALL: 0.87 CM (ref 0.6–1.1)
ECHO LV POSTERIOR WALL: 1.05 CM (ref 0.6–1.1)
EOSINOPHIL # BLD AUTO: 0 K/UL
EOSINOPHIL # BLD AUTO: 0 K/UL (ref 0–0.5)
EOSINOPHIL # BLD AUTO: 0.1 K/UL
EOSINOPHIL # BLD AUTO: 0.1 K/UL
EOSINOPHIL # BLD AUTO: 0.4 K/UL
EOSINOPHIL NFR BLD: 0 %
EOSINOPHIL NFR BLD: 0 % (ref 0–8)
EOSINOPHIL NFR BLD: 0.1 %
EOSINOPHIL NFR BLD: 0.2 %
EOSINOPHIL NFR BLD: 0.3 %
EOSINOPHIL NFR BLD: 0.3 %
EOSINOPHIL NFR BLD: 0.4 %
EOSINOPHIL NFR BLD: 0.4 %
EOSINOPHIL NFR BLD: 0.6 %
EOSINOPHIL NFR BLD: 0.6 %
EOSINOPHIL NFR BLD: 5.2 %
EP: 5
ERYTHROCYTE [DISTWIDTH] IN BLOOD BY AUTOMATED COUNT: 15.7 % (ref 11.5–14.5)
ERYTHROCYTE [DISTWIDTH] IN BLOOD BY AUTOMATED COUNT: 15.8 % (ref 11.5–14.5)
ERYTHROCYTE [DISTWIDTH] IN BLOOD BY AUTOMATED COUNT: 15.9 % (ref 11.5–14.5)
ERYTHROCYTE [DISTWIDTH] IN BLOOD BY AUTOMATED COUNT: 15.9 % (ref 11.5–14.5)
ERYTHROCYTE [DISTWIDTH] IN BLOOD BY AUTOMATED COUNT: 16 % (ref 11.5–14.5)
ERYTHROCYTE [DISTWIDTH] IN BLOOD BY AUTOMATED COUNT: 17 % (ref 11.5–14.5)
ERYTHROCYTE [DISTWIDTH] IN BLOOD BY AUTOMATED COUNT: 17 % (ref 11.5–14.5)
ERYTHROCYTE [DISTWIDTH] IN BLOOD BY AUTOMATED COUNT: 17.1 % (ref 11.5–14.5)
ERYTHROCYTE [DISTWIDTH] IN BLOOD BY AUTOMATED COUNT: 17.2 % (ref 11.5–14.5)
ERYTHROCYTE [DISTWIDTH] IN BLOOD BY AUTOMATED COUNT: 17.2 % (ref 11.5–14.5)
ERYTHROCYTE [DISTWIDTH] IN BLOOD BY AUTOMATED COUNT: 17.4 % (ref 11.5–14.5)
ERYTHROCYTE [DISTWIDTH] IN BLOOD BY AUTOMATED COUNT: 17.7 % (ref 11.5–14.5)
ERYTHROCYTE [DISTWIDTH] IN BLOOD BY AUTOMATED COUNT: 18.7 %
ERYTHROCYTE [DISTWIDTH] IN BLOOD BY AUTOMATED COUNT: 18.7 %
ERYTHROCYTE [DISTWIDTH] IN BLOOD BY AUTOMATED COUNT: 18.8 %
ERYTHROCYTE [DISTWIDTH] IN BLOOD BY AUTOMATED COUNT: 18.9 %
ERYTHROCYTE [DISTWIDTH] IN BLOOD BY AUTOMATED COUNT: 18.9 %
ERYTHROCYTE [DISTWIDTH] IN BLOOD BY AUTOMATED COUNT: 19 %
ERYTHROCYTE [DISTWIDTH] IN BLOOD BY AUTOMATED COUNT: 19 %
ERYTHROCYTE [DISTWIDTH] IN BLOOD BY AUTOMATED COUNT: 19.1 %
ERYTHROCYTE [DISTWIDTH] IN BLOOD BY AUTOMATED COUNT: 19.4 %
ERYTHROCYTE [DISTWIDTH] IN BLOOD BY AUTOMATED COUNT: 19.5 %
ERYTHROCYTE [DISTWIDTH] IN BLOOD BY AUTOMATED COUNT: 19.6 %
ERYTHROCYTE [DISTWIDTH] IN BLOOD BY AUTOMATED COUNT: 19.7 %
ERYTHROCYTE [DISTWIDTH] IN BLOOD BY AUTOMATED COUNT: 19.8 %
ERYTHROCYTE [DISTWIDTH] IN BLOOD BY AUTOMATED COUNT: 19.8 %
ERYTHROCYTE [DISTWIDTH] IN BLOOD BY AUTOMATED COUNT: 19.9 %
ERYTHROCYTE [DISTWIDTH] IN BLOOD BY AUTOMATED COUNT: 19.9 %
ERYTHROCYTE [DISTWIDTH] IN BLOOD BY AUTOMATED COUNT: 20 %
ERYTHROCYTE [DISTWIDTH] IN BLOOD BY AUTOMATED COUNT: 20.2 %
ERYTHROCYTE [DISTWIDTH] IN BLOOD BY AUTOMATED COUNT: 20.3 %
ERYTHROCYTE [DISTWIDTH] IN BLOOD BY AUTOMATED COUNT: 20.3 %
ERYTHROCYTE [DISTWIDTH] IN BLOOD BY AUTOMATED COUNT: 20.5 %
ERYTHROCYTE [DISTWIDTH] IN BLOOD BY AUTOMATED COUNT: 20.5 %
ERYTHROCYTE [DISTWIDTH] IN BLOOD BY AUTOMATED COUNT: 20.6 %
ERYTHROCYTE [DISTWIDTH] IN BLOOD BY AUTOMATED COUNT: 20.7 %
ERYTHROCYTE [DISTWIDTH] IN BLOOD BY AUTOMATED COUNT: 20.9 %
ERYTHROCYTE [DISTWIDTH] IN BLOOD BY AUTOMATED COUNT: 21 %
ERYTHROCYTE [DISTWIDTH] IN BLOOD BY AUTOMATED COUNT: 21.1 %
ERYTHROCYTE [DISTWIDTH] IN BLOOD BY AUTOMATED COUNT: 21.2 %
ERYTHROCYTE [DISTWIDTH] IN BLOOD BY AUTOMATED COUNT: 21.2 %
ERYTHROCYTE [DISTWIDTH] IN BLOOD BY AUTOMATED COUNT: 21.3 %
ERYTHROCYTE [DISTWIDTH] IN BLOOD BY AUTOMATED COUNT: 21.4 %
ERYTHROCYTE [SEDIMENTATION RATE] IN BLOOD BY WESTERGREN METHOD: 14 MM/H
ERYTHROCYTE [SEDIMENTATION RATE] IN BLOOD BY WESTERGREN METHOD: 16 MM/H
ERYTHROCYTE [SEDIMENTATION RATE] IN BLOOD BY WESTERGREN METHOD: 17 MM/H
ERYTHROCYTE [SEDIMENTATION RATE] IN BLOOD BY WESTERGREN METHOD: 18 MM/H
ERYTHROCYTE [SEDIMENTATION RATE] IN BLOOD BY WESTERGREN METHOD: 54 MM/HR
EST. GFR  (AFRICAN AMERICAN): 13.3 ML/MIN/1.73 M^2
EST. GFR  (AFRICAN AMERICAN): 15.3 ML/MIN/1.73 M^2
EST. GFR  (AFRICAN AMERICAN): 17.3 ML/MIN/1.73 M^2
EST. GFR  (AFRICAN AMERICAN): 18.5 ML/MIN/1.73 M^2
EST. GFR  (AFRICAN AMERICAN): 18.5 ML/MIN/1.73 M^2
EST. GFR  (AFRICAN AMERICAN): 19.9 ML/MIN/1.73 M^2
EST. GFR  (AFRICAN AMERICAN): 19.9 ML/MIN/1.73 M^2
EST. GFR  (AFRICAN AMERICAN): 20.6 ML/MIN/1.73 M^2
EST. GFR  (AFRICAN AMERICAN): 20.6 ML/MIN/1.73 M^2
EST. GFR  (AFRICAN AMERICAN): 22.3 ML/MIN/1.73 M^2
EST. GFR  (AFRICAN AMERICAN): 23.2 ML/MIN/1.73 M^2
EST. GFR  (AFRICAN AMERICAN): 23.2 ML/MIN/1.73 M^2
EST. GFR  (AFRICAN AMERICAN): 24.2 ML/MIN/1.73 M^2
EST. GFR  (AFRICAN AMERICAN): 25.3 ML/MIN/1.73 M^2
EST. GFR  (AFRICAN AMERICAN): 27.8 ML/MIN/1.73 M^2
EST. GFR  (AFRICAN AMERICAN): 30.7 ML/MIN/1.73 M^2
EST. GFR  (AFRICAN AMERICAN): 32.4 ML/MIN/1.73 M^2
EST. GFR  (AFRICAN AMERICAN): 32.4 ML/MIN/1.73 M^2
EST. GFR  (AFRICAN AMERICAN): 34.3 ML/MIN/1.73 M^2
EST. GFR  (AFRICAN AMERICAN): 36.4 ML/MIN/1.73 M^2
EST. GFR  (AFRICAN AMERICAN): 38.7 ML/MIN/1.73 M^2
EST. GFR  (AFRICAN AMERICAN): 41.3 ML/MIN/1.73 M^2
EST. GFR  (AFRICAN AMERICAN): 44.3 ML/MIN/1.73 M^2
EST. GFR  (AFRICAN AMERICAN): 47.7 ML/MIN/1.73 M^2
EST. GFR  (AFRICAN AMERICAN): 51.5 ML/MIN/1.73 M^2
EST. GFR  (AFRICAN AMERICAN): 56 ML/MIN/1.73 M^2
EST. GFR  (AFRICAN AMERICAN): >60 ML/MIN/1.73 M^2
EST. GFR  (NON AFRICAN AMERICAN): 11.5 ML/MIN/1.73 M^2
EST. GFR  (NON AFRICAN AMERICAN): 13.2 ML/MIN/1.73 M^2
EST. GFR  (NON AFRICAN AMERICAN): 15 ML/MIN/1.73 M^2
EST. GFR  (NON AFRICAN AMERICAN): 16 ML/MIN/1.73 M^2
EST. GFR  (NON AFRICAN AMERICAN): 16 ML/MIN/1.73 M^2
EST. GFR  (NON AFRICAN AMERICAN): 17.2 ML/MIN/1.73 M^2
EST. GFR  (NON AFRICAN AMERICAN): 17.2 ML/MIN/1.73 M^2
EST. GFR  (NON AFRICAN AMERICAN): 17.8 ML/MIN/1.73 M^2
EST. GFR  (NON AFRICAN AMERICAN): 17.8 ML/MIN/1.73 M^2
EST. GFR  (NON AFRICAN AMERICAN): 19.3 ML/MIN/1.73 M^2
EST. GFR  (NON AFRICAN AMERICAN): 20.1 ML/MIN/1.73 M^2
EST. GFR  (NON AFRICAN AMERICAN): 20.1 ML/MIN/1.73 M^2
EST. GFR  (NON AFRICAN AMERICAN): 21 ML/MIN/1.73 M^2
EST. GFR  (NON AFRICAN AMERICAN): 21.9 ML/MIN/1.73 M^2
EST. GFR  (NON AFRICAN AMERICAN): 24 ML/MIN/1.73 M^2
EST. GFR  (NON AFRICAN AMERICAN): 26.6 ML/MIN/1.73 M^2
EST. GFR  (NON AFRICAN AMERICAN): 28.1 ML/MIN/1.73 M^2
EST. GFR  (NON AFRICAN AMERICAN): 28.1 ML/MIN/1.73 M^2
EST. GFR  (NON AFRICAN AMERICAN): 29.7 ML/MIN/1.73 M^2
EST. GFR  (NON AFRICAN AMERICAN): 31.5 ML/MIN/1.73 M^2
EST. GFR  (NON AFRICAN AMERICAN): 33.5 ML/MIN/1.73 M^2
EST. GFR  (NON AFRICAN AMERICAN): 35.8 ML/MIN/1.73 M^2
EST. GFR  (NON AFRICAN AMERICAN): 38.3 ML/MIN/1.73 M^2
EST. GFR  (NON AFRICAN AMERICAN): 41.2 ML/MIN/1.73 M^2
EST. GFR  (NON AFRICAN AMERICAN): 44.6 ML/MIN/1.73 M^2
EST. GFR  (NON AFRICAN AMERICAN): 48.5 ML/MIN/1.73 M^2
EST. GFR  (NON AFRICAN AMERICAN): 53 ML/MIN/1.73 M^2
EST. GFR  (NON AFRICAN AMERICAN): 58.4 ML/MIN/1.73 M^2
ESTIMATED AVG GLUCOSE: 186 MG/DL
ESTIMATED AVG GLUCOSE: 214 MG/DL (ref 68–131)
FERRITIN SERPL-MCNC: 465 NG/ML
FIO2: 100
FIO2: 100
FIO2: 20
FIO2: 21
FIO2: 21
FIO2: 32
FIO2: 32
FIO2: 36
FIO2: 40
FIO2: 50
FLOW: 15
FLOW: 15
FLOW: 2
FLOW: 3
FLOW: 4
FLOW: 5
FRACTIONAL SHORTENING: 11 % (ref 28–44)
FRACTIONAL SHORTENING: 12 % (ref 28–44)
FRACTIONAL SHORTENING: 8 % (ref 28–44)
GIANT PLATELETS BLD QL SMEAR: PRESENT
GLUCOSE SERPL-MCNC: 100 MG/DL
GLUCOSE SERPL-MCNC: 105 MG/DL
GLUCOSE SERPL-MCNC: 105 MG/DL (ref 70–110)
GLUCOSE SERPL-MCNC: 110 MG/DL
GLUCOSE SERPL-MCNC: 110 MG/DL
GLUCOSE SERPL-MCNC: 111 MG/DL
GLUCOSE SERPL-MCNC: 112 MG/DL
GLUCOSE SERPL-MCNC: 116 MG/DL
GLUCOSE SERPL-MCNC: 119 MG/DL
GLUCOSE SERPL-MCNC: 123 MG/DL
GLUCOSE SERPL-MCNC: 126 MG/DL (ref 70–110)
GLUCOSE SERPL-MCNC: 127 MG/DL
GLUCOSE SERPL-MCNC: 127 MG/DL
GLUCOSE SERPL-MCNC: 131 MG/DL
GLUCOSE SERPL-MCNC: 132 MG/DL
GLUCOSE SERPL-MCNC: 134 MG/DL (ref 70–110)
GLUCOSE SERPL-MCNC: 135 MG/DL
GLUCOSE SERPL-MCNC: 135 MG/DL
GLUCOSE SERPL-MCNC: 138 MG/DL
GLUCOSE SERPL-MCNC: 141 MG/DL
GLUCOSE SERPL-MCNC: 145 MG/DL
GLUCOSE SERPL-MCNC: 146 MG/DL
GLUCOSE SERPL-MCNC: 149 MG/DL (ref 70–110)
GLUCOSE SERPL-MCNC: 153 MG/DL
GLUCOSE SERPL-MCNC: 160 MG/DL
GLUCOSE SERPL-MCNC: 162 MG/DL
GLUCOSE SERPL-MCNC: 162 MG/DL
GLUCOSE SERPL-MCNC: 163 MG/DL (ref 70–110)
GLUCOSE SERPL-MCNC: 164 MG/DL
GLUCOSE SERPL-MCNC: 164 MG/DL
GLUCOSE SERPL-MCNC: 165 MG/DL
GLUCOSE SERPL-MCNC: 167 MG/DL
GLUCOSE SERPL-MCNC: 172 MG/DL
GLUCOSE SERPL-MCNC: 173 MG/DL
GLUCOSE SERPL-MCNC: 174 MG/DL
GLUCOSE SERPL-MCNC: 175 MG/DL
GLUCOSE SERPL-MCNC: 175 MG/DL
GLUCOSE SERPL-MCNC: 177 MG/DL
GLUCOSE SERPL-MCNC: 178 MG/DL
GLUCOSE SERPL-MCNC: 178 MG/DL (ref 70–110)
GLUCOSE SERPL-MCNC: 180 MG/DL
GLUCOSE SERPL-MCNC: 183 MG/DL
GLUCOSE SERPL-MCNC: 190 MG/DL
GLUCOSE SERPL-MCNC: 191 MG/DL (ref 70–110)
GLUCOSE SERPL-MCNC: 193 MG/DL
GLUCOSE SERPL-MCNC: 193 MG/DL
GLUCOSE SERPL-MCNC: 194 MG/DL
GLUCOSE SERPL-MCNC: 194 MG/DL
GLUCOSE SERPL-MCNC: 195 MG/DL (ref 70–110)
GLUCOSE SERPL-MCNC: 196 MG/DL
GLUCOSE SERPL-MCNC: 200 MG/DL
GLUCOSE SERPL-MCNC: 203 MG/DL
GLUCOSE SERPL-MCNC: 204 MG/DL
GLUCOSE SERPL-MCNC: 207 MG/DL
GLUCOSE SERPL-MCNC: 209 MG/DL
GLUCOSE SERPL-MCNC: 209 MG/DL
GLUCOSE SERPL-MCNC: 212 MG/DL (ref 70–110)
GLUCOSE SERPL-MCNC: 213 MG/DL (ref 70–110)
GLUCOSE SERPL-MCNC: 215 MG/DL
GLUCOSE SERPL-MCNC: 223 MG/DL (ref 70–110)
GLUCOSE SERPL-MCNC: 225 MG/DL (ref 70–110)
GLUCOSE SERPL-MCNC: 230 MG/DL
GLUCOSE SERPL-MCNC: 230 MG/DL
GLUCOSE SERPL-MCNC: 234 MG/DL
GLUCOSE SERPL-MCNC: 234 MG/DL
GLUCOSE SERPL-MCNC: 239 MG/DL
GLUCOSE SERPL-MCNC: 239 MG/DL
GLUCOSE SERPL-MCNC: 242 MG/DL (ref 70–110)
GLUCOSE SERPL-MCNC: 243 MG/DL
GLUCOSE SERPL-MCNC: 243 MG/DL (ref 70–110)
GLUCOSE SERPL-MCNC: 245 MG/DL
GLUCOSE SERPL-MCNC: 245 MG/DL
GLUCOSE SERPL-MCNC: 247 MG/DL
GLUCOSE SERPL-MCNC: 247 MG/DL
GLUCOSE SERPL-MCNC: 250 MG/DL (ref 70–110)
GLUCOSE SERPL-MCNC: 254 MG/DL
GLUCOSE SERPL-MCNC: 257 MG/DL (ref 70–110)
GLUCOSE SERPL-MCNC: 260 MG/DL (ref 70–110)
GLUCOSE SERPL-MCNC: 264 MG/DL
GLUCOSE SERPL-MCNC: 270 MG/DL (ref 70–110)
GLUCOSE SERPL-MCNC: 270 MG/DL (ref 70–110)
GLUCOSE SERPL-MCNC: 305 MG/DL (ref 70–110)
GLUCOSE SERPL-MCNC: 327 MG/DL
GLUCOSE SERPL-MCNC: 62 MG/DL
GLUCOSE SERPL-MCNC: 72 MG/DL
GLUCOSE SERPL-MCNC: 73 MG/DL
GLUCOSE SERPL-MCNC: 78 MG/DL (ref 70–110)
GLUCOSE SERPL-MCNC: 82 MG/DL
GLUCOSE SERPL-MCNC: 83 MG/DL
GLUCOSE SERPL-MCNC: 99 MG/DL
GLUCOSE SERPL-MCNC: 99 MG/DL (ref 70–110)
GLUCOSE UR QL STRIP: ABNORMAL
GLUCOSE UR QL STRIP: NEGATIVE
GRAM STN SPEC: NORMAL
HBA1C MFR BLD HPLC: 8.1 %
HBA1C MFR BLD HPLC: 9.1 % (ref 4–5.6)
HCO3 UR-SCNC: 21.6 MMOL/L (ref 24–28)
HCO3 UR-SCNC: 23.5 MMOL/L (ref 24–28)
HCO3 UR-SCNC: 25.3 MMOL/L (ref 24–28)
HCO3 UR-SCNC: 25.5 MMOL/L (ref 24–28)
HCO3 UR-SCNC: 25.7 MMOL/L (ref 24–28)
HCO3 UR-SCNC: 26.3 MMOL/L (ref 24–28)
HCO3 UR-SCNC: 26.7 MMOL/L (ref 24–28)
HCO3 UR-SCNC: 26.7 MMOL/L (ref 24–28)
HCO3 UR-SCNC: 27.6 MMOL/L (ref 24–28)
HCO3 UR-SCNC: 27.6 MMOL/L (ref 24–28)
HCO3 UR-SCNC: 28 MMOL/L (ref 24–28)
HCO3 UR-SCNC: 28.3 MMOL/L (ref 24–28)
HCO3 UR-SCNC: 28.3 MMOL/L (ref 24–28)
HCO3 UR-SCNC: 29.8 MMOL/L (ref 24–28)
HCO3 UR-SCNC: 31.1 MMOL/L (ref 24–28)
HCO3 UR-SCNC: 31.6 MMOL/L (ref 24–28)
HCO3 UR-SCNC: 31.6 MMOL/L (ref 24–28)
HCO3 UR-SCNC: 33.9 MMOL/L (ref 24–28)
HCO3 UR-SCNC: 33.9 MMOL/L (ref 24–28)
HCO3 UR-SCNC: 34.7 MMOL/L (ref 24–28)
HCO3 UR-SCNC: 35.5 MMOL/L (ref 24–28)
HCO3 UR-SCNC: 35.7 MMOL/L (ref 24–28)
HCO3 UR-SCNC: 35.8 MMOL/L (ref 24–28)
HCO3 UR-SCNC: 36.3 MMOL/L (ref 24–28)
HCO3 UR-SCNC: 36.4 MMOL/L (ref 24–28)
HCO3 UR-SCNC: 36.9 MMOL/L (ref 24–28)
HCO3 UR-SCNC: 37 MMOL/L (ref 24–28)
HCO3 UR-SCNC: 37.7 MMOL/L (ref 24–28)
HCO3 UR-SCNC: 38.1 MMOL/L (ref 24–28)
HCO3 UR-SCNC: 38.3 MMOL/L (ref 24–28)
HCO3 UR-SCNC: 38.4 MMOL/L (ref 24–28)
HCO3 UR-SCNC: 38.5 MMOL/L (ref 24–28)
HCO3 UR-SCNC: 38.6 MMOL/L (ref 24–28)
HCO3 UR-SCNC: 38.7 MMOL/L (ref 24–28)
HCO3 UR-SCNC: 39.7 MMOL/L (ref 24–28)
HCO3 UR-SCNC: 40 MMOL/L (ref 24–28)
HCO3 UR-SCNC: 41.5 MMOL/L (ref 24–28)
HCO3 UR-SCNC: 42.4 MMOL/L (ref 24–28)
HCO3 UR-SCNC: 43.3 MMOL/L (ref 24–28)
HCO3 UR-SCNC: 44.6 MMOL/L (ref 24–28)
HCT VFR BLD AUTO: 21.3 %
HCT VFR BLD AUTO: 22.9 %
HCT VFR BLD AUTO: 23 %
HCT VFR BLD AUTO: 23.1 %
HCT VFR BLD AUTO: 23.6 %
HCT VFR BLD AUTO: 24.3 %
HCT VFR BLD AUTO: 24.7 %
HCT VFR BLD AUTO: 24.7 %
HCT VFR BLD AUTO: 24.8 %
HCT VFR BLD AUTO: 24.9 %
HCT VFR BLD AUTO: 25.1 %
HCT VFR BLD AUTO: 25.2 %
HCT VFR BLD AUTO: 25.5 %
HCT VFR BLD AUTO: 25.6 %
HCT VFR BLD AUTO: 25.8 %
HCT VFR BLD AUTO: 25.9 %
HCT VFR BLD AUTO: 26.2 %
HCT VFR BLD AUTO: 26.2 %
HCT VFR BLD AUTO: 26.4 %
HCT VFR BLD AUTO: 26.5 %
HCT VFR BLD AUTO: 27.3 %
HCT VFR BLD AUTO: 27.5 % (ref 40–54)
HCT VFR BLD AUTO: 27.6 %
HCT VFR BLD AUTO: 28 %
HCT VFR BLD AUTO: 28.4 % (ref 40–54)
HCT VFR BLD AUTO: 28.6 % (ref 40–54)
HCT VFR BLD AUTO: 28.7 %
HCT VFR BLD AUTO: 28.8 %
HCT VFR BLD AUTO: 29.1 % (ref 40–54)
HCT VFR BLD AUTO: 29.3 %
HCT VFR BLD AUTO: 29.5 % (ref 40–54)
HCT VFR BLD AUTO: 29.6 %
HCT VFR BLD AUTO: 29.8 %
HCT VFR BLD AUTO: 30 % (ref 40–54)
HCT VFR BLD AUTO: 30.2 % (ref 40–54)
HCT VFR BLD AUTO: 30.2 % (ref 40–54)
HCT VFR BLD AUTO: 30.3 % (ref 40–54)
HCT VFR BLD AUTO: 30.6 %
HCT VFR BLD AUTO: 30.7 % (ref 40–54)
HCT VFR BLD AUTO: 31.2 %
HCT VFR BLD AUTO: 31.6 %
HCT VFR BLD AUTO: 31.9 % (ref 40–54)
HCT VFR BLD AUTO: 31.9 % (ref 40–54)
HCT VFR BLD CALC: 24 %PCV (ref 36–54)
HCT VFR BLD CALC: 27 %PCV (ref 36–54)
HGB BLD-MCNC: 10.1 G/DL
HGB BLD-MCNC: 10.1 G/DL (ref 14–18)
HGB BLD-MCNC: 10.2 G/DL (ref 14–18)
HGB BLD-MCNC: 10.2 G/DL (ref 14–18)
HGB BLD-MCNC: 10.3 G/DL (ref 14–18)
HGB BLD-MCNC: 10.7 G/DL (ref 14–18)
HGB BLD-MCNC: 10.8 G/DL (ref 14–18)
HGB BLD-MCNC: 10.9 G/DL (ref 14–18)
HGB BLD-MCNC: 6.5 G/DL
HGB BLD-MCNC: 6.8 G/DL
HGB BLD-MCNC: 6.8 G/DL
HGB BLD-MCNC: 6.9 G/DL
HGB BLD-MCNC: 7 G/DL
HGB BLD-MCNC: 7.3 G/DL
HGB BLD-MCNC: 7.3 G/DL
HGB BLD-MCNC: 7.4 G/DL
HGB BLD-MCNC: 7.5 G/DL
HGB BLD-MCNC: 7.6 G/DL
HGB BLD-MCNC: 7.7 G/DL
HGB BLD-MCNC: 7.8 G/DL
HGB BLD-MCNC: 7.8 G/DL
HGB BLD-MCNC: 7.9 G/DL
HGB BLD-MCNC: 8 G/DL
HGB BLD-MCNC: 8 G/DL
HGB BLD-MCNC: 8.1 G/DL
HGB BLD-MCNC: 8.3 G/DL
HGB BLD-MCNC: 8.4 G/DL
HGB BLD-MCNC: 8.7 G/DL
HGB BLD-MCNC: 8.7 G/DL
HGB BLD-MCNC: 8.9 G/DL
HGB BLD-MCNC: 9 G/DL (ref 14–18)
HGB BLD-MCNC: 9.2 G/DL
HGB BLD-MCNC: 9.2 G/DL
HGB BLD-MCNC: 9.3 G/DL
HGB BLD-MCNC: 9.3 G/DL (ref 14–18)
HGB BLD-MCNC: 9.4 G/DL
HGB BLD-MCNC: 9.4 G/DL
HGB BLD-MCNC: 9.4 G/DL (ref 14–18)
HGB BLD-MCNC: 9.7 G/DL
HGB BLD-MCNC: 9.7 G/DL (ref 14–18)
HGB BLD-MCNC: 9.9 G/DL (ref 14–18)
HGB UR QL STRIP: ABNORMAL
HYALINE CASTS UR QL AUTO: 0 /LPF
HYALINE CASTS UR QL AUTO: 1 /LPF
HYALINE CASTS UR QL AUTO: 33 /LPF
HYALINE CASTS UR QL AUTO: 7 /LPF
HYPOCHROMIA BLD QL SMEAR: ABNORMAL
IMM GRANULOCYTES # BLD AUTO: 0.01 K/UL
IMM GRANULOCYTES # BLD AUTO: 0.01 K/UL (ref 0–0.04)
IMM GRANULOCYTES # BLD AUTO: 0.02 K/UL
IMM GRANULOCYTES # BLD AUTO: 0.02 K/UL (ref 0–0.04)
IMM GRANULOCYTES # BLD AUTO: 0.02 K/UL (ref 0–0.04)
IMM GRANULOCYTES # BLD AUTO: 0.03 K/UL
IMM GRANULOCYTES # BLD AUTO: 0.03 K/UL (ref 0–0.04)
IMM GRANULOCYTES # BLD AUTO: 0.04 K/UL
IMM GRANULOCYTES # BLD AUTO: 0.04 K/UL (ref 0–0.04)
IMM GRANULOCYTES # BLD AUTO: 0.04 K/UL (ref 0–0.04)
IMM GRANULOCYTES # BLD AUTO: 0.05 K/UL
IMM GRANULOCYTES # BLD AUTO: 0.05 K/UL (ref 0–0.04)
IMM GRANULOCYTES # BLD AUTO: 0.06 K/UL
IMM GRANULOCYTES # BLD AUTO: 0.06 K/UL (ref 0–0.04)
IMM GRANULOCYTES # BLD AUTO: 0.07 K/UL
IMM GRANULOCYTES # BLD AUTO: 0.1 K/UL
IMM GRANULOCYTES # BLD AUTO: 0.1 K/UL
IMM GRANULOCYTES # BLD AUTO: 0.11 K/UL
IMM GRANULOCYTES # BLD AUTO: 0.11 K/UL
IMM GRANULOCYTES # BLD AUTO: 0.11 K/UL (ref 0–0.04)
IMM GRANULOCYTES # BLD AUTO: 0.16 K/UL
IMM GRANULOCYTES # BLD AUTO: 0.23 K/UL
IMM GRANULOCYTES # BLD AUTO: 0.24 K/UL
IMM GRANULOCYTES # BLD AUTO: 0.4 K/UL
IMM GRANULOCYTES NFR BLD AUTO: 0.1 %
IMM GRANULOCYTES NFR BLD AUTO: 0.1 % (ref 0–0.5)
IMM GRANULOCYTES NFR BLD AUTO: 0.2 %
IMM GRANULOCYTES NFR BLD AUTO: 0.2 % (ref 0–0.5)
IMM GRANULOCYTES NFR BLD AUTO: 0.3 %
IMM GRANULOCYTES NFR BLD AUTO: 0.3 % (ref 0–0.5)
IMM GRANULOCYTES NFR BLD AUTO: 0.3 % (ref 0–0.5)
IMM GRANULOCYTES NFR BLD AUTO: 0.4 %
IMM GRANULOCYTES NFR BLD AUTO: 0.4 % (ref 0–0.5)
IMM GRANULOCYTES NFR BLD AUTO: 0.5 %
IMM GRANULOCYTES NFR BLD AUTO: 0.6 %
IMM GRANULOCYTES NFR BLD AUTO: 0.6 % (ref 0–0.5)
IMM GRANULOCYTES NFR BLD AUTO: 0.6 % (ref 0–0.5)
IMM GRANULOCYTES NFR BLD AUTO: 0.7 %
IMM GRANULOCYTES NFR BLD AUTO: 0.8 %
IMM GRANULOCYTES NFR BLD AUTO: 1 %
IMM GRANULOCYTES NFR BLD AUTO: 1.1 %
IMM GRANULOCYTES NFR BLD AUTO: 1.7 %
IMM GRANULOCYTES NFR BLD AUTO: 1.8 %
INR PPP: 1.2
INR PPP: 1.4
INR PPP: 1.5
INR PPP: 1.5
INR PPP: 1.7
INR PPP: 1.9
INR PPP: 2.1
INR PPP: 2.2
INR PPP: 2.3
INR PPP: 2.4
INR PPP: 2.4
INR PPP: 2.6
INR PPP: 2.6
INR PPP: 2.8
INR PPP: 2.8
INR PPP: 2.9
INR PPP: 2.9
INR PPP: 3.5
INR PPP: 3.6
INR PPP: 3.8
INR PPP: 4.1
INR PPP: 4.2
INR PPP: 4.3
INR PPP: 4.8
INTERVENTRICULAR SEPTUM: 0.74 CM (ref 0.6–1.1)
INTERVENTRICULAR SEPTUM: 0.82 CM (ref 0.6–1.1)
INTERVENTRICULAR SEPTUM: 1.07 CM (ref 0.6–1.1)
IP: 10
IRON SERPL-MCNC: 33 UG/DL
KETONES UR QL STRIP: ABNORMAL
KETONES UR QL STRIP: NEGATIVE
LA MAJOR: 5.84 CM
LA MAJOR: 6.21 CM
LA MAJOR: 6.85 CM
LA MINOR: 5.2 CM
LA MINOR: 5.84 CM
LA MINOR: 5.87 CM
LA WIDTH: 4.66 CM
LA WIDTH: 4.93 CM
LA WIDTH: 5.1 CM
LACTATE SERPL-SCNC: 1 MMOL/L
LACTATE SERPL-SCNC: 1.1 MMOL/L
LACTATE SERPL-SCNC: 1.1 MMOL/L
LACTATE SERPL-SCNC: 1.2 MMOL/L
LACTATE SERPL-SCNC: 1.3 MMOL/L
LACTATE SERPL-SCNC: 1.4 MMOL/L
LACTATE SERPL-SCNC: 1.6 MMOL/L
LDH SERPL L TO P-CCNC: 0.78 MMOL/L (ref 0.36–1.25)
LEFT ATRIUM SIZE: 4.23 CM
LEFT ATRIUM SIZE: 4.35 CM
LEFT ATRIUM SIZE: 4.89 CM
LEFT ATRIUM VOLUME INDEX: 51.1 ML/M2
LEFT ATRIUM VOLUME INDEX: 52.4 ML/M2
LEFT ATRIUM VOLUME INDEX: 52.5 ML/M2
LEFT ATRIUM VOLUME: 108.41 CM3
LEFT ATRIUM VOLUME: 110.01 CM3
LEFT ATRIUM VOLUME: 113.12 CM3
LEFT INTERNAL DIMENSION IN SYSTOLE: 5.93 CM (ref 2.1–4)
LEFT INTERNAL DIMENSION IN SYSTOLE: 6.01 CM (ref 2.1–4)
LEFT INTERNAL DIMENSION IN SYSTOLE: 6.46 CM (ref 2.1–4)
LEFT VENTRICLE DIASTOLIC VOLUME INDEX: 115.98 ML/M2
LEFT VENTRICLE DIASTOLIC VOLUME INDEX: 127.59 ML/M2
LEFT VENTRICLE DIASTOLIC VOLUME INDEX: 99.73 ML/M2
LEFT VENTRICLE DIASTOLIC VOLUME: 211.56 ML
LEFT VENTRICLE DIASTOLIC VOLUME: 243.26 ML
LEFT VENTRICLE DIASTOLIC VOLUME: 275.5 ML
LEFT VENTRICLE MASS INDEX: 107.9 G/M2
LEFT VENTRICLE MASS INDEX: 128.4 G/M2
LEFT VENTRICLE MASS INDEX: 141.5 G/M2
LEFT VENTRICLE SYSTOLIC VOLUME INDEX: 82.5 ML/M2
LEFT VENTRICLE SYSTOLIC VOLUME INDEX: 86 ML/M2
LEFT VENTRICLE SYSTOLIC VOLUME INDEX: 98.6 ML/M2
LEFT VENTRICLE SYSTOLIC VOLUME: 175.05 ML
LEFT VENTRICLE SYSTOLIC VOLUME: 180.4 ML
LEFT VENTRICLE SYSTOLIC VOLUME: 212.87 ML
LEFT VENTRICULAR INTERNAL DIMENSION IN DIASTOLE: 6.44 CM (ref 3.5–6)
LEFT VENTRICULAR INTERNAL DIMENSION IN DIASTOLE: 6.85 CM (ref 3.5–6)
LEFT VENTRICULAR INTERNAL DIMENSION IN DIASTOLE: 7.24 CM (ref 3.5–6)
LEFT VENTRICULAR MASS: 226.31 G
LEFT VENTRICULAR MASS: 277.34 G
LEFT VENTRICULAR MASS: 300.23 G
LEUKOCYTE ESTERASE UR QL STRIP: NEGATIVE
LIPASE SERPL-CCNC: 3 U/L (ref 4–60)
LV LATERAL E/E' RATIO: 24.8
LV SEPTAL E/E' RATIO: 31
LYMPHOCYTES # BLD AUTO: 0.6 K/UL (ref 1–4.8)
LYMPHOCYTES # BLD AUTO: 0.6 K/UL (ref 1–4.8)
LYMPHOCYTES # BLD AUTO: 0.7 K/UL (ref 1–4.8)
LYMPHOCYTES # BLD AUTO: 0.7 K/UL (ref 1–4.8)
LYMPHOCYTES # BLD AUTO: 0.8 K/UL (ref 1–4.8)
LYMPHOCYTES # BLD AUTO: 0.9 K/UL
LYMPHOCYTES # BLD AUTO: 0.9 K/UL
LYMPHOCYTES # BLD AUTO: 0.9 K/UL (ref 1–4.8)
LYMPHOCYTES # BLD AUTO: 1 K/UL
LYMPHOCYTES # BLD AUTO: 1 K/UL (ref 1–4.8)
LYMPHOCYTES # BLD AUTO: 1 K/UL (ref 1–4.8)
LYMPHOCYTES # BLD AUTO: 1.1 K/UL
LYMPHOCYTES # BLD AUTO: 1.1 K/UL (ref 1–4.8)
LYMPHOCYTES # BLD AUTO: 1.2 K/UL
LYMPHOCYTES # BLD AUTO: 1.2 K/UL (ref 1–4.8)
LYMPHOCYTES # BLD AUTO: 1.3 K/UL
LYMPHOCYTES # BLD AUTO: 1.4 K/UL
LYMPHOCYTES # BLD AUTO: 1.5 K/UL
LYMPHOCYTES # BLD AUTO: 1.6 K/UL
LYMPHOCYTES # BLD AUTO: 1.7 K/UL
LYMPHOCYTES # BLD AUTO: 1.7 K/UL
LYMPHOCYTES # BLD AUTO: 1.8 K/UL
LYMPHOCYTES NFR BLD: 10.2 % (ref 18–48)
LYMPHOCYTES NFR BLD: 10.3 %
LYMPHOCYTES NFR BLD: 10.3 % (ref 18–48)
LYMPHOCYTES NFR BLD: 10.6 %
LYMPHOCYTES NFR BLD: 10.7 %
LYMPHOCYTES NFR BLD: 11 %
LYMPHOCYTES NFR BLD: 11.2 %
LYMPHOCYTES NFR BLD: 11.4 %
LYMPHOCYTES NFR BLD: 11.5 %
LYMPHOCYTES NFR BLD: 11.5 %
LYMPHOCYTES NFR BLD: 11.6 %
LYMPHOCYTES NFR BLD: 12.2 %
LYMPHOCYTES NFR BLD: 12.2 %
LYMPHOCYTES NFR BLD: 12.4 % (ref 18–48)
LYMPHOCYTES NFR BLD: 12.7 % (ref 18–48)
LYMPHOCYTES NFR BLD: 12.8 % (ref 18–48)
LYMPHOCYTES NFR BLD: 13 %
LYMPHOCYTES NFR BLD: 13.2 %
LYMPHOCYTES NFR BLD: 13.2 %
LYMPHOCYTES NFR BLD: 13.4 %
LYMPHOCYTES NFR BLD: 13.8 %
LYMPHOCYTES NFR BLD: 14 %
LYMPHOCYTES NFR BLD: 14.6 %
LYMPHOCYTES NFR BLD: 14.6 %
LYMPHOCYTES NFR BLD: 14.8 %
LYMPHOCYTES NFR BLD: 14.8 %
LYMPHOCYTES NFR BLD: 15.1 % (ref 18–48)
LYMPHOCYTES NFR BLD: 15.5 %
LYMPHOCYTES NFR BLD: 16.3 %
LYMPHOCYTES NFR BLD: 17.1 % (ref 18–48)
LYMPHOCYTES NFR BLD: 18.4 %
LYMPHOCYTES NFR BLD: 21.5 %
LYMPHOCYTES NFR BLD: 3.8 % (ref 18–48)
LYMPHOCYTES NFR BLD: 5 %
LYMPHOCYTES NFR BLD: 6.1 % (ref 18–48)
LYMPHOCYTES NFR BLD: 6.4 %
LYMPHOCYTES NFR BLD: 6.5 %
LYMPHOCYTES NFR BLD: 7 %
LYMPHOCYTES NFR BLD: 7 % (ref 18–48)
LYMPHOCYTES NFR BLD: 7.6 %
LYMPHOCYTES NFR BLD: 7.6 %
LYMPHOCYTES NFR BLD: 7.6 % (ref 18–48)
LYMPHOCYTES NFR BLD: 7.9 %
LYMPHOCYTES NFR BLD: 8.1 % (ref 18–48)
LYMPHOCYTES NFR BLD: 8.3 %
LYMPHOCYTES NFR BLD: 9.7 %
MAGNESIUM SERPL-MCNC: 1.5 MG/DL
MAGNESIUM SERPL-MCNC: 1.6 MG/DL
MAGNESIUM SERPL-MCNC: 1.7 MG/DL
MAGNESIUM SERPL-MCNC: 1.7 MG/DL
MAGNESIUM SERPL-MCNC: 1.7 MG/DL (ref 1.6–2.6)
MAGNESIUM SERPL-MCNC: 1.8 MG/DL (ref 1.6–2.6)
MAGNESIUM SERPL-MCNC: 1.9 MG/DL
MAGNESIUM SERPL-MCNC: 1.9 MG/DL (ref 1.6–2.6)
MAGNESIUM SERPL-MCNC: 1.9 MG/DL (ref 1.6–2.6)
MAGNESIUM SERPL-MCNC: 2 MG/DL
MAGNESIUM SERPL-MCNC: 2 MG/DL (ref 1.6–2.6)
MAGNESIUM SERPL-MCNC: 2.1 MG/DL
MAGNESIUM SERPL-MCNC: 2.2 MG/DL
MAGNESIUM SERPL-MCNC: 2.2 MG/DL (ref 1.6–2.6)
MAGNESIUM SERPL-MCNC: 2.3 MG/DL
MAGNESIUM SERPL-MCNC: 2.3 MG/DL (ref 1.6–2.6)
MAGNESIUM SERPL-MCNC: 2.4 MG/DL
MAGNESIUM SERPL-MCNC: 2.5 MG/DL
MAGNESIUM SERPL-MCNC: 2.5 MG/DL (ref 1.6–2.6)
MAGNESIUM SERPL-MCNC: 2.6 MG/DL
MAGNESIUM SERPL-MCNC: 2.7 MG/DL
MCH RBC QN AUTO: 27.6 PG
MCH RBC QN AUTO: 27.7 PG
MCH RBC QN AUTO: 27.8 PG
MCH RBC QN AUTO: 27.9 PG
MCH RBC QN AUTO: 27.9 PG
MCH RBC QN AUTO: 28.1 PG
MCH RBC QN AUTO: 28.1 PG
MCH RBC QN AUTO: 28.2 PG
MCH RBC QN AUTO: 28.3 PG
MCH RBC QN AUTO: 28.3 PG
MCH RBC QN AUTO: 28.4 PG
MCH RBC QN AUTO: 28.4 PG
MCH RBC QN AUTO: 28.5 PG
MCH RBC QN AUTO: 28.5 PG (ref 27–31)
MCH RBC QN AUTO: 28.6 PG
MCH RBC QN AUTO: 28.7 PG
MCH RBC QN AUTO: 28.7 PG
MCH RBC QN AUTO: 28.8 PG
MCH RBC QN AUTO: 28.9 PG
MCH RBC QN AUTO: 28.9 PG (ref 27–31)
MCH RBC QN AUTO: 28.9 PG (ref 27–31)
MCH RBC QN AUTO: 29 PG
MCH RBC QN AUTO: 29 PG
MCH RBC QN AUTO: 29 PG (ref 27–31)
MCH RBC QN AUTO: 29.1 PG
MCH RBC QN AUTO: 29.1 PG (ref 27–31)
MCH RBC QN AUTO: 29.2 PG (ref 27–31)
MCH RBC QN AUTO: 29.2 PG (ref 27–31)
MCH RBC QN AUTO: 29.3 PG (ref 27–31)
MCH RBC QN AUTO: 29.3 PG (ref 27–31)
MCH RBC QN AUTO: 29.4 PG (ref 27–31)
MCH RBC QN AUTO: 29.5 PG
MCH RBC QN AUTO: 29.5 PG (ref 27–31)
MCH RBC QN AUTO: 29.7 PG
MCH RBC QN AUTO: 30.3 PG (ref 27–31)
MCHC RBC AUTO-ENTMCNC: 29.1 G/DL
MCHC RBC AUTO-ENTMCNC: 29.4 G/DL
MCHC RBC AUTO-ENTMCNC: 29.4 G/DL
MCHC RBC AUTO-ENTMCNC: 29.5 G/DL
MCHC RBC AUTO-ENTMCNC: 29.5 G/DL
MCHC RBC AUTO-ENTMCNC: 29.7 G/DL
MCHC RBC AUTO-ENTMCNC: 29.7 G/DL
MCHC RBC AUTO-ENTMCNC: 29.8 G/DL
MCHC RBC AUTO-ENTMCNC: 29.8 G/DL
MCHC RBC AUTO-ENTMCNC: 30 G/DL
MCHC RBC AUTO-ENTMCNC: 30.2 G/DL
MCHC RBC AUTO-ENTMCNC: 30.2 G/DL
MCHC RBC AUTO-ENTMCNC: 30.3 G/DL
MCHC RBC AUTO-ENTMCNC: 30.4 G/DL
MCHC RBC AUTO-ENTMCNC: 30.4 G/DL
MCHC RBC AUTO-ENTMCNC: 30.5 G/DL
MCHC RBC AUTO-ENTMCNC: 30.6 G/DL
MCHC RBC AUTO-ENTMCNC: 30.7 G/DL
MCHC RBC AUTO-ENTMCNC: 30.8 G/DL
MCHC RBC AUTO-ENTMCNC: 30.9 G/DL
MCHC RBC AUTO-ENTMCNC: 31 G/DL
MCHC RBC AUTO-ENTMCNC: 31.1 G/DL
MCHC RBC AUTO-ENTMCNC: 31.1 G/DL
MCHC RBC AUTO-ENTMCNC: 31.3 G/DL
MCHC RBC AUTO-ENTMCNC: 31.4 G/DL
MCHC RBC AUTO-ENTMCNC: 31.5 G/DL
MCHC RBC AUTO-ENTMCNC: 31.8 G/DL
MCHC RBC AUTO-ENTMCNC: 32 G/DL
MCHC RBC AUTO-ENTMCNC: 32.3 G/DL
MCHC RBC AUTO-ENTMCNC: 32.3 G/DL
MCHC RBC AUTO-ENTMCNC: 32.4 G/DL
MCHC RBC AUTO-ENTMCNC: 32.5 G/DL (ref 32–36)
MCHC RBC AUTO-ENTMCNC: 32.7 G/DL (ref 32–36)
MCHC RBC AUTO-ENTMCNC: 32.7 G/DL (ref 32–36)
MCHC RBC AUTO-ENTMCNC: 33.1 G/DL (ref 32–36)
MCHC RBC AUTO-ENTMCNC: 33.3 G/DL (ref 32–36)
MCHC RBC AUTO-ENTMCNC: 33.5 G/DL (ref 32–36)
MCHC RBC AUTO-ENTMCNC: 33.6 G/DL (ref 32–36)
MCHC RBC AUTO-ENTMCNC: 33.8 G/DL (ref 32–36)
MCHC RBC AUTO-ENTMCNC: 34 G/DL (ref 32–36)
MCHC RBC AUTO-ENTMCNC: 34.2 G/DL (ref 32–36)
MCHC RBC AUTO-ENTMCNC: 34.2 G/DL (ref 32–36)
MCHC RBC AUTO-ENTMCNC: 35.8 G/DL (ref 32–36)
MCV RBC AUTO: 85 FL (ref 82–98)
MCV RBC AUTO: 86 FL (ref 82–98)
MCV RBC AUTO: 86 FL (ref 82–98)
MCV RBC AUTO: 87 FL
MCV RBC AUTO: 87 FL (ref 82–98)
MCV RBC AUTO: 88 FL
MCV RBC AUTO: 88 FL (ref 82–98)
MCV RBC AUTO: 89 FL
MCV RBC AUTO: 89 FL
MCV RBC AUTO: 89 FL (ref 82–98)
MCV RBC AUTO: 90 FL
MCV RBC AUTO: 90 FL (ref 82–98)
MCV RBC AUTO: 90 FL (ref 82–98)
MCV RBC AUTO: 91 FL
MCV RBC AUTO: 92 FL
MCV RBC AUTO: 93 FL
MCV RBC AUTO: 94 FL
MCV RBC AUTO: 94 FL
MCV RBC AUTO: 95 FL
MCV RBC AUTO: 96 FL
MCV RBC AUTO: 97 FL
MCV RBC AUTO: 98 FL
MCV RBC AUTO: 99 FL
METHEMOGLOBIN: 0.4 % (ref 0–3)
METHEMOGLOBIN: 0.5 % (ref 0–3)
MICROSCOPIC COMMENT: ABNORMAL
MICROSCOPIC COMMENT: ABNORMAL
MICROSCOPIC COMMENT: NORMAL
MICROSCOPIC COMMENT: NORMAL
MIN VOL: 12.5
MODE: ABNORMAL
MONOCYTES # BLD AUTO: 0.8 K/UL (ref 0.3–1)
MONOCYTES # BLD AUTO: 0.9 K/UL
MONOCYTES # BLD AUTO: 0.9 K/UL (ref 0.3–1)
MONOCYTES # BLD AUTO: 0.9 K/UL (ref 0.3–1)
MONOCYTES # BLD AUTO: 1 K/UL
MONOCYTES # BLD AUTO: 1 K/UL (ref 0.3–1)
MONOCYTES # BLD AUTO: 1.1 K/UL
MONOCYTES # BLD AUTO: 1.1 K/UL (ref 0.3–1)
MONOCYTES # BLD AUTO: 1.2 K/UL
MONOCYTES # BLD AUTO: 1.2 K/UL (ref 0.3–1)
MONOCYTES # BLD AUTO: 1.3 K/UL
MONOCYTES # BLD AUTO: 1.3 K/UL (ref 0.3–1)
MONOCYTES # BLD AUTO: 1.4 K/UL
MONOCYTES # BLD AUTO: 1.4 K/UL
MONOCYTES # BLD AUTO: 1.4 K/UL (ref 0.3–1)
MONOCYTES # BLD AUTO: 1.4 K/UL (ref 0.3–1)
MONOCYTES # BLD AUTO: 1.6 K/UL
MONOCYTES # BLD AUTO: 1.6 K/UL (ref 0.3–1)
MONOCYTES # BLD AUTO: 1.7 K/UL
MONOCYTES # BLD AUTO: 1.8 K/UL
MONOCYTES # BLD AUTO: 2 K/UL
MONOCYTES # BLD AUTO: 2.1 K/UL
MONOCYTES # BLD AUTO: 2.2 K/UL
MONOCYTES NFR BLD: 10.2 % (ref 4–15)
MONOCYTES NFR BLD: 10.4 %
MONOCYTES NFR BLD: 10.7 %
MONOCYTES NFR BLD: 10.8 %
MONOCYTES NFR BLD: 10.8 % (ref 4–15)
MONOCYTES NFR BLD: 10.9 %
MONOCYTES NFR BLD: 10.9 %
MONOCYTES NFR BLD: 11.1 %
MONOCYTES NFR BLD: 11.4 %
MONOCYTES NFR BLD: 11.5 %
MONOCYTES NFR BLD: 11.7 %
MONOCYTES NFR BLD: 11.7 % (ref 4–15)
MONOCYTES NFR BLD: 11.9 % (ref 4–15)
MONOCYTES NFR BLD: 12.1 %
MONOCYTES NFR BLD: 12.1 %
MONOCYTES NFR BLD: 12.4 %
MONOCYTES NFR BLD: 12.4 % (ref 4–15)
MONOCYTES NFR BLD: 12.4 % (ref 4–15)
MONOCYTES NFR BLD: 12.5 %
MONOCYTES NFR BLD: 12.6 %
MONOCYTES NFR BLD: 12.8 %
MONOCYTES NFR BLD: 12.8 % (ref 4–15)
MONOCYTES NFR BLD: 12.9 %
MONOCYTES NFR BLD: 13.1 %
MONOCYTES NFR BLD: 13.2 %
MONOCYTES NFR BLD: 13.3 %
MONOCYTES NFR BLD: 14 %
MONOCYTES NFR BLD: 14.1 %
MONOCYTES NFR BLD: 14.6 % (ref 4–15)
MONOCYTES NFR BLD: 14.8 %
MONOCYTES NFR BLD: 16.4 % (ref 4–15)
MONOCYTES NFR BLD: 16.6 %
MONOCYTES NFR BLD: 16.9 % (ref 4–15)
MONOCYTES NFR BLD: 17.5 % (ref 4–15)
MONOCYTES NFR BLD: 7.8 %
MONOCYTES NFR BLD: 8.3 %
MONOCYTES NFR BLD: 8.9 % (ref 4–15)
MONOCYTES NFR BLD: 9 %
MONOCYTES NFR BLD: 9 %
MONOCYTES NFR BLD: 9.1 %
MONOCYTES NFR BLD: 9.6 %
MONOCYTES NFR BLD: 9.7 %
MONOCYTES NFR BLD: 9.8 %
MONOCYTES NFR BLD: 9.9 %
MV PEAK E VEL: 1.17 M/S
MV PEAK E VEL: 1.24 M/S
NEUTROPHILS # BLD AUTO: 10 K/UL
NEUTROPHILS # BLD AUTO: 10.2 K/UL
NEUTROPHILS # BLD AUTO: 10.2 K/UL
NEUTROPHILS # BLD AUTO: 10.3 K/UL
NEUTROPHILS # BLD AUTO: 10.4 K/UL
NEUTROPHILS # BLD AUTO: 10.5 K/UL
NEUTROPHILS # BLD AUTO: 10.6 K/UL
NEUTROPHILS # BLD AUTO: 10.8 K/UL
NEUTROPHILS # BLD AUTO: 11.4 K/UL (ref 1.8–7.7)
NEUTROPHILS # BLD AUTO: 11.8 K/UL
NEUTROPHILS # BLD AUTO: 12.5 K/UL
NEUTROPHILS # BLD AUTO: 13.6 K/UL
NEUTROPHILS # BLD AUTO: 14 K/UL
NEUTROPHILS # BLD AUTO: 14.5 K/UL
NEUTROPHILS # BLD AUTO: 15.2 K/UL (ref 1.8–7.7)
NEUTROPHILS # BLD AUTO: 17.6 K/UL
NEUTROPHILS # BLD AUTO: 19.8 K/UL
NEUTROPHILS # BLD AUTO: 3.9 K/UL (ref 1.8–7.7)
NEUTROPHILS # BLD AUTO: 4.1 K/UL
NEUTROPHILS # BLD AUTO: 4.1 K/UL (ref 1.8–7.7)
NEUTROPHILS # BLD AUTO: 4.3 K/UL (ref 1.8–7.7)
NEUTROPHILS # BLD AUTO: 4.5 K/UL
NEUTROPHILS # BLD AUTO: 4.5 K/UL (ref 1.8–7.7)
NEUTROPHILS # BLD AUTO: 5.1 K/UL
NEUTROPHILS # BLD AUTO: 5.2 K/UL
NEUTROPHILS # BLD AUTO: 5.5 K/UL
NEUTROPHILS # BLD AUTO: 5.5 K/UL (ref 1.8–7.7)
NEUTROPHILS # BLD AUTO: 6.1 K/UL (ref 1.8–7.7)
NEUTROPHILS # BLD AUTO: 6.2 K/UL
NEUTROPHILS # BLD AUTO: 6.4 K/UL (ref 1.8–7.7)
NEUTROPHILS # BLD AUTO: 6.5 K/UL
NEUTROPHILS # BLD AUTO: 6.9 K/UL
NEUTROPHILS # BLD AUTO: 6.9 K/UL
NEUTROPHILS # BLD AUTO: 7.1 K/UL
NEUTROPHILS # BLD AUTO: 7.2 K/UL (ref 1.8–7.7)
NEUTROPHILS # BLD AUTO: 7.5 K/UL
NEUTROPHILS # BLD AUTO: 7.6 K/UL
NEUTROPHILS # BLD AUTO: 7.6 K/UL
NEUTROPHILS # BLD AUTO: 7.7 K/UL
NEUTROPHILS # BLD AUTO: 7.8 K/UL
NEUTROPHILS # BLD AUTO: 8 K/UL
NEUTROPHILS # BLD AUTO: 8 K/UL
NEUTROPHILS # BLD AUTO: 8.2 K/UL
NEUTROPHILS # BLD AUTO: 8.4 K/UL
NEUTROPHILS # BLD AUTO: 9 K/UL
NEUTROPHILS # BLD AUTO: 9 K/UL (ref 1.8–7.7)
NEUTROPHILS # BLD AUTO: 9.1 K/UL
NEUTROPHILS # BLD AUTO: 9.1 K/UL (ref 1.8–7.7)
NEUTROPHILS NFR BLD: 62.6 %
NEUTROPHILS NFR BLD: 65.1 % (ref 38–73)
NEUTROPHILS NFR BLD: 65.6 %
NEUTROPHILS NFR BLD: 67.8 %
NEUTROPHILS NFR BLD: 68.3 %
NEUTROPHILS NFR BLD: 69.1 % (ref 38–73)
NEUTROPHILS NFR BLD: 69.8 %
NEUTROPHILS NFR BLD: 69.9 %
NEUTROPHILS NFR BLD: 70.5 %
NEUTROPHILS NFR BLD: 71.6 % (ref 38–73)
NEUTROPHILS NFR BLD: 71.8 % (ref 38–73)
NEUTROPHILS NFR BLD: 72.1 % (ref 38–73)
NEUTROPHILS NFR BLD: 72.3 %
NEUTROPHILS NFR BLD: 73 %
NEUTROPHILS NFR BLD: 73.4 %
NEUTROPHILS NFR BLD: 73.5 %
NEUTROPHILS NFR BLD: 73.8 % (ref 38–73)
NEUTROPHILS NFR BLD: 73.9 %
NEUTROPHILS NFR BLD: 74.2 %
NEUTROPHILS NFR BLD: 74.4 %
NEUTROPHILS NFR BLD: 74.7 %
NEUTROPHILS NFR BLD: 74.9 %
NEUTROPHILS NFR BLD: 75.1 %
NEUTROPHILS NFR BLD: 75.1 %
NEUTROPHILS NFR BLD: 75.4 %
NEUTROPHILS NFR BLD: 75.5 %
NEUTROPHILS NFR BLD: 75.9 %
NEUTROPHILS NFR BLD: 75.9 %
NEUTROPHILS NFR BLD: 76.1 %
NEUTROPHILS NFR BLD: 76.2 %
NEUTROPHILS NFR BLD: 76.4 %
NEUTROPHILS NFR BLD: 76.4 % (ref 38–73)
NEUTROPHILS NFR BLD: 76.5 %
NEUTROPHILS NFR BLD: 76.7 %
NEUTROPHILS NFR BLD: 78.6 %
NEUTROPHILS NFR BLD: 78.8 %
NEUTROPHILS NFR BLD: 79.4 % (ref 38–73)
NEUTROPHILS NFR BLD: 80.3 %
NEUTROPHILS NFR BLD: 80.5 %
NEUTROPHILS NFR BLD: 80.5 % (ref 38–73)
NEUTROPHILS NFR BLD: 80.6 %
NEUTROPHILS NFR BLD: 80.6 % (ref 38–73)
NEUTROPHILS NFR BLD: 81.1 %
NEUTROPHILS NFR BLD: 82.6 %
NEUTROPHILS NFR BLD: 83.1 % (ref 38–73)
NEUTROPHILS NFR BLD: 83.8 %
NEUTROPHILS NFR BLD: 85.8 %
NEUTROPHILS NFR BLD: 86.4 % (ref 38–73)
NITRITE UR QL STRIP: NEGATIVE
NRBC BLD-RTO: 0 /100 WBC
O+P STL TRI STN: NORMAL
OVALOCYTES BLD QL SMEAR: ABNORMAL
PCO2 BLDA: 33.5 MMHG (ref 35–45)
PCO2 BLDA: 38.7 MMHG (ref 35–45)
PCO2 BLDA: 41 MMHG (ref 35–45)
PCO2 BLDA: 41.3 MMHG (ref 35–45)
PCO2 BLDA: 42.1 MMHG (ref 35–45)
PCO2 BLDA: 42.6 MMHG (ref 35–45)
PCO2 BLDA: 42.6 MMHG (ref 35–45)
PCO2 BLDA: 44.4 MMHG (ref 35–45)
PCO2 BLDA: 45.2 MMHG (ref 35–45)
PCO2 BLDA: 46.3 MMHG (ref 35–45)
PCO2 BLDA: 47 MMHG (ref 35–45)
PCO2 BLDA: 47.3 MMHG (ref 35–45)
PCO2 BLDA: 47.3 MMHG (ref 35–45)
PCO2 BLDA: 48.2 MMHG (ref 35–45)
PCO2 BLDA: 48.2 MMHG (ref 35–45)
PCO2 BLDA: 48.3 MMHG (ref 35–45)
PCO2 BLDA: 49 MMHG (ref 35–45)
PCO2 BLDA: 51.3 MMHG (ref 35–45)
PCO2 BLDA: 51.5 MMHG (ref 35–45)
PCO2 BLDA: 52.1 MMHG (ref 35–45)
PCO2 BLDA: 52.9 MMHG (ref 35–45)
PCO2 BLDA: 52.9 MMHG (ref 35–45)
PCO2 BLDA: 53 MMHG (ref 35–45)
PCO2 BLDA: 53.8 MMHG (ref 35–45)
PCO2 BLDA: 53.8 MMHG (ref 35–45)
PCO2 BLDA: 54.7 MMHG (ref 35–45)
PCO2 BLDA: 55 MMHG (ref 35–45)
PCO2 BLDA: 55.1 MMHG (ref 35–45)
PCO2 BLDA: 55.4 MMHG (ref 35–45)
PCO2 BLDA: 55.5 MMHG (ref 35–45)
PCO2 BLDA: 55.7 MMHG (ref 35–45)
PCO2 BLDA: 56.5 MMHG (ref 35–45)
PCO2 BLDA: 56.6 MMHG (ref 35–45)
PCO2 BLDA: 56.6 MMHG (ref 35–45)
PCO2 BLDA: 59.2 MMHG (ref 35–45)
PCO2 BLDA: 59.4 MMHG (ref 35–45)
PCO2 BLDA: 59.5 MMHG (ref 35–45)
PCO2 BLDA: 59.5 MMHG (ref 35–45)
PH SMN: 7.33 [PH] (ref 7.35–7.45)
PH SMN: 7.36 [PH] (ref 7.35–7.45)
PH SMN: 7.36 [PH] (ref 7.35–7.45)
PH SMN: 7.37 [PH] (ref 7.35–7.45)
PH SMN: 7.37 [PH] (ref 7.35–7.45)
PH SMN: 7.38 [PH] (ref 7.35–7.45)
PH SMN: 7.39 [PH] (ref 7.35–7.45)
PH SMN: 7.4 [PH] (ref 7.35–7.45)
PH SMN: 7.4 [PH] (ref 7.35–7.45)
PH SMN: 7.41 [PH] (ref 7.35–7.45)
PH SMN: 7.42 [PH] (ref 7.35–7.45)
PH SMN: 7.43 [PH] (ref 7.35–7.45)
PH SMN: 7.44 [PH] (ref 7.35–7.45)
PH SMN: 7.45 [PH] (ref 7.35–7.45)
PH SMN: 7.46 [PH] (ref 7.35–7.45)
PH SMN: 7.47 [PH] (ref 7.35–7.45)
PH SMN: 7.48 [PH] (ref 7.35–7.45)
PH SMN: 7.48 [PH] (ref 7.35–7.45)
PH SMN: 7.49 [PH] (ref 7.35–7.45)
PH SMN: 7.5 [PH] (ref 7.35–7.45)
PH SMN: 7.51 [PH] (ref 7.35–7.45)
PH SMN: 7.53 [PH] (ref 7.35–7.45)
PH UR STRIP: 5 [PH] (ref 5–8)
PH UR STRIP: 7 [PH] (ref 5–8)
PHOSPHATE SERPL-MCNC: 2.4 MG/DL
PHOSPHATE SERPL-MCNC: 2.5 MG/DL
PHOSPHATE SERPL-MCNC: 2.6 MG/DL
PHOSPHATE SERPL-MCNC: 2.7 MG/DL
PHOSPHATE SERPL-MCNC: 2.9 MG/DL
PHOSPHATE SERPL-MCNC: 3 MG/DL
PHOSPHATE SERPL-MCNC: 3.2 MG/DL
PHOSPHATE SERPL-MCNC: 3.2 MG/DL
PHOSPHATE SERPL-MCNC: 3.4 MG/DL
PHOSPHATE SERPL-MCNC: 3.5 MG/DL
PHOSPHATE SERPL-MCNC: 3.8 MG/DL
PHOSPHATE SERPL-MCNC: 3.8 MG/DL
PHOSPHATE SERPL-MCNC: 3.9 MG/DL
PHOSPHATE SERPL-MCNC: 4.1 MG/DL
PHOSPHATE SERPL-MCNC: 4.1 MG/DL
PHOSPHATE SERPL-MCNC: 4.2 MG/DL
PHOSPHATE SERPL-MCNC: 4.4 MG/DL
PHOSPHATE SERPL-MCNC: 4.5 MG/DL
PHOSPHATE SERPL-MCNC: 4.5 MG/DL
PHOSPHATE SERPL-MCNC: 4.9 MG/DL
PHOSPHATE SERPL-MCNC: 5 MG/DL
PHOSPHATE SERPL-MCNC: 5.7 MG/DL
PISA TR MAX VEL: 3.51 M/S
PISA TR MAX VEL: 3.52 M/S
PISA TR MAX VEL: 4.01 M/S
PLATELET # BLD AUTO: 101 K/UL
PLATELET # BLD AUTO: 101 K/UL
PLATELET # BLD AUTO: 104 K/UL
PLATELET # BLD AUTO: 104 K/UL
PLATELET # BLD AUTO: 105 K/UL
PLATELET # BLD AUTO: 105 K/UL (ref 150–350)
PLATELET # BLD AUTO: 108 K/UL
PLATELET # BLD AUTO: 109 K/UL
PLATELET # BLD AUTO: 109 K/UL
PLATELET # BLD AUTO: 110 K/UL
PLATELET # BLD AUTO: 110 K/UL
PLATELET # BLD AUTO: 117 K/UL
PLATELET # BLD AUTO: 120 K/UL
PLATELET # BLD AUTO: 125 K/UL
PLATELET # BLD AUTO: 126 K/UL
PLATELET # BLD AUTO: 128 K/UL
PLATELET # BLD AUTO: 141 K/UL
PLATELET # BLD AUTO: 145 K/UL
PLATELET # BLD AUTO: 155 K/UL
PLATELET # BLD AUTO: 157 K/UL
PLATELET # BLD AUTO: 166 K/UL
PLATELET # BLD AUTO: 168 K/UL
PLATELET # BLD AUTO: 170 K/UL
PLATELET # BLD AUTO: 185 K/UL
PLATELET # BLD AUTO: 196 K/UL
PLATELET # BLD AUTO: 211 K/UL
PLATELET # BLD AUTO: 230 K/UL
PLATELET # BLD AUTO: 231 K/UL
PLATELET # BLD AUTO: 250 K/UL
PLATELET # BLD AUTO: 256 K/UL
PLATELET # BLD AUTO: 258 K/UL
PLATELET # BLD AUTO: 273 K/UL
PLATELET # BLD AUTO: 280 K/UL
PLATELET # BLD AUTO: 280 K/UL
PLATELET # BLD AUTO: 78 K/UL (ref 150–350)
PLATELET # BLD AUTO: 79 K/UL (ref 150–350)
PLATELET # BLD AUTO: 79 K/UL (ref 150–350)
PLATELET # BLD AUTO: 82 K/UL (ref 150–350)
PLATELET # BLD AUTO: 84 K/UL (ref 150–350)
PLATELET # BLD AUTO: 90 K/UL (ref 150–350)
PLATELET # BLD AUTO: 91 K/UL (ref 150–350)
PLATELET # BLD AUTO: 92 K/UL (ref 150–350)
PLATELET # BLD AUTO: 93 K/UL
PLATELET # BLD AUTO: 93 K/UL (ref 150–350)
PLATELET # BLD AUTO: 93 K/UL (ref 150–350)
PLATELET # BLD AUTO: 98 K/UL (ref 150–350)
PLATELET BLD QL SMEAR: ABNORMAL
PLATELET BLD QL SMEAR: ABNORMAL
PMV BLD AUTO: 11.4 FL
PMV BLD AUTO: 11.4 FL
PMV BLD AUTO: 11.7 FL
PMV BLD AUTO: 11.8 FL
PMV BLD AUTO: 11.9 FL
PMV BLD AUTO: 11.9 FL
PMV BLD AUTO: 12 FL
PMV BLD AUTO: 12 FL
PMV BLD AUTO: 12.1 FL
PMV BLD AUTO: 12.2 FL
PMV BLD AUTO: 12.2 FL
PMV BLD AUTO: 12.3 FL
PMV BLD AUTO: 12.3 FL
PMV BLD AUTO: 12.4 FL
PMV BLD AUTO: 12.5 FL
PMV BLD AUTO: 12.5 FL
PMV BLD AUTO: 12.6 FL
PMV BLD AUTO: 12.8 FL
PMV BLD AUTO: 12.9 FL
PMV BLD AUTO: 12.9 FL
PMV BLD AUTO: 13 FL
PMV BLD AUTO: 13.2 FL
PMV BLD AUTO: 13.3 FL
PMV BLD AUTO: 13.3 FL
PMV BLD AUTO: 13.6 FL
PMV BLD AUTO: 13.6 FL
PMV BLD AUTO: 13.7 FL
PMV BLD AUTO: 13.8 FL
PMV BLD AUTO: ABNORMAL FL (ref 9.2–12.9)
PO2 BLDA: 103 MMHG (ref 80–100)
PO2 BLDA: 108 MMHG (ref 80–100)
PO2 BLDA: 128 MMHG (ref 80–100)
PO2 BLDA: 22 MMHG (ref 40–60)
PO2 BLDA: 23 MMHG (ref 40–60)
PO2 BLDA: 24 MMHG (ref 40–60)
PO2 BLDA: 25 MMHG (ref 40–60)
PO2 BLDA: 26 MMHG (ref 40–60)
PO2 BLDA: 26 MMHG (ref 40–60)
PO2 BLDA: 27 MMHG (ref 40–60)
PO2 BLDA: 27 MMHG (ref 40–60)
PO2 BLDA: 28 MMHG (ref 40–60)
PO2 BLDA: 28 MMHG (ref 40–60)
PO2 BLDA: 29 MMHG (ref 40–60)
PO2 BLDA: 30 MMHG (ref 40–60)
PO2 BLDA: 31 MMHG (ref 40–60)
PO2 BLDA: 32 MMHG (ref 40–60)
PO2 BLDA: 32 MMHG (ref 40–60)
PO2 BLDA: 33 MMHG (ref 40–60)
PO2 BLDA: 35 MMHG (ref 40–60)
PO2 BLDA: 37 MMHG (ref 40–60)
PO2 BLDA: 40 MMHG (ref 40–60)
PO2 BLDA: 50 MMHG (ref 80–100)
PO2 BLDA: 84 MMHG (ref 80–100)
PO2 BLDA: 88 MMHG (ref 80–100)
POC BE: -2 MMOL/L
POC BE: -3 MMOL/L
POC BE: 1 MMOL/L
POC BE: 10 MMOL/L
POC BE: 11 MMOL/L
POC BE: 12 MMOL/L
POC BE: 13 MMOL/L
POC BE: 14 MMOL/L
POC BE: 15 MMOL/L
POC BE: 17 MMOL/L
POC BE: 18 MMOL/L
POC BE: 19 MMOL/L
POC BE: 2 MMOL/L
POC BE: 20 MMOL/L
POC BE: 22 MMOL/L
POC BE: 3 MMOL/L
POC BE: 3 MMOL/L
POC BE: 4 MMOL/L
POC BE: 6 MMOL/L
POC BE: 7 MMOL/L
POC BE: 9 MMOL/L
POC IONIZED CALCIUM: 0.98 MMOL/L (ref 1.06–1.42)
POC IONIZED CALCIUM: 1.16 MMOL/L (ref 1.06–1.42)
POC PCO2 TEMP: 42.6 MMHG
POC PCO2 TEMP: 55.5 MMHG
POC PH TEMP: 7.42
POC PH TEMP: 7.45
POC PO2 TEMP: 33 MMHG
POC PO2 TEMP: 50 MMHG
POC SATURATED O2: 40 % (ref 95–100)
POC SATURATED O2: 40 % (ref 95–100)
POC SATURATED O2: 44 % (ref 95–100)
POC SATURATED O2: 44 % (ref 95–100)
POC SATURATED O2: 46 % (ref 95–100)
POC SATURATED O2: 46 % (ref 95–100)
POC SATURATED O2: 48 % (ref 95–100)
POC SATURATED O2: 49 % (ref 95–100)
POC SATURATED O2: 52 % (ref 95–100)
POC SATURATED O2: 54 % (ref 95–100)
POC SATURATED O2: 55 % (ref 95–100)
POC SATURATED O2: 56 % (ref 95–100)
POC SATURATED O2: 58 % (ref 95–100)
POC SATURATED O2: 58 % (ref 95–100)
POC SATURATED O2: 59 % (ref 95–100)
POC SATURATED O2: 60 % (ref 95–100)
POC SATURATED O2: 61 % (ref 95–100)
POC SATURATED O2: 61 % (ref 95–100)
POC SATURATED O2: 62 % (ref 95–100)
POC SATURATED O2: 63 % (ref 95–100)
POC SATURATED O2: 67 % (ref 95–100)
POC SATURATED O2: 67 % (ref 95–100)
POC SATURATED O2: 68 % (ref 95–100)
POC SATURATED O2: 68 % (ref 95–100)
POC SATURATED O2: 70 % (ref 95–100)
POC SATURATED O2: 71 % (ref 95–100)
POC SATURATED O2: 75 % (ref 95–100)
POC SATURATED O2: 75 % (ref 95–100)
POC SATURATED O2: 85 % (ref 95–100)
POC SATURATED O2: 96 % (ref 95–100)
POC SATURATED O2: 97 % (ref 95–100)
POC SATURATED O2: 98 % (ref 95–100)
POC SATURATED O2: 98 % (ref 95–100)
POC SATURATED O2: 99 % (ref 95–100)
POC TCO2: 23 MMOL/L (ref 23–27)
POC TCO2: 25 MMOL/L (ref 24–29)
POC TCO2: 26 MMOL/L (ref 24–29)
POC TCO2: 27 MMOL/L (ref 23–27)
POC TCO2: 27 MMOL/L (ref 24–29)
POC TCO2: 28 MMOL/L (ref 24–29)
POC TCO2: 29 MMOL/L (ref 24–29)
POC TCO2: 30 MMOL/L (ref 24–29)
POC TCO2: 30 MMOL/L (ref 24–29)
POC TCO2: 31 MMOL/L (ref 24–29)
POC TCO2: 33 MMOL/L (ref 24–29)
POC TCO2: 35 MMOL/L (ref 24–29)
POC TCO2: 35 MMOL/L (ref 24–29)
POC TCO2: 36 MMOL/L (ref 24–29)
POC TCO2: 37 MMOL/L (ref 23–27)
POC TCO2: 37 MMOL/L (ref 24–29)
POC TCO2: 37 MMOL/L (ref 24–29)
POC TCO2: 38 MMOL/L (ref 23–27)
POC TCO2: 38 MMOL/L (ref 24–29)
POC TCO2: 39 MMOL/L (ref 23–27)
POC TCO2: 39 MMOL/L (ref 24–29)
POC TCO2: 39 MMOL/L (ref 24–29)
POC TCO2: 40 MMOL/L (ref 23–27)
POC TCO2: 40 MMOL/L (ref 24–29)
POC TCO2: 41 MMOL/L (ref 24–29)
POC TCO2: 42 MMOL/L (ref 24–29)
POC TCO2: 43 MMOL/L (ref 24–29)
POC TCO2: 44 MMOL/L (ref 24–29)
POC TCO2: 45 MMOL/L (ref 24–29)
POC TCO2: 46 MMOL/L (ref 24–29)
POC TEMPERATURE: ABNORMAL
POC TEMPERATURE: ABNORMAL
POCT GLUCOSE: 100 MG/DL (ref 70–110)
POCT GLUCOSE: 103 MG/DL (ref 70–110)
POCT GLUCOSE: 104 MG/DL (ref 70–110)
POCT GLUCOSE: 105 MG/DL (ref 70–110)
POCT GLUCOSE: 107 MG/DL (ref 70–110)
POCT GLUCOSE: 107 MG/DL (ref 70–110)
POCT GLUCOSE: 108 MG/DL (ref 70–110)
POCT GLUCOSE: 114 MG/DL (ref 70–110)
POCT GLUCOSE: 114 MG/DL (ref 70–110)
POCT GLUCOSE: 115 MG/DL (ref 70–110)
POCT GLUCOSE: 120 MG/DL (ref 70–110)
POCT GLUCOSE: 121 MG/DL (ref 70–110)
POCT GLUCOSE: 121 MG/DL (ref 70–110)
POCT GLUCOSE: 122 MG/DL (ref 70–110)
POCT GLUCOSE: 125 MG/DL (ref 70–110)
POCT GLUCOSE: 126 MG/DL (ref 70–110)
POCT GLUCOSE: 128 MG/DL (ref 70–110)
POCT GLUCOSE: 129 MG/DL (ref 70–110)
POCT GLUCOSE: 129 MG/DL (ref 70–110)
POCT GLUCOSE: 130 MG/DL (ref 70–110)
POCT GLUCOSE: 131 MG/DL (ref 70–110)
POCT GLUCOSE: 133 MG/DL (ref 70–110)
POCT GLUCOSE: 135 MG/DL (ref 70–110)
POCT GLUCOSE: 136 MG/DL (ref 70–110)
POCT GLUCOSE: 138 MG/DL (ref 70–110)
POCT GLUCOSE: 139 MG/DL (ref 70–110)
POCT GLUCOSE: 143 MG/DL (ref 70–110)
POCT GLUCOSE: 144 MG/DL (ref 70–110)
POCT GLUCOSE: 144 MG/DL (ref 70–110)
POCT GLUCOSE: 146 MG/DL (ref 70–110)
POCT GLUCOSE: 146 MG/DL (ref 70–110)
POCT GLUCOSE: 147 MG/DL (ref 70–110)
POCT GLUCOSE: 149 MG/DL (ref 70–110)
POCT GLUCOSE: 150 MG/DL (ref 70–110)
POCT GLUCOSE: 152 MG/DL (ref 70–110)
POCT GLUCOSE: 154 MG/DL (ref 70–110)
POCT GLUCOSE: 155 MG/DL (ref 70–110)
POCT GLUCOSE: 158 MG/DL (ref 70–110)
POCT GLUCOSE: 158 MG/DL (ref 70–110)
POCT GLUCOSE: 161 MG/DL (ref 70–110)
POCT GLUCOSE: 161 MG/DL (ref 70–110)
POCT GLUCOSE: 162 MG/DL (ref 70–110)
POCT GLUCOSE: 165 MG/DL (ref 70–110)
POCT GLUCOSE: 165 MG/DL (ref 70–110)
POCT GLUCOSE: 167 MG/DL (ref 70–110)
POCT GLUCOSE: 168 MG/DL (ref 70–110)
POCT GLUCOSE: 169 MG/DL (ref 70–110)
POCT GLUCOSE: 170 MG/DL (ref 70–110)
POCT GLUCOSE: 171 MG/DL (ref 70–110)
POCT GLUCOSE: 171 MG/DL (ref 70–110)
POCT GLUCOSE: 174 MG/DL (ref 70–110)
POCT GLUCOSE: 178 MG/DL (ref 70–110)
POCT GLUCOSE: 179 MG/DL (ref 70–110)
POCT GLUCOSE: 180 MG/DL (ref 70–110)
POCT GLUCOSE: 182 MG/DL (ref 70–110)
POCT GLUCOSE: 183 MG/DL (ref 70–110)
POCT GLUCOSE: 184 MG/DL (ref 70–110)
POCT GLUCOSE: 185 MG/DL (ref 70–110)
POCT GLUCOSE: 185 MG/DL (ref 70–110)
POCT GLUCOSE: 186 MG/DL (ref 70–110)
POCT GLUCOSE: 187 MG/DL (ref 70–110)
POCT GLUCOSE: 188 MG/DL (ref 70–110)
POCT GLUCOSE: 193 MG/DL (ref 70–110)
POCT GLUCOSE: 193 MG/DL (ref 70–110)
POCT GLUCOSE: 197 MG/DL (ref 70–110)
POCT GLUCOSE: 197 MG/DL (ref 70–110)
POCT GLUCOSE: 198 MG/DL (ref 70–110)
POCT GLUCOSE: 199 MG/DL (ref 70–110)
POCT GLUCOSE: 200 MG/DL (ref 70–110)
POCT GLUCOSE: 202 MG/DL (ref 70–110)
POCT GLUCOSE: 202 MG/DL (ref 70–110)
POCT GLUCOSE: 204 MG/DL (ref 70–110)
POCT GLUCOSE: 207 MG/DL (ref 70–110)
POCT GLUCOSE: 208 MG/DL (ref 70–110)
POCT GLUCOSE: 209 MG/DL (ref 70–110)
POCT GLUCOSE: 210 MG/DL (ref 70–110)
POCT GLUCOSE: 211 MG/DL (ref 70–110)
POCT GLUCOSE: 211 MG/DL (ref 70–110)
POCT GLUCOSE: 212 MG/DL (ref 70–110)
POCT GLUCOSE: 213 MG/DL (ref 70–110)
POCT GLUCOSE: 215 MG/DL (ref 70–110)
POCT GLUCOSE: 217 MG/DL (ref 70–110)
POCT GLUCOSE: 218 MG/DL (ref 70–110)
POCT GLUCOSE: 223 MG/DL (ref 70–110)
POCT GLUCOSE: 225 MG/DL (ref 70–110)
POCT GLUCOSE: 227 MG/DL (ref 70–110)
POCT GLUCOSE: 230 MG/DL (ref 70–110)
POCT GLUCOSE: 232 MG/DL (ref 70–110)
POCT GLUCOSE: 234 MG/DL (ref 70–110)
POCT GLUCOSE: 235 MG/DL (ref 70–110)
POCT GLUCOSE: 236 MG/DL (ref 70–110)
POCT GLUCOSE: 240 MG/DL (ref 70–110)
POCT GLUCOSE: 241 MG/DL (ref 70–110)
POCT GLUCOSE: 244 MG/DL (ref 70–110)
POCT GLUCOSE: 245 MG/DL (ref 70–110)
POCT GLUCOSE: 248 MG/DL (ref 70–110)
POCT GLUCOSE: 252 MG/DL (ref 70–110)
POCT GLUCOSE: 254 MG/DL (ref 70–110)
POCT GLUCOSE: 255 MG/DL (ref 70–110)
POCT GLUCOSE: 255 MG/DL (ref 70–110)
POCT GLUCOSE: 257 MG/DL (ref 70–110)
POCT GLUCOSE: 260 MG/DL (ref 70–110)
POCT GLUCOSE: 260 MG/DL (ref 70–110)
POCT GLUCOSE: 262 MG/DL (ref 70–110)
POCT GLUCOSE: 266 MG/DL (ref 70–110)
POCT GLUCOSE: 267 MG/DL (ref 70–110)
POCT GLUCOSE: 268 MG/DL (ref 70–110)
POCT GLUCOSE: 268 MG/DL (ref 70–110)
POCT GLUCOSE: 273 MG/DL (ref 70–110)
POCT GLUCOSE: 276 MG/DL (ref 70–110)
POCT GLUCOSE: 280 MG/DL (ref 70–110)
POCT GLUCOSE: 286 MG/DL (ref 70–110)
POCT GLUCOSE: 287 MG/DL (ref 70–110)
POCT GLUCOSE: 292 MG/DL (ref 70–110)
POCT GLUCOSE: 294 MG/DL (ref 70–110)
POCT GLUCOSE: 297 MG/DL (ref 70–110)
POCT GLUCOSE: 297 MG/DL (ref 70–110)
POCT GLUCOSE: 318 MG/DL (ref 70–110)
POCT GLUCOSE: 320 MG/DL (ref 70–110)
POCT GLUCOSE: 325 MG/DL (ref 70–110)
POCT GLUCOSE: 327 MG/DL (ref 70–110)
POCT GLUCOSE: 327 MG/DL (ref 70–110)
POCT GLUCOSE: 336 MG/DL (ref 70–110)
POCT GLUCOSE: 340 MG/DL (ref 70–110)
POCT GLUCOSE: 347 MG/DL (ref 70–110)
POCT GLUCOSE: 353 MG/DL (ref 70–110)
POCT GLUCOSE: 363 MG/DL (ref 70–110)
POCT GLUCOSE: 365 MG/DL (ref 70–110)
POCT GLUCOSE: 366 MG/DL (ref 70–110)
POCT GLUCOSE: 52 MG/DL (ref 70–110)
POCT GLUCOSE: 61 MG/DL (ref 70–110)
POCT GLUCOSE: 66 MG/DL (ref 70–110)
POCT GLUCOSE: 69 MG/DL (ref 70–110)
POCT GLUCOSE: 73 MG/DL (ref 70–110)
POCT GLUCOSE: 77 MG/DL (ref 70–110)
POCT GLUCOSE: 78 MG/DL (ref 70–110)
POCT GLUCOSE: 84 MG/DL (ref 70–110)
POCT GLUCOSE: 84 MG/DL (ref 70–110)
POCT GLUCOSE: 85 MG/DL (ref 70–110)
POCT GLUCOSE: 86 MG/DL (ref 70–110)
POCT GLUCOSE: 90 MG/DL (ref 70–110)
POCT GLUCOSE: 90 MG/DL (ref 70–110)
POCT GLUCOSE: 95 MG/DL (ref 70–110)
POCT GLUCOSE: 97 MG/DL (ref 70–110)
POIKILOCYTOSIS BLD QL SMEAR: SLIGHT
POLYCHROMASIA BLD QL SMEAR: ABNORMAL
POLYCHROMASIA BLD QL SMEAR: ABNORMAL
POTASSIUM BLD-SCNC: 2.7 MMOL/L (ref 3.5–5.1)
POTASSIUM BLD-SCNC: 4.4 MMOL/L (ref 3.5–5.1)
POTASSIUM SERPL-SCNC: 3.2 MMOL/L
POTASSIUM SERPL-SCNC: 3.2 MMOL/L (ref 3.5–5.1)
POTASSIUM SERPL-SCNC: 3.3 MMOL/L
POTASSIUM SERPL-SCNC: 3.3 MMOL/L (ref 3.5–5.1)
POTASSIUM SERPL-SCNC: 3.4 MMOL/L
POTASSIUM SERPL-SCNC: 3.4 MMOL/L
POTASSIUM SERPL-SCNC: 3.4 MMOL/L (ref 3.5–5.1)
POTASSIUM SERPL-SCNC: 3.5 MMOL/L
POTASSIUM SERPL-SCNC: 3.5 MMOL/L (ref 3.5–5.1)
POTASSIUM SERPL-SCNC: 3.6 MMOL/L
POTASSIUM SERPL-SCNC: 3.6 MMOL/L (ref 3.5–5.1)
POTASSIUM SERPL-SCNC: 3.6 MMOL/L (ref 3.5–5.1)
POTASSIUM SERPL-SCNC: 3.7 MMOL/L
POTASSIUM SERPL-SCNC: 3.7 MMOL/L (ref 3.5–5.1)
POTASSIUM SERPL-SCNC: 3.8 MMOL/L
POTASSIUM SERPL-SCNC: 3.8 MMOL/L (ref 3.5–5.1)
POTASSIUM SERPL-SCNC: 3.8 MMOL/L (ref 3.5–5.1)
POTASSIUM SERPL-SCNC: 3.9 MMOL/L
POTASSIUM SERPL-SCNC: 3.9 MMOL/L (ref 3.5–5.1)
POTASSIUM SERPL-SCNC: 4 MMOL/L
POTASSIUM SERPL-SCNC: 4 MMOL/L (ref 3.5–5.1)
POTASSIUM SERPL-SCNC: 4 MMOL/L (ref 3.5–5.1)
POTASSIUM SERPL-SCNC: 4.1 MMOL/L
POTASSIUM SERPL-SCNC: 4.1 MMOL/L (ref 3.5–5.1)
POTASSIUM SERPL-SCNC: 4.2 MMOL/L
POTASSIUM SERPL-SCNC: 4.2 MMOL/L
POTASSIUM SERPL-SCNC: 4.3 MMOL/L
POTASSIUM SERPL-SCNC: 4.3 MMOL/L (ref 3.5–5.1)
POTASSIUM SERPL-SCNC: 4.4 MMOL/L
POTASSIUM SERPL-SCNC: 4.5 MMOL/L (ref 3.5–5.1)
POTASSIUM SERPL-SCNC: 4.6 MMOL/L
POTASSIUM SERPL-SCNC: 4.7 MMOL/L
POTASSIUM SERPL-SCNC: 4.7 MMOL/L (ref 3.5–5.1)
POTASSIUM SERPL-SCNC: 4.9 MMOL/L
POTASSIUM SERPL-SCNC: 5.7 MMOL/L (ref 3.5–5.1)
POTASSIUM SERPL-SCNC: 5.9 MMOL/L
PROCALCITONIN SERPL IA-MCNC: 0.16 NG/ML
PROT SERPL-MCNC: 5 G/DL
PROT SERPL-MCNC: 5.1 G/DL
PROT SERPL-MCNC: 5.2 G/DL
PROT SERPL-MCNC: 5.5 G/DL
PROT SERPL-MCNC: 5.6 G/DL
PROT SERPL-MCNC: 5.9 G/DL (ref 6–8.4)
PROT SERPL-MCNC: 6 G/DL
PROT SERPL-MCNC: 6 G/DL (ref 6–8.4)
PROT SERPL-MCNC: 6.1 G/DL
PROT SERPL-MCNC: 6.1 G/DL (ref 6–8.4)
PROT SERPL-MCNC: 6.2 G/DL
PROT SERPL-MCNC: 6.2 G/DL
PROT SERPL-MCNC: 6.2 G/DL (ref 6–8.4)
PROT SERPL-MCNC: 6.3 G/DL
PROT SERPL-MCNC: 6.3 G/DL
PROT SERPL-MCNC: 6.3 G/DL (ref 6–8.4)
PROT SERPL-MCNC: 6.3 G/DL (ref 6–8.4)
PROT SERPL-MCNC: 6.4 G/DL
PROT SERPL-MCNC: 6.4 G/DL
PROT SERPL-MCNC: 6.4 G/DL (ref 6–8.4)
PROT SERPL-MCNC: 6.4 G/DL (ref 6–8.4)
PROT SERPL-MCNC: 6.5 G/DL
PROT SERPL-MCNC: 6.5 G/DL (ref 6–8.4)
PROT SERPL-MCNC: 6.5 G/DL (ref 6–8.4)
PROT SERPL-MCNC: 6.6 G/DL
PROT SERPL-MCNC: 6.6 G/DL
PROT SERPL-MCNC: 6.7 G/DL
PROT SERPL-MCNC: 6.8 G/DL
PROT SERPL-MCNC: 6.9 G/DL
PROT SERPL-MCNC: 7 G/DL
PROT SERPL-MCNC: 7 G/DL
PROT SERPL-MCNC: 7.2 G/DL (ref 6–8.4)
PROT UR QL STRIP: ABNORMAL
PROT UR QL STRIP: ABNORMAL
PROT UR QL STRIP: NEGATIVE
PROT UR QL STRIP: NEGATIVE
PROT UR-MCNC: 67 MG/DL
PROT/CREAT UR: 0.8 MG/G{CREAT}
PROTHROMBIN TIME: 12 SEC
PROTHROMBIN TIME: 12.1 SEC
PROTHROMBIN TIME: 12.2 SEC
PROTHROMBIN TIME: 12.4 SEC
PROTHROMBIN TIME: 13.5 SEC
PROTHROMBIN TIME: 14.8 SEC
PROTHROMBIN TIME: 15 SEC
PROTHROMBIN TIME: 17.1 SEC
PROTHROMBIN TIME: 18.2 SEC
PROTHROMBIN TIME: 20.4 SEC
PROTHROMBIN TIME: 21.2 SEC
PROTHROMBIN TIME: 21.9 SEC
PROTHROMBIN TIME: 23 SEC
PROTHROMBIN TIME: 23.6 SEC
PROTHROMBIN TIME: 25.2 SEC
PROTHROMBIN TIME: 25.5 SEC
PROTHROMBIN TIME: 27.2 SEC
PROTHROMBIN TIME: 27.4 SEC
PROTHROMBIN TIME: 28.1 SEC
PROTHROMBIN TIME: 28.2 SEC
PROTHROMBIN TIME: 34.4 SEC
PROTHROMBIN TIME: 35.6 SEC
PROTHROMBIN TIME: 36.9 SEC
PROTHROMBIN TIME: 40.4 SEC
PROTHROMBIN TIME: 40.8 SEC
PROTHROMBIN TIME: 42.1 SEC
PROTHROMBIN TIME: 47.2 SEC
PULM VEIN S/D RATIO: 0.3
PULM VEIN S/D RATIO: 0.42
PV PEAK D VEL: 0.45 M/S
PV PEAK D VEL: 0.93 M/S
PV PEAK S VEL: 0.19 M/S
PV PEAK S VEL: 0.28 M/S
RA MAJOR: 5.76 CM
RA MAJOR: 5.79 CM
RA MAJOR: 5.79 CM
RA PRESSURE: 15 MMHG
RA PRESSURE: 8 MMHG
RA WIDTH: 3.97 CM
RA WIDTH: 4.98 CM
RA WIDTH: 5.3 CM
RBC # BLD AUTO: 2.2 M/UL
RBC # BLD AUTO: 2.34 M/UL
RBC # BLD AUTO: 2.34 M/UL
RBC # BLD AUTO: 2.38 M/UL
RBC # BLD AUTO: 2.46 M/UL
RBC # BLD AUTO: 2.6 M/UL
RBC # BLD AUTO: 2.62 M/UL
RBC # BLD AUTO: 2.62 M/UL
RBC # BLD AUTO: 2.63 M/UL
RBC # BLD AUTO: 2.63 M/UL
RBC # BLD AUTO: 2.66 M/UL
RBC # BLD AUTO: 2.66 M/UL
RBC # BLD AUTO: 2.68 M/UL
RBC # BLD AUTO: 2.69 M/UL
RBC # BLD AUTO: 2.7 M/UL
RBC # BLD AUTO: 2.71 M/UL
RBC # BLD AUTO: 2.72 M/UL
RBC # BLD AUTO: 2.74 M/UL
RBC # BLD AUTO: 2.74 M/UL
RBC # BLD AUTO: 2.75 M/UL
RBC # BLD AUTO: 2.76 M/UL
RBC # BLD AUTO: 2.84 M/UL
RBC # BLD AUTO: 2.84 M/UL
RBC # BLD AUTO: 2.88 M/UL
RBC # BLD AUTO: 2.92 M/UL
RBC # BLD AUTO: 2.98 M/UL
RBC # BLD AUTO: 3.05 M/UL
RBC # BLD AUTO: 3.05 M/UL
RBC # BLD AUTO: 3.07 M/UL (ref 4.6–6.2)
RBC # BLD AUTO: 3.18 M/UL (ref 4.6–6.2)
RBC # BLD AUTO: 3.19 M/UL
RBC # BLD AUTO: 3.22 M/UL
RBC # BLD AUTO: 3.24 M/UL
RBC # BLD AUTO: 3.25 M/UL (ref 4.6–6.2)
RBC # BLD AUTO: 3.28 M/UL
RBC # BLD AUTO: 3.29 M/UL
RBC # BLD AUTO: 3.35 M/UL
RBC # BLD AUTO: 3.35 M/UL (ref 4.6–6.2)
RBC # BLD AUTO: 3.42 M/UL (ref 4.6–6.2)
RBC # BLD AUTO: 3.46 M/UL (ref 4.6–6.2)
RBC # BLD AUTO: 3.47 M/UL (ref 4.6–6.2)
RBC # BLD AUTO: 3.49 M/UL (ref 4.6–6.2)
RBC # BLD AUTO: 3.51 M/UL
RBC # BLD AUTO: 3.52 M/UL
RBC # BLD AUTO: 3.57 M/UL (ref 4.6–6.2)
RBC # BLD AUTO: 3.61 M/UL (ref 4.6–6.2)
RBC # BLD AUTO: 3.64 M/UL (ref 4.6–6.2)
RBC # BLD AUTO: 3.72 M/UL (ref 4.6–6.2)
RBC #/AREA URNS AUTO: 1 /HPF (ref 0–4)
RBC #/AREA URNS AUTO: 1 /HPF (ref 0–4)
RBC #/AREA URNS AUTO: 11 /HPF (ref 0–4)
RBC #/AREA URNS AUTO: 19 /HPF (ref 0–4)
RBC CASTS UR QL COMP ASSIST: 5 /LPF
RIGHT VENTRICULAR END-DIASTOLIC DIMENSION: 2.43 CM
RIGHT VENTRICULAR END-DIASTOLIC DIMENSION: 4.18 CM
RIGHT VENTRICULAR END-DIASTOLIC DIMENSION: 4.76 CM
SAMPLE: ABNORMAL
SATURATED IRON: 12 %
SINUS: 3.01 CM
SINUS: 3.61 CM
SINUS: 3.69 CM
SITE: ABNORMAL
SMUDGE CELLS BLD QL SMEAR: PRESENT
SODIUM BLD-SCNC: 142 MMOL/L (ref 136–145)
SODIUM BLD-SCNC: 145 MMOL/L (ref 136–145)
SODIUM SERPL-SCNC: 129 MMOL/L (ref 136–145)
SODIUM SERPL-SCNC: 129 MMOL/L (ref 136–145)
SODIUM SERPL-SCNC: 132 MMOL/L (ref 136–145)
SODIUM SERPL-SCNC: 133 MMOL/L (ref 136–145)
SODIUM SERPL-SCNC: 133 MMOL/L (ref 136–145)
SODIUM SERPL-SCNC: 134 MMOL/L
SODIUM SERPL-SCNC: 134 MMOL/L (ref 136–145)
SODIUM SERPL-SCNC: 134 MMOL/L (ref 136–145)
SODIUM SERPL-SCNC: 135 MMOL/L
SODIUM SERPL-SCNC: 135 MMOL/L (ref 136–145)
SODIUM SERPL-SCNC: 135 MMOL/L (ref 136–145)
SODIUM SERPL-SCNC: 136 MMOL/L (ref 136–145)
SODIUM SERPL-SCNC: 137 MMOL/L
SODIUM SERPL-SCNC: 137 MMOL/L (ref 136–145)
SODIUM SERPL-SCNC: 138 MMOL/L
SODIUM SERPL-SCNC: 138 MMOL/L (ref 136–145)
SODIUM SERPL-SCNC: 139 MMOL/L
SODIUM SERPL-SCNC: 139 MMOL/L (ref 136–145)
SODIUM SERPL-SCNC: 139 MMOL/L (ref 136–145)
SODIUM SERPL-SCNC: 140 MMOL/L
SODIUM SERPL-SCNC: 141 MMOL/L
SODIUM SERPL-SCNC: 142 MMOL/L
SODIUM SERPL-SCNC: 143 MMOL/L
SODIUM SERPL-SCNC: 145 MMOL/L
SP GR UR STRIP: 1.01 (ref 1–1.03)
SP GR UR STRIP: 1.02 (ref 1–1.03)
SP02: 100
SP02: 94
SP02: 95
SP02: 96
SP02: 97
SP02: 98
SPONT RATE: 24
SQUAMOUS #/AREA URNS AUTO: 0 /HPF
SQUAMOUS #/AREA URNS AUTO: 0 /HPF
STJ: 2.75 CM
STJ: 2.91 CM
STJ: 2.95 CM
T4 FREE SERPL-MCNC: 0.7 NG/DL (ref 0.71–1.51)
TDI LATERAL: 0.05
TDI SEPTAL: 0.04
TDI: 0.05
TOTAL IRON BINDING CAPACITY: 269 UG/DL
TR MAX PG: 49.28 MMHG
TR MAX PG: 49.56 MMHG
TR MAX PG: 64.32 MMHG
TRANS ERYTHROCYTES VOL PATIENT: NORMAL ML
TRANSFERRIN SERPL-MCNC: 182 MG/DL
TRICUSPID ANNULAR PLANE SYSTOLIC EXCURSION: 0.75 CM
TRICUSPID ANNULAR PLANE SYSTOLIC EXCURSION: 0.96 CM
TRICUSPID ANNULAR PLANE SYSTOLIC EXCURSION: 1.37 CM
TSH SERPL DL<=0.005 MIU/L-ACNC: 4.98 UIU/ML (ref 0.4–4)
TV REST PULMONARY ARTERY PRESSURE: 58 MMHG
TV REST PULMONARY ARTERY PRESSURE: 64 MMHG
TV REST PULMONARY ARTERY PRESSURE: 79 MMHG
URN SPEC COLLECT METH UR: ABNORMAL
VANCOMYCIN SERPL-MCNC: 15.4 UG/ML
VANCOMYCIN SERPL-MCNC: 7.4 UG/ML
VANCOMYCIN SERPL-MCNC: <1.1 UG/ML
WBC # BLD AUTO: 10.07 K/UL
WBC # BLD AUTO: 10.08 K/UL
WBC # BLD AUTO: 10.27 K/UL
WBC # BLD AUTO: 10.5 K/UL
WBC # BLD AUTO: 10.51 K/UL
WBC # BLD AUTO: 10.53 K/UL
WBC # BLD AUTO: 10.67 K/UL
WBC # BLD AUTO: 11.21 K/UL (ref 3.9–12.7)
WBC # BLD AUTO: 11.47 K/UL (ref 3.9–12.7)
WBC # BLD AUTO: 11.97 K/UL
WBC # BLD AUTO: 12.05 K/UL
WBC # BLD AUTO: 12.05 K/UL
WBC # BLD AUTO: 12.66 K/UL
WBC # BLD AUTO: 13.22 K/UL
WBC # BLD AUTO: 13.51 K/UL
WBC # BLD AUTO: 13.55 K/UL
WBC # BLD AUTO: 13.57 K/UL
WBC # BLD AUTO: 13.67 K/UL (ref 3.9–12.7)
WBC # BLD AUTO: 13.76 K/UL
WBC # BLD AUTO: 14.37 K/UL
WBC # BLD AUTO: 14.4 K/UL
WBC # BLD AUTO: 15.48 K/UL
WBC # BLD AUTO: 15.97 K/UL
WBC # BLD AUTO: 16.67 K/UL
WBC # BLD AUTO: 16.81 K/UL
WBC # BLD AUTO: 17.57 K/UL (ref 3.9–12.7)
WBC # BLD AUTO: 17.97 K/UL
WBC # BLD AUTO: 20.54 K/UL
WBC # BLD AUTO: 23.91 K/UL
WBC # BLD AUTO: 5.9 K/UL (ref 3.9–12.7)
WBC # BLD AUTO: 5.92 K/UL (ref 3.9–12.7)
WBC # BLD AUTO: 5.92 K/UL (ref 3.9–12.7)
WBC # BLD AUTO: 6.08 K/UL
WBC # BLD AUTO: 6.23 K/UL (ref 3.9–12.7)
WBC # BLD AUTO: 7.2 K/UL
WBC # BLD AUTO: 7.35 K/UL
WBC # BLD AUTO: 7.52 K/UL (ref 3.9–12.7)
WBC # BLD AUTO: 7.67 K/UL (ref 3.9–12.7)
WBC # BLD AUTO: 7.91 K/UL
WBC # BLD AUTO: 7.98 K/UL
WBC # BLD AUTO: 8.52 K/UL
WBC # BLD AUTO: 8.6 K/UL (ref 3.9–12.7)
WBC # BLD AUTO: 9.05 K/UL
WBC # BLD AUTO: 9.23 K/UL
WBC # BLD AUTO: 9.48 K/UL (ref 3.9–12.7)
WBC # BLD AUTO: 9.5 K/UL
WBC # BLD AUTO: 9.5 K/UL
WBC # BLD AUTO: 9.98 K/UL
WBC #/AREA STL HPF: NORMAL /[HPF]
WBC #/AREA URNS AUTO: 1 /HPF (ref 0–5)
WBC #/AREA URNS AUTO: 1 /HPF (ref 0–5)
WBC #/AREA URNS AUTO: 8 /HPF (ref 0–5)

## 2019-01-01 PROCEDURE — 84100 ASSAY OF PHOSPHORUS: CPT

## 2019-01-01 PROCEDURE — 63600367 HC NITRIC OXIDE PER HOUR

## 2019-01-01 PROCEDURE — 25000003 PHARM REV CODE 250: Performed by: NURSE PRACTITIONER

## 2019-01-01 PROCEDURE — 80053 COMPREHEN METABOLIC PANEL: CPT

## 2019-01-01 PROCEDURE — 93005 ELECTROCARDIOGRAM TRACING: CPT

## 2019-01-01 PROCEDURE — 99024 POSTOP FOLLOW-UP VISIT: CPT | Mod: ,,, | Performed by: INTERNAL MEDICINE

## 2019-01-01 PROCEDURE — 99291 CRITICAL CARE FIRST HOUR: CPT | Mod: ,,, | Performed by: NURSE PRACTITIONER

## 2019-01-01 PROCEDURE — 99233 PR SUBSEQUENT HOSPITAL CARE,LEVL III: ICD-10-PCS | Mod: GC,,, | Performed by: NURSE PRACTITIONER

## 2019-01-01 PROCEDURE — 83735 ASSAY OF MAGNESIUM: CPT | Mod: 91

## 2019-01-01 PROCEDURE — 99233 SBSQ HOSP IP/OBS HIGH 50: CPT | Mod: ,,, | Performed by: INTERNAL MEDICINE

## 2019-01-01 PROCEDURE — 27201423 OPTIME MED/SURG SUP & DEVICES STERILE SUPPLY: Performed by: INTERNAL MEDICINE

## 2019-01-01 PROCEDURE — 99233 PR SUBSEQUENT HOSPITAL CARE,LEVL III: ICD-10-PCS | Mod: ,,, | Performed by: INTERNAL MEDICINE

## 2019-01-01 PROCEDURE — 85610 PROTHROMBIN TIME: CPT

## 2019-01-01 PROCEDURE — D9220A PRA ANESTHESIA: ICD-10-PCS | Mod: ANES,,, | Performed by: ANESTHESIOLOGY

## 2019-01-01 PROCEDURE — 93010 ELECTROCARDIOGRAM REPORT: CPT | Mod: ,,, | Performed by: INTERNAL MEDICINE

## 2019-01-01 PROCEDURE — 20000000 HC ICU ROOM

## 2019-01-01 PROCEDURE — 99233 SBSQ HOSP IP/OBS HIGH 50: CPT | Mod: ,,, | Performed by: NURSE PRACTITIONER

## 2019-01-01 PROCEDURE — 63600175 PHARM REV CODE 636 W HCPCS: Performed by: INTERNAL MEDICINE

## 2019-01-01 PROCEDURE — 83605 ASSAY OF LACTIC ACID: CPT

## 2019-01-01 PROCEDURE — 97535 SELF CARE MNGMENT TRAINING: CPT

## 2019-01-01 PROCEDURE — 25000003 PHARM REV CODE 250: Performed by: INTERNAL MEDICINE

## 2019-01-01 PROCEDURE — 99291 PR CRITICAL CARE, E/M 30-74 MINUTES: ICD-10-PCS | Mod: ,,, | Performed by: INTERNAL MEDICINE

## 2019-01-01 PROCEDURE — 86920 COMPATIBILITY TEST SPIN: CPT

## 2019-01-01 PROCEDURE — 99214 OFFICE O/P EST MOD 30 MIN: CPT | Mod: S$PBB,,, | Performed by: INTERNAL MEDICINE

## 2019-01-01 PROCEDURE — 99239 PR HOSPITAL DISCHARGE DAY,>30 MIN: ICD-10-PCS | Mod: GC,,, | Performed by: INTERNAL MEDICINE

## 2019-01-01 PROCEDURE — 83735 ASSAY OF MAGNESIUM: CPT

## 2019-01-01 PROCEDURE — 85730 THROMBOPLASTIN TIME PARTIAL: CPT

## 2019-01-01 PROCEDURE — 25000003 PHARM REV CODE 250: Performed by: PHYSICIAN ASSISTANT

## 2019-01-01 PROCEDURE — 83050 HGB METHEMOGLOBIN QUAN: CPT

## 2019-01-01 PROCEDURE — C1894 INTRO/SHEATH, NON-LASER: HCPCS | Performed by: INTERNAL MEDICINE

## 2019-01-01 PROCEDURE — 80048 BASIC METABOLIC PNL TOTAL CA: CPT | Mod: 91

## 2019-01-01 PROCEDURE — C1725 CATH, TRANSLUMIN NON-LASER: HCPCS | Performed by: INTERNAL MEDICINE

## 2019-01-01 PROCEDURE — 36415 COLL VENOUS BLD VENIPUNCTURE: CPT

## 2019-01-01 PROCEDURE — 36430 TRANSFUSION BLD/BLD COMPNT: CPT

## 2019-01-01 PROCEDURE — 25000003 PHARM REV CODE 250: Performed by: STUDENT IN AN ORGANIZED HEALTH CARE EDUCATION/TRAINING PROGRAM

## 2019-01-01 PROCEDURE — 92960 CARDIOVERSION ELECTRIC EXT: CPT | Performed by: INTERNAL MEDICINE

## 2019-01-01 PROCEDURE — 85025 COMPLETE CBC W/AUTO DIFF WBC: CPT

## 2019-01-01 PROCEDURE — 99024 PR POST-OP FOLLOW-UP VISIT: ICD-10-PCS | Mod: S$PBB,,, | Performed by: NURSE PRACTITIONER

## 2019-01-01 PROCEDURE — 63600175 PHARM REV CODE 636 W HCPCS: Performed by: NURSE PRACTITIONER

## 2019-01-01 PROCEDURE — 99291 PR CRITICAL CARE, E/M 30-74 MINUTES: ICD-10-PCS | Mod: ,,, | Performed by: PHYSICIAN ASSISTANT

## 2019-01-01 PROCEDURE — 99239 HOSP IP/OBS DSCHRG MGMT >30: CPT | Mod: GC,,, | Performed by: INTERNAL MEDICINE

## 2019-01-01 PROCEDURE — 97530 THERAPEUTIC ACTIVITIES: CPT

## 2019-01-01 PROCEDURE — 99024 PR POST-OP FOLLOW-UP VISIT: ICD-10-PCS | Mod: ,,, | Performed by: INTERNAL MEDICINE

## 2019-01-01 PROCEDURE — 99291 CRITICAL CARE FIRST HOUR: CPT | Mod: ,,, | Performed by: INTERNAL MEDICINE

## 2019-01-01 PROCEDURE — 63600175 PHARM REV CODE 636 W HCPCS: Performed by: PHYSICIAN ASSISTANT

## 2019-01-01 PROCEDURE — 99233 PR SUBSEQUENT HOSPITAL CARE,LEVL III: ICD-10-PCS | Mod: GC,,, | Performed by: PHYSICIAN ASSISTANT

## 2019-01-01 PROCEDURE — 82803 BLOOD GASES ANY COMBINATION: CPT

## 2019-01-01 PROCEDURE — 51798 US URINE CAPACITY MEASURE: CPT

## 2019-01-01 PROCEDURE — 94660 CPAP INITIATION&MGMT: CPT

## 2019-01-01 PROCEDURE — 99999 PR PBB SHADOW E&M-EST. PATIENT-LVL II: ICD-10-PCS | Mod: PBBFAC,,, | Performed by: INTERNAL MEDICINE

## 2019-01-01 PROCEDURE — 99214 PR OFFICE/OUTPT VISIT, EST, LEVL IV, 30-39 MIN: ICD-10-PCS | Mod: S$PBB,,, | Performed by: INTERNAL MEDICINE

## 2019-01-01 PROCEDURE — 97116 GAIT TRAINING THERAPY: CPT

## 2019-01-01 PROCEDURE — 97162 PT EVAL MOD COMPLEX 30 MIN: CPT

## 2019-01-01 PROCEDURE — 93284 PRGRMG EVAL IMPLANTABLE DFB: CPT

## 2019-01-01 PROCEDURE — 93010 EKG 12-LEAD: ICD-10-PCS | Mod: 76,,, | Performed by: INTERNAL MEDICINE

## 2019-01-01 PROCEDURE — S5571 INSULIN DISPOS PEN 3 ML: HCPCS | Performed by: NURSE PRACTITIONER

## 2019-01-01 PROCEDURE — 99900035 HC TECH TIME PER 15 MIN (STAT)

## 2019-01-01 PROCEDURE — 99204 PR OFFICE/OUTPT VISIT, NEW, LEVL IV, 45-59 MIN: ICD-10-PCS | Mod: S$PBB,,, | Performed by: INTERNAL MEDICINE

## 2019-01-01 PROCEDURE — 99213 OFFICE O/P EST LOW 20 MIN: CPT | Mod: PBBFAC,27 | Performed by: INTERNAL MEDICINE

## 2019-01-01 PROCEDURE — 99999 PR PBB SHADOW E&M-EST. PATIENT-LVL III: CPT | Mod: PBBFAC,,, | Performed by: NURSE PRACTITIONER

## 2019-01-01 PROCEDURE — 20600001 HC STEP DOWN PRIVATE ROOM

## 2019-01-01 PROCEDURE — 99233 SBSQ HOSP IP/OBS HIGH 50: CPT | Mod: GC,,, | Performed by: PHYSICIAN ASSISTANT

## 2019-01-01 PROCEDURE — P9021 RED BLOOD CELLS UNIT: HCPCS

## 2019-01-01 PROCEDURE — 99212 OFFICE O/P EST SF 10 MIN: CPT | Mod: PBBFAC | Performed by: INTERNAL MEDICINE

## 2019-01-01 PROCEDURE — S0020 INJECTION, BUPIVICAINE HYDRO: HCPCS | Performed by: INTERNAL MEDICINE

## 2019-01-01 PROCEDURE — 80048 BASIC METABOLIC PNL TOTAL CA: CPT

## 2019-01-01 PROCEDURE — 99292 CRITICAL CARE ADDL 30 MIN: CPT | Mod: ,,, | Performed by: INTERNAL MEDICINE

## 2019-01-01 PROCEDURE — 36620 INSERTION CATHETER ARTERY: CPT | Mod: 59,,, | Performed by: ANESTHESIOLOGY

## 2019-01-01 PROCEDURE — 87186 SC STD MICRODIL/AGAR DIL: CPT

## 2019-01-01 PROCEDURE — 63600175 PHARM REV CODE 636 W HCPCS: Performed by: NURSE ANESTHETIST, CERTIFIED REGISTERED

## 2019-01-01 PROCEDURE — 87209 SMEAR COMPLEX STAIN: CPT

## 2019-01-01 PROCEDURE — 86850 RBC ANTIBODY SCREEN: CPT

## 2019-01-01 PROCEDURE — C1730 CATH, EP, 19 OR FEW ELECT: HCPCS | Performed by: INTERNAL MEDICINE

## 2019-01-01 PROCEDURE — C2629 INTRO/SHEATH, LASER: HCPCS | Performed by: INTERNAL MEDICINE

## 2019-01-01 PROCEDURE — 87070 CULTURE OTHR SPECIMN AEROBIC: CPT

## 2019-01-01 PROCEDURE — 27000221 HC OXYGEN, UP TO 24 HOURS

## 2019-01-01 PROCEDURE — 83605 ASSAY OF LACTIC ACID: CPT | Mod: 91

## 2019-01-01 PROCEDURE — 36556 INSERT NON-TUNNEL CV CATH: CPT

## 2019-01-01 PROCEDURE — 85730 THROMBOPLASTIN TIME PARTIAL: CPT | Mod: 91

## 2019-01-01 PROCEDURE — 63600175 PHARM REV CODE 636 W HCPCS: Performed by: STUDENT IN AN ORGANIZED HEALTH CARE EDUCATION/TRAINING PROGRAM

## 2019-01-01 PROCEDURE — 25500020 PHARM REV CODE 255: Performed by: STUDENT IN AN ORGANIZED HEALTH CARE EDUCATION/TRAINING PROGRAM

## 2019-01-01 PROCEDURE — 51702 INSERT TEMP BLADDER CATH: CPT

## 2019-01-01 PROCEDURE — 85025 COMPLETE CBC W/AUTO DIFF WBC: CPT | Mod: 91

## 2019-01-01 PROCEDURE — 93010 EKG 12-LEAD: ICD-10-PCS | Mod: ,,, | Performed by: INTERNAL MEDICINE

## 2019-01-01 PROCEDURE — C1882 AICD, OTHER THAN SING/DUAL: HCPCS | Performed by: INTERNAL MEDICINE

## 2019-01-01 PROCEDURE — 99233 PR SUBSEQUENT HOSPITAL CARE,LEVL III: ICD-10-PCS | Mod: 24,,, | Performed by: INTERNAL MEDICINE

## 2019-01-01 PROCEDURE — 99999 PR PBB SHADOW E&M-EST. PATIENT-LVL III: CPT | Mod: PBBFAC,,, | Performed by: INTERNAL MEDICINE

## 2019-01-01 PROCEDURE — 80053 COMPREHEN METABOLIC PANEL: CPT | Mod: 91

## 2019-01-01 PROCEDURE — 86140 C-REACTIVE PROTEIN: CPT

## 2019-01-01 PROCEDURE — D9220A PRA ANESTHESIA: ICD-10-PCS | Mod: CRNA,,, | Performed by: NURSE ANESTHETIST, CERTIFIED REGISTERED

## 2019-01-01 PROCEDURE — 99024 POSTOP FOLLOW-UP VISIT: CPT | Mod: ,,, | Performed by: PHYSICIAN ASSISTANT

## 2019-01-01 PROCEDURE — 94761 N-INVAS EAR/PLS OXIMETRY MLT: CPT

## 2019-01-01 PROCEDURE — 82150 ASSAY OF AMYLASE: CPT

## 2019-01-01 PROCEDURE — 99024 POSTOP FOLLOW-UP VISIT: CPT | Mod: S$PBB,,, | Performed by: NURSE PRACTITIONER

## 2019-01-01 PROCEDURE — 99232 PR SUBSEQUENT HOSPITAL CARE,LEVL II: ICD-10-PCS | Mod: ,,, | Performed by: NURSE PRACTITIONER

## 2019-01-01 PROCEDURE — 25000003 PHARM REV CODE 250: Performed by: NURSE ANESTHETIST, CERTIFIED REGISTERED

## 2019-01-01 PROCEDURE — 27200099 HC FEMSTOP

## 2019-01-01 PROCEDURE — 99213 OFFICE O/P EST LOW 20 MIN: CPT | Mod: PBBFAC,25 | Performed by: NURSE PRACTITIONER

## 2019-01-01 PROCEDURE — 99232 PR SUBSEQUENT HOSPITAL CARE,LEVL II: ICD-10-PCS | Mod: ,,, | Performed by: INTERNAL MEDICINE

## 2019-01-01 PROCEDURE — 99232 SBSQ HOSP IP/OBS MODERATE 35: CPT | Mod: ,,, | Performed by: NURSE PRACTITIONER

## 2019-01-01 PROCEDURE — 99292 PR CRITICAL CARE, ADDL 30 MIN: ICD-10-PCS | Mod: ,,, | Performed by: INTERNAL MEDICINE

## 2019-01-01 PROCEDURE — 33244 PR RMV PACER/ELECTRD,TRANSVEN EXTRCT: ICD-10-PCS | Mod: 58,,, | Performed by: INTERNAL MEDICINE

## 2019-01-01 PROCEDURE — 99233 PR SUBSEQUENT HOSPITAL CARE,LEVL III: ICD-10-PCS | Mod: ,,, | Performed by: PHYSICIAN ASSISTANT

## 2019-01-01 PROCEDURE — 82962 GLUCOSE BLOOD TEST: CPT | Performed by: INTERNAL MEDICINE

## 2019-01-01 PROCEDURE — 84132 ASSAY OF SERUM POTASSIUM: CPT

## 2019-01-01 PROCEDURE — 99231 PR SUBSEQUENT HOSPITAL CARE,LEVL I: ICD-10-PCS | Mod: ,,, | Performed by: NURSE PRACTITIONER

## 2019-01-01 PROCEDURE — 99291 PR CRITICAL CARE, E/M 30-74 MINUTES: ICD-10-PCS | Mod: ,,, | Performed by: NURSE PRACTITIONER

## 2019-01-01 PROCEDURE — 27100025 HC TUBING, SET FLUID WARMER: Performed by: NURSE ANESTHETIST, CERTIFIED REGISTERED

## 2019-01-01 PROCEDURE — 87045 FECES CULTURE AEROBIC BACT: CPT

## 2019-01-01 PROCEDURE — 99223 1ST HOSP IP/OBS HIGH 75: CPT | Mod: ,,, | Performed by: INTERNAL MEDICINE

## 2019-01-01 PROCEDURE — 99233 PR SUBSEQUENT HOSPITAL CARE,LEVL III: ICD-10-PCS | Mod: ,,, | Performed by: NURSE PRACTITIONER

## 2019-01-01 PROCEDURE — 99233 SBSQ HOSP IP/OBS HIGH 50: CPT | Mod: GC,,, | Performed by: NURSE PRACTITIONER

## 2019-01-01 PROCEDURE — 99999 PR PBB SHADOW E&M-EST. PATIENT-LVL III: ICD-10-PCS | Mod: PBBFAC,,, | Performed by: INTERNAL MEDICINE

## 2019-01-01 PROCEDURE — 80202 ASSAY OF VANCOMYCIN: CPT

## 2019-01-01 PROCEDURE — S5571 INSULIN DISPOS PEN 3 ML: HCPCS | Performed by: INTERNAL MEDICINE

## 2019-01-01 PROCEDURE — 93005 ELECTROCARDIOGRAM TRACING: CPT | Mod: PBBFAC | Performed by: INTERNAL MEDICINE

## 2019-01-01 PROCEDURE — 99233 SBSQ HOSP IP/OBS HIGH 50: CPT | Mod: 24,,, | Performed by: INTERNAL MEDICINE

## 2019-01-01 PROCEDURE — 99215 OFFICE O/P EST HI 40 MIN: CPT | Mod: PBBFAC | Performed by: INTERNAL MEDICINE

## 2019-01-01 PROCEDURE — 51701 INSERT BLADDER CATHETER: CPT

## 2019-01-01 PROCEDURE — 33225 L VENTRIC PACING LEAD ADD-ON: CPT | Mod: ,,, | Performed by: INTERNAL MEDICINE

## 2019-01-01 PROCEDURE — 99223 PR INITIAL HOSPITAL CARE,LEVL III: ICD-10-PCS | Mod: ,,, | Performed by: INTERNAL MEDICINE

## 2019-01-01 PROCEDURE — 36620 INSERTION CATHETER ARTERY: CPT

## 2019-01-01 PROCEDURE — D9220A PRA ANESTHESIA: Mod: ANES,,, | Performed by: ANESTHESIOLOGY

## 2019-01-01 PROCEDURE — 87086 URINE CULTURE/COLONY COUNT: CPT

## 2019-01-01 PROCEDURE — 99999 PR PBB SHADOW E&M-EST. PATIENT-LVL V: ICD-10-PCS | Mod: PBBFAC,,, | Performed by: INTERNAL MEDICINE

## 2019-01-01 PROCEDURE — 80076 HEPATIC FUNCTION PANEL: CPT

## 2019-01-01 PROCEDURE — 93010 ELECTROCARDIOGRAM REPORT: CPT | Mod: S$PBB,,, | Performed by: INTERNAL MEDICINE

## 2019-01-01 PROCEDURE — 99232 SBSQ HOSP IP/OBS MODERATE 35: CPT | Mod: ,,, | Performed by: INTERNAL MEDICINE

## 2019-01-01 PROCEDURE — 99223 1ST HOSP IP/OBS HIGH 75: CPT | Mod: ,,, | Performed by: NURSE PRACTITIONER

## 2019-01-01 PROCEDURE — 33241 REMOVE PULSE GENERATOR: CPT | Performed by: INTERNAL MEDICINE

## 2019-01-01 PROCEDURE — 33241 PR RMV PULSE GENERATOR,SNGL/DUAL: ICD-10-PCS | Mod: 58,51,, | Performed by: INTERNAL MEDICINE

## 2019-01-01 PROCEDURE — 92960 CARDIOVERSION ELECTRIC EXT: CPT

## 2019-01-01 PROCEDURE — 25000242 PHARM REV CODE 250 ALT 637 W/ HCPCS: Performed by: INTERNAL MEDICINE

## 2019-01-01 PROCEDURE — 37799 UNLISTED PX VASCULAR SURGERY: CPT

## 2019-01-01 PROCEDURE — 99223 PR INITIAL HOSPITAL CARE,LEVL III: ICD-10-PCS | Mod: ,,, | Performed by: NURSE PRACTITIONER

## 2019-01-01 PROCEDURE — 83880 ASSAY OF NATRIURETIC PEPTIDE: CPT

## 2019-01-01 PROCEDURE — P9047 ALBUMIN (HUMAN), 25%, 50ML: HCPCS | Mod: JG | Performed by: INTERNAL MEDICINE

## 2019-01-01 PROCEDURE — 87040 BLOOD CULTURE FOR BACTERIA: CPT

## 2019-01-01 PROCEDURE — C1751 CATH, INF, PER/CENT/MIDLINE: HCPCS

## 2019-01-01 PROCEDURE — 83690 ASSAY OF LIPASE: CPT

## 2019-01-01 PROCEDURE — 99239 HOSP IP/OBS DSCHRG MGMT >30: CPT | Mod: GC,,, | Performed by: PHYSICIAN ASSISTANT

## 2019-01-01 PROCEDURE — 99231 SBSQ HOSP IP/OBS SF/LOW 25: CPT | Mod: ,,, | Performed by: NURSE PRACTITIONER

## 2019-01-01 PROCEDURE — 33249 PR ICD INSERT SNGL/DUAL W/LEADS: ICD-10-PCS | Mod: 22,Q0,78, | Performed by: INTERNAL MEDICINE

## 2019-01-01 PROCEDURE — 25500020 PHARM REV CODE 255: Performed by: INTERNAL MEDICINE

## 2019-01-01 PROCEDURE — 87077 CULTURE AEROBIC IDENTIFY: CPT

## 2019-01-01 PROCEDURE — 27000190 HC CPAP FULL FACE MASK W/VALVE

## 2019-01-01 PROCEDURE — 37000009 HC ANESTHESIA EA ADD 15 MINS: Performed by: INTERNAL MEDICINE

## 2019-01-01 PROCEDURE — 27100171 HC OXYGEN HIGH FLOW UP TO 24 HOURS

## 2019-01-01 PROCEDURE — 99222 1ST HOSP IP/OBS MODERATE 55: CPT | Mod: ,,, | Performed by: INTERNAL MEDICINE

## 2019-01-01 PROCEDURE — 97161 PT EVAL LOW COMPLEX 20 MIN: CPT

## 2019-01-01 PROCEDURE — 33225 L VENTRIC PACING LEAD ADD-ON: CPT | Performed by: INTERNAL MEDICINE

## 2019-01-01 PROCEDURE — 27100092 HC HIGH FLOW DELIVERY CANNULA

## 2019-01-01 PROCEDURE — 27201037 HC PRESSURE MONITORING SET UP

## 2019-01-01 PROCEDURE — 93010 ELECTROCARDIOGRAM REPORT: CPT | Mod: 76,,, | Performed by: INTERNAL MEDICINE

## 2019-01-01 PROCEDURE — C1894 INTRO/SHEATH, NON-LASER: HCPCS

## 2019-01-01 PROCEDURE — 86901 BLOOD TYPING SEROLOGIC RH(D): CPT

## 2019-01-01 PROCEDURE — 33241 REMOVE PULSE GENERATOR: CPT | Mod: 58,51,, | Performed by: INTERNAL MEDICINE

## 2019-01-01 PROCEDURE — 93264 REM MNTR WRLS P-ART PRS SNR: CPT | Mod: S$PBB,,, | Performed by: PHYSICIAN ASSISTANT

## 2019-01-01 PROCEDURE — 99291 CRITICAL CARE FIRST HOUR: CPT | Mod: ,,, | Performed by: PHYSICIAN ASSISTANT

## 2019-01-01 PROCEDURE — 84145 PROCALCITONIN (PCT): CPT

## 2019-01-01 PROCEDURE — 99999 PR PBB SHADOW E&M-EST. PATIENT-LVL II: CPT | Mod: PBBFAC,,, | Performed by: INTERNAL MEDICINE

## 2019-01-01 PROCEDURE — 81001 URINALYSIS AUTO W/SCOPE: CPT

## 2019-01-01 PROCEDURE — 63600175 PHARM REV CODE 636 W HCPCS: Mod: JG | Performed by: INTERNAL MEDICINE

## 2019-01-01 PROCEDURE — 36620 ARTERIAL: ICD-10-PCS | Mod: 59,,, | Performed by: ANESTHESIOLOGY

## 2019-01-01 PROCEDURE — 25000003 PHARM REV CODE 250

## 2019-01-01 PROCEDURE — 97110 THERAPEUTIC EXERCISES: CPT

## 2019-01-01 PROCEDURE — 36556 CENTRAL LINE: ICD-10-PCS | Mod: 59,,, | Performed by: ANESTHESIOLOGY

## 2019-01-01 PROCEDURE — 99213 OFFICE O/P EST LOW 20 MIN: CPT | Mod: PBBFAC | Performed by: INTERNAL MEDICINE

## 2019-01-01 PROCEDURE — 99239 PR HOSPITAL DISCHARGE DAY,>30 MIN: ICD-10-PCS | Mod: GC,,, | Performed by: PHYSICIAN ASSISTANT

## 2019-01-01 PROCEDURE — 97165 OT EVAL LOW COMPLEX 30 MIN: CPT

## 2019-01-01 PROCEDURE — 87040 BLOOD CULTURE FOR BACTERIA: CPT | Mod: 59

## 2019-01-01 PROCEDURE — D9220A PRA ANESTHESIA: Mod: CRNA,,, | Performed by: NURSE ANESTHETIST, CERTIFIED REGISTERED

## 2019-01-01 PROCEDURE — 92526 ORAL FUNCTION THERAPY: CPT

## 2019-01-01 PROCEDURE — 80162 ASSAY OF DIGOXIN TOTAL: CPT

## 2019-01-01 PROCEDURE — 27200188 HC TRANSDUCER, ART ADULT/PEDS

## 2019-01-01 PROCEDURE — 85652 RBC SED RATE AUTOMATED: CPT

## 2019-01-01 PROCEDURE — A4216 STERILE WATER/SALINE, 10 ML: HCPCS | Performed by: NURSE ANESTHETIST, CERTIFIED REGISTERED

## 2019-01-01 PROCEDURE — 33244 REMOVE ELCTRD TRANSVENOUSLY: CPT | Mod: 58,,, | Performed by: INTERNAL MEDICINE

## 2019-01-01 PROCEDURE — 92960 PR CARDIOVERSION, ELECTIVE;EXTERN: ICD-10-PCS | Mod: ,,, | Performed by: INTERNAL MEDICINE

## 2019-01-01 PROCEDURE — 87427 SHIGA-LIKE TOXIN AG IA: CPT

## 2019-01-01 PROCEDURE — 86160 COMPLEMENT ANTIGEN: CPT

## 2019-01-01 PROCEDURE — 36556 INSERT NON-TUNNEL CV CATH: CPT | Mod: 59,,, | Performed by: ANESTHESIOLOGY

## 2019-01-01 PROCEDURE — 25000242 PHARM REV CODE 250 ALT 637 W/ HCPCS: Performed by: PHYSICIAN ASSISTANT

## 2019-01-01 PROCEDURE — 92960 CARDIOVERSION ELECTRIC EXT: CPT | Mod: ,,, | Performed by: INTERNAL MEDICINE

## 2019-01-01 PROCEDURE — 99204 OFFICE O/P NEW MOD 45 MIN: CPT | Mod: S$PBB,,, | Performed by: INTERNAL MEDICINE

## 2019-01-01 PROCEDURE — 99222 PR INITIAL HOSPITAL CARE,LEVL II: ICD-10-PCS | Mod: ,,, | Performed by: INTERNAL MEDICINE

## 2019-01-01 PROCEDURE — 99213 PR OFFICE/OUTPT VISIT, EST, LEVL III, 20-29 MIN: ICD-10-PCS | Mod: S$PBB,GC,, | Performed by: INTERNAL MEDICINE

## 2019-01-01 PROCEDURE — 33225 PR INSRT PACING ELECT,AT INSERT NEW DEVICE: ICD-10-PCS | Mod: ,,, | Performed by: INTERNAL MEDICINE

## 2019-01-01 PROCEDURE — 99999 PR PBB SHADOW E&M-EST. PATIENT-LVL III: CPT | Mod: PBBFAC,GC,, | Performed by: INTERNAL MEDICINE

## 2019-01-01 PROCEDURE — 87075 CULTR BACTERIA EXCEPT BLOOD: CPT

## 2019-01-01 PROCEDURE — 99999 PR PBB SHADOW E&M-EST. PATIENT-LVL V: CPT | Mod: PBBFAC,,, | Performed by: INTERNAL MEDICINE

## 2019-01-01 PROCEDURE — 93312 ECHO TRANSESOPHAGEAL: CPT

## 2019-01-01 PROCEDURE — C1769 GUIDE WIRE: HCPCS | Performed by: INTERNAL MEDICINE

## 2019-01-01 PROCEDURE — C2628 CATHETER, OCCLUSION: HCPCS | Performed by: INTERNAL MEDICINE

## 2019-01-01 PROCEDURE — 99213 OFFICE O/P EST LOW 20 MIN: CPT | Mod: PBBFAC,25,27 | Performed by: INTERNAL MEDICINE

## 2019-01-01 PROCEDURE — 33244 REMOVE ELCTRD TRANSVENOUSLY: CPT | Performed by: INTERNAL MEDICINE

## 2019-01-01 PROCEDURE — 99999 PR PBB SHADOW E&M-EST. PATIENT-LVL III: ICD-10-PCS | Mod: PBBFAC,,, | Performed by: NURSE PRACTITIONER

## 2019-01-01 PROCEDURE — 86901 BLOOD TYPING SEROLOGIC RH(D): CPT | Mod: 91

## 2019-01-01 PROCEDURE — 84443 ASSAY THYROID STIM HORMONE: CPT

## 2019-01-01 PROCEDURE — 82962 GLUCOSE BLOOD TEST: CPT

## 2019-01-01 PROCEDURE — 99213 OFFICE O/P EST LOW 20 MIN: CPT | Mod: S$PBB,GC,, | Performed by: INTERNAL MEDICINE

## 2019-01-01 PROCEDURE — 93264 REM MNTR WRLS P-ART PRS SNR: CPT | Mod: PBBFAC | Performed by: PHYSICIAN ASSISTANT

## 2019-01-01 PROCEDURE — 83036 HEMOGLOBIN GLYCOSYLATED A1C: CPT

## 2019-01-01 PROCEDURE — C1777 LEAD, AICD, ENDO SINGLE COIL: HCPCS | Performed by: INTERNAL MEDICINE

## 2019-01-01 PROCEDURE — 82728 ASSAY OF FERRITIN: CPT

## 2019-01-01 PROCEDURE — 33249 INSJ/RPLCMT DEFIB W/LEAD(S): CPT | Mod: 22,Q0 | Performed by: INTERNAL MEDICINE

## 2019-01-01 PROCEDURE — 37000008 HC ANESTHESIA 1ST 15 MINUTES: Performed by: INTERNAL MEDICINE

## 2019-01-01 PROCEDURE — 99999 PR PBB SHADOW E&M-EST. PATIENT-LVL III: ICD-10-PCS | Mod: PBBFAC,GC,, | Performed by: INTERNAL MEDICINE

## 2019-01-01 PROCEDURE — 33249 INSJ/RPLCMT DEFIB W/LEAD(S): CPT | Mod: 22,Q0,78, | Performed by: INTERNAL MEDICINE

## 2019-01-01 PROCEDURE — 86160 COMPLEMENT ANTIGEN: CPT | Mod: 59

## 2019-01-01 PROCEDURE — 93010 RHYTHM STRIP: ICD-10-PCS | Mod: S$PBB,,, | Performed by: INTERNAL MEDICINE

## 2019-01-01 PROCEDURE — 27000239 HC STAND-BY BYPASS PUMP

## 2019-01-01 PROCEDURE — 99223 1ST HOSP IP/OBS HIGH 75: CPT | Mod: AI,,, | Performed by: INTERNAL MEDICINE

## 2019-01-01 PROCEDURE — C1898 LEAD, PMKR, OTHER THAN TRANS: HCPCS | Performed by: INTERNAL MEDICINE

## 2019-01-01 PROCEDURE — C1893 INTRO/SHEATH, FIXED,NON-PEEL: HCPCS | Performed by: INTERNAL MEDICINE

## 2019-01-01 PROCEDURE — 87046 STOOL CULTR AEROBIC BACT EA: CPT

## 2019-01-01 PROCEDURE — 83540 ASSAY OF IRON: CPT

## 2019-01-01 PROCEDURE — 99024 PR POST-OP FOLLOW-UP VISIT: ICD-10-PCS | Mod: ,,, | Performed by: PHYSICIAN ASSISTANT

## 2019-01-01 PROCEDURE — 92610 EVALUATE SWALLOWING FUNCTION: CPT

## 2019-01-01 PROCEDURE — 99213 OFFICE O/P EST LOW 20 MIN: CPT | Mod: PBBFAC,25 | Performed by: INTERNAL MEDICINE

## 2019-01-01 PROCEDURE — C1900 LEAD, CORONARY VENOUS: HCPCS | Performed by: INTERNAL MEDICINE

## 2019-01-01 PROCEDURE — 93264 PR REMOTE MONTR, WIRELESS PULM-ART PRESS SENSOR, < 30 DAYS: ICD-10-PCS | Mod: S$PBB,,, | Performed by: PHYSICIAN ASSISTANT

## 2019-01-01 PROCEDURE — 31500 INSERT EMERGENCY AIRWAY: CPT

## 2019-01-01 PROCEDURE — 99233 SBSQ HOSP IP/OBS HIGH 50: CPT | Mod: ,,, | Performed by: PHYSICIAN ASSISTANT

## 2019-01-01 PROCEDURE — 89055 LEUKOCYTE ASSESSMENT FECAL: CPT

## 2019-01-01 PROCEDURE — 87205 SMEAR GRAM STAIN: CPT

## 2019-01-01 PROCEDURE — 84439 ASSAY OF FREE THYROXINE: CPT

## 2019-01-01 PROCEDURE — 94640 AIRWAY INHALATION TREATMENT: CPT

## 2019-01-01 PROCEDURE — 99223 PR INITIAL HOSPITAL CARE,LEVL III: ICD-10-PCS | Mod: AI,,, | Performed by: INTERNAL MEDICINE

## 2019-01-01 PROCEDURE — 84156 ASSAY OF PROTEIN URINE: CPT

## 2019-01-01 PROCEDURE — 87070 CULTURE OTHR SPECIMN AEROBIC: CPT | Mod: 59

## 2019-01-01 DEVICE — CARDIAC RESYNCHRONIZATION DEVICE, TIERED-THERAPY CARDIOVERTER/DEFIBRILLATOR VVED DDDRV
Type: IMPLANTABLE DEVICE | Site: HEART | Status: FUNCTIONAL
Brand: QUADRA ASSURA MP™

## 2019-01-01 DEVICE — PACING LEAD
Type: IMPLANTABLE DEVICE | Site: HEART | Status: FUNCTIONAL
Brand: TENDRIL™

## 2019-01-01 DEVICE — DEFIBRILLATION LEAD
Type: IMPLANTABLE DEVICE | Site: HEART | Status: FUNCTIONAL
Brand: DURATA™

## 2019-01-01 DEVICE — LEFT HEART LEAD
Type: IMPLANTABLE DEVICE | Site: HEART | Status: FUNCTIONAL
Brand: QUARTET™

## 2019-01-01 RX ORDER — GLUCAGON 1 MG
1 KIT INJECTION
Status: DISCONTINUED | OUTPATIENT
Start: 2019-01-01 | End: 2019-01-01 | Stop reason: HOSPADM

## 2019-01-01 RX ORDER — TAMSULOSIN HYDROCHLORIDE 0.4 MG/1
0.4 CAPSULE ORAL DAILY
Qty: 30 CAPSULE | Refills: 3 | Status: SHIPPED | OUTPATIENT
Start: 2019-01-01 | End: 2019-01-01 | Stop reason: SDUPTHER

## 2019-01-01 RX ORDER — INSULIN ASPART 100 [IU]/ML
9 INJECTION, SOLUTION INTRAVENOUS; SUBCUTANEOUS
Status: DISCONTINUED | OUTPATIENT
Start: 2019-01-01 | End: 2019-01-01

## 2019-01-01 RX ORDER — POTASSIUM CHLORIDE 29.8 MG/ML
40 INJECTION INTRAVENOUS ONCE
Status: COMPLETED | OUTPATIENT
Start: 2019-01-01 | End: 2019-01-01

## 2019-01-01 RX ORDER — ROPINIROLE 0.25 MG/1
0.5 TABLET, FILM COATED ORAL NIGHTLY
Status: DISCONTINUED | OUTPATIENT
Start: 2019-01-01 | End: 2019-01-01 | Stop reason: HOSPADM

## 2019-01-01 RX ORDER — ETOMIDATE 2 MG/ML
INJECTION INTRAVENOUS
Status: DISCONTINUED
Start: 2019-01-01 | End: 2019-01-01 | Stop reason: WASHOUT

## 2019-01-01 RX ORDER — NEOSTIGMINE METHYLSULFATE 1 MG/ML
INJECTION, SOLUTION INTRAVENOUS
Status: DISCONTINUED | OUTPATIENT
Start: 2019-01-01 | End: 2019-01-01

## 2019-01-01 RX ORDER — IBUPROFEN 200 MG
16 TABLET ORAL
Status: DISCONTINUED | OUTPATIENT
Start: 2019-01-01 | End: 2019-01-01

## 2019-01-01 RX ORDER — POTASSIUM CHLORIDE 20 MEQ/1
40 TABLET, EXTENDED RELEASE ORAL ONCE
Status: COMPLETED | OUTPATIENT
Start: 2019-01-01 | End: 2019-01-01

## 2019-01-01 RX ORDER — INSULIN ASPART 100 [IU]/ML
6 INJECTION, SOLUTION INTRAVENOUS; SUBCUTANEOUS
Status: DISCONTINUED | OUTPATIENT
Start: 2019-01-01 | End: 2019-01-01

## 2019-01-01 RX ORDER — INSULIN ASPART 100 [IU]/ML
8 INJECTION, SOLUTION INTRAVENOUS; SUBCUTANEOUS
Status: DISCONTINUED | OUTPATIENT
Start: 2019-01-01 | End: 2019-01-01

## 2019-01-01 RX ORDER — INSULIN ASPART 100 [IU]/ML
9 INJECTION, SOLUTION INTRAVENOUS; SUBCUTANEOUS
Status: DISCONTINUED | OUTPATIENT
Start: 2019-01-01 | End: 2019-01-01 | Stop reason: HOSPADM

## 2019-01-01 RX ORDER — INSULIN ASPART 100 [IU]/ML
5 INJECTION, SOLUTION INTRAVENOUS; SUBCUTANEOUS
Status: DISCONTINUED | OUTPATIENT
Start: 2019-01-01 | End: 2019-01-01

## 2019-01-01 RX ORDER — HYDROXYZINE HYDROCHLORIDE 10 MG/1
10 TABLET, FILM COATED ORAL 3 TIMES DAILY PRN
Status: DISCONTINUED | OUTPATIENT
Start: 2019-01-01 | End: 2019-01-01 | Stop reason: HOSPADM

## 2019-01-01 RX ORDER — LEVOTHYROXINE SODIUM 25 UG/1
25 TABLET ORAL
Status: DISCONTINUED | OUTPATIENT
Start: 2019-01-01 | End: 2019-01-01 | Stop reason: HOSPADM

## 2019-01-01 RX ORDER — INSULIN ASPART 100 [IU]/ML
0-5 INJECTION, SOLUTION INTRAVENOUS; SUBCUTANEOUS
Status: DISCONTINUED | OUTPATIENT
Start: 2019-01-01 | End: 2019-01-01 | Stop reason: HOSPADM

## 2019-01-01 RX ORDER — ETOMIDATE 2 MG/ML
INJECTION INTRAVENOUS
Status: DISCONTINUED | OUTPATIENT
Start: 2019-01-01 | End: 2019-01-01

## 2019-01-01 RX ORDER — INSULIN ASPART 100 [IU]/ML
10 INJECTION, SOLUTION INTRAVENOUS; SUBCUTANEOUS
Qty: 15 ML | Refills: 0 | Status: SHIPPED | OUTPATIENT
Start: 2019-01-01 | End: 2020-02-06

## 2019-01-01 RX ORDER — POTASSIUM CHLORIDE 14.9 MG/ML
INJECTION INTRAVENOUS CONTINUOUS PRN
Status: DISCONTINUED | OUTPATIENT
Start: 2019-01-01 | End: 2019-01-01

## 2019-01-01 RX ORDER — PANTOPRAZOLE SODIUM 40 MG/1
40 TABLET, DELAYED RELEASE ORAL DAILY
Status: DISCONTINUED | OUTPATIENT
Start: 2019-01-01 | End: 2019-01-01 | Stop reason: HOSPADM

## 2019-01-01 RX ORDER — ONDANSETRON 2 MG/ML
4 INJECTION INTRAMUSCULAR; INTRAVENOUS EVERY 6 HOURS PRN
Status: DISCONTINUED | OUTPATIENT
Start: 2019-01-01 | End: 2019-01-01 | Stop reason: HOSPADM

## 2019-01-01 RX ORDER — CETIRIZINE HYDROCHLORIDE 5 MG/1
10 TABLET ORAL DAILY
Status: DISCONTINUED | OUTPATIENT
Start: 2019-01-01 | End: 2019-01-01 | Stop reason: HOSPADM

## 2019-01-01 RX ORDER — LEVOTHYROXINE SODIUM 50 UG/1
50 TABLET ORAL
Status: DISCONTINUED | OUTPATIENT
Start: 2019-01-01 | End: 2019-01-01 | Stop reason: HOSPADM

## 2019-01-01 RX ORDER — POTASSIUM CHLORIDE 29.8 MG/ML
40 INJECTION INTRAVENOUS
Status: DISCONTINUED | OUTPATIENT
Start: 2019-01-01 | End: 2019-01-01

## 2019-01-01 RX ORDER — LEVOTHYROXINE SODIUM 25 UG/1
25 TABLET ORAL DAILY
Status: DISCONTINUED | OUTPATIENT
Start: 2019-01-01 | End: 2019-01-01

## 2019-01-01 RX ORDER — POTASSIUM CHLORIDE 20 MEQ/1
40 TABLET, EXTENDED RELEASE ORAL ONCE
Status: DISCONTINUED | OUTPATIENT
Start: 2019-01-01 | End: 2019-01-01

## 2019-01-01 RX ORDER — POTASSIUM CHLORIDE 20 MEQ/1
20 TABLET, EXTENDED RELEASE ORAL DAILY
Status: DISCONTINUED | OUTPATIENT
Start: 2019-01-01 | End: 2019-01-01 | Stop reason: HOSPADM

## 2019-01-01 RX ORDER — LANOLIN ALCOHOL/MO/W.PET/CERES
400 CREAM (GRAM) TOPICAL ONCE
Status: COMPLETED | OUTPATIENT
Start: 2019-01-01 | End: 2019-01-01

## 2019-01-01 RX ORDER — SODIUM CHLORIDE 9 MG/ML
INJECTION, SOLUTION INTRAVENOUS CONTINUOUS PRN
Status: DISCONTINUED | OUTPATIENT
Start: 2019-01-01 | End: 2019-01-01

## 2019-01-01 RX ORDER — POTASSIUM CHLORIDE 20 MEQ/1
40 TABLET, EXTENDED RELEASE ORAL DAILY
Status: DISCONTINUED | OUTPATIENT
Start: 2019-01-01 | End: 2019-01-01 | Stop reason: HOSPADM

## 2019-01-01 RX ORDER — TAMSULOSIN HYDROCHLORIDE 0.4 MG/1
0.4 CAPSULE ORAL DAILY
Status: DISCONTINUED | OUTPATIENT
Start: 2019-01-01 | End: 2019-01-01 | Stop reason: HOSPADM

## 2019-01-01 RX ORDER — LIDOCAINE HYDROCHLORIDE 20 MG/ML
INJECTION, SOLUTION INFILTRATION; PERINEURAL
Status: DISCONTINUED | OUTPATIENT
Start: 2019-01-01 | End: 2019-01-01

## 2019-01-01 RX ORDER — METOLAZONE 2.5 MG/1
2.5 TABLET ORAL
Status: DISCONTINUED | OUTPATIENT
Start: 2019-01-01 | End: 2019-01-01 | Stop reason: HOSPADM

## 2019-01-01 RX ORDER — ONDANSETRON HYDROCHLORIDE 8 MG/1
TABLET, FILM COATED ORAL
Refills: 1 | COMMUNITY
Start: 2019-01-01

## 2019-01-01 RX ORDER — POTASSIUM CHLORIDE 20 MEQ/1
60 TABLET, EXTENDED RELEASE ORAL ONCE
Status: COMPLETED | OUTPATIENT
Start: 2019-01-01 | End: 2019-01-01

## 2019-01-01 RX ORDER — IPRATROPIUM BROMIDE AND ALBUTEROL SULFATE 2.5; .5 MG/3ML; MG/3ML
3 SOLUTION RESPIRATORY (INHALATION) EVERY 6 HOURS PRN
Status: DISCONTINUED | OUTPATIENT
Start: 2019-01-01 | End: 2019-01-01

## 2019-01-01 RX ORDER — MEPERIDINE HYDROCHLORIDE 50 MG/ML
12.5 INJECTION INTRAMUSCULAR; INTRAVENOUS; SUBCUTANEOUS EVERY 10 MIN PRN
Status: CANCELLED | OUTPATIENT
Start: 2019-01-01 | End: 2019-01-01

## 2019-01-01 RX ORDER — INSULIN ASPART 100 [IU]/ML
8 INJECTION, SOLUTION INTRAVENOUS; SUBCUTANEOUS
Status: DISCONTINUED | OUTPATIENT
Start: 2019-01-01 | End: 2019-01-01 | Stop reason: HOSPADM

## 2019-01-01 RX ORDER — LEVOFLOXACIN 500 MG/1
500 TABLET, FILM COATED ORAL EVERY OTHER DAY
Status: DISCONTINUED | OUTPATIENT
Start: 2019-01-01 | End: 2019-01-01 | Stop reason: HOSPADM

## 2019-01-01 RX ORDER — POTASSIUM CHLORIDE 20 MEQ/1
60 TABLET, EXTENDED RELEASE ORAL DAILY
Status: DISCONTINUED | OUTPATIENT
Start: 2019-01-01 | End: 2019-01-01

## 2019-01-01 RX ORDER — MIDAZOLAM HYDROCHLORIDE 1 MG/ML
INJECTION INTRAMUSCULAR; INTRAVENOUS
Status: DISCONTINUED | OUTPATIENT
Start: 2019-01-01 | End: 2019-01-01

## 2019-01-01 RX ORDER — TRAMADOL HYDROCHLORIDE 50 MG/1
50 TABLET ORAL EVERY 6 HOURS PRN
Status: DISCONTINUED | OUTPATIENT
Start: 2019-01-01 | End: 2019-01-01 | Stop reason: HOSPADM

## 2019-01-01 RX ORDER — KETAMINE HYDROCHLORIDE 10 MG/ML
INJECTION, SOLUTION INTRAMUSCULAR; INTRAVENOUS
Status: DISCONTINUED | OUTPATIENT
Start: 2019-01-01 | End: 2019-01-01

## 2019-01-01 RX ORDER — IBUPROFEN 200 MG
24 TABLET ORAL
Status: DISCONTINUED | OUTPATIENT
Start: 2019-01-01 | End: 2019-01-01 | Stop reason: HOSPADM

## 2019-01-01 RX ORDER — INSULIN ASPART 100 [IU]/ML
6-8 INJECTION, SOLUTION INTRAVENOUS; SUBCUTANEOUS
Status: DISCONTINUED | OUTPATIENT
Start: 2019-01-01 | End: 2019-01-01

## 2019-01-01 RX ORDER — POTASSIUM CHLORIDE 20 MEQ/1
20 TABLET, EXTENDED RELEASE ORAL ONCE
Status: COMPLETED | OUTPATIENT
Start: 2019-01-01 | End: 2019-01-01

## 2019-01-01 RX ORDER — ROPINIROLE 0.5 MG/1
0.5 TABLET, FILM COATED ORAL NIGHTLY
COMMUNITY
Start: 2019-01-01

## 2019-01-01 RX ORDER — WARFARIN SODIUM 5 MG/1
5 TABLET ORAL DAILY
Status: DISCONTINUED | OUTPATIENT
Start: 2019-01-01 | End: 2019-01-01

## 2019-01-01 RX ORDER — LANOLIN ALCOHOL/MO/W.PET/CERES
400 CREAM (GRAM) TOPICAL 2 TIMES DAILY
Status: DISCONTINUED | OUTPATIENT
Start: 2019-01-01 | End: 2019-01-01

## 2019-01-01 RX ORDER — POTASSIUM CHLORIDE 20 MEQ/1
40 TABLET, EXTENDED RELEASE ORAL DAILY
Start: 2019-01-01 | End: 2019-01-01 | Stop reason: SDUPTHER

## 2019-01-01 RX ORDER — ACETAMINOPHEN 325 MG/1
650 TABLET ORAL EVERY 6 HOURS PRN
Status: DISCONTINUED | OUTPATIENT
Start: 2019-01-01 | End: 2019-01-01

## 2019-01-01 RX ORDER — BUMETANIDE 1 MG/1
2 TABLET ORAL 2 TIMES DAILY
Status: ON HOLD
Start: 2019-01-01 | End: 2019-01-01 | Stop reason: SDUPTHER

## 2019-01-01 RX ORDER — POLYETHYLENE GLYCOL 3350 17 G/17G
17 POWDER, FOR SOLUTION ORAL DAILY
Status: DISCONTINUED | OUTPATIENT
Start: 2019-01-01 | End: 2019-01-01 | Stop reason: HOSPADM

## 2019-01-01 RX ORDER — MAGNESIUM SULFATE HEPTAHYDRATE 40 MG/ML
4 INJECTION, SOLUTION INTRAVENOUS
Status: DISCONTINUED | OUTPATIENT
Start: 2019-01-01 | End: 2019-01-01

## 2019-01-01 RX ORDER — FERROUS SULFATE 325(65) MG
325 TABLET, DELAYED RELEASE (ENTERIC COATED) ORAL 2 TIMES DAILY
Status: DISCONTINUED | OUTPATIENT
Start: 2019-01-01 | End: 2019-01-01 | Stop reason: HOSPADM

## 2019-01-01 RX ORDER — VANCOMYCIN HCL IN 5 % DEXTROSE 1.25 G/25
15 PLASTIC BAG, INJECTION (ML) INTRAVENOUS ONCE
Status: COMPLETED | OUTPATIENT
Start: 2019-01-01 | End: 2019-01-01

## 2019-01-01 RX ORDER — FUROSEMIDE 10 MG/ML
80 INJECTION INTRAMUSCULAR; INTRAVENOUS ONCE
Status: COMPLETED | OUTPATIENT
Start: 2019-01-01 | End: 2019-01-01

## 2019-01-01 RX ORDER — GLYCERIN 1 G/1
1 SUPPOSITORY RECTAL DAILY PRN
Status: DISCONTINUED | OUTPATIENT
Start: 2019-01-01 | End: 2019-01-01 | Stop reason: HOSPADM

## 2019-01-01 RX ORDER — INSULIN ASPART 100 [IU]/ML
10 INJECTION, SOLUTION INTRAVENOUS; SUBCUTANEOUS
Status: DISCONTINUED | OUTPATIENT
Start: 2019-01-01 | End: 2019-01-01

## 2019-01-01 RX ORDER — DEXMEDETOMIDINE HYDROCHLORIDE 100 UG/ML
INJECTION, SOLUTION INTRAVENOUS
Status: DISCONTINUED | OUTPATIENT
Start: 2019-01-01 | End: 2019-01-01

## 2019-01-01 RX ORDER — BUMETANIDE 1 MG/1
2 TABLET ORAL DAILY
Qty: 450 TABLET | Refills: 3 | Status: ON HOLD
Start: 2019-01-01 | End: 2019-01-01 | Stop reason: SDUPTHER

## 2019-01-01 RX ORDER — ROSUVASTATIN CALCIUM 20 MG/1
20 TABLET, COATED ORAL NIGHTLY
Status: DISCONTINUED | OUTPATIENT
Start: 2019-01-01 | End: 2019-01-01 | Stop reason: HOSPADM

## 2019-01-01 RX ORDER — ONDANSETRON 2 MG/ML
INJECTION INTRAMUSCULAR; INTRAVENOUS
Status: DISCONTINUED | OUTPATIENT
Start: 2019-01-01 | End: 2019-01-01

## 2019-01-01 RX ORDER — IBUPROFEN 200 MG
16 TABLET ORAL
Status: DISCONTINUED | OUTPATIENT
Start: 2019-01-01 | End: 2019-01-01 | Stop reason: HOSPADM

## 2019-01-01 RX ORDER — LANOLIN ALCOHOL/MO/W.PET/CERES
800 CREAM (GRAM) TOPICAL ONCE
Status: DISCONTINUED | OUTPATIENT
Start: 2019-01-01 | End: 2019-01-01

## 2019-01-01 RX ORDER — WARFARIN 1 MG/1
1 TABLET ORAL DAILY
Status: DISCONTINUED | OUTPATIENT
Start: 2019-01-01 | End: 2019-01-01

## 2019-01-01 RX ORDER — IBUPROFEN 200 MG
24 TABLET ORAL
Status: DISCONTINUED | OUTPATIENT
Start: 2019-01-01 | End: 2019-01-01

## 2019-01-01 RX ORDER — ONDANSETRON 4 MG/1
4 TABLET, ORALLY DISINTEGRATING ORAL EVERY 6 HOURS PRN
Status: DISCONTINUED | OUTPATIENT
Start: 2019-01-01 | End: 2019-01-01 | Stop reason: HOSPADM

## 2019-01-01 RX ORDER — SODIUM CHLORIDE 0.9 % (FLUSH) 0.9 %
5 SYRINGE (ML) INJECTION
Status: DISCONTINUED | OUTPATIENT
Start: 2019-01-01 | End: 2019-01-01 | Stop reason: HOSPADM

## 2019-01-01 RX ORDER — POTASSIUM CHLORIDE 1500 MG/1
20 TABLET, EXTENDED RELEASE ORAL 3 TIMES DAILY
Refills: 3 | COMMUNITY
Start: 2019-01-01

## 2019-01-01 RX ORDER — BISACODYL 5 MG
5 TABLET, DELAYED RELEASE (ENTERIC COATED) ORAL ONCE
Status: DISCONTINUED | OUTPATIENT
Start: 2019-01-01 | End: 2019-01-01

## 2019-01-01 RX ORDER — IPRATROPIUM BROMIDE AND ALBUTEROL SULFATE 2.5; .5 MG/3ML; MG/3ML
3 SOLUTION RESPIRATORY (INHALATION) EVERY 6 HOURS
Status: DISCONTINUED | OUTPATIENT
Start: 2019-01-01 | End: 2019-01-01

## 2019-01-01 RX ORDER — AMOXICILLIN 500 MG/1
500 CAPSULE ORAL EVERY 8 HOURS
Qty: 90 CAPSULE | Refills: 11 | Status: ON HOLD | OUTPATIENT
Start: 2019-01-01 | End: 2019-01-01 | Stop reason: HOSPADM

## 2019-01-01 RX ORDER — FUROSEMIDE 10 MG/ML
20 INJECTION INTRAMUSCULAR; INTRAVENOUS ONCE
Status: COMPLETED | OUTPATIENT
Start: 2019-01-01 | End: 2019-01-01

## 2019-01-01 RX ORDER — DOPAMINE HCL IN DEXTROSE 5 % 400MG/.25L
5 INFUSION BOTTLE (ML) INTRAVENOUS CONTINUOUS
Status: DISCONTINUED | OUTPATIENT
Start: 2019-01-01 | End: 2019-01-01

## 2019-01-01 RX ORDER — CLOPIDOGREL BISULFATE 75 MG/1
75 TABLET ORAL DAILY
Status: DISCONTINUED | OUTPATIENT
Start: 2019-01-01 | End: 2019-01-01 | Stop reason: HOSPADM

## 2019-01-01 RX ORDER — GLUCAGON 1 MG
1 KIT INJECTION
Status: DISCONTINUED | OUTPATIENT
Start: 2019-01-01 | End: 2019-01-01

## 2019-01-01 RX ORDER — LIDOCAINE HYDROCHLORIDE 20 MG/ML
SOLUTION OROPHARYNGEAL
Status: DISCONTINUED | OUTPATIENT
Start: 2019-01-01 | End: 2019-01-01

## 2019-01-01 RX ORDER — METOPROLOL SUCCINATE 25 MG/1
25 TABLET, EXTENDED RELEASE ORAL DAILY
Qty: 30 TABLET | Refills: 11 | Status: SHIPPED | OUTPATIENT
Start: 2019-01-01

## 2019-01-01 RX ORDER — ATROPINE SULFATE 0.1 MG/ML
0.5 INJECTION INTRAVENOUS ONCE
Status: COMPLETED | OUTPATIENT
Start: 2019-01-01 | End: 2019-01-01

## 2019-01-01 RX ORDER — TORSEMIDE 20 MG/1
40 TABLET ORAL 2 TIMES DAILY
Qty: 30 TABLET | Refills: 11 | Status: SHIPPED | OUTPATIENT
Start: 2019-01-01

## 2019-01-01 RX ORDER — MIDAZOLAM HYDROCHLORIDE 1 MG/ML
INJECTION, SOLUTION INTRAMUSCULAR; INTRAVENOUS
Status: DISCONTINUED | OUTPATIENT
Start: 2019-01-01 | End: 2019-01-01

## 2019-01-01 RX ORDER — INSULIN ASPART 100 [IU]/ML
5-7 INJECTION, SOLUTION INTRAVENOUS; SUBCUTANEOUS
Status: DISCONTINUED | OUTPATIENT
Start: 2019-01-01 | End: 2019-01-01

## 2019-01-01 RX ORDER — METOLAZONE 2.5 MG/1
5 TABLET ORAL ONCE
Status: COMPLETED | OUTPATIENT
Start: 2019-01-01 | End: 2019-01-01

## 2019-01-01 RX ORDER — HYDROXYZINE HYDROCHLORIDE 10 MG/1
10 TABLET, FILM COATED ORAL 3 TIMES DAILY PRN
Status: DISCONTINUED | OUTPATIENT
Start: 2019-01-01 | End: 2019-01-01

## 2019-01-01 RX ORDER — NOREPINEPHRINE BITARTRATE/D5W 4MG/250ML
0.02 PLASTIC BAG, INJECTION (ML) INTRAVENOUS CONTINUOUS
Status: DISCONTINUED | OUTPATIENT
Start: 2019-01-01 | End: 2019-01-01

## 2019-01-01 RX ORDER — SODIUM CHLORIDE 0.9 % (FLUSH) 0.9 %
3 SYRINGE (ML) INJECTION
Status: DISCONTINUED | OUTPATIENT
Start: 2019-01-01 | End: 2019-01-01 | Stop reason: HOSPADM

## 2019-01-01 RX ORDER — DOXEPIN HYDROCHLORIDE 10 MG/1
10 CAPSULE ORAL NIGHTLY
Refills: 0 | COMMUNITY
Start: 2019-01-01

## 2019-01-01 RX ORDER — AMOXICILLIN 250 MG
2 CAPSULE ORAL 2 TIMES DAILY
Status: DISCONTINUED | OUTPATIENT
Start: 2019-01-01 | End: 2019-01-01 | Stop reason: HOSPADM

## 2019-01-01 RX ORDER — POLYETHYLENE GLYCOL 3350 17 G/17G
17 POWDER, FOR SOLUTION ORAL ONCE
Status: COMPLETED | OUTPATIENT
Start: 2019-01-01 | End: 2019-01-01

## 2019-01-01 RX ORDER — LEVOTHYROXINE SODIUM 50 UG/1
50 TABLET ORAL DAILY
Qty: 30 TABLET | Refills: 11 | Status: SHIPPED | OUTPATIENT
Start: 2019-01-01 | End: 2020-05-23

## 2019-01-01 RX ORDER — OMEGA-3-ACID ETHYL ESTERS 1 G/1
2 CAPSULE, LIQUID FILLED ORAL 2 TIMES DAILY
Status: DISCONTINUED | OUTPATIENT
Start: 2019-01-01 | End: 2019-01-01 | Stop reason: HOSPADM

## 2019-01-01 RX ORDER — AMOXICILLIN 500 MG/1
500 CAPSULE ORAL EVERY 8 HOURS
Qty: 90 CAPSULE | Refills: 11 | Status: CANCELLED | OUTPATIENT
Start: 2019-01-01 | End: 2020-02-19

## 2019-01-01 RX ORDER — VANCOMYCIN HCL IN 5 % DEXTROSE 1G/250ML
1000 PLASTIC BAG, INJECTION (ML) INTRAVENOUS ONCE
Status: COMPLETED | OUTPATIENT
Start: 2019-01-01 | End: 2019-01-01

## 2019-01-01 RX ORDER — FUROSEMIDE 10 MG/ML
40 INJECTION INTRAMUSCULAR; INTRAVENOUS
Status: SHIPPED | OUTPATIENT
Start: 2019-01-01

## 2019-01-01 RX ORDER — METOLAZONE 2.5 MG/1
5 TABLET ORAL DAILY
Status: DISCONTINUED | OUTPATIENT
Start: 2019-01-01 | End: 2019-01-01

## 2019-01-01 RX ORDER — ROCURONIUM BROMIDE 10 MG/ML
INJECTION, SOLUTION INTRAVENOUS
Status: DISCONTINUED
Start: 2019-01-01 | End: 2019-01-01 | Stop reason: WASHOUT

## 2019-01-01 RX ORDER — LEVOTHYROXINE SODIUM 50 UG/1
50 TABLET ORAL DAILY
Status: DISCONTINUED | OUTPATIENT
Start: 2019-01-01 | End: 2019-01-01

## 2019-01-01 RX ORDER — INSULIN ASPART 100 [IU]/ML
0-5 INJECTION, SOLUTION INTRAVENOUS; SUBCUTANEOUS
Status: DISCONTINUED | OUTPATIENT
Start: 2019-01-01 | End: 2019-01-01

## 2019-01-01 RX ORDER — APIXABAN 5 MG/1
TABLET, FILM COATED ORAL
Qty: 60 TABLET | Refills: 2 | Status: SHIPPED | OUTPATIENT
Start: 2019-01-01

## 2019-01-01 RX ORDER — AMOXICILLIN 500 MG/1
500 CAPSULE ORAL EVERY 12 HOURS
Status: DISCONTINUED | OUTPATIENT
Start: 2019-01-01 | End: 2019-01-01 | Stop reason: HOSPADM

## 2019-01-01 RX ORDER — BUMETANIDE 1 MG/1
2 TABLET ORAL 2 TIMES DAILY
Qty: 60 TABLET | Refills: 5 | Status: SHIPPED | OUTPATIENT
Start: 2019-01-01 | End: 2019-01-01 | Stop reason: ALTCHOICE

## 2019-01-01 RX ORDER — IODIXANOL 320 MG/ML
INJECTION, SOLUTION INTRAVASCULAR
Status: DISCONTINUED | OUTPATIENT
Start: 2019-01-01 | End: 2019-01-01

## 2019-01-01 RX ORDER — ALPRAZOLAM 0.5 MG/1
0.5 TABLET ORAL ONCE
Status: COMPLETED | OUTPATIENT
Start: 2019-01-01 | End: 2019-01-01

## 2019-01-01 RX ORDER — RAMELTEON 8 MG/1
8 TABLET ORAL NIGHTLY PRN
Status: DISCONTINUED | OUTPATIENT
Start: 2019-01-01 | End: 2019-01-01 | Stop reason: HOSPADM

## 2019-01-01 RX ORDER — POTASSIUM CHLORIDE 20 MEQ/1
20 TABLET, EXTENDED RELEASE ORAL DAILY
Status: DISCONTINUED | OUTPATIENT
Start: 2019-01-01 | End: 2019-01-01

## 2019-01-01 RX ORDER — POTASSIUM CHLORIDE 14.9 MG/ML
60 INJECTION INTRAVENOUS
Status: DISCONTINUED | OUTPATIENT
Start: 2019-01-01 | End: 2019-01-01

## 2019-01-01 RX ORDER — WARFARIN 2.5 MG/1
2.5 TABLET ORAL ONCE
Status: COMPLETED | OUTPATIENT
Start: 2019-01-01 | End: 2019-01-01

## 2019-01-01 RX ORDER — PROPOFOL 10 MG/ML
VIAL (ML) INTRAVENOUS
Status: DISCONTINUED | OUTPATIENT
Start: 2019-01-01 | End: 2019-01-01

## 2019-01-01 RX ORDER — MAGNESIUM SULFATE HEPTAHYDRATE 40 MG/ML
2 INJECTION, SOLUTION INTRAVENOUS
Status: DISCONTINUED | OUTPATIENT
Start: 2019-01-01 | End: 2019-01-01

## 2019-01-01 RX ORDER — PROPOFOL 10 MG/ML
INJECTION, EMULSION INTRAVENOUS
Status: DISCONTINUED
Start: 2019-01-01 | End: 2019-01-01 | Stop reason: WASHOUT

## 2019-01-01 RX ORDER — ALBUMIN HUMAN 250 G/1000ML
25 SOLUTION INTRAVENOUS ONCE
Status: COMPLETED | OUTPATIENT
Start: 2019-01-01 | End: 2019-01-01

## 2019-01-01 RX ORDER — DOPAMINE HYDROCHLORIDE 160 MG/100ML
5 INJECTION, SOLUTION INTRAVENOUS CONTINUOUS
Status: DISCONTINUED | OUTPATIENT
Start: 2019-01-01 | End: 2019-01-01

## 2019-01-01 RX ORDER — HYDROCODONE BITARTRATE AND ACETAMINOPHEN 500; 5 MG/1; MG/1
TABLET ORAL
Status: DISCONTINUED | OUTPATIENT
Start: 2019-01-01 | End: 2019-01-01 | Stop reason: HOSPADM

## 2019-01-01 RX ORDER — BUMETANIDE 1 MG/1
2 TABLET ORAL DAILY
Start: 2019-01-01 | End: 2019-01-01

## 2019-01-01 RX ORDER — LIDOCAINE HCL/PF 100 MG/5ML
SYRINGE (ML) INTRAVENOUS
Status: DISCONTINUED | OUTPATIENT
Start: 2019-01-01 | End: 2019-01-01

## 2019-01-01 RX ORDER — ALBUTEROL SULFATE 90 UG/1
2 AEROSOL, METERED RESPIRATORY (INHALATION) EVERY 4 HOURS PRN
Status: DISCONTINUED | OUTPATIENT
Start: 2019-01-01 | End: 2019-01-01 | Stop reason: HOSPADM

## 2019-01-01 RX ORDER — CEFEPIME HYDROCHLORIDE 2 G/1
2 INJECTION, POWDER, FOR SOLUTION INTRAVENOUS
Status: DISCONTINUED | OUTPATIENT
Start: 2019-01-01 | End: 2019-01-01 | Stop reason: ALTCHOICE

## 2019-01-01 RX ORDER — WARFARIN 2 MG/1
2 TABLET ORAL DAILY
Status: DISCONTINUED | OUTPATIENT
Start: 2019-01-01 | End: 2019-01-01

## 2019-01-01 RX ORDER — POTASSIUM CHLORIDE 20 MEQ/1
20 TABLET, EXTENDED RELEASE ORAL ONCE
Status: DISCONTINUED | OUTPATIENT
Start: 2019-01-01 | End: 2019-01-01

## 2019-01-01 RX ORDER — DOPAMINE HCL IN DEXTROSE 5 % 400MG/.25L
2 INFUSION BOTTLE (ML) INTRAVENOUS CONTINUOUS
Status: DISCONTINUED | OUTPATIENT
Start: 2019-01-01 | End: 2019-01-01

## 2019-01-01 RX ORDER — DOCUSATE SODIUM 100 MG/1
100 CAPSULE, LIQUID FILLED ORAL 2 TIMES DAILY
Status: DISCONTINUED | OUTPATIENT
Start: 2019-01-01 | End: 2019-01-01

## 2019-01-01 RX ORDER — WARFARIN 2 MG/1
2 TABLET ORAL ONCE
Status: DISCONTINUED | OUTPATIENT
Start: 2019-01-01 | End: 2019-01-01

## 2019-01-01 RX ORDER — ROCURONIUM BROMIDE 10 MG/ML
INJECTION, SOLUTION INTRAVENOUS
Status: DISCONTINUED | OUTPATIENT
Start: 2019-01-01 | End: 2019-01-01

## 2019-01-01 RX ORDER — LEVOTHYROXINE SODIUM 50 UG/1
50 CAPSULE ORAL DAILY
Qty: 90 TABLET | Refills: 3 | Status: SHIPPED | OUTPATIENT
Start: 2019-01-01 | End: 2019-01-01

## 2019-01-01 RX ORDER — POTASSIUM CHLORIDE 20 MEQ/1
40 TABLET, EXTENDED RELEASE ORAL 2 TIMES DAILY
Status: DISCONTINUED | OUTPATIENT
Start: 2019-01-01 | End: 2019-01-01

## 2019-01-01 RX ORDER — POTASSIUM CHLORIDE 20 MEQ/15ML
20 SOLUTION ORAL ONCE
Status: COMPLETED | OUTPATIENT
Start: 2019-01-01 | End: 2019-01-01

## 2019-01-01 RX ORDER — ACETAMINOPHEN 325 MG/1
650 TABLET ORAL EVERY 4 HOURS PRN
Status: DISCONTINUED | OUTPATIENT
Start: 2019-01-01 | End: 2019-01-01 | Stop reason: HOSPADM

## 2019-01-01 RX ORDER — INSULIN ASPART 100 [IU]/ML
3 INJECTION, SOLUTION INTRAVENOUS; SUBCUTANEOUS
Status: DISCONTINUED | OUTPATIENT
Start: 2019-01-01 | End: 2019-01-01

## 2019-01-01 RX ORDER — FUROSEMIDE 10 MG/ML
INJECTION INTRAMUSCULAR; INTRAVENOUS
Status: DISPENSED
Start: 2019-01-01 | End: 2019-01-01

## 2019-01-01 RX ORDER — MEMANTINE HYDROCHLORIDE 5 MG-10 MG
KIT ORAL
Refills: 0 | COMMUNITY
Start: 2019-01-01

## 2019-01-01 RX ORDER — GLYCOPYRROLATE 0.2 MG/ML
INJECTION INTRAMUSCULAR; INTRAVENOUS
Status: DISCONTINUED | OUTPATIENT
Start: 2019-01-01 | End: 2019-01-01

## 2019-01-01 RX ORDER — MIDAZOLAM HYDROCHLORIDE 1 MG/ML
INJECTION INTRAMUSCULAR; INTRAVENOUS
Status: COMPLETED
Start: 2019-01-01 | End: 2019-01-01

## 2019-01-01 RX ORDER — FENTANYL CITRATE 50 UG/ML
INJECTION, SOLUTION INTRAMUSCULAR; INTRAVENOUS
Status: DISCONTINUED | OUTPATIENT
Start: 2019-01-01 | End: 2019-01-01

## 2019-01-01 RX ORDER — SENNOSIDES 8.6 MG/1
8.6 TABLET ORAL DAILY
Status: DISCONTINUED | OUTPATIENT
Start: 2019-01-01 | End: 2019-01-01

## 2019-01-01 RX ORDER — ACETAMINOPHEN 500 MG
1000 TABLET ORAL 3 TIMES DAILY
Status: DISCONTINUED | OUTPATIENT
Start: 2019-01-01 | End: 2019-01-01

## 2019-01-01 RX ORDER — INSULIN ASPART 100 [IU]/ML
10 INJECTION, SOLUTION INTRAVENOUS; SUBCUTANEOUS
Status: DISCONTINUED | OUTPATIENT
Start: 2019-01-01 | End: 2019-01-01 | Stop reason: HOSPADM

## 2019-01-01 RX ORDER — INSULIN ASPART 100 [IU]/ML
3 INJECTION, SOLUTION INTRAVENOUS; SUBCUTANEOUS ONCE
Status: COMPLETED | OUTPATIENT
Start: 2019-01-01 | End: 2019-01-01

## 2019-01-01 RX ORDER — POTASSIUM CHLORIDE 20 MEQ/15ML
40 SOLUTION ORAL 2 TIMES DAILY
Status: DISCONTINUED | OUTPATIENT
Start: 2019-01-01 | End: 2019-01-01

## 2019-01-01 RX ORDER — LIDOCAINE HYDROCHLORIDE 20 MG/ML
JELLY TOPICAL
Status: DISCONTINUED | OUTPATIENT
Start: 2019-01-01 | End: 2019-01-01

## 2019-01-01 RX ORDER — TAMSULOSIN HYDROCHLORIDE 0.4 MG/1
CAPSULE ORAL
Qty: 30 CAPSULE | Refills: 2 | Status: SHIPPED | OUTPATIENT
Start: 2019-01-01

## 2019-01-01 RX ORDER — METOLAZONE 5 MG/1
5 TABLET ORAL DAILY
Refills: 2 | COMMUNITY
Start: 2019-01-01

## 2019-01-01 RX ORDER — VANCOMYCIN 1.75 GRAM/500 ML IN 0.9 % SODIUM CHLORIDE INTRAVENOUS
20 ONCE
Status: DISCONTINUED | OUTPATIENT
Start: 2019-01-01 | End: 2019-01-01

## 2019-01-01 RX ORDER — POTASSIUM CHLORIDE 29.8 MG/ML
80 INJECTION INTRAVENOUS
Status: DISCONTINUED | OUTPATIENT
Start: 2019-01-01 | End: 2019-01-01

## 2019-01-01 RX ORDER — BUPIVACAINE HYDROCHLORIDE 2.5 MG/ML
INJECTION, SOLUTION EPIDURAL; INFILTRATION; INTRACAUDAL
Status: DISCONTINUED | OUTPATIENT
Start: 2019-01-01 | End: 2019-01-01

## 2019-01-01 RX ORDER — LEVOFLOXACIN 500 MG/1
500 TABLET, FILM COATED ORAL EVERY OTHER DAY
Qty: 1 TABLET | Refills: 0 | Status: SHIPPED | OUTPATIENT
Start: 2019-01-01 | End: 2019-01-01

## 2019-01-01 RX ORDER — FUROSEMIDE 10 MG/ML
80 INJECTION INTRAMUSCULAR; INTRAVENOUS 3 TIMES DAILY
Status: DISCONTINUED | OUTPATIENT
Start: 2019-01-01 | End: 2019-01-01

## 2019-01-01 RX ORDER — ACETAMINOPHEN 325 MG/1
650 TABLET ORAL EVERY 4 HOURS PRN
Status: DISCONTINUED | OUTPATIENT
Start: 2019-01-01 | End: 2019-01-01

## 2019-01-01 RX ORDER — METOPROLOL SUCCINATE 25 MG/1
25 TABLET, EXTENDED RELEASE ORAL DAILY
Status: DISCONTINUED | OUTPATIENT
Start: 2019-01-01 | End: 2019-01-01

## 2019-01-01 RX ORDER — ALPRAZOLAM 0.25 MG/1
0.25 TABLET ORAL ONCE
Status: COMPLETED | OUTPATIENT
Start: 2019-01-01 | End: 2019-01-01

## 2019-01-01 RX ORDER — POTASSIUM CHLORIDE 20 MEQ/1
20 TABLET, EXTENDED RELEASE ORAL 2 TIMES DAILY
Status: DISCONTINUED | OUTPATIENT
Start: 2019-01-01 | End: 2019-01-01

## 2019-01-01 RX ORDER — BUPIVACAINE HYDROCHLORIDE 5 MG/ML
INJECTION, SOLUTION EPIDURAL; INTRACAUDAL
Status: DISCONTINUED | OUTPATIENT
Start: 2019-01-01 | End: 2019-01-01

## 2019-01-01 RX ORDER — WARFARIN 2 MG/1
2 TABLET ORAL ONCE
Status: COMPLETED | OUTPATIENT
Start: 2019-01-01 | End: 2019-01-01

## 2019-01-01 RX ORDER — DIGOXIN 0.05 MG/ML
0.12 SOLUTION ORAL EVERY MORNING
Status: DISCONTINUED | OUTPATIENT
Start: 2019-01-01 | End: 2019-01-01

## 2019-01-01 RX ORDER — ALPRAZOLAM 0.25 MG/1
0.25 TABLET ORAL 3 TIMES DAILY PRN
Status: DISCONTINUED | OUTPATIENT
Start: 2019-01-01 | End: 2019-01-01

## 2019-01-01 RX ORDER — PEN NEEDLE, DIABETIC 30 GX3/16"
1 NEEDLE, DISPOSABLE MISCELLANEOUS 4 TIMES DAILY
Qty: 100 EACH | Refills: 0 | Status: SHIPPED | OUTPATIENT
Start: 2019-01-01

## 2019-01-01 RX ORDER — SUCCINYLCHOLINE CHLORIDE 20 MG/ML
INJECTION INTRAMUSCULAR; INTRAVENOUS
Status: DISCONTINUED
Start: 2019-01-01 | End: 2019-01-01 | Stop reason: WASHOUT

## 2019-01-01 RX ORDER — POTASSIUM CHLORIDE 20 MEQ/1
60 TABLET, EXTENDED RELEASE ORAL DAILY
Status: ON HOLD
Start: 2019-01-01 | End: 2019-01-01 | Stop reason: SDUPTHER

## 2019-01-01 RX ORDER — HEPARIN SODIUM,PORCINE/D5W 25000/250
12 INTRAVENOUS SOLUTION INTRAVENOUS CONTINUOUS
Status: DISCONTINUED | OUTPATIENT
Start: 2019-01-01 | End: 2019-01-01

## 2019-01-01 RX ORDER — ATROPINE SULFATE 0.1 MG/ML
1 INJECTION INTRAVENOUS ONCE AS NEEDED
Status: DISCONTINUED | OUTPATIENT
Start: 2019-01-01 | End: 2019-01-01 | Stop reason: HOSPADM

## 2019-01-01 RX ORDER — FERROUS SULFATE 325(65) MG
325 TABLET, DELAYED RELEASE (ENTERIC COATED) ORAL 2 TIMES DAILY
Refills: 0 | COMMUNITY
Start: 2019-01-01

## 2019-01-01 RX ORDER — HYDROCODONE BITARTRATE AND ACETAMINOPHEN 500; 5 MG/1; MG/1
TABLET ORAL
Status: DISCONTINUED | OUTPATIENT
Start: 2019-01-01 | End: 2019-01-01 | Stop reason: ALTCHOICE

## 2019-01-01 RX ORDER — POTASSIUM CHLORIDE 20 MEQ/1
20 TABLET, EXTENDED RELEASE ORAL DAILY
Qty: 90 TABLET | Refills: 3 | Status: ON HOLD
Start: 2019-01-01 | End: 2019-01-01 | Stop reason: SDUPTHER

## 2019-01-01 RX ORDER — METOPROLOL SUCCINATE 25 MG/1
25 TABLET, EXTENDED RELEASE ORAL NIGHTLY
Status: DISCONTINUED | OUTPATIENT
Start: 2019-01-01 | End: 2019-01-01

## 2019-01-01 RX ORDER — AMOXICILLIN 500 MG/1
500 CAPSULE ORAL EVERY 8 HOURS
Refills: 11 | COMMUNITY
Start: 2019-01-01

## 2019-01-01 RX ORDER — METOPROLOL SUCCINATE 25 MG/1
25 TABLET, EXTENDED RELEASE ORAL DAILY
Status: DISCONTINUED | OUTPATIENT
Start: 2019-01-01 | End: 2019-01-01 | Stop reason: HOSPADM

## 2019-01-01 RX ORDER — POTASSIUM CHLORIDE 20 MEQ/1
60 TABLET, EXTENDED RELEASE ORAL ONCE
Status: DISCONTINUED | OUTPATIENT
Start: 2019-01-01 | End: 2019-01-01

## 2019-01-01 RX ORDER — AMOXICILLIN 500 MG/1
500 CAPSULE ORAL EVERY 12 HOURS
Qty: 30 CAPSULE | Refills: 12 | Status: SHIPPED | OUTPATIENT
Start: 2019-01-01 | End: 2019-01-01 | Stop reason: ALTCHOICE

## 2019-01-01 RX ORDER — FUROSEMIDE 10 MG/ML
60 INJECTION INTRAMUSCULAR; INTRAVENOUS
Status: DISCONTINUED | OUTPATIENT
Start: 2019-01-01 | End: 2019-01-01 | Stop reason: HOSPADM

## 2019-01-01 RX ORDER — SODIUM CHLORIDE 0.9 % (FLUSH) 0.9 %
10 SYRINGE (ML) INJECTION
Status: DISCONTINUED | OUTPATIENT
Start: 2019-01-01 | End: 2019-01-01 | Stop reason: HOSPADM

## 2019-01-01 RX ORDER — WARFARIN 2.5 MG/1
2.5 TABLET ORAL DAILY
Status: DISCONTINUED | OUTPATIENT
Start: 2019-01-01 | End: 2019-01-01

## 2019-01-01 RX ORDER — LANOLIN ALCOHOL/MO/W.PET/CERES
800 CREAM (GRAM) TOPICAL 2 TIMES DAILY
Status: DISCONTINUED | OUTPATIENT
Start: 2019-01-01 | End: 2019-01-01

## 2019-01-01 RX ORDER — FENTANYL CITRATE 50 UG/ML
25 INJECTION, SOLUTION INTRAMUSCULAR; INTRAVENOUS EVERY 5 MIN PRN
Status: DISCONTINUED | OUTPATIENT
Start: 2019-01-01 | End: 2019-01-01

## 2019-01-01 RX ORDER — HYDROMORPHONE HYDROCHLORIDE 1 MG/ML
0.2 INJECTION, SOLUTION INTRAMUSCULAR; INTRAVENOUS; SUBCUTANEOUS EVERY 5 MIN PRN
Status: CANCELLED | OUTPATIENT
Start: 2019-01-01

## 2019-01-01 RX ORDER — CETIRIZINE HYDROCHLORIDE 5 MG/1
5 TABLET ORAL DAILY
Status: DISCONTINUED | OUTPATIENT
Start: 2019-01-01 | End: 2019-01-01 | Stop reason: HOSPADM

## 2019-01-01 RX ORDER — SODIUM CHLORIDE 9 MG/ML
INJECTION, SOLUTION INTRAVENOUS CONTINUOUS
Status: ACTIVE | OUTPATIENT
Start: 2019-01-01 | End: 2019-01-01

## 2019-01-01 RX ORDER — SACUBITRIL AND VALSARTAN 24; 26 MG/1; MG/1
TABLET, FILM COATED ORAL
Qty: 60 TABLET | Refills: 0 | Status: ON HOLD | OUTPATIENT
Start: 2019-01-01 | End: 2019-01-01 | Stop reason: HOSPADM

## 2019-01-01 RX ORDER — AMOXICILLIN 500 MG/1
500 CAPSULE ORAL EVERY 8 HOURS
Status: DISCONTINUED | OUTPATIENT
Start: 2019-01-01 | End: 2019-01-01

## 2019-01-01 RX ORDER — DIGOXIN 0.05 MG/ML
0.06 SOLUTION ORAL EVERY MORNING
Status: DISCONTINUED | OUTPATIENT
Start: 2019-01-01 | End: 2019-01-01

## 2019-01-01 RX ORDER — POTASSIUM CHLORIDE 20 MEQ/1
40 TABLET, EXTENDED RELEASE ORAL DAILY
Qty: 180 TABLET | Refills: 3 | Status: SHIPPED | OUTPATIENT
Start: 2019-01-01 | End: 2019-01-01

## 2019-01-01 RX ORDER — BUMETANIDE 1 MG/1
2 TABLET ORAL DAILY
Status: DISCONTINUED | OUTPATIENT
Start: 2019-01-01 | End: 2019-01-01

## 2019-01-01 RX ORDER — BUMETANIDE 1 MG/1
2 TABLET ORAL 2 TIMES DAILY
Status: DISCONTINUED | OUTPATIENT
Start: 2019-01-01 | End: 2019-01-01 | Stop reason: HOSPADM

## 2019-01-01 RX ORDER — FUROSEMIDE 10 MG/ML
80 INJECTION INTRAMUSCULAR; INTRAVENOUS 2 TIMES DAILY
Status: DISCONTINUED | OUTPATIENT
Start: 2019-01-01 | End: 2019-01-01

## 2019-01-01 RX ORDER — INSULIN GLARGINE 100 [IU]/ML
6 INJECTION, SOLUTION SUBCUTANEOUS DAILY
Qty: 15 ML | Refills: 0 | Status: SHIPPED | OUTPATIENT
Start: 2019-01-01 | End: 2020-02-06

## 2019-01-01 RX ORDER — INSULIN ASPART 100 [IU]/ML
3-5 INJECTION, SOLUTION INTRAVENOUS; SUBCUTANEOUS
Status: DISCONTINUED | OUTPATIENT
Start: 2019-01-01 | End: 2019-01-01

## 2019-01-01 RX ORDER — INSULIN ASPART 100 [IU]/ML
0-5 INJECTION, SOLUTION INTRAVENOUS; SUBCUTANEOUS EVERY 6 HOURS PRN
Status: DISCONTINUED | OUTPATIENT
Start: 2019-01-01 | End: 2019-01-01

## 2019-01-01 RX ADMIN — HYPROMELLOSE 2910 1 DROP: 5 SOLUTION OPHTHALMIC at 05:01

## 2019-01-01 RX ADMIN — AMPICILLIN SODIUM 4000 MG: 2 INJECTION, POWDER, FOR SOLUTION INTRAVENOUS at 01:01

## 2019-01-01 RX ADMIN — HYPROMELLOSE 2910 1 DROP: 5 SOLUTION OPHTHALMIC at 06:01

## 2019-01-01 RX ADMIN — POLYETHYLENE GLYCOL 3350 17 G: 17 POWDER, FOR SOLUTION ORAL at 08:01

## 2019-01-01 RX ADMIN — ROSUVASTATIN CALCIUM 20 MG: 20 TABLET, FILM COATED ORAL at 09:02

## 2019-01-01 RX ADMIN — MAGNESIUM OXIDE TAB 400 MG (241.3 MG ELEMENTAL MG) 400 MG: 400 (241.3 MG) TAB at 05:01

## 2019-01-01 RX ADMIN — HEPARIN SODIUM AND DEXTROSE 10 UNITS/KG/HR: 10000; 5 INJECTION INTRAVENOUS at 08:01

## 2019-01-01 RX ADMIN — FUROSEMIDE 20 MG/HR: 10 INJECTION, SOLUTION INTRAMUSCULAR; INTRAVENOUS at 01:04

## 2019-01-01 RX ADMIN — POTASSIUM CHLORIDE 20 MEQ: 1500 TABLET, EXTENDED RELEASE ORAL at 08:04

## 2019-01-01 RX ADMIN — ROSUVASTATIN CALCIUM 20 MG: 20 TABLET, FILM COATED ORAL at 09:01

## 2019-01-01 RX ADMIN — AMPICILLIN SODIUM 4000 MG: 2 INJECTION, POWDER, FOR SOLUTION INTRAVENOUS at 12:01

## 2019-01-01 RX ADMIN — POTASSIUM CHLORIDE 20 MEQ: 1500 TABLET, EXTENDED RELEASE ORAL at 08:01

## 2019-01-01 RX ADMIN — BUMETANIDE 2 MG: 1 TABLET ORAL at 08:05

## 2019-01-01 RX ADMIN — MAGNESIUM OXIDE TAB 400 MG (241.3 MG ELEMENTAL MG) 400 MG: 400 (241.3 MG) TAB at 06:01

## 2019-01-01 RX ADMIN — ACETAMINOPHEN 650 MG: 325 TABLET, FILM COATED ORAL at 09:01

## 2019-01-01 RX ADMIN — FERROUS SULFATE TAB EC 325 MG (65 MG FE EQUIVALENT) 325 MG: 325 (65 FE) TABLET DELAYED RESPONSE at 09:02

## 2019-01-01 RX ADMIN — INSULIN ASPART 7 UNITS: 100 INJECTION, SOLUTION INTRAVENOUS; SUBCUTANEOUS at 04:01

## 2019-01-01 RX ADMIN — FERROUS SULFATE TAB EC 325 MG (65 MG FE EQUIVALENT) 325 MG: 325 (65 FE) TABLET DELAYED RESPONSE at 08:05

## 2019-01-01 RX ADMIN — RAMELTEON 8 MG: 8 TABLET, FILM COATED ORAL at 08:01

## 2019-01-01 RX ADMIN — THERA TABS 1 TABLET: TAB at 08:05

## 2019-01-01 RX ADMIN — POTASSIUM CHLORIDE 40 MEQ: 1500 TABLET, EXTENDED RELEASE ORAL at 09:01

## 2019-01-01 RX ADMIN — CLOPIDOGREL 75 MG: 75 TABLET, FILM COATED ORAL at 09:05

## 2019-01-01 RX ADMIN — HYPROMELLOSE 2910 1 DROP: 5 SOLUTION OPHTHALMIC at 09:01

## 2019-01-01 RX ADMIN — TAMSULOSIN HYDROCHLORIDE 0.4 MG: 0.4 CAPSULE ORAL at 08:02

## 2019-01-01 RX ADMIN — HYPROMELLOSE 2910 1 DROP: 5 SOLUTION OPHTHALMIC at 11:01

## 2019-01-01 RX ADMIN — INSULIN ASPART 10 UNITS: 100 INJECTION, SOLUTION INTRAVENOUS; SUBCUTANEOUS at 12:02

## 2019-01-01 RX ADMIN — ROSUVASTATIN CALCIUM 20 MG: 20 TABLET, FILM COATED ORAL at 08:01

## 2019-01-01 RX ADMIN — FENTANYL CITRATE 25 MCG: 50 INJECTION, SOLUTION INTRAMUSCULAR; INTRAVENOUS at 03:01

## 2019-01-01 RX ADMIN — SENNOSIDES AND DOCUSATE SODIUM 2 TABLET: 8.6; 5 TABLET ORAL at 08:01

## 2019-01-01 RX ADMIN — TRAMADOL HYDROCHLORIDE 50 MG: 50 TABLET, COATED ORAL at 09:02

## 2019-01-01 RX ADMIN — POTASSIUM CHLORIDE 40 MEQ: 1500 TABLET, EXTENDED RELEASE ORAL at 11:01

## 2019-01-01 RX ADMIN — IPRATROPIUM BROMIDE AND ALBUTEROL SULFATE 3 ML: .5; 3 SOLUTION RESPIRATORY (INHALATION) at 08:01

## 2019-01-01 RX ADMIN — CEFTRIAXONE SODIUM 2 G: 2 INJECTION, POWDER, FOR SOLUTION INTRAMUSCULAR; INTRAVENOUS at 04:01

## 2019-01-01 RX ADMIN — AMPICILLIN 2 G: 2 INJECTION, POWDER, FOR SOLUTION INTRAVENOUS at 12:02

## 2019-01-01 RX ADMIN — RAMELTEON 8 MG: 8 TABLET, FILM COATED ORAL at 10:01

## 2019-01-01 RX ADMIN — INSULIN ASPART 9 UNITS: 100 INJECTION, SOLUTION INTRAVENOUS; SUBCUTANEOUS at 04:01

## 2019-01-01 RX ADMIN — INSULIN ASPART 4 UNITS: 100 INJECTION, SOLUTION INTRAVENOUS; SUBCUTANEOUS at 05:04

## 2019-01-01 RX ADMIN — FERROUS SULFATE TAB EC 325 MG (65 MG FE EQUIVALENT) 325 MG: 325 (65 FE) TABLET DELAYED RESPONSE at 09:04

## 2019-01-01 RX ADMIN — AMPICILLIN SODIUM 4000 MG: 2 INJECTION, POWDER, FOR SOLUTION INTRAVENOUS at 09:01

## 2019-01-01 RX ADMIN — INSULIN ASPART 2 UNITS: 100 INJECTION, SOLUTION INTRAVENOUS; SUBCUTANEOUS at 08:01

## 2019-01-01 RX ADMIN — OMEGA-3-ACID ETHYL ESTERS 2 G: 1 CAPSULE, LIQUID FILLED ORAL at 08:05

## 2019-01-01 RX ADMIN — DOPAMINE HYDROCHLORIDE IN DEXTROSE 10 MCG/KG/MIN: 1.6 INJECTION, SOLUTION INTRAVENOUS at 05:01

## 2019-01-01 RX ADMIN — FUROSEMIDE 40 MG/HR: 10 INJECTION, SOLUTION INTRAMUSCULAR; INTRAVENOUS at 09:01

## 2019-01-01 RX ADMIN — INSULIN ASPART 6 UNITS: 100 INJECTION, SOLUTION INTRAVENOUS; SUBCUTANEOUS at 06:01

## 2019-01-01 RX ADMIN — INSULIN ASPART 6 UNITS: 100 INJECTION, SOLUTION INTRAVENOUS; SUBCUTANEOUS at 05:01

## 2019-01-01 RX ADMIN — INSULIN ASPART 10 UNITS: 100 INJECTION, SOLUTION INTRAVENOUS; SUBCUTANEOUS at 07:05

## 2019-01-01 RX ADMIN — HYPROMELLOSE 2910 1 DROP: 5 SOLUTION OPHTHALMIC at 07:02

## 2019-01-01 RX ADMIN — SENNOSIDES AND DOCUSATE SODIUM 2 TABLET: 8.6; 5 TABLET ORAL at 10:01

## 2019-01-01 RX ADMIN — DOPAMINE HYDROCHLORIDE IN DEXTROSE 7.5 MCG/KG/MIN: 1.6 INJECTION, SOLUTION INTRAVENOUS at 08:01

## 2019-01-01 RX ADMIN — POTASSIUM CHLORIDE 40 MEQ: 1500 TABLET, EXTENDED RELEASE ORAL at 08:05

## 2019-01-01 RX ADMIN — INSULIN ASPART 1 UNITS: 100 INJECTION, SOLUTION INTRAVENOUS; SUBCUTANEOUS at 10:04

## 2019-01-01 RX ADMIN — AMPICILLIN 2 G: 2 INJECTION, POWDER, FOR SOLUTION INTRAVENOUS at 12:01

## 2019-01-01 RX ADMIN — INSULIN ASPART 2 UNITS: 100 INJECTION, SOLUTION INTRAVENOUS; SUBCUTANEOUS at 04:05

## 2019-01-01 RX ADMIN — OMEGA-3-ACID ETHYL ESTERS 2 G: 1 CAPSULE, LIQUID FILLED ORAL at 09:04

## 2019-01-01 RX ADMIN — FERROUS SULFATE TAB EC 325 MG (65 MG FE EQUIVALENT) 325 MG: 325 (65 FE) TABLET DELAYED RESPONSE at 08:02

## 2019-01-01 RX ADMIN — AMPICILLIN 2 G: 2 INJECTION, POWDER, FOR SOLUTION INTRAVENOUS at 09:02

## 2019-01-01 RX ADMIN — AMPICILLIN SODIUM 2 G: 2 INJECTION, POWDER, FOR SOLUTION INTRAMUSCULAR; INTRAVENOUS at 09:01

## 2019-01-01 RX ADMIN — AMPICILLIN SODIUM 4000 MG: 2 INJECTION, POWDER, FOR SOLUTION INTRAVENOUS at 03:01

## 2019-01-01 RX ADMIN — OMEGA-3-ACID ETHYL ESTERS 2 G: 1 CAPSULE, LIQUID FILLED ORAL at 09:05

## 2019-01-01 RX ADMIN — HEPARIN SODIUM AND DEXTROSE 12 UNITS/KG/HR: 10000; 5 INJECTION INTRAVENOUS at 12:01

## 2019-01-01 RX ADMIN — INSULIN ASPART 1 UNITS: 100 INJECTION, SOLUTION INTRAVENOUS; SUBCUTANEOUS at 09:04

## 2019-01-01 RX ADMIN — INSULIN ASPART 3 UNITS: 100 INJECTION, SOLUTION INTRAVENOUS; SUBCUTANEOUS at 09:04

## 2019-01-01 RX ADMIN — FUROSEMIDE 20 MG/HR: 10 INJECTION, SOLUTION INTRAMUSCULAR; INTRAVENOUS at 07:01

## 2019-01-01 RX ADMIN — APIXABAN 5 MG: 5 TABLET, FILM COATED ORAL at 08:04

## 2019-01-01 RX ADMIN — FUROSEMIDE 40 MG/HR: 10 INJECTION, SOLUTION INTRAMUSCULAR; INTRAVENOUS at 08:01

## 2019-01-01 RX ADMIN — POTASSIUM CHLORIDE 40 MEQ: 1500 TABLET, EXTENDED RELEASE ORAL at 12:02

## 2019-01-01 RX ADMIN — HYPROMELLOSE 2910 1 DROP: 5 SOLUTION OPHTHALMIC at 10:01

## 2019-01-01 RX ADMIN — FUROSEMIDE 60 MG/HR: 10 INJECTION, SOLUTION INTRAMUSCULAR; INTRAVENOUS at 06:01

## 2019-01-01 RX ADMIN — LEVOTHYROXINE SODIUM 25 MCG: 25 TABLET ORAL at 05:01

## 2019-01-01 RX ADMIN — Medication 0.08 MCG/KG/MIN: at 04:01

## 2019-01-01 RX ADMIN — POTASSIUM CHLORIDE 40 MEQ: 1500 TABLET, EXTENDED RELEASE ORAL at 09:04

## 2019-01-01 RX ADMIN — POTASSIUM CHLORIDE 60 MEQ: 1500 TABLET, EXTENDED RELEASE ORAL at 06:01

## 2019-01-01 RX ADMIN — IPRATROPIUM BROMIDE AND ALBUTEROL SULFATE 3 ML: .5; 3 SOLUTION RESPIRATORY (INHALATION) at 09:01

## 2019-01-01 RX ADMIN — DOPAMINE HYDROCHLORIDE IN DEXTROSE 10 MCG/KG/MIN: 1.6 INJECTION, SOLUTION INTRAVENOUS at 04:01

## 2019-01-01 RX ADMIN — DOPAMINE HYDROCHLORIDE IN DEXTROSE 5 MCG/KG/MIN: 1.6 INJECTION, SOLUTION INTRAVENOUS at 01:01

## 2019-01-01 RX ADMIN — HYPROMELLOSE 2910 1 DROP: 5 SOLUTION OPHTHALMIC at 12:02

## 2019-01-01 RX ADMIN — CHLOROTHIAZIDE SODIUM 500 MG: 500 INJECTION, POWDER, LYOPHILIZED, FOR SOLUTION INTRAVENOUS at 07:01

## 2019-01-01 RX ADMIN — INSULIN ASPART 10 UNITS: 100 INJECTION, SOLUTION INTRAVENOUS; SUBCUTANEOUS at 04:05

## 2019-01-01 RX ADMIN — OMEGA-3-ACID ETHYL ESTERS 2 G: 1 CAPSULE, LIQUID FILLED ORAL at 08:04

## 2019-01-01 RX ADMIN — INSULIN ASPART 10 UNITS: 100 INJECTION, SOLUTION INTRAVENOUS; SUBCUTANEOUS at 04:02

## 2019-01-01 RX ADMIN — LEVOTHYROXINE SODIUM 25 MCG: 25 TABLET ORAL at 05:02

## 2019-01-01 RX ADMIN — CEFTRIAXONE SODIUM 2 G: 2 INJECTION, POWDER, FOR SOLUTION INTRAMUSCULAR; INTRAVENOUS at 03:01

## 2019-01-01 RX ADMIN — CHLOROTHIAZIDE SODIUM 250 MG: 500 INJECTION, POWDER, LYOPHILIZED, FOR SOLUTION INTRAVENOUS at 10:01

## 2019-01-01 RX ADMIN — RAMELTEON 8 MG: 8 TABLET, FILM COATED ORAL at 09:01

## 2019-01-01 RX ADMIN — POTASSIUM CHLORIDE 20 MEQ: 1500 TABLET, EXTENDED RELEASE ORAL at 11:04

## 2019-01-01 RX ADMIN — POLYETHYLENE GLYCOL 3350 17 G: 17 POWDER, FOR SOLUTION ORAL at 09:01

## 2019-01-01 RX ADMIN — WARFARIN SODIUM 5 MG: 5 TABLET ORAL at 05:01

## 2019-01-01 RX ADMIN — INSULIN ASPART 6 UNITS: 100 INJECTION, SOLUTION INTRAVENOUS; SUBCUTANEOUS at 12:01

## 2019-01-01 RX ADMIN — INSULIN ASPART 10 UNITS: 100 INJECTION, SOLUTION INTRAVENOUS; SUBCUTANEOUS at 05:01

## 2019-01-01 RX ADMIN — HYPROMELLOSE 2910 1 DROP: 5 SOLUTION OPHTHALMIC at 05:02

## 2019-01-01 RX ADMIN — FUROSEMIDE 20 MG/HR: 10 INJECTION, SOLUTION INTRAMUSCULAR; INTRAVENOUS at 04:04

## 2019-01-01 RX ADMIN — EPINEPHRINE 0.04 MCG/KG/MIN: 1 INJECTION INTRAMUSCULAR; INTRAVENOUS; SUBCUTANEOUS at 05:01

## 2019-01-01 RX ADMIN — PIPERACILLIN AND TAZOBACTAM 4.5 G: 4; .5 INJECTION, POWDER, LYOPHILIZED, FOR SOLUTION INTRAVENOUS; PARENTERAL at 12:05

## 2019-01-01 RX ADMIN — INSULIN ASPART 2 UNITS: 100 INJECTION, SOLUTION INTRAVENOUS; SUBCUTANEOUS at 10:01

## 2019-01-01 RX ADMIN — POTASSIUM CHLORIDE 20 MEQ: 1500 TABLET, EXTENDED RELEASE ORAL at 06:01

## 2019-01-01 RX ADMIN — MAGNESIUM OXIDE TAB 400 MG (241.3 MG ELEMENTAL MG) 400 MG: 400 (241.3 MG) TAB at 09:01

## 2019-01-01 RX ADMIN — CEFTRIAXONE SODIUM 2 G: 2 INJECTION, POWDER, FOR SOLUTION INTRAMUSCULAR; INTRAVENOUS at 04:02

## 2019-01-01 RX ADMIN — ONDANSETRON 4 MG: 2 INJECTION INTRAMUSCULAR; INTRAVENOUS at 03:01

## 2019-01-01 RX ADMIN — FUROSEMIDE 80 MG: 10 INJECTION, SOLUTION INTRAMUSCULAR; INTRAVENOUS at 12:01

## 2019-01-01 RX ADMIN — FUROSEMIDE 10 MG/HR: 10 INJECTION, SOLUTION INTRAMUSCULAR; INTRAVENOUS at 06:01

## 2019-01-01 RX ADMIN — DOPAMINE HYDROCHLORIDE IN DEXTROSE 5 MCG/KG/MIN: 1.6 INJECTION, SOLUTION INTRAVENOUS at 07:01

## 2019-01-01 RX ADMIN — FERROUS SULFATE TAB EC 325 MG (65 MG FE EQUIVALENT) 325 MG: 325 (65 FE) TABLET DELAYED RESPONSE at 09:01

## 2019-01-01 RX ADMIN — INSULIN ASPART 0 UNITS: 100 INJECTION, SOLUTION INTRAVENOUS; SUBCUTANEOUS at 05:01

## 2019-01-01 RX ADMIN — INSULIN ASPART 2 UNITS: 100 INJECTION, SOLUTION INTRAVENOUS; SUBCUTANEOUS at 05:01

## 2019-01-01 RX ADMIN — THERA TABS 1 TABLET: TAB at 09:05

## 2019-01-01 RX ADMIN — ROSUVASTATIN CALCIUM 20 MG: 20 TABLET, FILM COATED ORAL at 10:01

## 2019-01-01 RX ADMIN — POTASSIUM CHLORIDE 40 MEQ: 29.8 INJECTION, SOLUTION INTRAVENOUS at 09:01

## 2019-01-01 RX ADMIN — ACETAMINOPHEN 1000 MG: 500 TABLET, FILM COATED ORAL at 10:01

## 2019-01-01 RX ADMIN — ROSUVASTATIN CALCIUM 20 MG: 20 TABLET, FILM COATED ORAL at 09:04

## 2019-01-01 RX ADMIN — AMPICILLIN SODIUM 4000 MG: 2 INJECTION, POWDER, FOR SOLUTION INTRAVENOUS at 07:01

## 2019-01-01 RX ADMIN — LIDOCAINE HYDROCHLORIDE 60 MG: 20 INJECTION, SOLUTION INTRAVENOUS at 10:01

## 2019-01-01 RX ADMIN — CLOPIDOGREL 75 MG: 75 TABLET, FILM COATED ORAL at 09:02

## 2019-01-01 RX ADMIN — AMPICILLIN SODIUM 2 G: 2 INJECTION, POWDER, FOR SOLUTION INTRAMUSCULAR; INTRAVENOUS at 05:01

## 2019-01-01 RX ADMIN — MAGNESIUM SULFATE HEPTAHYDRATE 3 G: 500 INJECTION, SOLUTION INTRAMUSCULAR; INTRAVENOUS at 12:04

## 2019-01-01 RX ADMIN — VASOPRESSIN 1 UNITS: 20 INJECTION INTRAVENOUS at 12:01

## 2019-01-01 RX ADMIN — HYPROMELLOSE 2910 1 DROP: 5 SOLUTION OPHTHALMIC at 12:01

## 2019-01-01 RX ADMIN — APIXABAN 5 MG: 5 TABLET, FILM COATED ORAL at 08:02

## 2019-01-01 RX ADMIN — AMPICILLIN 2 G: 2 INJECTION, POWDER, FOR SOLUTION INTRAVENOUS at 05:01

## 2019-01-01 RX ADMIN — FUROSEMIDE 20 MG/HR: 10 INJECTION, SOLUTION INTRAMUSCULAR; INTRAVENOUS at 11:04

## 2019-01-01 RX ADMIN — INSULIN ASPART 10 UNITS: 100 INJECTION, SOLUTION INTRAVENOUS; SUBCUTANEOUS at 09:05

## 2019-01-01 RX ADMIN — AMPICILLIN SODIUM 2 G: 2 INJECTION, POWDER, FOR SOLUTION INTRAMUSCULAR; INTRAVENOUS at 12:01

## 2019-01-01 RX ADMIN — PANTOPRAZOLE SODIUM 40 MG: 40 TABLET, DELAYED RELEASE ORAL at 09:04

## 2019-01-01 RX ADMIN — ETOMIDATE 10 MG: 2 INJECTION, SOLUTION INTRAVENOUS at 09:01

## 2019-01-01 RX ADMIN — ROSUVASTATIN CALCIUM 20 MG: 20 TABLET, FILM COATED ORAL at 09:05

## 2019-01-01 RX ADMIN — PANTOPRAZOLE SODIUM 40 MG: 40 TABLET, DELAYED RELEASE ORAL at 08:05

## 2019-01-01 RX ADMIN — INSULIN ASPART 2 UNITS: 100 INJECTION, SOLUTION INTRAVENOUS; SUBCUTANEOUS at 09:01

## 2019-01-01 RX ADMIN — TAMSULOSIN HYDROCHLORIDE 0.4 MG: 0.4 CAPSULE ORAL at 09:05

## 2019-01-01 RX ADMIN — AMOXICILLIN 500 MG: 500 CAPSULE ORAL at 05:04

## 2019-01-01 RX ADMIN — TAMSULOSIN HYDROCHLORIDE 0.4 MG: 0.4 CAPSULE ORAL at 08:01

## 2019-01-01 RX ADMIN — AMPICILLIN 2 G: 2 INJECTION, POWDER, FOR SOLUTION INTRAVENOUS at 11:02

## 2019-01-01 RX ADMIN — LEVOTHYROXINE SODIUM 25 MCG: 25 TABLET ORAL at 06:01

## 2019-01-01 RX ADMIN — TAMSULOSIN HYDROCHLORIDE 0.4 MG: 0.4 CAPSULE ORAL at 09:04

## 2019-01-01 RX ADMIN — FUROSEMIDE 20 MG/HR: 10 INJECTION, SOLUTION INTRAMUSCULAR; INTRAVENOUS at 06:01

## 2019-01-01 RX ADMIN — POTASSIUM CHLORIDE 20 MEQ: 1500 TABLET, EXTENDED RELEASE ORAL at 09:04

## 2019-01-01 RX ADMIN — INSULIN ASPART 2 UNITS: 100 INJECTION, SOLUTION INTRAVENOUS; SUBCUTANEOUS at 12:01

## 2019-01-01 RX ADMIN — INSULIN DETEMIR 6 UNITS: 100 INJECTION, SOLUTION SUBCUTANEOUS at 09:05

## 2019-01-01 RX ADMIN — CLOPIDOGREL 75 MG: 75 TABLET, FILM COATED ORAL at 09:01

## 2019-01-01 RX ADMIN — ETOMIDATE 4 MG: 2 INJECTION, SOLUTION INTRAVENOUS at 09:01

## 2019-01-01 RX ADMIN — LEVOTHYROXINE SODIUM 25 MCG: 25 TABLET ORAL at 07:04

## 2019-01-01 RX ADMIN — MIDAZOLAM HYDROCHLORIDE 1 MG: 1 INJECTION, SOLUTION INTRAMUSCULAR; INTRAVENOUS at 09:01

## 2019-01-01 RX ADMIN — BUMETANIDE 2 MG: 1 TABLET ORAL at 09:04

## 2019-01-01 RX ADMIN — SENNOSIDES AND DOCUSATE SODIUM 2 TABLET: 8.6; 5 TABLET ORAL at 09:01

## 2019-01-01 RX ADMIN — FUROSEMIDE 40 MG/HR: 10 INJECTION, SOLUTION INTRAMUSCULAR; INTRAVENOUS at 05:01

## 2019-01-01 RX ADMIN — MAGNESIUM OXIDE TAB 400 MG (241.3 MG ELEMENTAL MG) 800 MG: 400 (241.3 MG) TAB at 08:01

## 2019-01-01 RX ADMIN — INSULIN ASPART 3 UNITS: 100 INJECTION, SOLUTION INTRAVENOUS; SUBCUTANEOUS at 11:04

## 2019-01-01 RX ADMIN — DOPAMINE HYDROCHLORIDE IN DEXTROSE 5 MCG/KG/MIN: 1.6 INJECTION, SOLUTION INTRAVENOUS at 03:01

## 2019-01-01 RX ADMIN — INSULIN ASPART 8 UNITS: 100 INJECTION, SOLUTION INTRAVENOUS; SUBCUTANEOUS at 11:01

## 2019-01-01 RX ADMIN — VASOPRESSIN 2 UNITS: 20 INJECTION INTRAVENOUS at 10:01

## 2019-01-01 RX ADMIN — INSULIN ASPART 4 UNITS: 100 INJECTION, SOLUTION INTRAVENOUS; SUBCUTANEOUS at 11:04

## 2019-01-01 RX ADMIN — CHLOROTHIAZIDE SODIUM 250 MG: 500 INJECTION, POWDER, LYOPHILIZED, FOR SOLUTION INTRAVENOUS at 11:01

## 2019-01-01 RX ADMIN — FERROUS SULFATE TAB EC 325 MG (65 MG FE EQUIVALENT) 325 MG: 325 (65 FE) TABLET DELAYED RESPONSE at 08:01

## 2019-01-01 RX ADMIN — AMPICILLIN SODIUM 2 G: 2 INJECTION, POWDER, FOR SOLUTION INTRAMUSCULAR; INTRAVENOUS at 10:01

## 2019-01-01 RX ADMIN — AMPICILLIN SODIUM 4000 MG: 2 INJECTION, POWDER, FOR SOLUTION INTRAVENOUS at 08:01

## 2019-01-01 RX ADMIN — DIGOXIN 0.06 MG: 0.05 SOLUTION ORAL at 06:01

## 2019-01-01 RX ADMIN — POTASSIUM CHLORIDE 40 MEQ: 1500 TABLET, EXTENDED RELEASE ORAL at 05:01

## 2019-01-01 RX ADMIN — FUROSEMIDE 10 MG/HR: 10 INJECTION, SOLUTION INTRAMUSCULAR; INTRAVENOUS at 10:01

## 2019-01-01 RX ADMIN — INSULIN ASPART 6 UNITS: 100 INJECTION, SOLUTION INTRAVENOUS; SUBCUTANEOUS at 02:01

## 2019-01-01 RX ADMIN — Medication 0.04 MCG/KG/MIN: at 12:01

## 2019-01-01 RX ADMIN — MAGNESIUM OXIDE TAB 400 MG (241.3 MG ELEMENTAL MG) 800 MG: 400 (241.3 MG) TAB at 09:01

## 2019-01-01 RX ADMIN — CLOPIDOGREL 75 MG: 75 TABLET, FILM COATED ORAL at 08:01

## 2019-01-01 RX ADMIN — POTASSIUM CHLORIDE 60 MEQ: 1500 TABLET, EXTENDED RELEASE ORAL at 08:05

## 2019-01-01 RX ADMIN — METOPROLOL SUCCINATE 25 MG: 25 TABLET, EXTENDED RELEASE ORAL at 08:05

## 2019-01-01 RX ADMIN — LEVOTHYROXINE SODIUM 25 MCG: 25 TABLET ORAL at 06:02

## 2019-01-01 RX ADMIN — AMPICILLIN SODIUM 2 G: 2 INJECTION, POWDER, FOR SOLUTION INTRAMUSCULAR; INTRAVENOUS at 11:01

## 2019-01-01 RX ADMIN — INSULIN ASPART 6 UNITS: 100 INJECTION, SOLUTION INTRAVENOUS; SUBCUTANEOUS at 07:01

## 2019-01-01 RX ADMIN — FUROSEMIDE 60 MG/HR: 10 INJECTION, SOLUTION INTRAMUSCULAR; INTRAVENOUS at 09:01

## 2019-01-01 RX ADMIN — AMPICILLIN 2 G: 2 INJECTION, POWDER, FOR SOLUTION INTRAVENOUS at 07:01

## 2019-01-01 RX ADMIN — INSULIN ASPART 10 UNITS: 100 INJECTION, SOLUTION INTRAVENOUS; SUBCUTANEOUS at 07:02

## 2019-01-01 RX ADMIN — AMPICILLIN 2 G: 2 INJECTION, POWDER, FOR SOLUTION INTRAVENOUS at 11:01

## 2019-01-01 RX ADMIN — FUROSEMIDE 60 MG/HR: 10 INJECTION, SOLUTION INTRAMUSCULAR; INTRAVENOUS at 02:01

## 2019-01-01 RX ADMIN — AMOXICILLIN 500 MG: 500 CAPSULE ORAL at 08:04

## 2019-01-01 RX ADMIN — HYPROMELLOSE 2910 1 DROP: 5 SOLUTION OPHTHALMIC at 06:02

## 2019-01-01 RX ADMIN — INSULIN ASPART 10 UNITS: 100 INJECTION, SOLUTION INTRAVENOUS; SUBCUTANEOUS at 08:02

## 2019-01-01 RX ADMIN — FUROSEMIDE 20 MG/HR: 10 INJECTION, SOLUTION INTRAMUSCULAR; INTRAVENOUS at 09:01

## 2019-01-01 RX ADMIN — PIPERACILLIN AND TAZOBACTAM 4.5 G: 4; .5 INJECTION, POWDER, LYOPHILIZED, FOR SOLUTION INTRAVENOUS; PARENTERAL at 09:05

## 2019-01-01 RX ADMIN — TRAMADOL HYDROCHLORIDE 50 MG: 50 TABLET, COATED ORAL at 11:01

## 2019-01-01 RX ADMIN — PIPERACILLIN AND TAZOBACTAM 4.5 G: 4; .5 INJECTION, POWDER, LYOPHILIZED, FOR SOLUTION INTRAVENOUS; PARENTERAL at 03:05

## 2019-01-01 RX ADMIN — INSULIN ASPART 2 UNITS: 100 INJECTION, SOLUTION INTRAVENOUS; SUBCUTANEOUS at 08:04

## 2019-01-01 RX ADMIN — TAMSULOSIN HYDROCHLORIDE 0.4 MG: 0.4 CAPSULE ORAL at 08:04

## 2019-01-01 RX ADMIN — NEOSTIGMINE METHYLSULFATE 5 MG: 1 INJECTION INTRAVENOUS at 02:01

## 2019-01-01 RX ADMIN — SODIUM CHLORIDE, SODIUM GLUCONATE, SODIUM ACETATE, POTASSIUM CHLORIDE, MAGNESIUM CHLORIDE, SODIUM PHOSPHATE, DIBASIC, AND POTASSIUM PHOSPHATE: .53; .5; .37; .037; .03; .012; .00082 INJECTION, SOLUTION INTRAVENOUS at 10:01

## 2019-01-01 RX ADMIN — AMPICILLIN SODIUM 2 G: 2 INJECTION, POWDER, FOR SOLUTION INTRAMUSCULAR; INTRAVENOUS at 08:01

## 2019-01-01 RX ADMIN — INSULIN ASPART 3 UNITS: 100 INJECTION, SOLUTION INTRAVENOUS; SUBCUTANEOUS at 06:04

## 2019-01-01 RX ADMIN — EPINEPHRINE 0.02 MCG/KG/MIN: 1 INJECTION INTRAMUSCULAR; INTRAVENOUS; SUBCUTANEOUS at 05:01

## 2019-01-01 RX ADMIN — INSULIN ASPART 10 UNITS: 100 INJECTION, SOLUTION INTRAVENOUS; SUBCUTANEOUS at 12:05

## 2019-01-01 RX ADMIN — FUROSEMIDE 20 MG/HR: 10 INJECTION, SOLUTION INTRAMUSCULAR; INTRAVENOUS at 12:01

## 2019-01-01 RX ADMIN — POTASSIUM CHLORIDE: 14.9 INJECTION INTRAVENOUS at 12:01

## 2019-01-01 RX ADMIN — FUROSEMIDE 20 MG/HR: 10 INJECTION, SOLUTION INTRAMUSCULAR; INTRAVENOUS at 02:01

## 2019-01-01 RX ADMIN — AMOXICILLIN 500 MG: 500 CAPSULE ORAL at 10:04

## 2019-01-01 RX ADMIN — POTASSIUM CHLORIDE 40 MEQ: 29.8 INJECTION, SOLUTION INTRAVENOUS at 06:01

## 2019-01-01 RX ADMIN — POTASSIUM CHLORIDE 40 MEQ: 1500 TABLET, EXTENDED RELEASE ORAL at 12:01

## 2019-01-01 RX ADMIN — POTASSIUM CHLORIDE 20 MEQ: 1500 TABLET, EXTENDED RELEASE ORAL at 09:01

## 2019-01-01 RX ADMIN — INSULIN ASPART 5 UNITS: 100 INJECTION, SOLUTION INTRAVENOUS; SUBCUTANEOUS at 04:01

## 2019-01-01 RX ADMIN — APIXABAN 5 MG: 5 TABLET, FILM COATED ORAL at 09:04

## 2019-01-01 RX ADMIN — CETIRIZINE HYDROCHLORIDE 5 MG: 5 TABLET ORAL at 09:04

## 2019-01-01 RX ADMIN — FENTANYL CITRATE 50 MCG: 50 INJECTION, SOLUTION INTRAMUSCULAR; INTRAVENOUS at 01:01

## 2019-01-01 RX ADMIN — ROCURONIUM BROMIDE 10 MG: 10 INJECTION, SOLUTION INTRAVENOUS at 12:01

## 2019-01-01 RX ADMIN — CALCIUM CHLORIDE 200 MG: 100 INJECTION, SOLUTION INTRAVENOUS at 12:01

## 2019-01-01 RX ADMIN — INSULIN ASPART 7 UNITS: 100 INJECTION, SOLUTION INTRAVENOUS; SUBCUTANEOUS at 07:01

## 2019-01-01 RX ADMIN — POTASSIUM CHLORIDE 40 MEQ: 1500 TABLET, EXTENDED RELEASE ORAL at 09:02

## 2019-01-01 RX ADMIN — INSULIN ASPART 5 UNITS: 100 INJECTION, SOLUTION INTRAVENOUS; SUBCUTANEOUS at 12:01

## 2019-01-01 RX ADMIN — SODIUM CHLORIDE 250 ML: 0.9 INJECTION, SOLUTION INTRAVENOUS at 12:01

## 2019-01-01 RX ADMIN — ACETAMINOPHEN 1000 MG: 500 TABLET, FILM COATED ORAL at 04:01

## 2019-01-01 RX ADMIN — POTASSIUM CHLORIDE 40 MEQ: 1500 TABLET, EXTENDED RELEASE ORAL at 08:01

## 2019-01-01 RX ADMIN — FUROSEMIDE 10 MG/HR: 10 INJECTION, SOLUTION INTRAMUSCULAR; INTRAVENOUS at 11:01

## 2019-01-01 RX ADMIN — ETOMIDATE 2 MG: 2 INJECTION, SOLUTION INTRAVENOUS at 10:01

## 2019-01-01 RX ADMIN — DEXMEDETOMIDINE HYDROCHLORIDE 40 MCG: 100 INJECTION, SOLUTION, CONCENTRATE INTRAVENOUS at 02:01

## 2019-01-01 RX ADMIN — INSULIN DETEMIR 15 UNITS: 100 INJECTION, SOLUTION SUBCUTANEOUS at 08:01

## 2019-01-01 RX ADMIN — THERA TABS 1 TABLET: TAB at 09:04

## 2019-01-01 RX ADMIN — CLOPIDOGREL 75 MG: 75 TABLET, FILM COATED ORAL at 08:05

## 2019-01-01 RX ADMIN — APIXABAN 5 MG: 5 TABLET, FILM COATED ORAL at 11:02

## 2019-01-01 RX ADMIN — AMPICILLIN 2 G: 2 INJECTION, POWDER, FOR SOLUTION INTRAVENOUS at 05:02

## 2019-01-01 RX ADMIN — ROSUVASTATIN CALCIUM 20 MG: 20 TABLET, FILM COATED ORAL at 10:04

## 2019-01-01 RX ADMIN — POTASSIUM CHLORIDE 40 MEQ: 1500 TABLET, EXTENDED RELEASE ORAL at 04:04

## 2019-01-01 RX ADMIN — INSULIN ASPART 5 UNITS: 100 INJECTION, SOLUTION INTRAVENOUS; SUBCUTANEOUS at 08:01

## 2019-01-01 RX ADMIN — METOLAZONE 5 MG: 2.5 TABLET ORAL at 08:01

## 2019-01-01 RX ADMIN — DOPAMINE HYDROCHLORIDE IN DEXTROSE 2 MCG/KG/MIN: 1.6 INJECTION, SOLUTION INTRAVENOUS at 04:01

## 2019-01-01 RX ADMIN — APIXABAN 5 MG: 5 TABLET, FILM COATED ORAL at 09:02

## 2019-01-01 RX ADMIN — DOPAMINE HYDROCHLORIDE IN DEXTROSE 10 MCG/KG/MIN: 1.6 INJECTION, SOLUTION INTRAVENOUS at 06:01

## 2019-01-01 RX ADMIN — LIDOCAINE HYDROCHLORIDE 15 ML: 20 SOLUTION OROPHARYNGEAL at 10:01

## 2019-01-01 RX ADMIN — TAMSULOSIN HYDROCHLORIDE 0.4 MG: 0.4 CAPSULE ORAL at 08:05

## 2019-01-01 RX ADMIN — ACETAMINOPHEN 1000 MG: 500 TABLET, FILM COATED ORAL at 09:01

## 2019-01-01 RX ADMIN — INSULIN ASPART 10 UNITS: 100 INJECTION, SOLUTION INTRAVENOUS; SUBCUTANEOUS at 04:01

## 2019-01-01 RX ADMIN — INSULIN DETEMIR 17 UNITS: 100 INJECTION, SOLUTION SUBCUTANEOUS at 09:01

## 2019-01-01 RX ADMIN — POTASSIUM CHLORIDE 40 MEQ: 1500 TABLET, EXTENDED RELEASE ORAL at 06:04

## 2019-01-01 RX ADMIN — ALBUMIN (HUMAN) 25 G: 25 SOLUTION INTRAVENOUS at 04:05

## 2019-01-01 RX ADMIN — AMPICILLIN 2 G: 2 INJECTION, POWDER, FOR SOLUTION INTRAVENOUS at 08:02

## 2019-01-01 RX ADMIN — TAMSULOSIN HYDROCHLORIDE 0.4 MG: 0.4 CAPSULE ORAL at 09:02

## 2019-01-01 RX ADMIN — INSULIN ASPART 6 UNITS: 100 INJECTION, SOLUTION INTRAVENOUS; SUBCUTANEOUS at 08:01

## 2019-01-01 RX ADMIN — CEFTRIAXONE SODIUM 2 G: 2 INJECTION, POWDER, FOR SOLUTION INTRAMUSCULAR; INTRAVENOUS at 06:01

## 2019-01-01 RX ADMIN — INSULIN ASPART 2 UNITS: 100 INJECTION, SOLUTION INTRAVENOUS; SUBCUTANEOUS at 06:01

## 2019-01-01 RX ADMIN — FUROSEMIDE 20 MG/HR: 10 INJECTION, SOLUTION INTRAMUSCULAR; INTRAVENOUS at 10:01

## 2019-01-01 RX ADMIN — MAGNESIUM OXIDE TAB 400 MG (241.3 MG ELEMENTAL MG) 800 MG: 400 (241.3 MG) TAB at 10:01

## 2019-01-01 RX ADMIN — ONDANSETRON 4 MG: 2 INJECTION INTRAMUSCULAR; INTRAVENOUS at 07:01

## 2019-01-01 RX ADMIN — INSULIN ASPART 8 UNITS: 100 INJECTION, SOLUTION INTRAVENOUS; SUBCUTANEOUS at 08:01

## 2019-01-01 RX ADMIN — POTASSIUM CHLORIDE 40 MEQ: 1500 TABLET, EXTENDED RELEASE ORAL at 02:01

## 2019-01-01 RX ADMIN — Medication 0.06 MCG/KG/MIN: at 05:01

## 2019-01-01 RX ADMIN — ONDANSETRON 4 MG: 2 INJECTION INTRAMUSCULAR; INTRAVENOUS at 05:01

## 2019-01-01 RX ADMIN — AMOXICILLIN 500 MG: 500 CAPSULE ORAL at 09:04

## 2019-01-01 RX ADMIN — AMPICILLIN 2 G: 2 INJECTION, POWDER, FOR SOLUTION INTRAVENOUS at 06:01

## 2019-01-01 RX ADMIN — POTASSIUM CHLORIDE 20 MEQ: 1500 TABLET, EXTENDED RELEASE ORAL at 12:01

## 2019-01-01 RX ADMIN — HUMAN ALBUMIN MICROSPHERES AND PERFLUTREN 0.66 MG: 10; .22 INJECTION, SOLUTION INTRAVENOUS at 01:01

## 2019-01-01 RX ADMIN — SACUBITRIL AND VALSARTAN 1 TABLET: 24; 26 TABLET, FILM COATED ORAL at 09:02

## 2019-01-01 RX ADMIN — MIDAZOLAM 1 MG: 1 INJECTION INTRAMUSCULAR; INTRAVENOUS at 02:01

## 2019-01-01 RX ADMIN — INSULIN ASPART 3 UNITS: 100 INJECTION, SOLUTION INTRAVENOUS; SUBCUTANEOUS at 11:02

## 2019-01-01 RX ADMIN — CHLOROTHIAZIDE SODIUM 250 MG: 500 INJECTION, POWDER, LYOPHILIZED, FOR SOLUTION INTRAVENOUS at 02:04

## 2019-01-01 RX ADMIN — TRAMADOL HYDROCHLORIDE 50 MG: 50 TABLET, COATED ORAL at 12:01

## 2019-01-01 RX ADMIN — INSULIN ASPART 5 UNITS: 100 INJECTION, SOLUTION INTRAVENOUS; SUBCUTANEOUS at 05:01

## 2019-01-01 RX ADMIN — INSULIN ASPART 10 UNITS: 100 INJECTION, SOLUTION INTRAVENOUS; SUBCUTANEOUS at 11:05

## 2019-01-01 RX ADMIN — FUROSEMIDE 10 MG/HR: 10 INJECTION, SOLUTION INTRAMUSCULAR; INTRAVENOUS at 01:01

## 2019-01-01 RX ADMIN — FUROSEMIDE 20 MG/HR: 10 INJECTION, SOLUTION INTRAMUSCULAR; INTRAVENOUS at 01:01

## 2019-01-01 RX ADMIN — METOLAZONE 5 MG: 2.5 TABLET ORAL at 02:01

## 2019-01-01 RX ADMIN — FUROSEMIDE 20 MG/HR: 10 INJECTION, SOLUTION INTRAMUSCULAR; INTRAVENOUS at 02:04

## 2019-01-01 RX ADMIN — FUROSEMIDE 10 MG/HR: 10 INJECTION, SOLUTION INTRAMUSCULAR; INTRAVENOUS at 12:01

## 2019-01-01 RX ADMIN — Medication 0.06 MCG/KG/MIN: at 06:01

## 2019-01-01 RX ADMIN — BUMETANIDE 2 MG: 1 TABLET ORAL at 09:05

## 2019-01-01 RX ADMIN — HYPROMELLOSE 2910 1 DROP: 5 SOLUTION OPHTHALMIC at 04:01

## 2019-01-01 RX ADMIN — EPINEPHRINE 0.01 MCG/KG/MIN: 1 INJECTION INTRAMUSCULAR; INTRAVENOUS; SUBCUTANEOUS at 10:01

## 2019-01-01 RX ADMIN — FUROSEMIDE 40 MG/HR: 10 INJECTION, SOLUTION INTRAMUSCULAR; INTRAVENOUS at 01:01

## 2019-01-01 RX ADMIN — INSULIN ASPART 0 UNITS: 100 INJECTION, SOLUTION INTRAVENOUS; SUBCUTANEOUS at 11:01

## 2019-01-01 RX ADMIN — LIDOCAINE HYDROCHLORIDE 60 MG: 20 INJECTION, SOLUTION INTRAVENOUS at 09:01

## 2019-01-01 RX ADMIN — AMPICILLIN SODIUM 4000 MG: 2 INJECTION, POWDER, FOR SOLUTION INTRAVENOUS at 04:01

## 2019-01-01 RX ADMIN — ALPRAZOLAM 0.25 MG: 0.25 TABLET ORAL at 01:01

## 2019-01-01 RX ADMIN — AMPICILLIN 2 G: 2 INJECTION, POWDER, FOR SOLUTION INTRAVENOUS at 06:02

## 2019-01-01 RX ADMIN — MAGNESIUM SULFATE IN WATER 2 G: 40 INJECTION, SOLUTION INTRAVENOUS at 10:01

## 2019-01-01 RX ADMIN — THERA TABS 1 TABLET: TAB at 08:04

## 2019-01-01 RX ADMIN — CETIRIZINE HYDROCHLORIDE 10 MG: 5 TABLET ORAL at 08:05

## 2019-01-01 RX ADMIN — FERROUS SULFATE TAB EC 325 MG (65 MG FE EQUIVALENT) 325 MG: 325 (65 FE) TABLET DELAYED RESPONSE at 08:04

## 2019-01-01 RX ADMIN — FERROUS SULFATE TAB EC 325 MG (65 MG FE EQUIVALENT) 325 MG: 325 (65 FE) TABLET DELAYED RESPONSE at 09:05

## 2019-01-01 RX ADMIN — VASOPRESSIN 1 UNITS: 20 INJECTION INTRAVENOUS at 11:01

## 2019-01-01 RX ADMIN — FUROSEMIDE 20 MG/HR: 10 INJECTION, SOLUTION INTRAMUSCULAR; INTRAVENOUS at 04:01

## 2019-01-01 RX ADMIN — MAGNESIUM OXIDE TAB 400 MG (241.3 MG ELEMENTAL MG) 400 MG: 400 (241.3 MG) TAB at 08:01

## 2019-01-01 RX ADMIN — SENNOSIDES AND DOCUSATE SODIUM 2 TABLET: 8.6; 5 TABLET ORAL at 09:02

## 2019-01-01 RX ADMIN — DOPAMINE HYDROCHLORIDE 5 MCG/KG/MIN: 160 INJECTION, SOLUTION INTRAVENOUS at 12:01

## 2019-01-01 RX ADMIN — FUROSEMIDE 40 MG/HR: 10 INJECTION, SOLUTION INTRAMUSCULAR; INTRAVENOUS at 07:01

## 2019-01-01 RX ADMIN — AMPICILLIN 2 G: 2 INJECTION, POWDER, FOR SOLUTION INTRAVENOUS at 10:02

## 2019-01-01 RX ADMIN — POLYETHYLENE GLYCOL 3350 17 G: 17 POWDER, FOR SOLUTION ORAL at 12:01

## 2019-01-01 RX ADMIN — INSULIN ASPART 3 UNITS: 100 INJECTION, SOLUTION INTRAVENOUS; SUBCUTANEOUS at 04:05

## 2019-01-01 RX ADMIN — FUROSEMIDE 40 MG/HR: 10 INJECTION, SOLUTION INTRAMUSCULAR; INTRAVENOUS at 04:01

## 2019-01-01 RX ADMIN — LEVOTHYROXINE SODIUM 50 MCG: 50 TABLET ORAL at 05:05

## 2019-01-01 RX ADMIN — SACUBITRIL AND VALSARTAN 1 TABLET: 24; 26 TABLET, FILM COATED ORAL at 12:02

## 2019-01-01 RX ADMIN — FUROSEMIDE 10 MG/HR: 10 INJECTION, SOLUTION INTRAMUSCULAR; INTRAVENOUS at 05:01

## 2019-01-01 RX ADMIN — ACETAMINOPHEN 1000 MG: 500 TABLET, FILM COATED ORAL at 02:01

## 2019-01-01 RX ADMIN — IPRATROPIUM BROMIDE AND ALBUTEROL SULFATE 3 ML: .5; 3 SOLUTION RESPIRATORY (INHALATION) at 01:01

## 2019-01-01 RX ADMIN — OMEGA-3-ACID ETHYL ESTERS 2 G: 1 CAPSULE, LIQUID FILLED ORAL at 10:04

## 2019-01-01 RX ADMIN — EPINEPHRINE 0.06 MCG/KG/MIN: 1 INJECTION INTRAMUSCULAR; INTRAVENOUS; SUBCUTANEOUS at 12:01

## 2019-01-01 RX ADMIN — CHLOROTHIAZIDE SODIUM 250 MG: 500 INJECTION, POWDER, LYOPHILIZED, FOR SOLUTION INTRAVENOUS at 04:04

## 2019-01-01 RX ADMIN — EPINEPHRINE 0.02 MCG/KG/MIN: 1 INJECTION INTRAMUSCULAR; INTRAVENOUS; SUBCUTANEOUS at 10:01

## 2019-01-01 RX ADMIN — FUROSEMIDE 40 MG/HR: 10 INJECTION, SOLUTION INTRAMUSCULAR; INTRAVENOUS at 10:01

## 2019-01-01 RX ADMIN — ROCURONIUM BROMIDE 10 MG: 10 INJECTION, SOLUTION INTRAVENOUS at 11:01

## 2019-01-01 RX ADMIN — SENNOSIDES AND DOCUSATE SODIUM 2 TABLET: 8.6; 5 TABLET ORAL at 02:01

## 2019-01-01 RX ADMIN — LEVOTHYROXINE SODIUM 50 MCG: 50 TABLET ORAL at 06:05

## 2019-01-01 RX ADMIN — ACETAMINOPHEN 650 MG: 325 TABLET ORAL at 02:04

## 2019-01-01 RX ADMIN — ATROPINE SULFATE 0.5 MG: 0.1 INJECTION PARENTERAL at 11:01

## 2019-01-01 RX ADMIN — CEFTRIAXONE SODIUM 2 G: 2 INJECTION, POWDER, FOR SOLUTION INTRAMUSCULAR; INTRAVENOUS at 05:01

## 2019-01-01 RX ADMIN — INSULIN ASPART 1 UNITS: 100 INJECTION, SOLUTION INTRAVENOUS; SUBCUTANEOUS at 09:05

## 2019-01-01 RX ADMIN — MIDAZOLAM 1 MG: 1 INJECTION INTRAMUSCULAR; INTRAVENOUS at 10:01

## 2019-01-01 RX ADMIN — DOPAMINE HYDROCHLORIDE IN DEXTROSE 5 MCG/KG/MIN: 1.6 INJECTION, SOLUTION INTRAVENOUS at 05:01

## 2019-01-01 RX ADMIN — ROCURONIUM BROMIDE 40 MG: 10 INJECTION, SOLUTION INTRAVENOUS at 09:01

## 2019-01-01 RX ADMIN — CALCIUM CHLORIDE 200 MG: 100 INJECTION, SOLUTION INTRAVENOUS at 01:01

## 2019-01-01 RX ADMIN — ROPINIROLE HYDROCHLORIDE 0.5 MG: 0.25 TABLET, FILM COATED ORAL at 08:04

## 2019-01-01 RX ADMIN — CEFEPIME 2 G: 2 INJECTION, POWDER, FOR SOLUTION INTRAVENOUS at 02:01

## 2019-01-01 RX ADMIN — CEFTRIAXONE SODIUM 2 G: 2 INJECTION, POWDER, FOR SOLUTION INTRAMUSCULAR; INTRAVENOUS at 03:02

## 2019-01-01 RX ADMIN — AMPICILLIN SODIUM 2 G: 2 INJECTION, POWDER, FOR SOLUTION INTRAMUSCULAR; INTRAVENOUS at 04:01

## 2019-01-01 RX ADMIN — FUROSEMIDE 40 MG/HR: 10 INJECTION, SOLUTION INTRAMUSCULAR; INTRAVENOUS at 03:01

## 2019-01-01 RX ADMIN — INSULIN ASPART 8 UNITS: 100 INJECTION, SOLUTION INTRAVENOUS; SUBCUTANEOUS at 04:01

## 2019-01-01 RX ADMIN — HYPROMELLOSE 2910 1 DROP: 5 SOLUTION OPHTHALMIC at 01:02

## 2019-01-01 RX ADMIN — AMPICILLIN SODIUM 4000 MG: 2 INJECTION, POWDER, FOR SOLUTION INTRAVENOUS at 02:01

## 2019-01-01 RX ADMIN — POLYETHYLENE GLYCOL 3350 17 G: 17 POWDER, FOR SOLUTION ORAL at 09:02

## 2019-01-01 RX ADMIN — ROSUVASTATIN CALCIUM 20 MG: 20 TABLET, FILM COATED ORAL at 08:04

## 2019-01-01 RX ADMIN — INSULIN DETEMIR 13 UNITS: 100 INJECTION, SOLUTION SUBCUTANEOUS at 08:01

## 2019-01-01 RX ADMIN — GLYCOPYRROLATE 0.6 MG: 0.2 INJECTION, SOLUTION INTRAMUSCULAR; INTRAVENOUS at 02:01

## 2019-01-01 RX ADMIN — FUROSEMIDE 10 MG/HR: 10 INJECTION, SOLUTION INTRAMUSCULAR; INTRAVENOUS at 08:01

## 2019-01-01 RX ADMIN — GLYCERIN 1 SUPPOSITORY: 2 SUPPOSITORY RECTAL at 09:01

## 2019-01-01 RX ADMIN — EPINEPHRINE 0.06 MCG/KG/MIN: 1 INJECTION INTRAMUSCULAR; INTRAVENOUS; SUBCUTANEOUS at 01:01

## 2019-01-01 RX ADMIN — INSULIN DETEMIR 13 UNITS: 100 INJECTION, SOLUTION SUBCUTANEOUS at 10:01

## 2019-01-01 RX ADMIN — LEVOTHYROXINE SODIUM 25 MCG: 25 TABLET ORAL at 05:04

## 2019-01-01 RX ADMIN — DIBASIC SODIUM PHOSPHATE, MONOBASIC POTASSIUM PHOSPHATE AND MONOBASIC SODIUM PHOSPHATE 2 TABLET: 852; 155; 130 TABLET ORAL at 08:01

## 2019-01-01 RX ADMIN — CEFTRIAXONE SODIUM 2 G: 2 INJECTION, POWDER, FOR SOLUTION INTRAMUSCULAR; INTRAVENOUS at 05:02

## 2019-01-01 RX ADMIN — AMPICILLIN 2 G: 2 INJECTION, POWDER, FOR SOLUTION INTRAVENOUS at 04:02

## 2019-01-01 RX ADMIN — LEVOTHYROXINE SODIUM 25 MCG: 25 TABLET ORAL at 06:04

## 2019-01-01 RX ADMIN — HYPROMELLOSE 2910 1 DROP: 5 SOLUTION OPHTHALMIC at 08:02

## 2019-01-01 RX ADMIN — AMPICILLIN 2 G: 2 INJECTION, POWDER, FOR SOLUTION INTRAVENOUS at 03:02

## 2019-01-01 RX ADMIN — CLOPIDOGREL 75 MG: 75 TABLET, FILM COATED ORAL at 12:01

## 2019-01-01 RX ADMIN — ONDANSETRON 4 MG: 2 INJECTION INTRAMUSCULAR; INTRAVENOUS at 12:01

## 2019-01-01 RX ADMIN — CLOPIDOGREL 75 MG: 75 TABLET, FILM COATED ORAL at 02:01

## 2019-01-01 RX ADMIN — FERROUS SULFATE TAB EC 325 MG (65 MG FE EQUIVALENT) 325 MG: 325 (65 FE) TABLET DELAYED RESPONSE at 10:04

## 2019-01-01 RX ADMIN — DOPAMINE HYDROCHLORIDE IN DEXTROSE 15 MCG/KG/MIN: 1.6 INJECTION, SOLUTION INTRAVENOUS at 10:01

## 2019-01-01 RX ADMIN — SACUBITRIL AND VALSARTAN 1 TABLET: 24; 26 TABLET, FILM COATED ORAL at 09:01

## 2019-01-01 RX ADMIN — INSULIN ASPART 3 UNITS: 100 INJECTION, SOLUTION INTRAVENOUS; SUBCUTANEOUS at 07:04

## 2019-01-01 RX ADMIN — SENNOSIDES AND DOCUSATE SODIUM 2 TABLET: 8.6; 5 TABLET ORAL at 11:01

## 2019-01-01 RX ADMIN — CETIRIZINE HYDROCHLORIDE 5 MG: 5 TABLET ORAL at 08:04

## 2019-01-01 RX ADMIN — ROCURONIUM BROMIDE 10 MG: 10 INJECTION, SOLUTION INTRAVENOUS at 01:01

## 2019-01-01 RX ADMIN — APIXABAN 5 MG: 5 TABLET, FILM COATED ORAL at 08:05

## 2019-01-01 RX ADMIN — SODIUM CHLORIDE, SODIUM GLUCONATE, SODIUM ACETATE, POTASSIUM CHLORIDE, MAGNESIUM CHLORIDE, SODIUM PHOSPHATE, DIBASIC, AND POTASSIUM PHOSPHATE: .53; .5; .37; .037; .03; .012; .00082 INJECTION, SOLUTION INTRAVENOUS at 09:01

## 2019-01-01 RX ADMIN — AMPICILLIN SODIUM 4000 MG: 2 INJECTION, POWDER, FOR SOLUTION INTRAVENOUS at 11:01

## 2019-01-01 RX ADMIN — IOHEXOL 15 ML: 350 INJECTION, SOLUTION INTRAVENOUS at 08:05

## 2019-01-01 RX ADMIN — FUROSEMIDE 20 MG/HR: 10 INJECTION, SOLUTION INTRAMUSCULAR; INTRAVENOUS at 11:01

## 2019-01-01 RX ADMIN — INSULIN ASPART 2 UNITS: 100 INJECTION, SOLUTION INTRAVENOUS; SUBCUTANEOUS at 07:01

## 2019-01-01 RX ADMIN — ALPRAZOLAM 0.25 MG: 0.25 TABLET ORAL at 10:01

## 2019-01-01 RX ADMIN — INSULIN ASPART 3 UNITS: 100 INJECTION, SOLUTION INTRAVENOUS; SUBCUTANEOUS at 08:04

## 2019-01-01 RX ADMIN — INSULIN ASPART 3 UNITS: 100 INJECTION, SOLUTION INTRAVENOUS; SUBCUTANEOUS at 04:04

## 2019-01-01 RX ADMIN — PROPOFOL 20 MG: 10 INJECTION, EMULSION INTRAVENOUS at 10:01

## 2019-01-01 RX ADMIN — INSULIN ASPART 9 UNITS: 100 INJECTION, SOLUTION INTRAVENOUS; SUBCUTANEOUS at 12:02

## 2019-01-01 RX ADMIN — INSULIN ASPART 10 UNITS: 100 INJECTION, SOLUTION INTRAVENOUS; SUBCUTANEOUS at 11:02

## 2019-01-01 RX ADMIN — POTASSIUM CHLORIDE 40 MEQ: 20 SOLUTION ORAL at 08:01

## 2019-01-01 RX ADMIN — ONDANSETRON 4 MG: 2 INJECTION INTRAMUSCULAR; INTRAVENOUS at 09:02

## 2019-01-01 RX ADMIN — ONDANSETRON 4 MG: 2 INJECTION INTRAMUSCULAR; INTRAVENOUS at 11:01

## 2019-01-01 RX ADMIN — TRAMADOL HYDROCHLORIDE 50 MG: 50 TABLET, COATED ORAL at 05:01

## 2019-01-01 RX ADMIN — CLOPIDOGREL 75 MG: 75 TABLET, FILM COATED ORAL at 09:04

## 2019-01-01 RX ADMIN — DEXTROSE MONOHYDRATE 25 G: 25 INJECTION, SOLUTION INTRAVENOUS at 06:01

## 2019-01-01 RX ADMIN — FUROSEMIDE 80 MG: 10 INJECTION, SOLUTION INTRAMUSCULAR; INTRAVENOUS at 10:01

## 2019-01-01 RX ADMIN — ONDANSETRON 4 MG: 2 INJECTION INTRAMUSCULAR; INTRAVENOUS at 02:01

## 2019-01-01 RX ADMIN — HYPROMELLOSE 2910 1 DROP: 5 SOLUTION OPHTHALMIC at 11:02

## 2019-01-01 RX ADMIN — FUROSEMIDE 80 MG: 10 INJECTION, SOLUTION INTRAMUSCULAR; INTRAVENOUS at 08:04

## 2019-01-01 RX ADMIN — INSULIN ASPART 10 UNITS: 100 INJECTION, SOLUTION INTRAVENOUS; SUBCUTANEOUS at 05:05

## 2019-01-01 RX ADMIN — FUROSEMIDE 40 MG/HR: 10 INJECTION, SOLUTION INTRAMUSCULAR; INTRAVENOUS at 11:01

## 2019-01-01 RX ADMIN — Medication 0.02 MCG/KG/MIN: at 01:01

## 2019-01-01 RX ADMIN — INSULIN ASPART 1 UNITS: 100 INJECTION, SOLUTION INTRAVENOUS; SUBCUTANEOUS at 10:01

## 2019-01-01 RX ADMIN — DIGOXIN 0.06 MG: 0.05 SOLUTION ORAL at 05:01

## 2019-01-01 RX ADMIN — DOPAMINE HYDROCHLORIDE IN DEXTROSE 5 MCG/KG/MIN: 1.6 INJECTION, SOLUTION INTRAVENOUS at 12:01

## 2019-01-01 RX ADMIN — INSULIN DETEMIR 15 UNITS: 100 INJECTION, SOLUTION SUBCUTANEOUS at 10:01

## 2019-01-01 RX ADMIN — DOPAMINE HYDROCHLORIDE IN DEXTROSE 12 MCG/KG/MIN: 1.6 INJECTION, SOLUTION INTRAVENOUS at 08:01

## 2019-01-01 RX ADMIN — POTASSIUM CHLORIDE 40 MEQ: 1500 TABLET, EXTENDED RELEASE ORAL at 06:01

## 2019-01-01 RX ADMIN — TAMSULOSIN HYDROCHLORIDE 0.4 MG: 0.4 CAPSULE ORAL at 09:01

## 2019-01-01 RX ADMIN — ALPRAZOLAM 0.25 MG: 0.25 TABLET ORAL at 06:01

## 2019-01-01 RX ADMIN — CLOPIDOGREL 75 MG: 75 TABLET, FILM COATED ORAL at 08:04

## 2019-01-01 RX ADMIN — DOPAMINE HYDROCHLORIDE IN DEXTROSE 13 MCG/KG/MIN: 1.6 INJECTION, SOLUTION INTRAVENOUS at 10:01

## 2019-01-01 RX ADMIN — WARFARIN SODIUM 2 MG: 2 TABLET ORAL at 04:01

## 2019-01-01 RX ADMIN — CETIRIZINE HYDROCHLORIDE 10 MG: 5 TABLET ORAL at 09:05

## 2019-01-01 RX ADMIN — SACUBITRIL AND VALSARTAN 1 TABLET: 24; 26 TABLET, FILM COATED ORAL at 08:02

## 2019-01-01 RX ADMIN — INSULIN DETEMIR 4 UNITS: 100 INJECTION, SOLUTION SUBCUTANEOUS at 09:01

## 2019-01-01 RX ADMIN — VASOPRESSIN 1 UNITS: 20 INJECTION INTRAVENOUS at 09:01

## 2019-01-01 RX ADMIN — POTASSIUM CHLORIDE 20 MEQ: 1500 TABLET, EXTENDED RELEASE ORAL at 05:01

## 2019-01-01 RX ADMIN — AMPICILLIN SODIUM 4000 MG: 2 INJECTION, POWDER, FOR SOLUTION INTRAVENOUS at 06:01

## 2019-01-01 RX ADMIN — EPINEPHRINE 0.02 MCG/KG/MIN: 1 INJECTION INTRAMUSCULAR; INTRAVENOUS; SUBCUTANEOUS at 06:01

## 2019-01-01 RX ADMIN — INSULIN ASPART 5 UNITS: 100 INJECTION, SOLUTION INTRAVENOUS; SUBCUTANEOUS at 06:01

## 2019-01-01 RX ADMIN — POTASSIUM CHLORIDE 20 MEQ: 20 SOLUTION ORAL at 04:01

## 2019-01-01 RX ADMIN — AMPICILLIN 2 G: 2 INJECTION, POWDER, FOR SOLUTION INTRAVENOUS at 01:01

## 2019-01-01 RX ADMIN — FUROSEMIDE 40 MG/HR: 10 INJECTION, SOLUTION INTRAMUSCULAR; INTRAVENOUS at 12:01

## 2019-01-01 RX ADMIN — VASOPRESSIN 0.04 UNITS/MIN: 20 INJECTION INTRAVENOUS at 10:01

## 2019-01-01 RX ADMIN — FUROSEMIDE 10 MG/HR: 10 INJECTION, SOLUTION INTRAMUSCULAR; INTRAVENOUS at 07:01

## 2019-01-01 RX ADMIN — AMOXICILLIN 500 MG: 500 CAPSULE ORAL at 02:04

## 2019-01-01 RX ADMIN — DOPAMINE HYDROCHLORIDE IN DEXTROSE 10 MCG/KG/MIN: 1.6 INJECTION, SOLUTION INTRAVENOUS at 12:01

## 2019-01-01 RX ADMIN — AMPICILLIN 2 G: 2 INJECTION, POWDER, FOR SOLUTION INTRAVENOUS at 01:02

## 2019-01-01 RX ADMIN — SENNOSIDES 8.6 MG: 8.6 TABLET, FILM COATED ORAL at 10:01

## 2019-01-01 RX ADMIN — POTASSIUM CHLORIDE 40 MEQ: 1500 TABLET, EXTENDED RELEASE ORAL at 10:01

## 2019-01-01 RX ADMIN — FUROSEMIDE 60 MG/HR: 10 INJECTION, SOLUTION INTRAMUSCULAR; INTRAVENOUS at 11:01

## 2019-01-01 RX ADMIN — CEFTRIAXONE SODIUM 2 G: 2 INJECTION, POWDER, FOR SOLUTION INTRAMUSCULAR; INTRAVENOUS at 06:02

## 2019-01-01 RX ADMIN — HYDROXYZINE HYDROCHLORIDE 10 MG: 10 TABLET ORAL at 08:01

## 2019-01-01 RX ADMIN — DOPAMINE HYDROCHLORIDE IN DEXTROSE 5 MCG/KG/MIN: 1.6 INJECTION, SOLUTION INTRAVENOUS at 04:01

## 2019-01-01 RX ADMIN — VANCOMYCIN HYDROCHLORIDE 1 G: 1 INJECTION, POWDER, LYOPHILIZED, FOR SOLUTION INTRAVENOUS at 03:01

## 2019-01-01 RX ADMIN — ACETAMINOPHEN 1000 MG: 500 TABLET, FILM COATED ORAL at 08:01

## 2019-01-01 RX ADMIN — INSULIN ASPART 10 UNITS: 100 INJECTION, SOLUTION INTRAVENOUS; SUBCUTANEOUS at 08:01

## 2019-01-01 RX ADMIN — MAGNESIUM OXIDE TAB 400 MG (241.3 MG ELEMENTAL MG) 800 MG: 400 (241.3 MG) TAB at 02:01

## 2019-01-01 RX ADMIN — APIXABAN 5 MG: 5 TABLET, FILM COATED ORAL at 10:04

## 2019-01-01 RX ADMIN — Medication 0.01 MCG/KG/MIN: at 05:01

## 2019-01-01 RX ADMIN — ETOMIDATE 4 MG: 2 INJECTION, SOLUTION INTRAVENOUS at 10:01

## 2019-01-01 RX ADMIN — VASOPRESSIN 1 UNITS: 20 INJECTION INTRAVENOUS at 10:01

## 2019-01-01 RX ADMIN — INSULIN ASPART 6 UNITS: 100 INJECTION, SOLUTION INTRAVENOUS; SUBCUTANEOUS at 11:01

## 2019-01-01 RX ADMIN — PIPERACILLIN AND TAZOBACTAM 4.5 G: 4; .5 INJECTION, POWDER, LYOPHILIZED, FOR SOLUTION INTRAVENOUS; PARENTERAL at 04:05

## 2019-01-01 RX ADMIN — AMPICILLIN SODIUM 4000 MG: 2 INJECTION, POWDER, FOR SOLUTION INTRAVENOUS at 05:01

## 2019-01-01 RX ADMIN — INSULIN ASPART 6 UNITS: 100 INJECTION, SOLUTION INTRAVENOUS; SUBCUTANEOUS at 01:01

## 2019-01-01 RX ADMIN — HEPARIN SODIUM AND DEXTROSE 10 UNITS/KG/HR: 10000; 5 INJECTION INTRAVENOUS at 05:01

## 2019-01-01 RX ADMIN — INSULIN ASPART 3 UNITS: 100 INJECTION, SOLUTION INTRAVENOUS; SUBCUTANEOUS at 05:04

## 2019-01-01 RX ADMIN — HYPROMELLOSE 2910 1 DROP: 5 SOLUTION OPHTHALMIC at 10:02

## 2019-01-01 RX ADMIN — INSULIN ASPART 10 UNITS: 100 INJECTION, SOLUTION INTRAVENOUS; SUBCUTANEOUS at 12:01

## 2019-01-01 RX ADMIN — ROSUVASTATIN CALCIUM 20 MG: 20 TABLET, FILM COATED ORAL at 08:02

## 2019-01-01 RX ADMIN — METOPROLOL SUCCINATE 25 MG: 25 TABLET, EXTENDED RELEASE ORAL at 10:05

## 2019-01-01 RX ADMIN — INSULIN ASPART 10 UNITS: 100 INJECTION, SOLUTION INTRAVENOUS; SUBCUTANEOUS at 05:02

## 2019-01-01 RX ADMIN — IOHEXOL 15 ML: 350 INJECTION, SOLUTION INTRAVENOUS at 10:05

## 2019-01-01 RX ADMIN — PANTOPRAZOLE SODIUM 40 MG: 40 TABLET, DELAYED RELEASE ORAL at 09:05

## 2019-01-01 RX ADMIN — HYDROXYZINE HYDROCHLORIDE 10 MG: 10 TABLET ORAL at 02:01

## 2019-01-01 RX ADMIN — SODIUM CHLORIDE 250 ML: 0.9 INJECTION, SOLUTION INTRAVENOUS at 01:02

## 2019-01-01 RX ADMIN — PANTOPRAZOLE SODIUM 40 MG: 40 TABLET, DELAYED RELEASE ORAL at 08:04

## 2019-01-01 RX ADMIN — CLOPIDOGREL 75 MG: 75 TABLET, FILM COATED ORAL at 08:02

## 2019-01-01 RX ADMIN — DOCUSATE SODIUM 100 MG: 100 CAPSULE, LIQUID FILLED ORAL at 09:01

## 2019-01-01 RX ADMIN — EPINEPHRINE 0.06 MCG/KG/MIN: 1 INJECTION INTRAMUSCULAR; INTRAVENOUS; SUBCUTANEOUS at 07:01

## 2019-01-01 RX ADMIN — VANCOMYCIN HYDROCHLORIDE 1250 MG: 10 INJECTION, POWDER, LYOPHILIZED, FOR SOLUTION INTRAVENOUS at 02:01

## 2019-01-01 RX ADMIN — FUROSEMIDE 20 MG/HR: 10 INJECTION, SOLUTION INTRAMUSCULAR; INTRAVENOUS at 08:01

## 2019-01-01 RX ADMIN — MAGNESIUM OXIDE TAB 400 MG (241.3 MG ELEMENTAL MG) 400 MG: 400 (241.3 MG) TAB at 12:02

## 2019-01-01 RX ADMIN — SODIUM CHLORIDE 250 ML: 0.9 INJECTION, SOLUTION INTRAVENOUS at 07:02

## 2019-01-01 RX ADMIN — INSULIN ASPART 5 UNITS: 100 INJECTION, SOLUTION INTRAVENOUS; SUBCUTANEOUS at 11:01

## 2019-01-01 RX ADMIN — KETAMINE HYDROCHLORIDE 25 MG: 10 INJECTION, SOLUTION INTRAMUSCULAR; INTRAVENOUS at 10:01

## 2019-01-01 RX ADMIN — ALPRAZOLAM 0.5 MG: 0.5 TABLET ORAL at 01:01

## 2019-01-01 RX ADMIN — INSULIN HUMAN 10 UNITS: 100 INJECTION, SOLUTION PARENTERAL at 06:01

## 2019-01-01 RX ADMIN — INSULIN DETEMIR 15 UNITS: 100 INJECTION, SOLUTION SUBCUTANEOUS at 01:01

## 2019-01-01 RX ADMIN — EPINEPHRINE 0.06 MCG/KG/MIN: 1 INJECTION INTRAMUSCULAR; INTRAVENOUS; SUBCUTANEOUS at 11:01

## 2019-01-01 RX ADMIN — VANCOMYCIN HYDROCHLORIDE 1000 MG: 1 INJECTION, POWDER, LYOPHILIZED, FOR SOLUTION INTRAVENOUS at 02:05

## 2019-01-01 RX ADMIN — INSULIN ASPART 3 UNITS: 100 INJECTION, SOLUTION INTRAVENOUS; SUBCUTANEOUS at 11:01

## 2019-01-01 RX ADMIN — ALPRAZOLAM 0.25 MG: 0.25 TABLET ORAL at 08:01

## 2019-01-01 RX ADMIN — METOPROLOL SUCCINATE 25 MG: 25 TABLET, EXTENDED RELEASE ORAL at 08:01

## 2019-01-01 RX ADMIN — TRAMADOL HYDROCHLORIDE 50 MG: 50 TABLET, COATED ORAL at 12:02

## 2019-01-01 RX ADMIN — INSULIN DETEMIR 9 UNITS: 100 INJECTION, SOLUTION SUBCUTANEOUS at 09:01

## 2019-01-01 RX ADMIN — ALPRAZOLAM 0.25 MG: 0.25 TABLET ORAL at 12:01

## 2019-01-01 RX ADMIN — FERROUS SULFATE TAB EC 325 MG (65 MG FE EQUIVALENT) 325 MG: 325 (65 FE) TABLET DELAYED RESPONSE at 11:01

## 2019-01-01 RX ADMIN — INSULIN ASPART 2 UNITS: 100 INJECTION, SOLUTION INTRAVENOUS; SUBCUTANEOUS at 12:02

## 2019-01-01 RX ADMIN — DOCUSATE SODIUM 100 MG: 100 CAPSULE, LIQUID FILLED ORAL at 08:01

## 2019-01-01 RX ADMIN — ROPINIROLE HYDROCHLORIDE 0.5 MG: 0.25 TABLET, FILM COATED ORAL at 09:04

## 2019-01-01 RX ADMIN — Medication 0.05 MCG/KG/MIN: at 07:01

## 2019-01-01 RX ADMIN — CHLOROTHIAZIDE SODIUM 250 MG: 500 INJECTION, POWDER, LYOPHILIZED, FOR SOLUTION INTRAVENOUS at 07:01

## 2019-01-01 RX ADMIN — TAMSULOSIN HYDROCHLORIDE 0.4 MG: 0.4 CAPSULE ORAL at 02:01

## 2019-01-01 RX ADMIN — ROSUVASTATIN CALCIUM 20 MG: 20 TABLET, FILM COATED ORAL at 11:01

## 2019-01-01 RX ADMIN — TRAMADOL HYDROCHLORIDE 50 MG: 50 TABLET, COATED ORAL at 04:02

## 2019-01-01 RX ADMIN — INSULIN ASPART 3 UNITS: 100 INJECTION, SOLUTION INTRAVENOUS; SUBCUTANEOUS at 01:01

## 2019-01-01 RX ADMIN — Medication 1250 MG: at 07:05

## 2019-01-01 RX ADMIN — POTASSIUM CHLORIDE 60 MEQ: 1500 TABLET, EXTENDED RELEASE ORAL at 08:02

## 2019-01-01 RX ADMIN — TRAMADOL HYDROCHLORIDE 50 MG: 50 TABLET, COATED ORAL at 03:02

## 2019-01-01 RX ADMIN — LEVOFLOXACIN 500 MG: 500 TABLET, FILM COATED ORAL at 08:05

## 2019-01-01 RX ADMIN — INSULIN ASPART 1 UNITS: 100 INJECTION, SOLUTION INTRAVENOUS; SUBCUTANEOUS at 08:04

## 2019-01-01 RX ADMIN — CHLOROTHIAZIDE SODIUM 250 MG: 500 INJECTION, POWDER, LYOPHILIZED, FOR SOLUTION INTRAVENOUS at 04:01

## 2019-01-01 RX ADMIN — INSULIN DETEMIR 13 UNITS: 100 INJECTION, SOLUTION SUBCUTANEOUS at 09:01

## 2019-01-01 RX ADMIN — INSULIN ASPART 8 UNITS: 100 INJECTION, SOLUTION INTRAVENOUS; SUBCUTANEOUS at 07:02

## 2019-01-01 RX ADMIN — FUROSEMIDE 20 MG/HR: 10 INJECTION, SOLUTION INTRAMUSCULAR; INTRAVENOUS at 05:01

## 2019-01-01 RX ADMIN — ROPINIROLE HYDROCHLORIDE 0.5 MG: 0.25 TABLET, FILM COATED ORAL at 10:04

## 2019-01-01 RX ADMIN — Medication 0.12 MCG/KG/MIN: at 04:01

## 2019-01-01 RX ADMIN — Medication 0.05 MCG/KG/MIN: at 03:01

## 2019-01-01 RX ADMIN — FUROSEMIDE 80 MG: 10 INJECTION, SOLUTION INTRAMUSCULAR; INTRAVENOUS at 04:04

## 2019-01-01 RX ADMIN — INSULIN DETEMIR 5 UNITS: 100 INJECTION, SOLUTION SUBCUTANEOUS at 09:04

## 2019-01-01 RX ADMIN — DOPAMINE HYDROCHLORIDE IN DEXTROSE 15 MCG/KG/MIN: 1.6 INJECTION, SOLUTION INTRAVENOUS at 03:01

## 2019-01-01 RX ADMIN — LEVOTHYROXINE SODIUM 50 MCG: 50 TABLET ORAL at 08:05

## 2019-01-01 RX ADMIN — POLYETHYLENE GLYCOL 3350 17 G: 17 POWDER, FOR SOLUTION ORAL at 02:01

## 2019-01-01 RX ADMIN — INSULIN ASPART 10 UNITS: 100 INJECTION, SOLUTION INTRAVENOUS; SUBCUTANEOUS at 08:05

## 2019-01-01 RX ADMIN — CHLOROTHIAZIDE SODIUM 250 MG: 500 INJECTION, POWDER, LYOPHILIZED, FOR SOLUTION INTRAVENOUS at 12:04

## 2019-01-01 RX ADMIN — PROMETHAZINE HYDROCHLORIDE 12.5 MG: 25 INJECTION INTRAMUSCULAR; INTRAVENOUS at 11:01

## 2019-01-01 RX ADMIN — WARFARIN SODIUM 2.5 MG: 2.5 TABLET ORAL at 04:01

## 2019-01-01 RX ADMIN — ROSUVASTATIN CALCIUM 20 MG: 20 TABLET, FILM COATED ORAL at 08:05

## 2019-01-01 RX ADMIN — ACETAMINOPHEN 650 MG: 325 TABLET, FILM COATED ORAL at 02:01

## 2019-01-01 RX ADMIN — INSULIN DETEMIR 10 UNITS: 100 INJECTION, SOLUTION SUBCUTANEOUS at 09:01

## 2019-01-01 RX ADMIN — AMOXICILLIN 500 MG: 500 CAPSULE ORAL at 07:04

## 2019-01-01 RX ADMIN — INSULIN ASPART 4 UNITS: 100 INJECTION, SOLUTION INTRAVENOUS; SUBCUTANEOUS at 11:01

## 2019-01-01 RX ADMIN — FERROUS SULFATE TAB EC 325 MG (65 MG FE EQUIVALENT) 325 MG: 325 (65 FE) TABLET DELAYED RESPONSE at 10:01

## 2019-01-01 RX ADMIN — DOPAMINE HYDROCHLORIDE IN DEXTROSE 15 MCG/KG/MIN: 1.6 INJECTION, SOLUTION INTRAVENOUS at 04:01

## 2019-01-01 RX ADMIN — POTASSIUM CHLORIDE 40 MEQ: 1500 TABLET, EXTENDED RELEASE ORAL at 07:02

## 2019-01-01 RX ADMIN — INSULIN ASPART 3 UNITS: 100 INJECTION, SOLUTION INTRAVENOUS; SUBCUTANEOUS at 04:01

## 2019-01-01 RX ADMIN — POTASSIUM CHLORIDE 40 MEQ: 20 SOLUTION ORAL at 09:01

## 2019-01-01 RX ADMIN — DOCUSATE SODIUM 100 MG: 100 CAPSULE, LIQUID FILLED ORAL at 12:01

## 2019-01-01 RX ADMIN — PIPERACILLIN AND TAZOBACTAM 4.5 G: 4; .5 INJECTION, POWDER, LYOPHILIZED, FOR SOLUTION INTRAVENOUS; PARENTERAL at 08:05

## 2019-01-01 RX ADMIN — MAGNESIUM OXIDE TAB 400 MG (241.3 MG ELEMENTAL MG) 400 MG: 400 (241.3 MG) TAB at 08:02

## 2019-01-01 RX ADMIN — EPINEPHRINE 0.02 MCG/KG/MIN: 1 INJECTION INTRAMUSCULAR; INTRAVENOUS; SUBCUTANEOUS at 04:01

## 2019-01-01 RX ADMIN — DEXMEDETOMIDINE HYDROCHLORIDE 0.5 MCG/KG/HR: 100 INJECTION, SOLUTION, CONCENTRATE INTRAVENOUS at 03:01

## 2019-01-01 RX ADMIN — INSULIN DETEMIR 15 UNITS: 100 INJECTION, SOLUTION SUBCUTANEOUS at 09:01

## 2019-01-01 RX ADMIN — WARFARIN SODIUM 2.5 MG: 2.5 TABLET ORAL at 05:01

## 2019-01-01 RX ADMIN — METOLAZONE 5 MG: 2.5 TABLET ORAL at 10:01

## 2019-01-01 RX ADMIN — FUROSEMIDE 80 MG: 10 INJECTION, SOLUTION INTRAMUSCULAR; INTRAVENOUS at 08:01

## 2019-01-01 RX ADMIN — ALBUTEROL SULFATE 2 PUFF: 90 AEROSOL, METERED RESPIRATORY (INHALATION) at 12:04

## 2019-01-01 RX ADMIN — INSULIN ASPART 2 UNITS: 100 INJECTION, SOLUTION INTRAVENOUS; SUBCUTANEOUS at 11:01

## 2019-01-01 RX ADMIN — POTASSIUM CHLORIDE 40 MEQ: 1500 TABLET, EXTENDED RELEASE ORAL at 08:04

## 2019-01-01 RX ADMIN — FUROSEMIDE 20 MG: 10 INJECTION, SOLUTION INTRAMUSCULAR; INTRAVENOUS at 03:01

## 2019-01-01 RX ADMIN — SODIUM CHLORIDE, SODIUM GLUCONATE, SODIUM ACETATE, POTASSIUM CHLORIDE, MAGNESIUM CHLORIDE, SODIUM PHOSPHATE, DIBASIC, AND POTASSIUM PHOSPHATE: .53; .5; .37; .037; .03; .012; .00082 INJECTION, SOLUTION INTRAVENOUS at 02:01

## 2019-01-01 RX ADMIN — WARFARIN SODIUM 5 MG: 5 TABLET ORAL at 04:01

## 2019-01-01 RX ADMIN — INSULIN ASPART 8 UNITS: 100 INJECTION, SOLUTION INTRAVENOUS; SUBCUTANEOUS at 09:04

## 2019-01-01 RX ADMIN — VASOPRESSIN 4 UNITS: 20 INJECTION INTRAVENOUS at 09:01

## 2019-01-01 RX ADMIN — INSULIN ASPART 7 UNITS: 100 INJECTION, SOLUTION INTRAVENOUS; SUBCUTANEOUS at 11:01

## 2019-01-01 RX ADMIN — AMPICILLIN SODIUM 2 G: 500 INJECTION, POWDER, FOR SOLUTION INTRAMUSCULAR; INTRAVENOUS at 01:01

## 2019-01-01 RX ADMIN — INSULIN ASPART 5 UNITS: 100 INJECTION, SOLUTION INTRAVENOUS; SUBCUTANEOUS at 09:01

## 2019-01-01 RX ADMIN — WARFARIN SODIUM 1 MG: 1 TABLET ORAL at 05:01

## 2019-01-01 RX ADMIN — SENNOSIDES AND DOCUSATE SODIUM 2 TABLET: 8.6; 5 TABLET ORAL at 08:02

## 2019-01-01 RX ADMIN — PANTOPRAZOLE SODIUM 40 MG: 40 TABLET, DELAYED RELEASE ORAL at 02:05

## 2019-01-01 RX ADMIN — WARFARIN SODIUM 2.5 MG: 2.5 TABLET ORAL at 06:01

## 2019-01-01 RX ADMIN — INSULIN ASPART 3 UNITS: 100 INJECTION, SOLUTION INTRAVENOUS; SUBCUTANEOUS at 08:01

## 2019-01-01 RX ADMIN — AMPICILLIN SODIUM 2 G: 2 INJECTION, POWDER, FOR SOLUTION INTRAMUSCULAR; INTRAVENOUS at 01:01

## 2019-01-01 RX ADMIN — POTASSIUM CHLORIDE 20 MEQ: 1500 TABLET, EXTENDED RELEASE ORAL at 08:02

## 2019-01-01 RX ADMIN — PROMETHAZINE HYDROCHLORIDE 25 MG: 25 INJECTION INTRAMUSCULAR; INTRAVENOUS at 10:01

## 2019-01-01 RX ADMIN — GLYCOPYRROLATE 0.2 MG: 0.2 INJECTION, SOLUTION INTRAMUSCULAR; INTRAVENOUS at 10:01

## 2019-01-01 RX ADMIN — Medication 2 G: at 02:01

## 2019-01-01 RX ADMIN — SODIUM CHLORIDE: 0.9 INJECTION, SOLUTION INTRAVENOUS at 10:01

## 2019-01-01 RX ADMIN — FENTANYL CITRATE 50 MCG: 50 INJECTION, SOLUTION INTRAMUSCULAR; INTRAVENOUS at 09:01

## 2019-01-01 RX ADMIN — INSULIN DETEMIR 10 UNITS: 100 INJECTION, SOLUTION SUBCUTANEOUS at 10:01

## 2019-01-01 RX ADMIN — AMPICILLIN SODIUM 2 G: 2 INJECTION, POWDER, FOR SOLUTION INTRAMUSCULAR; INTRAVENOUS at 03:01

## 2019-01-01 RX ADMIN — HYPROMELLOSE 2910 1 DROP: 5 SOLUTION OPHTHALMIC at 09:02

## 2019-01-01 RX ADMIN — ROSUVASTATIN CALCIUM 20 MG: 20 TABLET, FILM COATED ORAL at 01:01

## 2019-01-01 RX ADMIN — INSULIN ASPART 4 UNITS: 100 INJECTION, SOLUTION INTRAVENOUS; SUBCUTANEOUS at 05:01

## 2019-01-01 RX ADMIN — ALPRAZOLAM 0.25 MG: 0.25 TABLET ORAL at 05:01

## 2019-01-01 RX ADMIN — INSULIN ASPART 10 UNITS: 100 INJECTION, SOLUTION INTRAVENOUS; SUBCUTANEOUS at 01:05

## 2019-01-01 RX ADMIN — DOPAMINE HYDROCHLORIDE IN DEXTROSE 17 MCG/KG/MIN: 1.6 INJECTION, SOLUTION INTRAVENOUS at 12:01

## 2019-01-01 RX ADMIN — INSULIN ASPART 5 UNITS: 100 INJECTION, SOLUTION INTRAVENOUS; SUBCUTANEOUS at 11:04

## 2019-01-01 RX ADMIN — INSULIN ASPART 1 UNITS: 100 INJECTION, SOLUTION INTRAVENOUS; SUBCUTANEOUS at 09:01

## 2019-01-01 RX ADMIN — FUROSEMIDE 40 MG/HR: 10 INJECTION, SOLUTION INTRAMUSCULAR; INTRAVENOUS at 06:01

## 2019-01-01 RX ADMIN — INSULIN ASPART 9 UNITS: 100 INJECTION, SOLUTION INTRAVENOUS; SUBCUTANEOUS at 08:02

## 2019-01-01 RX ADMIN — ETOMIDATE 8 MG: 2 INJECTION, SOLUTION INTRAVENOUS at 10:01

## 2019-01-01 RX ADMIN — INSULIN ASPART 3 UNITS: 100 INJECTION, SOLUTION INTRAVENOUS; SUBCUTANEOUS at 10:01

## 2019-01-01 RX ADMIN — PROMETHAZINE HYDROCHLORIDE 12.5 MG: 25 INJECTION INTRAMUSCULAR; INTRAVENOUS at 09:01

## 2019-01-01 RX ADMIN — Medication 16 G: at 10:05

## 2019-01-01 RX ADMIN — INSULIN DETEMIR 6 UNITS: 100 INJECTION, SOLUTION SUBCUTANEOUS at 10:05

## 2019-01-01 RX ADMIN — Medication 400 MG: at 09:04

## 2019-01-06 PROBLEM — R78.81 BACTEREMIA: Status: ACTIVE | Noted: 2019-01-01

## 2019-01-06 NOTE — Clinical Note
All sheaths removed, manual pressure applied to bilateral groin until hemostasis achieved. Then applied gauze and tegaderm.

## 2019-01-06 NOTE — Clinical Note
A 9 Fr tight rail mini was passed over RA lead with countertraction maneuvers in attempts to remove lead

## 2019-01-06 NOTE — Clinical Note
A venogram was performed in the SVC. The vessel was injected with hand injection  with 20 mL of contrast.

## 2019-01-06 NOTE — Clinical Note
Prepped: groin, chest and abdomen. Prepped with: ChloraPrep. The site was clipped. The patient was draped. ioban applied to chest

## 2019-01-06 NOTE — Clinical Note
A dressing is applied to the incision. Foam dressing to be applied 30 minutes after dermaflex adhesive applied then apply pressure dressing.

## 2019-01-06 NOTE — Clinical Note
11fr tight rail removed. 14fr guidelight laser inserted over RV.  Laser pulses with countertraction maneuvers performed in attempt to remove RV lead

## 2019-01-06 NOTE — Clinical Note
The stylet was removed and the LV lead was prepared for LLD insertion.A lead locking device size [id number] was attached to the LV lead

## 2019-01-06 NOTE — Clinical Note
A tight rail mini was passed over the LV lead with countertraction maneuvers in attempts to remove lead

## 2019-01-06 NOTE — Clinical Note
The sheath is inserted into the right femoral vein. 12 Fr sheath from Balloon kit inserted into right femoral vein

## 2019-01-06 NOTE — Clinical Note
Generator Pocket made at the right  upper chest  with blunt dissection, electrocautery and sharp dissection.

## 2019-01-06 NOTE — Clinical Note
The stylet was removed and the RA lead was prepared for LLD insertion.A lead locking device size #2 was attached to the RA lead

## 2019-01-06 NOTE — Clinical Note
The site was marked. Prepped: groin, chest and abdomen. Prepped with: ChloraPrep and Ioban. The site was clipped. The patient was draped.

## 2019-01-06 NOTE — Clinical Note
The stylet was removed and the RV lead was prepared for LLD insertion.A lead locking device size EZ was attached to the RV lead

## 2019-01-07 PROBLEM — T82.7XXA INFECTION OF AUTOMATIC IMPLANTABLE CARDIOVERTER-DEFIBRILLATOR LEAD: Status: ACTIVE | Noted: 2018-03-22

## 2019-01-07 NOTE — PLAN OF CARE
Problem: Adult Inpatient Plan of Care  Goal: Plan of Care Review  Outcome: Ongoing (interventions implemented as appropriate)  Pt AAOx4, VSS, afebrile.  Currently denies c/o pain.  Pt c/o CORDOBA.  1x dose IV 20mg lasix administered.  IV Vancomycin and IV cefepime administered.  Blood cultures x2 drawn. UA sample sent.  Cardiac monitoring initiated.  Pt v-paced.  Strip posted in chart.  NPO for possible RICARDO.  Pt verbalized understanding.  Wife remain at pt bedside.  Bedside commode in use.  Fall risk precautions initiated.  Pt in lowest bed position setting, lighting adjusted, pt to wear nonskid socks when ambulating, side rails up x2.  Pt remain free from falls during shift.  Pt verbalize understanding to call when needed assistance. Call light within reach.  Will continue to monitor.

## 2019-01-07 NOTE — ASSESSMENT & PLAN NOTE
EF 20-25% 4/18  BNP 1607, net + >6L from [facility] with worsening dyspnea  Repeat 2D echo  Evidence of volume overload on exam, will administer gentle diuresis and reevaluate in AM  Hold entresto and toprol XL for now, re-evaluate in AM  Low sodium diet, fluid restriction

## 2019-01-07 NOTE — HPI
78M here for extraction of CRT-D device and leads for which we are consulted for RICARDO.    He has a PMH HFrEF/ICM s/p ICD, S/p cardioMEMS implant (enrolled in GUIDE-HF study), CAD ([facility] 3/18 with patent stents in proximal OM and ostial to mid RCA), AF on eliquis and digoxin, COPD, DM, hypothyroidism, HLD, prior CVA. He was transferred from [facility] for suspected RV lead vegatation <0.5 cm seen on RICARDO. 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 TTE with EF 20%, moderate-severe MR, moderate TR, moderately depressed RV; no vegetation identified.    Dysphagia or odynophagia:  No  Liver Disease, esophageal disease, or known varices:  No  Upper GI Bleeding: No  Snoring:  No  Sleep Apnea:  No  Prior neck surgery or radiation:  No  History of anesthetic difficulties:  No  Family history of anesthetic difficulties:  No  Last oral intake:  12 hours ago  Able to move neck in all directions:  No

## 2019-01-07 NOTE — HPI
Mr. Jerry Solis is a 78 year old gentleman with history of CHF, s/p ICD placement and s/p cardioMems heart failure monitoring device , Afib, DM, HTN, COPD, history of CVA who presented to [facility] with nausea, vomiting, diarrhea and fevers.  Found to be hypotensive and with TREY.  Required pressor support and was transferred to [facility] where blood cultures found to be Enterococcus faecalis, and RICARDO showed possible vegetation on RV lead.  He was transferred to [facility] for further evaluation and possible device extraction.   He is currently on IV vancomycin and cefepime.      Reports that he had an episode of what he thought was food poisoning a couple of months ago when he was visiting his brother in [place].  Patient reports a loss of appetite for approximately the past 3 -4 weeks and has lost approx 20 pounds during that time.  He did not seek medical attention because he was working.   He denies any fevers, chills until this past Monday when he was admitted.  He denies current fevers, chills, sweats.  He has chronic SOB that is not worse from baseline.  Denies current n/v/d.  Denies melena, hematachezia.  No urinary complaints. Denies any pain, redness at ICD pocket site.      ICD/pacer placed >10 years ago.  Had ? Generator changed in 2008.  Denies any problems.      Last colonoscopy was 2 years ago.  He has q 3 year surveillance colonoscopies as he has polyps.      Blood culture results from [facility] of 1/2/19 - E. Faecalis - PCN, vancomycin sensitive.  PICC was placed 1/3/19.  Unclear when blood cultures cleared as blood cultures were not repeated.   Repeat blood cultures taken here on admission 1/7.      He is afebrile, WBC 13.2, platelets 128, H/H 8.7/27.6, ESR 54, CRP 27.9, procalcitonin wnl.

## 2019-01-07 NOTE — NURSING
Per ID, no need for Contact Precautions - patient's final results were received from sending facility & had no MRSA/VRE

## 2019-01-07 NOTE — ASSESSMENT & PLAN NOTE
WBC 13, off pressors, afebrile, no bands, lactate and procalcitonin within normal limits  Presumed due to ICD lead infection  Continue Vanc/Cefepime  Blood cultures ordered, however patient had already been on ABX prior to transfer  GI illness seems to have resolved, also was C Dif negative at prior facility  CXR pending

## 2019-01-07 NOTE — SUBJECTIVE & OBJECTIVE
Past Medical History:   Diagnosis Date    Chronic systolic congestive heart failure 3/22/2018    Coronary artery disease involving native coronary artery of native heart without angina pectoris 3/22/2018    ICD (implantable cardioverter-defibrillator) in place 3/22/2018    Ischemic cardiomyopathy 3/22/2018    Mixed hyperlipidemia 3/22/2018    Type 2 diabetes mellitus with complication 3/22/2018    VT (ventricular tachycardia) 3/22/2018       Past Surgical History:   Procedure Laterality Date    HEART CATH-RIGHT Right 4/5/2018    Performed by Elizabeth Wise MD at [facility] CATH LAB       Review of patient's allergies indicates:   Allergen Reactions    Adhesive Other (See Comments)     blisters    Codeine Other (See Comments)     Blurred vision    Latex Hives    Ace inhibitors     Lipitor [atorvastatin] Other (See Comments)     Muscle spasms       Medications:  Medications Prior to Admission   Medication Sig    bumetanide (BUMEX) 1 MG tablet Take 3 mg every morning and 2 mg every evening    clopidogrel (PLAVIX) 75 mg tablet Take 1 tablet (75 mg total) by mouth once daily.    digoxin (LANOXIN) 125 mcg tablet Take 0.5 tablets by mouth every morning.    ELIQUIS 2.5 mg Tab Take 1 tablet by mouth 2 (two) times daily.    LANTUS U-100 INSULIN 100 unit/mL injection Inject 50 Units into the skin 2 (two) times daily.    levothyroxine (SYNTHROID) 25 MCG tablet Take 1 tablet by mouth once daily.    metOLazone (ZAROXOLYN) 5 MG tablet Take one tablet by mouth now. Then only take as ordered per MD. (Patient taking differently: Take 5 mg by mouth as needed. Take one tablet by mouth now. Then only take as ordered per MD.)    metoprolol succinate (TOPROL-XL) 25 MG 24 hr tablet Take 1 tablet (25 mg total) by mouth every evening.    NOVOLOG U-100 INSULIN ASPART 100 unit/mL injection Inject 5 Units into the skin 4 (four) times daily.    omega-3 acid ethyl esters (LOVAZA) 1 gram capsule Take 2 g by mouth 2 (two) times  daily.    omeprazole (PRILOSEC) 40 MG capsule Take 40 mg by mouth once daily.    potassium chloride SA (KLOR-CON M20) 20 MEQ tablet Take 2-3 tablets by mouth daily as directed.  Take it when taking the bumetadine.    rosuvastatin (CRESTOR) 20 MG tablet Take 1 tablet (20 mg total) by mouth every evening.    sacubitril-valsartan (ENTRESTO) 24-26 mg per tablet Take 1 tablet by mouth 2 (two) times daily.    VENTOLIN HFA 90 mcg/actuation inhaler Inhale 2 puffs into the lungs every 4 (four) hours as needed.     loratadine (CLARITIN) 10 mg tablet Take 10 mg by mouth once daily.    multivitamin (THERAGRAN) per tablet Take 1 tablet by mouth once daily.    TRELEGY ELLIPTA 100-62.5-25 mcg DsDv once daily.      Antibiotics (From admission, onward)    Start     Stop Route Frequency Ordered    01/07/19 1700  ampicillin 2 g in sodium chloride 0.9 % 100 mL IVPB (ready to mix system)      -- IV Every 4 hours (non-standard times) 01/07/19 1453    01/07/19 1600  cefTRIAXone (ROCEPHIN) 2 g in dextrose 5 % 50 mL IVPB      -- IV Every 12 hours (non-standard times) 01/07/19 1453        Antifungals (From admission, onward)    None        Antivirals (From admission, onward)    None             There is no immunization history on file for this patient.    Family History     None        Social History     Socioeconomic History    Marital status:      Spouse name: None    Number of children: None    Years of education: None    Highest education level: None   Social Needs    Financial resource strain: None    Food insecurity - worry: None    Food insecurity - inability: None    Transportation needs - medical: None    Transportation needs - non-medical: None   Occupational History    None   Tobacco Use    Smoking status: Former Smoker    Smokeless tobacco: Former User   Substance and Sexual Activity    Alcohol use: Yes     Comment: former drinker    Drug use: No    Sexual activity: None   Other Topics Concern     None   Social History Narrative    None     Review of Systems   Constitutional: Positive for appetite change and unexpected weight change. Negative for chills, diaphoresis, fatigue and fever.   HENT: Negative for congestion, dental problem, ear pain, sore throat and tinnitus.    Eyes: Negative.    Respiratory: Negative for cough, shortness of breath (chronic, at baseline) and wheezing.    Cardiovascular: Negative for chest pain, palpitations and leg swelling.   Gastrointestinal: Positive for diarrhea (resolved). Negative for abdominal distention, abdominal pain, blood in stool, constipation, rectal pain and vomiting.   Genitourinary: Negative for dysuria, flank pain, frequency, hematuria and urgency.   Musculoskeletal: Negative for arthralgias, back pain, myalgias and neck pain.   Skin: Negative for rash.   Allergic/Immunologic: Negative for immunocompromised state.   Neurological: Negative for dizziness, light-headedness, numbness and headaches.   Hematological: Negative for adenopathy. Does not bruise/bleed easily.   Psychiatric/Behavioral: Negative for confusion and sleep disturbance. The patient is not nervous/anxious.      Objective:     Vital Signs (Most Recent):  Temp: 97.4 °F (36.3 °C) (01/07/19 1630)  Pulse: 70 (01/07/19 1356)  Resp: 19 (01/07/19 1356)  BP: 112/79 (01/07/19 0735)  SpO2: 97 % (01/07/19 1356) Vital Signs (24h Range):  Temp:  [97.4 °F (36.3 °C)-98.5 °F (36.9 °C)] 97.4 °F (36.3 °C)  Pulse:  [70-86] 70  Resp:  [16-22] 19  SpO2:  [95 %-100 %] 97 %  BP: ()/(67-84) 112/79     Weight: 89.4 kg (197 lb)  Body mass index is 25.29 kg/m².    Estimated Creatinine Clearance: 60.9 mL/min (based on SCr of 1.2 mg/dL).    Physical Exam   Constitutional: He is oriented to person, place, and time. He appears well-developed and well-nourished.   HENT:   Head: Normocephalic and atraumatic.   Eyes: Conjunctivae are normal. No scleral icterus.   Neck: Normal range of motion. Neck supple.   Cardiovascular:  Normal rate. An irregularly irregular rhythm present.   No murmur heard.  Left upper chest ICD pocket - intact, no tenderness, erythema   Pulmonary/Chest: Effort normal. No respiratory distress. He has no wheezes.   Crackles at bases     Abdominal: Soft. He exhibits no distension. There is no tenderness. There is no guarding.   Musculoskeletal: He exhibits edema (trace bilateral ankle edema).   Neurological: He is alert and oriented to person, place, and time.   Skin: Skin is warm and dry.   PICC RUE - c/d/i   Psychiatric: He has a normal mood and affect. His behavior is normal.   Vitals reviewed.      Significant Labs:   Blood Culture:   Recent Labs   Lab 01/07/19  0154   LABBLOO No Growth to date  No Growth to date     CBC:   Recent Labs   Lab 01/07/19 0115   WBC 13.22*   HGB 8.7*   HCT 27.6*   *     CMP:   Recent Labs   Lab 01/07/19 0115      K 4.4   *   CO2 17*   *   BUN 35*   CREATININE 1.2   CALCIUM 8.6*   PROT 6.5   ALBUMIN 2.6*   BILITOT 1.0   ALKPHOS 146*   AST 37   ALT 37   ANIONGAP 9   EGFRNONAA 58.4*     Lactic Acid:   Recent Labs   Lab 01/07/19 0115   LACTATE 1.2     Procalcitonin:   Recent Labs   Lab 01/07/19 0115   PROCAL 0.16       Significant Imaging: I have reviewed all pertinent imaging results/findings within the past 24 hours.

## 2019-01-07 NOTE — ASSESSMENT & PLAN NOTE
Concern for vegetation in the setting of G+ cocci bacteremia  Repeat 2D echo  NPO past midnight  Patient may require RICARDO  Ultimately will likely need device extraction  ID consult

## 2019-01-07 NOTE — HPI
We are consulted for device extraction in this 79 yo male with a past medical hx of ICM (20%), s/p [employer] BiV ICD upgraded on 08/01/2014 -- initial leads appear to be 20 years old and implanted in [place], persistent AF (currently on Eliquis, being on this admission) without pacemaker dependence, CAD s/p patent stents in Prox OM and ostial to mid RCA in [facility] 03/2018, COPD, DM, hypothyroidism, HLD, prior CVA here as a transfer from [facility] for a possible RV lead vegetation and ID consultation. The patient is treated here is followed by the [facility] service for his ICM. He had prior to this admission NYHA class III symptoms and is s/p cardioMEMS implant (enrolled in GUIDE-HF study).     At [facility] he presented with weakness and hypotension after having nausea and diarrhea on 01/02/19. Was admitted for sepsis, grew Enterococcus faecalis (VRE) on blood cultures (no repeat available from there), and was treated with Vanc/Cefepime. He had a RICARDO with a <0.5cm vegetation, confirmed by our echocardiographers here and was transferred here for evaluation for lead extraction.

## 2019-01-07 NOTE — PROGRESS NOTES
Admit Note     Met with patient and spouse to assess needs. Patient is a 78 y.o.  male, admitted for bacteremia, atrial fibrillation and type 2 diabetes. Pt is hard of hearing.      Patient admitted on 1/6/2019 .  At this time, patient presents as alert and oriented x 4, good eye contact and calm.  At this time, patients caregiver presents as alert and oriented x 4, good eye contact, calm and communicative.    Household/Family Systems     Patient resides with patient's spouse, at    Physical address:    [facility] [place] [address]    Mailing address:    [address] [place] [address].        Support system includes spouse and three adult children.  All of the pt's children live close by and one son works with pt.   The pt's youngest son is now on dialysis and per pt is being evaluated for a kidney transplant at [facility].  Pt does not have any dependent children.       Patients primary caregiver is Mague Solis, patients spouse, phone number [phone].    Pt's cell:  [phone]  Home:  [phone]  Pt's spouse did not want to list any additional emergency contacts.    During admission, patient's caregiver plans to stay in patient's room.  Confirmed patient and patients caregivers do have access to reliable transportation.    Cognitive Status/Learning     Patient reports reading ability as 12th grade and states patient does have difficulty with seeing, hearing and memory. Pt reports left eye is blurry, hearing loss(not able to afford hearing aids) and short term memory difficulties.  Pt reports no difficulty with reading, writing or learning.    Patient reports patient learns best by one on one .   Needed: No.   Highest education level: High School (9-12) or GED    Vocation/Disability   .  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  The pt's son works with pt.   Pt's spouse does not work  outside of the home.     Adherence     Patient reports a high level of adherence to patients health care regimen.  Adherence counseling and education provided. Patient verbalizes understanding.    Substance Use    Patient reports the following substance usage.    Tobacco: none.  Pt quit smoking in . Pt used to smoke 2-3 ppd.  Alcohol: none.  No use for overy 10 years.  Illicit Drugs/Non-prescribed Medications: none, patient denies any use.  Patient states clear understanding of the potential impact of substance use.  Substance abstinence/cessation counseling, education and resources provided and reviewed.     Services Utilizing/ADLS    Infusion Service: Prior to admission, patient utilizing? no  Home Health: Prior to admission, patient utilizing? no  DME: Prior to admission, no  Pulmonary/Cardiac Rehab: Prior to admission, no  Dialysis:  Prior to admission, no  Transplant Specialty Pharmacy:  Prior to admission, no.    Prior to admission, patient reports patient was independent with ADLS and was driving. Pt's spouse drives, but not to [place].    Patient reports patient is not able to care for self at this time due to compromised medical condition (as documented in medical record) and physical weakness..  Patient indicates a willingness to care for self once medically cleared to do so.    Insurance/Medications    Insured by   Payor/Plan Subscr  Sex Relation Sub. Ins. ID Effective Group Num   1. MEDICARE - [place]* IVY LUGO 1940 Male  [id number] 10/1/07                                    [address]   2. GENERIC COMME* IVY LUGO 1940 Male  [id number]  1983                                    [address], [facility] [address]      Primary Insurance (for UNOS reporting): Public Insurance - Medicare FFS (Fee For Service)  Secondary Insurance (for UNOS reporting): Private Insurance    Patient reports patient is able to obtain and afford medications at this time and at time of  discharge.    Living Will/Healthcare Power of     Patient states patient does not have a LW and/or HCPA.   provided education regarding LW and HCPA and the completion of forms.    Coping/Mental Health    Patient is coping adequately with the aid of  family members, friends and hillary.   Patient denies mental health difficulties.     Discharge Planning    At time of discharge, patient plans to return to patient's home under the care of self and spouse.  Patients son will transport patient.  Per rounds today, expected discharge date has not been medically determined at this time. Patient and patients caregiver  verbalize understanding and are involved in treatment planning and discharge process.    Additional Concerns    Patient's caretaker denies additional needs and/or concerns at this time. Patient is being followed for needs, education, resources, information, emotional support, supportive counseling, and for supportive and skilled discharge plan of care.  providing ongoing psychosocial support, education, resources and d/c planning as needed.  SW remains available. Patient's caregiver verbalizes understanding and agreement with information reviewed,  availability and how to access available resources as needed. Patient denies additional needs and/or concerns at this time. Patient verbalizes understanding and agreement with information reviewed, social work availability, and how to access available resources as needed.

## 2019-01-07 NOTE — ASSESSMENT & PLAN NOTE
WBC 13, off pressors, afebrile, no bands, lactate and procalcitonin within normal limits  Presumed due to ICD lead infection  Continue Vanc/Cefepime  Blood cultures ordered, however patient had already been on ABX prior to transfer  GI illness seems to have resolved, also was negative for C Dif at prior facility  CXR pending

## 2019-01-07 NOTE — H&P
[facility]  Cardiology  History and Physical     Patient Name: Jerry Solis  MRN: [id number]  Admission Date: 1/6/2019  Code Status: Full Code   Attending Provider: Mela Nails MD   Primary Care Physician: Primary Doctor No  Principal Problem:Bacteremia    Patient information was obtained from patient and ER records.     Subjective:     Chief Complaint:  Bacteremia     HPI:  78 M with PMH HFrEF/ICM s/p ICD S/p cardioMEMS implant (enrolled in GUIDE-HF study), CAD ([facility] 3/18 with patent stents in proximal OM and ostial to mid RCA) AF on eliquis and digoxin, COPD, DM, hypothyroidism, HLD, prior CVA transferred to [facility] for evaluation of possible RV lead infection. The patient developed a GI illness with nausea, vomiting and diarrhea prior to his initial presentation to the hospital. He was febrile and was admitted with sepsis at [facility]. On initial presentation BP was 78/44, Creatinine 2.63, was started on pressors and transferred to [facility] for a higher level of care. He was resuscitated with IV fluids at [facility] as he was thought to have volume depletion. Blood cultures grew G + cocci and the patient was treated with Vanc + Cefepime. He was net +6L and EF was found to be 30-35%. RICARDO with possible RV lead echodensity suggestive of vegetation.    Currently, the patient feels symptomatically improved, although he reported losing 15 lbs in past 2-3 weeks. He feels that since he has been hospitalized he has been becoming more short of breath. He denies any symptoms of angina, orthopnea, PND or peripheral edema. His diarrhea has subsided and he is afebrile, off all pressors.      Past Medical History:   Diagnosis Date    Chronic systolic congestive heart failure 3/22/2018    Coronary artery disease involving native coronary artery of native heart without angina pectoris 3/22/2018    ICD (implantable cardioverter-defibrillator) in place 3/22/2018    Ischemic cardiomyopathy 3/22/2018     Mixed hyperlipidemia 3/22/2018    Type 2 diabetes mellitus with complication 3/22/2018    VT (ventricular tachycardia) 3/22/2018       Past Surgical History:   Procedure Laterality Date    HEART CATH-RIGHT Right 4/5/2018    Performed by Elizabeth Wise MD at [facility] CATH LAB       Review of patient's allergies indicates:   Allergen Reactions    Adhesive Other (See Comments)     blisters    Codeine Other (See Comments)     Blurred vision    Latex Hives    Ace inhibitors     Lipitor [atorvastatin] Other (See Comments)     Muscle spasms       No current facility-administered medications on file prior to encounter.      Current Outpatient Medications on File Prior to Encounter   Medication Sig    bumetanide (BUMEX) 1 MG tablet Take 3 mg every morning and 2 mg every evening    clopidogrel (PLAVIX) 75 mg tablet Take 1 tablet (75 mg total) by mouth once daily.    digoxin (LANOXIN) 125 mcg tablet Take 0.5 tablets by mouth every morning.    ELIQUIS 2.5 mg Tab Take 1 tablet by mouth 2 (two) times daily.    LANTUS U-100 INSULIN 100 unit/mL injection Inject 50 Units into the skin 2 (two) times daily.    levothyroxine (SYNTHROID) 25 MCG tablet Take 1 tablet by mouth once daily.    metOLazone (ZAROXOLYN) 5 MG tablet Take one tablet by mouth now. Then only take as ordered per MD. (Patient taking differently: Take 5 mg by mouth as needed. Take one tablet by mouth now. Then only take as ordered per MD.)    metoprolol succinate (TOPROL-XL) 25 MG 24 hr tablet Take 1 tablet (25 mg total) by mouth every evening.    NOVOLOG U-100 INSULIN ASPART 100 unit/mL injection Inject 5 Units into the skin 4 (four) times daily.    omega-3 acid ethyl esters (LOVAZA) 1 gram capsule Take 2 g by mouth 2 (two) times daily.    omeprazole (PRILOSEC) 40 MG capsule Take 40 mg by mouth once daily.    potassium chloride SA (KLOR-CON M20) 20 MEQ tablet Take 2-3 tablets by mouth daily as directed.  Take it when taking the bumetadine.     rosuvastatin (CRESTOR) 20 MG tablet Take 1 tablet (20 mg total) by mouth every evening.    sacubitril-valsartan (ENTRESTO) 24-26 mg per tablet Take 1 tablet by mouth 2 (two) times daily.    VENTOLIN HFA 90 mcg/actuation inhaler Inhale 2 puffs into the lungs every 4 (four) hours as needed.     loratadine (CLARITIN) 10 mg tablet Take 10 mg by mouth once daily.    multivitamin (THERAGRAN) per tablet Take 1 tablet by mouth once daily.    TRELEGY ELLIPTA 100-62.5-25 mcg DsDv once daily.      Family History     None        Tobacco Use    Smoking status: Former Smoker    Smokeless tobacco: Former User   Substance and Sexual Activity    Alcohol use: Yes     Comment: former drinker    Drug use: No    Sexual activity: Not on file     Review of Systems   Constitution: Negative.   HENT: Negative.    Eyes: Negative.    Cardiovascular: Negative.    Respiratory: Positive for cough, shortness of breath and wheezing.    Endocrine: Negative.    Hematologic/Lymphatic: Negative.    Skin: Negative.    Musculoskeletal: Negative.    Gastrointestinal: Negative.    Genitourinary: Negative.    Neurological: Negative.    Psychiatric/Behavioral: Negative.      Objective:     Vital Signs (Most Recent):  Temp: 98.5 °F (36.9 °C) (01/06/19 2330)  Pulse: 71 (01/07/19 0059)  Resp: 18 (01/07/19 0059)  BP: 120/84 (01/07/19 0059)  SpO2: 96 % (01/07/19 0059) Vital Signs (24h Range):  Temp:  [98.2 °F (36.8 °C)-98.5 °F (36.9 °C)] 98.5 °F (36.9 °C)  Pulse:  [71-84] 71  Resp:  [18] 18  SpO2:  [96 %-100 %] 96 %  BP: (120-126)/(60-84) 120/84     Weight: 89.8 kg (197 lb 13.8 oz)  Body mass index is 25.4 kg/m².    SpO2: 96 %  O2 Device (Oxygen Therapy): room air    No intake or output data in the 24 hours ending 01/07/19 0119    Lines/Drains/Airways     Peripherally Inserted Central Catheter Line                 PICC Triple Lumen right brachial -- days                Physical Exam   Constitutional: He is oriented to person, place, and time. He appears  well-developed and well-nourished. No distress.   HENT:   Head: Normocephalic and atraumatic.   Neck: JVD (estimated CVP ~11) present.   Cardiovascular: Normal rate, normal heart sounds and intact distal pulses. Exam reveals no gallop and no friction rub.   No murmur heard.  Irregularly irregular rhythm   Pulmonary/Chest: Effort normal. No respiratory distress. He has wheezes. He has rales.   Abdominal: Soft. Bowel sounds are normal. He exhibits no distension. There is no tenderness.   Musculoskeletal: He exhibits edema (1+ bilateral lower extremity edema).   Neurological: He is alert and oriented to person, place, and time.   Skin: He is not diaphoretic.       Significant Labs:     Recent Results (from the past 24 hour(s))   POCT glucose    Collection Time: 01/06/19 11:53 PM   Result Value Ref Range    POCT Glucose 197 (H) 70 - 110 mg/dL   Comprehensive Metabolic Panel (CMP)    Collection Time: 01/07/19  1:15 AM   Result Value Ref Range    Sodium 138 136 - 145 mmol/L    Potassium 4.4 3.5 - 5.1 mmol/L    Chloride 112 (H) 95 - 110 mmol/L    CO2 17 (L) 23 - 29 mmol/L    Glucose 196 (H) 70 - 110 mg/dL    BUN, Bld 35 (H) 8 - 23 mg/dL    Creatinine 1.2 0.5 - 1.4 mg/dL    Calcium 8.6 (L) 8.7 - 10.5 mg/dL    Total Protein 6.5 6.0 - 8.4 g/dL    Albumin 2.6 (L) 3.5 - 5.2 g/dL    Total Bilirubin 1.0 0.1 - 1.0 mg/dL    Alkaline Phosphatase 146 (H) 55 - 135 U/L    AST 37 10 - 40 U/L    ALT 37 10 - 44 U/L    Anion Gap 9 8 - 16 mmol/L    eGFR if African American >60.0 >60 mL/min/1.73 m^2    eGFR if non  58.4 (A) >60 mL/min/1.73 m^2   Magnesium    Collection Time: 01/07/19  1:15 AM   Result Value Ref Range    Magnesium 2.0 1.6 - 2.6 mg/dL   Phosphorus    Collection Time: 01/07/19  1:15 AM   Result Value Ref Range    Phosphorus 2.5 (L) 2.7 - 4.5 mg/dL   Hemoglobin A1c    Collection Time: 01/07/19  1:15 AM   Result Value Ref Range    Hemoglobin A1C 8.1 (H) 4.0 - 5.6 %    Estimated Avg Glucose 186 (H) 68 - 131 mg/dL    CBC with Automated Differential    Collection Time: 01/07/19  1:15 AM   Result Value Ref Range    WBC 13.22 (H) 3.90 - 12.70 K/uL    RBC 3.05 (L) 4.60 - 6.20 M/uL    Hemoglobin 8.7 (L) 14.0 - 18.0 g/dL    Hematocrit 27.6 (L) 40.0 - 54.0 %    MCV 91 82 - 98 fL    MCH 28.5 27.0 - 31.0 pg    MCHC 31.5 (L) 32.0 - 36.0 g/dL    RDW 18.8 (H) 11.5 - 14.5 %    Platelets 128 (L) 150 - 350 K/uL    MPV 12.1 9.2 - 12.9 fL    Immature Granulocytes 1.8 (H) 0.0 - 0.5 %    Gran # (ANC) 10.0 (H) 1.8 - 7.7 K/uL    Immature Grans (Abs) 0.24 (H) 0.00 - 0.04 K/uL    Lymph # 1.8 1.0 - 4.8 K/uL    Mono # 1.2 (H) 0.3 - 1.0 K/uL    Eos # 0.0 0.0 - 0.5 K/uL    Baso # 0.08 0.00 - 0.20 K/uL    nRBC 0 0 /100 WBC    Gran% 75.4 (H) 38.0 - 73.0 %    Lymph% 13.2 (L) 18.0 - 48.0 %    Mono% 9.0 4.0 - 15.0 %    Eosinophil% 0.0 0.0 - 8.0 %    Basophil% 0.6 0.0 - 1.9 %    Differential Method Automated    Brain natriuretic peptide    Collection Time: 01/07/19  1:15 AM   Result Value Ref Range    BNP 1,607 (H) 0 - 99 pg/mL   PTT    Collection Time: 01/07/19  1:15 AM   Result Value Ref Range    aPTT 27.4 21.0 - 32.0 sec   PT/INR    Collection Time: 01/07/19  1:15 AM   Result Value Ref Range    Prothrombin Time 14.8 (H) 9.0 - 12.5 sec    INR 1.5 (H) 0.8 - 1.2   Lactic acid, plasma    Collection Time: 01/07/19  1:15 AM   Result Value Ref Range    Lactate (Lactic Acid) 1.2 0.5 - 2.2 mmol/L   Procalcitonin    Collection Time: 01/07/19  1:15 AM   Result Value Ref Range    Procalcitonin 0.16 <0.25 ng/mL         Significant Imaging:         CXR pending    Assessment and Plan:     * Bacteremia    WBC 13, off pressors, afebrile, no bands, lactate and procalcitonin within normal limits  Presumed due to ICD lead infection  Continue Vanc/Cefepime  Blood cultures ordered, however patient had already been on ABX prior to transfer  GI illness seems to have resolved, also was negative for C Dif at prior facility  CXR pending     Atrial fibrillation    Rate  controlled  CHADS VASc 6  Continue eliquis  Cardiac monitoring     Type 2 diabetes mellitus with complication    A1c 8.1  Target -180 while hospitalized  detemir 15, aspart 5 with sliding scale     ICD (implantable cardioverter-defibrillator) in place    Concern for vegetation in the setting of G+ cocci bacteremia  Repeat 2D echo  NPO past midnight  Patient may require RICARDO  Ultimately will likely need device extraction  ID consult     Acute on chronic combined systolic and diastolic CHF, NYHA class 3    EF 20-25% 4/18  BNP 1607, net + >6L from [place] with worsening dyspnea  Repeat 2D echo  Evidence of volume overload on exam, will administer gentle diuresis and reevaluate in AM  Hold entresto and toprol XL for now, re-evaluate in AM  Low sodium diet, fluid restriction         VTE Risk Mitigation (From admission, onward)        Ordered     apixaban tablet 2.5 mg  2 times daily      01/07/19 0054     IP VTE HIGH RISK PATIENT  Once      01/07/19 0054     Reason for no Mechanical VTE Prophylaxis  Once      01/07/19 0054          Valentin Farah MD  Cardiology   [facility]

## 2019-01-07 NOTE — ASSESSMENT & PLAN NOTE
EF 20-25% 4/18  BNP 1607, net + >6L from [place] with worsening dyspnea  Repeat 2D echo  Evidence of volume overload on exam, will administer gentle diuresis and re-evaluate in AM  Resume Entresto  Resume Toprol XL after BP stable on Entresto  Low sodium diet, fluid restriction

## 2019-01-07 NOTE — CONSULTS
[facility]  Infectious Disease  Consult Note    Patient Name: Jerry Solis  MRN: [id number]  Admission Date: 1/6/2019  Hospital Length of Stay: 1 days  Attending Physician: Mela Nails MD  Primary Care Provider: Primary Doctor No     Isolation Status: No active isolations    Patient information was obtained from patient, spouse/SO, relative(s) and past medical records.      Consults  Assessment/Plan:     * Bacteremia       78 year old man with history of CHF, s/p ICD placement (? Many years ago, ? Generator exchange 2008) and s/p cardioMems heart failure monitoring device placement 5/2018, Afib (on eliquis), DM, HTN, COPD, history of CVA who presented to [facility] with nausea, vomiting, diarrhea and fevers.  Found to be hypotensive and with TREY.  Required pressor support and was transferred to [facility] where blood cultures of 1/2/18  found to be positive for Enterococcus faecalis, and RICARDO showed possible vegetation on RV lead.  He was transferred to [facility] for further evaluation and possible device extraction.  He is currently on IV vancomycin and cefepime.      No repeat blood cultures at [facility].  Repeat blood cultures of 1/7 on admission here are pending.        Source unclear. Denies any problems or history of problems with ICD pocket.   Reports episode of food poisoning a couple of months ago.   Patient reports a loss of appetite for approximately the past 3 -4 weeks and has lost approx 20 pounds during that time.   He denies any fevers, chills,sweats over the past few weeks until this past Monday when he was admitted.      Last colonoscopy was 2 years ago.  He has q 3 year surveillance colonoscopies as he has polyps.      He is afebrile, WBC 13.2, platelets 128, H/H 8.7/27.6, ESR 54, CRP 27.9, procalcitonin wnl.    Plan/recommendations:  1.  D/c vancomycin and cefepime and start amoxicillin 2 grams IV q 6 hours and ceftriaxone 2 grams IV q 12 hours for E faecalis  endocarditis.   2.  Recommend lead extraction.   3.  Will follow repeat blood cultures.  He may need PICC removal and replacement once negative blood cultures are confirmed.  Current PICC placed at OSH on 1/3 (Blood cultures + on 1/2 and no repeat blood cultures taken).   4.  Will follow cultures.   5.  Recommend EGD and colonoscopy as an outpatient.   6.  Will follow up tomorrow with further recommendations.     Patient seen by, and plan discussed with, ID staff  Discussed with Primary Team            Thank you for your consult. I will follow-up with patient. Please contact us if you have any additional questions.    Thank you.   Please call for any questions or concerns.  COURT Muniz, ANP-C  [phone]    Subjective:     Principal Problem: Bacteremia    HPI: Mr. Jerry Solis is a 78 year old gentleman with history of CHF, s/p ICD placement and s/p cardioMems heart failure monitoring device , Afib, DM, HTN, COPD, history of CVA who presented to [facility] with nausea, vomiting, diarrhea and fevers.  Found to be hypotensive and with TREY.  Required pressor support and was transferred to [facility] where blood cultures found to be Enterococcus faecalis, and RICARDO showed possible vegetation on RV lead.  He was transferred to [facility] for further evaluation and possible device extraction.   He is currently on IV vancomycin and cefepime.      Reports that he had an episode of what he thought was food poisoning a couple of months ago when he was visiting his brother in [place].  Patient reports a loss of appetite for approximately the past 3 -4 weeks and has lost approx 20 pounds during that time.  He did not seek medical attention because he was working.   He denies any fevers, chills until this past Monday when he was admitted.  He denies current fevers, chills, sweats.  He has chronic SOB that is not worse from baseline.  Denies current n/v/d.  Denies melena, hematachezia.  No urinary complaints. Denies any  pain, redness at ICD pocket site.      ICD/pacer placed >10 years ago.  Had ? Generator changed in 2008.  Denies any problems.      Last colonoscopy was 2 years ago.  He has q 3 year surveillance colonoscopies as he has polyps.      Blood culture results from [facility] of 1/2/19 - E. Faecalis - PCN, vancomycin sensitive.  PICC was placed 1/3/19.  Unclear when blood cultures cleared as blood cultures were not repeated.   Repeat blood cultures taken here on admission 1/7.      He is afebrile, WBC 13.2, platelets 128, H/H 8.7/27.6, ESR 54, CRP 27.9, procalcitonin wnl.            Past Medical History:   Diagnosis Date    Chronic systolic congestive heart failure 3/22/2018    Coronary artery disease involving native coronary artery of native heart without angina pectoris 3/22/2018    ICD (implantable cardioverter-defibrillator) in place 3/22/2018    Ischemic cardiomyopathy 3/22/2018    Mixed hyperlipidemia 3/22/2018    Type 2 diabetes mellitus with complication 3/22/2018    VT (ventricular tachycardia) 3/22/2018       Past Surgical History:   Procedure Laterality Date    HEART CATH-RIGHT Right 4/5/2018    Performed by Eliazbeth Wise MD at [facility] CATH LAB       Review of patient's allergies indicates:   Allergen Reactions    Adhesive Other (See Comments)     blisters    Codeine Other (See Comments)     Blurred vision    Latex Hives    Ace inhibitors     Lipitor [atorvastatin] Other (See Comments)     Muscle spasms       Medications:  Medications Prior to Admission   Medication Sig    bumetanide (BUMEX) 1 MG tablet Take 3 mg every morning and 2 mg every evening    clopidogrel (PLAVIX) 75 mg tablet Take 1 tablet (75 mg total) by mouth once daily.    digoxin (LANOXIN) 125 mcg tablet Take 0.5 tablets by mouth every morning.    ELIQUIS 2.5 mg Tab Take 1 tablet by mouth 2 (two) times daily.    LANTUS U-100 INSULIN 100 unit/mL injection Inject 50 Units into the skin 2 (two) times daily.    levothyroxine  (SYNTHROID) 25 MCG tablet Take 1 tablet by mouth once daily.    metOLazone (ZAROXOLYN) 5 MG tablet Take one tablet by mouth now. Then only take as ordered per MD. (Patient taking differently: Take 5 mg by mouth as needed. Take one tablet by mouth now. Then only take as ordered per MD.)    metoprolol succinate (TOPROL-XL) 25 MG 24 hr tablet Take 1 tablet (25 mg total) by mouth every evening.    NOVOLOG U-100 INSULIN ASPART 100 unit/mL injection Inject 5 Units into the skin 4 (four) times daily.    omega-3 acid ethyl esters (LOVAZA) 1 gram capsule Take 2 g by mouth 2 (two) times daily.    omeprazole (PRILOSEC) 40 MG capsule Take 40 mg by mouth once daily.    potassium chloride SA (KLOR-CON M20) 20 MEQ tablet Take 2-3 tablets by mouth daily as directed.  Take it when taking the bumetadine.    rosuvastatin (CRESTOR) 20 MG tablet Take 1 tablet (20 mg total) by mouth every evening.    sacubitril-valsartan (ENTRESTO) 24-26 mg per tablet Take 1 tablet by mouth 2 (two) times daily.    VENTOLIN HFA 90 mcg/actuation inhaler Inhale 2 puffs into the lungs every 4 (four) hours as needed.     loratadine (CLARITIN) 10 mg tablet Take 10 mg by mouth once daily.    multivitamin (THERAGRAN) per tablet Take 1 tablet by mouth once daily.    TRELEGY ELLIPTA 100-62.5-25 mcg DsDv once daily.      Antibiotics (From admission, onward)    Start     Stop Route Frequency Ordered    01/07/19 1700  ampicillin 2 g in sodium chloride 0.9 % 100 mL IVPB (ready to mix system)      -- IV Every 4 hours (non-standard times) 01/07/19 1453    01/07/19 1600  cefTRIAXone (ROCEPHIN) 2 g in dextrose 5 % 50 mL IVPB      -- IV Every 12 hours (non-standard times) 01/07/19 1453        Antifungals (From admission, onward)    None        Antivirals (From admission, onward)    None             There is no immunization history on file for this patient.    Family History     None        Social History     Socioeconomic History    Marital status:       Spouse name: None    Number of children: None    Years of education: None    Highest education level: None   Social Needs    Financial resource strain: None    Food insecurity - worry: None    Food insecurity - inability: None    Transportation needs - medical: None    Transportation needs - non-medical: None   Occupational History    None   Tobacco Use    Smoking status: Former Smoker    Smokeless tobacco: Former User   Substance and Sexual Activity    Alcohol use: Yes     Comment: former drinker    Drug use: No    Sexual activity: None   Other Topics Concern    None   Social History Narrative    None     Review of Systems   Constitutional: Positive for appetite change and unexpected weight change. Negative for chills, diaphoresis, fatigue and fever.   HENT: Negative for congestion, dental problem, ear pain, sore throat and tinnitus.    Eyes: Negative.    Respiratory: Negative for cough, shortness of breath (chronic, at baseline) and wheezing.    Cardiovascular: Negative for chest pain, palpitations and leg swelling.   Gastrointestinal: Positive for diarrhea (resolved). Negative for abdominal distention, abdominal pain, blood in stool, constipation, rectal pain and vomiting.   Genitourinary: Negative for dysuria, flank pain, frequency, hematuria and urgency.   Musculoskeletal: Negative for arthralgias, back pain, myalgias and neck pain.   Skin: Negative for rash.   Allergic/Immunologic: Negative for immunocompromised state.   Neurological: Negative for dizziness, light-headedness, numbness and headaches.   Hematological: Negative for adenopathy. Does not bruise/bleed easily.   Psychiatric/Behavioral: Negative for confusion and sleep disturbance. The patient is not nervous/anxious.      Objective:     Vital Signs (Most Recent):  Temp: 97.4 °F (36.3 °C) (01/07/19 1630)  Pulse: 70 (01/07/19 1356)  Resp: 19 (01/07/19 1356)  BP: 112/79 (01/07/19 0735)  SpO2: 97 % (01/07/19 1356) Vital Signs (24h  Range):  Temp:  [97.4 °F (36.3 °C)-98.5 °F (36.9 °C)] 97.4 °F (36.3 °C)  Pulse:  [70-86] 70  Resp:  [16-22] 19  SpO2:  [95 %-100 %] 97 %  BP: ()/(67-84) 112/79     Weight: 89.4 kg (197 lb)  Body mass index is 25.29 kg/m².    Estimated Creatinine Clearance: 60.9 mL/min (based on SCr of 1.2 mg/dL).    Physical Exam   Constitutional: He is oriented to person, place, and time. He appears well-developed and well-nourished.   HENT:   Head: Normocephalic and atraumatic.   Eyes: Conjunctivae are normal. No scleral icterus.   Neck: Normal range of motion. Neck supple.   Cardiovascular: Normal rate. An irregularly irregular rhythm present.   No murmur heard.  Left upper chest ICD pocket - intact, no tenderness, erythema   Pulmonary/Chest: Effort normal. No respiratory distress. He has no wheezes.   Crackles at bases     Abdominal: Soft. He exhibits no distension. There is no tenderness. There is no guarding.   Musculoskeletal: He exhibits edema (trace bilateral ankle edema).   Neurological: He is alert and oriented to person, place, and time.   Skin: Skin is warm and dry.   PICC RUE - c/d/i   Psychiatric: He has a normal mood and affect. His behavior is normal.   Vitals reviewed.      Significant Labs:   Blood Culture:   Recent Labs   Lab 01/07/19  0154   LABBLOO No Growth to date  No Growth to date     CBC:   Recent Labs   Lab 01/07/19 0115   WBC 13.22*   HGB 8.7*   HCT 27.6*   *     CMP:   Recent Labs   Lab 01/07/19 0115      K 4.4   *   CO2 17*   *   BUN 35*   CREATININE 1.2   CALCIUM 8.6*   PROT 6.5   ALBUMIN 2.6*   BILITOT 1.0   ALKPHOS 146*   AST 37   ALT 37   ANIONGAP 9   EGFRNONAA 58.4*     Lactic Acid:   Recent Labs   Lab 01/07/19 0115   LACTATE 1.2     Procalcitonin:   Recent Labs   Lab 01/07/19 0115   PROCAL 0.16       Significant Imaging: I have reviewed all pertinent imaging results/findings within the past 24 hours.

## 2019-01-07 NOTE — SUBJECTIVE & OBJECTIVE
Past Medical History:   Diagnosis Date    Chronic systolic congestive heart failure 3/22/2018    Coronary artery disease involving native coronary artery of native heart without angina pectoris 3/22/2018    ICD (implantable cardioverter-defibrillator) in place 3/22/2018    Ischemic cardiomyopathy 3/22/2018    Mixed hyperlipidemia 3/22/2018    Type 2 diabetes mellitus with complication 3/22/2018    VT (ventricular tachycardia) 3/22/2018       Past Surgical History:   Procedure Laterality Date    HEART CATH-RIGHT Right 4/5/2018    Performed by Elizabeth Wise MD at [facility] CATH LAB       Review of patient's allergies indicates:   Allergen Reactions    Adhesive Other (See Comments)     blisters    Codeine Other (See Comments)     Blurred vision    Latex Hives    Ace inhibitors     Lipitor [atorvastatin] Other (See Comments)     Muscle spasms       No current facility-administered medications on file prior to encounter.      Current Outpatient Medications on File Prior to Encounter   Medication Sig    bumetanide (BUMEX) 1 MG tablet Take 3 mg every morning and 2 mg every evening    clopidogrel (PLAVIX) 75 mg tablet Take 1 tablet (75 mg total) by mouth once daily.    digoxin (LANOXIN) 125 mcg tablet Take 0.5 tablets by mouth every morning.    ELIQUIS 2.5 mg Tab Take 1 tablet by mouth 2 (two) times daily.    LANTUS U-100 INSULIN 100 unit/mL injection Inject 50 Units into the skin 2 (two) times daily.    levothyroxine (SYNTHROID) 25 MCG tablet Take 1 tablet by mouth once daily.    metOLazone (ZAROXOLYN) 5 MG tablet Take one tablet by mouth now. Then only take as ordered per MD. (Patient taking differently: Take 5 mg by mouth as needed. Take one tablet by mouth now. Then only take as ordered per MD.)    metoprolol succinate (TOPROL-XL) 25 MG 24 hr tablet Take 1 tablet (25 mg total) by mouth every evening.    NOVOLOG U-100 INSULIN ASPART 100 unit/mL injection Inject 5 Units into the skin 4 (four) times  daily.    omega-3 acid ethyl esters (LOVAZA) 1 gram capsule Take 2 g by mouth 2 (two) times daily.    omeprazole (PRILOSEC) 40 MG capsule Take 40 mg by mouth once daily.    potassium chloride SA (KLOR-CON M20) 20 MEQ tablet Take 2-3 tablets by mouth daily as directed.  Take it when taking the bumetadine.    rosuvastatin (CRESTOR) 20 MG tablet Take 1 tablet (20 mg total) by mouth every evening.    sacubitril-valsartan (ENTRESTO) 24-26 mg per tablet Take 1 tablet by mouth 2 (two) times daily.    VENTOLIN HFA 90 mcg/actuation inhaler Inhale 2 puffs into the lungs every 4 (four) hours as needed.     loratadine (CLARITIN) 10 mg tablet Take 10 mg by mouth once daily.    multivitamin (THERAGRAN) per tablet Take 1 tablet by mouth once daily.    TRELEGY ELLIPTA 100-62.5-25 mcg DsDv once daily.      Family History     None        Tobacco Use    Smoking status: Former Smoker    Smokeless tobacco: Former User   Substance and Sexual Activity    Alcohol use: Yes     Comment: former drinker    Drug use: No    Sexual activity: Not on file     Review of Systems   Constitution: Negative.   HENT: Negative.    Eyes: Negative.    Cardiovascular: Negative.    Respiratory: Positive for cough, shortness of breath and wheezing.    Endocrine: Negative.    Hematologic/Lymphatic: Negative.    Skin: Negative.    Musculoskeletal: Negative.    Gastrointestinal: Negative.    Genitourinary: Negative.    Neurological: Negative.    Psychiatric/Behavioral: Negative.      Objective:     Vital Signs (Most Recent):  Temp: 98.5 °F (36.9 °C) (01/06/19 2330)  Pulse: 71 (01/07/19 0059)  Resp: 18 (01/07/19 0059)  BP: 120/84 (01/07/19 0059)  SpO2: 96 % (01/07/19 0059) Vital Signs (24h Range):  Temp:  [98.2 °F (36.8 °C)-98.5 °F (36.9 °C)] 98.5 °F (36.9 °C)  Pulse:  [71-84] 71  Resp:  [18] 18  SpO2:  [96 %-100 %] 96 %  BP: (120-126)/(60-84) 120/84     Weight: 89.8 kg (197 lb 13.8 oz)  Body mass index is 25.4 kg/m².    SpO2: 96 %  O2 Device (Oxygen  Therapy): room air    No intake or output data in the 24 hours ending 01/07/19 0119    Lines/Drains/Airways     Peripherally Inserted Central Catheter Line                 PICC Triple Lumen right brachial -- days                Physical Exam   Constitutional: He is oriented to person, place, and time. He appears well-developed and well-nourished. No distress.   HENT:   Head: Normocephalic and atraumatic.   Neck: JVD (estimated CVP ~11) present.   Cardiovascular: Normal rate, normal heart sounds and intact distal pulses. Exam reveals no gallop and no friction rub.   No murmur heard.  Irregularly irregular rhythm   Pulmonary/Chest: Effort normal. No respiratory distress. He has wheezes. He has rales.   Abdominal: Soft. Bowel sounds are normal. He exhibits no distension. There is no tenderness.   Musculoskeletal: He exhibits edema (1+ bilateral lower extremity edema).   Neurological: He is alert and oriented to person, place, and time.   Skin: He is not diaphoretic.       Significant Labs:     Recent Results (from the past 24 hour(s))   POCT glucose    Collection Time: 01/06/19 11:53 PM   Result Value Ref Range    POCT Glucose 197 (H) 70 - 110 mg/dL   Comprehensive Metabolic Panel (CMP)    Collection Time: 01/07/19  1:15 AM   Result Value Ref Range    Sodium 138 136 - 145 mmol/L    Potassium 4.4 3.5 - 5.1 mmol/L    Chloride 112 (H) 95 - 110 mmol/L    CO2 17 (L) 23 - 29 mmol/L    Glucose 196 (H) 70 - 110 mg/dL    BUN, Bld 35 (H) 8 - 23 mg/dL    Creatinine 1.2 0.5 - 1.4 mg/dL    Calcium 8.6 (L) 8.7 - 10.5 mg/dL    Total Protein 6.5 6.0 - 8.4 g/dL    Albumin 2.6 (L) 3.5 - 5.2 g/dL    Total Bilirubin 1.0 0.1 - 1.0 mg/dL    Alkaline Phosphatase 146 (H) 55 - 135 U/L    AST 37 10 - 40 U/L    ALT 37 10 - 44 U/L    Anion Gap 9 8 - 16 mmol/L    eGFR if African American >60.0 >60 mL/min/1.73 m^2    eGFR if non  58.4 (A) >60 mL/min/1.73 m^2   Magnesium    Collection Time: 01/07/19  1:15 AM   Result Value Ref Range     Magnesium 2.0 1.6 - 2.6 mg/dL   Phosphorus    Collection Time: 01/07/19  1:15 AM   Result Value Ref Range    Phosphorus 2.5 (L) 2.7 - 4.5 mg/dL   Hemoglobin A1c    Collection Time: 01/07/19  1:15 AM   Result Value Ref Range    Hemoglobin A1C 8.1 (H) 4.0 - 5.6 %    Estimated Avg Glucose 186 (H) 68 - 131 mg/dL   CBC with Automated Differential    Collection Time: 01/07/19  1:15 AM   Result Value Ref Range    WBC 13.22 (H) 3.90 - 12.70 K/uL    RBC 3.05 (L) 4.60 - 6.20 M/uL    Hemoglobin 8.7 (L) 14.0 - 18.0 g/dL    Hematocrit 27.6 (L) 40.0 - 54.0 %    MCV 91 82 - 98 fL    MCH 28.5 27.0 - 31.0 pg    MCHC 31.5 (L) 32.0 - 36.0 g/dL    RDW 18.8 (H) 11.5 - 14.5 %    Platelets 128 (L) 150 - 350 K/uL    MPV 12.1 9.2 - 12.9 fL    Immature Granulocytes 1.8 (H) 0.0 - 0.5 %    Gran # (ANC) 10.0 (H) 1.8 - 7.7 K/uL    Immature Grans (Abs) 0.24 (H) 0.00 - 0.04 K/uL    Lymph # 1.8 1.0 - 4.8 K/uL    Mono # 1.2 (H) 0.3 - 1.0 K/uL    Eos # 0.0 0.0 - 0.5 K/uL    Baso # 0.08 0.00 - 0.20 K/uL    nRBC 0 0 /100 WBC    Gran% 75.4 (H) 38.0 - 73.0 %    Lymph% 13.2 (L) 18.0 - 48.0 %    Mono% 9.0 4.0 - 15.0 %    Eosinophil% 0.0 0.0 - 8.0 %    Basophil% 0.6 0.0 - 1.9 %    Differential Method Automated    Brain natriuretic peptide    Collection Time: 01/07/19  1:15 AM   Result Value Ref Range    BNP 1,607 (H) 0 - 99 pg/mL   PTT    Collection Time: 01/07/19  1:15 AM   Result Value Ref Range    aPTT 27.4 21.0 - 32.0 sec   PT/INR    Collection Time: 01/07/19  1:15 AM   Result Value Ref Range    Prothrombin Time 14.8 (H) 9.0 - 12.5 sec    INR 1.5 (H) 0.8 - 1.2   Lactic acid, plasma    Collection Time: 01/07/19  1:15 AM   Result Value Ref Range    Lactate (Lactic Acid) 1.2 0.5 - 2.2 mmol/L   Procalcitonin    Collection Time: 01/07/19  1:15 AM   Result Value Ref Range    Procalcitonin 0.16 <0.25 ng/mL         Significant Imaging:         CXR pending

## 2019-01-07 NOTE — NURSING
Pt arrived to unit floor transfer from [facility] in [place].  Oriented pt to room. Educated pt on use of call bell.  Pt verbalize understanding to call when needed assistance. Family at bedside.  Notified HTS fellow of pt arrival to unit floor. JOANN GAMEZ.  Admission documents completed. Will continue to monitor.

## 2019-01-07 NOTE — CONSULTS
[facility]  Infectious Disease  Consult Note    Patient Name: Jerry Solis  MRN: [id number]  Admission Date: 1/6/2019  Hospital Length of Stay: 1 days  Attending Physician: Mela Nails MD  Primary Care Provider: Primary Doctor No     Isolation Status: No active isolations      Inpatient consult to Infectious Diseases  Consult performed by: COURT Manzo, ANP  Consult ordered by: Valentin Farah MD  Reason for consult: G+ cocci bacteremia with possible infected pacemaker lead      ID Consult acknowledged.   Full consult and recommendations to follow today  In the interim, please call for any immediate concerns.     Thank you.   COURT Muniz, ANP-C  [phone] pager,  [phone] [phone]

## 2019-01-07 NOTE — ASSESSMENT & PLAN NOTE
78 year old man with history of CHF, s/p ICD placement (? Many years ago, ? Generator exchange 2008) and s/p cardioMems heart failure monitoring device placement 5/2018, Afib (on eliquis), DM, HTN, COPD, history of CVA who presented to [facility] with nausea, vomiting, diarrhea and fevers.  Found to be hypotensive and with TREY.  Required pressor support and was transferred to [facility] where blood cultures of 1/2/18  found to be positive for Enterococcus faecalis, and RICARDO showed possible vegetation on RV lead.  He was [place] [facility] [place] and possible device extraction.  He is currently on IV vancomycin and cefepime.      No repeat blood cultures at [facility].  Repeat blood cultures of 1/7 on admission here are pending.        Source unclear. Denies any problems or history of problems with ICD pocket.   Reports episode of food poisoning a couple of months ago.   Patient reports a loss of appetite for approximately the past 3 -4 weeks and has lost approx 20 pounds during that time.   He denies any fevers, chills,sweats over the past few weeks until this past Monday when he was admitted.      Last colonoscopy was 2 years ago.  He has q 3 year surveillance colonoscopies as he has polyps.      He is afebrile, WBC 13.2, platelets 128, H/H 8.7/27.6, ESR 54, CRP 27.9, procalcitonin wnl.    Plan/recommendations:  1.  D/c vancomycin and cefepime and start amoxicillin 2 grams IV q 6 hours and ceftriaxone 2 grams IV q 12 hours for E faecalis endocarditis.   2.  Recommend lead extraction.   3.  Will follow repeat blood cultures.  He may need PICC removal and replacement once negative blood cultures are confirmed.  Current PICC placed at OSH on 1/3 (Blood cultures + on 1/2 and no repeat blood cultures taken).   4.  Will follow cultures.   5.  Recommend EGD and colonoscopy as an outpatient.   6.  Will follow up tomorrow with further recommendations.     Patient seen by, and plan discussed with,  ID staff  Discussed with Primary Team

## 2019-01-08 NOTE — PROGRESS NOTES
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  Has no new complaints.  Volume status is improving.  ID recommending Colonoscopy for low H/H and Enterococcus Faecalis.  GI consulted and awaiting their recommendations.  ID changed abx yesterday.     Continuous Infusions:   furosemide (LASIX) 2 mg/mL infusion (non-titrating) 10 mg/hr (01/08/19 0041)     Scheduled Meds:   ampicillin IVPB  2 g Intravenous Q4H    cefTRIAXone (ROCEPHIN) IVPB  2 g Intravenous Q12H    clopidogrel  75 mg Oral Daily    digoxin  0.0625 mg Oral QAM    insulin aspart U-100  5 Units Subcutaneous TIDWM    insulin detemir U-100  15 Units Subcutaneous QHS    levothyroxine  25 mcg Oral Before breakfast    metoprolol succinate  25 mg Oral QHS    rosuvastatin  20 mg Oral QHS    sacubitril-valsartan  1 tablet Oral BID     PRN Meds:acetaminophen, albuterol-ipratropium, dextrose 50%, dextrose 50%, glucagon (human recombinant), glucose, glucose, insulin aspart U-100, ondansetron, sodium chloride 0.9%    Review of patient's allergies indicates:   Allergen Reactions    Adhesive Other (See Comments)     blisters    Codeine Other (See Comments)     Blurred vision    Latex Hives    Ace inhibitors     Lipitor [atorvastatin] Other (See Comments)     Muscle spasms     Objective:     Vital Signs (Most Recent):  Temp: 98.7 °F (37.1 °C) (01/08/19 1130)  Pulse: 70 (01/08/19 1320)  Resp: (!) 25 (01/08/19 1320)  BP: 100/62 (01/08/19 1320)  SpO2: 99 % (01/08/19 1320) Vital Signs (24h Range):  Temp:  [97.4 °F (36.3 °C)-98.7 °F (37.1 °C)] 98.7 °F (37.1 °C)  Pulse:  [60-83] 70  Resp:   [15-26] 25  SpO2:  [93 %-99 %] 99 %  BP: ()/(50-67) 100/62     Patient Vitals for the past 72 hrs (Last 3 readings):   Weight   01/08/19 0433 88.4 kg (194 lb 14.4 oz)   01/07/19 0735 89.4 kg (197 lb)   01/06/19 2330 89.8 kg (197 lb 13.8 oz)     Body mass index is 25.02 kg/m².      Intake/Output Summary (Last 24 hours) at 1/8/2019 1417  Last data filed at 1/8/2019 1317  Gross per 24 hour   Intake 1151.59 ml   Output 2125 ml   Net -973.41 ml     Physical Exam  Constitutional: Sitting in bed NAD; wife at bedside.  Patient is hard of hearing.   Neck: JVD elevation to mid neck    Cardiovascular:Irregularly irregular rhythm   and intact distal pulses. Exam reveals no gallop and no friction rub. No murmur heard. Pulmonary/Chest: Effort normal. No respiratory distress. He has wheezes. He has rales.   Abdominal: Soft. Bowel sounds are normal. He exhibits no distension. There is no tenderness.   Musculoskeletal: He exhibits edema (1+ bilateral lower extremity edema).   Neurological: He is alert and oriented to person, place, and time.   Skin: He is not diaphoretic.     Significant Labs:  CBC:  Recent Labs   Lab 01/07/19 0115 01/08/19 0616   WBC 13.22* 16.67*   RBC 3.05* 2.88*   HGB 8.7* 8.3*   HCT 27.6* 25.6*   * 110*   MCV 91 89   MCH 28.5 28.8   MCHC 31.5* 32.4     BNP:  Recent Labs   Lab 01/07/19 0115   BNP 1,607*     CMP:  Recent Labs   Lab 01/07/19 0115 01/08/19 0616   * 83   CALCIUM 8.6* 8.7   ALBUMIN 2.6* 2.7*   PROT 6.5 6.4    143   K 4.4 3.3*   CO2 17* 23   * 110   BUN 35* 28*   CREATININE 1.2 1.2   ALKPHOS 146* 140*   ALT 37 29   AST 37 27   BILITOT 1.0 0.9      Coagulation:   Recent Labs   Lab 01/07/19 0115   INR 1.5*   APTT 27.4     LDH:  No results for input(s): LDH in the last 72 hours.  Microbiology:  Microbiology Results (last 7 days)     Procedure Component Value Units Date/Time    Blood culture (site 1) [[id number]] Collected:  01/07/19 0154    Order Status:  Completed  Specimen:  Blood Updated:  01/08/19 0812     Blood Culture, Routine No Growth to date     Blood Culture, Routine No Growth to date    Narrative:       Site # 1, aerobic and anaerobic    Blood culture (site 2) [[id number]] Collected:  01/07/19 0154    Order Status:  Completed Specimen:  Blood Updated:  01/08/19 0812     Blood Culture, Routine No Growth to date     Blood Culture, Routine No Growth to date    Narrative:       Site # 2, aerobic only    Culture, Respiratory [[id number]]     Order Status:  No result Specimen:  Respiratory from Sputum, Expectorated           I have reviewed all pertinent labs within the past 24 hours.    Estimated Creatinine Clearance: 60.9 mL/min (based on SCr of 1.2 mg/dL).    Diagnostic Results:  I have reviewed all pertinent imaging results/findings within the past 24 hours.    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  -ID changed abx. From Vanc and Cefepime to Amoxicillin and Ceftriaxone.    -Blood cultures are negative here thus far.  -Will discuss EGD and colonoscopy with GI.  GI consulted.        Bacteremia    -see above     Acute on chronic combined systolic and diastolic CHF, NYHA class 3    -ICM  -Last 2D Echo 1/7/19: LVEF 20%, LVEDD 7.24 cm  -Diuresing with IV Lasix infusion.  Still appears volume overloaded on exam.  Will continue for now  -GDMT with Digoxin, Entresto  -2g Na dietary restriction, 1500 mL fluid restriction, strict I/Os       Atrial fibrillation    -Rate controlled  -CHADS VASc 6  -Holding eliquis for procedures  -Cardiac monitoring     Type 2 diabetes mellitus with complication    -A1c 8.1  -Target -180 while hospitalized  -detemir 15, aspart 5 with sliding scale         STAR Mahan  Heart Transplant  [facility]

## 2019-01-08 NOTE — PLAN OF CARE
Problem: Adult Inpatient Plan of Care  Goal: Plan of Care Review  Outcome: Ongoing (interventions implemented as appropriate)  Afebrile. Ceftriaxone and ampicillin continue as ordered. K+=3.3 this am Replacement given. Pt ate some BF this am. Stated did not have appetite after speaking with MD about the planned surgery for tomorrow. Emotional support given to pt and wife. Reinforced to wear non-skid socks when ambulating to prevent falling. Verbalized understanding. Pt informed by MD to be NPO after MN tonight.

## 2019-01-08 NOTE — ASSESSMENT & PLAN NOTE
This is a 79yo gentleman with long-standing history of ICM with EF 35%, a 20-year history of ICD with upgrade to CRT-D in 2014 who presents for device extraction due to RV lead vegetation ~0.5cm in the setting of VRE bacteremia with a likely initial GI source. Likewise has a CardioMeMMS device in place. Is not pacemaker dependent    - will need to ensure clear blood cultures before deciding to remove device  - ID believes the primary source is the vegetation, will continue evaluation for device extraction  - tentatively added to the schedule for Wednesday at this time

## 2019-01-08 NOTE — ASSESSMENT & PLAN NOTE
78 year old man with history of CHF, s/p ICD placement (> 20 years ago; Generator exchange 2008) and s/p cardioMems heart failure monitoring device placement 5/2018, Afib (on eliquis), DM, HTN, COPD, history of CVA who presented to [facility] with nausea, vomiting, diarrhea and fevers.  Found to be hypotensive and with TREY.  Required pressor support and was transferred to [facility] where blood cultures of 1/2/18  found to be positive for Enterococcus faecalis, and RICARDO showed vegetation on RV lead.  [facility] team reviewed films and confirmed presence of vegetation.   He was transferred to [facility] for further evaluation and possible device extraction.      No repeat blood cultures at [facility].  Repeat blood cultures of 1/7 on admission here NGTD      Source unclear - likely GI.   Denies any problems or history of problems with ICD pocket.   Reports episode of food poisoning a couple of months ago.   Patient reports a loss of appetite for approximately the past 3 -4 weeks and has lost approx 20 pounds during that time.   He denies any fevers, chills,sweats over the past few weeks until this past Monday when he was admitted.      Last colonoscopy was 2 years ago.  He has q 3 year surveillance colonoscopies as he has polyps.      He is scheduled for lead/device extraction tomorrow.    Remains febrile, WBC uptrending to 16 today.  Platelets 110, H/H 8.3/25, ESR 54, CRP 27.9, procalcitonin wnl.       Plan/recommendations:  1.  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 Will discuss changing to ampicillin extended infusion, though may be too much fluid. Will discuss with Pharm D and primary team.   2.  Please send lead tips for culture.  Blood cultures X 1 after extraction.   3.  Recommend EGD and colonoscopy - if possible as inpatient.  GI consult pending.   4.  Will follow.     Patient seen by, and plan discussed with, ID staff  Discussed with Primary  Team and  EP

## 2019-01-08 NOTE — CONSULTS
[facility]  Cardiac Electrophysiology  Consult Note    Admission Date: 1/6/2019  Code Status: Full Code   Attending Provider: Mela Nails MD  Consulting Provider: Oniel George MD  Principal Problem:Bacteremia    Inpatient consult to Electrophysiology  Consult performed by: Oniel George MD  Consult ordered by: Dionna Lora NP        Subjective:     REASON FOR CONSULT: DEVICE INFECTION    HPI:   We are consulted for device extraction in this 79 yo male with a past medical hx of ICM (20%), s/p [employer] BiV ICD upgraded on 08/01/2014 -- initial leads appear to be 20 years old and implanted in [place], persistent AF (currently on Eliquis, being on this admission) without pacemaker dependence, CAD s/p patent stents in [facility] and ostial to mid RCA in [facility] 03/2018, COPD, DM, hypothyroidism, HLD, prior CVA here as a transfer from [facility] for a possible RV lead vegetation and ID consultation. The patient is treated here is followed by the [facility] service for his ICM. He had prior to this admission NYHA class III symptoms and is s/p cardioMEMS implant (enrolled in GUIDE-HF study).     At [facility] he presented with weakness and hypotension after having nausea and diarrhea on 01/02/19. Was admitted for sepsis, grew Enterococcus faecalis (VRE) on blood cultures (no repeat available from there), and was treated with Vanc/Cefepime. He had a RICARDO with a <0.5cm vegetation, confirmed by our echocardiographers here and was transferred here for evaluation for lead extraction.    Past Medical History:   Diagnosis Date    Chronic systolic congestive heart failure 3/22/2018    Coronary artery disease involving native coronary artery of native heart without angina pectoris 3/22/2018    ICD (implantable cardioverter-defibrillator) in place 3/22/2018    Ischemic cardiomyopathy 3/22/2018    Mixed hyperlipidemia 3/22/2018    Type 2 diabetes mellitus with complication 3/22/2018     VT (ventricular tachycardia) 3/22/2018       Past Surgical History:   Procedure Laterality Date    HEART CATH-RIGHT Right 4/5/2018    Performed by Elizabeth Wise MD at [facility] CATH LAB       Review of patient's allergies indicates:   Allergen Reactions    Adhesive Other (See Comments)     blisters    Codeine Other (See Comments)     Blurred vision    Latex Hives    Ace inhibitors     Lipitor [atorvastatin] Other (See Comments)     Muscle spasms       No current facility-administered medications on file prior to encounter.      Current Outpatient Medications on File Prior to Encounter   Medication Sig    bumetanide (BUMEX) 1 MG tablet Take 3 mg every morning and 2 mg every evening    clopidogrel (PLAVIX) 75 mg tablet Take 1 tablet (75 mg total) by mouth once daily.    digoxin (LANOXIN) 125 mcg tablet Take 0.5 tablets by mouth every morning.    ELIQUIS 2.5 mg Tab Take 1 tablet by mouth 2 (two) times daily.    LANTUS U-100 INSULIN 100 unit/mL injection Inject 50 Units into the skin 2 (two) times daily.    levothyroxine (SYNTHROID) 25 MCG tablet Take 1 tablet by mouth once daily.    metOLazone (ZAROXOLYN) 5 MG tablet Take one tablet by mouth now. Then only take as ordered per MD. (Patient taking differently: Take 5 mg by mouth as needed. Take one tablet by mouth now. Then only take as ordered per MD.)    metoprolol succinate (TOPROL-XL) 25 MG 24 hr tablet Take 1 tablet (25 mg total) by mouth every evening.    NOVOLOG U-100 INSULIN ASPART 100 unit/mL injection Inject 5 Units into the skin 4 (four) times daily.    omega-3 acid ethyl esters (LOVAZA) 1 gram capsule Take 2 g by mouth 2 (two) times daily.    omeprazole (PRILOSEC) 40 MG capsule Take 40 mg by mouth once daily.    potassium chloride SA (KLOR-CON M20) 20 MEQ tablet Take 2-3 tablets by mouth daily as directed.  Take it when taking the bumetadine.    rosuvastatin (CRESTOR) 20 MG tablet Take 1 tablet (20 mg total) by mouth every evening.     sacubitril-valsartan (ENTRESTO) 24-26 mg per tablet Take 1 tablet by mouth 2 (two) times daily.    VENTOLIN HFA 90 mcg/actuation inhaler Inhale 2 puffs into the lungs every 4 (four) hours as needed.     loratadine (CLARITIN) 10 mg tablet Take 10 mg by mouth once daily.    multivitamin (THERAGRAN) per tablet Take 1 tablet by mouth once daily.    TRELEGY ELLIPTA 100-62.5-25 mcg DsDv once daily.      Family History     None        Tobacco Use    Smoking status: Former Smoker    Smokeless tobacco: Former User   Substance and Sexual Activity    Alcohol use: Yes     Comment: former drinker    Drug use: No    Sexual activity: Not on file     Review of Systems   Constitution: Positive for chills and fever. Negative for weakness and weight gain.   HENT: Negative for congestion.    Eyes: Negative for visual disturbance.   Cardiovascular: Negative for chest pain, claudication, dyspnea on exertion, leg swelling, orthopnea, palpitations and syncope.   Respiratory: Negative for cough, shortness of breath and snoring.    Hematologic/Lymphatic: Does not bruise/bleed easily.   Skin: Negative for rash.   Musculoskeletal: Negative for muscle cramps and myalgias.   Gastrointestinal: Negative for bloating, abdominal pain, constipation, diarrhea and melena.   Genitourinary: Negative for bladder incontinence.   Neurological: Negative for excessive daytime sleepiness and focal weakness.   Psychiatric/Behavioral: Negative for depression and suicidal ideas.     Objective:     Vital Signs (Most Recent):  Temp: 97.4 °F (36.3 °C) (01/07/19 1630)  Pulse: 70 (01/07/19 1356)  Resp: 19 (01/07/19 1356)  BP: 112/79 (01/07/19 0735)  SpO2: 97 % (01/07/19 1356) Vital Signs (24h Range):  Temp:  [97.4 °F (36.3 °C)-98.5 °F (36.9 °C)] 97.4 °F (36.3 °C)  Pulse:  [70-86] 70  Resp:  [16-22] 19  SpO2:  [95 %-100 %] 97 %  BP: ()/(67-84) 112/79       Weight: 89.4 kg (197 lb)  Body mass index is 25.29 kg/m².    SpO2: 97 %  O2 Device (Oxygen  Therapy): room air    Physical Exam   Constitutional: He is oriented to person, place, and time. He appears well-developed and well-nourished. No distress.   HENT:   Head: Normocephalic and atraumatic.   Mouth/Throat: Oropharynx is clear and moist.   Eyes: Conjunctivae and EOM are normal. Pupils are equal, round, and reactive to light. No scleral icterus.   Neck: Normal range of motion. Neck supple. No JVD present.   Cardiovascular: Normal rate, normal heart sounds and intact distal pulses. An irregularly irregular rhythm present.   No murmur heard.  Pulses:       Radial pulses are 2+ on the right side, and 2+ on the left side.   Pulmonary/Chest: Effort normal and breath sounds normal. No respiratory distress.   Symmetrical expansion   Abdominal: Soft. Bowel sounds are normal. There is no hepatosplenomegaly. There is no tenderness.   Musculoskeletal: Normal range of motion. He exhibits edema (trace).   Neurological: He is alert and oriented to person, place, and time. No cranial nerve deficit.   Skin: Skin is warm and dry. No rash noted. He is not diaphoretic.   Psychiatric: He has a normal mood and affect. Judgment and thought content normal.       Significant Labs:   EP:   Recent Labs   Lab 01/07/19  0115      K 4.4   *   CO2 17*   *   BUN 35*   CREATININE 1.2   CALCIUM 8.6*   PROT 6.5   ALBUMIN 2.6*   BILITOT 1.0   ALKPHOS 146*   AST 37   ALT 37   ANIONGAP 9   ESTGFRAFRICA >60.0   EGFRNONAA 58.4*   WBC 13.22*   HGB 8.7*   HCT 27.6*   *   INR 1.5*       Significant Imaging:   ECHO:  · Severe left atrial enlargement.  · Severe left ventricular enlargement.  · Eccentric left ventricular hypertrophy.  · Severely decreased left ventricular systolic function. The estimated ejection fraction is 20%  · Global hypokinetic wall motion.  · Left ventricular diastolic dysfunction.  · Mild right atrial enlargement.  · Mild right ventricular enlargement.  · Moderately reduced right ventricular systolic  function.  · Moderate-to-severe mitral regurgitation.  · Moderate tricuspid regurgitation.  · Elevated central venous pressure (15 mm Hg).  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 Likewise has a CardioMeMMS device in place. Is not pacemaker dependent    - will need to ensure clear blood cultures before deciding to remove device  - will discuss options with patient and ID given the patient's comorbidities and the presence of cardio-memms device  - will tentatively add to the schedule for Wednesday at this time  - HF management as per HTS service  - infection management as per ID, will follow along         Thank you for your consult. I will follow-up with patient. Please contact us if you have any additional questions.    Oniel George MD  Cardiac Electrophysiology  [facility]

## 2019-01-08 NOTE — PROGRESS NOTES
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 2 episodes of NSVT noted    ROS  Objective:     Vital Signs (Most Recent):  Temp: 98 °F (36.7 °C) (01/08/19 0800)  Pulse: 72 (01/08/19 0800)  Resp: 18 (01/08/19 0800)  BP: (!) 106/58 (01/08/19 0800)  SpO2: 97 % (01/08/19 0800) Vital Signs (24h Range):  Temp:  [97.4 °F (36.3 °C)-98 °F (36.7 °C)] 98 °F (36.7 °C)  Pulse:  [60-83] 72  Resp:  [15-21] 18  SpO2:  [93 %-99 %] 97 %  BP: ()/(51-67) 106/58     Weight: 88.4 kg (194 lb 14.4 oz)  Body mass index is 25.02 kg/m².     SpO2: 97 %  O2 Device (Oxygen Therapy): room air    Physical Exam   Constitutional: He is oriented to person, place, and time. He appears well-developed and well-nourished. No distress.   HENT:   Head: Normocephalic and atraumatic.   Mouth/Throat: Oropharynx is clear and moist.   Eyes: Conjunctivae and EOM are normal. Pupils are equal, round, and reactive to light. No scleral icterus.   Neck: Normal range of motion. Neck supple. No JVD present.   Cardiovascular: Normal rate, normal heart sounds and intact distal pulses. An irregularly irregular rhythm present.   No murmur heard.  Pulses:       Radial pulses are 2+ on the right side, and 2+ on the left side.   Pulmonary/Chest: Effort normal and breath sounds normal. No respiratory distress.   Symmetrical expansion   Abdominal: Soft. Bowel sounds are normal. There is no hepatosplenomegaly. There is no tenderness.   Musculoskeletal: Normal range of motion. He exhibits edema (trace).   Neurological: He is alert and oriented to person, place, and time. No cranial nerve deficit.   Skin: Skin is warm and dry. No rash noted. He is not  diaphoretic.   Psychiatric: He has a normal mood and affect. Judgment and thought content normal.       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 Likewise has a CardioMeMMS device in place. Is not pacemaker dependent    - will need to ensure clear blood cultures before deciding to remove device  - ID believes the primary source is the vegetation, will continue evaluation for device extraction  - tentatively added to the schedule for Wednesday at this time         Oniel George MD  Cardiac Electrophysiology  [facility]

## 2019-01-08 NOTE — PLAN OF CARE
Problem: Adult Inpatient Plan of Care  Goal: Plan of Care Review  Outcome: Ongoing (interventions implemented as appropriate)  - ID changed antibiotics to Ampicillin & Ceftriaxone  - EP team has been to bedside to evaluate patient  - Lasix gtt at 10 mg/hr infusing, loading dose of 80 mg given this AM  - 2D Echo done today  - NP notified of patient's 6 beat run of VTach this AM.  Pt with known history of VTach, notify team if VTach is sustained or increasing in frequency  - VPaced on Telemetry  - Wheezing improving, reports improvement in SOB since lasix administered on admit here  - Patient is VERY hard of hearing.  Stand close, speak directly to him, avoid speaking to him when spouse not at bedside as he doesn't let staff know when he doesn't hear them.

## 2019-01-08 NOTE — SUBJECTIVE & OBJECTIVE
Interval History: Feeling well, no events    Tele: AF with HR 60-70. 2 episodes of NSVT noted    ROS  Objective:     Vital Signs (Most Recent):  Temp: 98 °F (36.7 °C) (01/08/19 0800)  Pulse: 72 (01/08/19 0800)  Resp: 18 (01/08/19 0800)  BP: (!) 106/58 (01/08/19 0800)  SpO2: 97 % (01/08/19 0800) Vital Signs (24h Range):  Temp:  [97.4 °F (36.3 °C)-98 °F (36.7 °C)] 98 °F (36.7 °C)  Pulse:  [60-83] 72  Resp:  [15-21] 18  SpO2:  [93 %-99 %] 97 %  BP: ()/(51-67) 106/58     Weight: 88.4 kg (194 lb 14.4 oz)  Body mass index is 25.02 kg/m².     SpO2: 97 %  O2 Device (Oxygen Therapy): room air    Physical Exam   Constitutional: He is oriented to person, place, and time. He appears well-developed and well-nourished. No distress.   HENT:   Head: Normocephalic and atraumatic.   Mouth/Throat: Oropharynx is clear and moist.   Eyes: Conjunctivae and EOM are normal. Pupils are equal, round, and reactive to light. No scleral icterus.   Neck: Normal range of motion. Neck supple. No JVD present.   Cardiovascular: Normal rate, normal heart sounds and intact distal pulses. An irregularly irregular rhythm present.   No murmur heard.  Pulses:       Radial pulses are 2+ on the right side, and 2+ on the left side.   Pulmonary/Chest: Effort normal and breath sounds normal. No respiratory distress.   Symmetrical expansion   Abdominal: Soft. Bowel sounds are normal. There is no hepatosplenomegaly. There is no tenderness.   Musculoskeletal: Normal range of motion. He exhibits edema (trace).   Neurological: He is alert and oriented to person, place, and time. No cranial nerve deficit.   Skin: Skin is warm and dry. No rash noted. He is not diaphoretic.   Psychiatric: He has a normal mood and affect. Judgment and thought content normal.       Significant Labs:   EP:   Recent Labs   Lab 01/07/19  0115 01/08/19  0616    143   K 4.4 3.3*   * 110   CO2 17* 23   * 83   BUN 35* 28*   CREATININE 1.2 1.2   CALCIUM 8.6* 8.7   PROT  6.5 6.4   ALBUMIN 2.6* 2.7*   BILITOT 1.0 0.9   ALKPHOS 146* 140*   AST 37 27   ALT 37 29   ANIONGAP 9 10   ESTGFRAFRICA >60.0 >60.0   EGFRNONAA 58.4* 58.4*   WBC 13.22* 16.67*   HGB 8.7* 8.3*   HCT 27.6* 25.6*   * 110*   INR 1.5*  --

## 2019-01-08 NOTE — SUBJECTIVE & OBJECTIVE
Interval History:    No acute events overnight. EP consulted to evaluate for lead extraction - plan for lead extraction tomorrow.   Afebrile.  WBC 16.  Denies subjective complaints except poor appetite   Repeat blood cultures 1/7/19 NGTD    Review of Systems   Constitutional: Positive for appetite change and unexpected weight change. Negative for chills, diaphoresis, fatigue and fever.   HENT: Positive for hearing loss (very hard of hearing). Negative for congestion, dental problem, ear pain, sore throat and tinnitus.    Eyes: Negative.    Respiratory: Negative for cough, shortness of breath (chronic, at baseline) and wheezing.    Cardiovascular: Negative for chest pain, palpitations and leg swelling.   Gastrointestinal: Negative for abdominal distention, abdominal pain, blood in stool, constipation, diarrhea, rectal pain and vomiting.   Genitourinary: Negative for dysuria, flank pain, frequency, hematuria and urgency.   Musculoskeletal: Negative for arthralgias, back pain, myalgias and neck pain.   Skin: Negative for rash.   Allergic/Immunologic: Negative for immunocompromised state.   Neurological: Negative for dizziness, light-headedness, numbness and headaches.   Hematological: Negative for adenopathy. Does not bruise/bleed easily.   Psychiatric/Behavioral: Negative for confusion and sleep disturbance. The patient is not nervous/anxious.      Objective:     Vital Signs (Most Recent):  Temp: 98 °F (36.7 °C) (01/08/19 0800)  Pulse: 72 (01/08/19 0800)  Resp: 18 (01/08/19 0800)  BP: (!) 106/58 (01/08/19 0800)  SpO2: 97 % (01/08/19 0800) Vital Signs (24h Range):  Temp:  [97.4 °F (36.3 °C)-98 °F (36.7 °C)] 98 °F (36.7 °C)  Pulse:  [60-83] 72  Resp:  [15-21] 18  SpO2:  [93 %-99 %] 97 %  BP: ()/(51-67) 106/58     Weight: 88.4 kg (194 lb 14.4 oz)  Body mass index is 25.02 kg/m².    Estimated Creatinine Clearance: 60.9 mL/min (based on SCr of 1.2 mg/dL).    Physical Exam   Constitutional: He is oriented to person,  place, and time. He appears well-developed and well-nourished.   HENT:   Head: Normocephalic and atraumatic.   Eyes: Conjunctivae are normal. No scleral icterus.   Neck: Normal range of motion. Neck supple.   Cardiovascular: Normal rate. An irregularly irregular rhythm present.   No murmur heard.  Left upper chest ICD pocket - intact, no tenderness, erythema   Pulmonary/Chest: Effort normal. No respiratory distress. He has no wheezes.   Crackles at bases     Abdominal: Soft. He exhibits no distension.   Musculoskeletal: He exhibits no edema.   Neurological: He is alert and oriented to person, place, and time.   Skin: Skin is warm and dry.   PICC RUE - c/d/i   Psychiatric: He has a normal mood and affect. His behavior is normal.   Vitals reviewed.      Significant Labs:   Blood Culture:   Recent Labs   Lab 01/07/19  0154   LABBLOO No Growth to date  No Growth to date  No Growth to date  No Growth to date     CBC:   Recent Labs   Lab 01/07/19  0115 01/08/19  0616   WBC 13.22* 16.67*   HGB 8.7* 8.3*   HCT 27.6* 25.6*   * 110*     CMP:   Recent Labs   Lab 01/07/19  0115 01/08/19  0616    143   K 4.4 3.3*   * 110   CO2 17* 23   * 83   BUN 35* 28*   CREATININE 1.2 1.2   CALCIUM 8.6* 8.7   PROT 6.5 6.4   ALBUMIN 2.6* 2.7*   BILITOT 1.0 0.9   ALKPHOS 146* 140*   AST 37 27   ALT 37 29   ANIONGAP 9 10   EGFRNONAA 58.4* 58.4*     Procalcitonin:   Recent Labs   Lab 01/07/19  0115   PROCAL 0.16       Significant Imaging: I have reviewed all pertinent imaging results/findings within the past 24 hours.

## 2019-01-08 NOTE — ASSESSMENT & PLAN NOTE
This is a 79yo gentleman with long-standing history of ICM with EF 35%, a 20-year history of ICD with upgrade to CRT-D in 2014 who presents for device extraction due to RV lead vegetation ~0.5cm in the setting of VRE bacteremia with a likely initial GI source. Likewise has a CardioMeMMS device in place. Is not pacemaker dependent    - will need to ensure clear blood cultures before deciding to remove device  - will discuss options with patient and ID given the patient's comorbidities and the presence of cardio-memms device  - will tentatively add to the schedule for Wednesday at this time  - HF management as per HTS service  - infection management as per ID, will follow along

## 2019-01-08 NOTE — HPI
Mr souza is a 78 year old man with history of HFrEF s/p ICD, cardioMEMS device, CAD s/p PCI 3/2018 with stents, AF on eliquis and digoxin, COPD, DM, Hypothyroidism who was admitted on 1/6 due to concern for enterococcus bacteremia; GI consulted for consideration of colonoscopy.    Review of labs notable for decline in H&H from baseline 13-14 (5/2018), and 8.7 on admission. MCV normal. No iron studies available. No FOBT. LFT notable for elevated ALP (intermittently elevated since 5/2018). CTAP (4/2018) with cholelithiasis, no bowel findings or hepatic abnormalities.      Prior Endo Hx  - [facility] or [facility]  - last colonoscopy reportedly 2017, q3 yr surveillance

## 2019-01-08 NOTE — SUBJECTIVE & OBJECTIVE
Past Medical History:   Diagnosis Date    Chronic systolic congestive heart failure 3/22/2018    Coronary artery disease involving native coronary artery of native heart without angina pectoris 3/22/2018    ICD (implantable cardioverter-defibrillator) in place 3/22/2018    Ischemic cardiomyopathy 3/22/2018    Mixed hyperlipidemia 3/22/2018    Type 2 diabetes mellitus with complication 3/22/2018    VT (ventricular tachycardia) 3/22/2018       Past Surgical History:   Procedure Laterality Date    HEART CATH-RIGHT Right 4/5/2018    Performed by Elizabeth Wise MD at [facility] CATH LAB       Review of patient's allergies indicates:   Allergen Reactions    Adhesive Other (See Comments)     blisters    Codeine Other (See Comments)     Blurred vision    Latex Hives    Ace inhibitors     Lipitor [atorvastatin] Other (See Comments)     Muscle spasms       No current facility-administered medications on file prior to encounter.      Current Outpatient Medications on File Prior to Encounter   Medication Sig    bumetanide (BUMEX) 1 MG tablet Take 3 mg every morning and 2 mg every evening    clopidogrel (PLAVIX) 75 mg tablet Take 1 tablet (75 mg total) by mouth once daily.    digoxin (LANOXIN) 125 mcg tablet Take 0.5 tablets by mouth every morning.    ELIQUIS 2.5 mg Tab Take 1 tablet by mouth 2 (two) times daily.    LANTUS U-100 INSULIN 100 unit/mL injection Inject 50 Units into the skin 2 (two) times daily.    levothyroxine (SYNTHROID) 25 MCG tablet Take 1 tablet by mouth once daily.    metOLazone (ZAROXOLYN) 5 MG tablet Take one tablet by mouth now. Then only take as ordered per MD. (Patient taking differently: Take 5 mg by mouth as needed. Take one tablet by mouth now. Then only take as ordered per MD.)    metoprolol succinate (TOPROL-XL) 25 MG 24 hr tablet Take 1 tablet (25 mg total) by mouth every evening.    NOVOLOG U-100 INSULIN ASPART 100 unit/mL injection Inject 5 Units into the skin 4 (four) times  daily.    omega-3 acid ethyl esters (LOVAZA) 1 gram capsule Take 2 g by mouth 2 (two) times daily.    omeprazole (PRILOSEC) 40 MG capsule Take 40 mg by mouth once daily.    potassium chloride SA (KLOR-CON M20) 20 MEQ tablet Take 2-3 tablets by mouth daily as directed.  Take it when taking the bumetadine.    rosuvastatin (CRESTOR) 20 MG tablet Take 1 tablet (20 mg total) by mouth every evening.    sacubitril-valsartan (ENTRESTO) 24-26 mg per tablet Take 1 tablet by mouth 2 (two) times daily.    VENTOLIN HFA 90 mcg/actuation inhaler Inhale 2 puffs into the lungs every 4 (four) hours as needed.     loratadine (CLARITIN) 10 mg tablet Take 10 mg by mouth once daily.    multivitamin (THERAGRAN) per tablet Take 1 tablet by mouth once daily.    TRELEGY ELLIPTA 100-62.5-25 mcg DsDv once daily.      Family History     None        Tobacco Use    Smoking status: Former Smoker    Smokeless tobacco: Former User   Substance and Sexual Activity    Alcohol use: Yes     Comment: former drinker    Drug use: No    Sexual activity: Not on file     Review of Systems   Constitution: Positive for chills and fever. Negative for weakness and weight gain.   HENT: Negative for congestion.    Eyes: Negative for visual disturbance.   Cardiovascular: Negative for chest pain, claudication, dyspnea on exertion, leg swelling, orthopnea, palpitations and syncope.   Respiratory: Negative for cough, shortness of breath and snoring.    Hematologic/Lymphatic: Does not bruise/bleed easily.   Skin: Negative for rash.   Musculoskeletal: Negative for muscle cramps and myalgias.   Gastrointestinal: Negative for bloating, abdominal pain, constipation, diarrhea and melena.   Genitourinary: Negative for bladder incontinence.   Neurological: Negative for excessive daytime sleepiness and focal weakness.   Psychiatric/Behavioral: Negative for depression and suicidal ideas.     Objective:     Vital Signs (Most Recent):  Temp: 97.4 °F (36.3 °C)  (01/07/19 1630)  Pulse: 70 (01/07/19 1356)  Resp: 19 (01/07/19 1356)  BP: 112/79 (01/07/19 0735)  SpO2: 97 % (01/07/19 1356) Vital Signs (24h Range):  Temp:  [97.4 °F (36.3 °C)-98.5 °F (36.9 °C)] 97.4 °F (36.3 °C)  Pulse:  [70-86] 70  Resp:  [16-22] 19  SpO2:  [95 %-100 %] 97 %  BP: ()/(67-84) 112/79       Weight: 89.4 kg (197 lb)  Body mass index is 25.29 kg/m².    SpO2: 97 %  O2 Device (Oxygen Therapy): room air    Physical Exam   Constitutional: He is oriented to person, place, and time. He appears well-developed and well-nourished. No distress.   HENT:   Head: Normocephalic and atraumatic.   Mouth/Throat: Oropharynx is clear and moist.   Eyes: Conjunctivae and EOM are normal. Pupils are equal, round, and reactive to light. No scleral icterus.   Neck: Normal range of motion. Neck supple. No JVD present.   Cardiovascular: Normal rate, normal heart sounds and intact distal pulses. An irregularly irregular rhythm present.   No murmur heard.  Pulses:       Radial pulses are 2+ on the right side, and 2+ on the left side.   Pulmonary/Chest: Effort normal and breath sounds normal. No respiratory distress.   Symmetrical expansion   Abdominal: Soft. Bowel sounds are normal. There is no hepatosplenomegaly. There is no tenderness.   Musculoskeletal: Normal range of motion. He exhibits edema (trace).   Neurological: He is alert and oriented to person, place, and time. No cranial nerve deficit.   Skin: Skin is warm and dry. No rash noted. He is not diaphoretic.   Psychiatric: He has a normal mood and affect. Judgment and thought content normal.       Significant Labs:   EP:   Recent Labs   Lab 01/07/19  0115      K 4.4   *   CO2 17*   *   BUN 35*   CREATININE 1.2   CALCIUM 8.6*   PROT 6.5   ALBUMIN 2.6*   BILITOT 1.0   ALKPHOS 146*   AST 37   ALT 37   ANIONGAP 9   ESTGFRAFRICA >60.0   EGFRNONAA 58.4*   WBC 13.22*   HGB 8.7*   HCT 27.6*   *   INR 1.5*       Significant Imaging:   ECHO:  · Severe  left atrial enlargement.  · Severe left ventricular enlargement.  · Eccentric left ventricular hypertrophy.  · Severely decreased left ventricular systolic function. The estimated ejection fraction is 20%  · Global hypokinetic wall motion.  · Left ventricular diastolic dysfunction.  · Mild right atrial enlargement.  · Mild right ventricular enlargement.  · Moderately reduced right ventricular systolic function.  · Moderate-to-severe mitral regurgitation.  · Moderate tricuspid regurgitation.  · Elevated central venous pressure (15 mm Hg).  · The estimated PA systolic pressure is 64 mm Hg    EKG:  Ventricular pacing at 70bpm on a background of AF

## 2019-01-08 NOTE — PLAN OF CARE
Problem: Adult Inpatient Plan of Care  Goal: Plan of Care Review  Pt aaox4 [place] IRA with vswnl after visi re calibrated. Bed in low position and callbell within reach. Wife at bedside. Lasix gtt infusing at 10mg/hr. accu ck at 0821=579. Telemetry maintained paced. Iv antibiotics continued for + bl cx. . 2 decho done on day shift with vegetation noted on RV lead. Consult to EP. Pt jumpy and restless while sleeping. No c/o pain. resp treatments for wheezing and decreased breath sounds.

## 2019-01-08 NOTE — ASSESSMENT & PLAN NOTE
-Vegetation seen on RICARDO  -Electrophysiology consulted and planning for lead extraction.  -ID changed abx. From Vanc and Cefepime to Amoxicillin and Ceftriaxone.    -Blood cultures are negative here thus far.  -Will discuss EGD and colonoscopy with GI.  GI consulted.

## 2019-01-08 NOTE — PROCEDURES
ICD Evaluation Report     01/08/2019     : [employer] CRT-D     Reason for evaluation: Lead infection    Implant 08/01/2014     Initial Parameters  Mode: VVTR Lower rate: 70bpm LV --> RV 40ms    Atrial joshua - Sensitivity:0.4 mV  Pacing amplitude: N/A    RV - Amplitude: 0.625V  Pulse width: 0.5 ms  Sensitivity: 8.8 mV  Pacing amplitude: 2V @ 0.5ms    LV - Amplitude: 2.75V  Pulse width: 1.0 ms  Pacing amplitude: 3.25V @ 1.0ms    LEADS: [employer] -- lead brands/serial numbers/implant dates not listed    Oniel George   PGYIV Cardiovascular Diseases  Pager: [phone]

## 2019-01-08 NOTE — SUBJECTIVE & OBJECTIVE
Interval History: Patient reports he is doing well this morning.  Has no new complaints.  Volume status is improving.  ID recommending Colonoscopy for low H/H and Enterococcus Faecalis.  GI consulted and awaiting their recommendations.  ID changed abx yesterday.     Continuous Infusions:   furosemide (LASIX) 2 mg/mL infusion (non-titrating) 10 mg/hr (01/08/19 0041)     Scheduled Meds:   ampicillin IVPB  2 g Intravenous Q4H    cefTRIAXone (ROCEPHIN) IVPB  2 g Intravenous Q12H    clopidogrel  75 mg Oral Daily    digoxin  0.0625 mg Oral QAM    insulin aspart U-100  5 Units Subcutaneous TIDWM    insulin detemir U-100  15 Units Subcutaneous QHS    levothyroxine  25 mcg Oral Before breakfast    metoprolol succinate  25 mg Oral QHS    rosuvastatin  20 mg Oral QHS    sacubitril-valsartan  1 tablet Oral BID     PRN Meds:acetaminophen, albuterol-ipratropium, dextrose 50%, dextrose 50%, glucagon (human recombinant), glucose, glucose, insulin aspart U-100, ondansetron, sodium chloride 0.9%    Review of patient's allergies indicates:   Allergen Reactions    Adhesive Other (See Comments)     blisters    Codeine Other (See Comments)     Blurred vision    Latex Hives    Ace inhibitors     Lipitor [atorvastatin] Other (See Comments)     Muscle spasms     Objective:     Vital Signs (Most Recent):  Temp: 98.7 °F (37.1 °C) (01/08/19 1130)  Pulse: 70 (01/08/19 1320)  Resp: (!) 25 (01/08/19 1320)  BP: 100/62 (01/08/19 1320)  SpO2: 99 % (01/08/19 1320) Vital Signs (24h Range):  Temp:  [97.4 °F (36.3 °C)-98.7 °F (37.1 °C)] 98.7 °F (37.1 °C)  Pulse:  [60-83] 70  Resp:  [15-26] 25  SpO2:  [93 %-99 %] 99 %  BP: ()/(50-67) 100/62     Patient Vitals for the past 72 hrs (Last 3 readings):   Weight   01/08/19 0433 88.4 kg (194 lb 14.4 oz)   01/07/19 0735 89.4 kg (197 lb)   01/06/19 2330 89.8 kg (197 lb 13.8 oz)     Body mass index is 25.02 kg/m².      Intake/Output Summary (Last 24 hours) at 1/8/2019 1417  Last data filed at  1/8/2019 1317  Gross per 24 hour   Intake 1151.59 ml   Output 2125 ml   Net -973.41 ml     Physical Exam  Constitutional: Sitting in bed NAD; wife at bedside.  Patient is hard of hearing.   Neck: JVD elevation to mid neck    Cardiovascular:Irregularly irregular rhythm   and intact distal pulses. Exam reveals no gallop and no friction rub. No murmur heard. Pulmonary/Chest: Effort normal. No respiratory distress. He has wheezes. He has rales.   Abdominal: Soft. Bowel sounds are normal. He exhibits no distension. There is no tenderness.   Musculoskeletal: He exhibits edema (1+ bilateral lower extremity edema).   Neurological: He is alert and oriented to person, place, and time.   Skin: He is not diaphoretic.     Significant Labs:  CBC:  Recent Labs   Lab 01/07/19 0115 01/08/19 0616   WBC 13.22* 16.67*   RBC 3.05* 2.88*   HGB 8.7* 8.3*   HCT 27.6* 25.6*   * 110*   MCV 91 89   MCH 28.5 28.8   MCHC 31.5* 32.4     BNP:  Recent Labs   Lab 01/07/19 0115   BNP 1,607*     CMP:  Recent Labs   Lab 01/07/19 0115 01/08/19 0616   * 83   CALCIUM 8.6* 8.7   ALBUMIN 2.6* 2.7*   PROT 6.5 6.4    143   K 4.4 3.3*   CO2 17* 23   * 110   BUN 35* 28*   CREATININE 1.2 1.2   ALKPHOS 146* 140*   ALT 37 29   AST 37 27   BILITOT 1.0 0.9      Coagulation:   Recent Labs   Lab 01/07/19 0115   INR 1.5*   APTT 27.4     LDH:  No results for input(s): LDH in the last 72 hours.  Microbiology:  Microbiology Results (last 7 days)     Procedure Component Value Units Date/Time    Blood culture (site 1) [[id number]] Collected:  01/07/19 0154    Order Status:  Completed Specimen:  Blood Updated:  01/08/19 0812     Blood Culture, Routine No Growth to date     Blood Culture, Routine No Growth to date    Narrative:       Site # 1, aerobic and anaerobic    Blood culture (site 2) [[id number]] Collected:  01/07/19 0154    Order Status:  Completed Specimen:  Blood Updated:  01/08/19 0812     Blood Culture, Routine No Growth to date      Blood Culture, Routine No Growth to date    Narrative:       Site # 2, aerobic only    Culture, Respiratory [[id number]]     Order Status:  No result Specimen:  Respiratory from Sputum, Expectorated           I have reviewed all pertinent labs within the past 24 hours.    Estimated Creatinine Clearance: 60.9 mL/min (based on SCr of 1.2 mg/dL).    Diagnostic Results:  I have reviewed all pertinent imaging results/findings within the past 24 hours.

## 2019-01-09 NOTE — ASSESSMENT & PLAN NOTE
-ICM  -Last 2D Echo 1/7/19: LVEF 20%, LVEDD 7.24 cm  -Diuresing with IV Lasix infusion.  Still appears volume overloaded on exam.  Will continue for now  -GDMT with Digoxin (increase dose to 0.125 mg qd as level today is low at 0.1), Entresto, Toprol  -2g Na dietary restriction, 1500 mL fluid restriction, strict I/Os  -Self declined for LVAD after w/u last April 2018

## 2019-01-09 NOTE — TRANSFER OF CARE
"Anesthesia Transfer of Care Note    Patient: Jerry Solis    Procedure(s) Performed: Procedure(s) (LRB):  EXTRACTION, ELECTRODE LEAD (Left)  Insertion, Pacemaker, Temporary Transvenous  ECHOCARDIOGRAM,TRANSESOPHAGEAL (N/A)    Patient location: ICU    Anesthesia Type: general    Transport from OR: Transported from OR on 6-10 L/min O2 by face mask with adequate spontaneous ventilation. Continuos invasive BP monitoring in transport. Continuous SpO2 monitoring in transport. Continuous ECG monitoring in transport    Post pain: adequate analgesia    Post assessment: no apparent anesthetic complications and tolerated procedure well    Post vital signs: stable    Level of consciousness: confused and awake    Nausea/Vomiting: no nausea/vomiting    Complications: none    Transfer of care protocol was followed      Last vitals:   Visit Vitals  BP (!) 108/42 (BP Location: Left arm, Patient Position: Lying)   Pulse (!) 59   Temp 36.5 °C (97.7 °F) (Oral)   Resp (!) 24   Ht 6' 2" (1.88 m)   Wt 88.2 kg (194 lb 7.1 oz)   SpO2 100%   BMI 24.97 kg/m²     "

## 2019-01-09 NOTE — PROGRESS NOTES
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  Found to be hypotensive and with TREY.  Required pressor support and was transferred to [facility] where blood cultures of 1/2/18  found to be positive for Enterococcus faecalis, and RICARDO showed vegetation on RV lead.  [facility] team reviewed films and confirmed presence of vegetation.   He was transferred to [facility] for further evaluation and possible device extraction.      No repeat blood cultures at [facility].  Repeat blood cultures of 1/7 on admission here NGTD      Source unclear - likely GI.   Denies any problems or history of problems with ICD pocket.   Reports episode of food poisoning a couple of months ago.   Patient reports a loss of appetite for approximately the past 3 -4 weeks and has lost approx 20 pounds during that time.   He denies any fevers, chills,sweats over the past few weeks until this past Monday when he was admitted.      Last colonoscopy was 2 years ago.  He has q 3 year surveillance colonoscopies as he has polyps.      He is scheduled for lead/device extraction tomorrow.    Remains febrile, WBC uptrending to 16 today.  Platelets 110, H/H 8.3/25, ESR 54, CRP 27.9, procalcitonin wnl.       Plan/recommendations:  1.  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 Will  discuss changing to ampicillin extended infusion, though may be too much fluid. Will discuss with Pharm D and primary team.   2.  Please send lead tips for culture.  Blood cultures X 1 after extraction.   3.  Recommend EGD and colonoscopy - if possible as inpatient.  GI consult pending.   4.  Will follow.     Patient seen by, and plan discussed with, ID staff  Discussed with Primary Team and  EP       Bacteremia       See Assessment/plan above       Thank you.   Please call for any questions or concerns.  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 Jerry Solis is a 78 year old gentleman with history of CHF, s/p ICD placement and s/p cardioMems heart failure monitoring device , Afib, DM, HTN, COPD, history of CVA who presented to [facility] with nausea, vomiting, diarrhea and fevers.  Found to be hypotensive and with TREY.  Required pressor support and was transferred to [facility] where blood cultures found to be Enterococcus faecalis, and RICARDO showed possible vegetation on RV lead.  He was transferred to [facility] for further evaluation and possible device extraction.   He is currently on IV vancomycin and cefepime.      Reports that he had an episode of what he thought was food poisoning a couple of months ago when he was visiting his brother in [place].  Patient reports a loss of appetite for approximately the past 3 -4 weeks and has lost approx 20 pounds during that time.  He did not seek medical attention because he was working.   He denies any fevers, chills until this past Monday when he was admitted.  He denies current fevers, chills, sweats.  He has chronic SOB that is not worse from baseline.  Denies current n/v/d.  Denies melena, hematachezia.  No urinary complaints. Denies any pain, redness at ICD pocket site.      ICD/pacer placed >10 years ago.  Had ? Generator changed in 2008.  Denies any  problems.      Last colonoscopy was 2 years ago.  He has q 3 year surveillance colonoscopies as he has polyps.      Blood culture results from [facility] of 1/2/19 - E. Faecalis - PCN, vancomycin sensitive.  PICC was placed 1/3/19.  Unclear when blood cultures cleared as blood cultures were not repeated.   Repeat blood cultures taken here on admission 1/7.      He is afebrile, WBC 13.2, platelets 128, H/H 8.7/27.6, ESR 54, CRP 27.9, procalcitonin wnl.            Interval History:    No acute events overnight. EP consulted to evaluate for lead extraction - plan for lead extraction tomorrow.   Afebrile.  WBC 16.  Denies subjective complaints except poor appetite   Repeat blood cultures 1/7/19 NGTD    Review of Systems   Constitutional: Positive for appetite change and unexpected weight change. Negative for chills, diaphoresis, fatigue and fever.   HENT: Positive for hearing loss (very hard of hearing). Negative for congestion, dental problem, ear pain, sore throat and tinnitus.    Eyes: Negative.    Respiratory: Negative for cough, shortness of breath (chronic, at baseline) and wheezing.    Cardiovascular: Negative for chest pain, palpitations and leg swelling.   Gastrointestinal: Negative for abdominal distention, abdominal pain, blood in stool, constipation, diarrhea, rectal pain and vomiting.   Genitourinary: Negative for dysuria, flank pain, frequency, hematuria and urgency.   Musculoskeletal: Negative for arthralgias, back pain, myalgias and neck pain.   Skin: Negative for rash.   Allergic/Immunologic: Negative for immunocompromised state.   Neurological: Negative for dizziness, light-headedness, numbness and headaches.   Hematological: Negative for adenopathy. Does not bruise/bleed easily.   Psychiatric/Behavioral: Negative for confusion and sleep disturbance. The patient is not nervous/anxious.      Objective:     Vital Signs (Most Recent):  Temp: 98 °F (36.7 °C) (01/08/19 0800)  Pulse: 72 (01/08/19 0800)  Resp:  18 (01/08/19 0800)  BP: (!) 106/58 (01/08/19 0800)  SpO2: 97 % (01/08/19 0800) Vital Signs (24h Range):  Temp:  [97.4 °F (36.3 °C)-98 °F (36.7 °C)] 98 °F (36.7 °C)  Pulse:  [60-83] 72  Resp:  [15-21] 18  SpO2:  [93 %-99 %] 97 %  BP: ()/(51-67) 106/58     Weight: 88.4 kg (194 lb 14.4 oz)  Body mass index is 25.02 kg/m².    Estimated Creatinine Clearance: 60.9 mL/min (based on SCr of 1.2 mg/dL).    Physical Exam   Constitutional: He is oriented to person, place, and time. He appears well-developed and well-nourished.   HENT:   Head: Normocephalic and atraumatic.   Eyes: Conjunctivae are normal. No scleral icterus.   Neck: Normal range of motion. Neck supple.   Cardiovascular: Normal rate. An irregularly irregular rhythm present.   No murmur heard.  Left upper chest ICD pocket - intact, no tenderness, erythema   Pulmonary/Chest: Effort normal. No respiratory distress. He has no wheezes.   Crackles at bases     Abdominal: Soft. He exhibits no distension.   Musculoskeletal: He exhibits no edema.   Neurological: He is alert and oriented to person, place, and time.   Skin: Skin is warm and dry.   PICC RUE - c/d/i   Psychiatric: He has a normal mood and affect. His behavior is normal.   Vitals reviewed.      Significant Labs:   Blood Culture:   Recent Labs   Lab 01/07/19  0154   LABBLOO No Growth to date  No Growth to date  No Growth to date  No Growth to date     CBC:   Recent Labs   Lab 01/07/19 0115 01/08/19  0616   WBC 13.22* 16.67*   HGB 8.7* 8.3*   HCT 27.6* 25.6*   * 110*     CMP:   Recent Labs   Lab 01/07/19  0115 01/08/19  0616    143   K 4.4 3.3*   * 110   CO2 17* 23   * 83   BUN 35* 28*   CREATININE 1.2 1.2   CALCIUM 8.6* 8.7   PROT 6.5 6.4   ALBUMIN 2.6* 2.7*   BILITOT 1.0 0.9   ALKPHOS 146* 140*   AST 37 27   ALT 37 29   ANIONGAP 9 10   EGFRNONAA 58.4* 58.4*     Procalcitonin:   Recent Labs   Lab 01/07/19  0115   PROCAL 0.16       Significant Imaging: I have reviewed all  pertinent imaging results/findings within the past 24 hours.

## 2019-01-09 NOTE — PROGRESS NOTES
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 Undergoing explant of ICD 2/2 Enterococcus bacteremia. Afebrile, and WCC has normalized    Continuous Infusions:   furosemide (LASIX) 2 mg/mL infusion (non-titrating) 10 mg/hr (01/09/19 0920)     Scheduled Meds:   ampicillin IVPB  2 g Intravenous Q4H    cefTRIAXone (ROCEPHIN) IVPB  2 g Intravenous Q12H    clopidogrel  75 mg Oral Daily    [START ON 1/10/2019] digoxin  0.125 mg Oral QAM    insulin aspart U-100  5 Units Subcutaneous TIDWM    insulin detemir U-100  15 Units Subcutaneous QHS    levothyroxine  25 mcg Oral Before breakfast    magnesium oxide  400 mg Oral BID    magnesium oxide  800 mg Oral Once    metoprolol succinate  25 mg Oral QHS    potassium chloride SA  40 mEq Oral BID    potassium chloride SA  60 mEq Oral Once    rosuvastatin  20 mg Oral QHS    sacubitril-valsartan  1 tablet Oral BID     PRN Meds:sodium chloride, acetaminophen, albuterol-ipratropium, dextrose 50%, dextrose 50%, glucagon (human recombinant), glucose, glucose, insulin aspart U-100, ondansetron, sodium chloride 0.9%    Review of patient's allergies indicates:   Allergen Reactions    Adhesive Other (See Comments)     blisters    Codeine Other (See Comments)     Blurred vision    Latex Hives    Ace inhibitors     Lipitor [atorvastatin] Other (See Comments)     Muscle spasms     Objective:     Vital Signs (Most Recent):  Temp: 97.7 °F (36.5 °C) (01/09/19 0730)  Pulse: 69 (01/09/19 0736)  Resp: 18 (01/09/19 0730)  BP: (!) 92/53 (01/09/19 0730)  SpO2: (!) 93 % (01/09/19 0730) Vital Signs (24h Range):  Temp:  [97.7 °F  (36.5 °C)-98.7 °F (37.1 °C)] 97.7 °F (36.5 °C)  Pulse:  [61-80] 69  Resp:  [12-26] 18  SpO2:  [93 %-99 %] 93 %  BP: ()/(50-66) 92/53     Patient Vitals for the past 72 hrs (Last 3 readings):   Weight   01/08/19 1959 88.2 kg (194 lb 7.1 oz)   01/08/19 0433 88.4 kg (194 lb 14.4 oz)   01/07/19 0735 89.4 kg (197 lb)     Body mass index is 24.97 kg/m².      Intake/Output Summary (Last 24 hours) at 1/9/2019 1013  Last data filed at 1/9/2019 0512  Gross per 24 hour   Intake 2023.25 ml   Output 2950 ml   Net -926.75 ml       Hemodynamic Parameters:       Telemetry: BiV paced with underlying A fib    Physical Exam   Constitutional: He is oriented to person, place, and time. He appears well-developed and well-nourished.   HENT:   Head: Normocephalic and atraumatic.   Eyes: Conjunctivae and EOM are normal. Pupils are equal, round, and reactive to light.   Neck: Normal range of motion. Neck supple.   JVP ~ midneck   Cardiovascular: Normal rate.   Irregularly irregular   Pulmonary/Chest: Effort normal.   Abdominal: Soft. Bowel sounds are normal.   Musculoskeletal: Normal range of motion.   1+ HEATHER   Neurological: He is alert and oriented to person, place, and time.   Skin: Skin is warm and dry. Capillary refill takes 2 to 3 seconds.   Psychiatric: He has a normal mood and affect. His behavior is normal. Judgment and thought content normal.       Significant Labs:  CBC:  Recent Labs   Lab 01/07/19 0115 01/08/19  0616 01/09/19  0430   WBC 13.22* 16.67* 12.05   RBC 3.05* 2.88* 2.84*   HGB 8.7* 8.3* 8.0*   HCT 27.6* 25.6* 24.8*   * 110* 105*   MCV 91 89 87   MCH 28.5 28.8 28.2   MCHC 31.5* 32.4 32.3     BNP:  Recent Labs   Lab 01/07/19  0115   BNP 1,607*     CMP:  Recent Labs   Lab 01/07/19  0115 01/08/19  0616 01/09/19  0430   * 83 100   CALCIUM 8.6* 8.7 8.3*   ALBUMIN 2.6* 2.7* 2.4*   PROT 6.5 6.4 6.0    143 142   K 4.4 3.3* 3.2*   CO2 17* 23 26   * 110 107   BUN 35* 28* 22   CREATININE 1.2 1.2 1.2    ALKPHOS 146* 140* 138*   ALT 37 29 22   AST 37 27 24   BILITOT 1.0 0.9 0.9      Coagulation:   Recent Labs   Lab 01/07/19  0115   INR 1.5*   APTT 27.4     LDH:  No results for input(s): LDH in the last 72 hours.  Microbiology:  Microbiology Results (last 7 days)     Procedure Component Value Units Date/Time    Blood culture (site 2) [[id number]] Collected:  01/07/19 0154    Order Status:  Completed Specimen:  Blood Updated:  01/09/19 0812     Blood Culture, Routine No Growth to date     Blood Culture, Routine No Growth to date     Blood Culture, Routine No Growth to date    Narrative:       Site # 2, aerobic only    Blood culture (site 1) [[id number]] Collected:  01/07/19 0154    Order Status:  Completed Specimen:  Blood Updated:  01/09/19 0812     Blood Culture, Routine No Growth to date     Blood Culture, Routine No Growth to date     Blood Culture, Routine No Growth to date    Narrative:       Site # 1, aerobic and anaerobic    IV catheter culture [[id number]]     Order Status:  No result Specimen:  Catheter Tip, NOS     Culture, Respiratory [[id number]]     Order Status:  No result Specimen:  Respiratory from Sputum, Expectorated           I have reviewed all pertinent labs within the past 24 hours.    Estimated Creatinine Clearance: 60.9 mL/min (based on SCr of 1.2 mg/dL).    Diagnostic Results:  I have reviewed all pertinent imaging results/findings within the past 24 hours.    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 Undergoing ICD extraction for Enterococcus endocarditis  -ID changed abx. From Vanc and Cefepime to Ampicillin and Ceftriaxone.    -Blood cultures are negative here thus far.  -Appreciate GI's help. Plan for outpatient EGD and C-scope for eval of anemia and Enterococcus bacteremia       Acute on chronic combined systolic and diastolic CHF, NYHA class 3    -ICM  -Last 2D Echo  1/7/19: LVEF 20%, LVEDD 7.24 cm  -Diuresing with IV Lasix infusion.  Still appears volume overloaded on exam.  Will continue for now  -GDMT with Digoxin (increase dose to 0.125 mg qd as level today is low at 0.1), Entresto, Toprol  -2g Na dietary restriction, 1500 mL fluid restriction, strict I/Os  -Self declined for LVAD after w/u last April 2018       Atrial fibrillation    -Rate controlled  -CHADS VASc 6  -Holding eliquis for procedures  -Cardiac monitoring     Type 2 diabetes mellitus with complication    -A1c 8.1  -Target -180 while hospitalized  -detemir 15, aspart 5 with sliding scale     Endocarditis    - See ICD lead infection     Bacteremia    -[place] [id number]  Heart Transplant  [facility]

## 2019-01-09 NOTE — BRIEF OP NOTE
Patient is s/p CRTD laser extraction :   Post op RICARDO - no effusion, Fluro showed lateral wall was contractile, hemodynamics were stable     Tolerated procedure well.   Post op care per protocol.  Will monitor in recovery in ICU overnight  Motrin , PPI , lasix prn   Hold elequis for 5 days   Cont antibiotics for 5 days from procedure prophylaxis standpoint.   NO HEPARIN PRODUCTS  There was a small pneumothorax noted during the procedure that was stable through out the case.   Chest Xray (ordered)  EP service to follow

## 2019-01-09 NOTE — ASSESSMENT & PLAN NOTE
-Vegetation seen on RICARDO  -Appreciate EP's help. Undergoing ICD extraction for Enterococcus endocarditis  -ID changed abx. From Vanc and Cefepime to Ampicillin and Ceftriaxone.    -Blood cultures are negative here thus far.  -Appreciate GI's help. Plan for outpatient EGD and C-scope for eval of anemia and Enterococcus bacteremia

## 2019-01-09 NOTE — ASSESSMENT & PLAN NOTE
This is a 77yo gentleman with long-standing history of ICM with EF 35%, a 20-year history of ICD with upgrade to CRT-D in 2014 who presents for device extraction due to RV lead vegetation ~0.5cm in the setting of VRE bacteremia with a likely initial GI source. Likewise has a CardioMeMMS device in place. Is not pacemaker dependent. ID believes the primary source is the vegetation    Called [facility], lead history is:  Implantation for all leads: 10/08/2008  RV lead -- 7021/65  RA lead -- 1888TC/52  LV lead -- 1156/86    - device extraction today, discussed risks and benefits extensively with family at bedside.  - lead tip culture ordered

## 2019-01-09 NOTE — ANESTHESIA PROCEDURE NOTES
Central Line    Diagnosis: Chronic systolic heart failure   Patient location during procedure: done in OR  Procedure start time: 1/9/2019 10:02 AM  Timeout: 1/9/2019 10:01 AM  Procedure end time: 1/9/2019 10:13 AM  Staffing  Anesthesiologist: DEUCE Zendejas MD  Performed: anesthesiologist   Anesthesiologist was present at the time of the procedure.  Preanesthetic Checklist  Completed: patient identified, site marked, surgical consent, pre-op evaluation, timeout performed, IV checked, risks and benefits discussed, monitors and equipment checked and anesthesia consent given  Indication  Indication: hemodynamic monitoring, med administration     Anesthesia   general anesthesia    Central Line  Skin Prep: skin prepped with ChloraPrep, skin prep agent completely dried prior to procedure  maximum sterile barriers used during central venous catheter insertion  hand hygiene performed prior to central venous catheter insertion  Location: right, internal jugular.   Catheter type: double lumen  Catheter Size: 8 Fr  Inserted Catheter Length: 14 cm  Ultrasound: vascular probe with ultrasound   Vessel Caliber: patent  Needle advanced into vessel with real time Ultrasound guidance.  Guidewire confirmed in vessel.   Manometry: Venous cannualation confirmed by visual estimation of blood vessel pressure using manometry.  Insertion Attempts: 1   Securement:line sutured, chlorhexidine patch, sterile dressing applied and blood return through all ports     Post-Procedure   Adverse Events:none

## 2019-01-09 NOTE — PROGRESS NOTES
Pt sent down for RICARDO and lead extraction following RICARDO.  NPO since midnight.  VSS upon leaving unit. BG this am 94.    Report given to FLORENTINO Parker.  Plavis held this am.  Morning dose of ampicillin and entresto held at this time as per FLORENTINO Parker's request.  Both meds given to FLORENTINO Parker and sent down with pt in case MD orders them to be given.  Wife at bedside aware of current plan of care.    No issues at this time. Will monitor.

## 2019-01-09 NOTE — SUBJECTIVE & OBJECTIVE
Interval History: No events overnight, transient hypotension appears to be cuff issue    Tele: AF, with ventricular pacing, HR 70. No events    ROS  Objective:     Vital Signs (Most Recent):  Temp: 97.7 °F (36.5 °C) (01/09/19 0730)  Pulse: 69 (01/09/19 0736)  Resp: 18 (01/09/19 0730)  BP: (!) 92/53 (01/09/19 0730)  SpO2: (!) 93 % (01/09/19 0730) Vital Signs (24h Range):  Temp:  [97.7 °F (36.5 °C)-98.7 °F (37.1 °C)] 97.7 °F (36.5 °C)  Pulse:  [61-80] 69  Resp:  [12-26] 18  SpO2:  [93 %-99 %] 93 %  BP: ()/(50-66) 92/53     Weight: 88.2 kg (194 lb 7.1 oz)  Body mass index is 24.97 kg/m².     SpO2: (!) 93 %  O2 Device (Oxygen Therapy): room air    Physical Exam   Constitutional: He is oriented to person, place, and time. He appears well-developed and well-nourished. No distress.   HENT:   Head: Normocephalic and atraumatic.   Mouth/Throat: Oropharynx is clear and moist.   Eyes: Conjunctivae and EOM are normal. Pupils are equal, round, and reactive to light. No scleral icterus.   Neck: Normal range of motion. Neck supple. No JVD present.   Cardiovascular: Normal rate, normal heart sounds and intact distal pulses. An irregularly irregular rhythm present.   No murmur heard.  Pulses:       Radial pulses are 2+ on the right side, and 2+ on the left side.   Pulmonary/Chest: Effort normal and breath sounds normal. No respiratory distress.   Symmetrical expansion   Abdominal: Soft. Bowel sounds are normal. There is no hepatosplenomegaly. There is no tenderness.   Musculoskeletal: Normal range of motion. He exhibits edema (trace).   Neurological: He is alert and oriented to person, place, and time. No cranial nerve deficit.   Skin: Skin is warm and dry. No rash noted. He is not diaphoretic.   Psychiatric: He has a normal mood and affect. Judgment and thought content normal.       Significant Labs:   EP:   Recent Labs   Lab 01/08/19  0616 01/09/19  0430    142   K 3.3* 3.2*    107   CO2 23 26   GLU 83 100   BUN  28* 22   CREATININE 1.2 1.2   CALCIUM 8.7 8.3*   PROT 6.4 6.0   ALBUMIN 2.7* 2.4*   BILITOT 0.9 0.9   ALKPHOS 140* 138*   AST 27 24   ALT 29 22   ANIONGAP 10 9   ESTGFRAFRICA >60.0 >60.0   EGFRNONAA 58.4* 58.4*   WBC 16.67* 12.05   HGB 8.3* 8.0*   HCT 25.6* 24.8*   * 105*

## 2019-01-09 NOTE — PROGRESS NOTES
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 No events    ROS  Objective:     Vital Signs (Most Recent):  Temp: 97.7 °F (36.5 °C) (01/09/19 0730)  Pulse: 69 (01/09/19 0736)  Resp: 18 (01/09/19 0730)  BP: (!) 92/53 (01/09/19 0730)  SpO2: (!) 93 % (01/09/19 0730) Vital Signs (24h Range):  Temp:  [97.7 °F (36.5 °C)-98.7 °F (37.1 °C)] 97.7 °F (36.5 °C)  Pulse:  [61-80] 69  Resp:  [12-26] 18  SpO2:  [93 %-99 %] 93 %  BP: ()/(50-66) 92/53     Weight: 88.2 kg (194 lb 7.1 oz)  Body mass index is 24.97 kg/m².     SpO2: (!) 93 %  O2 Device (Oxygen Therapy): room air    Physical Exam   Constitutional: He is oriented to person, place, and time. He appears well-developed and well-nourished. No distress.   HENT:   Head: Normocephalic and atraumatic.   Mouth/Throat: Oropharynx is clear and moist.   Eyes: Conjunctivae and EOM are normal. Pupils are equal, round, and reactive to light. No scleral icterus.   Neck: Normal range of motion. Neck supple. No JVD present.   Cardiovascular: Normal rate, normal heart sounds and intact distal pulses. An irregularly irregular rhythm present.   No murmur heard.  Pulses:       Radial pulses are 2+ on the right side, and 2+ on the left side.   Pulmonary/Chest: Effort normal and breath sounds normal. No respiratory distress.   Symmetrical expansion   Abdominal: Soft. Bowel sounds are normal. There is no hepatosplenomegaly. There is no tenderness.   Musculoskeletal: Normal range of motion. He exhibits edema (trace).   Neurological: He is alert and oriented to person, place, and time. No cranial nerve  deficit.   Skin: Skin is warm and dry. No rash noted. He is not diaphoretic.   Psychiatric: He has a normal mood and affect. Judgment and thought content normal.       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 Likewise has a CardioMeMMS device in place. Is not pacemaker dependent. ID believes the primary source is the vegetation    Called [facility], lead history is:  Implantation for all leads: 10/08/2008  RV lead -- 7021/65  RA lead -- 1888TC/52  LV lead -- 1156/86    - device extraction today, discussed risks and benefits extensively with family at bedside.  - lead tip culture ordered         Oniel George MD  Cardiac Electrophysiology  [facility]

## 2019-01-09 NOTE — ANESTHESIA PREPROCEDURE EVALUATION
01/09/2019  Jerry Solis is a 78 y.o., male.  Pre-operative evaluation for Procedure(s) (LRB):  EXTRACTION, ELECTRODE LEAD (Left)  ECHOCARDIOGRAM,TRANSESOPHAGEAL (N/A)    Jerry Solis is a 78 y.o. 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    Type 2 diabetes mellitus with complication    Atrial fibrillation    TREY (acute kidney injury)    CHF (congestive heart failure)    Hypokalemia    Bacteremia    Endocarditis    ICD (implantable cardioverter-defibrillator) in place    Infection associated with cardiac device       Review of patient's allergies indicates:   Allergen Reactions    Adhesive Other (See Comments)     blisters    Codeine Other (See Comments)     Blurred vision    Latex Hives    Ace inhibitors     Lipitor [atorvastatin] Other (See Comments)     Muscle spasms       No current facility-administered medications on file prior to encounter.      Current Outpatient Medications on File Prior to Encounter   Medication Sig Dispense Refill    bumetanide (BUMEX) 1 MG tablet Take 3 mg every morning and 2 mg every evening 450 tablet 3    clopidogrel (PLAVIX) 75 mg tablet Take 1 tablet (75 mg total) by mouth once daily. 90 tablet 1    digoxin (LANOXIN) 125 mcg tablet Take 0.5 tablets by mouth every morning.      ELIQUIS 2.5 mg Tab Take 1 tablet by mouth 2 (two) times daily.      LANTUS U-100 INSULIN 100 unit/mL injection Inject 50 Units into the skin 2 (two) times daily.      levothyroxine (SYNTHROID) 25 MCG tablet Take 1 tablet by mouth once daily.      metOLazone (ZAROXOLYN) 5 MG tablet Take one tablet by mouth now. Then only take as ordered per MD. (Patient taking  differently: Take 5 mg by mouth as needed. Take one tablet by mouth now. Then only take as ordered per MD.) 20 tablet 1    metoprolol succinate (TOPROL-XL) 25 MG 24 hr tablet Take 1 tablet (25 mg total) by mouth every evening. 30 tablet 11    NOVOLOG U-100 INSULIN ASPART 100 unit/mL injection Inject 5 Units into the skin 4 (four) times daily.      omega-3 acid ethyl esters (LOVAZA) 1 gram capsule Take 2 g by mouth 2 (two) times daily.      omeprazole (PRILOSEC) 40 MG capsule Take 40 mg by mouth once daily.      potassium chloride SA (KLOR-CON M20) 20 MEQ tablet Take 2-3 tablets by mouth daily as directed.  Take it when taking the bumetadine. 90 tablet 3    rosuvastatin (CRESTOR) 20 MG tablet Take 1 tablet (20 mg total) by mouth every evening. 30 tablet 11    sacubitril-valsartan (ENTRESTO) 24-26 mg per tablet Take 1 tablet by mouth 2 (two) times daily. 60 tablet 1    VENTOLIN HFA 90 mcg/actuation inhaler Inhale 2 puffs into the lungs every 4 (four) hours as needed.       loratadine (CLARITIN) 10 mg tablet Take 10 mg by mouth once daily.      multivitamin (THERAGRAN) per tablet Take 1 tablet by mouth once daily.      TRELEGY ELLIPTA 100-62.5-25 mcg DsDv once daily.          Past Surgical History:   Procedure Laterality Date    HEART CATH-RIGHT Right 4/5/2018    Performed by Elizabeth Wise MD at [facility] C[facility] LAB       Social History     Socioeconomic History    Marital status:      Spouse name: Not on file    Number of children: Not on file    Years of education: Not on file    Highest education level: Not on file   Social Needs    Financial resource strain: Not on file    Food insecurity - worry: Not on file    Food insecurity - inability: Not on file    Transportation needs - medical: Not on file    Transportation needs - non-medical: Not on file   Occupational History    Not on file   Tobacco Use    Smoking status: Former Smoker    Smokeless tobacco: Former User   Substance and Sexual  "Activity    Alcohol use: Yes     Comment: former drinker    Drug use: No    Sexual activity: Not on file   Other Topics Concern    Not on file   Social History Narrative    Not on file         CBC:   Recent Labs     19  0616 19  0430   WBC 16.67* 12.05   RBC 2.88* 2.84*   HGB 8.3* 8.0*   HCT 25.6* 24.8*   * 105*   MCV 89 87   MCH 28.8 28.2   MCHC 32.4 32.3       CMP:   Recent Labs     19  0115 19  0616    143   K 4.4 3.3*   * 110   CO2 17* 23   BUN 35* 28*   CREATININE 1.2 1.2   * 83   MG 2.0 1.7   PHOS 2.5* 2.7   CALCIUM 8.6* 8.7   ALBUMIN 2.6* 2.7*   PROT 6.5 6.4   ALKPHOS 146* 140*   ALT 37 29   AST 37 27   BILITOT 1.0 0.9       INR  Recent Labs     19  011   INR 1.5*   APTT 27.4           Diagnostic Studies:      EKD Echo:  Results for orders placed or performed during the hospital encounter of 18   2D Echo w/ Color Flow Doppler   Result Value Ref Range    QEF 20 (A) 55 - 65    Mitral Valve Regurgitation MODERATE (A)     Aortic Valve Regurgitation TRIVIAL     Est. PA Systolic Pressure 96 (A)     Tricuspid Valve Regurgitation MODERATE (A)      Last 3 sets of Vitals    Vitals - 1 value per visit 2019   SYSTOLIC 126 - 92   DIASTOLIC 60 - 53   PULSE 84 - 69   TEMPERATURE 98.2 - 97.7   RESPIRATIONS 18 - 18   SPO2 98 - 93   Weight (lb) - 194.45 -   Weight (kg) - 88.2 -   HEIGHT - 6' 2" -   BODY MASS INDEX 24.97 - -   VISIT REPORT - - -   Pain Score  - - -         Anesthesia Evaluation    I have reviewed the Patient Summary Reports.    I have reviewed the Nursing Notes.      Review of Systems  Social:  Former Smoker, Alcohol Use    Hematology/Oncology:     Oncology Normal    -- Anemia: Hematology Comments: bacteremia     EENT/Dental:EENT/Dental Normal   Cardiovascular:   Exercise tolerance: poor Pacemaker Past MI (ischemic carediomyopathy) CAD  Dysrhythmias (ventricular tachycardia )  CHF (chronic systolic heart failure ) " hyperlipidemia Infected cardiac device    Pulmonary:  Pulmonary Normal    Renal/:   Chronic Renal Disease, ARF    Hepatic/GI:  Hepatic/GI Normal    Musculoskeletal:  Musculoskeletal Normal    Neurological:  Neurology Normal    Endocrine:   Diabetes, poorly controlled, type 2    Dermatological:  Skin Normal    Psych:  Psychiatric Normal           Physical Exam  General:  Well nourished    Airway/Jaw/Neck:  Airway Findings: Mouth Opening: Normal Tongue: Normal  Mallampati: II  TM Distance: Normal, at least 6 cm      Dental:  Dental Findings: Periodontal disease, Mild   Chest/Lungs:  Chest/Lungs Findings: Normal Respiratory Rate         Mental Status:  Mental Status Findings:  Cooperative, Alert and Oriented         Anesthesia Plan  Type of Anesthesia, risks & benefits discussed:  Anesthesia Type:  general  Patient's Preference:   Intra-op Monitoring Plan: standard ASA monitors, central line and arterial line  Intra-op Monitoring Plan Comments:   Post Op Pain Control Plan: per primary service following discharge from PACU  Post Op Pain Control Plan Comments:   Induction:   IV  Beta Blocker:  Patient is not currently on a Beta-Blocker (No further documentation required).       Informed Consent: Patient understands risks and agrees with Anesthesia plan.  Questions answered. Anesthesia consent signed with patient.  ASA Score: 3     Day of Surgery Review of History & Physical:    H&P update referred to the surgeon.     Anesthesia Plan Notes: Plan for GETA,  Awake arterial line for induction.  CVP for vasoactive infusions and possible transfusions.         Ready For Surgery From Anesthesia Perspective.

## 2019-01-09 NOTE — CONSULTS
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     Consults  Subjective:     Chief Complaint:  weakness  HPI:   78M here for extraction of CRT-D device and leads for which we are consulted for RICARDO.    He has a PMH HFrEF/ICM s/p ICD, S/p cardioMEMS implant (enrolled in GUIDE-HF study), CAD ([facility] 3/18 with patent stents in proximal OM and ostial to mid RCA), AF on eliquis and digoxin, COPD, DM, hypothyroidism, HLD, prior CVA. He was transferred from [facility] for suspected RV lead vegatation <0.5 cm seen on RICARDO. 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 TTE with EF 20%, moderate-severe MR, moderate TR, moderately depressed RV; no vegetation identified.    Dysphagia or odynophagia:  No  Liver Disease, esophageal disease, or known varices:  No  Upper GI Bleeding: No  Snoring:  No  Sleep Apnea:  No  Prior neck surgery or radiation:  No  History of anesthetic difficulties:  No  Family history of anesthetic difficulties:  No  Last oral intake:  12 hours ago  Able to move neck in all directions:  No      Past Medical History:   Diagnosis Date    Chronic systolic congestive heart failure 3/22/2018    Coronary artery disease involving native coronary artery of native heart without angina pectoris 3/22/2018    ICD (implantable cardioverter-defibrillator) in place 3/22/2018    Ischemic cardiomyopathy 3/22/2018    Mixed hyperlipidemia 3/22/2018    Type 2 diabetes mellitus with complication 3/22/2018    VT (ventricular tachycardia) 3/22/2018        Past Surgical History:   Procedure Laterality Date    HEART CATH-RIGHT Right 4/5/2018    Performed by Elizabeth Wise MD at [facility] CATH LAB       Review of patient's allergies indicates:   Allergen Reactions    Adhesive Other (See Comments)     blisters    Codeine Other (See Comments)     Blurred vision    Latex Hives    Ace inhibitors     Lipitor [atorvastatin] Other (See Comments)     Muscle spasms       No current facility-administered medications on file prior to encounter.      Current Outpatient Medications on File Prior to Encounter   Medication Sig    bumetanide (BUMEX) 1 MG tablet Take 3 mg every morning and 2 mg every evening    clopidogrel (PLAVIX) 75 mg tablet Take 1 tablet (75 mg total) by mouth once daily.    digoxin (LANOXIN) 125 mcg tablet Take 0.5 tablets by mouth every morning.    ELIQUIS 2.5 mg Tab Take 1 tablet by mouth 2 (two) times daily.    LANTUS U-100 INSULIN 100 unit/mL injection Inject 50 Units into the skin 2 (two) times daily.    levothyroxine (SYNTHROID) 25 MCG tablet Take 1 tablet by mouth once daily.    metOLazone (ZAROXOLYN) 5 MG tablet Take one tablet by mouth now. Then only take as ordered per MD. (Patient taking differently: Take 5 mg by mouth as needed. Take one tablet by mouth now. Then only take as ordered per MD.)    metoprolol succinate (TOPROL-XL) 25 MG 24 hr tablet Take 1 tablet (25 mg total) by mouth every evening.    NOVOLOG U-100 INSULIN ASPART 100 unit/mL injection Inject 5 Units into the skin 4 (four) times daily.    omega-3 acid ethyl esters (LOVAZA) 1 gram capsule Take 2 g by mouth 2 (two) times daily.    omeprazole (PRILOSEC) 40 MG capsule Take 40 mg by mouth once daily.    potassium chloride SA (KLOR-CON M20) 20 MEQ tablet Take 2-3 tablets by mouth daily as directed.  Take it when taking the bumetadine.    rosuvastatin (CRESTOR) 20 MG tablet Take 1 tablet (20 mg total) by mouth every evening.    sacubitril-valsartan (ENTRESTO) 24-26 mg per  tablet Take 1 tablet by mouth 2 (two) times daily.    VENTOLIN HFA 90 mcg/actuation inhaler Inhale 2 puffs into the lungs every 4 (four) hours as needed.     loratadine (CLARITIN) 10 mg tablet Take 10 mg by mouth once daily.    multivitamin (THERAGRAN) per tablet Take 1 tablet by mouth once daily.    TRELEGY ELLIPTA 100-62.5-25 mcg DsDv once daily.      Family History     None        Tobacco Use    Smoking status: Former Smoker    Smokeless tobacco: Former User   Substance and Sexual Activity    Alcohol use: Yes     Comment: former drinker    Drug use: No    Sexual activity: Not on file     Review of Systems   All other systems reviewed and are negative.    Objective:     Vital Signs (Most Recent):  Temp: 97.7 °F (36.5 °C) (01/09/19 0730)  Pulse: 69 (01/09/19 0736)  Resp: 18 (01/09/19 0730)  BP: (!) 92/53 (01/09/19 0730)  SpO2: (!) 93 % (01/09/19 0730) Vital Signs (24h Range):  Temp:  [97.7 °F (36.5 °C)-98.7 °F (37.1 °C)] 97.7 °F (36.5 °C)  Pulse:  [61-80] 69  Resp:  [12-26] 18  SpO2:  [93 %-99 %] 93 %  BP: ()/(50-66) 92/53     Weight: 88.2 kg (194 lb 7.1 oz)  Body mass index is 24.97 kg/m².    SpO2: (!) 93 %  O2 Device (Oxygen Therapy): room air      Intake/Output Summary (Last 24 hours) at 1/9/2019 0922  Last data filed at 1/9/2019 0512  Gross per 24 hour   Intake 2363.25 ml   Output 2950 ml   Net -586.75 ml       Lines/Drains/Airways     Peripherally Inserted Central Catheter Line                 PICC Triple Lumen right brachial -- days                Physical Exam   Constitutional: He is oriented to person, place, and time. No distress.   HENT:   Head: Atraumatic.   Mouth/Throat: No oropharyngeal exudate.   Eyes: EOM are normal. No scleral icterus.   Neck: Neck supple. No JVD present.   Cardiovascular: Normal rate. Exam reveals no gallop and no friction rub.   Murmur heard.  Irregularly irregular   Pulmonary/Chest: Effort normal and breath sounds normal. No respiratory distress. He has no wheezes.  He has no rales. He exhibits no tenderness.   Abdominal: Soft. Bowel sounds are normal. He exhibits no distension. There is no tenderness.   Musculoskeletal: He exhibits no edema.   Neurological: He is alert and oriented to person, place, and time. No cranial nerve deficit.   Skin: Skin is warm and dry. No erythema.   Psychiatric: He has a normal mood and affect.       Significant Labs:   CMP   Recent Labs   Lab 01/08/19  0616 01/09/19  0430    142   K 3.3* 3.2*    107   CO2 23 26   GLU 83 100   BUN 28* 22   CREATININE 1.2 1.2   CALCIUM 8.7 8.3*   PROT 6.4 6.0   ALBUMIN 2.7* 2.4*   BILITOT 0.9 0.9   ALKPHOS 140* 138*   AST 27 24   ALT 29 22   ANIONGAP 10 9   ESTGFRAFRICA >60.0 >60.0   EGFRNONAA 58.4* 58.4*   , CBC   Recent Labs   Lab 01/08/19  0616 01/09/19  0430   WBC 16.67* 12.05   HGB 8.3* 8.0*   HCT 25.6* 24.8*   * 105*    and INR No results for input(s): INR, PROTIME in the last 48 hours.    Significant Imaging: Echocardiogram:   Transthoracic echo (TTE) complete (Cupid Only):   Results for orders placed or performed during the hospital encounter of 01/06/19   Transthoracic echo (TTE) complete (Cupid Only)   Result Value Ref Range    Ascending aorta 3.73 cm    STJ 2.75 cm    AV mean gradient 2.97 mmHg    Ao peak rod 1.07 m/s    Ao VTI 18.79 cm    IVS 0.82 0.6 - 1.1 cm    LA size 4.89 cm    Left Atrium Major Axis 5.84 cm    Left Atrium Minor Axis 5.84 cm    LVIDD 7.24 (A) 3.5 - 6.0 cm    LVIDS 6.46 (A) 2.1 - 4.0 cm    LVOT diameter 2.11 cm    LVOT peak VTI 14.54 cm    PW 0.87 0.6 - 1.1 cm    MV Peak E Rod 1.24 m/s    PV Peak D Rod 0.93 m/s    PV Peak S Rod 0.28 m/s    RA Major Axis 5.76 cm    RA Width 5.30 cm    RVDD 4.76 cm    Sinus 3.69 cm    TAPSE 0.96 cm    TR Max Rod 3.51 m/s    TDI LATERAL 0.05     TDI SEPTAL 0.04     LA WIDTH 4.66 cm    LV Diastolic Volume 275.50 mL    LV Systolic Volume 212.87 mL    LV LATERAL E/E' RATIO 24.80     LV SEPTAL E/E' RATIO 31.00     FS 11 %    LA volume  113.12 cm3    LV mass 277.34 g    Left Ventricle Relative Wall Thickness 0.24 cm    AV valve area 2.70 cm2    AV index (prosthetic) 0.77     Mean e' 0.05     Pulm vein S/D ratio 0.30     LVOT area 3.49 cm2    LVOT stroke volume 50.82 cm3    AV peak gradient 4.58 mmHg    E/E' ratio 27.56     LV Systolic Volume Index 98.6 mL/m2    LV Diastolic Volume Index 127.59 mL/m2    LA Volume Index 52.4 mL/m2    LV Mass Index 128.4 g/m2    Triscuspid Valve Regurgitation Peak Gradient 49.28 mmHg    BSA 2.16 m2    Right Atrial Pressure (from IVC) 15 mmHg    TV rest pulmonary artery pressure 64 mmHg    and EKG: AP-    Assessment and Plan:     * 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 Plan for extraction with RICARDO during.     PLAN:  1. RICARDO for evaluation of leads during extraction    -The risks, benefits & alternatives of the procedure were explained to the patient.    -The risks of transesophageal echo include but are not limited to:  Dental trauma, esophageal trauma/perforation, bleeding, laryngospasm/brochospasm, aspiration, sore throat/hoarseness, & dislodgement of the endotracheal tube/nasogastric tube (where applicable).    -The risks of moderate sedation include hypotension, respiratory depression, arrhythmias, bronchospasm, & death.    -Informed consent was obtained & the patient is agreeable to proceed with the procedure.           Jeff Santiago MD  Cardiology   [facility]

## 2019-01-09 NOTE — ASSESSMENT & PLAN NOTE
78M with ICM, HFrEF, s/p ICD here with VRE bacteremia and possible RV lead vegetation. Plan for extraction with RICARDO during.     PLAN:  1. RICARDO for evaluation of leads during extraction    -The risks, benefits & alternatives of the procedure were explained to the patient.    -The risks of transesophageal echo include but are not limited to:  Dental trauma, esophageal trauma/perforation, bleeding, laryngospasm/brochospasm, aspiration, sore throat/hoarseness, & dislodgement of the endotracheal tube/nasogastric tube (where applicable).    -The risks of moderate sedation include hypotension, respiratory depression, arrhythmias, bronchospasm, & death.    -Informed consent was obtained & the patient is agreeable to proceed with the procedure.

## 2019-01-09 NOTE — SUBJECTIVE & OBJECTIVE
Interval History: No complaints or overnight events. Undergoing explant of ICD 2/2 Enterococcus bacteremia. Afebrile, and WCC has normalized    Continuous Infusions:   furosemide (LASIX) 2 mg/mL infusion (non-titrating) 10 mg/hr (01/09/19 0920)     Scheduled Meds:   ampicillin IVPB  2 g Intravenous Q4H    cefTRIAXone (ROCEPHIN) IVPB  2 g Intravenous Q12H    clopidogrel  75 mg Oral Daily    [START ON 1/10/2019] digoxin  0.125 mg Oral QAM    insulin aspart U-100  5 Units Subcutaneous TIDWM    insulin detemir U-100  15 Units Subcutaneous QHS    levothyroxine  25 mcg Oral Before breakfast    magnesium oxide  400 mg Oral BID    magnesium oxide  800 mg Oral Once    metoprolol succinate  25 mg Oral QHS    potassium chloride SA  40 mEq Oral BID    potassium chloride SA  60 mEq Oral Once    rosuvastatin  20 mg Oral QHS    sacubitril-valsartan  1 tablet Oral BID     PRN Meds:sodium chloride, acetaminophen, albuterol-ipratropium, dextrose 50%, dextrose 50%, glucagon (human recombinant), glucose, glucose, insulin aspart U-100, ondansetron, sodium chloride 0.9%    Review of patient's allergies indicates:   Allergen Reactions    Adhesive Other (See Comments)     blisters    Codeine Other (See Comments)     Blurred vision    Latex Hives    Ace inhibitors     Lipitor [atorvastatin] Other (See Comments)     Muscle spasms     Objective:     Vital Signs (Most Recent):  Temp: 97.7 °F (36.5 °C) (01/09/19 0730)  Pulse: 69 (01/09/19 0736)  Resp: 18 (01/09/19 0730)  BP: (!) 92/53 (01/09/19 0730)  SpO2: (!) 93 % (01/09/19 0730) Vital Signs (24h Range):  Temp:  [97.7 °F (36.5 °C)-98.7 °F (37.1 °C)] 97.7 °F (36.5 °C)  Pulse:  [61-80] 69  Resp:  [12-26] 18  SpO2:  [93 %-99 %] 93 %  BP: ()/(50-66) 92/53     Patient Vitals for the past 72 hrs (Last 3 readings):   Weight   01/08/19 1959 88.2 kg (194 lb 7.1 oz)   01/08/19 0433 88.4 kg (194 lb 14.4 oz)   01/07/19 0735 89.4 kg (197 lb)     Body mass index is 24.97  kg/m².      Intake/Output Summary (Last 24 hours) at 1/9/2019 1013  Last data filed at 1/9/2019 0512  Gross per 24 hour   Intake 2023.25 ml   Output 2950 ml   Net -926.75 ml       Hemodynamic Parameters:       Telemetry: BiV paced with underlying A fib    Physical Exam   Constitutional: He is oriented to person, place, and time. He appears well-developed and well-nourished.   HENT:   Head: Normocephalic and atraumatic.   Eyes: Conjunctivae and EOM are normal. Pupils are equal, round, and reactive to light.   Neck: Normal range of motion. Neck supple.   JVP ~ midneck   Cardiovascular: Normal rate.   Irregularly irregular   Pulmonary/Chest: Effort normal.   Abdominal: Soft. Bowel sounds are normal.   Musculoskeletal: Normal range of motion.   1+ HEATHER   Neurological: He is alert and oriented to person, place, and time.   Skin: Skin is warm and dry. Capillary refill takes 2 to 3 seconds.   Psychiatric: He has a normal mood and affect. His behavior is normal. Judgment and thought content normal.       Significant Labs:  CBC:  Recent Labs   Lab 01/07/19 0115 01/08/19 0616 01/09/19  0430   WBC 13.22* 16.67* 12.05   RBC 3.05* 2.88* 2.84*   HGB 8.7* 8.3* 8.0*   HCT 27.6* 25.6* 24.8*   * 110* 105*   MCV 91 89 87   MCH 28.5 28.8 28.2   MCHC 31.5* 32.4 32.3     BNP:  Recent Labs   Lab 01/07/19 0115   BNP 1,607*     CMP:  Recent Labs   Lab 01/07/19 0115 01/08/19  0616 01/09/19  0430   * 83 100   CALCIUM 8.6* 8.7 8.3*   ALBUMIN 2.6* 2.7* 2.4*   PROT 6.5 6.4 6.0    143 142   K 4.4 3.3* 3.2*   CO2 17* 23 26   * 110 107   BUN 35* 28* 22   CREATININE 1.2 1.2 1.2   ALKPHOS 146* 140* 138*   ALT 37 29 22   AST 37 27 24   BILITOT 1.0 0.9 0.9      Coagulation:   Recent Labs   Lab 01/07/19  0115   INR 1.5*   APTT 27.4     LDH:  No results for input(s): LDH in the last 72 hours.  Microbiology:  Microbiology Results (last 7 days)     Procedure Component Value Units Date/Time    Blood culture (site 2) [[id number]]  Collected:  01/07/19 0154    Order Status:  Completed Specimen:  Blood Updated:  01/09/19 0812     Blood Culture, Routine No Growth to date     Blood Culture, Routine No Growth to date     Blood Culture, Routine No Growth to date    Narrative:       Site # 2, aerobic only    Blood culture (site 1) [[id number]] Collected:  01/07/19 0154    Order Status:  Completed Specimen:  Blood Updated:  01/09/19 0812     Blood Culture, Routine No Growth to date     Blood Culture, Routine No Growth to date     Blood Culture, Routine No Growth to date    Narrative:       Site # 1, aerobic and anaerobic    IV catheter culture [[id number]]     Order Status:  No result Specimen:  Catheter Tip, NOS     Culture, Respiratory [[id number]]     Order Status:  No result Specimen:  Respiratory from Sputum, Expectorated           I have reviewed all pertinent labs within the past 24 hours.    Estimated Creatinine Clearance: 60.9 mL/min (based on SCr of 1.2 mg/dL).    Diagnostic Results:  I have reviewed all pertinent imaging results/findings within the past 24 hours.

## 2019-01-09 NOTE — PLAN OF CARE
ID follow up:  Patient not seen today.  In lab for laser device extraction and then recovery.  Awaiting ICU bed  Per brief Op Note and discussions with Dr. Boggs device and leads removed intact. Cultures sent.   Repeat blood cultures 1/7 NGTD, afebrile, no leukocytosis.   To be monitored in ICU overnight.   Continue IV ampicillin and ceftriaxone.   Will follow up tomorrow.     Thank you.   Please call for any questions or concerns.  COURT Muniz, ANP-C  [phone]

## 2019-01-09 NOTE — ANESTHESIA PROCEDURE NOTES
Arterial    Diagnosis: chronic systolic heart failure     Patient location during procedure: done in OR  Staffing  Anesthesiologist: DEUCE Zendejas MD  Performed: anesthesiologist   Anesthesiologist was present at the time of the procedure.  Preanesthetic Checklist  Completed: patient identified, site marked, surgical consent, pre-op evaluation, timeout performed, IV checked, risks and benefits discussed, monitors and equipment checked and anesthesia consent givenArterial  Skin Prep: chlorhexidine gluconate  Local Infiltration: none  Orientation: left  Location: radial  Catheter Size: 20 G  Catheter placement by Anatomical landmarks and Ultrasound guidance. Heme positive aspiration all ports.  Vessel Caliber: patent, compressibility normal  Needle advanced into vessel with real time Ultrasound guidance.Insertion Attempts: 2  Assessment  Dressing: secured with tape and tegaderm  Patient: Tolerated well

## 2019-01-09 NOTE — SUBJECTIVE & OBJECTIVE
Interval History:   POD#1 laser lead extraction.  In ICU overnight. Hypotensive episode last night with bradycardia. Dopamine added.  Sore throat from ET tube.  Nauseated, vomiting.  Not tolerating oral intake.   Afebrile.  Post-op leukocytosis, trending down   Surgical cultures pending    Review of Systems   Constitutional: Positive for appetite change and unexpected weight change. Negative for chills, diaphoresis, fatigue and fever.   HENT: Positive for hearing loss (very hard of hearing) and sore throat. Negative for congestion, dental problem, ear pain and tinnitus.    Eyes: Negative.    Respiratory: Positive for shortness of breath (chronic, at baseline). Negative for cough and wheezing.    Cardiovascular: Negative for chest pain, palpitations and leg swelling.   Gastrointestinal: Positive for nausea and vomiting. Negative for abdominal distention, abdominal pain, blood in stool, constipation, diarrhea and rectal pain.   Genitourinary: Negative for dysuria, flank pain, frequency, hematuria and urgency.   Musculoskeletal: Negative for arthralgias, back pain, myalgias and neck pain.   Skin: Negative for rash.   Allergic/Immunologic: Negative for immunocompromised state.   Neurological: Negative for dizziness, light-headedness, numbness and headaches.   Hematological: Negative for adenopathy. Does not bruise/bleed easily.   Psychiatric/Behavioral: Negative for confusion and sleep disturbance. The patient is not nervous/anxious.      Objective:     Vital Signs (Most Recent):  Temp: 97.7 °F (36.5 °C) (01/09/19 0730)  Pulse: 69 (01/09/19 0736)  Resp: 18 (01/09/19 0730)  BP: (!) 92/53 (01/09/19 0730)  SpO2: (!) 93 % (01/09/19 0730) Vital Signs (24h Range):  Temp:  [97.7 °F (36.5 °C)-98.7 °F (37.1 °C)] 97.7 °F (36.5 °C)  Pulse:  [61-80] 69  Resp:  [12-26] 18  SpO2:  [93 %-99 %] 93 %  BP: ()/(50-66) 92/53     Weight: 88.2 kg (194 lb 7.1 oz)  Body mass index is 24.97 kg/m².    Estimated Creatinine Clearance: 60.9  mL/min (based on SCr of 1.2 mg/dL).    Physical Exam   Constitutional: He is oriented to person, place, and time. He appears well-developed and well-nourished.   HENT:   Head: Normocephalic and atraumatic.   Eyes: Conjunctivae are normal. No scleral icterus.   Neck: Normal range of motion. Neck supple.   Cardiovascular: Normal rate. An irregularly irregular rhythm present.   No murmur heard.  Left upper chest ICD extraction site - dressing in place, c/d/i.    Pulmonary/Chest: Effort normal. No respiratory distress. He has no wheezes.   Crackles at bases     Abdominal: Soft. He exhibits no distension.   Musculoskeletal: He exhibits no edema.   Neurological: He is alert and oriented to person, place, and time.   Skin: Skin is warm and dry.   PICC RUE - c/d/i   Psychiatric: He has a normal mood and affect. His behavior is normal.   Vitals reviewed.      Significant Labs:   Blood Culture:   Recent Labs   Lab 01/07/19  0154   LABBLOO No Growth to date  No Growth to date  No Growth to date  No Growth to date  No Growth to date  No Growth to date     CBC:   Recent Labs   Lab 01/08/19  0616 01/09/19  0430   WBC 16.67* 12.05   HGB 8.3* 8.0*   HCT 25.6* 24.8*   * 105*     CMP:   Recent Labs   Lab 01/08/19  0616 01/09/19  0430    142   K 3.3* 3.2*    107   CO2 23 26   GLU 83 100   BUN 28* 22   CREATININE 1.2 1.2   CALCIUM 8.7 8.3*   PROT 6.4 6.0   ALBUMIN 2.7* 2.4*   BILITOT 0.9 0.9   ALKPHOS 140* 138*   AST 27 24   ALT 29 22   ANIONGAP 10 9   EGFRNONAA 58.4* 58.4*     Procalcitonin:   No results for input(s): PROCAL in the last 48 hours.    Significant Imaging: I have reviewed all pertinent imaging results/findings within the past 24 hours.

## 2019-01-09 NOTE — SUBJECTIVE & OBJECTIVE
Past Medical History:   Diagnosis Date    Chronic systolic congestive heart failure 3/22/2018    Coronary artery disease involving native coronary artery of native heart without angina pectoris 3/22/2018    ICD (implantable cardioverter-defibrillator) in place 3/22/2018    Ischemic cardiomyopathy 3/22/2018    Mixed hyperlipidemia 3/22/2018    Type 2 diabetes mellitus with complication 3/22/2018    VT (ventricular tachycardia) 3/22/2018       Past Surgical History:   Procedure Laterality Date    HEART CATH-RIGHT Right 4/5/2018    Performed by Elizabeth Wise MD at [facility] CATH LAB       Review of patient's allergies indicates:   Allergen Reactions    Adhesive Other (See Comments)     blisters    Codeine Other (See Comments)     Blurred vision    Latex Hives    Ace inhibitors     Lipitor [atorvastatin] Other (See Comments)     Muscle spasms       No current facility-administered medications on file prior to encounter.      Current Outpatient Medications on File Prior to Encounter   Medication Sig    bumetanide (BUMEX) 1 MG tablet Take 3 mg every morning and 2 mg every evening    clopidogrel (PLAVIX) 75 mg tablet Take 1 tablet (75 mg total) by mouth once daily.    digoxin (LANOXIN) 125 mcg tablet Take 0.5 tablets by mouth every morning.    ELIQUIS 2.5 mg Tab Take 1 tablet by mouth 2 (two) times daily.    LANTUS U-100 INSULIN 100 unit/mL injection Inject 50 Units into the skin 2 (two) times daily.    levothyroxine (SYNTHROID) 25 MCG tablet Take 1 tablet by mouth once daily.    metOLazone (ZAROXOLYN) 5 MG tablet Take one tablet by mouth now. Then only take as ordered per MD. (Patient taking differently: Take 5 mg by mouth as needed. Take one tablet by mouth now. Then only take as ordered per MD.)    metoprolol succinate (TOPROL-XL) 25 MG 24 hr tablet Take 1 tablet (25 mg total) by mouth every evening.    NOVOLOG U-100 INSULIN ASPART 100 unit/mL injection Inject 5 Units into the skin 4 (four) times  daily.    omega-3 acid ethyl esters (LOVAZA) 1 gram capsule Take 2 g by mouth 2 (two) times daily.    omeprazole (PRILOSEC) 40 MG capsule Take 40 mg by mouth once daily.    potassium chloride SA (KLOR-CON M20) 20 MEQ tablet Take 2-3 tablets by mouth daily as directed.  Take it when taking the bumetadine.    rosuvastatin (CRESTOR) 20 MG tablet Take 1 tablet (20 mg total) by mouth every evening.    sacubitril-valsartan (ENTRESTO) 24-26 mg per tablet Take 1 tablet by mouth 2 (two) times daily.    VENTOLIN HFA 90 mcg/actuation inhaler Inhale 2 puffs into the lungs every 4 (four) hours as needed.     loratadine (CLARITIN) 10 mg tablet Take 10 mg by mouth once daily.    multivitamin (THERAGRAN) per tablet Take 1 tablet by mouth once daily.    TRELEGY ELLIPTA 100-62.5-25 mcg DsDv once daily.      Family History     None        Tobacco Use    Smoking status: Former Smoker    Smokeless tobacco: Former User   Substance and Sexual Activity    Alcohol use: Yes     Comment: former drinker    Drug use: No    Sexual activity: Not on file     Review of Systems   All other systems reviewed and are negative.    Objective:     Vital Signs (Most Recent):  Temp: 97.7 °F (36.5 °C) (01/09/19 0730)  Pulse: 69 (01/09/19 0736)  Resp: 18 (01/09/19 0730)  BP: (!) 92/53 (01/09/19 0730)  SpO2: (!) 93 % (01/09/19 0730) Vital Signs (24h Range):  Temp:  [97.7 °F (36.5 °C)-98.7 °F (37.1 °C)] 97.7 °F (36.5 °C)  Pulse:  [61-80] 69  Resp:  [12-26] 18  SpO2:  [93 %-99 %] 93 %  BP: ()/(50-66) 92/53     Weight: 88.2 kg (194 lb 7.1 oz)  Body mass index is 24.97 kg/m².    SpO2: (!) 93 %  O2 Device (Oxygen Therapy): room air      Intake/Output Summary (Last 24 hours) at 1/9/2019 0922  Last data filed at 1/9/2019 0512  Gross per 24 hour   Intake 2363.25 ml   Output 2950 ml   Net -586.75 ml       Lines/Drains/Airways     Peripherally Inserted Central Catheter Line                 PICC Triple Lumen right brachial -- days                 Physical Exam   Constitutional: He is oriented to person, place, and time. No distress.   HENT:   Head: Atraumatic.   Mouth/Throat: No oropharyngeal exudate.   Eyes: EOM are normal. No scleral icterus.   Neck: Neck supple. No JVD present.   Cardiovascular: Normal rate. Exam reveals no gallop and no friction rub.   Murmur heard.  Irregularly irregular   Pulmonary/Chest: Effort normal and breath sounds normal. No respiratory distress. He has no wheezes. He has no rales. He exhibits no tenderness.   Abdominal: Soft. Bowel sounds are normal. He exhibits no distension. There is no tenderness.   Musculoskeletal: He exhibits no edema.   Neurological: He is alert and oriented to person, place, and time. No cranial nerve deficit.   Skin: Skin is warm and dry. No erythema.   Psychiatric: He has a normal mood and affect.       Significant Labs:   CMP   Recent Labs   Lab 01/08/19  0616 01/09/19  0430    142   K 3.3* 3.2*    107   CO2 23 26   GLU 83 100   BUN 28* 22   CREATININE 1.2 1.2   CALCIUM 8.7 8.3*   PROT 6.4 6.0   ALBUMIN 2.7* 2.4*   BILITOT 0.9 0.9   ALKPHOS 140* 138*   AST 27 24   ALT 29 22   ANIONGAP 10 9   ESTGFRAFRICA >60.0 >60.0   EGFRNONAA 58.4* 58.4*   , CBC   Recent Labs   Lab 01/08/19  0616 01/09/19  0430   WBC 16.67* 12.05   HGB 8.3* 8.0*   HCT 25.6* 24.8*   * 105*    and INR No results for input(s): INR, PROTIME in the last 48 hours.    Significant Imaging: Echocardiogram:   Transthoracic echo (TTE) complete (Cupid Only):   Results for orders placed or performed during the hospital encounter of 01/06/19   Transthoracic echo (TTE) complete (Cupid Only)   Result Value Ref Range    Ascending aorta 3.73 cm    STJ 2.75 cm    AV mean gradient 2.97 mmHg    Ao peak jermain 1.07 m/s    Ao VTI 18.79 cm    IVS 0.82 0.6 - 1.1 cm    LA size 4.89 cm    Left Atrium Major Axis 5.84 cm    Left Atrium Minor Axis 5.84 cm    LVIDD 7.24 (A) 3.5 - 6.0 cm    LVIDS 6.46 (A) 2.1 - 4.0 cm    LVOT diameter 2.11 cm     LVOT peak VTI 14.54 cm    PW 0.87 0.6 - 1.1 cm    MV Peak E Rod 1.24 m/s    PV Peak D Rod 0.93 m/s    PV Peak S Rod 0.28 m/s    RA Major Axis 5.76 cm    RA Width 5.30 cm    RVDD 4.76 cm    Sinus 3.69 cm    TAPSE 0.96 cm    TR Max Rod 3.51 m/s    TDI LATERAL 0.05     TDI SEPTAL 0.04     LA WIDTH 4.66 cm    LV Diastolic Volume 275.50 mL    LV Systolic Volume 212.87 mL    LV LATERAL E/E' RATIO 24.80     LV SEPTAL E/E' RATIO 31.00     FS 11 %    LA volume 113.12 cm3    LV mass 277.34 g    Left Ventricle Relative Wall Thickness 0.24 cm    AV valve area 2.70 cm2    AV index (prosthetic) 0.77     Mean e' 0.05     Pulm vein S/D ratio 0.30     LVOT area 3.49 cm2    LVOT stroke volume 50.82 cm3    AV peak gradient 4.58 mmHg    E/E' ratio 27.56     LV Systolic Volume Index 98.6 mL/m2    LV Diastolic Volume Index 127.59 mL/m2    LA Volume Index 52.4 mL/m2    LV Mass Index 128.4 g/m2    Triscuspid Valve Regurgitation Peak Gradient 49.28 mmHg    BSA 2.16 m2    Right Atrial Pressure (from IVC) 15 mmHg    TV rest pulmonary artery pressure 64 mmHg    and EKG: AP-

## 2019-01-09 NOTE — ASSESSMENT & PLAN NOTE
78 year old man with history of CHF, s/p ICD placement (> 20 years ago; Generator exchange 2008) and s/p cardioMems heart failure monitoring device placement 5/2018, Afib (on eliquis), DM, HTN, COPD, history of CVA who presented to [facility] with nausea, vomiting, diarrhea and fevers.  Found to be hypotensive and with TREY.  Required pressor support and was transferred to [facility] where blood cultures of 1/2/18  found to be positive for Enterococcus faecalis, and RICARDO showed vegetation on RV lead.  [facility] team reviewed films and confirmed presence of vegetation.   He was transferred to [facility] for further evaluation and possible device extraction.      No repeat blood cultures at [facility].  Repeat blood cultures of 1/7 on admission here NGTD      Source unclear - likely GI.   Denies any problems or history of problems with ICD pocket.   Reports episode of food poisoning a couple of months ago.   Patient reports a loss of appetite for approximately the past 3 -4 weeks and has lost approx 20 pounds during that time.   He denies any fevers, chills,sweats over the past few weeks until this past Monday when he was admitted.      Last colonoscopy was 2 years ago.  He has q 3 year surveillance colonoscopies as he has polyps.      Now POD#1 lead/device extraction. RICARDO pre-extraction showed 2 vegetations on RV lead.  No vegetations noted on valves. Post-extraction RICARDO  - no masses present in the right atrium or attached to the tricuspid valve following removal of the leads.     Hypotensive and bradycardic last night.    Dopamine started with good response. Very nauseated, vomiting - ? Related to fluid overload vs anesthesia?   Remains afebrile, post-op leukocytosis trending down.        Plan/recommendations:  1.  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 Will discuss changing to ampicillin extended infusion at some point,  though may be too much fluid.  Will discuss with Pharm D and primary team.  Anticipated duration of antibiotics - 6 weeks post extraction.  Estimated end date 2/20/19  2.  Will follow lead and post-op blood cultures.   3.  If urgently necessary from a cardiac standpoint, it is okay to proceed with implanting new device if repeat blood cultures taken post extraction remain negative X 72 hours.  Suspect known vegetations on RV leads, now removed, were primary source of infection, but unable to ascertain presence or clearance of infection of indwelling CardioMEMs device.  Understand from Heart Failure team that this cannot be removed? - only way to determine if this device was seeded would be to complete course of antibiotics, stop abx, and observe for recurrence of infection.  Please see Staff Attestation to follow.   4.  Recommend EGD and colonoscopy  5.  Will follow up tomorrow.     Patient seen by, and plan discussed with, ID staff

## 2019-01-09 NOTE — NURSING
md notified pt's bp running 90's/50-60's and sacubotriril-valsartan due. md johnson states hold med for now

## 2019-01-10 PROBLEM — R30.0 DYSURIA: Status: ACTIVE | Noted: 2019-01-01

## 2019-01-10 PROBLEM — R11.2 NAUSEA & VOMITING: Status: ACTIVE | Noted: 2019-01-01

## 2019-01-10 PROBLEM — I49.8 JUNCTIONAL RHYTHM: Status: ACTIVE | Noted: 2019-01-01

## 2019-01-10 PROBLEM — R00.1 JUNCTIONAL BRADYCARDIA: Status: ACTIVE | Noted: 2019-01-01

## 2019-01-10 PROBLEM — R13.10 DYSPHAGIA: Status: ACTIVE | Noted: 2019-01-01

## 2019-01-10 NOTE — SUBJECTIVE & OBJECTIVE
Interval History: Had bradycardia with junctional escape after procedure, nausea and hypotension overnight. With improvement in BP and HR with the addition of dopamine    Tele: Junctional rhythm with underlying AF, HR at 45, then 55    ROS  Objective:     Vital Signs (Most Recent):  Temp: 99 °F (37.2 °C) (01/10/19 1100)  Pulse: (!) 58 (01/10/19 1100)  Resp: (!) 29 (01/10/19 1100)  BP: (!) 97/51 (01/10/19 1100)  SpO2: 96 % (01/10/19 1100) Vital Signs (24h Range):  Temp:  [97.7 °F (36.5 °C)-99 °F (37.2 °C)] 99 °F (37.2 °C)  Pulse:  [42-59] 58  Resp:  [11-36] 29  SpO2:  [92 %-100 %] 96 %  BP: ()/(42-67) 97/51  Arterial Line BP: ()/(36-73) 120/45     Weight: 88.2 kg (194 lb 7.1 oz)  Body mass index is 24.97 kg/m².     SpO2: 96 %  O2 Device (Oxygen Therapy): nasal cannula    Physical Exam   Constitutional: He is oriented to person, place, and time. He appears well-developed and well-nourished. No distress.   HENT:   Head: Normocephalic and atraumatic.   Mouth/Throat: Oropharynx is clear and moist.   Eyes: Conjunctivae and EOM are normal. Pupils are equal, round, and reactive to light. No scleral icterus.   Neck: Normal range of motion. Neck supple. No JVD present.   Cardiovascular: Normal rate, normal heart sounds and intact distal pulses. An irregularly irregular rhythm present.   No murmur heard.  Pulses:       Radial pulses are 2+ on the right side, and 2+ on the left side.   Pulmonary/Chest: Effort normal and breath sounds normal. No respiratory distress.   Symmetrical expansion  Left chest incision is clean   Abdominal: Soft. Bowel sounds are normal. There is no hepatosplenomegaly. There is no tenderness.   Musculoskeletal: Normal range of motion. He exhibits edema (trace).   Neurological: He is alert and oriented to person, place, and time. No cranial nerve deficit.   Skin: Skin is warm and dry. No rash noted. He is not diaphoretic.   Well perfused in upper and lower extremities with good capillary  refill   Psychiatric: He has a normal mood and affect. Judgment and thought content normal.       Significant Labs:   EP:   Recent Labs   Lab 01/09/19  0430 01/09/19  1628 01/09/19  1634 01/10/19  0348     --  142 141   K 3.2*  --  3.8 4.3     --  105 104   CO2 26  --  24 22*     --  209* 230*   BUN 22  --  24* 31*   CREATININE 1.2  --  1.4 1.7*   CALCIUM 8.3*  --  9.5 8.9   PROT 6.0  --   --  6.3   ALBUMIN 2.4*  --   --  2.5*   BILITOT 0.9  --   --  0.8   ALKPHOS 138*  --   --  130   AST 24  --   --  19   ALT 22  --   --  23   ANIONGAP 9  --  13 15   ESTGFRAFRICA >60.0  --  56.0* 44.3*   EGFRNONAA 58.4*  --  48.5* 38.3*   WBC 12.05 23.91*  --  20.54*   HGB 8.0* 9.4*  --  9.4*   HCT 24.8* 29.6*  --  30.6*   * 125*  --  110*       Significant Imaging:   CXR: worsening pulmonary edema, no change in cardiac silhouette     Limited echo: no effusion    EKG: Junctional escape with low limb voltage

## 2019-01-10 NOTE — PROGRESS NOTES
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 With improvement in BP and HR with the addition of dopamine    Tele: Junctional rhythm with underlying AF, HR at 45, then 55    ROS  Objective:     Vital Signs (Most Recent):  Temp: 99 °F (37.2 °C) (01/10/19 1100)  Pulse: (!) 58 (01/10/19 1100)  Resp: (!) 29 (01/10/19 1100)  BP: (!) 97/51 (01/10/19 1100)  SpO2: 96 % (01/10/19 1100) Vital Signs (24h Range):  Temp:  [97.7 °F (36.5 °C)-99 °F (37.2 °C)] 99 °F (37.2 °C)  Pulse:  [42-59] 58  Resp:  [11-36] 29  SpO2:  [92 %-100 %] 96 %  BP: ()/(42-67) 97/51  Arterial Line BP: ()/(36-73) 120/45     Weight: 88.2 kg (194 lb 7.1 oz)  Body mass index is 24.97 kg/m².     SpO2: 96 %  O2 Device (Oxygen Therapy): nasal cannula    Physical Exam   Constitutional: He is oriented to person, place, and time. He appears well-developed and well-nourished. No distress.   HENT:   Head: Normocephalic and atraumatic.   Mouth/Throat: Oropharynx is clear and moist.   Eyes: Conjunctivae and EOM are normal. Pupils are equal, round, and reactive to light. No scleral icterus.   Neck: Normal range of motion. Neck supple. No JVD present.   Cardiovascular: Normal rate, normal heart sounds and intact distal pulses. An irregularly irregular rhythm present.   No murmur heard.  Pulses:       Radial pulses are 2+ on the right side, and 2+ on the left side.   Pulmonary/Chest: Effort normal and breath sounds normal. No respiratory distress.   Symmetrical expansion  Left chest incision is clean   Abdominal: Soft. Bowel sounds are normal. There is no hepatosplenomegaly. There is no  tenderness.   Musculoskeletal: Normal range of motion. He exhibits edema (trace).   Neurological: He is alert and oriented to person, place, and time. No cranial nerve deficit.   Skin: Skin is warm and dry. No rash noted. He is not diaphoretic.   Well perfused in upper and lower extremities with good capillary refill   Psychiatric: He has a normal mood and affect. Judgment and thought content normal.       Significant Labs:   EP:   Recent Labs   Lab 01/09/19  0430 01/09/19  1628 01/09/19  1634 01/10/19  0348     --  142 141   K 3.2*  --  3.8 4.3     --  105 104   CO2 26  --  24 22*     --  209* 230*   BUN 22  --  24* 31*   CREATININE 1.2  --  1.4 1.7*   CALCIUM 8.3*  --  9.5 8.9   PROT 6.0  --   --  6.3   ALBUMIN 2.4*  --   --  2.5*   BILITOT 0.9  --   --  0.8   ALKPHOS 138*  --   --  130   AST 24  --   --  19   ALT 22  --   --  23   ANIONGAP 9  --  13 15   ESTGFRAFRICA >60.0  --  56.0* 44.3*   EGFRNONAA 58.4*  --  48.5* 38.3*   WBC 12.05 23.91*  --  20.54*   HGB 8.0* 9.4*  --  9.4*   HCT 24.8* 29.6*  --  30.6*   * 125*  --  110*       Significant Imaging:   CXR: worsening pulmonary edema, no change in cardiac silhouette     Limited echo: no effusion    EKG: Junctional escape with low limb voltage    Assessment and Plan:     * 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 Likewise has a CardioMeMMS device in place. Is not pacemaker dependent. ID believes the primary source is the vegetation. He is s/p extraction on 01/09, recovery complicated by slow junctional escape rhythm responding to dopamine.    - lead cultures obtained, ID to follow  - agree with dopamine, wean as tolerated  - if junctional rhythm  persists, will consider RICARDO/cardioversion on Monday as NSR may conduct and keep him from being chronotrope-dependent  - ok to restart anticoagulation on 01/13  - volume/HF management as per primary team         Oniel George MD  Cardiac Electrophysiology  [facility]

## 2019-01-10 NOTE — ASSESSMENT & PLAN NOTE
- Transferred to ICU yesterday after ICD explant 2/2 junctional rhythm, which he remains in. Required Atropine X 1 overnight for HR 40's  - Continue Dopamine started overnight for drop in HR to 40's with hypotension  - Toprol and Digoxin D/C'd yesterday

## 2019-01-10 NOTE — PROCEDURES
PROCEDURE SUMMARY - Central Venous Catheter Placement     PROCEDURE:  Right-sided 7 Maori Triple-lumen IJ central venous catheter placement under ultrasound guidance    CONSENT:  The risks and benefits of this procedure were explained to the patient. All questions were answered and alternative options explained.     STAFF PHYSICIAN:  FCO HERNÁNDEZ    ASSISTING PHYSICIAN:  Denis Eldridge    MEDICATIONS USED:  Lidocaine 1% without epinephrine, total quantity 3 mL.    PREOPERATIVE DIAGNOSIS(ES):  Bacteremia    POSTPROCEDURE DIAGNOSIS(ES):  Bacteremia    DESCRIPTION OF PROCEDURE:  Consent was obtained and placed upon the chart. Patient was positively identified and procedure site was marked. Time-out was performed by operating team and patient. Vital sign monitoring was accomplished by noninvasive hemodynamic monitoring, pulse oximetry, and telemetry.     Time-out was performed and consent was verified. The patient was placed in the supine position and the Right neck was prepped and draped in sterile fashion. The internal jugular vein was localized with the ultrasound and the skin was anesthetized with 1% lidocaine without epinephrine. A micropuncture needle was placed into the interal jugular vein with return of dark red non-pulsatile blood on the 1st attempt. A micropuncture wire was then advanced. A micropuncture dilator sheath was placed over the wire and then the wire was removed. A J-wire was advanced through the micropuncture sheath without difficulty. A small incision was made followed by dilation and a 7 Maori Triple-lumen central venous catheter to a depth of 15 cm. The catheter was sutured in place with 3-0 silk sutures x2. All 3 ports were withdrawn and flushed without difficulty. A sterile dressing was applied and procedure was terminated. Post-procedure chest x-ray is pending.     Denis Eldridge  1/10/2019  5:34 PM

## 2019-01-10 NOTE — ANESTHESIA POSTPROCEDURE EVALUATION
"Anesthesia Post Evaluation    Patient: Jerry Solis    Procedure(s) Performed: Procedure(s) (LRB):  EXTRACTION, ELECTRODE LEAD (Left)  Insertion, Pacemaker, Temporary Transvenous  ECHOCARDIOGRAM,TRANSESOPHAGEAL (N/A)    Final Anesthesia Type: general  Patient location during evaluation: PACU  Patient participation: Yes- Able to Participate  Level of consciousness: awake and alert and oriented  Post-procedure vital signs: reviewed and stable  Pain management: adequate  Airway patency: patent  PONV status at discharge: No PONV  Anesthetic complications: no      Cardiovascular status: blood pressure returned to baseline, hemodynamically stable and stable  Respiratory status: unassisted, room air and spontaneous ventilation  Hydration status: euvolemic  Follow-up not needed.        Visit Vitals  BP (!) 102/56 (BP Location: Left arm, Patient Position: Lying)   Pulse (!) 56   Temp 36.5 °C (97.7 °F) (Oral)   Resp 20   Ht 6' 2" (1.88 m)   Wt 88.2 kg (194 lb 7.1 oz)   SpO2 95%   BMI 24.97 kg/m²       Pain/Danny Score: Pain Rating Prior to Med Admin: 3 (1/9/2019  9:18 PM)  Pain Rating Post Med Admin: 1 (1/9/2019 10:18 PM)        "

## 2019-01-10 NOTE — PLAN OF CARE
Problem: Adult Inpatient Plan of Care  Goal: Plan of Care Review  Outcome: Ongoing (interventions implemented as appropriate)    No acute events throughout day. See vital signs and assessments in flowsheets. See below for updates on today's progress.     Pulmonary: intubated for procedure, now extubated at 2L NC with SpO2 maintaining 98%    Cardiovascular: pt post EP procedure from PPM removal; pt appears in junctional rhythm with HR slowing to 46 - EP and HTS fellow both aware of HR; BP stable after Levo gtt weaned off; SBP >90 (ok per nursing communication)    Neurological: confused after procedure, but seems to be improving back to baseline; following all commands and answering questions appropriately; afebrile; moves all extremities equally    Gastrointestinal: clear liquid diet ordered - held r/t confusion; no BM    Genitourinary: gtz in place with Lasix gtt infusing    Endocrine: insulin held r/t NPO during procedure    Integumentary/Other: pale; bruised; femstop applied to bilateral groin sites; no signs of hematoma    Infusions: Lasix gtt infusing    Patient progressing towards goals as tolerated, plan of care communicated and reviewed with Jerry Solis and family. All concerns addressed. Will continue to monitor.

## 2019-01-10 NOTE — PROGRESS NOTES
Update:  SW met with pt and wife Mague in pt's room in order to provide continuity of care. Pt appeared alert but sleepy, with hand shakes multiple times during pt's visit. Wife reports that pt shakes both hands often and that this is baseline. Per bedside RN, pt NPO due to n/v and will be placed on IV Lasix to assist with possible fluid overload that could be the cause of the n/v. Pt's wife reports that their adult children just left pt's room after a visit. Per wife, she and pt coping adequately and denied any needs at this time. Wife planning to stay in room with pt as they live in [place], [place]. SW remains available for continued psychosocial support, education, resources, and additional d/c planning as needed.

## 2019-01-10 NOTE — NURSING
Notified FARIDEH Lora that pt was dry heaving and pt already received Zofran. NP ordered promethazine.

## 2019-01-10 NOTE — ASSESSMENT & PLAN NOTE
-ICM  -Last 2D Echo 1/7/19: LVEF 20%, LVEDD 7.24 cm  -Lasix gtt transitioned to IVP overnight 2/2 hypotension, TREY, but CXR shows increased pulmonary edema. He also is having N/V. Will change back to gtt at 10 mg/hr  -GDMT: Dig and Toprol D/C'd 2/2 junctional rhythm. Entresto D/C'd 2/2 transient hypotension overnight and TREY  -Self declined for LVAD after w/u last April 2018

## 2019-01-10 NOTE — PT/OT/SLP PROGRESS
Speech Language Pathology    Jerry Solis  MRN: [id number]    SLP Swallow Evaluation orders received.  SLP attempted to see Patient for assessment; however, informed Patient NPO for testing. Patient not seen today secondary to Nursing hold (NPO.) Will follow-up to re-attempt 1/11/19 when cleared for PO trials. Thank you.    JOVANI Mancilla., [facility]-SLP  Speech-Language Pathology  Pager: [phone]  1/10/2019

## 2019-01-10 NOTE — ASSESSMENT & PLAN NOTE
-Vegetation seen on RICARDO  -Appreciate EP's help. 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 Lead tip and ICD pocket cultured with NGTD  -Continue Ampicillin and CTX per ID recs  -Blood cultures are negative here thus far.  -WCC has climbed to 20.54 post op - blood cultures repeated this morning  -Continue holding Apixaban for now. Will likely resume tomorrow if no bleeding from ICD pocket  -Will need Life Vest prior to discharge  -Appreciate GI's help. Plan for outpatient EGD and C-scope for eval of anemia and Enterococcus bacteremia

## 2019-01-10 NOTE — PLAN OF CARE
Problem: Adult Inpatient Plan of Care  Goal: Plan of Care Review  Outcome: Ongoing (interventions implemented as appropriate)  See vital signs and assessments in flowsheets.   See below for updates on progress this shift.     Pulmonary: 02 NC 2L, no SOB reported     Cardiovascular: HR 40's with wide QRS, Dopamine started and HR increased to 50's. Titrating Dopamine to maintain SBP > 90     Neurological: AAO x 3, confused at times, hard of hearing, general weakness     Gastrointestinal: Clear Liquid Diet, intermittent nausea this shift, zofran given x 1     Genitourinary: Voiding per gtz catheter, UO low, HTS team aware, output improving the last few hours     Endocrine: Accuchecks AC & HS, meal dose insulin and nighttime lantus ordered     Integumentary/Other: skin cool, dry and pale with some bruising. Bilateral groin sites intact with no oozing, hematoma, or bruising.      Infusions: Dopamine @ 5 mcg/kg/min, Lasix gtt off     Patient progressing towards goals as tolerated, plan of care communicated and reviewed with Jerry Solis and family. All concerns addressed and questions answered.  .

## 2019-01-10 NOTE — EICU
Dr. Eldridge placed Right Intrajugular Triple Lumen Catheter without complication. Xray ordered and LDA placed. Line sutured into place.

## 2019-01-10 NOTE — PROGRESS NOTES
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 Events overnight noted. HR currently in the 50's (still junctional), and no further hypotension since starting Dopamine 5 mcg/kg/min. Lasix was changed to IVP overnight, and CXR shows more pulmonary edema. Having a lot of N/V as well as dysphagia, and also complains of dysuria with Morin cath in. Some pain at ICD extraction site.     Continuous Infusions:   DOPamine 5 mcg/kg/min (01/10/19 0800)    epinephrine infusion Stopped (01/09/19 1731)     Scheduled Meds:   acetaminophen  1,000 mg Oral TID    ampicillin IVPB  2 g Intravenous Q4H    cefTRIAXone (ROCEPHIN) IVPB  2 g Intravenous Q12H    clopidogrel  75 mg Oral Daily    furosemide  80 mg Intravenous BID    insulin aspart U-100  5 Units Subcutaneous TIDWM    insulin detemir U-100  15 Units Subcutaneous QHS    levothyroxine  25 mcg Oral Before breakfast    magnesium oxide  800 mg Oral Once    magnesium oxide  800 mg Oral BID    potassium chloride SA  40 mEq Oral BID    potassium chloride SA  60 mEq Oral Once    rosuvastatin  20 mg Oral QHS     PRN Meds:sodium chloride, albuterol-ipratropium, bupivacaine (PF) 0.25% (2.5 mg/ml), dextrose 50%, dextrose 50%, glucagon (human recombinant), glucose, glucose, insulin aspart U-100, lidocaine HCL 20 mg/ml (2%), lidocaine HCL 20 mg/ml (2%), magnesium sulfate IVPB, magnesium sulfate IVPB, ondansetron, sodium chloride 0.9%, traMADol, visipaque 320 iodixanol    Review of patient's allergies indicates:   Allergen Reactions    Adhesive Other (See  Comments)     blisters    Codeine Other (See Comments)     Blurred vision    Latex Hives    Ace inhibitors     Lipitor [atorvastatin] Other (See Comments)     Muscle spasms     Objective:     Vital Signs (Most Recent):  Temp: 97.7 °F (36.5 °C) (01/10/19 0301)  Pulse: (!) 56 (01/10/19 0800)  Resp: (!) 23 (01/10/19 0800)  BP: (!) 89/57 (01/10/19 0845)  SpO2: 95 % (01/10/19 0800) Vital Signs (24h Range):  Temp:  [97.7 °F (36.5 °C)-98.6 °F (37 °C)] 97.7 °F (36.5 °C)  Pulse:  [42-59] 56  Resp:  [11-36] 23  SpO2:  [92 %-100 %] 95 %  BP: ()/(42-57) 89/57  Arterial Line BP: ()/(36-62) 114/48     Patient Vitals for the past 72 hrs (Last 3 readings):   Weight   01/10/19 0601 88.2 kg (194 lb 7.1 oz)   01/08/19 1959 88.2 kg (194 lb 7.1 oz)   01/08/19 0433 88.4 kg (194 lb 14.4 oz)     Body mass index is 24.97 kg/m².      Intake/Output Summary (Last 24 hours) at 1/10/2019 1025  Last data filed at 1/10/2019 0800  Gross per 24 hour   Intake 2789.26 ml   Output 1291 ml   Net 1498.26 ml       Hemodynamic Parameters:       Telemetry: Junctional rhythm    Physical Exam   Constitutional: He is oriented to person, place, and time. He appears well-developed and well-nourished.   HENT:   Head: Normocephalic and atraumatic.   Eyes: Conjunctivae and EOM are normal.   Neck: Normal range of motion. Neck supple. No JVD present. No thyromegaly present.   Cardiovascular: Regular rhythm.   Bradycardic   Pulmonary/Chest: Effort normal and breath sounds normal.   Abdominal: Soft.   Hypoactive bowel sounds   Musculoskeletal: Normal range of motion.   1+ HEATHER   Neurological: He is alert and oriented to person, place, and time.   Skin: Skin is warm and dry. Capillary refill takes 2 to 3 seconds.   Psychiatric: He has a normal mood and affect. His behavior is normal. Judgment and thought content normal.       Significant Labs:  CBC:  Recent Labs   Lab 01/09/19  0430 01/09/19  1628 01/10/19  0348   WBC 12.05 23.91* 20.54*   RBC 2.84* 3.29*  3.35*   HGB 8.0* 9.4* 9.4*   HCT 24.8* 29.6* 30.6*   * 125* 110*   MCV 87 90 91   MCH 28.2 28.6 28.1   MCHC 32.3 31.8* 30.7*     BNP:  Recent Labs   Lab 01/07/19  0115   BNP 1,607*     CMP:  Recent Labs   Lab 01/08/19  0616 01/09/19  0430 01/09/19  1634 01/10/19  0348   GLU 83 100 209* 230*   CALCIUM 8.7 8.3* 9.5 8.9   ALBUMIN 2.7* 2.4*  --  2.5*   PROT 6.4 6.0  --  6.3    142 142 141   K 3.3* 3.2* 3.8 4.3   CO2 23 26 24 22*    107 105 104   BUN 28* 22 24* 31*   CREATININE 1.2 1.2 1.4 1.7*   ALKPHOS 140* 138*  --  130   ALT 29 22  --  23   AST 27 24  --  19   BILITOT 0.9 0.9  --  0.8      Coagulation:   Recent Labs   Lab 01/07/19  0115   INR 1.5*   APTT 27.4     LDH:  No results for input(s): LDH in the last 72 hours.  Microbiology:  Microbiology Results (last 7 days)     Procedure Component Value Units Date/Time    Aerobic culture [[id number]] Collected:  01/09/19 1322    Order Status:  Completed Specimen:  Other from Chest, Left Updated:  01/10/19 0843     Aerobic Bacterial Culture No growth    Narrative:       #3 LV lead tip    Aerobic culture [[id number]] Collected:  01/09/19 1124    Order Status:  Completed Specimen:  Tissue from Chest, Left Updated:  01/10/19 0843     Aerobic Bacterial Culture No growth    Narrative:       ICD pocket tissue    Aerobic culture [[id number]] Collected:  01/09/19 1314    Order Status:  Completed Specimen:  Other from Chest, Left Updated:  01/10/19 0843     Aerobic Bacterial Culture No growth    Narrative:       #2 RA Lead tip    Aerobic culture [[id number]] Collected:  01/09/19 1402    Order Status:  Completed Specimen:  Other from Chest, Left Updated:  01/10/19 0843     Aerobic Bacterial Culture No growth    Narrative:       #4 RV lead tip    Blood culture (site 2) [[id number]] Collected:  01/07/19 0154    Order Status:  Completed Specimen:  Blood Updated:  01/10/19 0812     Blood Culture, Routine No Growth to date     Blood Culture, Routine No Growth to date      Blood Culture, Routine No Growth to date     Blood Culture, Routine No Growth to date    Narrative:       Site # 2, aerobic only    Blood culture (site 1) [[id number]] Collected:  01/07/19 0154    Order Status:  Completed Specimen:  Blood Updated:  01/10/19 0812     Blood Culture, Routine No Growth to date     Blood Culture, Routine No Growth to date     Blood Culture, Routine No Growth to date     Blood Culture, Routine No Growth to date    Narrative:       Site # 1, aerobic and anaerobic    Culture, Anaerobe [[id number]] Collected:  01/09/19 1124    Order Status:  Completed Specimen:  Tissue from Chest, Left Updated:  01/10/19 0739     Anaerobic Culture Culture in progress    Narrative:       ICD pocket tissue    Culture, Anaerobe [[id number]] Collected:  01/09/19 1402    Order Status:  Completed Specimen:  Other from Chest, Left Updated:  01/10/19 0739     Anaerobic Culture Culture in progress    Narrative:       #4 RV lead tip    Culture, Anaerobe [[id number]] Collected:  01/09/19 1314    Order Status:  Completed Specimen:  Other from Chest, Left Updated:  01/10/19 0739     Anaerobic Culture Culture in progress    Narrative:       #2 RA Lead tip    Culture, Anaerobe [[id number]] Collected:  01/09/19 1322    Order Status:  Completed Specimen:  Other from Chest, Left Updated:  01/10/19 0739     Anaerobic Culture Culture in progress    Narrative:       #3 LV lead tip    Blood culture [[id number]]     Order Status:  No result Specimen:  Blood     Blood culture [[id number]]     Order Status:  No result Specimen:  Blood     Blood culture [[id number]] Collected:  01/09/19 1657    Order Status:  Completed Specimen:  Blood from Peripheral, Hand, Right Updated:  01/10/19 0115     Blood Culture, Routine No Growth to date    IV catheter culture [[id number]]     Order Status:  No result Specimen:  Catheter Tip, NOS     Culture, Respiratory [[id number]]     Order Status:  No result Specimen:  Respiratory from Sputum, Expectorated            I have reviewed all pertinent labs within the past 24 hours.    Estimated Creatinine Clearance: 43 mL/min (A) (based on SCr of 1.7 mg/dL (H)).    Diagnostic Results:  I have reviewed all pertinent imaging results/findings within the past 24 hours.    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 Lead tip and ICD pocket cultured with NGTD  -Continue Ampicillin and CTX per ID recs  -Blood cultures are negative here thus far.  -WCC has climbed to 20.54 post op - blood cultures repeated this morning  -Continue holding Apixaban for now. Will likely resume tomorrow if no bleeding from ICD pocket  -Will need Life Vest prior to discharge  -Appreciate GI's help. Plan for outpatient EGD and C-scope for eval of anemia and Enterococcus bacteremia       Acute on chronic combined systolic and diastolic CHF, NYHA class 3    -ICM  -Last 2D Echo 1/7/19: LVEF 20%, LVEDD 7.24 cm  -Lasix gtt transitioned to IVP overnight 2/2 hypotension, TREY, but CXR shows increased pulmonary edema. He also is having N/V. Will change back to gtt at 10 mg/hr  -GDMT: Dig and Toprol D/C'd 2/2 junctional rhythm. Entresto D/C'd 2/2 transient hypotension overnight and TREY  -Self declined for LVAD after w/u last April 2018       Atrial fibrillation    -Currently in junctional rhythm  -CHADS VASc 6  -Holding eliquis s/p ICD explant yesterday. Will likely resume tomorrow if no bleeding at ICD pocket  -Cardiac monitoring     Type 2 diabetes mellitus with complication    -A1c 8.1  -Target -180 while hospitalized  -detemir 15, aspart 5 with sliding scale     Dysuria    - UA with reflex culture  - D/C Morin       Dysphagia    - Consult SLP     Nausea & vomiting    - Likely 2/2 volume overload +/- side effects from anesthesia. Change IVP Lasix back to gtt  - Antiemetics  - NPO  for now     Junctional rhythm    - Transferred to ICU yesterday after ICD explant 2/2 junctional rhythm, which he remains in. Required Atropine X 1 overnight for HR 40's  - Continue Dopamine started overnight for drop in HR to 40's with hypotension  - Toprol and Digoxin D/C'd yesterday     Endocarditis    - See ICD lead infection     Bacteremia    -see above     TREY (acute kidney injury)    - Creatinine has bumped to 1.7 likely 2/2 transient hypotension  - CVP 8-10. CXR with increased pulmonary edema. Change IVP Lasix back to gtt at 10 mg/hr  - Hold Entresto for now       Uninterrupted Critical Care/Counseling Time (not including procedures): [place] [id number]  Heart Transplant  [facility]

## 2019-01-10 NOTE — ASSESSMENT & PLAN NOTE
This is a 79yo gentleman with long-standing history of ICM with EF 35%, a 20-year history of ICD with upgrade to CRT-D in 2014 who presents for device extraction due to RV lead vegetation ~0.5cm in the setting of VRE bacteremia with a likely initial GI source. Likewise has a CardioMeMMS device in place. Is not pacemaker dependent. ID believes the primary source is the vegetation. He is s/p extraction on 01/09, recovery complicated by slow junctional escape rhythm responding to dopamine.    - lead cultures obtained, ID to follow  - agree with dopamine, wean as tolerated  - if junctional rhythm persists, will consider RICARDO/cardioversion on Monday as NSR may conduct and keep him from being chronotrope-dependent  - ok to restart anticoagulation on 01/13  - volume/HF management as per primary team

## 2019-01-10 NOTE — PLAN OF CARE
Problem: Adult Inpatient Plan of Care  Goal: Plan of Care Review  No acute events throughout day, VS and assessment per flow sheet, patient progressing towards goals as tolerated, plan of care reviewed with Jerry Solis and family, all concerns addressed, will continue to monitor.

## 2019-01-10 NOTE — ASSESSMENT & PLAN NOTE
- Likely 2/2 volume overload +/- side effects from anesthesia. Change IVP Lasix back to gtt  - Antiemetics  - NPO for now

## 2019-01-10 NOTE — ASSESSMENT & PLAN NOTE
- Creatinine has bumped to 1.7 likely 2/2 transient hypotension  - CVP 8-10. CXR with increased pulmonary edema. Change IVP Lasix back to gtt at 10 mg/hr  - Hold Entresto for now

## 2019-01-10 NOTE — SIGNIFICANT EVENT
Delayed entry from ~00:00, 1/10/19    Notified by nursing staff regarding HR in low 40s with associated BP drop to 78/40 mmHg via arterial line. Patient answering questions appropriately, no complaints offered. Administered atropine 0.5 mg x1 followed by initiation of dopamine infusion as discussed with EP fellow at shift change. Patient's HR improved to high 50s and BP up to 99/39 mmHg by arterial line. Will continue to monitor, may need transcutaneous pacing.    Of note UOP ~ 30 cc/hr for past hour (albeit improved from earlier per nursing). Will obtain CVP and SVO2.     Sherry Davidson MD   Cardiology Fellow, PGY-4      Discussed with Dr Nails.      Addendum:    UOP remains around ~20-30 cc/hr.     CVP 8; SVO2 71; CO 8.3; CI 3.9; . BPs stable.    Cr trended up to 1.7 from 1.2 yesterday on AM labs.     Suspect patient has developed prerenal TREY +/- ATN from hypotensive episode earlier this evening.     Will discontinue Lasix gtt at 10/hr and transition to Lasix 80 IV BID.     Continue to monitor.    Sherry Davidson MD   Cardiology Fellow, PGY-4      Discussed with Dr Nails.

## 2019-01-10 NOTE — PROGRESS NOTES
Notified FARIDEH Lora and MD Nails of pt with confusion post procedure/sedation and irregular pupils. MD Nails at bedside. Pt able to move all extremities equally and follows commands. No need for intervention. TAMARA.

## 2019-01-10 NOTE — ASSESSMENT & PLAN NOTE
-Currently in junctional rhythm  -CHADS VASc 6  -Holding eliquis s/p ICD explant yesterday. Will likely resume tomorrow if no bleeding at ICD pocket  -Cardiac monitoring

## 2019-01-10 NOTE — PROGRESS NOTES
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  Found to be hypotensive and with TREY.  Required pressor support and was transferred to [facility] where blood cultures of 1/2/18  found to be positive for Enterococcus faecalis, and RICARDO showed vegetation on RV lead.  [facility] team reviewed films and confirmed presence of vegetation.   He was transferred to [facility] for further evaluation and possible device extraction.      No repeat blood cultures at [facility].  Repeat blood cultures of 1/7 on admission here NGTD      Source unclear - likely GI.   Denies any problems or history of problems with ICD pocket.   Reports episode of food poisoning a couple of months ago.   Patient reports a loss of appetite for approximately the past 3 -4 weeks and has lost approx 20 pounds during that time.   He denies any fevers, chills,sweats over the past few weeks until this past Monday when he was admitted.      Last colonoscopy was 2 years ago.  He has q 3 year surveillance colonoscopies as he has polyps.      Now POD#1 lead/device extraction. RICARDO pre-extraction showed 2 vegetations on RV lead.  No vegetations noted on valves. Post-extraction RICARDO  - no masses present in the right atrium or attached to the tricuspid valve following removal of the leads.     Hypotensive and bradycardic last night.    Dopamine started with  good response. Very nauseated, vomiting - ? Related to fluid overload vs anesthesia?   Remains afebrile, post-op leukocytosis trending down.        Plan/recommendations:  1.  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 Will discuss changing to ampicillin extended infusion at some point,  though may be too much fluid. Will discuss with Pharm D and primary team.  Anticipated duration of antibiotics - 6 weeks post extraction.  Estimated end date 2/20/19  2.  Will follow lead and post-op blood cultures.   3.  If urgently necessary from a cardiac standpoint, it is okay to proceed with implanting new device if repeat blood cultures taken post extraction remain negative X 72 hours.  Suspect known vegetations on RV leads, now removed, were primary source of infection, but unable to ascertain presence or clearance of infection of indwelling CardioMEMs device.  Understand from Heart Failure team that this cannot be removed? - only way to determine if this device was seeded would be to complete course of antibiotics, stop abx, and observe for recurrence of infection.  Please see Staff Attestation to follow.   4.  Recommend EGD and colonoscopy  5.  Will follow up tomorrow.     Patient seen by, and plan discussed with, ID staff             Thank you.   Please call for any questions or concerns.  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 Jerry Solis is a 78 year old gentleman with history of CHF, s/p ICD placement and s/p cardioMems heart failure monitoring device , Afib, DM, HTN, COPD, history of CVA who presented to [facility] with nausea, vomiting, diarrhea and fevers.  Found to be hypotensive and with TREY.  Required pressor support and was transferred to [facility] where blood cultures found to be Enterococcus faecalis, and RICARDO showed possible  vegetation on RV lead.  He was transferred to [facility] for further evaluation and possible device extraction.   He is currently on IV vancomycin and cefepime.      Reports that he had an episode of what he thought was food poisoning a couple of months ago when he was visiting his brother in [place].  Patient reports a loss of appetite for approximately the past 3 -4 weeks and has lost approx 20 pounds during that time.  He did not seek medical attention because he was working.   He denies any fevers, chills until this past Monday when he was admitted.  He denies current fevers, chills, sweats.  He has chronic SOB that is not worse from baseline.  Denies current n/v/d.  Denies melena, hematachezia.  No urinary complaints. Denies any pain, redness at ICD pocket site.      ICD/pacer placed >10 years ago.  Had ? Generator changed in 2008.  Denies any problems.      Last colonoscopy was 2 years ago.  He has q 3 year surveillance colonoscopies as he has polyps.      Blood culture results from [facility] of 1/2/19 - E. Faecalis - PCN, vancomycin sensitive.  PICC was placed 1/3/19.  Unclear when blood cultures cleared as blood cultures were not repeated.   Repeat blood cultures taken here on admission 1/7.      He is afebrile, WBC 13.2, platelets 128, H/H 8.7/27.6, ESR 54, CRP 27.9, procalcitonin wnl.            Interval History:   POD#1 laser lead extraction.  In ICU overnight. Hypotensive episode last night with bradycardia. Dopamine added.  Sore throat from ET tube.  Nauseated, vomiting.  Not tolerating oral intake.   Afebrile.  Post-op leukocytosis, trending down   Surgical cultures pending    Review of Systems   Constitutional: Positive for appetite change and unexpected weight change. Negative for chills, diaphoresis, fatigue and fever.   HENT: Positive for hearing loss (very hard of hearing) and sore throat. Negative for congestion, dental problem, ear pain and tinnitus.    Eyes: Negative.    Respiratory: Positive for  shortness of breath (chronic, at baseline). Negative for cough and wheezing.    Cardiovascular: Negative for chest pain, palpitations and leg swelling.   Gastrointestinal: Positive for nausea and vomiting. Negative for abdominal distention, abdominal pain, blood in stool, constipation, diarrhea and rectal pain.   Genitourinary: Negative for dysuria, flank pain, frequency, hematuria and urgency.   Musculoskeletal: Negative for arthralgias, back pain, myalgias and neck pain.   Skin: Negative for rash.   Allergic/Immunologic: Negative for immunocompromised state.   Neurological: Negative for dizziness, light-headedness, numbness and headaches.   Hematological: Negative for adenopathy. Does not bruise/bleed easily.   Psychiatric/Behavioral: Negative for confusion and sleep disturbance. The patient is not nervous/anxious.      Objective:     Vital Signs (Most Recent):  Temp: 97.7 °F (36.5 °C) (01/09/19 0730)  Pulse: 69 (01/09/19 0736)  Resp: 18 (01/09/19 0730)  BP: (!) 92/53 (01/09/19 0730)  SpO2: (!) 93 % (01/09/19 0730) Vital Signs (24h Range):  Temp:  [97.7 °F (36.5 °C)-98.7 °F (37.1 °C)] 97.7 °F (36.5 °C)  Pulse:  [61-80] 69  Resp:  [12-26] 18  SpO2:  [93 %-99 %] 93 %  BP: ()/(50-66) 92/53     Weight: 88.2 kg (194 lb 7.1 oz)  Body mass index is 24.97 kg/m².    Estimated Creatinine Clearance: 60.9 mL/min (based on SCr of 1.2 mg/dL).    Physical Exam   Constitutional: He is oriented to person, place, and time. He appears well-developed and well-nourished.   HENT:   Head: Normocephalic and atraumatic.   Eyes: Conjunctivae are normal. No scleral icterus.   Neck: Normal range of motion. Neck supple.   Cardiovascular: Normal rate. An irregularly irregular rhythm present.   No murmur heard.  Left upper chest ICD extraction site - dressing in place, c/d/i.    Pulmonary/Chest: Effort normal. No respiratory distress. He has no wheezes.   Crackles at bases     Abdominal: Soft. He exhibits no distension.   Musculoskeletal:  He exhibits no edema.   Neurological: He is alert and oriented to person, place, and time.   Skin: Skin is warm and dry.   PICC RUE - c/d/i   Psychiatric: He has a normal mood and affect. His behavior is normal.   Vitals reviewed.      Significant Labs:   Blood Culture:   Recent Labs   Lab 01/07/19  0154   LABBLOO No Growth to date  No Growth to date  No Growth to date  No Growth to date  No Growth to date  No Growth to date     CBC:   Recent Labs   Lab 01/08/19  0616 01/09/19  0430   WBC 16.67* 12.05   HGB 8.3* 8.0*   HCT 25.6* 24.8*   * 105*     CMP:   Recent Labs   Lab 01/08/19 0616 01/09/19  0430    142   K 3.3* 3.2*    107   CO2 23 26   GLU 83 100   BUN 28* 22   CREATININE 1.2 1.2   CALCIUM 8.7 8.3*   PROT 6.4 6.0   ALBUMIN 2.7* 2.4*   BILITOT 0.9 0.9   ALKPHOS 140* 138*   AST 27 24   ALT 29 22   ANIONGAP 10 9   EGFRNONAA 58.4* 58.4*     Procalcitonin:   No results for input(s): PROCAL in the last 48 hours.    Significant Imaging: I have reviewed all pertinent imaging results/findings within the past 24 hours.

## 2019-01-11 NOTE — ASSESSMENT & PLAN NOTE
78 year old man with history of CHF, s/p ICD placement (> 20 years ago; Generator exchange 2008) and s/p cardioMems heart failure monitoring device placement 5/2018, Afib (on eliquis), DM, HTN, COPD, history of CVA who presented to [facility] with nausea, vomiting, diarrhea and fevers.  Found to be hypotensive and with TREY.  Required pressor support and was transferred to [facility] where blood cultures of 1/2/18  found to be positive for Enterococcus faecalis, and RICARDO showed vegetation on RV lead.  [facility] [place] films and confirmed presence of vegetation.   He was [place] [facility] [place] evaluation and possible device extraction.      No repeat blood cultures at [facility].  Repeat blood cultures of 1/7 on admission here NGTD      Source unclear - likely GI.   Denies any problems or history of problems with ICD pocket.   Reports episode of food poisoning a couple of months ago.   Patient reports a loss of appetite for approximately the past 3 -4 weeks and has lost approx 20 pounds during that time.   He denies any fevers, chills,sweats over the past few weeks until this past Monday when he was admitted.      Last colonoscopy was 2 years ago.  He has q 3 year surveillance colonoscopies as he has polyps.      Now POD#2 lead/device extraction. RICARDO pre-extraction showed 2 vegetations on RV lead.  No vegetations noted on valves. Post-extraction RICARDO  - no masses present in the right atrium or attached to the tricuspid valve following removal of the leads.     Afebrile. Stable leukocytosis. Less nausea today.      Plan/recommendations:  1.  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 Will discuss changing to ampicillin extended infusion at some point,  though may be too much fluid. Will discuss with Pharm D and primary team.  Anticipated duration of antibiotics - 6 weeks post extraction.  Estimated  end date 2/20/19  2.  Will follow lead and post-op blood cultures.   3.  If urgently necessary from a cardiac standpoint, it is okay to proceed with implanting new device if repeat blood cultures taken post extraction remain negative X 72 hours.  Suspect known vegetations on RV leads, now removed, were primary source of infection; cardioMems likely not infected  4.  Recommend EGD and colonoscopy as part of work up  5. Will follow

## 2019-01-11 NOTE — CONSULTS
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 Solis is a 79 yo male with HFrEF, ICM s/p ICD, CAD, Afib, COPD and CKD III ([facility] sCr ~ 1.7) who presents as a transfer from [facility] on 1/06 for higher level of care for possible RV lead infection.  According to records from outside facility, he orginally presented to [facility] with chief complaint of weakness, fatigue, and diarrhea.  On initial presentation, he was found to be hypotensive with BP of 78/44 and SCr of 2.6.  He was administered 1L of IVF and was started on levo and transferred to [facility] for higher level of care.  While there, he was given 3L of IVF on presentation and was able to be weaned from levo with noted improvement in his SCr with volume expansion (SCr down to 1.4).  Blood cultures at [facility] grew Gram positive cocci and the patient was treated with Vanc + Cefepime. He was net +6L and EF was found to be 30-35%. RICARDO with possible RV lead echodensity suggestive of vegetation.  According to records, he was felt to be in HF after evaluating ECHO/and CXR and he was then initiated on diuretics.  RICARDO showed possible vegetation on RV lead.  [place] [facility] [place] and possible device extraction.      [place] [facility] kidney function had remained stable with a SCr ~ 1.2 (appears to have had multiple fluctuations in his SCr, likely from CRS), but stayed stable through the admission until 1/9-1/10 when SCr daniel from  "1.4-1.7-4.1.  He underwent lead extraction on 1/9 and post-procedure, he was noted to have hypotensive episodes and bradycardia with SBP dropping into the 70's along with HR in the 40s.  He was administered atropine and started on dobutamine with improvement in his hemodynamics, but noted to have a decrease in his UOP along with decline in kidney function.  On 1/10, he became oliguric despite lasix gtt and labs revealing mild hyperkalemia of 5.9.  Pertinent UA findings 2+ protein 2+ blood, 19 RBCs (cath sample) and many bacteria. BNP >2000 on consultation and CXR with evidence of pulmonary edema with a CVP of 14.  Urinary catheter was discontinued this morning and patient was reporting feeling the "need to go".  Morin was placed on examination and noted ~ 250 ml of yellow urine return. Nephrology has been consulted for oliguric TREY.        Past Medical History:   Diagnosis Date    Chronic systolic congestive heart failure 3/22/2018    Coronary artery disease involving native coronary artery of native heart without angina pectoris 3/22/2018    ICD (implantable cardioverter-defibrillator) in place 3/22/2018    Ischemic cardiomyopathy 3/22/2018    Mixed hyperlipidemia 3/22/2018    Type 2 diabetes mellitus with complication 3/22/2018    VT (ventricular tachycardia) 3/22/2018       Past Surgical History:   Procedure Laterality Date    ECHOCARDIOGRAM,TRANSESOPHAGEAL N/A 1/9/2019    Performed by [facility] Diagnostic Provider at [facility] EP LAB    EXTRACTION, ELECTRODE LEAD Left 1/9/2019    Performed by Werner Boggs MD at [facility] EP LAB    HEART CATH-RIGHT Right 4/5/2018    Performed by Elizabeth Wise MD at [facility] CATH LAB    Insertion, Pacemaker, Temporary Transvenous  1/9/2019    Performed by Werner Boggs MD at [facility] EP LAB       Review of patient's allergies indicates:   Allergen Reactions    Adhesive Other (See Comments)     blisters    Codeine Other (See Comments)     Blurred vision    Latex Hives    Ace " inhibitors     Lipitor [atorvastatin] Other (See Comments)     Muscle spasms     Current Facility-Administered Medications   Medication Frequency    0.9%  NaCl infusion (for blood administration) Q24H PRN    acetaminophen tablet 1,000 mg TID    albuterol-ipratropium 2.5 mg-0.5 mg/3 mL nebulizer solution 3 mL Q6H PRN    ampicillin 2 g in sodium chloride 0.9 % 100 mL IVPB (ready to mix system) Q4H    bupivacaine (PF) 0.25% (2.5 mg/ml) injection PRN    cefTRIAXone (ROCEPHIN) 2 g in dextrose 5 % 50 mL IVPB Q12H    chlorothiazide (DIURIL) 500 mg in dextrose 5 % 50 mL IVPB Daily    clopidogrel tablet 75 mg Daily    dextrose 50% injection 12.5 g PRN    dextrose 50% injection 25 g PRN    DOPamine 400 mg in dextrose 5 % 250 mL infusion (premix) Continuous    EPINEPHrine (ADRENALIN) 5 mg in sodium chloride 0.9% 250 mL infusion Continuous    furosemide (LASIX) 2 mg/mL in sodium chloride 0.9% 100 mL infusion (conc: 2 mg/mL) Continuous    glucagon (human recombinant) injection 1 mg PRN    glucose chewable tablet 16 g PRN    glucose chewable tablet 24 g PRN    insulin aspart U-100 pen 0-5 Units QID (AC + HS) PRN    insulin aspart U-100 pen 5 Units TIDWM    insulin detemir U-100 pen 15 Units QHS    levothyroxine tablet 25 mcg Before breakfast    lidocaine HCL 20 mg/ml (2%) injection PRN    lidocaine HCL 20 mg/ml (2%) injection PRN    magnesium oxide tablet 800 mg Once    magnesium oxide tablet 800 mg BID    magnesium sulfate 2g in water 50mL IVPB (premix) PRN    magnesium sulfate 2g in water 50mL IVPB (premix) PRN    ondansetron injection 4 mg Q6H PRN    potassium chloride SA CR tablet 40 mEq BID    potassium chloride SA CR tablet 60 mEq Once    promethazine (PHENERGAN) 25 mg in dextrose 5 % 50 mL IVPB Q6H PRN    rosuvastatin tablet 20 mg QHS    sodium chloride 0.9% flush 5 mL PRN    tamsulosin 24 hr capsule 0.4 mg Daily    traMADol tablet 50 mg Q6H PRN    visipaque 320 iodixanol PRN     Family  History     None        Tobacco Use    Smoking status: Former Smoker    Smokeless tobacco: Former User   Substance and Sexual Activity    Alcohol use: Yes     Comment: former drinker    Drug use: No    Sexual activity: Not on file     Review of Systems   Constitutional: Positive for appetite change and unexpected weight change. Negative for chills, diaphoresis, fatigue and fever.   HENT: Positive for hearing loss (very hard of hearing) and sore throat. Negative for congestion, dental problem, ear pain and tinnitus.    Eyes: Negative.    Respiratory: Positive for shortness of breath (chronic). Negative for cough and wheezing.    Cardiovascular: Negative for chest pain, palpitations and leg swelling.   Gastrointestinal: Positive for vomiting. Negative for abdominal distention, abdominal pain, blood in stool, constipation, diarrhea, nausea and rectal pain.   Genitourinary: Negative for dysuria, flank pain, frequency, hematuria and urgency.   Musculoskeletal: Negative for arthralgias, back pain, myalgias and neck pain.   Skin: Negative for rash.   Allergic/Immunologic: Negative for immunocompromised state.   Neurological: Negative for dizziness, light-headedness, numbness and headaches.   Hematological: Negative for adenopathy. Does not bruise/bleed easily.   Psychiatric/Behavioral: Negative for confusion and sleep disturbance. The patient is not nervous/anxious.      Objective:     Vital Signs (Most Recent):  Temp: 98.5 °F (36.9 °C) (01/10/19 1915)  Pulse: 60 (01/10/19 1957)  Resp: 20 (01/10/19 1957)  BP: (!) 92/55 (01/10/19 1915)  SpO2: (!) 94 % (01/10/19 1957)  O2 Device (Oxygen Therapy): nasal cannula (01/10/19 1957) Vital Signs (24h Range):  Temp:  [97.7 °F (36.5 °C)-99 °F (37.2 °C)] 98.5 °F (36.9 °C)  Pulse:  [42-60] 60  Resp:  [6-29] 20  SpO2:  [92 %-97 %] 94 %  BP: ()/(44-67) 92/55  Arterial Line BP: ()/(36-73) 97/40     Weight: 88.2 kg (194 lb 7.1 oz) (01/10/19 0601)  Body mass index is 24.97  kg/m².  Body surface area is 2.15 meters squared.    I/O last 3 completed shifts:  In: 3425.1 [P.O.:480; I.V.:1645.1; Blood:350; IV Piggyback:950]  Out: 1466 [Urine:1216; Blood:250]    Physical Exam   Constitutional: He is oriented to person, place, and time. He appears well-developed and well-nourished. He appears ill.   HENT:   Head: Normocephalic and atraumatic.   Eyes: Conjunctivae and EOM are normal.   Neck: Normal range of motion. Neck supple. JVD present. No thyromegaly present.   Cardiovascular: Regular rhythm.   Bradycardic   Pulmonary/Chest: Effort normal. No respiratory distress. He has no wheezes. He has rales (bibasilar).   Difficult laying flat   Abdominal: Soft.   Hypoactive bowel sounds   Musculoskeletal: Normal range of motion. He exhibits no edema or deformity.   Neurological: He is alert and oriented to person, place, and time.   Skin: Skin is warm and dry.   Psychiatric: He has a normal mood and affect. His behavior is normal.       Significant Labs:  CBC:   Recent Labs   Lab 01/10/19  1653   WBC 15.48*   RBC 3.28*   HGB 9.3*   HCT 28.8*   *   MCV 88   MCH 28.4   MCHC 32.3     CMP:   Recent Labs   Lab 01/10/19  1653   *   CALCIUM 8.6*   ALBUMIN 2.3*   PROT 6.0      K 5.9*   CO2 23      BUN 54*   CREATININE 4.1*   ALKPHOS 127   ALT 19   AST 15   BILITOT 0.7     Assessment/Plan:     Endocarditis    Defer management to primary team     TREY (acute kidney injury)    TREY on CKD III  Multiple AKIs in the past, appears to have a baseline SCr averaging around 1.7.  Admitted to OSH with TREY with sCr of 2.4, thought to be pre-renal in nature, which responded to IVF, becoming net + ~ 6L at the OSH.  He was diuresed in the hospital with stabilization of her sCr ~ 1.2, but developed hypotensive episodes and bradycardia likely leading to an ischemic ATN.    Differentials include AIN from recent ABx use, Ischemic ATN from hemodynamic changes (high likelihood given history) vs.  Obstructive nephropathy (noted to have ~ 250 ml of urinary retention when gtz was inserted.    Plan/Recommendations:  Hold off on Diuril at this time and continue close monitoring of UOP  Serial monitoring of CVP  If UOP decreases or worsening oxygen requirements, would recommend adding diuril as originally planned.  Will add urine culture as appears he has many bacteria on most recent UA  UPCR  C3C4 (noted to have hematuria, ?cath sample vs. Infectious GN)  Urine microscopy tomorrow  Will f/u with these labs and determine need for further work-up.       Aroldo Cochran, NP  Nephrology  [facility]

## 2019-01-11 NOTE — PROGRESS NOTES
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  Found to be hypotensive and with TREY.  Required pressor support and was transferred to [facility] where blood cultures of 1/2/18  found to be positive for Enterococcus faecalis, and RICARDO showed vegetation on RV lead.  [facility] team reviewed films and confirmed presence of vegetation.   He was transferred to [facility] for further evaluation and possible device extraction.      No repeat blood cultures at [facility].  Repeat blood cultures of 1/7 on admission here NGTD      Source unclear - likely GI.   Denies any problems or history of problems with ICD pocket.   Reports episode of food poisoning a couple of months ago.   Patient reports a loss of appetite for approximately the past 3 -4 weeks and has lost approx 20 pounds during that time.   He denies any fevers, chills,sweats over the past few weeks until this past Monday when he was admitted.      Last colonoscopy was 2 years ago.  He has q 3 year surveillance colonoscopies as he has polyps.      Now POD#2 lead/device extraction. RICARDO pre-extraction showed 2 vegetations on RV lead.  No vegetations noted on valves. Post-extraction RICARDO  - no masses present in the right atrium or attached to the tricuspid valve following removal of the leads.     Afebrile. Stable leukocytosis. Less nausea today.       Plan/recommendations:  1.  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 Will discuss changing to ampicillin extended infusion at some point,  though may be too much fluid. Will discuss with Pharm D and primary team.  Anticipated duration of antibiotics - 6 weeks post extraction.  Estimated end date 2/20/19  2.  Will follow lead and post-op blood cultures.   3.  If urgently necessary from a cardiac standpoint, it is okay to proceed with implanting new device if repeat blood cultures taken post extraction remain negative X 72 hours.  Suspect known vegetations on RV leads, now removed, were primary source of infection; cardioMems likely not infected  4.  Recommend EGD and colonoscopy as part of work up  5. Will follow         Thank you for your consult. I will follow-up with patient. Please contact us if you have any additional questions.  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 Jerry Solis is a 78 year old gentleman with history of CHF, s/p ICD placement and s/p cardioMems heart failure monitoring device , Afib, DM, HTN, COPD, history of CVA who presented to [facility] with nausea, vomiting, diarrhea and fevers.  Found to be hypotensive and with TREY.  Required pressor support and was transferred to [facility] where blood cultures found to be Enterococcus faecalis, and RICARDO showed possible vegetation on RV lead.  He was transferred to [facility] for further evaluation and possible device extraction.   He is currently on IV vancomycin and cefepime.      Reports that he had an episode of what he thought was food poisoning a couple of months ago when he was visiting his brother in [place].  Patient reports a loss of appetite for approximately the  past 3 -4 weeks and has lost approx 20 pounds during that time.  He did not seek medical attention because he was working.   He denies any fevers, chills until this past Monday when he was admitted.  He denies current fevers, chills, sweats.  He has chronic SOB that is not worse from baseline.  Denies current n/v/d.  Denies melena, hematachezia.  No urinary complaints. Denies any pain, redness at ICD pocket site.      ICD/pacer placed >10 years ago.  Had ? Generator changed in 2008.  Denies any problems.      Last colonoscopy was 2 years ago.  He has q 3 year surveillance colonoscopies as he has polyps.      Blood culture results from [facility] of 1/2/19 - E. Faecalis - PCN, vancomycin sensitive.  PICC was placed 1/3/19.  Unclear when blood cultures cleared as blood cultures were not repeated.   Repeat blood cultures taken here on admission 1/7.      He is afebrile, WBC 13.2, platelets 128, H/H 8.7/27.6, ESR 54, CRP 27.9, procalcitonin wnl.          Interval History: NAEO; blood cultures remain NGTD; less nauseated today.     Review of Systems   Constitutional: Positive for appetite change. Negative for chills, fatigue and fever.   HENT: Positive for hearing loss (very hard of hearing). Negative for congestion, dental problem and ear pain.    Eyes: Negative.    Respiratory: Positive for shortness of breath (chronic, at baseline). Negative for cough and wheezing.    Cardiovascular: Negative for chest pain, palpitations and leg swelling.   Gastrointestinal: Positive for nausea. Negative for abdominal distention, abdominal pain, constipation, diarrhea and vomiting.   Genitourinary: Negative for flank pain and frequency.   Musculoskeletal: Negative for arthralgias, back pain, myalgias and neck pain.   Skin: Negative for rash.   Neurological: Positive for weakness. Negative for dizziness, light-headedness, numbness and headaches.   Psychiatric/Behavioral: Negative for confusion and sleep disturbance. The patient is not  nervous/anxious.      Objective:     Vital Signs (Most Recent):  Temp: 97 °F (36.1 °C) (01/11/19 1100)  Pulse: (!) 59 (01/11/19 1200)  Resp: 16 (01/11/19 1200)  BP: (!) 106/52 (01/11/19 1200)  SpO2: 98 % (01/11/19 1200) Vital Signs (24h Range):  Temp:  [97 °F (36.1 °C)-98.5 °F (36.9 °C)] 97 °F (36.1 °C)  Pulse:  [53-65] 59  Resp:  [6-31] 16  SpO2:  [92 %-98 %] 98 %  BP: ()/(50-90) 106/52  Arterial Line BP: ()/(32-69) 116/51     Weight: 88.2 kg (194 lb 7.1 oz)  Body mass index is 24.97 kg/m².    Estimated Creatinine Clearance: 34.8 mL/min (A) (based on SCr of 2.1 mg/dL (H)).    Physical Exam   Constitutional: He is oriented to person, place, and time. He appears well-developed and well-nourished.   HENT:   Head: Normocephalic and atraumatic.   Eyes: Conjunctivae are normal. No scleral icterus.   Neck: Normal range of motion. Neck supple.   Cardiovascular: Normal rate. An irregularly irregular rhythm present.   No murmur heard.  Left upper chest ICD extraction site - dressing in place, c/d/i.    Pulmonary/Chest: Effort normal. No respiratory distress. He has no wheezes.   Crackles at bases     Abdominal: Soft. He exhibits no distension.   Musculoskeletal: He exhibits no edema.   Neurological: He is alert and oriented to person, place, and time.   Skin: Skin is warm and dry.   PICC RUE - c/d/i   Psychiatric: He has a normal mood and affect. His behavior is normal.   Vitals reviewed.      Significant Labs:   Blood Culture:   Recent Labs   Lab 01/07/19  0154 01/09/19  1657 01/10/19  1401   LABBLOO No Growth to date  No Growth to date  No Growth to date  No Growth to date  No Growth to date  No Growth to date  No Growth to date  No Growth to date  No Growth to date  No Growth to date No Growth to date  No Growth to date No Growth to date  No Growth to date     CBC:   Recent Labs   Lab 01/10/19  0348 01/10/19  1653 01/11/19  0505   WBC 20.54* 15.48* 17.97*   HGB 9.4* 9.3* 10.1*   HCT 30.6* 28.8*  31.6*   * 101* 108*     CMP:   Recent Labs   Lab 01/10/19  0348 01/10/19  1653  01/11/19  0110 01/11/19  0505 01/11/19  0823    137   < > 139 140  140 140   K 4.3 5.9*   < > 4.0 4.0  4.0 3.8    104   < > 104 105  105 105   CO2 22* 23   < > 24 23  23 25   * 215*   < > 254* 247*  247* 234*   BUN 31* 54*   < > 42* 44*  44* 43*   CREATININE 1.7* 4.1*   < > 2.1* 2.1*  2.1* 2.1*   CALCIUM 8.9 8.6*   < > 9.0 8.8  8.8 8.7   PROT 6.3 6.0  --   --  6.6  --    ALBUMIN 2.5* 2.3*  --   --  2.5*  --    BILITOT 0.8 0.7  --   --  0.7  --    ALKPHOS 130 127  --   --  132  --    AST 19 15  --   --  15  --    ALT 23 19  --   --  20  --    ANIONGAP 15 10   < > 11 12  12 10   EGFRNONAA 38.3* 13.2*   < > 29.7* 29.7*  29.7* 29.7*    < > = values in this interval not displayed.       Significant Imaging: I have reviewed all pertinent imaging results/findings within the past 24 hours.

## 2019-01-11 NOTE — ASSESSMENT & PLAN NOTE
Caution with insulin stacking  Estimated Creatinine Clearance: 34.8 mL/min (A) (based on SCr of 2.1 mg/dL (H)).

## 2019-01-11 NOTE — SUBJECTIVE & OBJECTIVE
Interval HPI:   Overnight events: Remains in CMICU, NAEON.  BG elevated yesterday, trending down today but still above goal.  On IV antibiotics, dopamine, and lasix.  Eatin%  Nausea: No  Hypoglycemia and intervention: No  Fever: No  TPN and/or TF: No    PMH, PSH, FH, SH reviewed     Review of Systems    Constitutional: Positive for weight changes, 20 lb weight loss over past 1-2 months.  Eyes: Negative for visual disturbance.  Respiratory: Positive for moderate, dry cough.   Cardiovascular: Negative for chest pain.  Gastrointestinal: Negative for nausea.  Endocrine: Negative for polyuria, polydipsia.  Musculoskeletal: Positive for chronic back pain.  Skin: Negative for rash.  Neurological: Negative for syncope.  Positive for intermittent dizziness with rapid position changes.  Psychiatric/Behavioral: Positive for depression.  Denies any suicidal or homicidal ideations.    Current Medications and/or Treatments Impacting Glycemic Control  Immunotherapy:    Immunosuppressants     None        Steroids:   Hormones (From admission, onward)    None        Pressors:    Autonomic Drugs (From admission, onward)    Start     Stop Route Frequency Ordered    19 2337  DOPamine 400 mg in dextrose 5 % 250 mL infusion (premix)     Question Answer Comment   Titrate by: (in mcg/kg/min) 5    Titrate interval: (in minutes) 15    To maintain goal of: SBP (in mmHG)    Greater than: 90    Maximum dose: (in mcg/kg/min) 20        -- IV Continuous 19 2341    19 1745  EPINEPHrine (ADRENALIN) 5 mg in sodium chloride 0.9% 250 mL infusion     Question Answer Comment   Titrate by: (in mcg/kg/min) 0.02    Titrate interval: (in minutes) 5    Titrate to maintain: (SBP or MAP or Cardiac Index) SBP    Greater than: (in mmHg) 90    Maximum dose: (in mcg/kg/min) 2        -- IV Continuous 19 1639        Hyperglycemia/Diabetes Medications:   Antihyperglycemics (From admission, onward)    Start     Stop Route Frequency Ordered     01/07/19 0715  insulin aspart U-100 pen 5 Units      -- SubQ 3 times daily with meals 01/07/19 0054    01/07/19 0200  insulin detemir U-100 pen 15 Units      -- SubQ Nightly 01/07/19 0054    01/07/19 0152  insulin aspart U-100 pen 0-5 Units      -- SubQ Before meals & nightly PRN 01/07/19 0054             PHYSICAL EXAMINATION:  Vitals:    01/11/19 1200   BP: (!) 106/52   Pulse: (!) 59   Resp: 16   Temp:      Body mass index is 24.97 kg/m².    Physical Exam     Constitutional: Well developed, well nourished, NAD.  ENT: External ears no masses with nose patent; [place].  Neck: Supple; trachea midline  Cardiovascular: Normal heart sounds, no LE edema. DP +2 bilaterally.  Lungs: Normal effort; lungs anterior bilaterally clear to auscultation.  Abdomen: Soft, no masses, no hernias.  MS: No clubbing or cyanosis of nails noted; unable to assess gait.  Skin: No rashes, lesions, or ulcers; no nodules. Injection sites are ok. No lipo hypertropthy or atrophy.  L chest incision covered with foam adhesive dressing.  Psychiatric: Good judgement and insight; normal mood and affect.  Neurological: Cranial nerves are grossly intact.  Foot: Nails in thick and brittle, no amputations noted.

## 2019-01-11 NOTE — ASSESSMENT & PLAN NOTE
-A1c 8.1  -Target -180 while hospitalized  -detemir 15, aspart 5 with sliding scale  -Consult Endocrine to help with management

## 2019-01-11 NOTE — ASSESSMENT & PLAN NOTE
TREY on CKD III  Multiple AKIs in the past, appears to have a baseline SCr averaging around 1.7.  Admitted to OSH with TREY with sCr of 2.4, thought to be pre-renal in nature, which responded to IVF, becoming net + ~ 6L at the OSH.  He was diuresed in the hospital with stabilization of her sCr ~ 1.2, but developed hypotensive episodes and bradycardia likely leading to an ischemic ATN.    Differentials include AIN from recent ABx use, Ischemic ATN from hemodynamic changes (high likelihood given history) vs. Obstructive nephropathy (noted to have ~ 250 ml of urinary retention when gtz was inserted.    Plan/Recommendations:  Hold off on Diuril at this time and continue close monitoring of UOP  Serial monitoring of CVP  If UOP decreases or worsening oxygen requirements, would recommend adding diuril as originally planned.  Will add urine culture as appears he has many bacteria on most recent UA  UPCR  C3C4 (noted to have hematuria, ?cath sample vs. Infectious GN)  Urine microscopy tomorrow  Renal US  Will f/u with these labs and determine need for further work-up.

## 2019-01-11 NOTE — NURSING
Notified HTS team that pt had increasing SOB and looked like pt was working harder to breath. CVP at 7. O2 @ 99. Vitals stable. Josef-MD Edilberto stated that he would be by to see the pt.

## 2019-01-11 NOTE — PLAN OF CARE
Problem: Adult Inpatient Plan of Care  Goal: Plan of Care Review  Outcome: Ongoing (interventions implemented as appropriate)  See vital signs and assessments in flowsheets. See below for updates on today's progress.     Pulmonary: Lung sounds dim at bases. 2L nasal cannula.    Cardiovascular: Junctional rhythm.     Neurological: Alert. Intermittently confused to time. Afebrile. Denies any pain.    Gastrointestinal: No BM since 1/8/19    Genitourinary: Morin in place, draining 45-75cc urine/hr.    Endocrine: BG elevated, PRN insulin given.    Integumentary/Other: Intact. Dressings CDI.    Infusions: Dopamine, lasix infusing.    Patient progressing towards goals as tolerated, plan of care communicated and reviewed with Jerry Solis and spouse. Concerns addressed, questions answered. Will continue to monitor closely.

## 2019-01-11 NOTE — CONSULTS
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 3-4x per day   BG readings on regimen: AM 120s  Hypoglycemia on the regimen?  Yes - 2-3x per month  Missed doses on regimen?  Yes - 2x per week    Diabetes Complications include:     Hyperglycemia, Hypoglycemia , Diabetic retinopathy , Diabetic cataract  and Diabetic peripheral neuropathy     Complicating diabetes co morbidities:   CHF and History of CVA      HPI:   Patient is a 78 y.o. male with a diagnosis of DM2 and CHF who was admitted on 19 for possible RV lead infection.  Patient is s/p lead extraction on 19.  Patient's DM managed by PCP, Dr. Hdz.  Endocrine consulted for DM/BG management.            Interval HPI:   Overnight events: Remains in CMICU, NAEON.  BG elevated yesterday, trending down today but still above goal.  On IV antibiotics, dopamine, and lasix.  Eatin%  Nausea: No  Hypoglycemia and intervention: No  Fever: No  TPN and/or TF: No    PMH, PSH, FH, SH reviewed     Review of Systems    Constitutional: Positive for weight changes, 20 lb weight loss over past 1-2 months.  Eyes: Negative for visual disturbance.  Respiratory: Positive for moderate, dry cough.   Cardiovascular: Negative for chest pain.  Gastrointestinal: Negative for nausea.  Endocrine: Negative for polyuria, polydipsia.  Musculoskeletal: Positive for chronic back pain.  Skin: Negative for rash.  Neurological: Negative for syncope.   Positive for intermittent dizziness with rapid position changes.  Psychiatric/Behavioral: Positive for depression.  Denies any suicidal or homicidal ideations.    Current Medications and/or Treatments Impacting Glycemic Control  Immunotherapy:    Immunosuppressants     None        Steroids:   Hormones (From admission, onward)    None        Pressors:    Autonomic Drugs (From admission, onward)    Start     Stop Route Frequency Ordered    01/09/19 2337  DOPamine 400 mg in dextrose 5 % 250 mL infusion (premix)     Question Answer Comment   Titrate by: (in mcg/kg/min) 5    Titrate interval: (in minutes) 15    To maintain goal of: SBP (in mmHG)    Greater than: 90    Maximum dose: (in mcg/kg/min) 20        -- IV Continuous 01/09/19 2341    01/09/19 1745  EPINEPHrine (ADRENALIN) 5 mg in sodium chloride 0.9% 250 mL infusion     Question Answer Comment   Titrate by: (in mcg/kg/min) 0.02    Titrate interval: (in minutes) 5    Titrate to maintain: (SBP or MAP or Cardiac Index) SBP    Greater than: (in mmHg) 90    Maximum dose: (in mcg/kg/min) 2        -- IV Continuous 01/09/19 1639        Hyperglycemia/Diabetes Medications:   Antihyperglycemics (From admission, onward)    Start     Stop Route Frequency Ordered    01/07/19 0715  insulin aspart U-100 pen 5 Units      -- SubQ 3 times daily with meals 01/07/19 0054    01/07/19 0200  insulin detemir U-100 pen 15 Units      -- SubQ Nightly 01/07/19 0054    01/07/19 0152  insulin aspart U-100 pen 0-5 Units      -- SubQ Before meals & nightly PRN 01/07/19 0054             PHYSICAL EXAMINATION:  Vitals:    01/11/19 1200   BP: (!) 106/52   Pulse: (!) 59   Resp: 16   Temp:      Body mass index is 24.97 kg/m².    Physical Exam     Constitutional: Well developed, well nourished, NAD.  ENT: External ears no masses with nose patent; [place].  Neck: Supple; trachea midline  Cardiovascular: Normal heart sounds, no LE edema. DP +2 bilaterally.  Lungs: Normal effort; lungs anterior bilaterally  clear to auscultation.  Abdomen: Soft, no masses, no hernias.  MS: No clubbing or cyanosis of nails noted; unable to assess gait.  Skin: No rashes, lesions, or ulcers; no nodules. Injection sites are ok. No lipo hypertropthy or atrophy.  L chest incision covered with foam adhesive dressing.  Psychiatric: Good judgement and insight; normal mood and affect.  Neurological: Cranial nerves are grossly intact.  Foot: Nails in thick and brittle, no amputations noted.      Labs Reviewed and Include   Recent Labs   Lab 01/11/19  0505 01/11/19  0823   *  247* 234*   CALCIUM 8.8  8.8 8.7   ALBUMIN 2.5*  --    PROT 6.6  --      140 140   K 4.0  4.0 3.8   CO2 23  23 25     105 105   BUN 44*  44* 43*   CREATININE 2.1*  2.1* 2.1*   ALKPHOS 132  --    ALT 20  --    AST 15  --    BILITOT 0.7  --      Lab Results   Component Value Date    WBC 17.97 (H) 01/11/2019    HGB 10.1 (L) 01/11/2019    HCT 31.6 (L) 01/11/2019    MCV 90 01/11/2019     (L) 01/11/2019     No results for input(s): TSH, FREET4 in the last 168 hours.  Lab Results   Component Value Date    HGBA1C 8.1 (H) 01/07/2019       Nutritional status:   Body mass index is 24.97 kg/m².  Lab Results   Component Value Date    ALBUMIN 2.5 (L) 01/11/2019    ALBUMIN 2.3 (L) 01/10/2019    ALBUMIN 2.5 (L) 01/10/2019     No results found for: PREALBUMIN    Estimated Creatinine Clearance: 34.8 mL/min (A) (based on SCr of 2.1 mg/dL (H)).    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         Atrial fibrillation    May affect BG readings if uncontrolled             Plan discussed with patient, family, and RN at bedside.     Timo Avalos NP  Endocrinology  [facility]

## 2019-01-11 NOTE — SIGNIFICANT EVENT
7:15 pm, 1/10/19    Reviewed patient chart at shift change.    Cr increased to 4.1 from 1.7. K 5.9. Bicarb 23. Anion gap 10.  Per nursing staff patient bladder scanned for ~50 cc around 4 pm and voided ~110 cc around 6 pm. Was started on lasix gtt at 10/hr ~2 pm by day shift.     CVP 14 <-- 10, SVO2 pending.     Discussed case with on call Nephrology fellow - suspect ATN from hypotensive episodes this hospitalization. Nephrology recommended adding Diuril 500 mg IV x1 to lasix gtt in attempt to augment diuresis.     Will place gtz catheter for strict I/Os.    IV insulin and dextrose administered for hyperkalemia. Given sluggish BS / mild abdominal distention on my exam and no bowel movement x past 2 days, will obtain KUB to assess for ileus vs SBO prior to starting Kayexalate.     Will f/u BMP at 9 pm and monitor Q6H.     If becomes acidotic or develops increasing O2 requirements may need urgent dialysis tonight.    Patient and family updated at bedside.     Sherry Davidson MD   Cardiology Fellow, PGY-4        Discussed with Dr Nails.     Addendum:  9:44 pm, 1/10/19    Patient made additional 250 cc urine since gtz placed ~9 pm. Has not received IV Diruil yet.     Nephrology fellow at bedside - advised to hold off on IV Diuril for now. If urine output slows down overnight then ok to administer.     Repeat BMP pending.     Sherry Davidson MD   Cardiology Fellow, PGY-4

## 2019-01-11 NOTE — ASSESSMENT & PLAN NOTE
This is a 77yo gentleman with long-standing history of ICM with EF 35%, a 20-year history of ICD with upgrade to CRT-D in 2014 who presents for device extraction due to RV lead vegetation ~0.5cm in the setting of VRE bacteremia with a likely initial GI source. Likewise has a CardioMeMMS device in place. Is not pacemaker dependent. ID believes the primary source is the vegetation. He is s/p extraction on 01/09, recovery complicated by slow junctional escape rhythm responding to dopamine.    - lead cultures obtained, ID to follow  - agree with dopamine, wean as tolerated  - if junctional rhythm persists, will plan on RICARDO/cardioversion on Monday as NSR may conduct and keep him from being chronotrope-dependent  - ok to start coumadin with heparin bridge  - volume/HF management as per primary team

## 2019-01-11 NOTE — ASSESSMENT & PLAN NOTE
- Resolved  - Antiemetics prn  - SLP consult to assess for safe swallowing, then advance diet as tolerated

## 2019-01-11 NOTE — SUBJECTIVE & OBJECTIVE
Past Medical History:   Diagnosis Date    Chronic systolic congestive heart failure 3/22/2018    Coronary artery disease involving native coronary artery of native heart without angina pectoris 3/22/2018    ICD (implantable cardioverter-defibrillator) in place 3/22/2018    Ischemic cardiomyopathy 3/22/2018    Mixed hyperlipidemia 3/22/2018    Type 2 diabetes mellitus with complication 3/22/2018    VT (ventricular tachycardia) 3/22/2018       Past Surgical History:   Procedure Laterality Date    ECHOCARDIOGRAM,TRANSESOPHAGEAL N/A 1/9/2019    Performed by [facility] Diagnostic Provider at [facility] EP LAB    EXTRACTION, ELECTRODE LEAD Left 1/9/2019    Performed by Werner Boggs MD at [facility] EP LAB    HEART CATH-RIGHT Right 4/5/2018    Performed by Elizabeth Wise MD at [facility] CATH LAB    Insertion, Pacemaker, Temporary Transvenous  1/9/2019    Performed by Werner Boggs MD at [facility] EP LAB       Review of patient's allergies indicates:   Allergen Reactions    Adhesive Other (See Comments)     blisters    Codeine Other (See Comments)     Blurred vision    Latex Hives    Ace inhibitors     Lipitor [atorvastatin] Other (See Comments)     Muscle spasms     Current Facility-Administered Medications   Medication Frequency    0.9%  NaCl infusion (for blood administration) Q24H PRN    acetaminophen tablet 1,000 mg TID    albuterol-ipratropium 2.5 mg-0.5 mg/3 mL nebulizer solution 3 mL Q6H PRN    ampicillin 2 g in sodium chloride 0.9 % 100 mL IVPB (ready to mix system) Q4H    bupivacaine (PF) 0.25% (2.5 mg/ml) injection PRN    cefTRIAXone (ROCEPHIN) 2 g in dextrose 5 % 50 mL IVPB Q12H    chlorothiazide (DIURIL) 500 mg in dextrose 5 % 50 mL IVPB Daily    clopidogrel tablet 75 mg Daily    dextrose 50% injection 12.5 g PRN    dextrose 50% injection 25 g PRN    DOPamine 400 mg in dextrose 5 % 250 mL infusion (premix) Continuous    EPINEPHrine (ADRENALIN) 5 mg in sodium chloride 0.9% 250 mL infusion Continuous     furosemide (LASIX) 2 mg/mL in sodium chloride 0.9% 100 mL infusion (conc: 2 mg/mL) Continuous    glucagon (human recombinant) injection 1 mg PRN    glucose chewable tablet 16 g PRN    glucose chewable tablet 24 g PRN    insulin aspart U-100 pen 0-5 Units QID (AC + HS) PRN    insulin aspart U-100 pen 5 Units TIDWM    insulin detemir U-100 pen 15 Units QHS    levothyroxine tablet 25 mcg Before breakfast    lidocaine HCL 20 mg/ml (2%) injection PRN    lidocaine HCL 20 mg/ml (2%) injection PRN    magnesium oxide tablet 800 mg Once    magnesium oxide tablet 800 mg BID    magnesium sulfate 2g in water 50mL IVPB (premix) PRN    magnesium sulfate 2g in water 50mL IVPB (premix) PRN    ondansetron injection 4 mg Q6H PRN    potassium chloride SA CR tablet 40 mEq BID    potassium chloride SA CR tablet 60 mEq Once    promethazine (PHENERGAN) 25 mg in dextrose 5 % 50 mL IVPB Q6H PRN    rosuvastatin tablet 20 mg QHS    sodium chloride 0.9% flush 5 mL PRN    tamsulosin 24 hr capsule 0.4 mg Daily    traMADol tablet 50 mg Q6H PRN    visipaque 320 iodixanol PRN     Family History     None        Tobacco Use    Smoking status: Former Smoker    Smokeless tobacco: Former User   Substance and Sexual Activity    Alcohol use: Yes     Comment: former drinker    Drug use: No    Sexual activity: Not on file     Review of Systems   Constitutional: Positive for appetite change and unexpected weight change. Negative for chills, diaphoresis, fatigue and fever.   HENT: Positive for hearing loss (very hard of hearing) and sore throat. Negative for congestion, dental problem, ear pain and tinnitus.    Eyes: Negative.    Respiratory: Positive for shortness of breath (chronic). Negative for cough and wheezing.    Cardiovascular: Negative for chest pain, palpitations and leg swelling.   Gastrointestinal: Positive for vomiting. Negative for abdominal distention, abdominal pain, blood in stool, constipation, diarrhea, nausea  and rectal pain.   Genitourinary: Negative for dysuria, flank pain, frequency, hematuria and urgency.   Musculoskeletal: Negative for arthralgias, back pain, myalgias and neck pain.   Skin: Negative for rash.   Allergic/Immunologic: Negative for immunocompromised state.   Neurological: Negative for dizziness, light-headedness, numbness and headaches.   Hematological: Negative for adenopathy. Does not bruise/bleed easily.   Psychiatric/Behavioral: Negative for confusion and sleep disturbance. The patient is not nervous/anxious.      Objective:     Vital Signs (Most Recent):  Temp: 98.5 °F (36.9 °C) (01/10/19 1915)  Pulse: 60 (01/10/19 1957)  Resp: 20 (01/10/19 1957)  BP: (!) 92/55 (01/10/19 1915)  SpO2: (!) 94 % (01/10/19 1957)  O2 Device (Oxygen Therapy): nasal cannula (01/10/19 1957) Vital Signs (24h Range):  Temp:  [97.7 °F (36.5 °C)-99 °F (37.2 °C)] 98.5 °F (36.9 °C)  Pulse:  [42-60] 60  Resp:  [6-29] 20  SpO2:  [92 %-97 %] 94 %  BP: ()/(44-67) 92/55  Arterial Line BP: ()/(36-73) 97/40     Weight: 88.2 kg (194 lb 7.1 oz) (01/10/19 0601)  Body mass index is 24.97 kg/m².  Body surface area is 2.15 meters squared.    I/O last 3 completed shifts:  In: 3425.1 [P.O.:480; I.V.:1645.1; Blood:350; IV Piggyback:950]  Out: 1466 [Urine:1216; Blood:250]    Physical Exam   Constitutional: He is oriented to person, place, and time. He appears well-developed and well-nourished. He appears ill.   HENT:   Head: Normocephalic and atraumatic.   Eyes: Conjunctivae and EOM are normal.   Neck: Normal range of motion. Neck supple. JVD present. No thyromegaly present.   Cardiovascular: Regular rhythm.   Bradycardic   Pulmonary/Chest: Effort normal. No respiratory distress. He has no wheezes. He has rales (bibasilar).   Difficult laying flat   Abdominal: Soft.   Hypoactive bowel sounds   Musculoskeletal: Normal range of motion. He exhibits no edema or deformity.   Neurological: He is alert and oriented to person, place, and  time.   Skin: Skin is warm and dry.   Psychiatric: He has a normal mood and affect. His behavior is normal.       Significant Labs:  CBC:   Recent Labs   Lab 01/10/19  1653   WBC 15.48*   RBC 3.28*   HGB 9.3*   HCT 28.8*   *   MCV 88   MCH 28.4   MCHC 32.3     CMP:   Recent Labs   Lab 01/10/19  1653   *   CALCIUM 8.6*   ALBUMIN 2.3*   PROT 6.0      K 5.9*   CO2 23      BUN 54*   CREATININE 4.1*   ALKPHOS 127   ALT 19   AST 15   BILITOT 0.7

## 2019-01-11 NOTE — PROGRESS NOTES
Pharmacist Renal Dose Adjustment Note    Jerry Solis is a 78 y.o. male being treated with the medication ampicillin    Recent Labs   Lab 01/11/19  0110 01/11/19  0505 01/11/19  0823   CREATININE 2.1* 2.1*  2.1* 2.1*     Serum creatinine: 2.1 mg/dL (H) 01/11/19 0823  Estimated creatinine clearance: 34.8 mL/min (A)    Medication:Ampicillin dose: 2g frequency q4h will be changed to medication:ampicillin dose:2g frequency:q6h    Pharmacist's Name: Deepak Paz  Pharmacist's Extension: [phone]

## 2019-01-11 NOTE — PROGRESS NOTES
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 Solis is a 77 yo male with HFrEF, ICM s/p ICD, CAD, Afib, COPD and CKD III ([place] sCr ~ 1.7) who presents as a transfer from [facility] on 1/06 for higher level of care for possible RV lead infection.  According to records from outside facility, he orginally presented to [facility] with chief complaint of weakness, fatigue, and diarrhea.  On initial presentation, he was found to be hypotensive with BP of 78/44 and SCr of 2.6.  He was administered 1L of IVF and was started on levo and transferred to [facility] for higher level of care.  While there, he was given 3L of IVF on presentation and was able to be weaned from levo with noted improvement in his SCr with volume expansion (SCr down to 1.4).  Blood cultures at [facility] grew Gram positive cocci and the patient was treated with Vanc + Cefepime. He was net +6L and EF was found to be 30-35%. RICARDO with possible RV lead echodensity suggestive of vegetation.  According to records, he was felt to be in HF after evaluating ECHO/and CXR and he was then initiated on diuretics.  RICARDO showed possible vegetation on RV lead.  He was transferred to [facility] for further evaluation and possible device extraction.      On arrival to [facility], kidney function had remained stable with a SCr ~ 1.2 (appears to have had multiple fluctuations in his SCr, likely from CRS), but stayed stable through the admission until 1/9-1/10 when SCr daniel from 1.4-1.7-4.1.  He underwent lead extraction on 1/9 and post-procedure, he was noted to have hypotensive episodes and bradycardia with SBP dropping into the 70's along with HR  "in the 40s.  He was administered atropine and started on dobutamine with improvement in his hemodynamics, but noted to have a decrease in his UOP along with decline in kidney function.  On 1/10, he became oliguric despite lasix gtt and labs revealing mild hyperkalemia of 5.9.  Pertinent UA findings 2+ protein 2+ blood, 19 RBCs (cath sample) and many bacteria. BNP >2000 on consultation and CXR with evidence of pulmonary edema with a CVP of 14.  Urinary catheter was discontinued this morning and patient was reporting feeling the "need to go".  Morin was placed on examination and noted ~ 250 ml of yellow urine return. Nephrology has been consulted for oliguric TREY.        Interval History: sCr has improved to 2.1 on AM labs. Urine output 1 L since placing catheter last night. Patient remains on Lasix, Epi, and Dopamine drips.   Patient alert, denying c/o shortness of breath. Remains on 2 L via NC.     Review of patient's allergies indicates:   Allergen Reactions    Adhesive Other (See Comments)     blisters    Codeine Other (See Comments)     Blurred vision    Latex Hives    Ace inhibitors     Lipitor [atorvastatin] Other (See Comments)     Muscle spasms     Current Facility-Administered Medications   Medication Frequency    0.9%  NaCl infusion (for blood administration) Q24H PRN    acetaminophen tablet 1,000 mg TID    albuterol-ipratropium 2.5 mg-0.5 mg/3 mL nebulizer solution 3 mL Q6H PRN    ampicillin 2 g in sodium chloride 0.9 % 100 mL IVPB (ready to mix system) Q6H    bupivacaine (PF) 0.25% (2.5 mg/ml) injection PRN    cefTRIAXone (ROCEPHIN) 2 g in dextrose 5 % 50 mL IVPB Q12H    clopidogrel tablet 75 mg Daily    dextrose 50% injection 12.5 g PRN    dextrose 50% injection 25 g PRN    docusate sodium capsule 100 mg BID    DOPamine 400 mg in dextrose 5 % 250 mL infusion (premix) Continuous    EPINEPHrine (ADRENALIN) 5 mg in sodium chloride 0.9% 250 mL infusion Continuous    furosemide (LASIX) 2 " mg/mL in sodium chloride 0.9% 100 mL infusion (conc: 2 mg/mL) Continuous    glucagon (human recombinant) injection 1 mg PRN    glucose chewable tablet 16 g PRN    glucose chewable tablet 24 g PRN    insulin aspart U-100 pen 0-5 Units QID (AC + HS) PRN    insulin aspart U-100 pen 5 Units TIDWM    insulin detemir U-100 pen 15 Units QHS    levothyroxine tablet 25 mcg Before breakfast    lidocaine HCL 20 mg/ml (2%) injection PRN    lidocaine HCL 20 mg/ml (2%) injection PRN    magnesium oxide tablet 800 mg Once    magnesium oxide tablet 800 mg BID    magnesium sulfate 2g in water 50mL IVPB (premix) PRN    magnesium sulfate 2g in water 50mL IVPB (premix) PRN    ondansetron injection 4 mg Q6H PRN    polyethylene glycol packet 17 g Daily    potassium chloride SA CR tablet 40 mEq BID    potassium chloride SA CR tablet 60 mEq Once    promethazine (PHENERGAN) 25 mg in dextrose 5 % 50 mL IVPB Q6H PRN    rosuvastatin tablet 20 mg QHS    senna-docusate 8.6-50 mg per tablet 2 tablet BID    sodium chloride 0.9% flush 5 mL PRN    tamsulosin 24 hr capsule 0.4 mg Daily    traMADol tablet 50 mg Q6H PRN    visipaque 320 iodixanol PRN       Objective:     Vital Signs (Most Recent):  Temp: 97 °F (36.1 °C) (01/11/19 1100)  Pulse: (!) 59 (01/11/19 1200)  Resp: 16 (01/11/19 1200)  BP: (!) 106/52 (01/11/19 1200)  SpO2: 98 % (01/11/19 1200)  O2 Device (Oxygen Therapy): nasal cannula (01/11/19 1118) Vital Signs (24h Range):  Temp:  [97 °F (36.1 °C)-98.5 °F (36.9 °C)] 97 °F (36.1 °C)  Pulse:  [53-65] 59  Resp:  [6-31] 16  SpO2:  [92 %-98 %] 98 %  BP: ()/(50-90) 106/52  Arterial Line BP: ()/(32-69) 116/51     Weight: 88.2 kg (194 lb 7.1 oz) (01/10/19 0601)  Body mass index is 24.97 kg/m².  Body surface area is 2.15 meters squared.    I/O last 3 completed shifts:  In: 2594.9 [P.O.:530; I.V.:1014.9; IV Piggyback:1050]  Out: 1371 [Urine:1371]    Physical Exam   Constitutional: He is oriented to person, place, and  time. He appears well-developed and well-nourished. He appears ill.   HENT:   Head: Normocephalic and atraumatic.   Eyes: Conjunctivae and EOM are normal.   Neck: Normal range of motion. Neck supple. JVD present. No thyromegaly present.   Cardiovascular: Regular rhythm. Bradycardia present.   Pulmonary/Chest: Effort normal. No respiratory distress. He has no wheezes. He has rales.   Abdominal: Soft. He exhibits distension.   Musculoskeletal: Normal range of motion. He exhibits no edema or deformity.   Neurological: He is alert and oriented to person, place, and time.   Skin: Skin is warm and dry.   Psychiatric: He has a normal mood and affect. His behavior is normal.       Significant Labs:  CBC:   Recent Labs   Lab 01/11/19  0505   WBC 17.97*   RBC 3.52*   HGB 10.1*   HCT 31.6*   *   MCV 90   MCH 28.7   MCHC 32.0     CMP:   Recent Labs   Lab 01/11/19  0505 01/11/19  0823   *  247* 234*   CALCIUM 8.8  8.8 8.7   ALBUMIN 2.5*  --    PROT 6.6  --      140 140   K 4.0  4.0 3.8   CO2 23  23 25     105 105   BUN 44*  44* 43*   CREATININE 2.1*  2.1* 2.1*   ALKPHOS 132  --    ALT 20  --    AST 15  --    BILITOT 0.7  --             Assessment/Plan:     TREY (acute kidney injury)    TREY on CKD III  Multiple AKIs in the past, appears to have a baseline SCr averaging around 1.7.  Admitted to OSH with TREY with sCr of 2.4, thought to be pre-renal in nature, which responded to IVF, becoming net + ~ 6L at the OSH.  He was diuresed in the hospital with stabilization of her sCr ~ 1.2, but developed hypotensive episodes and bradycardia likely leading to an ischemic ATN.    Differentials include AIN from recent ABx use, Ischemic ATN from hemodynamic changes (high likelihood given history) vs. Obstructive nephropathy (noted to have ~ 250 ml of urinary retention when gtz was inserted.    Plan/Recommendations:  Serial monitoring of CVP; 13 today.   No urgent need for RRT at this time  Urine output, 20-75  cc/hr. 1 L since replacing catheter last night.   Agree with diuretics   UPCR - 0.8 gm  C3C4 (noted to have hematuria, ?cath sample vs. Infectious GN); /16  Urine collected for urine microscopy today  Renal US - No cortical thinning, loss of corticomedullary distinction, mass, renal stone, or hydronephrosis bilaterally.          Thank you for your consult. I will follow-up with patient. Please contact us if you have any additional questions.    Naida Lindquist NP  Nephrology  [facility]  [phone]

## 2019-01-11 NOTE — SUBJECTIVE & OBJECTIVE
Interval History: sCr has improved to 2.1 on AM labs. Urine output 1 L since placing catheter last night. Patient remains on Lasix, Epi, and Dopamine drips.   Patient alert, denying c/o shortness of breath. Remains on 2 L via NC.     Review of patient's allergies indicates:   Allergen Reactions    Adhesive Other (See Comments)     blisters    Codeine Other (See Comments)     Blurred vision    Latex Hives    Ace inhibitors     Lipitor [atorvastatin] Other (See Comments)     Muscle spasms     Current Facility-Administered Medications   Medication Frequency    0.9%  NaCl infusion (for blood administration) Q24H PRN    acetaminophen tablet 1,000 mg TID    albuterol-ipratropium 2.5 mg-0.5 mg/3 mL nebulizer solution 3 mL Q6H PRN    ampicillin 2 g in sodium chloride 0.9 % 100 mL IVPB (ready to mix system) Q6H    bupivacaine (PF) 0.25% (2.5 mg/ml) injection PRN    cefTRIAXone (ROCEPHIN) 2 g in dextrose 5 % 50 mL IVPB Q12H    clopidogrel tablet 75 mg Daily    dextrose 50% injection 12.5 g PRN    dextrose 50% injection 25 g PRN    docusate sodium capsule 100 mg BID    DOPamine 400 mg in dextrose 5 % 250 mL infusion (premix) Continuous    EPINEPHrine (ADRENALIN) 5 mg in sodium chloride 0.9% 250 mL infusion Continuous    furosemide (LASIX) 2 mg/mL in sodium chloride 0.9% 100 mL infusion (conc: 2 mg/mL) Continuous    glucagon (human recombinant) injection 1 mg PRN    glucose chewable tablet 16 g PRN    glucose chewable tablet 24 g PRN    insulin aspart U-100 pen 0-5 Units QID (AC + HS) PRN    insulin aspart U-100 pen 5 Units TIDWM    insulin detemir U-100 pen 15 Units QHS    levothyroxine tablet 25 mcg Before breakfast    lidocaine HCL 20 mg/ml (2%) injection PRN    lidocaine HCL 20 mg/ml (2%) injection PRN    magnesium oxide tablet 800 mg Once    magnesium oxide tablet 800 mg BID    magnesium sulfate 2g in water 50mL IVPB (premix) PRN    magnesium sulfate 2g in water 50mL IVPB (premix) PRN     ondansetron injection 4 mg Q6H PRN    polyethylene glycol packet 17 g Daily    potassium chloride SA CR tablet 40 mEq BID    potassium chloride SA CR tablet 60 mEq Once    promethazine (PHENERGAN) 25 mg in dextrose 5 % 50 mL IVPB Q6H PRN    rosuvastatin tablet 20 mg QHS    senna-docusate 8.6-50 mg per tablet 2 tablet BID    sodium chloride 0.9% flush 5 mL PRN    tamsulosin 24 hr capsule 0.4 mg Daily    traMADol tablet 50 mg Q6H PRN    visipaque 320 iodixanol PRN       Objective:     Vital Signs (Most Recent):  Temp: 97 °F (36.1 °C) (01/11/19 1100)  Pulse: (!) 59 (01/11/19 1200)  Resp: 16 (01/11/19 1200)  BP: (!) 106/52 (01/11/19 1200)  SpO2: 98 % (01/11/19 1200)  O2 Device (Oxygen Therapy): nasal cannula (01/11/19 1118) Vital Signs (24h Range):  Temp:  [97 °F (36.1 °C)-98.5 °F (36.9 °C)] 97 °F (36.1 °C)  Pulse:  [53-65] 59  Resp:  [6-31] 16  SpO2:  [92 %-98 %] 98 %  BP: ()/(50-90) 106/52  Arterial Line BP: ()/(32-69) 116/51     Weight: 88.2 kg (194 lb 7.1 oz) (01/10/19 0601)  Body mass index is 24.97 kg/m².  Body surface area is 2.15 meters squared.    I/O last 3 completed shifts:  In: 2594.9 [P.O.:530; I.V.:1014.9; IV Piggyback:1050]  Out: 1371 [Urine:1371]    Physical Exam   Constitutional: He is oriented to person, place, and time. He appears well-developed and well-nourished. He appears ill.   HENT:   Head: Normocephalic and atraumatic.   Eyes: Conjunctivae and EOM are normal.   Neck: Normal range of motion. Neck supple. JVD present. No thyromegaly present.   Cardiovascular: Regular rhythm. Bradycardia present.   Pulmonary/Chest: Effort normal. No respiratory distress. He has no wheezes. He has rales.   Abdominal: Soft. He exhibits distension.   Musculoskeletal: Normal range of motion. He exhibits no edema or deformity.   Neurological: He is alert and oriented to person, place, and time.   Skin: Skin is warm and dry.   Psychiatric: He has a normal mood and affect. His behavior is normal.        Significant Labs:  CBC:   Recent Labs   Lab 01/11/19  0505   WBC 17.97*   RBC 3.52*   HGB 10.1*   HCT 31.6*   *   MCV 90   MCH 28.7   MCHC 32.0     CMP:   Recent Labs   Lab 01/11/19  0505 01/11/19  0823   *  247* 234*   CALCIUM 8.8  8.8 8.7   ALBUMIN 2.5*  --    PROT 6.6  --      140 140   K 4.0  4.0 3.8   CO2 23  23 25     105 105   BUN 44*  44* 43*   CREATININE 2.1*  2.1* 2.1*   ALKPHOS 132  --    ALT 20  --    AST 15  --    BILITOT 0.7  --

## 2019-01-11 NOTE — HPI
Mr. Solis is a 77 yo male with HFrEF, ICM s/p ICD, CAD, Afib, COPD and CKD III ([facility] sCr ~ 1.7) who presents as a transfer from [facility] on 1/06 for higher level of care for possible RV lead infection.  According to records from outside facility, he orginally presented to [facility] with chief complaint of weakness, fatigue, and diarrhea.  On initial presentation, he was found to be hypotensive with BP of 78/44 and SCr of 2.6.  He was administered 1L of IVF and was started on levo and transferred to [facility] for higher level of care.  While there, he was given 3L of IVF on presentation and was able to be weaned from levo with noted improvement in his SCr with volume expansion (SCr down to 1.4).  Blood cultures at [facility] grew Gram positive cocci and the patient was treated with Vanc + Cefepime. He was net +6L and EF was found to be 30-35%. RICARDO with possible RV lead echodensity suggestive of vegetation.  According to records, he was felt to be in HF after evaluating ECHO/and CXR and he was then initiated on diuretics.  RICARDO showed possible vegetation on RV lead.  He was transferred to [facility] for further evaluation and possible device extraction.      On arrival to [facility], kidney function had remained stable with a SCr ~ 1.2 (appears to have had multiple fluctuations in his SCr, likely from CRS), but stayed stable through the admission until 1/9-1/10 when SCr daniel from 1.4-1.7-4.1.  He underwent lead extraction on 1/9 and post-procedure, he was noted to have hypotensive episodes and bradycardia with SBP dropping into the 70's along with HR in the 40s.  He was administered atropine and started on dobutamine with improvement in his hemodynamics, but noted to have a decrease in his UOP along with decline in kidney function.  On 1/10, he became oliguric despite lasix gtt and labs revealing mild hyperkalemia of 5.9.  Pertinent UA findings 2+ protein 2+ blood, 19 RBCs (cath sample) and many  "bacteria. BNP >2000 on consultation and CXR with evidence of pulmonary edema with a CVP of 14.  Urinary catheter was discontinued this morning and patient was reporting feeling the "need to go".  Morin was placed on examination and noted ~ 250 ml of yellow urine return. Nephrology has been consulted for oliguric TREY.      "

## 2019-01-11 NOTE — PROGRESS NOTES
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 Continue to require dopamine    Tele: Junctional rhythm with underlying AF, HR have been 50-65    ROS  Objective:     Vital Signs (Most Recent):  Temp: 97.7 °F (36.5 °C) (01/11/19 0700)  Pulse: (!) 53 (01/11/19 0700)  Resp: (!) 21 (01/11/19 0700)  BP: (!) 97/55 (01/11/19 0700)  SpO2: 98 % (01/11/19 0700) Vital Signs (24h Range):  Temp:  [97.7 °F (36.5 °C)-99 °F (37.2 °C)] 97.7 °F (36.5 °C)  Pulse:  [53-65] 53  Resp:  [6-31] 21  SpO2:  [92 %-98 %] 98 %  BP: ()/(50-90) 97/55  Arterial Line BP: ()/(36-69) 117/51     Weight: 88.2 kg (194 lb 7.1 oz)  Body mass index is 24.97 kg/m².     SpO2: 98 %  O2 Device (Oxygen Therapy): nasal cannula    Physical Exam   Constitutional: He is oriented to person, place, and time. He appears well-developed and well-nourished. No distress.   HENT:   Head: Normocephalic and atraumatic.   Mouth/Throat: Oropharynx is clear and moist.   Eyes: Conjunctivae and EOM are normal. Pupils are equal, round, and reactive to light. No scleral icterus.   Neck: Normal range of motion. Neck supple. No JVD present.   Cardiovascular: Normal rate, normal heart sounds and intact distal pulses. An irregularly irregular rhythm present.   No murmur heard.  Pulses:       Radial pulses are 2+ on the right side, and 2+ on the left side.   Pulmonary/Chest: Effort normal and breath sounds normal. No respiratory distress.   Symmetrical expansion  Left chest incision is clean   Abdominal: Soft. Bowel sounds are normal. There is no hepatosplenomegaly. There is no tenderness.    Musculoskeletal: Normal range of motion. He exhibits edema (trace).   Groin healing well and non-tender   Neurological: He is alert and oriented to person, place, and time. No cranial nerve deficit.   Skin: Skin is warm and dry. No rash noted. He is not diaphoretic.   Well perfused in upper and lower extremities with good capillary refill   Psychiatric: He has a normal mood and affect. Judgment and thought content normal.       Significant Labs:   EP:   Recent Labs   Lab 01/10/19  0348 01/10/19  1653  01/11/19  0110 01/11/19  0505 01/11/19  0823    137   < > 139 140  140 140   K 4.3 5.9*   < > 4.0 4.0  4.0 3.8    104   < > 104 105  105 105   CO2 22* 23   < > 24 23  23 25   * 215*   < > 254* 247*  247* 234*   BUN 31* 54*   < > 42* 44*  44* 43*   CREATININE 1.7* 4.1*   < > 2.1* 2.1*  2.1* 2.1*   CALCIUM 8.9 8.6*   < > 9.0 8.8  8.8 8.7   PROT 6.3 6.0  --   --  6.6  --    ALBUMIN 2.5* 2.3*  --   --  2.5*  --    BILITOT 0.8 0.7  --   --  0.7  --    ALKPHOS 130 127  --   --  132  --    AST 19 15  --   --  15  --    ALT 23 19  --   --  20  --    ANIONGAP 15 10   < > 11 12  12 10   ESTGFRAFRICA 44.3* 15.3*   < > 34.3* 34.3*  34.3* 34.3*   EGFRNONAA 38.3* 13.2*   < > 29.7* 29.7*  29.7* 29.7*   WBC 20.54* 15.48*  --   --  17.97*  --    HGB 9.4* 9.3*  --   --  10.1*  --    HCT 30.6* 28.8*  --   --  31.6*  --    * 101*  --   --  108*  --     < > = values in this interval not displayed.     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 Likewise has a CardioMeMMS device in place. Is not pacemaker dependent. ID believes the  primary source is the vegetation. He is s/p extraction on 01/09, recovery complicated by slow junctional escape rhythm responding to dopamine.    - lead cultures obtained, NGTD, ID to follow  - agree with dopamine, wean as tolerated  - if junctional rhythm persists, will consider RICARDO/cardioversion on Monday as NSR may conduct and keep him from being chronotrope-dependent  - ok to restart anticoagulation tonight if no bleeding (01/11 PM)  - volume/HF management as per primary team         Oniel George MD  Cardiac Electrophysiology  [facility]

## 2019-01-11 NOTE — PROGRESS NOTES
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 Feeling much better with no further confusion, N/V or dysuria. UOP 1081 cc since Morin put back in yesterday, Lasix gtt started and Dopamine gtt started. No further hypotension with MAP's > 65. CVP 9, sVO2 pending. Remains in atrial fib with junctional escape rhythm, HR 50's. WCC decreasing, and all cultures done here with NGTD    Continuous Infusions:   DOPamine 5 mcg/kg/min (01/11/19 0737)    epinephrine infusion Stopped (01/09/19 1731)    furosemide (LASIX) 2 mg/mL infusion (non-titrating) 10 mg/hr (01/11/19 0700)     Scheduled Meds:   acetaminophen  1,000 mg Oral TID    ampicillin IVPB  2 g Intravenous Q6H    cefTRIAXone (ROCEPHIN) IVPB  2 g Intravenous Q12H    clopidogrel  75 mg Oral Daily    docusate sodium  100 mg Oral BID    insulin aspart U-100  5 Units Subcutaneous TIDWM    insulin detemir U-100  15 Units Subcutaneous QHS    levothyroxine  25 mcg Oral Before breakfast    magnesium oxide  800 mg Oral Once    magnesium oxide  800 mg Oral BID    polyethylene glycol  17 g Oral Daily    potassium chloride SA  40 mEq Oral BID    potassium chloride SA  60 mEq Oral Once    rosuvastatin  20 mg Oral QHS    senna-docusate 8.6-50 mg  2 tablet Oral BID    tamsulosin  0.4 mg Oral Daily     PRN Meds:sodium chloride, albuterol-ipratropium, bupivacaine (PF) 0.25% (2.5 mg/ml), dextrose 50%, dextrose 50%, glucagon (human recombinant), glucose, glucose, insulin aspart U-100, lidocaine HCL 20 mg/ml (2%), lidocaine HCL 20 mg/ml (2%), magnesium sulfate IVPB, magnesium sulfate IVPB, ondansetron, promethazine  (PHENERGAN) IVPB, sodium chloride 0.9%, traMADol, visipaque 320 iodixanol    Review of patient's allergies indicates:   Allergen Reactions    Adhesive Other (See Comments)     blisters    Codeine Other (See Comments)     Blurred vision    Latex Hives    Ace inhibitors     Lipitor [atorvastatin] Other (See Comments)     Muscle spasms     Objective:     Vital Signs (Most Recent):  Temp: 97.7 °F (36.5 °C) (01/11/19 0700)  Pulse: (!) 53 (01/11/19 0700)  Resp: (!) 21 (01/11/19 0700)  BP: (!) 97/55 (01/11/19 0700)  SpO2: 98 % (01/11/19 0700) Vital Signs (24h Range):  Temp:  [97.7 °F (36.5 °C)-99 °F (37.2 °C)] 97.7 °F (36.5 °C)  Pulse:  [53-65] 53  Resp:  [6-31] 21  SpO2:  [92 %-98 %] 98 %  BP: ()/(50-90) 97/55  Arterial Line BP: ()/(32-69) 113/48     Patient Vitals for the past 72 hrs (Last 3 readings):   Weight   01/10/19 0601 88.2 kg (194 lb 7.1 oz)   01/08/19 1959 88.2 kg (194 lb 7.1 oz)     Body mass index is 24.97 kg/m².      Intake/Output Summary (Last 24 hours) at 1/11/2019 1035  Last data filed at 1/11/2019 0700  Gross per 24 hour   Intake 1314.91 ml   Output 940 ml   Net 374.91 ml       Hemodynamic Parameters:       Telemetry: A fib with junctional escape    Physical Exam   Constitutional: He is oriented to person, place, and time. He appears well-developed and well-nourished.   HENT:   Head: Normocephalic and atraumatic.   Eyes: Conjunctivae and EOM are normal.   Neck: Normal range of motion. Neck supple. No JVD present. No thyromegaly present.   Cardiovascular: Regular rhythm.   Bradycardic   Pulmonary/Chest: Effort normal and breath sounds normal.   Abdominal: Soft.   Hypoactive bowel sounds   Musculoskeletal: Normal range of motion.   1+ HEATHER   Neurological: He is alert and oriented to person, place, and time.   Skin: Skin is warm and dry. Capillary refill takes 2 to 3 seconds.   Psychiatric: He has a normal mood and affect. His behavior is normal. Judgment and thought content normal.        Significant Labs:  CBC:  Recent Labs   Lab 01/10/19  0348 01/10/19  1653 01/11/19  0505   WBC 20.54* 15.48* 17.97*   RBC 3.35* 3.28* 3.52*   HGB 9.4* 9.3* 10.1*   HCT 30.6* 28.8* 31.6*   * 101* 108*   MCV 91 88 90   MCH 28.1 28.4 28.7   MCHC 30.7* 32.3 32.0     BNP:  Recent Labs   Lab 01/07/19  0115 01/10/19  1141   BNP 1,607* 2,076*     CMP:  Recent Labs   Lab 01/10/19  0348 01/10/19  1653  01/11/19  0110 01/11/19  0505 01/11/19  0823   * 215*   < > 254* 247*  247* 234*   CALCIUM 8.9 8.6*   < > 9.0 8.8  8.8 8.7   ALBUMIN 2.5* 2.3*  --   --  2.5*  --    PROT 6.3 6.0  --   --  6.6  --     137   < > 139 140  140 140   K 4.3 5.9*   < > 4.0 4.0  4.0 3.8   CO2 22* 23   < > 24 23  23 25    104   < > 104 105  105 105   BUN 31* 54*   < > 42* 44*  44* 43*   CREATININE 1.7* 4.1*   < > 2.1* 2.1*  2.1* 2.1*   ALKPHOS 130 127  --   --  132  --    ALT 23 19  --   --  20  --    AST 19 15  --   --  15  --    BILITOT 0.8 0.7  --   --  0.7  --     < > = values in this interval not displayed.      Coagulation:   Recent Labs   Lab 01/07/19 0115   INR 1.5*   APTT 27.4     LDH:  No results for input(s): LDH in the last 72 hours.  Microbiology:  Microbiology Results (last 7 days)     Procedure Component Value Units Date/Time    Blood culture (site 1) [[id number]] Collected:  01/07/19 0154    Order Status:  Completed Specimen:  Blood Updated:  01/11/19 0812     Blood Culture, Routine No Growth to date     Blood Culture, Routine No Growth to date     Blood Culture, Routine No Growth to date     Blood Culture, Routine No Growth to date     Blood Culture, Routine No Growth to date    Narrative:       Site # 1, aerobic and anaerobic    Blood culture (site 2) [[id number]] Collected:  01/07/19 0154    Order Status:  Completed Specimen:  Blood Updated:  01/11/19 0812     Blood Culture, Routine No Growth to date     Blood Culture, Routine No Growth to date     Blood Culture, Routine No Growth to date      Blood Culture, Routine No Growth to date     Blood Culture, Routine No Growth to date    Narrative:       Site # 2, aerobic only    Culture, Anaerobe [[id number]] Collected:  01/09/19 1402    Order Status:  Completed Specimen:  Other from Chest, Left Updated:  01/11/19 0728     Anaerobic Culture Culture in progress    Narrative:       #4 RV lead tip    Culture, Anaerobe [[id number]] Collected:  01/09/19 1322    Order Status:  Completed Specimen:  Other from Chest, Left Updated:  01/11/19 0725     Anaerobic Culture Culture in progress    Narrative:       #3 LV lead tip    Culture, Anaerobe [[id number]] Collected:  01/09/19 1314    Order Status:  Completed Specimen:  Other from Chest, Left Updated:  01/11/19 0724     Anaerobic Culture Culture in progress    Narrative:       #2 RA Lead tip    Culture, Anaerobe [[id number]] Collected:  01/09/19 1124    Order Status:  Completed Specimen:  Tissue from Chest, Left Updated:  01/11/19 0724     Anaerobic Culture Culture in progress    Narrative:       ICD pocket tissue    Urine culture [[id number]] Collected:  01/11/19 0115    Order Status:  Sent Specimen:  Urine, Catheterized Updated:  01/11/19 0125    Blood culture [[id number]] Collected:  01/10/19 1401    Order Status:  Completed Specimen:  Blood from Peripheral, Hand, Left Updated:  01/10/19 2145     Blood Culture, Routine No Growth to date    Blood culture [[id number]] Collected:  01/10/19 1401    Order Status:  Completed Specimen:  Blood from Peripheral, Forearm, Left Updated:  01/10/19 2145     Blood Culture, Routine No Growth to date    Blood culture [[id number]] Collected:  01/09/19 1657    Order Status:  Completed Specimen:  Blood from Peripheral, Hand, Right Updated:  01/10/19 2012     Blood Culture, Routine No Growth to date     Blood Culture, Routine No Growth to date    Aerobic culture [[id number]] Collected:  01/09/19 1322    Order Status:  Completed Specimen:  Other from Chest, Left Updated:  01/10/19 0843     Aerobic  Bacterial Culture No growth    Narrative:       #3 LV lead tip    Aerobic culture [[id number]] Collected:  01/09/19 1124    Order Status:  Completed Specimen:  Tissue from Chest, Left Updated:  01/10/19 0843     Aerobic Bacterial Culture No growth    Narrative:       ICD pocket tissue    Aerobic culture [[id number]] Collected:  01/09/19 1314    Order Status:  Completed Specimen:  Other from Chest, Left Updated:  01/10/19 0843     Aerobic Bacterial Culture No growth    Narrative:       #2 RA Lead tip    Aerobic culture [[id number]] Collected:  01/09/19 1402    Order Status:  Completed Specimen:  Other from Chest, Left Updated:  01/10/19 0843     Aerobic Bacterial Culture No growth    Narrative:       #4 RV lead tip    IV catheter culture [[id number]]     Order Status:  No result Specimen:  Catheter Tip, NOS     Culture, Respiratory [[id number]]     Order Status:  No result Specimen:  Respiratory from Sputum, Expectorated           I have reviewed all pertinent labs within the past 24 hours.    Estimated Creatinine Clearance: 34.8 mL/min (A) (based on SCr of 2.1 mg/dL (H)).    Diagnostic Results:  I have reviewed all pertinent imaging results/findings within the past 24 hours.    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 Patient had 2 vegetations on RV lead which is most likely source of bacteremia. Lead tip and ICD pocket cultured with NGTD  -Continue Ampicillin and CTX per ID recs (will treat for 6 weeks for vegetations then would perform surveillance cultures after treatment complete).  -Blood cultures are negative here thus far.  -WCC now trending down  -Continue holding Apixaban for now. Per EP, can resume anticoagulation on 1/13  -Will need Life Vest prior to discharge  -Appreciate GI's help. Plan for outpatient EGD and C-scope for eval  of anemia and Enterococcus bacteremia       Acute on chronic combined systolic and diastolic CHF, NYHA class 3    -ICM  -Last full 2D Echo 1/7/19: LVEF 20%, LVEDD 7.24 cm. Limited bedside echo done after ICD explant showed no pericardial effusion  -CVP 9. Continue diuresing with Lasix gtt at 10 mg/hr  -sVO2 pending  -Wean Dopamine keeping MAP > 65  -GDMT: Dig and Toprol D/C'd 2/2 junctional rhythm. Entresto D/C'd 2/2 transient hypotension during/after ICD explant and TREY  -Self declined for LVAD after w/u last April 2018       Atrial fibrillation    -Remains in A fib with junctional escape rhythm  -If junctional rhythm persists, will consider RICARDO/cardioversion on Monday as NSR may conduct and keep him from being chronotrope-dependent  -Wean Dopamine as tolerated keeping MAP > 65  -CHADS VASc 6  -Holding eliquis s/p ICD explant 1/9. Per EP, can resume anticoagulation on 1/13  -Cardiac monitoring     Type 2 diabetes mellitus with complication    -A1c 8.1  -Target -180 while hospitalized  -detemir 15, aspart 5 with sliding scale  -Consult Endocrine to help with management     Dysuria    - Resolved  - UA showed many bacteria - culture with NGTD         Dysphagia    - Consult SLP     Nausea & vomiting    - Resolved  - Antiemetics prn  - SLP consult to assess for safe swallowing, then advance diet as tolerated     Junctional bradycardia    - Se A fib     Endocarditis    - See ICD lead infection     Bacteremia    -see above     TREY (acute kidney injury)    - Appreciate Nephrology's help  - Was oliguric yesterday and creatinine jumped to 4.1 likely 2/2 ATN/hypotension. UOP since Morin put back in yesterday, Lasix gtt and Dopamine gtts started > 1L  - CVP 9, UOP > 1L since yesterday afternoon and creatinine has come back down to 2.1 (was 1.2 on admit)  - Continue Lasix gtt, wean Dopamine as tolerated  - Hold Entresto for now       Uninterrupted Critical Care/Counseling Time (not including procedures): 60  [place] [id number]  Heart Transplant  [facility]

## 2019-01-11 NOTE — PT/OT/SLP EVAL
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    Recommendations:                 General Recommendations:  Dysphagia therapy  Diet recommendations:  Dental Soft, Thin   Aspiration Precautions: 1 bite/sip at a time, Avoid talking while eating, Eliminate distractions, Feed only when awake/alert, Frequent oral care, HOB to 90 degrees, Meds whole 1 at a time, No straws, Small bites/sips and Strict aspiration precautions Please continue to monitor for signs and symptoms of aspiration and discontinue oral feeding should you notice any of the following: watery eyes, reddened facial area, wet vocal quality, increased work of breathing, change in respiratory status, increased congestion, coughing, fever, and/or change in cognitive status.   General Precautions: Standard, aspiration, hearing impaired  Communication strategies:  go to room if call light pushed    History:     Past Medical History:   Diagnosis Date    Chronic systolic congestive heart failure 3/22/2018    Coronary artery disease involving native coronary artery of native heart without angina pectoris 3/22/2018    ICD (implantable cardioverter-defibrillator) in place 3/22/2018    Ischemic cardiomyopathy 3/22/2018    Mixed hyperlipidemia 3/22/2018    Type 2 diabetes mellitus with complication 3/22/2018    VT (ventricular tachycardia) 3/22/2018       Past Surgical History:   Procedure Laterality Date    ECHOCARDIOGRAM,TRANSESOPHAGEAL N/A 1/9/2019    Performed by [facility] Provider at [facility] EP [facility]    EXTRACTION, ELECTRODE LEAD Left 1/9/2019    Performed by Werner Boggs MD at [facility] EP LAB    HEART CATH-RIGHT Right 4/5/2018    Performed by Elizabeth Wise MD at [facility] CATH LAB    Insertion, Pacemaker, Temporary Transvenous  1/9/2019    Performed by Werner Boggs MD at [facility] EP LAB       Social  "History: Patient lives with Spouse in home in [place], [place]. He is a Spouse, father, grandfather and great-grandfather.     Prior Intubation HX:  1/09/2019    Modified Barium Swallow: none prior at this facility     Chest X-Rays: 1/10/2019: Since the previous exam, right central venous catheter has been placed, tip overlies the distal SVC/right atrial junction.  Right PICC catheter stable.  There is no pneumothorax or significant interval detrimental change in the cardiopulmonary status since the previous exam, noting shallow inspiratory effort..    Prior diet: Regular, thin     Occupation/hobbies/homemaking: Retired, he enjoys hunting and fishing in his free time.     Subjective     Pt reviewed with nurse  Pt presents AAOx4, calm and cooperative  He explains, "I have to cut most things up because I don't have teeth"  Spouse explains, "he ate a little apple sauce with his medications earlier"    Pain/Comfort:  · Pain Rating 1: 0/10    Objective:   Pt found awake and alert in bed. HOB elevated with Nurse permission. Nurse and RT in and out of room t/o session.     Oral Musculature Evaluation  · Oral Musculature: WNL  · Dentition: edentulous  · Mucosal Quality: dry  · Mandibular Strength and Mobility: WNL  · Oral Labial Strength and Mobility: WNL  · Lingual Strength and Mobility: WNL  · Volitional Cough: elicited   · Volitional Swallow: elicited, timely   · Voice Prior to PO Intake: clear, mildly hoarse    Bedside Swallow Eval:   Consistencies Assessed:  · Thin liquids self-regualted cup edge sips x7  · Puree tsp bites x3  · Solids bites of rosamaria crackers x5     Oral Phase:   · Dry mouth  · Excess chewing    Pharyngeal Phase:   · throat clearing    Compensatory Strategies  · single sips/bites   · Alternating bites/sips    Treatment: Pt with delayed throat clear during initial cup edge sip thin liquids, no additional throat clearing noted on subsequent self-fed sips thin liquids. No overt S/S aspiration with trials of " puree or solid textures. Patient with mildly prolonged mastication with bites of solids and required use of liquid wash between bites of solids 2/2 xerostomia.  Pt and Spouse educated on SLP role, S/S aspiration, aspiration precautions, diet modifications for dental soft textures and ongoing SLP POC. Pt and Spouse verbalized understanding. No additional questions noted. White board updated.  Nurse remained at bedside with Pt upon SLP exit from room.  Findings reviewed MD team following session.    Assessment:     Jerry Solis is a 78 y.o. male with an SLP diagnosis of Oral Dysphagia.  He presents with risk of aspiration  2/2 fatigue.  Frequent oral care advised 2/2 xerostomia.  ST to continue to follow to monitor.     Goals:   Multidisciplinary Problems     SLP Goals        Problem: SLP Goal    Goal Priority Disciplines Outcome   SLP Goal     SLP Ongoing (interventions implemented as appropriate)   Description:  Speech Language Pathology Goals  Goals expected to be met by 1/18/2019  1. Pt will tolerate a dental soft diet with thin liquids w/o overt S/S aspiration  2. Educate Pt and family on S/S aspiration and aspiration precautions                       Plan:     · Patient to be seen:      · Plan of Care expires:  02/10/19  · Plan of Care reviewed with:  patient, spouse   · SLP Follow-Up:  Yes       Discharge recommendations:  other (see comments)(pending progress and PT/OT recs)   Barriers to Discharge:  None    Time Tracking:     SLP Treatment Date:   01/11/19  Speech Start Time:  1058  Speech Stop Time:  1115     Speech Total Time (min):  17 min    Billable Minutes: Eval Swallow and Oral Function 17    JOVANI Mancilla., [facility]-SLP  Speech-Language Pathology  Pager: [phone]      01/11/2019

## 2019-01-11 NOTE — ASSESSMENT & PLAN NOTE
-ICM  -Last full 2D Echo 1/7/19: LVEF 20%, LVEDD 7.24 cm. Limited bedside echo done after ICD explant showed no pericardial effusion  -CVP 9. Continue diuresing with Lasix gtt at 10 mg/hr  -sVO2 pending  -Wean Dopamine keeping MAP > 65  -GDMT: Dig and Toprol D/C'd 2/2 junctional rhythm. Entresto D/C'd 2/2 transient hypotension during/after ICD explant and TREY  -Self declined for LVAD after w/u last April 2018

## 2019-01-11 NOTE — SUBJECTIVE & OBJECTIVE
**Interval History: Events overnight noted. Feeling much better with no further confusion, N/V or dysuria. UOP 1081 cc since Morin put back in yesterday, Lasix gtt started and Dopamine gtt started. No further hypotension with MAP's > 65. CVP 9, sVO2 pending. Remains in atrial fib with junctional escape rhythm, HR 50's. WCC decreasing, and all cultures done here with NGTD    Continuous Infusions:   DOPamine 5 mcg/kg/min (01/11/19 0737)    epinephrine infusion Stopped (01/09/19 1731)    furosemide (LASIX) 2 mg/mL infusion (non-titrating) 10 mg/hr (01/11/19 0700)     Scheduled Meds:   acetaminophen  1,000 mg Oral TID    ampicillin IVPB  2 g Intravenous Q6H    cefTRIAXone (ROCEPHIN) IVPB  2 g Intravenous Q12H    clopidogrel  75 mg Oral Daily    docusate sodium  100 mg Oral BID    insulin aspart U-100  5 Units Subcutaneous TIDWM    insulin detemir U-100  15 Units Subcutaneous QHS    levothyroxine  25 mcg Oral Before breakfast    magnesium oxide  800 mg Oral Once    magnesium oxide  800 mg Oral BID    polyethylene glycol  17 g Oral Daily    potassium chloride SA  40 mEq Oral BID    potassium chloride SA  60 mEq Oral Once    rosuvastatin  20 mg Oral QHS    senna-docusate 8.6-50 mg  2 tablet Oral BID    tamsulosin  0.4 mg Oral Daily     PRN Meds:sodium chloride, albuterol-ipratropium, bupivacaine (PF) 0.25% (2.5 mg/ml), dextrose 50%, dextrose 50%, glucagon (human recombinant), glucose, glucose, insulin aspart U-100, lidocaine HCL 20 mg/ml (2%), lidocaine HCL 20 mg/ml (2%), magnesium sulfate IVPB, magnesium sulfate IVPB, ondansetron, promethazine (PHENERGAN) IVPB, sodium chloride 0.9%, traMADol, visipaque 320 iodixanol    Review of patient's allergies indicates:   Allergen Reactions    Adhesive Other (See Comments)     blisters    Codeine Other (See Comments)     Blurred vision    Latex Hives    Ace inhibitors     Lipitor [atorvastatin] Other (See Comments)     Muscle spasms     Objective:     Vital  Signs (Most Recent):  Temp: 97.7 °F (36.5 °C) (01/11/19 0700)  Pulse: (!) 53 (01/11/19 0700)  Resp: (!) 21 (01/11/19 0700)  BP: (!) 97/55 (01/11/19 0700)  SpO2: 98 % (01/11/19 0700) Vital Signs (24h Range):  Temp:  [97.7 °F (36.5 °C)-99 °F (37.2 °C)] 97.7 °F (36.5 °C)  Pulse:  [53-65] 53  Resp:  [6-31] 21  SpO2:  [92 %-98 %] 98 %  BP: ()/(50-90) 97/55  Arterial Line BP: ()/(32-69) 113/48     Patient Vitals for the past 72 hrs (Last 3 readings):   Weight   01/10/19 0601 88.2 kg (194 lb 7.1 oz)   01/08/19 1959 88.2 kg (194 lb 7.1 oz)     Body mass index is 24.97 kg/m².      Intake/Output Summary (Last 24 hours) at 1/11/2019 1035  Last data filed at 1/11/2019 0700  Gross per 24 hour   Intake 1314.91 ml   Output 940 ml   Net 374.91 ml       Hemodynamic Parameters:       Telemetry: A fib with junctional escape    Physical Exam   Constitutional: He is oriented to person, place, and time. He appears well-developed and well-nourished.   HENT:   Head: Normocephalic and atraumatic.   Eyes: Conjunctivae and EOM are normal.   Neck: Normal range of motion. Neck supple. No JVD present. No thyromegaly present.   Cardiovascular: Regular rhythm.   Bradycardic   Pulmonary/Chest: Effort normal and breath sounds normal.   Abdominal: Soft.   Hypoactive bowel sounds   Musculoskeletal: Normal range of motion.   1+ HEATHER   Neurological: He is alert and oriented to person, place, and time.   Skin: Skin is warm and dry. Capillary refill takes 2 to 3 seconds.   Psychiatric: He has a normal mood and affect. His behavior is normal. Judgment and thought content normal.       Significant Labs:  CBC:  Recent Labs   Lab 01/10/19  0348 01/10/19  1653 01/11/19  0505   WBC 20.54* 15.48* 17.97*   RBC 3.35* 3.28* 3.52*   HGB 9.4* 9.3* 10.1*   HCT 30.6* 28.8* 31.6*   * 101* 108*   MCV 91 88 90   MCH 28.1 28.4 28.7   MCHC 30.7* 32.3 32.0     BNP:  Recent Labs   Lab 01/07/19  0115 01/10/19  1141   BNP 1,607* 2,076*     CMP:  Recent Labs    Lab 01/10/19  0348 01/10/19  1653  01/11/19  0110 01/11/19  0505 01/11/19  0823   * 215*   < > 254* 247*  247* 234*   CALCIUM 8.9 8.6*   < > 9.0 8.8  8.8 8.7   ALBUMIN 2.5* 2.3*  --   --  2.5*  --    PROT 6.3 6.0  --   --  6.6  --     137   < > 139 140  140 140   K 4.3 5.9*   < > 4.0 4.0  4.0 3.8   CO2 22* 23   < > 24 23  23 25    104   < > 104 105  105 105   BUN 31* 54*   < > 42* 44*  44* 43*   CREATININE 1.7* 4.1*   < > 2.1* 2.1*  2.1* 2.1*   ALKPHOS 130 127  --   --  132  --    ALT 23 19  --   --  20  --    AST 19 15  --   --  15  --    BILITOT 0.8 0.7  --   --  0.7  --     < > = values in this interval not displayed.      Coagulation:   Recent Labs   Lab 01/07/19  0115   INR 1.5*   APTT 27.4     LDH:  No results for input(s): LDH in the last 72 hours.  Microbiology:  Microbiology Results (last 7 days)     Procedure Component Value Units Date/Time    Blood culture (site 1) [[id number]] Collected:  01/07/19 0154    Order Status:  Completed Specimen:  Blood Updated:  01/11/19 0812     Blood Culture, Routine No Growth to date     Blood Culture, Routine No Growth to date     Blood Culture, Routine No Growth to date     Blood Culture, Routine No Growth to date     Blood Culture, Routine No Growth to date    Narrative:       Site # 1, aerobic and anaerobic    Blood culture (site 2) [[id number]] Collected:  01/07/19 0154    Order Status:  Completed Specimen:  Blood Updated:  01/11/19 0812     Blood Culture, Routine No Growth to date     Blood Culture, Routine No Growth to date     Blood Culture, Routine No Growth to date     Blood Culture, Routine No Growth to date     Blood Culture, Routine No Growth to date    Narrative:       Site # 2, aerobic only    Culture, Anaerobe [[id number]] Collected:  01/09/19 1402    Order Status:  Completed Specimen:  Other from Chest, Left Updated:  01/11/19 0728     Anaerobic Culture Culture in progress    Narrative:       #4 RV lead tip    Culture, Anaerobe  [[id number]] Collected:  01/09/19 1322    Order Status:  Completed Specimen:  Other from Chest, Left Updated:  01/11/19 0725     Anaerobic Culture Culture in progress    Narrative:       #3 LV lead tip    Culture, Anaerobe [[id number]] Collected:  01/09/19 1314    Order Status:  Completed Specimen:  Other from Chest, Left Updated:  01/11/19 0724     Anaerobic Culture Culture in progress    Narrative:       #2 RA Lead tip    Culture, Anaerobe [[id number]] Collected:  01/09/19 1124    Order Status:  Completed Specimen:  Tissue from Chest, Left Updated:  01/11/19 0724     Anaerobic Culture Culture in progress    Narrative:       ICD pocket tissue    Urine culture [[id number]] Collected:  01/11/19 0115    Order Status:  Sent Specimen:  Urine, Catheterized Updated:  01/11/19 0125    Blood culture [[id number]] Collected:  01/10/19 1401    Order Status:  Completed Specimen:  Blood from Peripheral, Hand, Left Updated:  01/10/19 2145     Blood Culture, Routine No Growth to date    Blood culture [[id number]] Collected:  01/10/19 1401    Order Status:  Completed Specimen:  Blood from Peripheral, Forearm, Left Updated:  01/10/19 2145     Blood Culture, Routine No Growth to date    Blood culture [[id number]] Collected:  01/09/19 1657    Order Status:  Completed Specimen:  Blood from Peripheral, Hand, Right Updated:  01/10/19 2012     Blood Culture, Routine No Growth to date     Blood Culture, Routine No Growth to date    Aerobic culture [[id number]] Collected:  01/09/19 1322    Order Status:  Completed Specimen:  Other from Chest, Left Updated:  01/10/19 0843     Aerobic Bacterial Culture No growth    Narrative:       #3 LV lead tip    Aerobic culture [[id number]] Collected:  01/09/19 1124    Order Status:  Completed Specimen:  Tissue from Chest, Left Updated:  01/10/19 0843     Aerobic Bacterial Culture No growth    Narrative:       ICD pocket tissue    Aerobic culture [[id number]] Collected:  01/09/19 1314    Order Status:  Completed  Specimen:  Other from Chest, Left Updated:  01/10/19 0843     Aerobic Bacterial Culture No growth    Narrative:       #2 RA Lead tip    Aerobic culture [[id number]] Collected:  01/09/19 1402    Order Status:  Completed Specimen:  Other from Chest, Left Updated:  01/10/19 0843     Aerobic Bacterial Culture No growth    Narrative:       #4 RV lead tip    IV catheter culture [[id number]]     Order Status:  No result Specimen:  Catheter Tip, NOS     Culture, Respiratory [[id number]]     Order Status:  No result Specimen:  Respiratory from Sputum, Expectorated           I have reviewed all pertinent labs within the past 24 hours.    Estimated Creatinine Clearance: 34.8 mL/min (A) (based on SCr of 2.1 mg/dL (H)).    Diagnostic Results:  I have reviewed all pertinent imaging results/findings within the past 24 hours.

## 2019-01-11 NOTE — ASSESSMENT & PLAN NOTE
BG goal 140-180    Increase Levemir to 17 units q HS  Increase Novolog to 6 units with meals  Low dose correction scale  BG monitoring AC/HS    Discharge planning:  TBD

## 2019-01-11 NOTE — PLAN OF CARE
Problem: Adult Inpatient Plan of Care  Goal: Plan of Care Review  No acute events throughout day, VS and assessment per flow sheet, patient progressing towards goals as tolerated, plan of care reviewed with Jerry Soils and family, all concerns addressed, will continue to monitor.

## 2019-01-11 NOTE — NURSING
"Notified FARIDEH Villareal that pt had an SVO2 of 44. NP put in orders for stat redraw. Called NP at 1118 with SVO2 of 46. NP said that she was going to calculate the pts CO and call me back. FARIDEH Villareal called and said that "CO number was 4.8 and we are going to keep managing the pt with the same treatment plan.    "

## 2019-01-11 NOTE — HPI
Reason for Consult: Management of T2DM, Hyperglycemia     Surgical Procedure and Date: Lead extraction 1/9/19    Diabetes diagnosis year: approximately 10 years ago    Lab Results   Component Value Date    HGBA1C 8.1 (H) 01/07/2019       Home Diabetes Medications: Lantus 50 units q HS, Novolog 25 units with meals (vials)    How often checking glucose at home? 3-4x per day   BG readings on regimen: AM 120s  Hypoglycemia on the regimen?  Yes - 2-3x per month  Missed doses on regimen?  Yes - 2x per week    Diabetes Complications include:     Hyperglycemia, Hypoglycemia , Diabetic retinopathy , Diabetic cataract  and Diabetic peripheral neuropathy     Complicating diabetes co morbidities:   CHF and History of CVA      HPI:   Patient is a 78 y.o. male with a diagnosis of DM2 and CHF who was admitted on 1/6/19 for possible RV lead infection.  Patient is s/p lead extraction on 1/9/19.  Patient's DM managed by PCP, Dr. Hdz.  Endocrine consulted for DM/BG management.

## 2019-01-11 NOTE — ASSESSMENT & PLAN NOTE
This is a 77yo gentleman with long-standing history of ICM with EF 35%, a 20-year history of ICD with upgrade to CRT-D in 2014 who presents for device extraction due to RV lead vegetation ~0.5cm in the setting of VRE bacteremia with a likely initial GI source. Likewise has a CardioMeMMS device in place. Is not pacemaker dependent. ID believes the primary source is the vegetation. He is s/p extraction on 01/09, recovery complicated by slow junctional escape rhythm responding to dopamine.    - lead cultures obtained, NGTD, ID to follow  - agree with dopamine, wean as tolerated  - if junctional rhythm persists, will consider RICARDO/cardioversion on Monday as NSR may conduct and keep him from being chronotrope-dependent  - ok to restart anticoagulation on 01/13  - volume/HF management as per primary team

## 2019-01-11 NOTE — ASSESSMENT & PLAN NOTE
TREY on CKD III  Multiple AKIs in the past, appears to have a baseline SCr averaging around 1.7.  Admitted to OSH with TREY with sCr of 2.4, thought to be pre-renal in nature, which responded to IVF, becoming net + ~ 6L at the OSH.  He was diuresed in the hospital with stabilization of her sCr ~ 1.2, but developed hypotensive episodes and bradycardia likely leading to an ischemic ATN.    Differentials include AIN from recent ABx use, Ischemic ATN from hemodynamic changes (high likelihood given history) vs. Obstructive nephropathy (noted to have ~ 250 ml of urinary retention when gtz was inserted.    Plan/Recommendations:  Serial monitoring of CVP; 13 today.   No urgent need for RRT at this time  Urine output, 20-75 cc/hr. 1 L since replacing catheter last night.   Agree with diuretics   UPCR - 0.8 gm  C3C4 (noted to have hematuria, ?cath sample vs. Infectious GN); /16  Urine collected for urine microscopy today  Renal US - No cortical thinning, loss of corticomedullary distinction, mass, renal stone, or hydronephrosis bilaterally.   Will f/u with these labs and determine need for further work-up.

## 2019-01-11 NOTE — NURSING
Notified FARIDEH Villareal that pt hadn't had any urine output since 11 AM with a bladder scan of 0 mL.

## 2019-01-11 NOTE — ASSESSMENT & PLAN NOTE
Managed per primary team  Avoid hypoglycemia  S/p lead extraction  Infection may elevate BG readings

## 2019-01-11 NOTE — ASSESSMENT & PLAN NOTE
-Remains in A fib with junctional escape rhythm  -If junctional rhythm persists, will consider RICARDO/cardioversion on Monday as NSR may conduct and keep him from being chronotrope-dependent  -Wean Dopamine as tolerated keeping MAP > 65  -CHADS VASc 6  -Holding eliquis s/p ICD explant 1/9. Per EP, can resume anticoagulation on 1/13  -Cardiac monitoring

## 2019-01-11 NOTE — ASSESSMENT & PLAN NOTE
- Appreciate Nephrology's help  - Was oliguric yesterday and creatinine jumped to 4.1 likely 2/2 ATN/hypotension. UOP since Morin put back in yesterday, Lasix gtt and Dopamine gtts started > 1L  - CVP 9, UOP > 1L since yesterday afternoon and creatinine has come back down to 2.1 (was 1.2 on admit)  - Continue Lasix gtt, wean Dopamine as tolerated  - Hold Entresto for now

## 2019-01-11 NOTE — SUBJECTIVE & OBJECTIVE
Interval History: Needed gtz placed overnight due to olguria, had mild retention (250cc), and intermittent confusion since then. Continue to require dopamine    Tele: Junctional rhythm with underlying AF, HR have been 50-65    ROS  Objective:     Vital Signs (Most Recent):  Temp: 97.7 °F (36.5 °C) (01/11/19 0700)  Pulse: (!) 53 (01/11/19 0700)  Resp: (!) 21 (01/11/19 0700)  BP: (!) 97/55 (01/11/19 0700)  SpO2: 98 % (01/11/19 0700) Vital Signs (24h Range):  Temp:  [97.7 °F (36.5 °C)-99 °F (37.2 °C)] 97.7 °F (36.5 °C)  Pulse:  [53-65] 53  Resp:  [6-31] 21  SpO2:  [92 %-98 %] 98 %  BP: ()/(50-90) 97/55  Arterial Line BP: ()/(36-69) 117/51     Weight: 88.2 kg (194 lb 7.1 oz)  Body mass index is 24.97 kg/m².     SpO2: 98 %  O2 Device (Oxygen Therapy): nasal cannula    Physical Exam   Constitutional: He is oriented to person, place, and time. He appears well-developed and well-nourished. No distress.   HENT:   Head: Normocephalic and atraumatic.   Mouth/Throat: Oropharynx is clear and moist.   Eyes: Conjunctivae and EOM are normal. Pupils are equal, round, and reactive to light. No scleral icterus.   Neck: Normal range of motion. Neck supple. No JVD present.   Cardiovascular: Normal rate, normal heart sounds and intact distal pulses. An irregularly irregular rhythm present.   No murmur heard.  Pulses:       Radial pulses are 2+ on the right side, and 2+ on the left side.   Pulmonary/Chest: Effort normal and breath sounds normal. No respiratory distress.   Symmetrical expansion  Left chest incision is clean   Abdominal: Soft. Bowel sounds are normal. There is no hepatosplenomegaly. There is no tenderness.   Musculoskeletal: Normal range of motion. He exhibits edema (trace).   Groin healing well and non-tender   Neurological: He is alert and oriented to person, place, and time. No cranial nerve deficit.   Skin: Skin is warm and dry. No rash noted. He is not diaphoretic.   Well perfused in upper and lower  extremities with good capillary refill   Psychiatric: He has a normal mood and affect. Judgment and thought content normal.       Significant Labs:   EP:   Recent Labs   Lab 01/10/19  0348 01/10/19  1653  01/11/19  0110 01/11/19  0505 01/11/19  0823    137   < > 139 140  140 140   K 4.3 5.9*   < > 4.0 4.0  4.0 3.8    104   < > 104 105  105 105   CO2 22* 23   < > 24 23  23 25   * 215*   < > 254* 247*  247* 234*   BUN 31* 54*   < > 42* 44*  44* 43*   CREATININE 1.7* 4.1*   < > 2.1* 2.1*  2.1* 2.1*   CALCIUM 8.9 8.6*   < > 9.0 8.8  8.8 8.7   PROT 6.3 6.0  --   --  6.6  --    ALBUMIN 2.5* 2.3*  --   --  2.5*  --    BILITOT 0.8 0.7  --   --  0.7  --    ALKPHOS 130 127  --   --  132  --    AST 19 15  --   --  15  --    ALT 23 19  --   --  20  --    ANIONGAP 15 10   < > 11 12  12 10   ESTGFRAFRICA 44.3* 15.3*   < > 34.3* 34.3*  34.3* 34.3*   EGFRNONAA 38.3* 13.2*   < > 29.7* 29.7*  29.7* 29.7*   WBC 20.54* 15.48*  --   --  17.97*  --    HGB 9.4* 9.3*  --   --  10.1*  --    HCT 30.6* 28.8*  --   --  31.6*  --    * 101*  --   --  108*  --     < > = values in this interval not displayed.     Micro:  All cultures (blood) and lead cultures are NGTD

## 2019-01-11 NOTE — ASSESSMENT & PLAN NOTE
-Vegetation seen on RICARDO  -Appreciate EP's help. 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 Patient had 2 vegetations on RV lead which is most likely source of bacteremia. Lead tip and ICD pocket cultured with NGTD  -Continue Ampicillin and CTX per ID recs (will treat for 6 weeks for vegetations then would perform surveillance cultures after treatment complete).  -Blood cultures are negative here thus far.  -WCC now trending down  -Continue holding Apixaban for now. Per EP, can resume anticoagulation on 1/13  -Will need Life Vest prior to discharge  -Appreciate GI's help. Plan for outpatient EGD and C-scope for eval of anemia and Enterococcus bacteremia

## 2019-01-11 NOTE — SUBJECTIVE & OBJECTIVE
Interval History: NAEO; blood cultures remain NGTD; less nauseated today.     Review of Systems   Constitutional: Positive for appetite change. Negative for chills, fatigue and fever.   HENT: Positive for hearing loss (very hard of hearing). Negative for congestion, dental problem and ear pain.    Eyes: Negative.    Respiratory: Positive for shortness of breath (chronic, at baseline). Negative for cough and wheezing.    Cardiovascular: Negative for chest pain, palpitations and leg swelling.   Gastrointestinal: Positive for nausea. Negative for abdominal distention, abdominal pain, constipation, diarrhea and vomiting.   Genitourinary: Negative for flank pain and frequency.   Musculoskeletal: Negative for arthralgias, back pain, myalgias and neck pain.   Skin: Negative for rash.   Neurological: Positive for weakness. Negative for dizziness, light-headedness, numbness and headaches.   Psychiatric/Behavioral: Negative for confusion and sleep disturbance. The patient is not nervous/anxious.      Objective:     Vital Signs (Most Recent):  Temp: 97 °F (36.1 °C) (01/11/19 1100)  Pulse: (!) 59 (01/11/19 1200)  Resp: 16 (01/11/19 1200)  BP: (!) 106/52 (01/11/19 1200)  SpO2: 98 % (01/11/19 1200) Vital Signs (24h Range):  Temp:  [97 °F (36.1 °C)-98.5 °F (36.9 °C)] 97 °F (36.1 °C)  Pulse:  [53-65] 59  Resp:  [6-31] 16  SpO2:  [92 %-98 %] 98 %  BP: ()/(50-90) 106/52  Arterial Line BP: ()/(32-69) 116/51     Weight: 88.2 kg (194 lb 7.1 oz)  Body mass index is 24.97 kg/m².    Estimated Creatinine Clearance: 34.8 mL/min (A) (based on SCr of 2.1 mg/dL (H)).    Physical Exam   Constitutional: He is oriented to person, place, and time. He appears well-developed and well-nourished.   HENT:   Head: Normocephalic and atraumatic.   Eyes: Conjunctivae are normal. No scleral icterus.   Neck: Normal range of motion. Neck supple.   Cardiovascular: Normal rate. An irregularly irregular rhythm present.   No murmur heard.  Left upper  chest ICD extraction site - dressing in place, c/d/i.    Pulmonary/Chest: Effort normal. No respiratory distress. He has no wheezes.   Crackles at bases     Abdominal: Soft. He exhibits no distension.   Musculoskeletal: He exhibits no edema.   Neurological: He is alert and oriented to person, place, and time.   Skin: Skin is warm and dry.   PICC RUE - c/d/i   Psychiatric: He has a normal mood and affect. His behavior is normal.   Vitals reviewed.      Significant Labs:   Blood Culture:   Recent Labs   Lab 01/07/19  0154 01/09/19  1657 01/10/19  1401   LABBLOO No Growth to date  No Growth to date  No Growth to date  No Growth to date  No Growth to date  No Growth to date  No Growth to date  No Growth to date  No Growth to date  No Growth to date No Growth to date  No Growth to date No Growth to date  No Growth to date     CBC:   Recent Labs   Lab 01/10/19  0348 01/10/19  1653 01/11/19  0505   WBC 20.54* 15.48* 17.97*   HGB 9.4* 9.3* 10.1*   HCT 30.6* 28.8* 31.6*   * 101* 108*     CMP:   Recent Labs   Lab 01/10/19  0348 01/10/19  1653  01/11/19  0110 01/11/19  0505 01/11/19  0823    137   < > 139 140  140 140   K 4.3 5.9*   < > 4.0 4.0  4.0 3.8    104   < > 104 105  105 105   CO2 22* 23   < > 24 23  23 25   * 215*   < > 254* 247*  247* 234*   BUN 31* 54*   < > 42* 44*  44* 43*   CREATININE 1.7* 4.1*   < > 2.1* 2.1*  2.1* 2.1*   CALCIUM 8.9 8.6*   < > 9.0 8.8  8.8 8.7   PROT 6.3 6.0  --   --  6.6  --    ALBUMIN 2.5* 2.3*  --   --  2.5*  --    BILITOT 0.8 0.7  --   --  0.7  --    ALKPHOS 130 127  --   --  132  --    AST 19 15  --   --  15  --    ALT 23 19  --   --  20  --    ANIONGAP 15 10   < > 11 12  12 10   EGFRNONAA 38.3* 13.2*   < > 29.7* 29.7*  29.7* 29.7*    < > = values in this interval not displayed.       Significant Imaging: I have reviewed all pertinent imaging results/findings within the past 24 hours.

## 2019-01-11 NOTE — NURSING
Notified FARIDEH Villareal that pt hadn't had any urine output but has a bladder scan of 50mL. See orders.

## 2019-01-12 NOTE — PROGRESS NOTES
"[facility]  Endocrinology  Progress Note    Admit Date: 2019     Reason for Consult: Management of T2DM, Hyperglycemia     Surgical Procedure and Date: Lead extraction 19    Diabetes diagnosis year: approximately 10 years ago    Lab Results   Component Value Date    HGBA1C 8.1 (H) 2019       Home Diabetes Medications: Lantus 50 units q HS, Novolog 25 units with meals (vials)    How often checking glucose at home? 3-4x per day   BG readings on regimen: AM 120s  Hypoglycemia on the regimen?  Yes - 2-3x per month  Missed doses on regimen?  Yes - 2x per week    Diabetes Complications include:     Hyperglycemia, Hypoglycemia , Diabetic retinopathy , Diabetic cataract  and Diabetic peripheral neuropathy     Complicating diabetes co morbidities:   CHF and History of CVA      HPI:   Patient is a 78 y.o. male with a diagnosis of DM2 and CHF who was admitted on 19 for possible RV lead infection.  Patient is s/p lead extraction on 19.  Patient's DM managed by PCP, Dr. Hdz.  Endocrine consulted for DM/BG management.            Interval HPI:   Overnight events: Remains in CMICU, increased respiratory effort overnight. Improved BG control overnight.  On IV antibiotics, dopamine, and lasix.  Eatin%  Nausea: No  Hypoglycemia and intervention: No  Fever: No  TPN and/or TF: No    BP (!) 106/56 (BP Location: Left arm, Patient Position: Sitting)   Pulse 68   Temp 98 °F (36.7 °C) (Oral)   Resp 18   Ht 6' 2" (1.88 m)   Wt 88.2 kg (194 lb 7.1 oz)   SpO2 97%   BMI 24.97 kg/m²      Labs Reviewed and Include    Recent Labs   Lab 19  0416   *  162*   CALCIUM 8.7  8.7   ALBUMIN 2.4*   PROT 6.7     141   K 3.7  3.7   CO2 24  24     105   BUN 43*  43*   CREATININE 1.8*  1.8*   ALKPHOS 137*   ALT 17   AST 17   BILITOT 0.8     Lab Results   Component Value Date    WBC 16.81 (H) 2019    HGB 9.7 (L) 2019    HCT 31.2 (L) 2019    MCV 89 " 01/12/2019     (L) 01/12/2019     No results for input(s): TSH, FREET4 in the last 168 hours.  Lab Results   Component Value Date    HGBA1C 8.1 (H) 01/07/2019       Nutritional status:   Body mass index is 24.97 kg/m².  Lab Results   Component Value Date    ALBUMIN 2.4 (L) 01/12/2019    ALBUMIN 2.5 (L) 01/11/2019    ALBUMIN 2.3 (L) 01/10/2019     No results found for: PREALBUMIN    Estimated Creatinine Clearance: 40.6 mL/min (A) (based on SCr of 1.8 mg/dL (H)).    Accu-Checks  Recent Labs     01/09/19  1633 01/09/19  2226 01/10/19  1235 01/10/19  1919 01/10/19  2227 01/11/19  0829 01/11/19  1055 01/11/19  1721 01/11/19  2222 01/12/19  0733   POCTGLUCOSE 208* 182* 236* 297* 241* 211* 198* 225* 211* 149*       Current Medications and/or Treatments Impacting Glycemic Control  Immunotherapy:    Immunosuppressants     None        Steroids:   Hormones (From admission, onward)    None        Pressors:    Autonomic Drugs (From admission, onward)    Start     Stop Route Frequency Ordered    01/09/19 2337  DOPamine 400 mg in dextrose 5 % 250 mL infusion (premix)     Question Answer Comment   Titrate by: (in mcg/kg/min) 5    Titrate interval: (in minutes) 15    To maintain goal of: SBP (in mmHG)    Greater than: 90    Maximum dose: (in mcg/kg/min) 20        -- IV Continuous 01/09/19 2341    01/09/19 1745  EPINEPHrine (ADRENALIN) 5 mg in sodium chloride 0.9% 250 mL infusion     Question Answer Comment   Titrate by: (in mcg/kg/min) 0.02    Titrate interval: (in minutes) 5    Titrate to maintain: (SBP or MAP or Cardiac Index) SBP    Greater than: (in mmHg) 90    Maximum dose: (in mcg/kg/min) 2        -- IV Continuous 01/09/19 1639        Hyperglycemia/Diabetes Medications:   Antihyperglycemics (From admission, onward)    Start     Stop Route Frequency Ordered    01/11/19 2100  insulin detemir U-100 pen 17 Units      -- SubQ Nightly 01/11/19 1259    01/11/19 1644  insulin aspart U-100 pen 6 Units      -- SubQ 3 times daily  with meals 01/11/19 1259    01/11/19 1358  insulin aspart U-100 pen 0-5 Units      -- SubQ Before meals & nightly PRN 01/11/19 1259          ASSESSMENT and PLAN    * 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         Atrial fibrillation    May affect BG readings if uncontrolled             Timo Avalos NP  Endocrinology  [facility]

## 2019-01-12 NOTE — SUBJECTIVE & OBJECTIVE
Interval History: Persisting shortness of breath, dopamine increased to 15mcg/kg/min    Tele: Junctional rhythm with underlying A-fib, more frequent PVCs. HR 55-65    ROS  Objective:     Vital Signs (Most Recent):  Temp: 98 °F (36.7 °C) (01/12/19 0701)  Pulse: 61 (01/12/19 0833)  Resp: (!) 21 (01/12/19 0833)  BP: (!) 106/56 (01/12/19 0701)  SpO2: 98 % (01/12/19 0833) Vital Signs (24h Range):  Temp:  [97 °F (36.1 °C)-98 °F (36.7 °C)] 98 °F (36.7 °C)  Pulse:  [53-68] 61  Resp:  [11-38] 21  SpO2:  [93 %-100 %] 98 %  BP: ()/(50-61) 106/56  Arterial Line BP: ()/(38-68) 107/55     Weight: 88.2 kg (194 lb 7.1 oz)  Body mass index is 24.97 kg/m².     SpO2: 98 %  O2 Device (Oxygen Therapy): nasal cannula    Physical Exam   Constitutional: He is oriented to person, place, and time. He appears well-developed and well-nourished. No distress.   HENT:   Head: Normocephalic and atraumatic.   Mouth/Throat: Oropharynx is clear and moist.   Eyes: Conjunctivae and EOM are normal. Pupils are equal, round, and reactive to light. No scleral icterus.   Neck: Normal range of motion. Neck supple. No JVD present.   Cardiovascular: Regular rhythm, normal heart sounds and intact distal pulses. Bradycardia present.   No murmur heard.  Pulses:       Radial pulses are 2+ on the right side, and 2+ on the left side.   Pulmonary/Chest: Effort normal and breath sounds normal. No respiratory distress.   Symmetrical expansion  Left chest incision is clean   Abdominal: Soft. Bowel sounds are normal. There is no hepatosplenomegaly. There is no tenderness.   Musculoskeletal: Normal range of motion.   Groin healing well and non-tender   Neurological: He is alert and oriented to person, place, and time. No cranial nerve deficit.   Skin: Skin is warm and dry. No rash noted. He is not diaphoretic.   Well perfused in upper and lower extremities with good capillary refill   Psychiatric: He has a normal mood and affect. Judgment and thought content  normal.       Significant Labs:   EP:   Recent Labs   Lab 01/10/19  1653  01/11/19  0505  01/11/19  1411 01/11/19  2108 01/12/19  0416      < > 140  140   < > 139 139 141  141   K 5.9*   < > 4.0  4.0   < > 3.6 3.7 3.7  3.7      < > 105  105   < > 104 105 105  105   CO2 23   < > 23  23   < > 25 23 24  24   *   < > 247*  247*   < > 264* 207* 162*  162*   BUN 54*   < > 44*  44*   < > 45* 45* 43*  43*   CREATININE 4.1*   < > 2.1*  2.1*   < > 2.1* 1.9* 1.8*  1.8*   CALCIUM 8.6*   < > 8.8  8.8   < > 8.6* 8.7 8.7  8.7   PROT 6.0  --  6.6  --   --   --  6.7   ALBUMIN 2.3*  --  2.5*  --   --   --  2.4*   BILITOT 0.7  --  0.7  --   --   --  0.8   ALKPHOS 127  --  132  --   --   --  137*   AST 15  --  15  --   --   --  17   ALT 19  --  20  --   --   --  17   ANIONGAP 10   < > 12  12   < > 10 11 12  12   ESTGFRAFRICA 15.3*   < > 34.3*  34.3*   < > 34.3* 38.7* 41.3*  41.3*   EGFRNONAA 13.2*   < > 29.7*  29.7*   < > 29.7* 33.5* 35.8*  35.8*   WBC 15.48*  --  17.97*  --   --   --  16.81*   HGB 9.3*  --  10.1*  --   --   --  9.7*   HCT 28.8*  --  31.6*  --   --   --  31.2*   *  --  108*  --   --   --  101*    < > = values in this interval not displayed.

## 2019-01-12 NOTE — PROGRESS NOTES
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 HR 55-65    ROS  Objective:     Vital Signs (Most Recent):  Temp: 98 °F (36.7 °C) (01/12/19 0701)  Pulse: 61 (01/12/19 0833)  Resp: (!) 21 (01/12/19 0833)  BP: (!) 106/56 (01/12/19 0701)  SpO2: 98 % (01/12/19 0833) Vital Signs (24h Range):  Temp:  [97 °F (36.1 °C)-98 °F (36.7 °C)] 98 °F (36.7 °C)  Pulse:  [53-68] 61  Resp:  [11-38] 21  SpO2:  [93 %-100 %] 98 %  BP: ()/(50-61) 106/56  Arterial Line BP: ()/(38-68) 107/55     Weight: 88.2 kg (194 lb 7.1 oz)  Body mass index is 24.97 kg/m².     SpO2: 98 %  O2 Device (Oxygen Therapy): nasal cannula    Physical Exam   Constitutional: He is oriented to person, place, and time. He appears well-developed and well-nourished. No distress.   HENT:   Head: Normocephalic and atraumatic.   Mouth/Throat: Oropharynx is clear and moist.   Eyes: Conjunctivae and EOM are normal. Pupils are equal, round, and reactive to light. No scleral icterus.   Neck: Normal range of motion. Neck supple. No JVD present.   Cardiovascular: Regular rhythm, normal heart sounds and intact distal pulses. Bradycardia present.   No murmur heard.  Pulses:       Radial pulses are 2+ on the right side, and 2+ on the left side.   Pulmonary/Chest: Effort normal and breath sounds normal. No respiratory distress.   Symmetrical expansion  Left chest incision is clean   Abdominal: Soft. Bowel sounds are normal. There is no hepatosplenomegaly. There is no tenderness.   Musculoskeletal: Normal range of motion.   Groin healing well and non-tender    Neurological: He is alert and oriented to person, place, and time. No cranial nerve deficit.   Skin: Skin is warm and dry. No rash noted. He is not diaphoretic.   Well perfused in upper and lower extremities with good capillary refill   Psychiatric: He has a normal mood and affect. Judgment and thought content normal.       Significant Labs:   EP:   Recent Labs   Lab 01/10/19  1653  01/11/19  0505  01/11/19  1411 01/11/19  2108 01/12/19  0416      < > 140  140   < > 139 139 141  141   K 5.9*   < > 4.0  4.0   < > 3.6 3.7 3.7  3.7      < > 105  105   < > 104 105 105  105   CO2 23   < > 23  23   < > 25 23 24  24   *   < > 247*  247*   < > 264* 207* 162*  162*   BUN 54*   < > 44*  44*   < > 45* 45* 43*  43*   CREATININE 4.1*   < > 2.1*  2.1*   < > 2.1* 1.9* 1.8*  1.8*   CALCIUM 8.6*   < > 8.8  8.8   < > 8.6* 8.7 8.7  8.7   PROT 6.0  --  6.6  --   --   --  6.7   ALBUMIN 2.3*  --  2.5*  --   --   --  2.4*   BILITOT 0.7  --  0.7  --   --   --  0.8   ALKPHOS 127  --  132  --   --   --  137*   AST 15  --  15  --   --   --  17   ALT 19  --  20  --   --   --  17   ANIONGAP 10   < > 12  12   < > 10 11 12  12   ESTGFRAFRICA 15.3*   < > 34.3*  34.3*   < > 34.3* 38.7* 41.3*  41.3*   EGFRNONAA 13.2*   < > 29.7*  29.7*   < > 29.7* 33.5* 35.8*  35.8*   WBC 15.48*  --  17.97*  --   --   --  16.81*   HGB 9.3*  --  10.1*  --   --   --  9.7*   HCT 28.8*  --  31.6*  --   --   --  31.2*   *  --  108*  --   --   --  101*    < > = values in this interval not displayed.         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 Likewise has a CardioMeMMS device in place. Is not pacemaker  dependent. ID believes the primary source is the vegetation. He is s/p extraction on 01/09, recovery complicated by slow junctional escape rhythm responding to dopamine.    - lead cultures obtained, ID to follow  - agree with dopamine, wean as tolerated  - if junctional rhythm persists, will plan on RICARDO/cardioversion on Monday as NSR may conduct and keep him from being chronotrope-dependent  - ok to start coumadin with heparin bridge  - volume/HF management as per primary team         Oniel George MD  Cardiac Electrophysiology  [facility]

## 2019-01-12 NOTE — PLAN OF CARE
Problem: Adult Inpatient Plan of Care  Goal: Plan of Care Review  Outcome: Ongoing (interventions implemented as appropriate)    No acute events throughout day. See vital signs and assessments in flowsheets. See below for updates on today's progress.     Pulmonary: Patient has been reporting SOB throughout the night. Oxygen titrated to 3L NC from 2L. HOB has been at least 45 degrees. He expressed some relief when he sat up in the chair for a few hours.    Cardiovascular: Patient has been maintaining a MAP >65. Unable to wean dopamine due to MAP dropping to 63-64 when titrated down. SBP low 100's-120's with A-line. Heart rate 58-60's. Latest CVPs 10/10/9. SVO2 this morning 54.    Neurological: AAOx4. Patient remains afebrile. Pulses palpable. Pupils unequal due to pt having a piece of steel go in his left eye.    Gastrointestinal: bowel sounds hypoactive. Bowel regimen in place. Abdomen rounded and nondistended.    Genitourinary: Morin in place. Hourly output between 90-250ml.    Endocrine: Blood glucose monitoring ACHS    Integumentary/Other: Bilateral femoral groin site soft with no drainage or hematoma formation.    Infusions: dopamine @ 15mcg/kg/min, lasix @ 20mg/hr, ampicillin abx    Patient progressing towards goals as tolerated, plan of care communicated and reviewed with Jerry Solis and family. All concerns addressed. Will continue to monitor.

## 2019-01-12 NOTE — ASSESSMENT & PLAN NOTE
BG goal 140-180    Levemir 17 units q HS  Novolog 6 units with meals  Low dose correction scale  BG monitoring AC/HS    Discharge planning:  TBD

## 2019-01-12 NOTE — ASSESSMENT & PLAN NOTE
78 year old man with history of CHF, s/p ICD placement (> 20 years ago; Generator exchange 2008) and s/p cardioMems heart failure monitoring device placement 5/2018, Afib (on eliquis), DM, HTN, COPD, history of CVA who presented to [facility] with nausea, vomiting, diarrhea and fevers.  Found to be hypotensive and with TREY.  Required pressor support and was transferred to [facility] where blood cultures of 1/2/18  found to be positive for Enterococcus faecalis, and RICARDO showed vegetation on RV lead.  [facility] team reviewed films and confirmed presence of vegetation.   He was transferred to [facility] for further evaluation and possible device extraction.      No repeat blood cultures at [facility].  Repeat blood cultures of 1/7 on admission here NGTD      Source unclear - likely GI.   Denies any problems or history of problems with ICD pocket.   Reports episode of food poisoning a couple of months ago.   Patient reports a loss of appetite for approximately the past 3 -4 weeks and has lost approx 20 pounds during that time.   He denies any fevers, chills,sweats over the past few weeks until this past Monday when he was admitted.      Last colonoscopy was 2 years ago.  He has q 3 year surveillance colonoscopies as he has polyps.      Now POD#2 lead/device extraction. RICARDO pre-extraction showed 2 vegetations on RV lead.  No vegetations noted on valves. Post-extraction RICARDO  - no masses present in the right atrium or attached to the tricuspid valve following removal of the leads.     Afebrile. Stable leukocytosis. Less nausea today.       Plan/recommendations:  1.  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 Anticipated duration of antibiotics - 6 weeks post extraction.  Estimated end date 2/20/19  2.  Will follow lead and post-op blood cultures.   3.  If urgently necessary from a cardiac standpoint, it is okay to proceed  with implanting new device if repeat blood cultures taken post extraction remain negative X 72 hours.  Suspect known vegetations on RV leads, now removed, were primary source of infection; cardioMems likely not infected  4.  Recommend EGD and colonoscopy as part of work up  5. Will follow

## 2019-01-12 NOTE — SUBJECTIVE & OBJECTIVE
Interval History: Patient had some SOB last night that has improved this am.  Cultures remain negative.  HR persistently in 50's junctional rhythm with underlying atrial fib.  CVP; 9, SVO2: 54, CO: 5,28, CI: 2.45 and SVR: 1,045    Lines:  Right IJ 1/10/19    Continuous Infusions:   DOPamine 15 mcg/kg/min (01/12/19 1000)    epinephrine infusion Stopped (01/09/19 1731)    furosemide (LASIX) 2 mg/mL infusion (non-titrating) 20 mg/hr (01/12/19 1000)     Scheduled Meds:   acetaminophen  1,000 mg Oral TID    ampicillin (OMNIPEN) 4 g in  mL EXTENDED INFUSION  4,000 mg Intravenous Q8H    cefTRIAXone (ROCEPHIN) IVPB  2 g Intravenous Q12H    clopidogrel  75 mg Oral Daily    docusate sodium  100 mg Oral BID    insulin aspart U-100  6 Units Subcutaneous TIDWM    insulin detemir U-100  17 Units Subcutaneous QHS    levothyroxine  25 mcg Oral Before breakfast    magnesium oxide  800 mg Oral Once    magnesium oxide  800 mg Oral BID    polyethylene glycol  17 g Oral Daily    potassium chloride 10%  40 mEq Oral BID    potassium chloride SA  60 mEq Oral Once    rosuvastatin  20 mg Oral QHS    senna-docusate 8.6-50 mg  2 tablet Oral BID    tamsulosin  0.4 mg Oral Daily     PRN Meds:albuterol-ipratropium, bupivacaine (PF) 0.25% (2.5 mg/ml), dextrose 50%, dextrose 50%, glucagon (human recombinant), glucose, glucose, insulin aspart U-100, lidocaine HCL 20 mg/ml (2%), lidocaine HCL 20 mg/ml (2%), magnesium sulfate IVPB, magnesium sulfate IVPB, ondansetron, promethazine (PHENERGAN) IVPB, ramelteon, sodium chloride 0.9%, traMADol, visipaque 320 iodixanol    Review of patient's allergies indicates:   Allergen Reactions    Adhesive Other (See Comments)     blisters    Codeine Other (See Comments)     Blurred vision    Latex Hives    Ace inhibitors     Lipitor [atorvastatin] Other (See Comments)     Muscle spasms     Objective:     Vital Signs (Most Recent):  Temp: 98 °F (36.7 °C) (01/12/19 0959)  Pulse: 63  (01/12/19 1000)  Resp: (!) 26 (01/12/19 1000)  BP: (!) 106/56 (01/12/19 0701)  SpO2: 98 % (01/12/19 1000) Vital Signs (24h Range):  Temp:  [97.5 °F (36.4 °C)-98 °F (36.7 °C)] 98 °F (36.7 °C)  Pulse:  [57-68] 63  Resp:  [11-38] 26  SpO2:  [93 %-100 %] 98 %  BP: ()/(50-58) 106/56  Arterial Line BP: ()/(38-68) 118/52     Patient Vitals for the past 72 hrs (Last 3 readings):   Weight   01/12/19 1000 87.9 kg (193 lb 12.6 oz)   01/10/19 0601 88.2 kg (194 lb 7.1 oz)     Body mass index is 24.88 kg/m².      Intake/Output Summary (Last 24 hours) at 1/12/2019 1115  Last data filed at 1/12/2019 1000  Gross per 24 hour   Intake 2370.87 ml   Output 2914 ml   Net -543.13 ml     Physical Exam    Constitutional: Sitting in bed NAD; wife at bedside.  Patient is hard of hearing.   Neck: JVD elevation to mid neck    Cardiovascular:Irregularly irregular rhythm   and intact distal pulses. Exam reveals no gallop and no friction rub. No murmur heard. Pulmonary/Chest: Effort normal. No respiratory distress. He has wheezes. He has rales.   Abdominal: Soft. Bowel sounds are normal. He exhibits no distension. There is no tenderness.   Musculoskeletal: He exhibits edema (1+ bilateral lower extremity edema).   Neurological: He is alert and oriented to person, place, and time.   Skin: He is not diaphoretic.     Significant Labs:  CBC:  Recent Labs   Lab 01/10/19  1653 01/11/19  0505 01/12/19  0416   WBC 15.48* 17.97* 16.81*   RBC 3.28* 3.52* 3.51*   HGB 9.3* 10.1* 9.7*   HCT 28.8* 31.6* 31.2*   * 108* 101*   MCV 88 90 89   MCH 28.4 28.7 27.6   MCHC 32.3 32.0 31.1*     BNP:  Recent Labs   Lab 01/07/19  0115 01/10/19  1141   BNP 1,607* 2,076*     CMP:  Recent Labs   Lab 01/10/19  1653  01/11/19  0505  01/11/19  1411 01/11/19  2108 01/12/19  0416   *   < > 247*  247*   < > 264* 207* 162*  162*   CALCIUM 8.6*   < > 8.8  8.8   < > 8.6* 8.7 8.7  8.7   ALBUMIN 2.3*  --  2.5*  --   --   --  2.4*   PROT 6.0  --  6.6  --   --    --  6.7      < > 140  140   < > 139 139 141  141   K 5.9*   < > 4.0  4.0   < > 3.6 3.7 3.7  3.7   CO2 23   < > 23  23   < > 25 23 24  24      < > 105  105   < > 104 105 105  105   BUN 54*   < > 44*  44*   < > 45* 45* 43*  43*   CREATININE 4.1*   < > 2.1*  2.1*   < > 2.1* 1.9* 1.8*  1.8*   ALKPHOS 127  --  132  --   --   --  137*   ALT 19  --  20  --   --   --  17   AST 15  --  15  --   --   --  17   BILITOT 0.7  --  0.7  --   --   --  0.8    < > = values in this interval not displayed.      Coagulation:   Recent Labs   Lab 01/07/19  0115   INR 1.5*   APTT 27.4     LDH:  No results for input(s): LDH in the last 72 hours.  Microbiology:  Microbiology Results (last 7 days)     Procedure Component Value Units Date/Time    Aerobic culture [[id number]] Collected:  01/09/19 1322    Order Status:  Completed Specimen:  Other from Chest, Left Updated:  01/12/19 1023     Aerobic Bacterial Culture No growth    Narrative:       #3 LV lead tip    Aerobic culture [[id number]] Collected:  01/09/19 1124    Order Status:  Completed Specimen:  Tissue from Chest, Left Updated:  01/12/19 1023     Aerobic Bacterial Culture No growth    Narrative:       ICD pocket tissue    Aerobic culture [[id number]] Collected:  01/09/19 1314    Order Status:  Completed Specimen:  Other from Chest, Left Updated:  01/12/19 1023     Aerobic Bacterial Culture No growth    Narrative:       #2 RA Lead tip    Blood culture (site 1) [[id number]] Collected:  01/07/19 0154    Order Status:  Completed Specimen:  Blood Updated:  01/12/19 0812     Blood Culture, Routine No growth after 5 days.    Narrative:       Site # 1, aerobic and anaerobic    Blood culture (site 2) [[id number]] Collected:  01/07/19 0154    Order Status:  Completed Specimen:  Blood Updated:  01/12/19 0812     Blood Culture, Routine No growth after 5 days.    Narrative:       Site # 2, aerobic only    Urine culture [[id number]] Collected:  01/11/19 0115    Order Status:   Completed Specimen:  Urine, Catheterized Updated:  01/12/19 0418     Urine Culture, Routine No growth    Blood culture [[id number]] Collected:  01/09/19 1657    Order Status:  Completed Specimen:  Blood from Peripheral, Hand, Right Updated:  01/11/19 2012     Blood Culture, Routine No Growth to date     Blood Culture, Routine No Growth to date     Blood Culture, Routine No Growth to date    Blood culture [[id number]] Collected:  01/10/19 1401    Order Status:  Completed Specimen:  Blood from Peripheral, Hand, Left Updated:  01/11/19 1612     Blood Culture, Routine No Growth to date     Blood Culture, Routine No Growth to date    Blood culture [[id number]] Collected:  01/10/19 1401    Order Status:  Completed Specimen:  Blood from Peripheral, Forearm, Left Updated:  01/11/19 1612     Blood Culture, Routine No Growth to date     Blood Culture, Routine No Growth to date    Aerobic culture [[id number]] Collected:  01/09/19 1402    Order Status:  Completed Specimen:  Other from Chest, Left Updated:  01/11/19 1056     Aerobic Bacterial Culture Further report to follow    Narrative:       #4 RV lead tip    Culture, Anaerobe [[id number]] Collected:  01/09/19 1402    Order Status:  Completed Specimen:  Other from Chest, Left Updated:  01/11/19 0728     Anaerobic Culture Culture in progress    Narrative:       #4 RV lead tip    Culture, Anaerobe [[id number]] Collected:  01/09/19 1322    Order Status:  Completed Specimen:  Other from Chest, Left Updated:  01/11/19 0725     Anaerobic Culture Culture in progress    Narrative:       #3 LV lead tip    Culture, Anaerobe [[id number]] Collected:  01/09/19 1314    Order Status:  Completed Specimen:  Other from Chest, Left Updated:  01/11/19 0724     Anaerobic Culture Culture in progress    Narrative:       #2 RA Lead tip    Culture, Anaerobe [[id number]] Collected:  01/09/19 1124    Order Status:  Completed Specimen:  Tissue from Chest, Left Updated:  01/11/19 0724     Anaerobic Culture  Culture in progress    Narrative:       ICD pocket tissue    IV catheter culture [[id number]]     Order Status:  No result Specimen:  Catheter Tip, NOS     Culture, Respiratory [[id number]]     Order Status:  No result Specimen:  Respiratory from Sputum, Expectorated           I have reviewed all pertinent labs within the past 24 hours.    Estimated Creatinine Clearance: 40.6 mL/min (A) (based on SCr of 1.8 mg/dL (H)).    Diagnostic Results:  I have reviewed all pertinent imaging results/findings within the past 24 hours.

## 2019-01-12 NOTE — ASSESSMENT & PLAN NOTE
-Vegetation seen on RICARDO  -Appreciate EP's help. 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 Patient had 2 vegetations on RV lead which is most likely source of bacteremia. Lead tip and ICD pocket cultured with NGTD  -Continue Ampicillin and Ceftriaxone per ID recs Will treat for 6 weeks post extraction.  Estimated end date 2/20/19.  Will likely change to ampicillin extended infusion at some point.   -If urgently necessary from a cardiac standpoint, it is okay to proceed with implanting new device if repeat blood cultures taken post extraction remain negative X 72 hours.  Suspect known vegetations on RV leads, now removed, were primary source of infection; cardioMems likely not infected  -Blood cultures are negative here thus far.  -WCC now trending down  -Per EP; ok to start heparin and Coumadin.  No Eliquis.   -Will need Life Vest prior to discharge  -Appreciate GI's help. Plan for outpatient EGD and C-scope for eval of anemia and Enterococcus bacteremia

## 2019-01-12 NOTE — ASSESSMENT & PLAN NOTE
- Appreciate Nephrology's help  - Was oliguric and creatinine jumped to 4.1 likely 2/2 ATN/hypotension. UOP since Mikel put back in yesterday, Lasix gtt and Dopamine gtts started   - CVP 9, UOP and creatinine improving. (was 1.2 on admit)  - Continue Lasix gtt, wean Dopamine as tolerated  - Hold Entresto for now

## 2019-01-12 NOTE — PROGRESS NOTES
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  Found to be hypotensive and with TREY.  Required pressor support and was transferred to [facility] where blood cultures of 1/2/18  found to be positive for Enterococcus faecalis, and RICARDO showed vegetation on RV lead.  [facility] team reviewed films and confirmed presence of vegetation.   He was transferred to [facility] for further evaluation and possible device extraction.      No repeat blood cultures at [facility].  Repeat blood cultures of 1/7 on admission here NGTD      Source unclear - likely GI.   Denies any problems or history of problems with ICD pocket.   Reports episode of food poisoning a couple of months ago.   Patient reports a loss of appetite for approximately the past 3 -4 weeks and has lost approx 20 pounds during that time.   He denies any fevers, chills,sweats over the past few weeks until this past Monday when he was admitted.      Last colonoscopy was 2 years ago.  He has q 3 year surveillance colonoscopies as he has polyps.      Now POD#2 lead/device extraction. RICARDO pre-extraction showed 2 vegetations on RV lead.  No vegetations noted on valves. Post-extraction RICARDO  - no masses present in the right atrium or attached to the tricuspid valve following removal of the leads.     Afebrile. Stable leukocytosis. Less nausea today.        Plan/recommendations:  1.  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 Anticipated duration of antibiotics - 6 weeks post extraction.  Estimated end date 2/20/19  2.  Will follow lead and post-op blood cultures.   3.  If urgently necessary from a cardiac standpoint, it is okay to proceed with implanting new device if repeat blood cultures taken post extraction remain negative X 72 hours.  Suspect known vegetations on RV leads, now removed, were primary source of infection; cardioMems likely not infected  4.  Recommend EGD and colonoscopy as part of work up  5. Will follow             Thank you for your consult. I will follow-up with patient. Please contact us if you have any additional questions.  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 Jerry Solis is a 78 year old gentleman with history of CHF, s/p ICD placement and s/p cardioMems heart failure monitoring device , Afib, DM, HTN, COPD, history of CVA who presented to [facility] with nausea, vomiting, diarrhea and fevers.  Found to be hypotensive and with TREY.  Required pressor support and was transferred to [facility] where blood cultures found to be Enterococcus faecalis, and RICARDO showed possible vegetation on RV lead.  He was transferred to [facility] [place] further evaluation and possible device extraction.   He is currently on IV vancomycin and cefepime.      Reports that he had an episode of what he thought was food poisoning a couple of months ago when he was visiting his brother in [place].  Patient reports a loss of appetite for approximately the past 3 -4 weeks and has lost approx 20 pounds during that time.  He did not seek medical attention because he was working.   He denies any  fevers, chills until this past Monday when he was admitted.  He denies current fevers, chills, sweats.  He has chronic SOB that is not worse from baseline.  Denies current n/v/d.  Denies melena, hematachezia.  No urinary complaints. Denies any pain, redness at ICD pocket site.      ICD/pacer placed >10 years ago.  Had ? Generator changed in 2008.  Denies any problems.      Last colonoscopy was 2 years ago.  He has q 3 year surveillance colonoscopies as he has polyps.      Blood culture results from [facility] of 1/2/19 - E. Faecalis - PCN, vancomycin sensitive.  PICC was placed 1/3/19.  Unclear when blood cultures cleared as blood cultures were not repeated.   Repeat blood cultures taken here on admission 1/7.      He is afebrile, WBC 13.2, platelets 128, H/H 8.7/27.6, ESR 54, CRP 27.9, procalcitonin wnl.          Interval History: NAEO; wife at bedside. Afebrile. Repeat blood cultures NGTD. OR cultures NGTD.     Review of Systems   Constitutional: Positive for appetite change. Negative for chills, fatigue and fever.   HENT: Positive for hearing loss. Negative for congestion, dental problem and ear pain.    Eyes: Negative.    Respiratory: Positive for shortness of breath (chronic, at baseline). Negative for cough and wheezing.    Cardiovascular: Negative for chest pain, palpitations and leg swelling.   Gastrointestinal: Positive for nausea. Negative for abdominal distention, abdominal pain, constipation, diarrhea and vomiting.   Genitourinary: Negative for flank pain and frequency.   Musculoskeletal: Negative for arthralgias, back pain, myalgias and neck pain.   Skin: Negative for rash.   Neurological: Positive for weakness. Negative for dizziness, light-headedness, numbness and headaches.   Psychiatric/Behavioral: Negative for confusion and sleep disturbance. The patient is not nervous/anxious.      Objective:     Vital Signs (Most Recent):  Temp: 98 °F (36.7 °C) (01/12/19 0959)  Pulse: 63 (01/12/19 1100)  Resp: (!)  21 (01/12/19 1100)  BP: (!) 106/56 (01/12/19 0701)  SpO2: 99 % (01/12/19 1100) Vital Signs (24h Range):  Temp:  [97.5 °F (36.4 °C)-98 °F (36.7 °C)] 98 °F (36.7 °C)  Pulse:  [57-68] 63  Resp:  [11-38] 21  SpO2:  [93 %-100 %] 99 %  BP: ()/(50-58) 106/56  Arterial Line BP: ()/(38-68) 126/60     Weight: 87.9 kg (193 lb 12.6 oz)  Body mass index is 24.88 kg/m².    Estimated Creatinine Clearance: 40.6 mL/min (A) (based on SCr of 1.8 mg/dL (H)).    Physical Exam   Constitutional: He is oriented to person, place, and time. He appears well-developed and well-nourished.   HENT:   Head: Normocephalic and atraumatic.   Eyes: Conjunctivae are normal. No scleral icterus.   Neck: Normal range of motion. Neck supple.   Cardiovascular: Normal rate. An irregularly irregular rhythm present.   No murmur heard.  Left upper chest ICD extraction site - dressing in place, c/d/i.    Pulmonary/Chest: Effort normal. No respiratory distress. He has no wheezes.   Crackles at bases     Abdominal: Soft. He exhibits no distension.   Musculoskeletal: He exhibits no edema.   Neurological: He is alert and oriented to person, place, and time.   Skin: Skin is warm and dry.   PICC RUE - c/d/i   Psychiatric: He has a normal mood and affect. His behavior is normal.   Vitals reviewed.      Significant Labs:   Blood Culture:   Recent Labs   Lab 01/07/19  0154 01/09/19  1657 01/10/19  1401   LABBLOO No growth after 5 days.  No growth after 5 days. No Growth to date  No Growth to date  No Growth to date No Growth to date  No Growth to date  No Growth to date  No Growth to date     CBC:   Recent Labs   Lab 01/10/19  1653 01/11/19  0505 01/12/19  0416   WBC 15.48* 17.97* 16.81*   HGB 9.3* 10.1* 9.7*   HCT 28.8* 31.6* 31.2*   * 108* 101*     CMP:   Recent Labs   Lab 01/10/19  1653  01/11/19  0505  01/11/19  2108 01/12/19  0416 01/12/19  1102      < > 140  140   < > 139 141  141 139   K 5.9*   < > 4.0  4.0   < > 3.7 3.7  3.7 4.9       < > 105  105   < > 105 105  105 104   CO2 23   < > 23  23   < > 23 24  24 25   *   < > 247*  247*   < > 207* 162*  162* 178*   BUN 54*   < > 44*  44*   < > 45* 43*  43* 42*   CREATININE 4.1*   < > 2.1*  2.1*   < > 1.9* 1.8*  1.8* 1.8*   CALCIUM 8.6*   < > 8.8  8.8   < > 8.7 8.7  8.7 8.6*   PROT 6.0  --  6.6  --   --  6.7  --    ALBUMIN 2.3*  --  2.5*  --   --  2.4*  --    BILITOT 0.7  --  0.7  --   --  0.8  --    ALKPHOS 127  --  132  --   --  137*  --    AST 15  --  15  --   --  17  --    ALT 19  --  20  --   --  17  --    ANIONGAP 10   < > 12  12   < > 11 12  12 10   EGFRNONAA 13.2*   < > 29.7*  29.7*   < > 33.5* 35.8*  35.8* 35.8*    < > = values in this interval not displayed.     Wound Culture:   Recent Labs   Lab 01/09/19  1124 01/09/19  1314 01/09/19  1322 01/09/19  1402   LABAERO No growth No growth No growth Further report to follow       Significant Imaging: I have reviewed all pertinent imaging results/findings within the past 24 hours.

## 2019-01-12 NOTE — ASSESSMENT & PLAN NOTE
-A1c 8.1  -Target -180 while hospitalized  -detemir 17, aspart 6 with sliding scale  -Consult Endocrine to help with management

## 2019-01-12 NOTE — SUBJECTIVE & OBJECTIVE
"Interval HPI:   Overnight events: Remains in CMICU, increased respiratory effort overnight. Improved BG control overnight.  On IV antibiotics, dopamine, and lasix.  Eatin%  Nausea: No  Hypoglycemia and intervention: No  Fever: No  TPN and/or TF: No    BP (!) 106/56 (BP Location: Left arm, Patient Position: Sitting)   Pulse 68   Temp 98 °F (36.7 °C) (Oral)   Resp 18   Ht 6' 2" (1.88 m)   Wt 88.2 kg (194 lb 7.1 oz)   SpO2 97%   BMI 24.97 kg/m²     Labs Reviewed and Include    Recent Labs   Lab 19  0416   *  162*   CALCIUM 8.7  8.7   ALBUMIN 2.4*   PROT 6.7     141   K 3.7  3.7   CO2 24  24     105   BUN 43*  43*   CREATININE 1.8*  1.8*   ALKPHOS 137*   ALT 17   AST 17   BILITOT 0.8     Lab Results   Component Value Date    WBC 16.81 (H) 2019    HGB 9.7 (L) 2019    HCT 31.2 (L) 2019    MCV 89 2019     (L) 2019     No results for input(s): TSH, FREET4 in the last 168 hours.  Lab Results   Component Value Date    HGBA1C 8.1 (H) 2019       Nutritional status:   Body mass index is 24.97 kg/m².  Lab Results   Component Value Date    ALBUMIN 2.4 (L) 2019    ALBUMIN 2.5 (L) 2019    ALBUMIN 2.3 (L) 01/10/2019     No results found for: PREALBUMIN    Estimated Creatinine Clearance: 40.6 mL/min (A) (based on SCr of 1.8 mg/dL (H)).    Accu-Checks  Recent Labs     19  1633 19  2226 01/10/19  1235 01/10/19  1919 01/10/19  2227 19  0829 19  1055 19  1721 19  2222 19  0733   POCTGLUCOSE 208* 182* 236* 297* 241* 211* 198* 225* 211* 149*       Current Medications and/or Treatments Impacting Glycemic Control  Immunotherapy:    Immunosuppressants     None        Steroids:   Hormones (From admission, onward)    None        Pressors:    Autonomic Drugs (From admission, onward)    Start     Stop Route Frequency Ordered    19 1232  DOPamine 400 mg in dextrose 5 % 250 mL infusion (premix)   "   Question Answer Comment   Titrate by: (in mcg/kg/min) 5    Titrate interval: (in minutes) 15    To maintain goal of: SBP (in mmHG)    Greater than: 90    Maximum dose: (in mcg/kg/min) 20        -- IV Continuous 01/09/19 2341    01/09/19 1745  EPINEPHrine (ADRENALIN) 5 mg in sodium chloride 0.9% 250 mL infusion     Question Answer Comment   Titrate by: (in mcg/kg/min) 0.02    Titrate interval: (in minutes) 5    Titrate to maintain: (SBP or MAP or Cardiac Index) SBP    Greater than: (in mmHg) 90    Maximum dose: (in mcg/kg/min) 2        -- IV Continuous 01/09/19 1639        Hyperglycemia/Diabetes Medications:   Antihyperglycemics (From admission, onward)    Start     Stop Route Frequency Ordered    01/11/19 2100  insulin detemir U-100 pen 17 Units      -- SubQ Nightly 01/11/19 1259    01/11/19 1645  insulin aspart U-100 pen 6 Units      -- SubQ 3 times daily with meals 01/11/19 1259    01/11/19 1358  insulin aspart U-100 pen 0-5 Units      -- SubQ Before meals & nightly PRN 01/11/19 1259

## 2019-01-12 NOTE — ASSESSMENT & PLAN NOTE
Caution with insulin stacking  Estimated Creatinine Clearance: 40.6 mL/min (A) (based on SCr of 1.8 mg/dL (H)).

## 2019-01-12 NOTE — PROGRESS NOTES
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  Cultures remain negative.  HR persistently in 50's junctional rhythm with underlying atrial fib.  CVP; 9, SVO2: 54, CO: 5,28, CI: 2.45 and SVR: 1,045    Lines:  Right IJ 1/10/19    Continuous Infusions:   DOPamine 15 mcg/kg/min (01/12/19 1000)    epinephrine infusion Stopped (01/09/19 1731)    furosemide (LASIX) 2 mg/mL infusion (non-titrating) 20 mg/hr (01/12/19 1000)     Scheduled Meds:   acetaminophen  1,000 mg Oral TID    ampicillin (OMNIPEN) 4 g in  mL EXTENDED INFUSION  4,000 mg Intravenous Q8H    cefTRIAXone (ROCEPHIN) IVPB  2 g Intravenous Q12H    clopidogrel  75 mg Oral Daily    docusate sodium  100 mg Oral BID    insulin aspart U-100  6 Units Subcutaneous TIDWM    insulin detemir U-100  17 Units Subcutaneous QHS    levothyroxine  25 mcg Oral Before breakfast    magnesium oxide  800 mg Oral Once    magnesium oxide  800 mg Oral BID    polyethylene glycol  17 g Oral Daily    potassium chloride 10%  40 mEq Oral BID    potassium chloride SA  60 mEq Oral Once    rosuvastatin  20 mg Oral QHS    senna-docusate 8.6-50 mg  2 tablet Oral BID    tamsulosin  0.4 mg Oral Daily     PRN Meds:albuterol-ipratropium, bupivacaine (PF) 0.25% (2.5 mg/ml), dextrose 50%, dextrose 50%, glucagon (human recombinant), glucose, glucose, insulin aspart U-100, lidocaine HCL 20 mg/ml (2%), lidocaine HCL 20 mg/ml (2%), magnesium sulfate IVPB, magnesium sulfate IVPB, ondansetron, promethazine (PHENERGAN) IVPB, ramelteon, sodium chloride 0.9%, traMADol, visipaque 320 iodixanol    Review  of patient's allergies indicates:   Allergen Reactions    Adhesive Other (See Comments)     blisters    Codeine Other (See Comments)     Blurred vision    Latex Hives    Ace inhibitors     Lipitor [atorvastatin] Other (See Comments)     Muscle spasms     Objective:     Vital Signs (Most Recent):  Temp: 98 °F (36.7 °C) (01/12/19 0959)  Pulse: 63 (01/12/19 1000)  Resp: (!) 26 (01/12/19 1000)  BP: (!) 106/56 (01/12/19 0701)  SpO2: 98 % (01/12/19 1000) Vital Signs (24h Range):  Temp:  [97.5 °F (36.4 °C)-98 °F (36.7 °C)] 98 °F (36.7 °C)  Pulse:  [57-68] 63  Resp:  [11-38] 26  SpO2:  [93 %-100 %] 98 %  BP: ()/(50-58) 106/56  Arterial Line BP: ()/(38-68) 118/52     Patient Vitals for the past 72 hrs (Last 3 readings):   Weight   01/12/19 1000 87.9 kg (193 lb 12.6 oz)   01/10/19 0601 88.2 kg (194 lb 7.1 oz)     Body mass index is 24.88 kg/m².      Intake/Output Summary (Last 24 hours) at 1/12/2019 1115  Last data filed at 1/12/2019 1000  Gross per 24 hour   Intake 2370.87 ml   Output 2914 ml   Net -543.13 ml     Physical Exam    Constitutional: Sitting in bed NAD; wife at bedside.  Patient is hard of hearing.   Neck: JVD elevation to mid neck    Cardiovascular:Irregularly irregular rhythm   and intact distal pulses. Exam reveals no gallop and no friction rub. No murmur heard. Pulmonary/Chest: Effort normal. No respiratory distress. He has wheezes. He has rales.   Abdominal: Soft. Bowel sounds are normal. He exhibits no distension. There is no tenderness.   Musculoskeletal: He exhibits edema (1+ bilateral lower extremity edema).   Neurological: He is alert and oriented to person, place, and time.   Skin: He is not diaphoretic.     Significant Labs:  CBC:  Recent Labs   Lab 01/10/19  1653 01/11/19  0505 01/12/19  0416   WBC 15.48* 17.97* 16.81*   RBC 3.28* 3.52* 3.51*   HGB 9.3* 10.1* 9.7*   HCT 28.8* 31.6* 31.2*   * 108* 101*   MCV 88 90 89   MCH 28.4 28.7 27.6   MCHC 32.3 32.0 31.1*     BNP:  Recent  Labs   Lab 01/07/19  0115 01/10/19  1141   BNP 1,607* 2,076*     CMP:  Recent Labs   Lab 01/10/19  1653  01/11/19  0505  01/11/19  1411 01/11/19  2108 01/12/19  0416   *   < > 247*  247*   < > 264* 207* 162*  162*   CALCIUM 8.6*   < > 8.8  8.8   < > 8.6* 8.7 8.7  8.7   ALBUMIN 2.3*  --  2.5*  --   --   --  2.4*   PROT 6.0  --  6.6  --   --   --  6.7      < > 140  140   < > 139 139 141  141   K 5.9*   < > 4.0  4.0   < > 3.6 3.7 3.7  3.7   CO2 23   < > 23  23   < > 25 23 24  24      < > 105  105   < > 104 105 105  105   BUN 54*   < > 44*  44*   < > 45* 45* 43*  43*   CREATININE 4.1*   < > 2.1*  2.1*   < > 2.1* 1.9* 1.8*  1.8*   ALKPHOS 127  --  132  --   --   --  137*   ALT 19  --  20  --   --   --  17   AST 15  --  15  --   --   --  17   BILITOT 0.7  --  0.7  --   --   --  0.8    < > = values in this interval not displayed.      Coagulation:   Recent Labs   Lab 01/07/19 0115   INR 1.5*   APTT 27.4     LDH:  No results for input(s): LDH in the last 72 hours.  Microbiology:  Microbiology Results (last 7 days)     Procedure Component Value Units Date/Time    Aerobic culture [[id number]] Collected:  01/09/19 1322    Order Status:  Completed Specimen:  Other from Chest, Left Updated:  01/12/19 1023     Aerobic Bacterial Culture No growth    Narrative:       #3 LV lead tip    Aerobic culture [[id number]] Collected:  01/09/19 1124    Order Status:  Completed Specimen:  Tissue from Chest, Left Updated:  01/12/19 1023     Aerobic Bacterial Culture No growth    Narrative:       ICD pocket tissue    Aerobic culture [[id number]] Collected:  01/09/19 1314    Order Status:  Completed Specimen:  Other from Chest, Left Updated:  01/12/19 1023     Aerobic Bacterial Culture No growth    Narrative:       #2 RA Lead tip    Blood culture (site 1) [[id number]] Collected:  01/07/19 0154    Order Status:  Completed Specimen:  Blood Updated:  01/12/19 0812     Blood Culture, Routine No growth after 5 days.     Narrative:       Site # 1, aerobic and anaerobic    Blood culture (site 2) [[id number]] Collected:  01/07/19 0154    Order Status:  Completed Specimen:  Blood Updated:  01/12/19 0812     Blood Culture, Routine No growth after 5 days.    Narrative:       Site # 2, aerobic only    Urine culture [[id number]] Collected:  01/11/19 0115    Order Status:  Completed Specimen:  Urine, Catheterized Updated:  01/12/19 0418     Urine Culture, Routine No growth    Blood culture [[id number]] Collected:  01/09/19 1657    Order Status:  Completed Specimen:  Blood from Peripheral, Hand, Right Updated:  01/11/19 2012     Blood Culture, Routine No Growth to date     Blood Culture, Routine No Growth to date     Blood Culture, Routine No Growth to date    Blood culture [[id number]] Collected:  01/10/19 1401    Order Status:  Completed Specimen:  Blood from Peripheral, Hand, Left Updated:  01/11/19 1612     Blood Culture, Routine No Growth to date     Blood Culture, Routine No Growth to date    Blood culture [[id number]] Collected:  01/10/19 1401    Order Status:  Completed Specimen:  Blood from Peripheral, Forearm, Left Updated:  01/11/19 1612     Blood Culture, Routine No Growth to date     Blood Culture, Routine No Growth to date    Aerobic culture [[id number]] Collected:  01/09/19 1402    Order Status:  Completed Specimen:  Other from Chest, Left Updated:  01/11/19 1056     Aerobic Bacterial Culture Further report to follow    Narrative:       #4 RV lead tip    Culture, Anaerobe [[id number]] Collected:  01/09/19 1402    Order Status:  Completed Specimen:  Other from Chest, Left Updated:  01/11/19 0728     Anaerobic Culture Culture in progress    Narrative:       #4 RV lead tip    Culture, Anaerobe [[id number]] Collected:  01/09/19 1322    Order Status:  Completed Specimen:  Other from Chest, Left Updated:  01/11/19 0725     Anaerobic Culture Culture in progress    Narrative:       #3 LV lead tip    Culture, Anaerobe [[id number]] Collected:   01/09/19 1314    Order Status:  Completed Specimen:  Other from Chest, Left Updated:  01/11/19 0724     Anaerobic Culture Culture in progress    Narrative:       #2 RA Lead tip    Culture, Anaerobe [[id number]] Collected:  01/09/19 1124    Order Status:  Completed Specimen:  Tissue from Chest, Left Updated:  01/11/19 0724     Anaerobic Culture Culture in progress    Narrative:       ICD pocket tissue    IV catheter culture [[id number]]     Order Status:  No result Specimen:  Catheter Tip, NOS     Culture, Respiratory [[id number]]     Order Status:  No result Specimen:  Respiratory from Sputum, Expectorated           I have reviewed all pertinent labs within the past 24 hours.    Estimated Creatinine Clearance: 40.6 mL/min (A) (based on SCr of 1.8 mg/dL (H)).    Diagnostic Results:  I have reviewed all pertinent imaging results/findings within the past 24 hours.    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 Patient had 2 vegetations on RV lead which is most likely source of bacteremia. Lead tip and ICD pocket cultured with NGTD  -Continue Ampicillin and Ceftriaxone per ID recs Will treat for 6 weeks post extraction.  Estimated end date 2/20/19.  Will likely change to ampicillin extended infusion at some point.   -If urgently necessary from a cardiac standpoint, it is okay to proceed with implanting new device if repeat blood cultures taken post extraction remain negative X 72 hours.  Suspect known vegetations on RV leads, now removed, were primary source of infection; cardioMems likely not infected  -Blood cultures are negative here thus far.  -WCC now trending down  -Per EP; ok to start heparin and Coumadin.  No Eliquis.   -Will need Life Vest prior to discharge  -Appreciate GI's help. Plan for outpatient EGD and C-scope for eval of anemia  and Enterococcus bacteremia       Bacteremia    -see above     TREY (acute kidney injury)    - Appreciate Nephrology's help  - Was oliguric and creatinine jumped to 4.1 likely 2/2 ATN/hypotension. UOP since Morin put back in yesterday, Lasix gtt and Dopamine gtts started   - CVP 9, UOP and creatinine improving. (was 1.2 on admit)  - Continue Lasix gtt, wean Dopamine as tolerated  - Hold Entresto for now     Atrial fibrillation    -Remains in A fib with junctional escape rhythm  -If junctional rhythm persists, will consider RICARDO/cardioversion on Monday as NSR may conduct and keep him from being chronotrope-dependent  -Wean Dopamine as tolerated keeping MAP > 65  -CHADS VASc 6  -Holding eliquis s/p ICD explant 1/9. Per EP, can resume anticoagulation on 1/13  -Cardiac monitoring     Type 2 diabetes mellitus with complication    -A1c 8.1  -Target -180 while hospitalized  -detemir 17, aspart 6 with sliding scale  -Consult Endocrine to help with management     Acute on chronic combined systolic and diastolic CHF, NYHA class 3    -ICM  -Last full 2D Echo 1/7/19: LVEF 20%, LVEDD 7.24 cm. Limited bedside echo done after ICD explant showed no pericardial effusion  -CVP 9. Continue diuresing with Lasix gtt at 10 mg/hr  -sVO2 pending  -Wean Dopamine keeping MAP > 65  -GDMT: Dig and Toprol D/C'd 2/2 junctional rhythm. Entresto D/C'd 2/2 transient hypotension during/after ICD explant and TREY  -Self declined for LVAD after w/u last April 2018       Dysuria    - Resolved  - UA showed many bacteria - culture with NGTD         Dysphagia    - Consult SLP     Nausea & vomiting    - Resolved  - Antiemetics prn  - Appreciate speech therapy consult.  Advanced diet     Junctional bradycardia    - Se A fib     Endocarditis    - See ICD lead infection       Uninterrupted Critical Care/Counseling Time (not including procedures): 50 minutes        STAR Mahan  Heart Transplant  [facility]

## 2019-01-12 NOTE — SUBJECTIVE & OBJECTIVE
Interval History: NAEO; wife at bedside. Afebrile. Repeat blood cultures NGTD. OR cultures NGTD.     Review of Systems   Constitutional: Positive for appetite change. Negative for chills, fatigue and fever.   HENT: Positive for hearing loss. Negative for congestion, dental problem and ear pain.    Eyes: Negative.    Respiratory: Positive for shortness of breath (chronic, at baseline). Negative for cough and wheezing.    Cardiovascular: Negative for chest pain, palpitations and leg swelling.   Gastrointestinal: Positive for nausea. Negative for abdominal distention, abdominal pain, constipation, diarrhea and vomiting.   Genitourinary: Negative for flank pain and frequency.   Musculoskeletal: Negative for arthralgias, back pain, myalgias and neck pain.   Skin: Negative for rash.   Neurological: Positive for weakness. Negative for dizziness, light-headedness, numbness and headaches.   Psychiatric/Behavioral: Negative for confusion and sleep disturbance. The patient is not nervous/anxious.      Objective:     Vital Signs (Most Recent):  Temp: 98 °F (36.7 °C) (01/12/19 0959)  Pulse: 63 (01/12/19 1100)  Resp: (!) 21 (01/12/19 1100)  BP: (!) 106/56 (01/12/19 0701)  SpO2: 99 % (01/12/19 1100) Vital Signs (24h Range):  Temp:  [97.5 °F (36.4 °C)-98 °F (36.7 °C)] 98 °F (36.7 °C)  Pulse:  [57-68] 63  Resp:  [11-38] 21  SpO2:  [93 %-100 %] 99 %  BP: ()/(50-58) 106/56  Arterial Line BP: ()/(38-68) 126/60     Weight: 87.9 kg (193 lb 12.6 oz)  Body mass index is 24.88 kg/m².    Estimated Creatinine Clearance: 40.6 mL/min (A) (based on SCr of 1.8 mg/dL (H)).    Physical Exam   Constitutional: He is oriented to person, place, and time. He appears well-developed and well-nourished.   HENT:   Head: Normocephalic and atraumatic.   Eyes: Conjunctivae are normal. No scleral icterus.   Neck: Normal range of motion. Neck supple.   Cardiovascular: Normal rate. An irregularly irregular rhythm present.   No murmur heard.  Left  upper chest ICD extraction site - dressing in place, c/d/i.    Pulmonary/Chest: Effort normal. No respiratory distress. He has no wheezes.   Crackles at bases     Abdominal: Soft. He exhibits no distension.   Musculoskeletal: He exhibits no edema.   Neurological: He is alert and oriented to person, place, and time.   Skin: Skin is warm and dry.   PICC RUE - c/d/i   Psychiatric: He has a normal mood and affect. His behavior is normal.   Vitals reviewed.      Significant Labs:   Blood Culture:   Recent Labs   Lab 01/07/19  0154 01/09/19  1657 01/10/19  1401   LABBLOO No growth after 5 days.  No growth after 5 days. No Growth to date  No Growth to date  No Growth to date No Growth to date  No Growth to date  No Growth to date  No Growth to date     CBC:   Recent Labs   Lab 01/10/19  1653 01/11/19  0505 01/12/19  0416   WBC 15.48* 17.97* 16.81*   HGB 9.3* 10.1* 9.7*   HCT 28.8* 31.6* 31.2*   * 108* 101*     CMP:   Recent Labs   Lab 01/10/19  1653  01/11/19  0505  01/11/19  2108 01/12/19  0416 01/12/19  1102      < > 140  140   < > 139 141  141 139   K 5.9*   < > 4.0  4.0   < > 3.7 3.7  3.7 4.9      < > 105  105   < > 105 105  105 104   CO2 23   < > 23  23   < > 23 24  24 25   *   < > 247*  247*   < > 207* 162*  162* 178*   BUN 54*   < > 44*  44*   < > 45* 43*  43* 42*   CREATININE 4.1*   < > 2.1*  2.1*   < > 1.9* 1.8*  1.8* 1.8*   CALCIUM 8.6*   < > 8.8  8.8   < > 8.7 8.7  8.7 8.6*   PROT 6.0  --  6.6  --   --  6.7  --    ALBUMIN 2.3*  --  2.5*  --   --  2.4*  --    BILITOT 0.7  --  0.7  --   --  0.8  --    ALKPHOS 127  --  132  --   --  137*  --    AST 15  --  15  --   --  17  --    ALT 19  --  20  --   --  17  --    ANIONGAP 10   < > 12  12   < > 11 12  12 10   EGFRNONAA 13.2*   < > 29.7*  29.7*   < > 33.5* 35.8*  35.8* 35.8*    < > = values in this interval not displayed.     Wound Culture:   Recent Labs   Lab 01/09/19  1124 01/09/19  1314 01/09/19  1322  01/09/19  1402   LABAERO No growth No growth No growth Further report to follow       Significant Imaging: I have reviewed all pertinent imaging results/findings within the past 24 hours.

## 2019-01-13 NOTE — ASSESSMENT & PLAN NOTE
-Remains in A fib with junctional escape rhythm  -plan for RICARDO/cardioversion on Monday as NSR may conduct and keep him from being chronotrope-dependent  -Wean Dopamine as tolerated keeping MAP > 55  -CHADS VASc 6  -Holding eliquis s/p ICD explant 1/9. Per EP, can resume anticoagulation on 1/13.  Started Heparin and Coumadin yesterday  -Cardiac monitoring

## 2019-01-13 NOTE — SUBJECTIVE & OBJECTIVE
"Interval HPI:   Overnight events: Remains in CMICU, NAEON. BG faily well controlled throughout yesterday and overnight.  On IV antibiotics, dopamine, heparin, and lasix.  Eatin%  Nausea: No  Hypoglycemia and intervention: Yes - 69 before breakfast, tray arrived and patient ate  Fever: No  TPN and/or TF: No    BP (!) 106/56 (BP Location: Left arm, Patient Position: Sitting)   Pulse (!) 57   Temp 96.7 °F (35.9 °C) (Axillary)   Resp (!) 21   Ht 6' 2" (1.88 m)   Wt 84.9 kg (187 lb 2.7 oz)   SpO2 98%   BMI 24.03 kg/m²     Labs Reviewed and Include    Recent Labs   Lab 19  0323   *   CALCIUM 8.6*   ALBUMIN 2.2*   PROT 6.3      K 3.7   CO2 26      BUN 39*   CREATININE 1.4   ALKPHOS 145*   ALT 16   AST 19   BILITOT 0.7     Lab Results   Component Value Date    WBC 11.97 2019    HGB 9.2 (L) 2019    HCT 29.8 (L) 2019    MCV 92 2019    PLT 93 (L) 2019     No results for input(s): TSH, FREET4 in the last 168 hours.  Lab Results   Component Value Date    HGBA1C 8.1 (H) 2019       Nutritional status:   Body mass index is 24.03 kg/m².  Lab Results   Component Value Date    ALBUMIN 2.2 (L) 2019    ALBUMIN 2.4 (L) 2019    ALBUMIN 2.5 (L) 2019     No results found for: PREALBUMIN    Estimated Creatinine Clearance: 52.2 mL/min (based on SCr of 1.4 mg/dL).    Accu-Checks  Recent Labs     01/10/19  1919 01/10/19  2227 19  0829 19  1055 19  1721 19  2222 19  0733 19  1121 19  1631 19  2132   POCTGLUCOSE 297* 241* 211* 198* 225* 211* 149* 169* 193* 193*       Current Medications and/or Treatments Impacting Glycemic Control  Immunotherapy:    Immunosuppressants     None        Steroids:   Hormones (From admission, onward)    None        Pressors:    Autonomic Drugs (From admission, onward)    Start     Stop Route Frequency Ordered    19 6470  DOPamine 400 mg in dextrose 5 % 250 mL infusion " (premix)     Question Answer Comment   Titrate by: (in mcg/kg/min) 5    Titrate interval: (in minutes) 15    To maintain goal of: SBP (in mmHG)    Greater than: 90    Maximum dose: (in mcg/kg/min) 20        -- IV Continuous 01/09/19 2341    01/09/19 1745  EPINEPHrine (ADRENALIN) 5 mg in sodium chloride 0.9% 250 mL infusion     Question Answer Comment   Titrate by: (in mcg/kg/min) 0.02    Titrate interval: (in minutes) 5    Titrate to maintain: (SBP or MAP or Cardiac Index) SBP    Greater than: (in mmHg) 90    Maximum dose: (in mcg/kg/min) 2        -- IV Continuous 01/09/19 1639        Hyperglycemia/Diabetes Medications:   Antihyperglycemics (From admission, onward)    Start     Stop Route Frequency Ordered    01/11/19 2100  insulin detemir U-100 pen 17 Units      -- SubQ Nightly 01/11/19 1259    01/11/19 1645  insulin aspart U-100 pen 6 Units      -- SubQ 3 times daily with meals 01/11/19 1259    01/11/19 1358  insulin aspart U-100 pen 0-5 Units      -- SubQ Before meals & nightly PRN 01/11/19 1259

## 2019-01-13 NOTE — ASSESSMENT & PLAN NOTE
- Appreciate Nephrology's help  - Was oliguric and creatinine jumped to 4.1 likely 2/2 ATN/hypotension. UOP since Morin put back in yesterday, Lasix gtt and Dopamine gtts started   - CVP 10, UOP and creatinine improving. (was 1.2 on admit)  - Continue Lasix gtt, wean Dopamine as tolerated for MAP >55  - Hold Entresto for now

## 2019-01-13 NOTE — SUBJECTIVE & OBJECTIVE
Interval History: No new complaints this am; requesting that his potassium replacement be changed to pill formulation.  Endo following and adjusting insulin.  Dopamine down to 10.  Started coumadin and heparin yesterday.  Cultures remain negative.  HR persistently in 50's junctional rhythm with underlying atrial fib.  CVP; 10, SVO2: 68, CO: 8.03, CI: 3.73 and SVR: 518.  RICARDO and DCCV planned for tomorrow    Lines:  Right IJ 1/10/19    Continuous Infusions:   DOPamine      epinephrine infusion Stopped (01/09/19 9761)    furosemide (LASIX) 2 mg/mL infusion (non-titrating) 20 mg/hr (01/13/19 0900)    heparin (porcine) in D5W 12 Units/kg/hr (01/13/19 0900)     Scheduled Meds:   acetaminophen  1,000 mg Oral TID    ampicillin (OMNIPEN) 4 g in  mL EXTENDED INFUSION  4,000 mg Intravenous Q8H    cefTRIAXone (ROCEPHIN) IVPB  2 g Intravenous Q12H    clopidogrel  75 mg Oral Daily    docusate sodium  100 mg Oral BID    insulin aspart U-100  6 Units Subcutaneous TIDWM    insulin detemir U-100  17 Units Subcutaneous QHS    levothyroxine  25 mcg Oral Before breakfast    magnesium oxide  800 mg Oral Once    magnesium oxide  800 mg Oral BID    polyethylene glycol  17 g Oral Daily    potassium chloride  40 mEq Oral BID    potassium chloride SA  60 mEq Oral Once    rosuvastatin  20 mg Oral QHS    senna-docusate 8.6-50 mg  2 tablet Oral BID    tamsulosin  0.4 mg Oral Daily    warfarin  5 mg Oral Daily     PRN Meds:albuterol-ipratropium, bupivacaine (PF) 0.25% (2.5 mg/ml), dextrose 50%, dextrose 50%, glucagon (human recombinant), glucose, glucose, insulin aspart U-100, lidocaine HCL 20 mg/ml (2%), lidocaine HCL 20 mg/ml (2%), magnesium sulfate IVPB, magnesium sulfate IVPB, ondansetron, promethazine (PHENERGAN) IVPB, ramelteon, sodium chloride 0.9%, traMADol, visipaque 320 iodixanol    Review of patient's allergies indicates:   Allergen Reactions    Adhesive Other (See Comments)     blisters    Codeine Other  (See Comments)     Blurred vision    Latex Hives    Ace inhibitors     Lipitor [atorvastatin] Other (See Comments)     Muscle spasms     Objective:     Vital Signs (Most Recent):  Temp: 97.2 °F (36.2 °C) (01/13/19 0701)  Pulse: 69 (01/13/19 0900)  Resp: 20 (01/13/19 0900)  BP: (!) 106/56 (01/12/19 0701)  SpO2: 98 % (01/13/19 0900) Vital Signs (24h Range):  Temp:  [96.7 °F (35.9 °C)-98.1 °F (36.7 °C)] 97.2 °F (36.2 °C)  Pulse:  [53-69] 69  Resp:  [14-36] 20  SpO2:  [92 %-100 %] 98 %  Arterial Line BP: ()/(36-60) 103/37     Patient Vitals for the past 72 hrs (Last 3 readings):   Weight   01/13/19 0300 84.9 kg (187 lb 2.7 oz)   01/12/19 1000 87.9 kg (193 lb 12.6 oz)     Body mass index is 24.03 kg/m².      Intake/Output Summary (Last 24 hours) at 1/13/2019 0929  Last data filed at 1/13/2019 0900  Gross per 24 hour   Intake 3778.67 ml   Output 5340 ml   Net -1561.33 ml     Physical Exam    Constitutional: Sitting in bed NAD; wife at bedside.  Patient is hard of hearing.   Neck: JVD elevation to mid neck    Cardiovascular:Irregularly irregular rhythm   and intact distal pulses. Exam reveals no gallop and no friction rub. No murmur heard. Pulmonary/Chest: Effort normal. No respiratory distress. He has wheezes. He has rales.   Abdominal: Soft. Bowel sounds are normal. He exhibits no distension. There is no tenderness.   Musculoskeletal: He exhibits edema (1+ bilateral lower extremity edema).   Neurological: He is alert and oriented to person, place, and time.   Skin: He is not diaphoretic.     Significant Labs:  CBC:  Recent Labs   Lab 01/11/19  0505 01/12/19  0416 01/13/19  0323   WBC 17.97* 16.81* 11.97   RBC 3.52* 3.51* 3.24*   HGB 10.1* 9.7* 9.2*   HCT 31.6* 31.2* 29.8*   * 101* 93*   MCV 90 89 92   MCH 28.7 27.6 28.4   MCHC 32.0 31.1* 30.9*     BNP:  Recent Labs   Lab 01/07/19  0115 01/10/19  1141   BNP 1,607* 2,076*     CMP:  Recent Labs   Lab 01/11/19  0505  01/12/19  0416  01/12/19  5057  01/13/19  0323 01/13/19  0756   *  247*   < > 162*  162*   < > 200* 119* 72   CALCIUM 8.8  8.8   < > 8.7  8.7   < > 8.3* 8.6* 8.6*   ALBUMIN 2.5*  --  2.4*  --   --  2.2*  --    PROT 6.6  --  6.7  --   --  6.3  --      140   < > 141  141   < > 140 141 143   K 4.0  4.0   < > 3.7  3.7   < > 4.7 3.7 3.5   CO2 23  23   < > 24  24   < > 26 26 29     105   < > 105  105   < > 103 104 102   BUN 44*  44*   < > 43*  43*   < > 40* 39* 38*   CREATININE 2.1*  2.1*   < > 1.8*  1.8*   < > 1.6* 1.4 1.4   ALKPHOS 132  --  137*  --   --  145*  --    ALT 20  --  17  --   --  16  --    AST 15  --  17  --   --  19  --    BILITOT 0.7  --  0.8  --   --  0.7  --     < > = values in this interval not displayed.      Coagulation:   Recent Labs   Lab 01/07/19  0115 01/12/19  1149 01/12/19 1952 01/13/19 0323   INR 1.5* 1.2  1.2  --  1.2   APTT 27.4 28.3 43.2* 49.2*     LDH:  No results for input(s): LDH in the last 72 hours.  Microbiology:  Microbiology Results (last 7 days)     Procedure Component Value Units Date/Time    Aerobic culture [[id number]] Collected:  01/09/19 1402    Order Status:  Completed Specimen:  Other from Chest, Left Updated:  01/13/19 0800     Aerobic Bacterial Culture --     ENTEROCOCCUS FAECALIS  Rare  Susceptibility pending      Narrative:       #4 RV lead tip    Blood culture [[id number]] Collected:  01/09/19 1657    Order Status:  Completed Specimen:  Blood from Peripheral, Hand, Right Updated:  01/12/19 2012     Blood Culture, Routine No Growth to date     Blood Culture, Routine No Growth to date     Blood Culture, Routine No Growth to date     Blood Culture, Routine No Growth to date    Blood culture [[id number]] Collected:  01/10/19 1401    Order Status:  Completed Specimen:  Blood from Peripheral, Hand, Left Updated:  01/12/19 1612     Blood Culture, Routine No Growth to date     Blood Culture, Routine No Growth to date     Blood Culture, Routine No Growth to date    Blood  culture [[id number]] Collected:  01/10/19 1401    Order Status:  Completed Specimen:  Blood from Peripheral, Forearm, Left Updated:  01/12/19 1612     Blood Culture, Routine No Growth to date     Blood Culture, Routine No Growth to date     Blood Culture, Routine No Growth to date    Aerobic culture [[id number]] Collected:  01/09/19 1322    Order Status:  Completed Specimen:  Other from Chest, Left Updated:  01/12/19 1023     Aerobic Bacterial Culture No growth    Narrative:       #3 LV lead tip    Aerobic culture [[id number]] Collected:  01/09/19 1124    Order Status:  Completed Specimen:  Tissue from Chest, Left Updated:  01/12/19 1023     Aerobic Bacterial Culture No growth    Narrative:       ICD pocket tissue    Aerobic culture [[id number]] Collected:  01/09/19 1314    Order Status:  Completed Specimen:  Other from Chest, Left Updated:  01/12/19 1023     Aerobic Bacterial Culture No growth    Narrative:       #2 RA Lead tip    Blood culture (site 1) [[id number]] Collected:  01/07/19 0154    Order Status:  Completed Specimen:  Blood Updated:  01/12/19 0812     Blood Culture, Routine No growth after 5 days.    Narrative:       Site # 1, aerobic and anaerobic    Blood culture (site 2) [[id number]] Collected:  01/07/19 0154    Order Status:  Completed Specimen:  Blood Updated:  01/12/19 0812     Blood Culture, Routine No growth after 5 days.    Narrative:       Site # 2, aerobic only    Urine culture [[id number]] Collected:  01/11/19 0115    Order Status:  Completed Specimen:  Urine, Catheterized Updated:  01/12/19 0418     Urine Culture, Routine No growth    Culture, Anaerobe [[id number]] Collected:  01/09/19 1402    Order Status:  Completed Specimen:  Other from Chest, Left Updated:  01/11/19 0728     Anaerobic Culture Culture in progress    Narrative:       #4 RV lead tip    Culture, Anaerobe [[id number]] Collected:  01/09/19 1322    Order Status:  Completed Specimen:  Other from Chest, Left Updated:  01/11/19 0725      Anaerobic Culture Culture in progress    Narrative:       #3 LV lead tip    Culture, Anaerobe [[id number]] Collected:  01/09/19 1314    Order Status:  Completed Specimen:  Other from Chest, Left Updated:  01/11/19 0724     Anaerobic Culture Culture in progress    Narrative:       #2 RA Lead tip    Culture, Anaerobe [[id number]] Collected:  01/09/19 1124    Order Status:  Completed Specimen:  Tissue from Chest, Left Updated:  01/11/19 0724     Anaerobic Culture Culture in progress    Narrative:       ICD pocket tissue    IV catheter culture [[id number]]     Order Status:  No result Specimen:  Catheter Tip, NOS     Culture, Respiratory [[id number]]     Order Status:  No result Specimen:  Respiratory from Sputum, Expectorated           I have reviewed all pertinent labs within the past 24 hours.    Estimated Creatinine Clearance: 52.2 mL/min (based on SCr of 1.4 mg/dL).    Diagnostic Results:  I have reviewed all pertinent imaging results/findings within the past 24 hours.

## 2019-01-13 NOTE — PROGRESS NOTES
"[facility]  Endocrinology  Progress Note    Admit Date: 2019     Reason for Consult: Management of T2DM, Hyperglycemia     Surgical Procedure and Date: Lead extraction 19    Diabetes diagnosis year: approximately 10 years ago    Lab Results   Component Value Date    HGBA1C 8.1 (H) 2019       Home Diabetes Medications: Lantus 50 units q HS, Novolog 25 units with meals (vials)    How often checking glucose at home? 3-4x per day   BG readings on regimen: AM 120s  Hypoglycemia on the regimen?  Yes - 2-3x per month  Missed doses on regimen?  Yes - 2x per week    Diabetes Complications include:     Hyperglycemia, Hypoglycemia , Diabetic retinopathy , Diabetic cataract  and Diabetic peripheral neuropathy     Complicating diabetes co morbidities:   CHF and History of CVA      HPI:   Patient is a 78 y.o. male with a diagnosis of DM2 and CHF who was admitted on 19 for possible RV lead infection.  Patient is s/p lead extraction on 19.  Patient's DM managed by PCP, Dr. Hdz.  Endocrine consulted for DM/BG management.            Interval HPI:   Overnight events: Remains in CMICU, NAEON. BG faily well controlled throughout yesterday and overnight.  On IV antibiotics, dopamine, heparin, and lasix.  Eatin%  Nausea: No  Hypoglycemia and intervention: Yes - 69 before breakfast, tray arrived and patient ate  Fever: No  TPN and/or TF: No    BP (!) 106/56 (BP Location: Left arm, Patient Position: Sitting)   Pulse (!) 57   Temp 96.7 °F (35.9 °C) (Axillary)   Resp (!) 21   Ht 6' 2" (1.88 m)   Wt 84.9 kg (187 lb 2.7 oz)   SpO2 98%   BMI 24.03 kg/m²      Labs Reviewed and Include    Recent Labs   Lab 19  0323   *   CALCIUM 8.6*   ALBUMIN 2.2*   PROT 6.3      K 3.7   CO2 26      BUN 39*   CREATININE 1.4   ALKPHOS 145*   ALT 16   AST 19   BILITOT 0.7     Lab Results   Component Value Date    WBC 11.97 2019    HGB 9.2 (L) 2019    HCT 29.8 (L) " 01/13/2019    MCV 92 01/13/2019    PLT 93 (L) 01/13/2019     No results for input(s): TSH, FREET4 in the last 168 hours.  Lab Results   Component Value Date    HGBA1C 8.1 (H) 01/07/2019       Nutritional status:   Body mass index is 24.03 kg/m².  Lab Results   Component Value Date    ALBUMIN 2.2 (L) 01/13/2019    ALBUMIN 2.4 (L) 01/12/2019    ALBUMIN 2.5 (L) 01/11/2019     No results found for: PREALBUMIN    Estimated Creatinine Clearance: 52.2 mL/min (based on SCr of 1.4 mg/dL).    Accu-Checks  Recent Labs     01/10/19  1919 01/10/19  2227 01/11/19  0829 01/11/19  1055 01/11/19  1721 01/11/19  2222 01/12/19  0733 01/12/19  1121 01/12/19  1631 01/12/19  2132   POCTGLUCOSE 297* 241* 211* 198* 225* 211* 149* 169* 193* 193*       Current Medications and/or Treatments Impacting Glycemic Control  Immunotherapy:    Immunosuppressants     None        Steroids:   Hormones (From admission, onward)    None        Pressors:    Autonomic Drugs (From admission, onward)    Start     Stop Route Frequency Ordered    01/09/19 2337  DOPamine 400 mg in dextrose 5 % 250 mL infusion (premix)     Question Answer Comment   Titrate by: (in mcg/kg/min) 5    Titrate interval: (in minutes) 15    To maintain goal of: SBP (in mmHG)    Greater than: 90    Maximum dose: (in mcg/kg/min) 20        -- IV Continuous 01/09/19 2341    01/09/19 1745  EPINEPHrine (ADRENALIN) 5 mg in sodium chloride 0.9% 250 mL infusion     Question Answer Comment   Titrate by: (in mcg/kg/min) 0.02    Titrate interval: (in minutes) 5    Titrate to maintain: (SBP or MAP or Cardiac Index) SBP    Greater than: (in mmHg) 90    Maximum dose: (in mcg/kg/min) 2        -- IV Continuous 01/09/19 1639        Hyperglycemia/Diabetes Medications:   Antihyperglycemics (From admission, onward)    Start     Stop Route Frequency Ordered    01/11/19 2100  insulin detemir U-100 pen 17 Units      -- SubQ Nightly 01/11/19 1259    01/11/19 1645  insulin aspart U-100 pen 6 Units      -- SubQ 3  times daily with meals 01/11/19 1259    01/11/19 1358  insulin aspart U-100 pen 0-5 Units      -- SubQ Before meals & nightly PRN 01/11/19 1259          ASSESSMENT and PLAN    * 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         Atrial fibrillation    May affect BG readings if uncontrolled             Timo Avalos NP  Endocrinology  [facility]

## 2019-01-13 NOTE — ASSESSMENT & PLAN NOTE
-Vegetation seen on RICARDO  -Appreciate EP's help. 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 Patient had 2 vegetations on RV lead which is most likely source of bacteremia. Lead tip and ICD pocket cultured with NGTD  -Continue Ampicillin and Ceftriaxone per ID recs Will treat for 6 weeks post extraction.  Estimated end date 2/20/19.    -If urgently necessary from a cardiac standpoint, it is okay to proceed with implanting new device if repeat blood cultures taken post extraction remain negative X 72 hours.  Suspect known vegetations on RV leads, now removed, were primary source of infection; cardioMems likely not infected  -Blood cultures are negative here thus far.  -WCC now trending down  -Per EP; ok to start heparin and Coumadin.  No Eliquis.   -Will need Life Vest prior to discharge  -Appreciate GI's help. Plan for outpatient EGD and C-scope for eval of anemia and Enterococcus bacteremia

## 2019-01-13 NOTE — ASSESSMENT & PLAN NOTE
-ICM  -Last full 2D Echo 1/7/19: LVEF 20%, LVEDD 7.24 cm. Limited bedside echo done after ICD explant showed no pericardial effusion  -CVP 9. Continue diuresing with Lasix gtt at 10 mg/hr  -sVO2 pending  -Wean Dopamine keeping MAP > 55  -GDMT: Dig and Toprol D/C'd 2/2 junctional rhythm. Entresto D/C'd 2/2 transient hypotension during/after ICD explant and TREY  -Self declined for LVAD after w/u last April 2018

## 2019-01-13 NOTE — ASSESSMENT & PLAN NOTE
BG goal 140-180    Decrease Levemir to 13 units q HS  Novolog 6 units with meals  Low dose correction scale  BG monitoring AC/HS    Discharge planning:  TBD

## 2019-01-13 NOTE — ASSESSMENT & PLAN NOTE
Caution with insulin stacking  Estimated Creatinine Clearance: 52.2 mL/min (based on SCr of 1.4 mg/dL).

## 2019-01-13 NOTE — PROGRESS NOTES
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  Endo following and adjusting insulin.  Dopamine down to 10.  Started coumadin and heparin yesterday.  Cultures remain negative.  HR persistently in 50's junctional rhythm with underlying atrial fib.  CVP; 10, SVO2: 68, CO: 8.03, CI: 3.73 and SVR: 518.  RICARDO and DCCV planned for tomorrow    Lines:  Right IJ 1/10/19    Continuous Infusions:   DOPamine      epinephrine infusion Stopped (01/09/19 1731)    furosemide (LASIX) 2 mg/mL infusion (non-titrating) 20 mg/hr (01/13/19 0900)    heparin (porcine) in D5W 12 Units/kg/hr (01/13/19 0900)     Scheduled Meds:   acetaminophen  1,000 mg Oral TID    ampicillin (OMNIPEN) 4 g in  mL EXTENDED INFUSION  4,000 mg Intravenous Q8H    cefTRIAXone (ROCEPHIN) IVPB  2 g Intravenous Q12H    clopidogrel  75 mg Oral Daily    docusate sodium  100 mg Oral BID    insulin aspart U-100  6 Units Subcutaneous TIDWM    insulin detemir U-100  17 Units Subcutaneous QHS    levothyroxine  25 mcg Oral Before breakfast    magnesium oxide  800 mg Oral Once    magnesium oxide  800 mg Oral BID    polyethylene glycol  17 g Oral Daily    potassium chloride  40 mEq Oral BID    potassium chloride SA  60 mEq Oral Once    rosuvastatin  20 mg Oral QHS    senna-docusate 8.6-50 mg  2 tablet Oral BID    tamsulosin  0.4 mg Oral Daily    warfarin  5 mg Oral Daily     PRN Meds:albuterol-ipratropium, bupivacaine (PF) 0.25% (2.5 mg/ml), dextrose 50%, dextrose 50%, glucagon (human recombinant), glucose, glucose, insulin  aspart U-100, lidocaine HCL 20 mg/ml (2%), lidocaine HCL 20 mg/ml (2%), magnesium sulfate IVPB, magnesium sulfate IVPB, ondansetron, promethazine (PHENERGAN) IVPB, ramelteon, sodium chloride 0.9%, traMADol, visipaque 320 iodixanol    Review of patient's allergies indicates:   Allergen Reactions    Adhesive Other (See Comments)     blisters    Codeine Other (See Comments)     Blurred vision    Latex Hives    Ace inhibitors     Lipitor [atorvastatin] Other (See Comments)     Muscle spasms     Objective:     Vital Signs (Most Recent):  Temp: 97.2 °F (36.2 °C) (01/13/19 0701)  Pulse: 69 (01/13/19 0900)  Resp: 20 (01/13/19 0900)  BP: (!) 106/56 (01/12/19 0701)  SpO2: 98 % (01/13/19 0900) Vital Signs (24h Range):  Temp:  [96.7 °F (35.9 °C)-98.1 °F (36.7 °C)] 97.2 °F (36.2 °C)  Pulse:  [53-69] 69  Resp:  [14-36] 20  SpO2:  [92 %-100 %] 98 %  Arterial Line BP: ()/(36-60) 103/37     Patient Vitals for the past 72 hrs (Last 3 readings):   Weight   01/13/19 0300 84.9 kg (187 lb 2.7 oz)   01/12/19 1000 87.9 kg (193 lb 12.6 oz)     Body mass index is 24.03 kg/m².      Intake/Output Summary (Last 24 hours) at 1/13/2019 0929  Last data filed at 1/13/2019 0900  Gross per 24 hour   Intake 3778.67 ml   Output 5340 ml   Net -1561.33 ml     Physical Exam    Constitutional: Sitting in bed NAD; wife at bedside.  Patient is hard of hearing.   Neck: JVD elevation to mid neck    Cardiovascular:Irregularly irregular rhythm   and intact distal pulses. Exam reveals no gallop and no friction rub. No murmur heard. Pulmonary/Chest: Effort normal. No respiratory distress. He has wheezes. He has rales.   Abdominal: Soft. Bowel sounds are normal. He exhibits no distension. There is no tenderness.   Musculoskeletal: He exhibits edema (1+ bilateral lower extremity edema).   Neurological: He is alert and oriented to person, place, and time.   Skin: He is not diaphoretic.     Significant Labs:  CBC:  Recent Labs   Lab 01/11/19  0505  01/12/19 0416 01/13/19 0323   WBC 17.97* 16.81* 11.97   RBC 3.52* 3.51* 3.24*   HGB 10.1* 9.7* 9.2*   HCT 31.6* 31.2* 29.8*   * 101* 93*   MCV 90 89 92   MCH 28.7 27.6 28.4   MCHC 32.0 31.1* 30.9*     BNP:  Recent Labs   Lab 01/07/19  0115 01/10/19  1141   BNP 1,607* 2,076*     CMP:  Recent Labs   Lab 01/11/19  0505  01/12/19  0416  01/12/19  2135 01/13/19 0323 01/13/19  0756   *  247*   < > 162*  162*   < > 200* 119* 72   CALCIUM 8.8  8.8   < > 8.7  8.7   < > 8.3* 8.6* 8.6*   ALBUMIN 2.5*  --  2.4*  --   --  2.2*  --    PROT 6.6  --  6.7  --   --  6.3  --      140   < > 141  141   < > 140 141 143   K 4.0  4.0   < > 3.7  3.7   < > 4.7 3.7 3.5   CO2 23  23   < > 24  24   < > 26 26 29     105   < > 105  105   < > 103 104 102   BUN 44*  44*   < > 43*  43*   < > 40* 39* 38*   CREATININE 2.1*  2.1*   < > 1.8*  1.8*   < > 1.6* 1.4 1.4   ALKPHOS 132  --  137*  --   --  145*  --    ALT 20  --  17  --   --  16  --    AST 15  --  17  --   --  19  --    BILITOT 0.7  --  0.8  --   --  0.7  --     < > = values in this interval not displayed.      Coagulation:   Recent Labs   Lab 01/07/19  0115 01/12/19  1149 01/12/19 1952 01/13/19 0323   INR 1.5* 1.2  1.2  --  1.2   APTT 27.4 28.3 43.2* 49.2*     LDH:  No results for input(s): LDH in the last 72 hours.  Microbiology:  Microbiology Results (last 7 days)     Procedure Component Value Units Date/Time    Aerobic culture [[id number]] Collected:  01/09/19 1402    Order Status:  Completed Specimen:  Other from Chest, Left Updated:  01/13/19 0800     Aerobic Bacterial Culture --     ENTEROCOCCUS FAECALIS  Rare  Susceptibility pending      Narrative:       #4 RV lead tip    Blood culture [[id number]] Collected:  01/09/19 1657    Order Status:  Completed Specimen:  Blood from Peripheral, Hand, Right Updated:  01/12/19 2012     Blood Culture, Routine No Growth to date     Blood Culture, Routine No Growth to date     Blood Culture, Routine No  Growth to date     Blood Culture, Routine No Growth to date    Blood culture [[id number]] Collected:  01/10/19 1401    Order Status:  Completed Specimen:  Blood from Peripheral, Hand, Left Updated:  01/12/19 1612     Blood Culture, Routine No Growth to date     Blood Culture, Routine No Growth to date     Blood Culture, Routine No Growth to date    Blood culture [[id number]] Collected:  01/10/19 1401    Order Status:  Completed Specimen:  Blood from Peripheral, Forearm, Left Updated:  01/12/19 1612     Blood Culture, Routine No Growth to date     Blood Culture, Routine No Growth to date     Blood Culture, Routine No Growth to date    Aerobic culture [[id number]] Collected:  01/09/19 1322    Order Status:  Completed Specimen:  Other from Chest, Left Updated:  01/12/19 1023     Aerobic Bacterial Culture No growth    Narrative:       #3 LV lead tip    Aerobic culture [[id number]] Collected:  01/09/19 1124    Order Status:  Completed Specimen:  Tissue from Chest, Left Updated:  01/12/19 1023     Aerobic Bacterial Culture No growth    Narrative:       ICD pocket tissue    Aerobic culture [[id number]] Collected:  01/09/19 1314    Order Status:  Completed Specimen:  Other from Chest, Left Updated:  01/12/19 1023     Aerobic Bacterial Culture No growth    Narrative:       #2 RA Lead tip    Blood culture (site 1) [[id number]] Collected:  01/07/19 0154    Order Status:  Completed Specimen:  Blood Updated:  01/12/19 0812     Blood Culture, Routine No growth after 5 days.    Narrative:       Site # 1, aerobic and anaerobic    Blood culture (site 2) [[id number]] Collected:  01/07/19 0154    Order Status:  Completed Specimen:  Blood Updated:  01/12/19 0812     Blood Culture, Routine No growth after 5 days.    Narrative:       Site # 2, aerobic only    Urine culture [[id number]] Collected:  01/11/19 0115    Order Status:  Completed Specimen:  Urine, Catheterized Updated:  01/12/19 0418     Urine Culture, Routine No growth    Culture,  Anaerobe [[id number]] Collected:  01/09/19 1402    Order Status:  Completed Specimen:  Other from Chest, Left Updated:  01/11/19 0728     Anaerobic Culture Culture in progress    Narrative:       #4 RV lead tip    Culture, Anaerobe [[id number]] Collected:  01/09/19 1322    Order Status:  Completed Specimen:  Other from Chest, Left Updated:  01/11/19 0725     Anaerobic Culture Culture in progress    Narrative:       #3 LV lead tip    Culture, Anaerobe [[id number]] Collected:  01/09/19 1314    Order Status:  Completed Specimen:  Other from Chest, Left Updated:  01/11/19 0724     Anaerobic Culture Culture in progress    Narrative:       #2 RA Lead tip    Culture, Anaerobe [[id number]] Collected:  01/09/19 1124    Order Status:  Completed Specimen:  Tissue from Chest, Left Updated:  01/11/19 0724     Anaerobic Culture Culture in progress    Narrative:       ICD pocket tissue    IV catheter culture [[id number]]     Order Status:  No result Specimen:  Catheter Tip, NOS     Culture, Respiratory [[id number]]     Order Status:  No result Specimen:  Respiratory from Sputum, Expectorated           I have reviewed all pertinent labs within the past 24 hours.    Estimated Creatinine Clearance: 52.2 mL/min (based on SCr of 1.4 mg/dL).    Diagnostic Results:  I have reviewed all pertinent imaging results/findings within the past 24 hours.    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 Patient had 2 vegetations on RV lead which is most likely source of bacteremia. Lead tip and ICD pocket cultured with NGTD  -Continue Ampicillin and Ceftriaxone per ID recs Will treat for 6 weeks post extraction.  Estimated end date 2/20/19.    -If urgently necessary from a cardiac standpoint, it is okay to proceed with implanting new device if repeat blood cultures taken post  extraction remain negative X 72 hours.  Suspect known vegetations on RV leads, now removed, were primary source of infection; cardioMems likely not infected  -Blood cultures are negative here thus far.  -WCC now trending down  -Per EP; ok to start heparin and Coumadin.  No Eliquis.   -Will need Life Vest prior to discharge  -Appreciate GI's help. Plan for outpatient EGD and C-scope for eval of anemia and Enterococcus bacteremia       Bacteremia    -see above     TREY (acute kidney injury)    - Appreciate Nephrology's help  - Was oliguric and creatinine jumped to 4.1 likely 2/2 ATN/hypotension. UOP since Morin put back in yesterday, Lasix gtt and Dopamine gtts started   - CVP 10, UOP and creatinine improving. (was 1.2 on admit)  - Continue Lasix gtt, wean Dopamine as tolerated for MAP >55  - Hold Entresto for now     Atrial fibrillation    -Remains in A fib with junctional escape rhythm  -plan for RICARDO/cardioversion on Monday as NSR may conduct and keep him from being chronotrope-dependent  -Wean Dopamine as tolerated keeping MAP > 55  -CHADS VASc 6  -Holding eliquis s/p ICD explant 1/9. Per EP, can resume anticoagulation on 1/13.  Started Heparin and Coumadin yesterday  -Cardiac monitoring     Type 2 diabetes mellitus with complication    -A1c 8.1  -Target -180 while hospitalized  -detemir 17, aspart 6 with sliding scale  -Consult Endocrine to help with management     Acute on chronic combined systolic and diastolic CHF, NYHA class 3    -ICM  -Last full 2D Echo 1/7/19: LVEF 20%, LVEDD 7.24 cm. Limited bedside echo done after ICD explant showed no pericardial effusion  -CVP 9. Continue diuresing with Lasix gtt at 10 mg/hr  -sVO2 pending  -Wean Dopamine keeping MAP > 55  -GDMT: Dig and Toprol D/C'd 2/2 junctional rhythm. Entresto D/C'd 2/2 transient hypotension during/after ICD explant and TREY  -Self declined for LVAD after w/u last April 2018       Dysuria    - Resolved  - UA showed many bacteria - culture with  NGTD         Dysphagia    - Consult SLP     Nausea & vomiting    - Resolved  - Antiemetics prn  - Appreciate speech therapy consult.  Advanced diet     Junctional bradycardia    - Se A fib     Endocarditis    - See ICD lead infection       Uninterrupted Critical Care/Counseling Time (not including procedures): 50 minutes      STAR Mahan  Heart Transplant  [facility]

## 2019-01-13 NOTE — PLAN OF CARE
Problem: Adult Inpatient Plan of Care  Goal: Plan of Care Review  Outcome: Ongoing (interventions implemented as appropriate)  Patient progressing towards discharge.  Patient had no acute events noted throughout the night at this time.  See Doc Flowsheets for documented results.       Infusions: dopamine, lasix, abx, heparin.     Neurological:  Responds to voice.  Opens eyes on command.  Pupils equal and reactive.     Cardiac:  junctional Rhythm, occasionally accelerated junctional rhythm. Pulses weak, but palpable in all extremities.  Capillary refill < 3 seconds in all extremities.    Respiratory:  3L 02 NC, tolerating well.      Gastrointestinal: normoactive BS     Genitourinary: gtz care performed. UOP adequate     Endocrine:  Blood glucose monitoring.     Musculoskeletal: HOFFMAN     Integumentary/Other: Patient repositioned every 2 hours throughout the night. Dopamine weaned down to 10 mcg/kg/min, pt noted HR decreased to 50 when dopamine weaned to 7.5 mcg/kg/min. Bathed and linens changed.      Discussed plan of care with patient and family with verbalization of understanding.  Will continue to monitor.

## 2019-01-14 NOTE — TRANSFER OF CARE
"Anesthesia Transfer of Care Note    Patient: Jerry Solis    Procedure(s) Performed: Procedure(s) (LRB):  CARDIOVERSION (N/A)  ECHOCARDIOGRAM,TRANSESOPHAGEAL (N/A)    Patient location: ICU    Anesthesia Type: general    Transport from OR: Transported from OR on 6-10 L/min O2 by face mask with adequate spontaneous ventilation. Continuous ECG monitoring in transport. Continuous SpO2 monitoring in transport    Post pain: adequate analgesia    Post assessment: no apparent anesthetic complications and tolerated procedure well    Post vital signs: stable    Level of consciousness: awake, alert and oriented    Nausea/Vomiting: no nausea/vomiting    Complications: none    Transfer of care protocol was followed      Last vitals:   Visit Vitals  BP (!) 107/53   Pulse (!) 59   Temp 36.6 °C (97.9 °F) (Oral)   Resp (!) 24   Ht 6' 2" (1.88 m)   Wt 83.5 kg (184 lb 1.4 oz)   SpO2 100%   BMI 23.64 kg/m²     "

## 2019-01-14 NOTE — ASSESSMENT & PLAN NOTE
-A1c 8.1  -Target -180 while hospitalized  -detemir 17, aspart 6 with sliding scale  -Appreciate Endocrine's help with management

## 2019-01-14 NOTE — PROGRESS NOTES
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  Blood cultures of 1/2/18  found to be positive for Enterococcus faecalis, and RICARDO showed vegetation on RV lead.  [facility] team reviewed films and confirmed presence of vegetation.   He was transferred to [facility] for further evaluation and possible device extraction.  Repeat blood cultures of 1/7 on admission here NGTD      Source unclear - likely GI.   Denies any problems or history of problems with ICD pocket.   Reports episode of food poisoning a couple of months ago.     Last colonoscopy was 2 years ago.  He has q 3 year surveillance colonoscopies as he has polyps.      Now POD#5 lead/device extraction (1/9/19). RICARDO pre-extraction showed 2 vegetations on RV lead.  No vegetations noted on valves. Post-extraction RICARDO  - no masses present in the right atrium or attached to the tricuspid valve following removal of the leads. Repeat RICARDO today +Two small mobile masses are appreciated distally on the central venous catheter present in the SVC. This could be fibrinous material but vegetation cannot be excluded.    Plan/recommendations:  1.  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 Anticipated duration of antibiotics - 6 weeks post extraction.  Estimated end date 2/21/19  2.  Lead cultures +E.faecalis. Repeat blood  cultures so far negative. Blood cultures repeated today.   3.  If urgently necessary from a cardiac standpoint, it is okay to proceed with implanting new device if repeat blood cultures taken post extraction remain negative X 72 hours.  Suspect known vegetations on RV leads, now removed, were primary source of infection; cardioMems likely not infected  4.  Recommend EGD and colonoscopy as part of work up  5.  Will need weekly cbc cmp esr crp sent to ID clinic at [phone]. Will sign off, please reconsult if repeat cultures are positive.     Seen and discussed with ID staff. Discussed with primary team.          Thank you for your consult. I will sign off. Please contact us if you have any additional questions.    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 Jerry Solis is a 78 year old gentleman with history of CHF, s/p ICD placement and s/p cardioMems heart failure monitoring device , Afib, DM, HTN, COPD, history of CVA who presented to [facility] with nausea, vomiting, diarrhea and fevers.  Found to be hypotensive and with TREY.  Required pressor support and was transferred to [facility] where blood cultures found to be Enterococcus faecalis, and RICARDO showed possible vegetation on RV lead.  He was transferred to [facility] for further evaluation and possible device extraction.   He is currently on IV vancomycin and cefepime.      Reports that he had an episode of what he thought was food poisoning a couple of months ago when he was visiting his brother in [place].  Patient reports a loss of appetite for approximately the past 3 -4 weeks and has lost approx 20 pounds during that time.  He did not seek medical attention because he was working.   He denies any fevers, chills until this past Monday when he was admitted.  He denies current fevers, chills, sweats.  He has  chronic SOB that is not worse from baseline.  Denies current n/v/d.  Denies melena, hematachezia.  No urinary complaints. Denies any pain, redness at ICD pocket site.      ICD/pacer placed >10 years ago.  Had ? Generator changed in 2008.  Denies any problems.      Last colonoscopy was 2 years ago.  He has q 3 year surveillance colonoscopies as he has polyps.      Blood culture results from [facility] of 1/2/19 - E. Faecalis - PCN, vancomycin sensitive.  PICC was placed 1/3/19.  Unclear when blood cultures cleared as blood cultures were not repeated.   Repeat blood cultures taken here on admission 1/7.      He is afebrile, WBC 13.2, platelets 128, H/H 8.7/27.6, ESR 54, CRP 27.9, procalcitonin wnl.          No new subjective & objective note has been filed under this hospital service since the last note was generated.

## 2019-01-14 NOTE — ASSESSMENT & PLAN NOTE
-ICM  -Last full 2D Echo 1/7/19: LVEF 20%, LVEDD 7.24 cm. Limited bedside echo done after ICD explant showed no pericardial effusion  -CVP 13. Continue diuresing with Lasix gtt at 40 mg/hr  -sVO2 61  -Wean Dopamine keeping MAP > 55  -GDMT: Dig and Toprol D/C'd 2/2 junctional rhythm. Entresto D/C'd 2/2 transient hypotension during/after ICD explant and TREY  -Self declined for LVAD after w/u last April 2018

## 2019-01-14 NOTE — PROGRESS NOTES
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 Good UOP, still requiring dopamine at a dose of 15mcg/kg/min    Tele: Junctional rhythm with HR ranging from 55 to 65bpm.     ROS  Objective:     Vital Signs (Most Recent):  Temp: 97.5 °F (36.4 °C) (01/14/19 0400)  Pulse: 81 (01/14/19 0630)  Resp: (!) 34 (01/14/19 0630)  BP: (!) 107/53 (01/13/19 2023)  SpO2: 100 % (01/14/19 0630) Vital Signs (24h Range):  Temp:  [97.1 °F (36.2 °C)-97.8 °F (36.6 °C)] 97.5 °F (36.4 °C)  Pulse:  [51-81] 81  Resp:  [15-34] 34  SpO2:  [91 %-100 %] 100 %  BP: (107)/(53) 107/53  Arterial Line BP: ()/(0-54) 95/44     Weight: 83.5 kg (184 lb 1.4 oz)  Body mass index is 23.64 kg/m².     SpO2: 100 %  O2 Device (Oxygen Therapy): nasal cannula w/ humidification    Physical Exam   Constitutional: He is oriented to person, place, and time. He appears well-developed and well-nourished. No distress.   HENT:   Head: Normocephalic and atraumatic.   Mouth/Throat: Oropharynx is clear and moist.   Eyes: Conjunctivae and EOM are normal. Pupils are equal, round, and reactive to light. No scleral icterus.   Neck: Normal range of motion. Neck supple. No JVD present.   Cardiovascular: Regular rhythm, normal heart sounds and intact distal pulses. Bradycardia present.   No murmur heard.  Pulses:       Radial pulses are 2+ on the right side, and 2+ on the left side.   Pulmonary/Chest: Effort normal and breath sounds normal. No respiratory distress.   Symmetrical expansion  Left chest incision is clean   Abdominal: Soft. Bowel sounds are normal. There is no hepatosplenomegaly. There is no tenderness.   Musculoskeletal: Normal range of motion.   Groin  healed   Neurological: He is alert and oriented to person, place, and time. No cranial nerve deficit.   Skin: Skin is warm and dry. No rash noted. He is not diaphoretic.   Well perfused in upper and lower extremities with good capillary refill   Psychiatric: He has a normal mood and affect. Judgment and thought content normal.       Significant Labs:   EP:   Recent Labs   Lab 01/12/19  1149  01/13/19  0323  01/13/19  2150 01/14/19  0321 01/14/19  0922   NA  --    < > 141   < > 139 140  140  140 138   K  --    < > 3.7   < > 4.1 4.6  4.6  4.6 4.0   CL  --    < > 104   < > 100 101  101  101 99   CO2  --    < > 26   < > 29 29  29  29 32*   GLU  --    < > 119*   < > 172* 165*  165*  165* 138*   BUN  --    < > 39*   < > 39* 40*  40*  40* 38*   CREATININE  --    < > 1.4   < > 1.4 1.4  1.4  1.4 1.3   CALCIUM  --    < > 8.6*   < > 8.8 8.7  8.7  8.7 9.1   PROT  --   --  6.3  --   --  6.5  --    ALBUMIN  --   --  2.2*  --   --  2.2*  --    BILITOT  --   --  0.7  --   --  0.6  --    ALKPHOS  --   --  145*  --   --  162*  --    AST  --   --  19  --   --  28  --    ALT  --   --  16  --   --  16  --    ANIONGAP  --    < > 11   < > 10 10  10  10 7*   ESTGFRAFRICA  --    < > 56.0*   < > 56.0* 56.0*  56.0*  56.0* >60.0   EGFRNONAA  --    < > 48.5*   < > 48.5* 48.5*  48.5*  48.5* 53.0*   WBC  --   --  11.97  --   --  9.50  --    HGB  --   --  9.2*  --   --  9.2*  --    HCT  --    < > 29.8*  --   --  29.3*  --    PLT  --   --  93*  --   --  109*  --    INR 1.2  1.2  --  1.2  --   --  1.2  --     < > = values in this interval not displayed.         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 Likewise has a  CardioMeMMS device in place. Is not pacemaker dependent. ID believes the primary source is the vegetation, he is s/p extraction on 01/09, RV lead tip grew E. Faecalis (resistant to tetracyclines). Recovery complicated by slow junctional escape rhythm responding to dopamine.    - agree with dopamine, wean as tolerated  - plan on RICARDO/cardioversion today  - Continue coumadin with heparin bridge, coumadin for easy reversal in case device needs to be placed urgently  - volume/HF management as per primary team         Oniel George MD  Cardiac Electrophysiology  [facility]

## 2019-01-14 NOTE — CONSULTS
TRANSESOPHAGEAL ECHOCARDIOGRAPHY   PRE-PROCEDURE NOTE    01/14/2019    HPI:     Jerry Solis is a 77 y/o male with PMH of CHF/ICM, s/p CRT-D placement, s/p CardioMEMS, AF, HTN and CVA who presented to OSH with hypotension, TREY, and shock. bCx 1/2 positive for Enterococcus faecalis. RICARDO showed a vegetation on the RV lead. [facility] reviewed the filsm and confirmed the presence of the vegetation. He was transferred here for device extraction. ID believes he likely has a GI source. The patient had his device extracted 1/9. RICARDO pre-extraction showed two vegetations on the RV lead. Post-extraction RICARDO showed no masses in the RA or attached to the TV. The patient is to be on abx through February. Post-procedure, he was in junctional rhythm and was placed on dopamine. EP would like RICARDO prior to DCCV, as they are concerned he has underlying AF. He is currently on warfarin and IV UFH.    TTE 1/10  · No pericardial effusion on limited non-Doppler study  · Severely decreased left ventricular systolic function. The estimated ejection fraction is 25%  · Severe global hypokinetic wall motion.  · Elevated central venous pressure (15 mm Hg).  · Moderately reduced right ventricular systolic function.  · Moderate left atrial enlargement.  · Mild right atrial enlargement.  · Mild mitral sclerosis.  · Full Doppler if clinically indicated    RICARDO 1/9  · Severely decreased left ventricular systolic function. The estimated ejection fraction is 25%  · Mild right ventricular enlargement.  · Mildly reduced right ventricular systolic function.  · Moderate to severe tricuspid regurgitation, present before the lead extraction, with no change after the procedure.  · Vegetation present on the RV (ICD) lead.  · No vegetation present after removal of RV (ICD) lead.  · RA, RV, CS leads successfully removed.  · Following completion of the procedure there was no pericardial fluid or collection around SVC.    Dysphagia or odynophagia:  No  Liver  Disease, esophageal disease, or known varices:  No  Upper GI Bleeding: No  Snoring:  Yes  Sleep Apnea:  No  Prior neck surgery or radiation:  No  History of anesthetic difficulties:  No  Family history of anesthetic difficulties:  No  Last oral intake:  12 hours ago  Able to move neck in all directions:  Yes      Meds:     Scheduled Meds:   acetaminophen  1,000 mg Oral TID    ampicillin (OMNIPEN) 4 g in  mL EXTENDED INFUSION  4,000 mg Intravenous Q8H    cefTRIAXone (ROCEPHIN) IVPB  2 g Intravenous Q12H    clopidogrel  75 mg Oral Daily    insulin aspart U-100  6 Units Subcutaneous TIDWM    insulin detemir U-100  13 Units Subcutaneous QHS    levothyroxine  25 mcg Oral Before breakfast    magnesium oxide  800 mg Oral Once    magnesium oxide  800 mg Oral BID    polyethylene glycol  17 g Oral Daily    potassium chloride SA  60 mEq Oral Once    rosuvastatin  20 mg Oral QHS    senna-docusate 8.6-50 mg  2 tablet Oral BID    tamsulosin  0.4 mg Oral Daily    warfarin  5 mg Oral Daily     PRN Meds:albuterol-ipratropium, bupivacaine (PF) 0.25% (2.5 mg/ml), dextrose 50%, dextrose 50%, glucagon (human recombinant), glucose, glucose, insulin aspart U-100, lidocaine HCL 20 mg/ml (2%), lidocaine HCL 20 mg/ml (2%), magnesium sulfate IVPB, magnesium sulfate IVPB, ondansetron, promethazine (PHENERGAN) IVPB, ramelteon, sodium chloride 0.9%, traMADol, visipaque 320 iodixanol  Continuous Infusions:   DOPamine 12 mcg/kg/min (01/14/19 0837)    epinephrine infusion Stopped (01/09/19 1731)    furosemide (LASIX) 2 mg/mL infusion (non-titrating) 20 mg/hr (01/14/19 0800)    heparin (porcine) in D5W 10.059 Units/kg/hr (01/14/19 0800)       Physical Exam:     Vitals:  Temp:  [97.1 °F (36.2 °C)-97.8 °F (36.6 °C)]   Pulse:  [51-81]   Resp:  [15-34]   BP: (107)/(53)   SpO2:  [91 %-100 %]   Arterial Line BP: ()/(0-54)        Constitutional: NAD, conversant  HEENT:   Sclera anicteric, Uvula midline, EOMI, OP clear  Neck:                No JVD, moves to all direction without any limitations  CV:               RRR, no murmurs / rubs / gallops, normal S1/S2  Pulm:               CTAB, no wheezes, rales, or ronchi  GI:               Abdomen soft, NTND, +BS  Extremities:              No LE edema, warm with palpable pulses  Neuro:   AAOX3, no focal motor deficits    Mallampati:3  ASA:3      Labs:     Recent Results (from the past 336 hour(s))   CBC with Automated Differential    Collection Time: 01/14/19  3:21 AM   Result Value Ref Range    WBC 9.50 3.90 - 12.70 K/uL    Hemoglobin 9.2 (L) 14.0 - 18.0 g/dL    Hematocrit 29.3 (L) 40.0 - 54.0 %    Platelets 109 (L) 150 - 350 K/uL   CBC with Automated Differential    Collection Time: 01/13/19  3:23 AM   Result Value Ref Range    WBC 11.97 3.90 - 12.70 K/uL    Hemoglobin 9.2 (L) 14.0 - 18.0 g/dL    Hematocrit 29.8 (L) 40.0 - 54.0 %    Platelets 93 (L) 150 - 350 K/uL   CBC with Automated Differential    Collection Time: 01/12/19  4:16 AM   Result Value Ref Range    WBC 16.81 (H) 3.90 - 12.70 K/uL    Hemoglobin 9.7 (L) 14.0 - 18.0 g/dL    Hematocrit 31.2 (L) 40.0 - 54.0 %    Platelets 101 (L) 150 - 350 K/uL       Recent Results (from the past 336 hour(s))   Basic metabolic panel    Collection Time: 01/14/19  3:21 AM   Result Value Ref Range    Sodium 140 136 - 145 mmol/L    Potassium 4.6 3.5 - 5.1 mmol/L    Chloride 101 95 - 110 mmol/L    CO2 29 23 - 29 mmol/L    BUN, Bld 40 (H) 8 - 23 mg/dL    Creatinine 1.4 0.5 - 1.4 mg/dL    Calcium 8.7 8.7 - 10.5 mg/dL    Anion Gap 10 8 - 16 mmol/L   Basic metabolic panel    Collection Time: 01/14/19  3:21 AM   Result Value Ref Range    Sodium 140 136 - 145 mmol/L    Potassium 4.6 3.5 - 5.1 mmol/L    Chloride 101 95 - 110 mmol/L    CO2 29 23 - 29 mmol/L    BUN, Bld 40 (H) 8 - 23 mg/dL    Creatinine 1.4 0.5 - 1.4 mg/dL    Calcium 8.7 8.7 - 10.5 mg/dL    Anion Gap 10 8 - 16 mmol/L   Basic metabolic panel    Collection Time: 01/13/19  9:50 PM   Result Value Ref  Range    Sodium 139 136 - 145 mmol/L    Potassium 4.1 3.5 - 5.1 mmol/L    Chloride 100 95 - 110 mmol/L    CO2 29 23 - 29 mmol/L    BUN, Bld 39 (H) 8 - 23 mg/dL    Creatinine 1.4 0.5 - 1.4 mg/dL    Calcium 8.8 8.7 - 10.5 mg/dL    Anion Gap 10 8 - 16 mmol/L       Estimated Creatinine Clearance: 52.2 mL/min (based on SCr of 1.4 mg/dL).    INR: 1.2          Assessment & Plan:     Atrial fibrillation  - RICARDO to r/o GIULIA thrombus prior to DCCV        -The risks, benefits & alternatives of the procedure were explained to the patient.    -The risks of transesophageal echo include but are not limited to:  Dental trauma, esophageal trauma/perforation, bleeding, laryngospasm/brochospasm, aspiration, sore throat/hoarseness, & dislodgement of the endotracheal tube/nasogastric tube (where applicable).    -The risks of moderate sedation include hypotension, respiratory depression, arrhythmias, bronchospasm, & death.    -Informed consent was obtained & the patient is agreeable to proceed with the procedure.    I will discuss with the attending physician. Attending addendum is to follow.     Further recommendations per attending addendum

## 2019-01-14 NOTE — ASSESSMENT & PLAN NOTE
78 year old man with history of CHF, s/p ICD placement (> 20 years ago; Generator exchange 2008) and s/p cardioMems heart failure monitoring device placement 5/2018, Afib (on eliquis), DM, HTN, COPD, history of CVA who presented to [facility] with nausea, vomiting, diarrhea and fevers.  Blood cultures of 1/2/18  found to be positive for Enterococcus faecalis, and RICARDO showed vegetation on RV lead.  [facility] [place] films and confirmed presence of vegetation.   [place] was [place] [facility] [place] and possible device extraction.  Repeat blood cultures of 1/7 on admission here NGTD      Source unclear - likely GI.   Denies any problems or history of problems with ICD pocket.   Reports episode of food poisoning a couple of months ago.     Last colonoscopy was 2 years ago.  He has q 3 year surveillance colonoscopies as he has polyps.      Now POD#5 lead/device extraction (1/9/19). RICARDO pre-extraction showed 2 vegetations on RV lead.  No vegetations noted on valves. Post-extraction RICARDO  - no masses present in the right atrium or attached to the tricuspid valve following removal of the leads. Repeat RICARDO today +Two small mobile masses are appreciated distally on the central venous catheter present in the SVC. This could be fibrinous material but vegetation cannot be excluded.    Plan/recommendations:  1.  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 Anticipated duration of antibiotics - 6 weeks post extraction.  Estimated end date 2/21/19  2.  Lead cultures +E.faecalis. Repeat blood cultures so far negative. Blood cultures repeated today.   3.  If urgently necessary from a cardiac standpoint, it is okay to proceed with implanting new device if repeat blood cultures taken post extraction remain negative X 72 hours.  Suspect known vegetations on RV leads, now removed, were primary source of infection; cardioMems likely not infected  4.  Recommend EGD and colonoscopy  as part of work up  5.  Will need weekly cbc cmp esr crp sent to ID clinic at [phone]. Will sign off, please reconsult if repeat cultures are positive.     Seen and discussed with ID staff. Discussed with primary team.

## 2019-01-14 NOTE — ANESTHESIA PREPROCEDURE EVALUATION
[facility]  Anesthesia Pre-Operative Evaluation         Patient Name: Jerry Solis  YOB: 1940  MRN: [id number]    SUBJECTIVE:     Pre-operative evaluation for Procedure(s) (LRB):  EXTRACTION, ELECTRODE LEAD (Left)  Insertion, Pacemaker, Temporary Transvenous  ECHOCARDIOGRAM,TRANSESOPHAGEAL (N/A)     01/14/2019    Jerry Solis is a 78 y.o. male w/ a significant PMHx of CHF/ICM, s/p CRT-D placement, s/p CardioMEMS, AF, HTN and CVA who presented to OSH with hypotension, TREY, and shock. BCx 1/2 positive for Enterococcus faecalis. RICARDO showed a vegetation on the RV lead. [facility] reviewed the film and confirmed the presence of the vegetation. He was transferred here for device extraction. ID believes he likely has a GI source. The patient had his device extracted 1/9. RICARDO pre-extraction showed two vegetations on the RV lead. Post-extraction RICARDO showed no masses in the RA or attached to the TV. The patient is to be on abx through February. Post-procedure, he was in junctional rhythm and was placed on dopamine. EP would like RICARDO prior to DCCV.       Patient now presents for the above procedure(s).      LDA: None documented.       PICC Triple Lumen right brachial (Active)   Site Assessment Clean;Dry;Intact;No redness;No swelling 1/14/2019  4:00 AM   Lumen 1 Status Infusing 1/14/2019  4:00 AM   Lumen 2 Status Infusing 1/14/2019  4:00 AM   Lumen 3 Status Flushed;Normal saline locked 1/14/2019  4:00 AM   Dressing Type Transparent 1/14/2019  4:00 AM   Dressing Status Biopatch in place;Clean;Dry;Intact 1/14/2019  4:00 AM   Dressing Intervention New dressing 1/11/2019 10:08 AM   Dressing Change Due 01/18/19 1/14/2019  4:00 AM   Daily Line Review Performed 1/14/2019  4:00 AM   Number of days:             Percutaneous Central Line Insertion/Assessment - triple lumen  01/10/19 1710 right internal jugular (Active)   Dressing dressing changed;biopatch in place 1/14/2019  6:00 AM   Securement secured w/  "sutures 1/14/2019  6:00 AM   Additional Site Signs no erythema;no warmth;no edema;no pain;no palpable cord;no streak formation;no drainage 1/13/2019  3:00 PM   Distal Patency/Care infusing 1/14/2019  4:00 AM   Medial Patency/Care infusing 1/14/2019  4:00 AM   Proximal Patency/Care infusing 1/14/2019  4:00 AM   Waveform normal 1/14/2019  4:00 AM   Line Interventions line leveled/zeroed 1/14/2019 12:00 AM   Dressing Change Due 01/21/19 1/14/2019  6:00 AM   Daily Line Review Performed 1/14/2019  4:00 AM   Number of days: 3            Arterial Line 01/09/19 1016 Left Radial (Active)   Site Assessment Clean;Dry;Intact;No redness;No swelling 1/14/2019  4:00 AM   Line Status Pulsatile blood flow 1/14/2019  4:00 AM   Art Line Waveform Appropriate;Square wave test performed 1/14/2019  4:00 AM   Arterial Line Interventions Leveled;Connections checked and tightened 1/14/2019  4:00 AM   Color/Movement/Sensation Capillary refill less than 3 sec 1/14/2019  4:00 AM   Dressing Type Transparent 1/14/2019  4:00 AM   Dressing Status Biopatch in place;Clean;Dry;Intact 1/14/2019  4:00 AM   Dressing Intervention Dressing changed 1/13/2019  4:00 AM   Dressing Change Due 01/17/19 1/14/2019  4:00 AM   Number of days: 4            Urethral Catheter 01/10/19 2115 Non-latex 10 Fr. (Active)   Site Assessment Clean;Intact 1/14/2019  4:00 AM   Collection Container Urimeter 1/14/2019  4:00 AM   Securement Method secured to top of thigh w/ adhesive device 1/14/2019  4:00 AM   Catheter Care Performed yes 1/13/2019  8:05 PM   Reason for Continuing Urinary Catheterization Critically ill in ICU requiring intensive monitoring;Required immobilization 1/14/2019  4:00 AM   CAUTI Prevention Bundle Drainage bag off the floor;Green sheeting clip in use;No dependent loops or kinks;StatLock in place w 1" slack;Drainage bag not overfilled (<2/3 full);Drainage bag below bladder 1/13/2019  8:00 PM   Output (mL) 230 mL 1/14/2019  8:00 AM   Number of days: 3 "       Prev airway: None documented.    Drips: None documented.      Patient Active Problem List   Diagnosis    Acute on chronic combined systolic and diastolic CHF, NYHA class 3    Ischemic cardiomyopathy    VT (ventricular tachycardia)    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    Type 2 diabetes mellitus with complication    Atrial fibrillation    TREY (acute kidney injury)    CHF (congestive heart failure)    Hypokalemia    Bacteremia    Endocarditis    ICD (implantable cardioverter-defibrillator) in place    Infection associated with cardiac device    Junctional bradycardia    Nausea & vomiting    Dysphagia    Dysuria    Hypotension    Bacteremia due to Enterococcus       Review of patient's allergies indicates:   Allergen Reactions    Adhesive Other (See Comments)     blisters    Codeine Other (See Comments)     Blurred vision    Latex Hives    Ace inhibitors     Lipitor [atorvastatin] Other (See Comments)     Muscle spasms       Current Inpatient Medications:      Current Facility-Administered Medications on File Prior to Visit   Medication Dose Route Frequency Provider Last Rate Last Dose    acetaminophen tablet 1,000 mg  1,000 mg Oral TID Mela Nails MD   1,000 mg at 01/13/19 2150    albuterol-ipratropium 2.5 mg-0.5 mg/3 mL nebulizer solution 3 mL  3 mL Nebulization Q6H PRN Mela Nails MD        ampicillin (OMNIPEN) 4 g in  mL EXTENDED INFUSION  4,000 mg Intravenous Q8H STAR Neil 31.3 mL/hr at 01/13/19 2356 4,000 mg at 01/13/19 2356    bupivacaine (PF) 0.25% (2.5 mg/ml) injection    PRN Werner Boggs MD   15 mL at 01/09/19 1115    cefTRIAXone (ROCEPHIN) 2 g in dextrose 5 % 50 mL IVPB  2 g Intravenous Q12H COURT Manzo, ANP 50 mL/hr at 01/13/19 1515 2 g at 01/14/19 0322    clopidogrel tablet 75 mg  75 mg Oral Daily Valentin Farah MD   75 mg at 01/13/19 0847    dextrose 50% injection 12.5 g  12.5 g Intravenous PRN Timo  MIRI Avalos NP        dextrose 50% injection 25 g  25 g Intravenous PRN Timo Avalos NP        DOPamine 400 mg in dextrose 5 % 250 mL infusion (premix)  5 mcg/kg/min Intravenous Continuous Mela Nails MD 38.2 mL/hr at 01/14/19 0837 12 mcg/kg/min at 01/14/19 0837    EPINEPHrine (ADRENALIN) 5 mg in sodium chloride 0.9% 250 mL infusion  0.02 mcg/kg/min Intravenous Continuous Dionna Lora NP   Stopped at 01/09/19 1731    furosemide (LASIX) 2 mg/mL in sodium chloride 0.9% 100 mL infusion (conc: 2 mg/mL)  20 mg/hr Intravenous Continuous Dionna Lora NP 10 mL/hr at 01/14/19 0800 20 mg/hr at 01/14/19 0800    glucagon (human recombinant) injection 1 mg  1 mg Intramuscular PRN Timo Avalos NP        glucose chewable tablet 16 g  16 g Oral PRN Timo Avalos NP        glucose chewable tablet 24 g  24 g Oral PRN Timo Avalos NP        heparin 25,000 units in dextrose 5% 250 ml (100 units/mL) infusion MINIMAL INTENSITY nomogram - [facility]  12 Units/kg/hr (Adjusted) Intravenous Continuous STAR Mahan 8.5 mL/hr at 01/14/19 0840 10 Units/kg/hr at 01/14/19 0840    insulin aspart U-100 pen 0-5 Units  0-5 Units Subcutaneous QID (AC + HS) PRN Timo Avalos NP   Stopped at 01/12/19 0736    insulin aspart U-100 pen 6 Units  6 Units Subcutaneous TIDWM Timo Avalos NP   6 Units at 01/13/19 1721    insulin detemir U-100 pen 13 Units  13 Units Subcutaneous QHS Timo Avalos NP   13 Units at 01/13/19 2200    levothyroxine tablet 25 mcg  25 mcg Oral Before breakfast Valentin Farah MD   25 mcg at 01/14/19 0505    lidocaine HCL 20 mg/ml (2%) injection    PRN Werner Boggs MD   15 mL at 01/09/19 1126    lidocaine HCL 20 mg/ml (2%) injection    PRN Werner Boggs MD   15 mL at 01/09/19 1126    magnesium oxide tablet 800 mg  800 mg Oral Once Dionna Lora NP        magnesium oxide tablet 800 mg  800 mg Oral BID Dionna Lora NP   800 mg at 01/13/19 2150    magnesium sulfate 2g  in water 50mL IVPB (premix)  2 g Intravenous PRN Dionna Lora NP   2 g at 01/10/19 1039    magnesium sulfate 2g in water 50mL IVPB (premix)  4 g Intravenous PRN Dionna Lora NP        ondansetron injection 4 mg  4 mg Intravenous Q6H PRN Valentin Farah MD   4 mg at 01/10/19 1700    polyethylene glycol packet 17 g  17 g Oral Daily Dionna Lora, NP   17 g at 01/13/19 0847    potassium chloride SA CR tablet 60 mEq  60 mEq Oral Once Dionna Lora NP        promethazine (PHENERGAN) 25 mg in dextrose 5 % 50 mL IVPB  25 mg Intravenous Q6H PRN Dionna Lora  mL/hr at 01/11/19 1018 25 mg at 01/11/19 1018    ramelteon tablet 8 mg  8 mg Oral Nightly PRN Sherry Davidson MD   8 mg at 01/13/19 2201    rosuvastatin tablet 20 mg  20 mg Oral QHS Valentin Farah MD   20 mg at 01/13/19 2150    senna-docusate 8.6-50 mg per tablet 2 tablet  2 tablet Oral BID Dionna Lora NP   2 tablet at 01/13/19 2150    sodium chloride 0.9% flush 5 mL  5 mL Intravenous PRN Valentin Farah MD        tamsulosin 24 hr capsule 0.4 mg  0.4 mg Oral Daily Dionna Lora NP   0.4 mg at 01/13/19 0847    traMADol tablet 50 mg  50 mg Oral Q6H PRN Dionna Lora NP        visipaque 320 iodixanol    PRN Werner Boggs MD   20 mL at 01/09/19 1146    warfarin (COUMADIN) tablet 5 mg  5 mg Oral Daily STAR Mahan   5 mg at 01/13/19 1720     Current Outpatient Medications on File Prior to Visit   Medication Sig Dispense Refill    bumetanide (BUMEX) 1 MG tablet Take 3 mg every morning and 2 mg every evening 450 tablet 3    clopidogrel (PLAVIX) 75 mg tablet Take 1 tablet (75 mg total) by mouth once daily. 90 tablet 1    digoxin (LANOXIN) 125 mcg tablet Take 0.5 tablets by mouth every morning.      ELIQUIS 2.5 mg Tab Take 1 tablet by mouth 2 (two) times daily.      LANTUS U-100 INSULIN 100 unit/mL injection Inject 50 Units into the skin 2 (two) times daily.      levothyroxine (SYNTHROID) 25 MCG tablet Take  1 tablet by mouth once daily.      loratadine (CLARITIN) 10 mg tablet Take 10 mg by mouth once daily.      metOLazone (ZAROXOLYN) 5 MG tablet Take one tablet by mouth now. Then only take as ordered per MD. (Patient taking differently: Take 5 mg by mouth as needed. Take one tablet by mouth now. Then only take as ordered per MD.) 20 tablet 1    metoprolol succinate (TOPROL-XL) 25 MG 24 hr tablet Take 1 tablet (25 mg total) by mouth every evening. 30 tablet 11    multivitamin (THERAGRAN) per tablet Take 1 tablet by mouth once daily.      NOVOLOG U-100 INSULIN ASPART 100 unit/mL injection Inject 5 Units into the skin 4 (four) times daily.      omega-3 acid ethyl esters (LOVAZA) 1 gram capsule Take 2 g by mouth 2 (two) times daily.      omeprazole (PRILOSEC) 40 MG capsule Take 40 mg by mouth once daily.      potassium chloride SA (KLOR-CON M20) 20 MEQ tablet Take 2-3 tablets by mouth daily as directed.  Take it when taking the bumetadine. 90 tablet 3    rosuvastatin (CRESTOR) 20 MG tablet Take 1 tablet (20 mg total) by mouth every evening. 30 tablet 11    sacubitril-valsartan (ENTRESTO) 24-26 mg per tablet Take 1 tablet by mouth 2 (two) times daily. 60 tablet 1    TRELEGY ELLIPTA 100-62.5-25 mcg DsDv once daily.       VENTOLIN HFA 90 mcg/actuation inhaler Inhale 2 puffs into the lungs every 4 (four) hours as needed.          Past Surgical History:   Procedure Laterality Date    ECHOCARDIOGRAM,TRANSESOPHAGEAL N/A 1/9/2019    Performed by [facility] Provider at [facility] EP LAB    EXTRACTION, ELECTRODE LEAD Left 1/9/2019    Performed by Werner Boggs MD at [facility] EP LAB    HEART CATH-RIGHT Right 4/5/2018    Performed by Elizabeth Wise MD at [facility] CATH LAB    Insertion, Pacemaker, Temporary Transvenous  1/9/2019    Performed by Werner Boggs MD at [facility] EP LAB       Social History     Socioeconomic History    Marital status:      Spouse name: Not on file    Number of children: Not on file     Years of education: Not on file    Highest education level: Not on file   Social Needs    Financial resource strain: Not on file    Food insecurity - worry: Not on file    Food insecurity - inability: Not on file    Transportation needs - medical: Not on file    Transportation needs - non-medical: Not on file   Occupational History    Not on file   Tobacco Use    Smoking status: Former Smoker    Smokeless tobacco: Former User   Substance and Sexual Activity    Alcohol use: Yes     Comment: former drinker    Drug use: No    Sexual activity: Not on file   Other Topics Concern    Not on file   Social History Narrative    Not on file       OBJECTIVE:     Vital Signs Range (Last 24H):  Temp:  [36.2 °C (97.1 °F)-36.6 °C (97.8 °F)]   Pulse:  [51-81]   Resp:  [15-34]   BP: (107)/(53)   SpO2:  [91 %-100 %]   Arterial Line BP: ()/(0-54)       Significant Labs:  Lab Results   Component Value Date    WBC 9.50 01/14/2019    HGB 9.2 (L) 01/14/2019    HCT 29.3 (L) 01/14/2019     (L) 01/14/2019    ALT 16 01/14/2019    AST 28 01/14/2019     01/14/2019     01/14/2019     01/14/2019    K 4.6 01/14/2019    K 4.6 01/14/2019    K 4.6 01/14/2019     01/14/2019     01/14/2019     01/14/2019    CREATININE 1.4 01/14/2019    CREATININE 1.4 01/14/2019    CREATININE 1.4 01/14/2019    BUN 40 (H) 01/14/2019    BUN 40 (H) 01/14/2019    BUN 40 (H) 01/14/2019    CO2 29 01/14/2019    CO2 29 01/14/2019    CO2 29 01/14/2019    TSH 2.459 04/06/2018    INR 1.2 01/14/2019    HGBA1C 8.1 (H) 01/07/2019       Diagnostic Studies: No relevant studies.    EKG: No recent studies available.    2D ECHO:  Results for orders placed or performed during the hospital encounter of 04/05/18   2D Echo w/ Color Flow Doppler   Result Value Ref Range    QEF 20 (A) 55 - 65    Mitral Valve Regurgitation MODERATE (A)     Aortic Valve Regurgitation TRIVIAL     Est. PA Systolic Pressure 96 (A)     Tricuspid Valve  Regurgitation MODERATE (A)          ASSESSMENT/PLAN:         Pre-op Assessment    I have reviewed the Patient Summary Reports.     I have reviewed the Nursing Notes.      Review of Systems  Social:  Former Smoker, Alcohol Use    Hematology/Oncology:     Oncology Normal    -- Anemia:   EENT/Dental:EENT/Dental Normal   Cardiovascular:   Exercise tolerance: poor Pacemaker Past MI (ischemic carediomyopathy) CAD  Dysrhythmias (ventricular tachycardia )  CHF (chronic systolic heart failure ) hyperlipidemia    Pulmonary:  Pulmonary Normal    Renal/:   Chronic Renal Disease, ARF    Hepatic/GI:  Hepatic/GI Normal    Musculoskeletal:  Musculoskeletal Normal    Neurological:  Neurology Normal    Endocrine:   Diabetes, poorly controlled, type 2    Dermatological:  Skin Normal    Psych:  Psychiatric Normal           Physical Exam  General:  Well nourished    Airway/Jaw/Neck:  Airway Findings: Mouth Opening: Normal Tongue: Normal  Mallampati: II  TM Distance: Normal, at least 6 cm      Dental:  Dental Findings: Periodontal disease, Mild   Chest/Lungs:  Chest/Lungs Findings: Normal Respiratory Rate         Mental Status:  Mental Status Findings:  Cooperative, Alert and Oriented         Anesthesia Plan  Type of Anesthesia, risks & benefits discussed:  Anesthesia Type:  general, MAC  Patient's Preference:   Intra-op Monitoring Plan: standard ASA monitors  Intra-op Monitoring Plan Comments:   Post Op Pain Control Plan: multimodal analgesia, IV/PO Opioids PRN and per primary service following discharge from PACU  Post Op Pain Control Plan Comments:   Induction:   IV  Beta Blocker:  Patient is not currently on a Beta-Blocker (No further documentation required).       Informed Consent: Patient representative understands risks and agrees with Anesthesia plan.  Questions answered. Anesthesia consent signed with patient representative.  ASA Score: 3     Day of Surgery Review of History & Physical:    H&P update referred to the surgeon.          Ready For Surgery From Anesthesia Perspective.

## 2019-01-14 NOTE — PLAN OF CARE
Problem: SLP Goal  Goal: SLP Goal  Speech Language Pathology Goals  Goals expected to be met by 1/18/2019  1. Pt will tolerate a dental soft diet with thin liquids w/o overt S/S aspiration  2. Educate Pt and family on S/S aspiration and aspiration precautions      Outcome: Ongoing (interventions implemented as appropriate)  Pt seen for follow up monitoring of tolerance of diet. Pt tolerating medications taken whole one at a time. Pt reports minimal appetite. Strict aspiration precautions advised. Please Continue to monitor for signs and symptoms of aspiration and discontinue oral feeding should you notice any of the following: watery eyes, reddened facial area, wet vocal quality, increased work of breathing, change in respiratory status, increased congestion, coughing, fever, etc.    JOVANI Mancilla., [facility]-SLP  Speech-Language Pathology  Pager: [phone]  1/14/2019

## 2019-01-14 NOTE — ANESTHESIA POSTPROCEDURE EVALUATION
"Anesthesia Post Evaluation    Patient: Jerry Solis    Procedure(s) Performed: Procedure(s) (LRB):  CARDIOVERSION (N/A)  ECHOCARDIOGRAM,TRANSESOPHAGEAL (N/A)    Final Anesthesia Type: general  Patient location during evaluation: ICU  Patient participation: No - Unable to Participate, Other Reason (see comments) (patient sleeping, interview with icu nurse)  Level of consciousness: awake and alert and oriented  Post-procedure vital signs: reviewed and stable  Pain management: adequate  Airway patency: patent  PONV status at discharge: No PONV  Anesthetic complications: no      Cardiovascular status: stable (epi added on arrival from cardiac studies)  Respiratory status: unassisted, spontaneous ventilation and room air  Hydration status: euvolemic  Follow-up not needed.        Visit Vitals  /64 (BP Location: Left arm, Patient Position: Lying)   Pulse 110   Temp 36.3 °C (97.4 °F) (Oral)   Resp 13   Ht 6' 2" (1.88 m)   Wt 83.5 kg (184 lb 1.4 oz)   SpO2 100%   BMI 23.64 kg/m²       Pain/Danny Score: Pain Rating Prior to Med Admin: 0 (1/13/2019  9:50 PM)  Pain Rating Post Med Admin: 0 (1/13/2019 10:37 PM)        "

## 2019-01-14 NOTE — ASSESSMENT & PLAN NOTE
This is a 79yo gentleman with long-standing history of ICM with EF 35%, a 20-year history of ICD with upgrade to CRT-D in 2014 who presents for device extraction due to RV lead vegetation ~0.5cm in the setting of VRE bacteremia with a likely initial GI source. Likewise has a CardioMeMMS device in place. Is not pacemaker dependent. ID believes the primary source is the vegetation, he is s/p extraction on 01/09, RV lead tip grew E. Faecalis (resistant to tetracyclines). Recovery complicated by slow junctional escape rhythm responding to dopamine.    - agree with dopamine, wean as tolerated  - plan on RICARDO/cardioversion today  - Continue coumadin with heparin bridge, coumadin for easy reversal in case device needs to be placed urgently  - volume/HF management as per primary team

## 2019-01-14 NOTE — PROGRESS NOTES
Update:  SW met with pt and wife Mague in pt's room in order to provide continuity of care and support. Pt AAOx4, pleasant, lying in bed and reports that he is coping adequately. Wife at bedside and in good spirits, stating that pt is doing a little better compared to last week but that he remains in a-fib despite procedure to try to get him out of this. SW provided validation and support. Pt's wife states that she was able to take a small break over the weekend when her children took her to the [facility]. Wife reports that she was able to get a nice shower in but cannot afford to stay in the . SW provided wife with additional hotel resources that could be more affordable. Wife expressed appreciation. Pt and wife denying any further needs at this time. SW remains available for continued psychosocial support, education, resources, and additional d/c planning as needed.

## 2019-01-14 NOTE — ASSESSMENT & PLAN NOTE
- Appreciate Nephrology's help  - Was oliguric and creatinine jumped to 4.1 likely 2/2 ATN/hypotension. Lasix gtt and Dopamine gtts started   - CVP 13, UOP and creatinine improving. (was 1.2 on admit)  - Continue Lasix gtt, wean Dopamine as tolerated for MAP >55  - Hold Entresto for now

## 2019-01-14 NOTE — NURSING
Notified FARIDEH Villareal of pts rhythm and decreased BP. PT has lots of PACs and  PVCs with possible a-fib and junctional rhythm. EP at bedside looking at EKG and rhythm.

## 2019-01-14 NOTE — CARE UPDATE
BG goal 140-180    Patient remains in CMICU, restless and labile BPs noted overnight  NPO for RICARDO cardioversion today  BG well controlled on current insulin regimen  Recommend continuing Levemir 13 units q HS, Novolog 6 units with meals, and low dose correction scale  BG monitoring AC/HS    Discharge planning: TBD, insulin requirements in-patient drastically lower than home dosing

## 2019-01-14 NOTE — NURSING
"Notified FARIDEH Villareal that pts DBP was in the 40s. Rhythm after cardioversion still looked to be junctional. MAP was dropping to low 50s. Titration up on dopamine. FARIDEH Villareal was present at bedside 1140. NP stated "I didn't know that you were titrating up so much on dopamine." NP started new orders on dopamine at a non-titrating rate of 5mcg/kg/hr. Started on Levo to keep MAPs above 65.   "

## 2019-01-14 NOTE — ASSESSMENT & PLAN NOTE
-Vegetation seen on RICARDO  -Appreciate EP's help. 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 Patient had 2 vegetations on RV lead which is most likely source of bacteremia. Lead tip and ICD pocket cultured with RV lead +ve for Enteroccocus  -Continue Ampicillin and Ceftriaxone per ID recs Will treat for 6 weeks post extraction.  Estimated end date 2/20/19.    -If urgently necessary from a cardiac standpoint, it is okay to proceed with implanting new device as repeat blood cultures taken post extraction remain negative X 72 hours.  Suspect known vegetations on RV leads, now removed, were primary source of infection; cardioMems likely not infected  -Blood cultures are negative here thus far.  -WCC has normalized  -Per EP; ok'd to start heparin and Coumadin.  No Eliquis for now as he may require implant of new device  -Will need Life Vest prior to discharge  -Appreciate GI's help. Plan for outpatient EGD and C-scope for eval of anemia and Enterococcus bacteremia

## 2019-01-14 NOTE — ASSESSMENT & PLAN NOTE
-Remains in A fib with junctional escape rhythm  -plan for RICARDO/cardioversion toda as NSR may conduct and keep him from being chronotrope-dependent  -Wean Dopamine as tolerated keeping MAP > 55  -CHADS VASc 6  -Holding eliquis s/p ICD explant 1/9. Per EP, anticoagulation resumed 1/12  -Cardiac monitoring

## 2019-01-14 NOTE — SUBJECTIVE & OBJECTIVE
Interval History: Feeling better, no events overnight. Good UOP, still requiring dopamine at a dose of 15mcg/kg/min    Tele: Junctional rhythm with HR ranging from 55 to 65bpm.     ROS  Objective:     Vital Signs (Most Recent):  Temp: 97.5 °F (36.4 °C) (01/14/19 0400)  Pulse: 81 (01/14/19 0630)  Resp: (!) 34 (01/14/19 0630)  BP: (!) 107/53 (01/13/19 2023)  SpO2: 100 % (01/14/19 0630) Vital Signs (24h Range):  Temp:  [97.1 °F (36.2 °C)-97.8 °F (36.6 °C)] 97.5 °F (36.4 °C)  Pulse:  [51-81] 81  Resp:  [15-34] 34  SpO2:  [91 %-100 %] 100 %  BP: (107)/(53) 107/53  Arterial Line BP: ()/(0-54) 95/44     Weight: 83.5 kg (184 lb 1.4 oz)  Body mass index is 23.64 kg/m².     SpO2: 100 %  O2 Device (Oxygen Therapy): nasal cannula w/ humidification    Physical Exam   Constitutional: He is oriented to person, place, and time. He appears well-developed and well-nourished. No distress.   HENT:   Head: Normocephalic and atraumatic.   Mouth/Throat: Oropharynx is clear and moist.   Eyes: Conjunctivae and EOM are normal. Pupils are equal, round, and reactive to light. No scleral icterus.   Neck: Normal range of motion. Neck supple. No JVD present.   Cardiovascular: Regular rhythm, normal heart sounds and intact distal pulses. Bradycardia present.   No murmur heard.  Pulses:       Radial pulses are 2+ on the right side, and 2+ on the left side.   Pulmonary/Chest: Effort normal and breath sounds normal. No respiratory distress.   Symmetrical expansion  Left chest incision is clean   Abdominal: Soft. Bowel sounds are normal. There is no hepatosplenomegaly. There is no tenderness.   Musculoskeletal: Normal range of motion.   Groin healed   Neurological: He is alert and oriented to person, place, and time. No cranial nerve deficit.   Skin: Skin is warm and dry. No rash noted. He is not diaphoretic.   Well perfused in upper and lower extremities with good capillary refill   Psychiatric: He has a normal mood and affect. Judgment and  thought content normal.       Significant Labs:   EP:   Recent Labs   Lab 01/12/19  1149  01/13/19  0323  01/13/19  2150 01/14/19 0321 01/14/19  0922   NA  --    < > 141   < > 139 140  140  140 138   K  --    < > 3.7   < > 4.1 4.6  4.6  4.6 4.0   CL  --    < > 104   < > 100 101  101  101 99   CO2  --    < > 26   < > 29 29  29  29 32*   GLU  --    < > 119*   < > 172* 165*  165*  165* 138*   BUN  --    < > 39*   < > 39* 40*  40*  40* 38*   CREATININE  --    < > 1.4   < > 1.4 1.4  1.4  1.4 1.3   CALCIUM  --    < > 8.6*   < > 8.8 8.7  8.7  8.7 9.1   PROT  --   --  6.3  --   --  6.5  --    ALBUMIN  --   --  2.2*  --   --  2.2*  --    BILITOT  --   --  0.7  --   --  0.6  --    ALKPHOS  --   --  145*  --   --  162*  --    AST  --   --  19  --   --  28  --    ALT  --   --  16  --   --  16  --    ANIONGAP  --    < > 11   < > 10 10  10  10 7*   ESTGFRAFRICA  --    < > 56.0*   < > 56.0* 56.0*  56.0*  56.0* >60.0   EGFRNONAA  --    < > 48.5*   < > 48.5* 48.5*  48.5*  48.5* 53.0*   WBC  --   --  11.97  --   --  9.50  --    HGB  --   --  9.2*  --   --  9.2*  --    HCT  --    < > 29.8*  --   --  29.3*  --    PLT  --   --  93*  --   --  109*  --    INR 1.2  1.2  --  1.2  --   --  1.2  --     < > = values in this interval not displayed.

## 2019-01-14 NOTE — PT/OT/SLP PROGRESS
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    Recommendations:                 General Recommendations:  ST to f/u to monitor tolerance of diet   Diet recommendations:  Dental Soft, Liquid Diet Level: Thin   Aspiration Precautions: 1 bite/sip at a time, Alternating bites/sips, Avoid talking while eating, Eliminate distractions, HOB to 90 degrees, Meds whole 1 at a time, No straws, Small bites/sips and Strict aspiration precautions  Continue to monitor for signs and symptoms of aspiration and discontinue oral feeding should you notice any of the following: watery eyes, reddened facial area, wet vocal quality, increased work of breathing, change in respiratory status, increased congestion, coughing, fever, etc.  General Precautions: Standard, aspiration, hearing impaired  Communication strategies:  provide increased time to answer and go to room if call light pushed    Subjective     SLP reviewed Pt with nurse, nurse reported Pt tolerating meals  Pt presents calm, lethargic  He explains, "Not much of one" [re: appetite]    Pain/Comfort:  · Pain Rating 1: 0/10    Objective:     Has the patient been evaluated by SLP for swallowing?   Yes  Keep patient NPO? No   Current Respiratory Status: room air      Pt seen for ongoing monitoring of tolerance of diet. Pt LARRY for procedure upon first and second attempts. Upon third attempt later service day, Pt found awake in bed with Spouse and Nurse at bedside. Pt seen with whole medications taken one at a time with thin liquidsx5. Pt consumed larger medications with yogurt x2.  No overt S/S aspiration with thin liquids or medications taken whole. Pt endorsed minimal appetite.  SLP educated Pt and Spouse on diet modifications for dental soft diet, S/S aspiration and aspiration precautions. Spouse verbalized understanding. Pt verbalized partial " understanding 2/2 easily distracted and would benefit from ongoing review. Whiteboard current. Nurse remained at bedside t/o session aware of findings/recs.     Assessment:     Jerry Solis is a 78 y.o. male with an SLP diagnosis of risk for aspiration. .  He presents with minimal appetite and lethargy today. ST to f/u to monitor. Strict aspiration precautions advised.     Goals:   Multidisciplinary Problems     SLP Goals        Problem: SLP Goal    Goal Priority Disciplines Outcome   SLP Goal     SLP Ongoing (interventions implemented as appropriate)   Description:  Speech Language Pathology Goals  Goals expected to be met by 1/18/2019  1. Pt will tolerate a dental soft diet with thin liquids w/o overt S/S aspiration  2. Educate Pt and family on S/S aspiration and aspiration precautions                       Plan:     · Patient to be seen:  3 x/week   · Plan of Care expires:  02/10/19  · Plan of Care reviewed with:  patient, spouse   · SLP Follow-Up:  Yes       Discharge recommendations:  other (see comments)(pending progress and PT/OT recs)   Barriers to Discharge: skilled level of assistance needed     Time Tracking:     SLP Treatment Date:   01/14/19  Speech Start Time:  1503  Speech Stop Time:  1511     Speech Total Time (min):  8 min    Billable Minutes: Treatment Swallowing Dysfunction 8    JOVANI Mancilla., [facility]-SLP  Speech-Language Pathology  Pager: [phone]      01/14/2019

## 2019-01-14 NOTE — PLAN OF CARE
Problem: Adult Inpatient Plan of Care  Goal: Plan of Care Review  Outcome: Ongoing (interventions implemented as appropriate)  Pt restless tonight, resting only for a few minutes at a time. BP very labile, maps dropping to 40's.  Afebrile. AM ScVO2 61%.  NPO for RICARDO cardioversion today. Currently remains in accelerated Junctional rhythm. Lungs continue to scattered crackles to posterior bases, left more than right.

## 2019-01-14 NOTE — PROGRESS NOTES
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 To have RICARDO guided DCCV of A fib    Continuous Infusions:   DOPamine 12 mcg/kg/min (01/14/19 0837)    epinephrine infusion Stopped (01/09/19 1731)    furosemide (LASIX) 2 mg/mL infusion (non-titrating) 20 mg/hr (01/14/19 0800)    heparin (porcine) in D5W 10 Units/kg/hr (01/14/19 0840)     Scheduled Meds:   acetaminophen  1,000 mg Oral TID    ampicillin (OMNIPEN) 4 g in  mL EXTENDED INFUSION  4,000 mg Intravenous Q8H    cefTRIAXone (ROCEPHIN) IVPB  2 g Intravenous Q12H    clopidogrel  75 mg Oral Daily    insulin aspart U-100  6 Units Subcutaneous TIDWM    insulin detemir U-100  13 Units Subcutaneous QHS    levothyroxine  25 mcg Oral Before breakfast    magnesium oxide  800 mg Oral Once    magnesium oxide  800 mg Oral BID    polyethylene glycol  17 g Oral Daily    potassium chloride SA  60 mEq Oral Once    rosuvastatin  20 mg Oral QHS    senna-docusate 8.6-50 mg  2 tablet Oral BID    tamsulosin  0.4 mg Oral Daily    warfarin  5 mg Oral Daily     PRN Meds:albuterol-ipratropium, bupivacaine (PF) 0.25% (2.5 mg/ml), dextrose 50%, dextrose 50%, glucagon (human recombinant), glucose, glucose, insulin aspart U-100, lidocaine HCL 20 mg/ml (2%), lidocaine HCL 20 mg/ml (2%), magnesium sulfate IVPB, magnesium sulfate IVPB, ondansetron, promethazine (PHENERGAN) IVPB, ramelteon, sodium chloride 0.9%, traMADol, visipaque 320 iodixanol    Review of patient's allergies indicates:   Allergen Reactions    Adhesive Other (See Comments)     blisters    Codeine Other (See Comments)     Blurred vision    Latex Hives     Ace inhibitors     Lipitor [atorvastatin] Other (See Comments)     Muscle spasms     Objective:     Vital Signs (Most Recent):  Temp: 97.5 °F (36.4 °C) (01/14/19 0400)  Pulse: 81 (01/14/19 0630)  Resp: (!) 34 (01/14/19 0630)  BP: (!) 107/53 (01/13/19 2023)  SpO2: 100 % (01/14/19 0630) Vital Signs (24h Range):  Temp:  [97.1 °F (36.2 °C)-97.8 °F (36.6 °C)] 97.5 °F (36.4 °C)  Pulse:  [51-81] 81  Resp:  [15-34] 34  SpO2:  [91 %-100 %] 100 %  BP: (107)/(53) 107/53  Arterial Line BP: ()/(0-54) 95/44     Patient Vitals for the past 72 hrs (Last 3 readings):   Weight   01/14/19 0600 83.5 kg (184 lb 1.4 oz)   01/13/19 0300 84.9 kg (187 lb 2.7 oz)   01/12/19 1000 87.9 kg (193 lb 12.6 oz)     Body mass index is 23.64 kg/m².      Intake/Output Summary (Last 24 hours) at 1/14/2019 0850  Last data filed at 1/14/2019 0800  Gross per 24 hour   Intake 3497.35 ml   Output 5060 ml   Net -1562.65 ml       Hemodynamic Parameters:       Telemetry: Atrial fib with junctional escape     Physical Exam   Constitutional: He is oriented to person, place, and time. He appears well-developed and well-nourished.   HENT:   Head: Normocephalic and atraumatic.   Eyes: Conjunctivae and EOM are normal.   Neck: Normal range of motion. Neck supple. No JVD present. No thyromegaly present.   Cardiovascular: Regular rhythm.   Irregular   Pulmonary/Chest: Effort normal and breath sounds normal.   Abdominal: Soft. Bowel sounds are normal.   Musculoskeletal: Normal range of motion.   Trace HEATHER   Neurological: He is alert and oriented to person, place, and time.   Skin: Skin is warm and dry. Capillary refill takes 2 to 3 seconds.   Psychiatric: He has a normal mood and affect. His behavior is normal. Judgment and thought content normal.       Significant Labs:  CBC:  Recent Labs   Lab 01/12/19  0416 01/13/19  0323 01/14/19  0321   WBC 16.81* 11.97 9.50   RBC 3.51* 3.24* 3.22*   HGB 9.7* 9.2* 9.2*   HCT 31.2* 29.8* 29.3*   * 93* 109*   MCV 89 92  91   MCH 27.6 28.4 28.6   MCHC 31.1* 30.9* 31.4*     BNP:  Recent Labs   Lab 01/10/19  1141   BNP 2,076*     CMP:  Recent Labs   Lab 01/12/19  0416  01/13/19  0323  01/13/19  1608 01/13/19  2150 01/14/19 0321   *  162*   < > 119*   < > 160* 172* 165*  165*  165*   CALCIUM 8.7  8.7   < > 8.6*   < > 8.8 8.8 8.7  8.7  8.7   ALBUMIN 2.4*  --  2.2*  --   --   --  2.2*   PROT 6.7  --  6.3  --   --   --  6.5     141   < > 141   < > 139 139 140  140  140   K 3.7  3.7   < > 3.7   < > 3.9 4.1 4.6  4.6  4.6   CO2 24  24   < > 26   < > 30* 29 29  29  29     105   < > 104   < > 99 100 101  101  101   BUN 43*  43*   < > 39*   < > 39* 39* 40*  40*  40*   CREATININE 1.8*  1.8*   < > 1.4   < > 1.4 1.4 1.4  1.4  1.4   ALKPHOS 137*  --  145*  --   --   --  162*   ALT 17  --  16  --   --   --  16   AST 17  --  19  --   --   --  28   BILITOT 0.8  --  0.7  --   --   --  0.6    < > = values in this interval not displayed.      Coagulation:   Recent Labs   Lab 01/12/19  1149 01/12/19 1952 01/13/19 0323 01/14/19 0321   INR 1.2  1.2  --  1.2 1.2   APTT 28.3 43.2* 49.2* 55.0*  55.0*     LDH:  No results for input(s): LDH in the last 72 hours.  Microbiology:  Microbiology Results (last 7 days)     Procedure Component Value Units Date/Time    Blood culture [[id number]] Collected:  01/09/19 1657    Order Status:  Completed Specimen:  Blood from Peripheral, Hand, Right Updated:  01/13/19 2012     Blood Culture, Routine No Growth to date     Blood Culture, Routine No Growth to date     Blood Culture, Routine No Growth to date     Blood Culture, Routine No Growth to date     Blood Culture, Routine No Growth to date    Blood culture [[id number]] Collected:  01/10/19 1401    Order Status:  Completed Specimen:  Blood from Peripheral, Hand, Left Updated:  01/13/19 1612     Blood Culture, Routine No Growth to date     Blood Culture, Routine No Growth to date     Blood Culture, Routine No Growth to date      Blood Culture, Routine No Growth to date    Blood culture [[id number]] Collected:  01/10/19 1401    Order Status:  Completed Specimen:  Blood from Peripheral, Forearm, Left Updated:  01/13/19 1612     Blood Culture, Routine No Growth to date     Blood Culture, Routine No Growth to date     Blood Culture, Routine No Growth to date     Blood Culture, Routine No Growth to date    Aerobic culture [[id number]] Collected:  01/09/19 1402    Order Status:  Completed Specimen:  Other from Chest, Left Updated:  01/13/19 0800     Aerobic Bacterial Culture --     ENTEROCOCCUS FAECALIS  Rare  Susceptibility pending      Narrative:       #4 RV lead tip    Aerobic culture [[id number]] Collected:  01/09/19 1322    Order Status:  Completed Specimen:  Other from Chest, Left Updated:  01/12/19 1023     Aerobic Bacterial Culture No growth    Narrative:       #3 LV lead tip    Aerobic culture [[id number]] Collected:  01/09/19 1124    Order Status:  Completed Specimen:  Tissue from Chest, Left Updated:  01/12/19 1023     Aerobic Bacterial Culture No growth    Narrative:       ICD pocket tissue    Aerobic culture [[id number]] Collected:  01/09/19 1314    Order Status:  Completed Specimen:  Other from Chest, Left Updated:  01/12/19 1023     Aerobic Bacterial Culture No growth    Narrative:       #2 RA Lead tip    Blood culture (site 1) [[id number]] Collected:  01/07/19 0154    Order Status:  Completed Specimen:  Blood Updated:  01/12/19 0812     Blood Culture, Routine No growth after 5 days.    Narrative:       Site # 1, aerobic and anaerobic    Blood culture (site 2) [[id number]] Collected:  01/07/19 0154    Order Status:  Completed Specimen:  Blood Updated:  01/12/19 0812     Blood Culture, Routine No growth after 5 days.    Narrative:       Site # 2, aerobic only    Urine culture [[id number]] Collected:  01/11/19 0115    Order Status:  Completed Specimen:  Urine, Catheterized Updated:  01/12/19 0418     Urine Culture, Routine No growth     Culture, Anaerobe [[id number]] Collected:  01/09/19 1402    Order Status:  Completed Specimen:  Other from Chest, Left Updated:  01/11/19 0728     Anaerobic Culture Culture in progress    Narrative:       #4 RV lead tip    Culture, Anaerobe [[id number]] Collected:  01/09/19 1322    Order Status:  Completed Specimen:  Other from Chest, Left Updated:  01/11/19 0725     Anaerobic Culture Culture in progress    Narrative:       #3 LV lead tip    Culture, Anaerobe [[id number]] Collected:  01/09/19 1314    Order Status:  Completed Specimen:  Other from Chest, Left Updated:  01/11/19 0724     Anaerobic Culture Culture in progress    Narrative:       #2 RA Lead tip    Culture, Anaerobe [[id number]] Collected:  01/09/19 1124    Order Status:  Completed Specimen:  Tissue from Chest, Left Updated:  01/11/19 0724     Anaerobic Culture Culture in progress    Narrative:       ICD pocket tissue    IV catheter culture [[id number]]     Order Status:  No result Specimen:  Catheter Tip, NOS           I have reviewed all pertinent labs within the past 24 hours.    Estimated Creatinine Clearance: 52.2 mL/min (based on SCr of 1.4 mg/dL).    Diagnostic Results:  I have reviewed all pertinent imaging results/findings within the past 24 hours.    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 Patient had 2 vegetations on RV lead which is most likely source of bacteremia. Lead tip and ICD pocket cultured with RV lead +ve for Enteroccocus  -Continue Ampicillin and Ceftriaxone per ID recs Will treat for 6 weeks post extraction.  Estimated end date 2/20/19.    -If urgently necessary from a cardiac standpoint, it is okay to proceed with implanting new device as repeat blood cultures taken post extraction remain negative X 72 hours.  Suspect known vegetations on RV leads, now  removed, were primary source of infection; cardioMems likely not infected  -Blood cultures are negative here thus far.  -WCC has normalized  -Per EP; ok'd to start heparin and Coumadin.  No Eliquis for now as he may require implant of new device  -Will need Life Vest prior to discharge  -Appreciate GI's help. Plan for outpatient EGD and C-scope for eval of anemia and Enterococcus bacteremia       Acute on chronic combined systolic and diastolic CHF, NYHA class 3    -ICM  -Last full 2D Echo 1/7/19: LVEF 20%, LVEDD 7.24 cm. Limited bedside echo done after ICD explant showed no pericardial effusion  -CVP 13. Continue diuresing with Lasix gtt at 40 mg/hr  -sVO2 61  -Wean Dopamine keeping MAP > 55  -GDMT: Dig and Toprol D/C'd 2/2 junctional rhythm. Entresto D/C'd 2/2 transient hypotension during/after ICD explant and TREY  -Self declined for LVAD after w/u last April 2018       Atrial fibrillation    -Remains in A fib with junctional escape rhythm  -plan for RICARDO/cardioversion toda as NSR may conduct and keep him from being chronotrope-dependent  -Wean Dopamine as tolerated keeping MAP > 55  -CHADS VASc 6  -Holding eliquis s/p ICD explant 1/9. Per EP, anticoagulation resumed 1/12  -Cardiac monitoring     Type 2 diabetes mellitus with complication    -A1c 8.1  -Target -180 while hospitalized  -detemir 17, aspart 6 with sliding scale  -Appreciate Endocrine's help with management     Dysuria    - Resolved  - UA showed many bacteria - culture -ve         Dysphagia    - Consult SLP     Nausea & vomiting    - Resolved  - Antiemetics prn  - Appreciate speech therapy consult.  Advanced diet     Junctional bradycardia    - Se A fib     Endocarditis    - See ICD lead infection     Bacteremia    -see above     TREY (acute kidney injury)    - Appreciate Nephrology's help  - Was oliguric and creatinine jumped to 4.1 likely 2/2 ATN/hypotension. Lasix gtt and Dopamine gtts started   - CVP 13, UOP and creatinine improving. (was 1.2  on admit)  - Continue Lasix gtt, wean Dopamine as tolerated for MAP >55  - Hold Entresto for now       Uninterrupted Critical Care/Counseling Time (not including procedures): 45 minutes      Dionna Lora NP [id number]  Heart Transplant  [facility]

## 2019-01-15 NOTE — ASSESSMENT & PLAN NOTE
- Appreciate Nephrology's help  - Was oliguric and creatinine jumped to 4.1 likely 2/2 ATN/hypotension following ICD explant.   - CVP 10, UOP and creatinine improving. (was 1.2 on admit)  - Continue Lasix gtt, wean Levo to keep   - Hold Entresto for now

## 2019-01-15 NOTE — PROGRESS NOTES
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 Pressors changed to levophed without significant effect. Has maintained BP, renal function. Patient states he feels well.    Tele review: Junctional rhythm with intermittent aberrantly conducted beats. The irregular nature, preserved pulse-wave on a-line monitoring suggest that this is breakthrough conduction of patient's baseline atrial fibrillation rather than beats that are ventricular in origin.    ROS  Objective:     Vital Signs (Most Recent):  Temp: 96.7 °F (35.9 °C) (01/15/19 1100)  Pulse: 64 (01/15/19 1400)  Resp: (!) 23 (01/15/19 1400)  BP: 114/63 (01/15/19 0745)  SpO2: 99 % (01/15/19 1400) Vital Signs (24h Range):  Temp:  [96.7 °F (35.9 °C)-98.8 °F (37.1 °C)] 96.7 °F (35.9 °C)  Pulse:  [] 64  Resp:  [12-27] 23  SpO2:  [95 %-100 %] 99 %  BP: ()/(52-77) 114/63  Arterial Line BP: ()/(35-52) 114/40     Weight: 83.5 kg (184 lb 1.4 oz)  Body mass index is 23.64 kg/m².     SpO2: 99 %  O2 Device (Oxygen Therapy): nasal cannula w/ humidification    Physical Exam   Constitutional: He is oriented to person, place, and time. He appears well-developed and well-nourished. No distress.   HENT:   Head: Normocephalic and atraumatic.   Mouth/Throat: Oropharynx is clear and moist.   Eyes: Conjunctivae and EOM are normal. Pupils are equal, round, and reactive to light. No scleral icterus.   Neck: Normal range of motion. Neck supple. No JVD present.   Cardiovascular: Normal rate, normal heart sounds and intact distal pulses. An irregularly irregular rhythm present.   No murmur heard.  Pulses:       Radial pulses are 2+ on the right side, and 2+ on  the left side.   Pulmonary/Chest: Effort normal and breath sounds normal. No respiratory distress.   Symmetrical expansion  Left chest incision is clean   Abdominal: Soft. Bowel sounds are normal. There is no hepatosplenomegaly. There is no tenderness.   Musculoskeletal: Normal range of motion.   Groin healed   Neurological: He is alert and oriented to person, place, and time. No cranial nerve deficit.   Skin: Skin is warm and dry. No rash noted. He is not diaphoretic.   Well perfused in upper and lower extremities with good capillary refill   Psychiatric: He has a normal mood and affect. Judgment and thought content normal.       Significant Labs:   EP:   Recent Labs   Lab 01/14/19  0321  01/14/19  1224  01/15/19  0058 01/15/19  0330 01/15/19  0409 01/15/19  0915     140  140   < > 141   < > 137  --  139 140   K 4.6  4.6  4.6   < > 3.9   < > 4.3  --  4.3 4.1     101  101   < > 100   < > 97  --  97 96   CO2 29  29  29   < > 32*   < > 32*  --  34* 36*   *  165*  165*   < > 131*   < > 209*  --  183* 153*   BUN 40*  40*  40*   < > 38*   < > 35*  --  35* 35*   CREATININE 1.4  1.4  1.4   < > 1.4   < > 1.3  --  1.4 1.4   CALCIUM 8.7  8.7  8.7   < > 9.0   < > 8.9  --  8.6* 8.7   PROT 6.5  --  6.5  --   --   --  6.1  --    ALBUMIN 2.2*  --  2.3*  --   --   --  2.2*  --    BILITOT 0.6  --  0.7  --   --   --  0.7  --    ALKPHOS 162*  --  155*  --   --   --  155*  --    AST 28  --  22  --   --   --  21  --    ALT 16  --  17  --   --   --  15  --    ANIONGAP 10  10  10   < > 9   < > 8  --  8 8   ESTGFRAFRICA 56.0*  56.0*  56.0*   < > 56.0*   < > >60.0  --  56.0* 56.0*   EGFRNONAA 48.5*  48.5*  48.5*   < > 48.5*   < > 53.0*  --  48.5* 48.5*   WBC 9.50  --  10.27  --   --  7.98  --   --    HGB 9.2*  --  8.9*  --   --  8.7*  --   --    HCT 29.3*  --  28.7*  --   --  28.0*  --   --    *  --  104*  --   --  117*  --   --    INR 1.2  --   --   --   --   --  1.5*  --     < > = values in  this interval not displayed.       Significant Imaging:     Rhythm strip shows junctional beats with interspersed runs of irregular wide complex tachycardia suggestive of AF with aberrancy.    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 Likewise has a CardioMeMMS device in place. Is not pacemaker dependent. ID believes the primary source is the vegetation, he is s/p extraction on 01/09, RV lead tip grew E. Faecalis (resistant to tetracyclines). Recovery complicated by slow junctional escape rhythm responding to dopamine, no response to DCCV. Now with undetermined atrial rhythm which is either sinus bradycardia with aberrantly conducted PAC or AT, or persistent AF with junctional escape and intermittent aberrant conduction of AF, the differences can be attributed due to AV manolo stimulation by inotrope/chronotrope medications resulting in conduction changes.    - wean inotropes/chronotropes as tolerated, target HR >60  - discussing timing of new device implantation with ID  - Continue coumadin with heparin bridge, coumadin for easy reversal in case device needs to be placed urgently  - volume/HF management as per primary team         Oniel George MD  Cardiac Electrophysiology  [facility]

## 2019-01-15 NOTE — SUBJECTIVE & OBJECTIVE
Interval History: s/p cardioversion has had increased PVC's. Pressors changed to levophed without significant effect. Has maintained BP, renal function. Patient states he feels well.    Tele review: Junctional rhythm with intermittent aberrantly conducted beats. The irregular nature, preserved pulse-wave on a-line monitoring suggest that this is breakthrough conduction of patient's baseline atrial fibrillation rather than beats that are ventricular in origin.    ROS  Objective:     Vital Signs (Most Recent):  Temp: 96.7 °F (35.9 °C) (01/15/19 1100)  Pulse: 64 (01/15/19 1400)  Resp: (!) 23 (01/15/19 1400)  BP: 114/63 (01/15/19 0745)  SpO2: 99 % (01/15/19 1400) Vital Signs (24h Range):  Temp:  [96.7 °F (35.9 °C)-98.8 °F (37.1 °C)] 96.7 °F (35.9 °C)  Pulse:  [] 64  Resp:  [12-27] 23  SpO2:  [95 %-100 %] 99 %  BP: ()/(52-77) 114/63  Arterial Line BP: ()/(35-52) 114/40     Weight: 83.5 kg (184 lb 1.4 oz)  Body mass index is 23.64 kg/m².     SpO2: 99 %  O2 Device (Oxygen Therapy): nasal cannula w/ humidification    Physical Exam   Constitutional: He is oriented to person, place, and time. He appears well-developed and well-nourished. No distress.   HENT:   Head: Normocephalic and atraumatic.   Mouth/Throat: Oropharynx is clear and moist.   Eyes: Conjunctivae and EOM are normal. Pupils are equal, round, and reactive to light. No scleral icterus.   Neck: Normal range of motion. Neck supple. No JVD present.   Cardiovascular: Normal rate, normal heart sounds and intact distal pulses. An irregularly irregular rhythm present.   No murmur heard.  Pulses:       Radial pulses are 2+ on the right side, and 2+ on the left side.   Pulmonary/Chest: Effort normal and breath sounds normal. No respiratory distress.   Symmetrical expansion  Left chest incision is clean   Abdominal: Soft. Bowel sounds are normal. There is no hepatosplenomegaly. There is no tenderness.   Musculoskeletal: Normal range of motion.   Groin  healed   Neurological: He is alert and oriented to person, place, and time. No cranial nerve deficit.   Skin: Skin is warm and dry. No rash noted. He is not diaphoretic.   Well perfused in upper and lower extremities with good capillary refill   Psychiatric: He has a normal mood and affect. Judgment and thought content normal.       Significant Labs:   EP:   Recent Labs   Lab 01/14/19  0321  01/14/19  1224  01/15/19  0058 01/15/19  0330 01/15/19  0409 01/15/19  0915     140  140   < > 141   < > 137  --  139 140   K 4.6  4.6  4.6   < > 3.9   < > 4.3  --  4.3 4.1     101  101   < > 100   < > 97  --  97 96   CO2 29  29  29   < > 32*   < > 32*  --  34* 36*   *  165*  165*   < > 131*   < > 209*  --  183* 153*   BUN 40*  40*  40*   < > 38*   < > 35*  --  35* 35*   CREATININE 1.4  1.4  1.4   < > 1.4   < > 1.3  --  1.4 1.4   CALCIUM 8.7  8.7  8.7   < > 9.0   < > 8.9  --  8.6* 8.7   PROT 6.5  --  6.5  --   --   --  6.1  --    ALBUMIN 2.2*  --  2.3*  --   --   --  2.2*  --    BILITOT 0.6  --  0.7  --   --   --  0.7  --    ALKPHOS 162*  --  155*  --   --   --  155*  --    AST 28  --  22  --   --   --  21  --    ALT 16  --  17  --   --   --  15  --    ANIONGAP 10  10  10   < > 9   < > 8  --  8 8   ESTGFRAFRICA 56.0*  56.0*  56.0*   < > 56.0*   < > >60.0  --  56.0* 56.0*   EGFRNONAA 48.5*  48.5*  48.5*   < > 48.5*   < > 53.0*  --  48.5* 48.5*   WBC 9.50  --  10.27  --   --  7.98  --   --    HGB 9.2*  --  8.9*  --   --  8.7*  --   --    HCT 29.3*  --  28.7*  --   --  28.0*  --   --    *  --  104*  --   --  117*  --   --    INR 1.2  --   --   --   --   --  1.5*  --     < > = values in this interval not displayed.       Significant Imaging:     Rhythm strip shows junctional beats with interspersed runs of irregular wide complex tachycardia suggestive of AF with aberrancy.

## 2019-01-15 NOTE — PROGRESS NOTES
"[facility]  Endocrinology  Progress Note    Admit Date: 2019     Reason for Consult: Management of T2DM, Hyperglycemia     Surgical Procedure and Date: Lead extraction 19    Diabetes diagnosis year: approximately 10 years ago    Lab Results   Component Value Date    HGBA1C 8.1 (H) 2019       Home Diabetes Medications: Lantus 50 units q HS, Novolog 25 units with meals (vials)    How often checking glucose at home? 3-4x per day   BG readings on regimen: AM 120s  Hypoglycemia on the regimen?  Yes - 2-3x per month  Missed doses on regimen?  Yes - 2x per week    Diabetes Complications include:     Hyperglycemia, Hypoglycemia , Diabetic retinopathy , Diabetic cataract  and Diabetic peripheral neuropathy     Complicating diabetes co morbidities:   CHF and History of CVA      HPI:   Patient is a 78 y.o. male with a diagnosis of DM2 and CHF who was admitted on 19 for possible RV lead infection.  Patient is s/p lead extraction on 19.  Patient's DM managed by PCP, Dr. Hdz.  Endocrine consulted for DM/BG management.            Interval HPI:   Overnight events: Remains in CMICU. arrhythmia overnight. BG at goal with current regimen. IV antibiotics. Pressor support.   Eatin%-50%  Nausea: No  Hypoglycemia and intervention: No  Fever: No  TPN and/or TF: No    /63 (BP Location: Left arm, Patient Position: Lying)   Pulse 68   Temp 97.7 °F (36.5 °C) (Oral)   Resp (!) 27   Ht 6' 2" (1.88 m)   Wt 83.5 kg (184 lb 1.4 oz)   SpO2 100%   BMI 23.64 kg/m²      Labs Reviewed and Include    Recent Labs   Lab 01/15/19  0409   *   CALCIUM 8.6*   ALBUMIN 2.2*   PROT 6.1      K 4.3   CO2 34*   CL 97   BUN 35*   CREATININE 1.4   ALKPHOS 155*   ALT 15   AST 21   BILITOT 0.7     Lab Results   Component Value Date    WBC 7.98 01/15/2019    HGB 8.7 (L) 01/15/2019    HCT 28.0 (L) 01/15/2019    MCV 92 01/15/2019     (L) 01/15/2019     No results for input(s): TSH, FREET4 " in the last 168 hours.  Lab Results   Component Value Date    HGBA1C 8.1 (H) 01/07/2019       Nutritional status:   Body mass index is 23.64 kg/m².  Lab Results   Component Value Date    ALBUMIN 2.2 (L) 01/15/2019    ALBUMIN 2.3 (L) 01/14/2019    ALBUMIN 2.2 (L) 01/14/2019     No results found for: PREALBUMIN    Estimated Creatinine Clearance: 52.2 mL/min (based on SCr of 1.4 mg/dL).    Accu-Checks  Recent Labs     01/13/19  0746 01/13/19  1124 01/13/19  1612 01/13/19  2215 01/13/19  2219 01/14/19  0807 01/14/19  1139 01/14/19  1804 01/14/19  2145 01/15/19  0755   POCTGLUCOSE 73 131* 158* 183* 169* 143* 135* 170* 161* 161*       Current Medications and/or Treatments Impacting Glycemic Control  Immunotherapy:    Immunosuppressants     None        Steroids:   Hormones (From admission, onward)    None        Pressors:    Autonomic Drugs (From admission, onward)    Start     Stop Route Frequency Ordered    01/14/19 1645  norepinephrine 4 mg in dextrose 5% 250 mL infusion (premix) (titrating)     Question Answer Comment   Titrate by: (in mcg/kg/min) 0.02    Titrate interval: (in minutes) 5    Titrate to maintain: (MAP or SBP) SBP    Greater than: (in mmHg) 100    Maximum dose: (in mcg/kg/min) 3        -- IV Continuous 01/14/19 1638        Hyperglycemia/Diabetes Medications:   Antihyperglycemics (From admission, onward)    Start     Stop Route Frequency Ordered    01/13/19 2100  insulin detemir U-100 pen 13 Units      -- SubQ Nightly 01/13/19 0949    01/11/19 1645  insulin aspart U-100 pen 6 Units      -- SubQ 3 times daily with meals 01/11/19 1259    01/11/19 1358  insulin aspart U-100 pen 0-5 Units      -- SubQ Before meals & nightly PRN 01/11/19 1259          ASSESSMENT and PLAN    * 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         Atrial fibrillation    May affect BG readings if uncontrolled  S/p cardioversion.          Cathie Eli NP  Endocrinology  [facility]

## 2019-01-15 NOTE — ASSESSMENT & PLAN NOTE
This is a 77yo gentleman with long-standing history of ICM with EF 35%, a 20-year history of ICD with upgrade to CRT-D in 2014 who presents for device extraction due to RV lead vegetation ~0.5cm in the setting of VRE bacteremia with a likely initial GI source. Likewise has a CardioMeMMS device in place. Is not pacemaker dependent. ID believes the primary source is the vegetation, he is s/p extraction on 01/09, RV lead tip grew E. Faecalis (resistant to tetracyclines). Recovery complicated by slow junctional escape rhythm responding to dopamine, no response to DCCV. Now with undetermined atrial rhythm which is either sinus bradycardia with aberrantly conducted PAC or AT, or persistent AF with junctional escape and intermittent aberrant conduction of AF, the differences can be attributed due to AV manolo stimulation by inotrope/chronotrope medications resulting in conduction changes. With weaning of chronotropic medications, he is back in a msotly junctional rhythm    - continue to wean inotropes as tolerated, with goal of stable renal perfusion  - ID states they feel that when needed a new device can be placed, a gallium-scan can help assess whether cardio-MEMS is involved and whether suppression therapy after antibiotic completion is warranted. They are ordering this test.  - Continue coumadin with target INR 2-3  - volume/HF management as per primary team  - will tentatively plan for new device implantation for next week

## 2019-01-15 NOTE — SUBJECTIVE & OBJECTIVE
"Interval HPI:   Overnight events: Remains in CMICU. arrhythmia overnight. BG at goal with current regimen. IV antibiotics. Pressor support.   Eatin%-50%  Nausea: No  Hypoglycemia and intervention: No  Fever: No  TPN and/or TF: No    /63 (BP Location: Left arm, Patient Position: Lying)   Pulse 68   Temp 97.7 °F (36.5 °C) (Oral)   Resp (!) 27   Ht 6' 2" (1.88 m)   Wt 83.5 kg (184 lb 1.4 oz)   SpO2 100%   BMI 23.64 kg/m²     Labs Reviewed and Include    Recent Labs   Lab 01/15/19  0409   *   CALCIUM 8.6*   ALBUMIN 2.2*   PROT 6.1      K 4.3   CO2 34*   CL 97   BUN 35*   CREATININE 1.4   ALKPHOS 155*   ALT 15   AST 21   BILITOT 0.7     Lab Results   Component Value Date    WBC 7.98 01/15/2019    HGB 8.7 (L) 01/15/2019    HCT 28.0 (L) 01/15/2019    MCV 92 01/15/2019     (L) 01/15/2019     No results for input(s): TSH, FREET4 in the last 168 hours.  Lab Results   Component Value Date    HGBA1C 8.1 (H) 2019       Nutritional status:   Body mass index is 23.64 kg/m².  Lab Results   Component Value Date    ALBUMIN 2.2 (L) 01/15/2019    ALBUMIN 2.3 (L) 2019    ALBUMIN 2.2 (L) 2019     No results found for: PREALBUMIN    Estimated Creatinine Clearance: 52.2 mL/min (based on SCr of 1.4 mg/dL).    Accu-Checks  Recent Labs     19  0746 19  1124 19  1612 19  2215 19  2219 19  0807 19  1139 19  1804 19  2145 01/15/19  0755   POCTGLUCOSE 73 131* 158* 183* 169* 143* 135* 170* 161* 161*       Current Medications and/or Treatments Impacting Glycemic Control  Immunotherapy:    Immunosuppressants     None        Steroids:   Hormones (From admission, onward)    None        Pressors:    Autonomic Drugs (From admission, onward)    Start     Stop Route Frequency Ordered    19 2147  norepinephrine 4 mg in dextrose 5% 250 mL infusion (premix) (titrating)     Question Answer Comment   Titrate by: (in mcg/kg/min) 0.02    Titrate " interval: (in minutes) 5    Titrate to maintain: (MAP or SBP) SBP    Greater than: (in mmHg) 100    Maximum dose: (in mcg/kg/min) 3        -- IV Continuous 01/14/19 1638        Hyperglycemia/Diabetes Medications:   Antihyperglycemics (From admission, onward)    Start     Stop Route Frequency Ordered    01/13/19 2100  insulin detemir U-100 pen 13 Units      -- SubQ Nightly 01/13/19 0949    01/11/19 1645  insulin aspart U-100 pen 6 Units      -- SubQ 3 times daily with meals 01/11/19 1259    01/11/19 1358  insulin aspart U-100 pen 0-5 Units      -- SubQ Before meals & nightly PRN 01/11/19 1259

## 2019-01-15 NOTE — PLAN OF CARE
Problem: SLP Goal  Goal: SLP Goal  Speech Language Pathology Goals  Goals expected to be met by 1/18/2019  1. Pt will tolerate a dental soft diet with thin liquids w/o overt S/S aspiration MET  2. Educate Pt and family on S/S aspiration and aspiration precautions MET     Outcome: Outcome(s) achieved Date Met: 01/15/19  pT WAS SEEN FOR st SESSion.

## 2019-01-15 NOTE — PROGRESS NOTES
Followed up with Dr. Casanova with [facility] concerning pt's repeat CVP of 11 after lasix on hold for two hours. MD gave order to resume gtt. MD also aware of pt's continued arrhthymias with frequent PVCs and junctional beats in between. BP stable on same dose of Levo since start of shift. No further orders received. Will continue to monitor closely.

## 2019-01-15 NOTE — PROGRESS NOTES
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 Does have some palpitations, but otherwise feels OK except he hasn't been OOB yet. Weaning Levo to keep . Plan is for CRT-D replacement once off Levophed for 48 hours depending on stability, culture data and ongoing assessment with ID. CVP 10 and sVO2 62.    Continuous Infusions:   furosemide (LASIX) 2 mg/mL infusion (non-titrating) 10 mg/hr (01/15/19 1115)    heparin (porcine) in D5W 10 Units/kg/hr (01/15/19 1100)    norepinephrine bitartrate-D5W 0.05 mcg/kg/min (01/15/19 1100)     Scheduled Meds:   acetaminophen  1,000 mg Oral TID    ampicillin (OMNIPEN) 4 g in  mL EXTENDED INFUSION  4,000 mg Intravenous Q8H    artificial tears  1 drop Both Eyes QID    cefTRIAXone (ROCEPHIN) IVPB  2 g Intravenous Q12H    clopidogrel  75 mg Oral Daily    insulin aspart U-100  6 Units Subcutaneous TIDWM    insulin detemir U-100  13 Units Subcutaneous QHS    levothyroxine  25 mcg Oral Before breakfast    magnesium oxide  800 mg Oral BID    polyethylene glycol  17 g Oral Daily    rosuvastatin  20 mg Oral QHS    senna-docusate 8.6-50 mg  2 tablet Oral BID    tamsulosin  0.4 mg Oral Daily    warfarin  5 mg Oral Daily     PRN Meds:albuterol-ipratropium, dextrose 50%, dextrose 50%, glucagon (human recombinant), glucose, glucose, insulin aspart U-100, magnesium sulfate IVPB, magnesium sulfate IVPB, ondansetron, promethazine (PHENERGAN) IVPB, ramelteon, sodium chloride 0.9%, sodium chloride 0.9%, traMADol    Review of patient's allergies indicates:   Allergen Reactions     Adhesive Other (See Comments)     blisters    Codeine Other (See Comments)     Blurred vision    Latex Hives    Ace inhibitors     Lipitor [atorvastatin] Other (See Comments)     Muscle spasms     Objective:     Vital Signs (Most Recent):  Temp: 96.7 °F (35.9 °C) (01/15/19 1100)  Pulse: 83 (01/15/19 1100)  Resp: 19 (01/15/19 1100)  BP: 114/63 (01/15/19 0745)  SpO2: 100 % (01/15/19 1100) Vital Signs (24h Range):  Temp:  [96.7 °F (35.9 °C)-98.8 °F (37.1 °C)] 96.7 °F (35.9 °C)  Pulse:  [] 83  Resp:  [12-27] 19  SpO2:  [95 %-100 %] 100 %  BP: ()/(52-77) 114/63  Arterial Line BP: ()/(36-59) 124/50     Patient Vitals for the past 72 hrs (Last 3 readings):   Weight   01/14/19 0600 83.5 kg (184 lb 1.4 oz)   01/13/19 0300 84.9 kg (187 lb 2.7 oz)     Body mass index is 23.64 kg/m².      Intake/Output Summary (Last 24 hours) at 1/15/2019 1123  Last data filed at 1/15/2019 1100  Gross per 24 hour   Intake 2161.89 ml   Output 3941 ml   Net -1779.11 ml       Hemodynamic Parameters:       Telemetry: A trail fib with variety of aberrant conduction    Physical Exam   Constitutional: He is oriented to person, place, and time. He appears well-developed and well-nourished.   HENT:   Head: Normocephalic and atraumatic.   Eyes: Conjunctivae and EOM are normal.   Neck: Normal range of motion. Neck supple. No JVD present. No thyromegaly present.   Cardiovascular: Regular rhythm.   Irregular, bradycardic   Pulmonary/Chest: Effort normal and breath sounds normal.   Abdominal: Soft. Bowel sounds are normal.   Musculoskeletal: Normal range of motion.   Trace HEATHER   Neurological: He is alert and oriented to person, place, and time.   Skin: Skin is warm and dry. Capillary refill takes 2 to 3 seconds.   Psychiatric: He has a normal mood and affect. His behavior is normal. Judgment and thought content normal.       Significant Labs:  CBC:  Recent Labs   Lab 01/14/19  0321 01/14/19  1224 01/15/19  0330   WBC 9.50 10.27 7.98    RBC 3.22* 3.19* 3.05*   HGB 9.2* 8.9* 8.7*   HCT 29.3* 28.7* 28.0*   * 104* 117*   MCV 91 90 92   MCH 28.6 27.9 28.5   MCHC 31.4* 31.0* 31.1*     BNP:  Recent Labs   Lab 01/10/19  1141   BNP 2,076*     CMP:  Recent Labs   Lab 01/14/19  0321 01/14/19  1224  01/15/19  0058 01/15/19  0409 01/15/19  0915   *  165*  165*   < > 131*   < > 209* 183* 153*   CALCIUM 8.7  8.7  8.7   < > 9.0   < > 8.9 8.6* 8.7   ALBUMIN 2.2*  --  2.3*  --   --  2.2*  --    PROT 6.5  --  6.5  --   --  6.1  --      140  140   < > 141   < > 137 139 140   K 4.6  4.6  4.6   < > 3.9   < > 4.3 4.3 4.1   CO2 29  29  29   < > 32*   < > 32* 34* 36*     101  101   < > 100   < > 97 97 96   BUN 40*  40*  40*   < > 38*   < > 35* 35* 35*   CREATININE 1.4  1.4  1.4   < > 1.4   < > 1.3 1.4 1.4   ALKPHOS 162*  --  155*  --   --  155*  --    ALT 16  --  17  --   --  15  --    AST 28  --  22  --   --  21  --    BILITOT 0.6  --  0.7  --   --  0.7  --     < > = values in this interval not displayed.      Coagulation:   Recent Labs   Lab 01/13/19 0323 01/14/19  0321 01/14/19  1224 01/14/19  1808 01/15/19  0409   INR 1.2 1.2  --   --  1.5*   APTT 49.2* 55.0*  55.0* 44.2* 46.6* 48.6*     LDH:  No results for input(s): LDH in the last 72 hours.  Microbiology:  Microbiology Results (last 7 days)     Procedure Component Value Units Date/Time    Blood culture [[id number]] Collected:  01/15/19 0320    Order Status:  Sent Specimen:  Blood from Peripheral, Antecubital, Left Updated:  01/15/19 0605    Blood culture [[id number]] Collected:  01/15/19 0328    Order Status:  Sent Specimen:  Blood from Peripheral, Forearm, Left Updated:  01/15/19 0600    Blood culture [[id number]] Collected:  01/09/19 1657    Order Status:  Completed Specimen:  Blood from Peripheral, Hand, Right Updated:  01/14/19 2012     Blood Culture, Routine No growth after 5 days.    Blood culture [[id number]] Collected:  01/10/19 1401    Order Status:  Completed  Specimen:  Blood from Peripheral, Hand, Left Updated:  01/14/19 1612     Blood Culture, Routine No Growth to date     Blood Culture, Routine No Growth to date     Blood Culture, Routine No Growth to date     Blood Culture, Routine No Growth to date     Blood Culture, Routine No Growth to date    Blood culture [[id number]] Collected:  01/10/19 1401    Order Status:  Completed Specimen:  Blood from Peripheral, Forearm, Left Updated:  01/14/19 1612     Blood Culture, Routine No Growth to date     Blood Culture, Routine No Growth to date     Blood Culture, Routine No Growth to date     Blood Culture, Routine No Growth to date     Blood Culture, Routine No Growth to date    Culture, Anaerobe [[id number]] Collected:  01/09/19 1402    Order Status:  Completed Specimen:  Other from Chest, Left Updated:  01/14/19 1047     Anaerobic Culture No anaerobes isolated    Narrative:       #4 RV lead tip    Culture, Anaerobe [[id number]] Collected:  01/09/19 1322    Order Status:  Completed Specimen:  Other from Chest, Left Updated:  01/14/19 1047     Anaerobic Culture Culture in progress    Narrative:       #3 LV lead tip    Culture, Anaerobe [[id number]] Collected:  01/09/19 1314    Order Status:  Completed Specimen:  Other from Chest, Left Updated:  01/14/19 1046     Anaerobic Culture Culture in progress    Narrative:       #2 RA Lead tip    Culture, Anaerobe [[id number]] Collected:  01/09/19 1124    Order Status:  Completed Specimen:  Tissue from Chest, Left Updated:  01/14/19 1046     Anaerobic Culture Culture in progress    Narrative:       ICD pocket tissue    Aerobic culture [[id number]]  (Susceptibility) Collected:  01/09/19 1402    Order Status:  Completed Specimen:  Other from Chest, Left Updated:  01/14/19 1003     Aerobic Bacterial Culture --     ENTEROCOCCUS FAECALIS  Rare      Narrative:       #4 RV lead tip    Aerobic culture [[id number]] Collected:  01/09/19 1322    Order Status:  Completed Specimen:  Other from Chest, Left  Updated:  01/12/19 1023     Aerobic Bacterial Culture No growth    Narrative:       #3 LV lead tip    Aerobic culture [[id number]] Collected:  01/09/19 1124    Order Status:  Completed Specimen:  Tissue from Chest, Left Updated:  01/12/19 1023     Aerobic Bacterial Culture No growth    Narrative:       ICD pocket tissue    Aerobic culture [[id number]] Collected:  01/09/19 1314    Order Status:  Completed Specimen:  Other from Chest, Left Updated:  01/12/19 1023     Aerobic Bacterial Culture No growth    Narrative:       #2 RA Lead tip    Blood culture (site 1) [[id number]] Collected:  01/07/19 0154    Order Status:  Completed Specimen:  Blood Updated:  01/12/19 0812     Blood Culture, Routine No growth after 5 days.    Narrative:       Site # 1, aerobic and anaerobic    Blood culture (site 2) [[id number]] Collected:  01/07/19 0154    Order Status:  Completed Specimen:  Blood Updated:  01/12/19 0812     Blood Culture, Routine No growth after 5 days.    Narrative:       Site # 2, aerobic only    Urine culture [[id number]] Collected:  01/11/19 0115    Order Status:  Completed Specimen:  Urine, Catheterized Updated:  01/12/19 0418     Urine Culture, Routine No growth    IV catheter culture [[id number]]     Order Status:  No result Specimen:  Catheter Tip, NOS           I have reviewed all pertinent labs within the past 24 hours.    Estimated Creatinine Clearance: 52.2 mL/min (based on SCr of 1.4 mg/dL).    Diagnostic Results:  I have reviewed all pertinent imaging results/findings within the past 24 hours.    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 Discussed with Dr. Russo. Blood cultures repeated  -Appreciate EP's help. 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 Patient had 2 vegetations on RV lead which is most likely source of  bacteremia. Lead tip and ICD pocket cultured with RV lead +ve for Enteroccocus. Blood cultures are negative here thus far.  -Continue Ampicillin and Ceftriaxone per ID recs Will treat for 6 weeks post extraction.  Estimated end date 2/20/19.    -If urgently necessary from a cardiac standpoint, it is okay to proceed with implanting new device as repeat blood cultures taken post extraction remain negative X 72 hours.  Suspect known vegetations on RV leads, now removed, were primary source of infection; cardioMems likely not infected  -Per EP; ok'd to start heparin and Coumadin on 1/12.  No Eliquis for now as he may require implant of new device  -Will need Life Vest prior to discharge  -Appreciate GI's help. Plan for outpatient EGD and C-scope for eval of anemia and Enterococcus bacteremia       Acute on chronic combined systolic and diastolic CHF, NYHA class 3    -ICM  -Last full 2D Echo 1/7/19: LVEF 20%, LVEDD 7.24 cm. Limited bedside echo done after ICD explant showed no pericardial effusion  -CVP 10. Continue diuresing with Lasix gtt at 10 mg/hr  -sVO2 62  -GDMT: Dig and Toprol D/C'd 2/2 junctional rhythm. Entresto D/C'd 2/2 transient hypotension during/after ICD explant and TREY  -Self declined for LVAD after w/u last April 2018       Atrial fibrillation    -S/P Unsuccessful RICARDO guided/DCCV 1/14. Remains in A fib with variety of aberrant conduction  -EP considering CRT-D replacement once off Levo for 48 hours depending on stability, culture data and ongoing discussion with ID  -May resume Dopamine if bradycardia worsens  -CHADS VASc 6  -Holding eliquis s/p ICD explant 1/9. Per EP, anticoagulation resumed 1/12  -Cardiac monitoring     Type 2 diabetes mellitus with complication    -A1c 8.1  -Target -180 while hospitalized  -detemir 13, aspart 6 with sliding scale  -Appreciate Endocrine's help with management     Hypotension    - Dopamine D/C'd after unsuccessful DCCV 1/14. Wean Levo keeping      Dysuria     - Resolved  - Urine culture -ve         Dysphagia    - Consult SLP     Nausea & vomiting    - Resolved  - Antiemetics prn  - Appreciate speech therapy consult.  Advanced diet     Junctional bradycardia    - Se A fib     Endocarditis    - See ICD lead infection     Bacteremia    -see above     TREY (acute kidney injury)    - Appreciate Nephrology's help  - Was oliguric and creatinine jumped to 4.1 likely 2/2 ATN/hypotension following ICD explant.   - CVP 10, UOP and creatinine improving. (was 1.2 on admit)  - Continue Lasix gtt, wean Levo to keep   - Hold Entresto for now       Uninterrupted Critical Care/Counseling Time (not including procedures): [place] [id number]  Heart Transplant  [facility]

## 2019-01-15 NOTE — PLAN OF CARE
Problem: Adult Inpatient Plan of Care  Goal: Plan of Care Review  See notes for events throughout day, VS and assessment per flow sheet, patient progressing towards goals as tolerated, plan of care reviewed with Jerry Solis and family, all concerns addressed, will continue to monitor.

## 2019-01-15 NOTE — ASSESSMENT & PLAN NOTE
-Vegetation seen on RICARDO  -2 mobile masses seen on CVC by RICARDO 1/14 (has both RUE PICC and RIJ TLC). Discussed with Dr. Russo. Blood cultures repeated  -Appreciate EP's help. 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 Patient had 2 vegetations on RV lead which is most likely source of bacteremia. Lead tip and ICD pocket cultured with RV lead +ve for Enteroccocus. Blood cultures are negative here thus far.  -Continue Ampicillin and Ceftriaxone per ID recs Will treat for 6 weeks post extraction.  Estimated end date 2/20/19.    -If urgently necessary from a cardiac standpoint, it is okay to proceed with implanting new device as repeat blood cultures taken post extraction remain negative X 72 hours.  Suspect known vegetations on RV leads, now removed, were primary source of infection; cardioMems likely not infected  -Per EP; ok'd to start heparin and Coumadin on 1/12.  No Eliquis for now as he may require implant of new device  -Will need Life Vest prior to discharge  -Appreciate GI's help. Plan for outpatient EGD and C-scope for eval of anemia and Enterococcus bacteremia

## 2019-01-15 NOTE — SIGNIFICANT EVENT
HTS SIGNIFICANT EVENT    During PM rounds noted increasing runs of PVCs, NSVT (few beat runs ) vs Afib w Aberrency? Causing MAP to drop in low 50s, otherwise MAPs in 60s.  Patient in junctional rhythm (escape) after failed DCCV.    Patient seen and examined.  Discussed with advanced EP fellow, Plan is to add dopamine if bradycardia at baseline worsens, check CVP, reduce lasix drip to allow for higher pressures, avoid Amio ggt at this time (except incessant VT) as it can further suppress  sinus activity.   If worsens transcutaneous pacing would be next step, leave pads on.  Mg >2 K>4   Discussed with staff    DEUCE Baig MD  [id number]  Cardiology Fellow, PGY 4

## 2019-01-15 NOTE — ASSESSMENT & PLAN NOTE
-ICM  -Last full 2D Echo 1/7/19: LVEF 20%, LVEDD 7.24 cm. Limited bedside echo done after ICD explant showed no pericardial effusion  -CVP 10. Continue diuresing with Lasix gtt at 10 mg/hr  -sVO2 62  -GDMT: Dig and Toprol D/C'd 2/2 junctional rhythm. Entresto D/C'd 2/2 transient hypotension during/after ICD explant and TREY  -Self declined for LVAD after w/u last April 2018

## 2019-01-15 NOTE — TELEPHONE ENCOUNTER
Patient has an order for an EGD and colonoscopy.  Patient remains admitted.  Will attempt to schedule procedures once discharged from hospital.

## 2019-01-15 NOTE — ASSESSMENT & PLAN NOTE
-S/P Unsuccessful RICARDO guided/DCCV 1/14. Remains in A fib with variety of aberrant conduction  -EP considering CRT-D replacement once off Levo for 48 hours depending on stability, culture data and ongoing discussion with ID  -May resume Dopamine if bradycardia worsens  -CHADS VASc 6  -Holding eliquis s/p ICD explant 1/9. Per EP, anticoagulation resumed 1/12  -Cardiac monitoring

## 2019-01-15 NOTE — ASSESSMENT & PLAN NOTE
BG goal 140-180    Continue Levemir to 13 units q HS  Novolog 6 units with meals  Low dose correction scale  BG monitoring AC/HS    Discharge planning:  TBD

## 2019-01-15 NOTE — SUBJECTIVE & OBJECTIVE
**Interval History: Remains in A fib with variety of aberrant conduction, HR 50's. Does have some palpitations, but otherwise feels OK except he hasn't been OOB yet. Weaning Levo to keep . Plan is for CRT-D replacement once off Levophed for 48 hours depending on stability, culture data and ongoing assessment with ID. CVP 10 and sVO2 62.    Continuous Infusions:   furosemide (LASIX) 2 mg/mL infusion (non-titrating) 10 mg/hr (01/15/19 1115)    heparin (porcine) in D5W 10 Units/kg/hr (01/15/19 1100)    norepinephrine bitartrate-D5W 0.05 mcg/kg/min (01/15/19 1100)     Scheduled Meds:   acetaminophen  1,000 mg Oral TID    ampicillin (OMNIPEN) 4 g in  mL EXTENDED INFUSION  4,000 mg Intravenous Q8H    artificial tears  1 drop Both Eyes QID    cefTRIAXone (ROCEPHIN) IVPB  2 g Intravenous Q12H    clopidogrel  75 mg Oral Daily    insulin aspart U-100  6 Units Subcutaneous TIDWM    insulin detemir U-100  13 Units Subcutaneous QHS    levothyroxine  25 mcg Oral Before breakfast    magnesium oxide  800 mg Oral BID    polyethylene glycol  17 g Oral Daily    rosuvastatin  20 mg Oral QHS    senna-docusate 8.6-50 mg  2 tablet Oral BID    tamsulosin  0.4 mg Oral Daily    warfarin  5 mg Oral Daily     PRN Meds:albuterol-ipratropium, dextrose 50%, dextrose 50%, glucagon (human recombinant), glucose, glucose, insulin aspart U-100, magnesium sulfate IVPB, magnesium sulfate IVPB, ondansetron, promethazine (PHENERGAN) IVPB, ramelteon, sodium chloride 0.9%, sodium chloride 0.9%, traMADol    Review of patient's allergies indicates:   Allergen Reactions    Adhesive Other (See Comments)     blisters    Codeine Other (See Comments)     Blurred vision    Latex Hives    Ace inhibitors     Lipitor [atorvastatin] Other (See Comments)     Muscle spasms     Objective:     Vital Signs (Most Recent):  Temp: 96.7 °F (35.9 °C) (01/15/19 1100)  Pulse: 83 (01/15/19 1100)  Resp: 19 (01/15/19 1100)  BP: 114/63 (01/15/19  0745)  SpO2: 100 % (01/15/19 1100) Vital Signs (24h Range):  Temp:  [96.7 °F (35.9 °C)-98.8 °F (37.1 °C)] 96.7 °F (35.9 °C)  Pulse:  [] 83  Resp:  [12-27] 19  SpO2:  [95 %-100 %] 100 %  BP: ()/(52-77) 114/63  Arterial Line BP: ()/(36-59) 124/50     Patient Vitals for the past 72 hrs (Last 3 readings):   Weight   01/14/19 0600 83.5 kg (184 lb 1.4 oz)   01/13/19 0300 84.9 kg (187 lb 2.7 oz)     Body mass index is 23.64 kg/m².      Intake/Output Summary (Last 24 hours) at 1/15/2019 1123  Last data filed at 1/15/2019 1100  Gross per 24 hour   Intake 2161.89 ml   Output 3941 ml   Net -1779.11 ml       Hemodynamic Parameters:       Telemetry: A trail fib with variety of aberrant conduction    Physical Exam   Constitutional: He is oriented to person, place, and time. He appears well-developed and well-nourished.   HENT:   Head: Normocephalic and atraumatic.   Eyes: Conjunctivae and EOM are normal.   Neck: Normal range of motion. Neck supple. No JVD present. No thyromegaly present.   Cardiovascular: Regular rhythm.   Irregular, bradycardic   Pulmonary/Chest: Effort normal and breath sounds normal.   Abdominal: Soft. Bowel sounds are normal.   Musculoskeletal: Normal range of motion.   Trace HEATHER   Neurological: He is alert and oriented to person, place, and time.   Skin: Skin is warm and dry. Capillary refill takes 2 to 3 seconds.   Psychiatric: He has a normal mood and affect. His behavior is normal. Judgment and thought content normal.       Significant Labs:  CBC:  Recent Labs   Lab 01/14/19  0321 01/14/19  1224 01/15/19  0330   WBC 9.50 10.27 7.98   RBC 3.22* 3.19* 3.05*   HGB 9.2* 8.9* 8.7*   HCT 29.3* 28.7* 28.0*   * 104* 117*   MCV 91 90 92   MCH 28.6 27.9 28.5   MCHC 31.4* 31.0* 31.1*     BNP:  Recent Labs   Lab 01/10/19  1141   BNP 2,076*     CMP:  Recent Labs   Lab 01/14/19  0321  01/14/19  1224  01/15/19  0058 01/15/19  0409 01/15/19  0915   *  165*  165*   < > 131*   < > 209*  183* 153*   CALCIUM 8.7  8.7  8.7   < > 9.0   < > 8.9 8.6* 8.7   ALBUMIN 2.2*  --  2.3*  --   --  2.2*  --    PROT 6.5  --  6.5  --   --  6.1  --      140  140   < > 141   < > 137 139 140   K 4.6  4.6  4.6   < > 3.9   < > 4.3 4.3 4.1   CO2 29  29  29   < > 32*   < > 32* 34* 36*     101  101   < > 100   < > 97 97 96   BUN 40*  40*  40*   < > 38*   < > 35* 35* 35*   CREATININE 1.4  1.4  1.4   < > 1.4   < > 1.3 1.4 1.4   ALKPHOS 162*  --  155*  --   --  155*  --    ALT 16  --  17  --   --  15  --    AST 28  --  22  --   --  21  --    BILITOT 0.6  --  0.7  --   --  0.7  --     < > = values in this interval not displayed.      Coagulation:   Recent Labs   Lab 01/13/19  0323 01/14/19  0321 01/14/19  1224 01/14/19  1808 01/15/19  0409   INR 1.2 1.2  --   --  1.5*   APTT 49.2* 55.0*  55.0* 44.2* 46.6* 48.6*     LDH:  No results for input(s): LDH in the last 72 hours.  Microbiology:  Microbiology Results (last 7 days)     Procedure Component Value Units Date/Time    Blood culture [[id number]] Collected:  01/15/19 0320    Order Status:  Sent Specimen:  Blood from Peripheral, Antecubital, Left Updated:  01/15/19 0605    Blood culture [[id number]] Collected:  01/15/19 0328    Order Status:  Sent Specimen:  Blood from Peripheral, Forearm, Left Updated:  01/15/19 0600    Blood culture [[id number]] Collected:  01/09/19 1657    Order Status:  Completed Specimen:  Blood from Peripheral, Hand, Right Updated:  01/14/19 2012     Blood Culture, Routine No growth after 5 days.    Blood culture [[id number]] Collected:  01/10/19 1401    Order Status:  Completed Specimen:  Blood from Peripheral, Hand, Left Updated:  01/14/19 1612     Blood Culture, Routine No Growth to date     Blood Culture, Routine No Growth to date     Blood Culture, Routine No Growth to date     Blood Culture, Routine No Growth to date     Blood Culture, Routine No Growth to date    Blood culture [[id number]] Collected:  01/10/19 1401    Order  Status:  Completed Specimen:  Blood from Peripheral, Forearm, Left Updated:  01/14/19 1612     Blood Culture, Routine No Growth to date     Blood Culture, Routine No Growth to date     Blood Culture, Routine No Growth to date     Blood Culture, Routine No Growth to date     Blood Culture, Routine No Growth to date    Culture, Anaerobe [[id number]] Collected:  01/09/19 1402    Order Status:  Completed Specimen:  Other from Chest, Left Updated:  01/14/19 1047     Anaerobic Culture No anaerobes isolated    Narrative:       #4 RV lead tip    Culture, Anaerobe [[id number]] Collected:  01/09/19 1322    Order Status:  Completed Specimen:  Other from Chest, Left Updated:  01/14/19 1047     Anaerobic Culture Culture in progress    Narrative:       #3 LV lead tip    Culture, Anaerobe [[id number]] Collected:  01/09/19 1314    Order Status:  Completed Specimen:  Other from Chest, Left Updated:  01/14/19 1046     Anaerobic Culture Culture in progress    Narrative:       #2 RA Lead tip    Culture, Anaerobe [[id number]] Collected:  01/09/19 1124    Order Status:  Completed Specimen:  Tissue from Chest, Left Updated:  01/14/19 1046     Anaerobic Culture Culture in progress    Narrative:       ICD pocket tissue    Aerobic culture [[id number]]  (Susceptibility) Collected:  01/09/19 1402    Order Status:  Completed Specimen:  Other from Chest, Left Updated:  01/14/19 1003     Aerobic Bacterial Culture --     ENTEROCOCCUS FAECALIS  Rare      Narrative:       #4 RV lead tip    Aerobic culture [[id number]] Collected:  01/09/19 1322    Order Status:  Completed Specimen:  Other from Chest, Left Updated:  01/12/19 1023     Aerobic Bacterial Culture No growth    Narrative:       #3 LV lead tip    Aerobic culture [[id number]] Collected:  01/09/19 1124    Order Status:  Completed Specimen:  Tissue from Chest, Left Updated:  01/12/19 1023     Aerobic Bacterial Culture No growth    Narrative:       ICD pocket tissue    Aerobic culture [[id number]]  Collected:  01/09/19 1314    Order Status:  Completed Specimen:  Other from Chest, Left Updated:  01/12/19 1023     Aerobic Bacterial Culture No growth    Narrative:       #2 RA Lead tip    Blood culture (site 1) [[id number]] Collected:  01/07/19 0154    Order Status:  Completed Specimen:  Blood Updated:  01/12/19 0812     Blood Culture, Routine No growth after 5 days.    Narrative:       Site # 1, aerobic and anaerobic    Blood culture (site 2) [[id number]] Collected:  01/07/19 0154    Order Status:  Completed Specimen:  Blood Updated:  01/12/19 0812     Blood Culture, Routine No growth after 5 days.    Narrative:       Site # 2, aerobic only    Urine culture [[id number]] Collected:  01/11/19 0115    Order Status:  Completed Specimen:  Urine, Catheterized Updated:  01/12/19 0418     Urine Culture, Routine No growth    IV catheter culture [[id number]]     Order Status:  No result Specimen:  Catheter Tip, NOS           I have reviewed all pertinent labs within the past 24 hours.    Estimated Creatinine Clearance: 52.2 mL/min (based on SCr of 1.4 mg/dL).    Diagnostic Results:  I have reviewed all pertinent imaging results/findings within the past 24 hours.

## 2019-01-15 NOTE — ASSESSMENT & PLAN NOTE
This is a 79yo gentleman with long-standing history of ICM with EF 35%, a 20-year history of ICD with upgrade to CRT-D in 2014 who presents for device extraction due to RV lead vegetation ~0.5cm in the setting of VRE bacteremia with a likely initial GI source. Likewise has a CardioMeMMS device in place. Is not pacemaker dependent. ID believes the primary source is the vegetation, he is s/p extraction on 01/09, RV lead tip grew E. Faecalis (resistant to tetracyclines). Recovery complicated by slow junctional escape rhythm responding to dopamine, no response to DCCV. Now with intermittent aberrant conduction of AF, possibly due to AV manolo stimulation by inotrope/chronotrope medications.    - wean inotropes/chronotropes as tolerated, target HR >60  - discussing timing of new device implantation with ID  - Continue coumadin with heparin bridge, coumadin for easy reversal in case device needs to be placed urgently  - volume/HF management as per primary team

## 2019-01-15 NOTE — PT/OT/SLP PROGRESS
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 Found completing lunch tray with wife at bedside. NSG, wife & pt report pt tolerating current diet.     Pain/Comfort:  · Pain Rating 1: 0/10  · Pain Rating Post-Intervention 2: 0/10    Objective:     Has the patient been evaluated by SLP for swallowing?   Yes  Keep patient NPO? No   Current Respiratory Status: room air      Pt edentulous. Pt & wife report pt tolerates regular solid consistencies without teeth/dentures.   Pt tolerated thin via water bottle sips x2 & via straw x2 & solid trial (1/4 rosamaria cracker x2) without s/s aspiration. Prolonged mastication yet adequate oral transit & clearance given time. SLP reviewed recs & precautions, both pt & family verbalized understanding.    Assessment:     Jerry Solis is a 78 y.o. male with oropharyngeal swallow appears at baseline/WFL. No further ST service warranted at this time for swallowing.     Goals:   Multidisciplinary Problems     SLP Goals     Not on file          Multidisciplinary Problems (Resolved)        Problem: SLP Goal    Goal Priority Disciplines Outcome   SLP Goal   (Resolved)     SLP Outcome(s) achieved   Description:  Speech Language Pathology Goals  Goals expected to be met by 1/18/2019  1. Pt will tolerate a dental soft diet with thin liquids w/o overt S/S aspiration MET  2. Educate Pt and family on S/S aspiration and aspiration precautions MET                        Plan:     · Patient to be seen:  3 x/week   · Plan of Care expires:  02/10/19  · Plan of Care reviewed with:  patient, spouse   · SLP Follow-Up:  No       Discharge recommendations:  (no ST f/u)     Time Tracking:     SLP Treatment Date:   01/15/19  Speech Start Time:  1303  Speech Stop Time:  1312     Speech Total Time (min):  9 min    Billable Minutes: Treatment Swallowing Dysfunction 9    Diana Jj CCC-SLP  01/15/2019

## 2019-01-15 NOTE — PROGRESS NOTES
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 Does have some palpitations, but otherwise feels OK except he hasn't been OOB yet. Weaning Levo to keep . Plan is for CRT-D replacement once off Levophed for 48 hours depending on stability, culture data and ongoing assessment with ID. CVP 10 and sVO2 62.    Continuous Infusions:   furosemide (LASIX) 2 mg/mL infusion (non-titrating) 10 mg/hr (01/15/19 1115)    heparin (porcine) in D5W 10 Units/kg/hr (01/15/19 1100)    norepinephrine bitartrate-D5W 0.05 mcg/kg/min (01/15/19 1100)     Scheduled Meds:   acetaminophen  1,000 mg Oral TID    ampicillin (OMNIPEN) 4 g in  mL EXTENDED INFUSION  4,000 mg Intravenous Q8H    artificial tears  1 drop Both Eyes QID    cefTRIAXone (ROCEPHIN) IVPB  2 g Intravenous Q12H    clopidogrel  75 mg Oral Daily    insulin aspart U-100  6 Units Subcutaneous TIDWM    insulin detemir U-100  13 Units Subcutaneous QHS    levothyroxine  25 mcg Oral Before breakfast    magnesium oxide  800 mg Oral BID    polyethylene glycol  17 g Oral Daily    rosuvastatin  20 mg Oral QHS    senna-docusate 8.6-50 mg  2 tablet Oral BID    tamsulosin  0.4 mg Oral Daily    warfarin  5 mg Oral Daily     PRN Meds:albuterol-ipratropium, dextrose 50%, dextrose 50%, glucagon (human recombinant), glucose, glucose, insulin aspart U-100, magnesium sulfate IVPB, magnesium sulfate IVPB, ondansetron, promethazine (PHENERGAN) IVPB, ramelteon, sodium chloride 0.9%, sodium chloride 0.9%, traMADol    Review of patient's allergies indicates:   Allergen Reactions     Adhesive Other (See Comments)     blisters    Codeine Other (See Comments)     Blurred vision    Latex Hives    Ace inhibitors     Lipitor [atorvastatin] Other (See Comments)     Muscle spasms     Objective:     Vital Signs (Most Recent):  Temp: 96.7 °F (35.9 °C) (01/15/19 1100)  Pulse: 83 (01/15/19 1100)  Resp: 19 (01/15/19 1100)  BP: 114/63 (01/15/19 0745)  SpO2: 100 % (01/15/19 1100) Vital Signs (24h Range):  Temp:  [96.7 °F (35.9 °C)-98.8 °F (37.1 °C)] 96.7 °F (35.9 °C)  Pulse:  [] 83  Resp:  [12-27] 19  SpO2:  [95 %-100 %] 100 %  BP: ()/(52-77) 114/63  Arterial Line BP: ()/(36-59) 124/50     Patient Vitals for the past 72 hrs (Last 3 readings):   Weight   01/14/19 0600 83.5 kg (184 lb 1.4 oz)   01/13/19 0300 84.9 kg (187 lb 2.7 oz)     Body mass index is 23.64 kg/m².      Intake/Output Summary (Last 24 hours) at 1/15/2019 1123  Last data filed at 1/15/2019 1100  Gross per 24 hour   Intake 2161.89 ml   Output 3941 ml   Net -1779.11 ml       Hemodynamic Parameters:       Telemetry: A trail fib with variety of aberrant conduction    Physical Exam   Constitutional: He is oriented to person, place, and time. He appears well-developed and well-nourished.   HENT:   Head: Normocephalic and atraumatic.   Eyes: Conjunctivae and EOM are normal.   Neck: Normal range of motion. Neck supple. No JVD present. No thyromegaly present.   Cardiovascular: Regular rhythm.   Irregular, bradycardic   Pulmonary/Chest: Effort normal and breath sounds normal.   Abdominal: Soft. Bowel sounds are normal.   Musculoskeletal: Normal range of motion.   Trace HEATHER   Neurological: He is alert and oriented to person, place, and time.   Skin: Skin is warm and dry. Capillary refill takes 2 to 3 seconds.   Psychiatric: He has a normal mood and affect. His behavior is normal. Judgment and thought content normal.       Significant Labs:  CBC:  Recent Labs   Lab 01/14/19  0321 01/14/19  1224 01/15/19  0330   WBC 9.50 10.27 7.98    RBC 3.22* 3.19* 3.05*   HGB 9.2* 8.9* 8.7*   HCT 29.3* 28.7* 28.0*   * 104* 117*   MCV 91 90 92   MCH 28.6 27.9 28.5   MCHC 31.4* 31.0* 31.1*     BNP:  Recent Labs   Lab 01/10/19  1141   BNP 2,076*     CMP:  Recent Labs   Lab 01/14/19  0321 01/14/19  1224  01/15/19  0058 01/15/19  0409 01/15/19  0915   *  165*  165*   < > 131*   < > 209* 183* 153*   CALCIUM 8.7  8.7  8.7   < > 9.0   < > 8.9 8.6* 8.7   ALBUMIN 2.2*  --  2.3*  --   --  2.2*  --    PROT 6.5  --  6.5  --   --  6.1  --      140  140   < > 141   < > 137 139 140   K 4.6  4.6  4.6   < > 3.9   < > 4.3 4.3 4.1   CO2 29  29  29   < > 32*   < > 32* 34* 36*     101  101   < > 100   < > 97 97 96   BUN 40*  40*  40*   < > 38*   < > 35* 35* 35*   CREATININE 1.4  1.4  1.4   < > 1.4   < > 1.3 1.4 1.4   ALKPHOS 162*  --  155*  --   --  155*  --    ALT 16  --  17  --   --  15  --    AST 28  --  22  --   --  21  --    BILITOT 0.6  --  0.7  --   --  0.7  --     < > = values in this interval not displayed.      Coagulation:   Recent Labs   Lab 01/13/19 0323 01/14/19  0321 01/14/19  1224 01/14/19  1808 01/15/19  0409   INR 1.2 1.2  --   --  1.5*   APTT 49.2* 55.0*  55.0* 44.2* 46.6* 48.6*     LDH:  No results for input(s): LDH in the last 72 hours.  Microbiology:  Microbiology Results (last 7 days)     Procedure Component Value Units Date/Time    Blood culture [[id number]] Collected:  01/15/19 0320    Order Status:  Sent Specimen:  Blood from Peripheral, Antecubital, Left Updated:  01/15/19 0605    Blood culture [[id number]] Collected:  01/15/19 0328    Order Status:  Sent Specimen:  Blood from Peripheral, Forearm, Left Updated:  01/15/19 0600    Blood culture [[id number]] Collected:  01/09/19 1657    Order Status:  Completed Specimen:  Blood from Peripheral, Hand, Right Updated:  01/14/19 2012     Blood Culture, Routine No growth after 5 days.    Blood culture [[id number]] Collected:  01/10/19 1401    Order Status:  Completed  Specimen:  Blood from Peripheral, Hand, Left Updated:  01/14/19 1612     Blood Culture, Routine No Growth to date     Blood Culture, Routine No Growth to date     Blood Culture, Routine No Growth to date     Blood Culture, Routine No Growth to date     Blood Culture, Routine No Growth to date    Blood culture [[id number]] Collected:  01/10/19 1401    Order Status:  Completed Specimen:  Blood from Peripheral, Forearm, Left Updated:  01/14/19 1612     Blood Culture, Routine No Growth to date     Blood Culture, Routine No Growth to date     Blood Culture, Routine No Growth to date     Blood Culture, Routine No Growth to date     Blood Culture, Routine No Growth to date    Culture, Anaerobe [[id number]] Collected:  01/09/19 1402    Order Status:  Completed Specimen:  Other from Chest, Left Updated:  01/14/19 1047     Anaerobic Culture No anaerobes isolated    Narrative:       #4 RV lead tip    Culture, Anaerobe [[id number]] Collected:  01/09/19 1322    Order Status:  Completed Specimen:  Other from Chest, Left Updated:  01/14/19 1047     Anaerobic Culture Culture in progress    Narrative:       #3 LV lead tip    Culture, Anaerobe [[id number]] Collected:  01/09/19 1314    Order Status:  Completed Specimen:  Other from Chest, Left Updated:  01/14/19 1046     Anaerobic Culture Culture in progress    Narrative:       #2 RA Lead tip    Culture, Anaerobe [[id number]] Collected:  01/09/19 1124    Order Status:  Completed Specimen:  Tissue from Chest, Left Updated:  01/14/19 1046     Anaerobic Culture Culture in progress    Narrative:       ICD pocket tissue    Aerobic culture [[id number]]  (Susceptibility) Collected:  01/09/19 1402    Order Status:  Completed Specimen:  Other from Chest, Left Updated:  01/14/19 1003     Aerobic Bacterial Culture --     ENTEROCOCCUS FAECALIS  Rare      Narrative:       #4 RV lead tip    Aerobic culture [[id number]] Collected:  01/09/19 1322    Order Status:  Completed Specimen:  Other from Chest, Left  Updated:  01/12/19 1023     Aerobic Bacterial Culture No growth    Narrative:       #3 LV lead tip    Aerobic culture [[id number]] Collected:  01/09/19 1124    Order Status:  Completed Specimen:  Tissue from Chest, Left Updated:  01/12/19 1023     Aerobic Bacterial Culture No growth    Narrative:       ICD pocket tissue    Aerobic culture [[id number]] Collected:  01/09/19 1314    Order Status:  Completed Specimen:  Other from Chest, Left Updated:  01/12/19 1023     Aerobic Bacterial Culture No growth    Narrative:       #2 RA Lead tip    Blood culture (site 1) [[id number]] Collected:  01/07/19 0154    Order Status:  Completed Specimen:  Blood Updated:  01/12/19 0812     Blood Culture, Routine No growth after 5 days.    Narrative:       Site # 1, aerobic and anaerobic    Blood culture (site 2) [[id number]] Collected:  01/07/19 0154    Order Status:  Completed Specimen:  Blood Updated:  01/12/19 0812     Blood Culture, Routine No growth after 5 days.    Narrative:       Site # 2, aerobic only    Urine culture [[id number]] Collected:  01/11/19 0115    Order Status:  Completed Specimen:  Urine, Catheterized Updated:  01/12/19 0418     Urine Culture, Routine No growth    IV catheter culture [[id number]]     Order Status:  No result Specimen:  Catheter Tip, NOS           I have reviewed all pertinent labs within the past 24 hours.    Estimated Creatinine Clearance: 52.2 mL/min (based on SCr of 1.4 mg/dL).    Diagnostic Results:  I have reviewed all pertinent imaging results/findings within the past 24 hours.    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 Discussed with Dr. Russo. Blood cultures repeated  -Appreciate EP's help. 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 Patient had 2 vegetations on RV lead which is most likely source of  bacteremia. Lead tip and ICD pocket cultured with RV lead +ve for Enteroccocus. Blood cultures are negative here thus far.  -Continue Ampicillin and Ceftriaxone per ID recs Will treat for 6 weeks post extraction.  Estimated end date 2/20/19.    -If urgently necessary from a cardiac standpoint, it is okay to proceed with implanting new device as repeat blood cultures taken post extraction remain negative X 72 hours.  Suspect known vegetations on RV leads, now removed, were primary source of infection; cardioMems likely not infected  -Per EP; ok'd to start heparin and Coumadin on 1/12.  No Eliquis for now as he may require implant of new device  -Will need Life Vest prior to discharge  -Appreciate GI's help. Plan for outpatient EGD and C-scope for eval of anemia and Enterococcus bacteremia       Acute on chronic combined systolic and diastolic CHF, NYHA class 3    -ICM  -Last full 2D Echo 1/7/19: LVEF 20%, LVEDD 7.24 cm. Limited bedside echo done after ICD explant showed no pericardial effusion  -CVP 10. Continue diuresing with Lasix gtt at 10 mg/hr  -sVO2 62  -GDMT: Dig and Toprol D/C'd 2/2 junctional rhythm. Entresto D/C'd 2/2 transient hypotension during/after ICD explant and TREY  -Self declined for LVAD after w/u last April 2018       Atrial fibrillation    -S/P Unsuccessful RICARDO guided/DCCV 1/14. Remains in A fib with variety of aberrant conduction  -EP considering CRT-D replacement once off Levo for 48 hours depending on stability, culture data and ongoing discussion with ID  -May resume Dopamine if bradycardia worsens  -CHADS VASc 6  -Holding eliquis s/p ICD explant 1/9. Per EP, anticoagulation resumed 1/12  -Cardiac monitoring     Type 2 diabetes mellitus with complication    -A1c 8.1  -Target -180 while hospitalized  -detemir 13, aspart 6 with sliding scale  -Appreciate Endocrine's help with management     Hypotension    - Dopamine D/C'd after unsuccessful DCCV 1/14. Wean Levo keeping      Dysuria     - Resolved  - UA showed many bacteria - culture -ve         Dysphagia    - Consult SLP     Nausea & vomiting    - Resolved  - Antiemetics prn  - Appreciate speech therapy consult.  Advanced diet     Junctional bradycardia    - Se A fib     Endocarditis    - See ICD lead infection     Bacteremia    -see above     TREY (acute kidney injury)    - Appreciate Nephrology's help  - Was oliguric and creatinine jumped to 4.1 likely 2/2 ATN/hypotension following ICD explant.   - CVP 10, UOP and creatinine improving. (was 1.2 on admit)  - Continue Lasix gtt, wean Levo to keep   - Hold Entresto for now       Uninterrupted Critical Care/Counseling Time (not including procedures): [place] [id number]  Heart Transplant  [facility]

## 2019-01-15 NOTE — PROGRESS NOTES
Md present at bedside for assessment. Ordered to hold lasix gtt for one hour due to pressor requirements and recheck CVP. Current CVP 9.     2200:CVP rechecked and read 11 with slight drop in urine output. Pt denies complaints of SOB, pain. Dr. Casanova called and notified of recheck. Ordered to hold Lasix gtt for another hour and reassess CVP at 2300. Will notify of results for further orders.

## 2019-01-15 NOTE — ASSESSMENT & PLAN NOTE
-A1c 8.1  -Target -180 while hospitalized  -detemir 13, aspart 6 with sliding scale  -Appreciate Endocrine's help with management

## 2019-01-16 NOTE — CARE UPDATE
BG goal 140-180. BG at goal with scheduled insulin; not requiring correction scale.     Continue Levemir to 13 units q HS  Continue Novolog 6 units with meals  BG monitoring AC/HS and low dose correction scale.       ** Please call Endocrine for any BG related issues **

## 2019-01-16 NOTE — PLAN OF CARE
Problem: Adult Inpatient Plan of Care  Goal: Plan of Care Review  Outcome: Ongoing (interventions implemented as appropriate)  Pt continues to experience irregular A-fib with junctional beat rhythm indeterminate with EKG. EP following. BP stable on 0.02mcgs/kg/min of Levo for SBP>100 with no significant increase with ectopy. HR variable from 50s-90s. CVP 8-9. SVO2 58. Afebrile. Oxygen via nasal cannula applied for comfort while pt asleep per request. Oxygen saturation % on room air and nasal cannula. Diminished lung sounds in bases. Neuro intact with exception of L pupil due to history of eye surgery per pt. Moves all extremities. Voiding adequate urine per gtz. Net output of ~1L. No bm but passing large amount of gas. Heparin, ABX, levo infusing per MAR. POC reviewed with pt and wife, all needs and concerns addressed. Plan for pacemaker placement 48 hours off of levo.

## 2019-01-16 NOTE — PLAN OF CARE
Received call from EP regarding cardio Mems device in setting of enterococcus faecalis bacteremia.   Blood cultures were positive from OSH on 1/2. Cleared quickly. RV tip culture also grew E. Faecalis which further proves the device as likely source.     EP concerned about Cardio Mems device and would like to get a nuclear medicine scan. Have asked ID to order it. Will order indium scan. If negative, no need for oral antibiotic suppression or surveillance blood cultures following 6 weeks of IV therapy. If positive, re-consult ID and would give patient oral antibiotic suppression following his 6 weeks of therapy.    Call back with questions.  Discussed case with ID staff, Dr. Gómez and Dr. Baumgarten.  Thanks, Radha Dean PA-C  Pager: [phone]

## 2019-01-16 NOTE — PLAN OF CARE
Problem: Adult Inpatient Plan of Care  Goal: Plan of Care Review  Outcome: Ongoing (interventions implemented as appropriate)  Pt continues to experience irregular A-fib with junctional beat rhythm indeterminate with EKG. MD aware throughout shift of pt's ectopy, see  Manual note. BP stable on 0.08mcgs/kg/min of Levo for SBP>100 with no significant increase with ectopy. HR variable from 60s-110s. CVP 10-11. SVO2 62. Afebrile. Oxygen via nasal cannula applied for comfort while pt asleep per request. Oxygen saturation % on room air and nasal cannula. Diminished lung sounds in bases. Neuro intact with exception of L pupil due to history of eye surgery per pt. Moves all extremities. Voiding adequate urine per gtz. No bm but passing large amount of gas. Heparin, ABX, levo infusing per MAR. POC reviewed with pt and wife, all needs and concerns addressed.

## 2019-01-16 NOTE — PROGRESS NOTES
K: 3.9. Cr: 1.4. Ma.9. MD Carla updated. 400mg Magnesium oxide tablet x1. 40mEq Potassium chloride tablet x1. readback x2   Decreased functional mobility secondary to decreased strength, endurance and balance

## 2019-01-16 NOTE — ASSESSMENT & PLAN NOTE
-ICM  -Last full 2D Echo 1/7/19: LVEF 20%, LVEDD 7.24 cm. Limited bedside echo done after ICD explant showed no pericardial effusion  -CVP 8. Continue diuresing with Lasix gtt at 10 mg/hr  -sVO2 58  -GDMT: Dig and Toprol D/C'd 2/2 junctional rhythm. Entresto D/C'd 2/2 transient hypotension during/after ICD explant and TREY  -Self declined for LVAD after w/u last April 2018

## 2019-01-16 NOTE — ASSESSMENT & PLAN NOTE
- Appreciate Nephrology's help  - Was oliguric and creatinine jumped to 4.1 likely 2/2 ATN/hypotension following ICD explant.   - CVP 8, UOP and creatinine improving. (was 1.2 on admit)  - Continue Lasix gtt, wean Levo to keep   - Hold Entresto for now

## 2019-01-16 NOTE — SUBJECTIVE & OBJECTIVE
Interval History: Stable on lasix gtt and levophed overnight.    Tele: Junctional rhythm with HR 65-80, with intermittent AF    ROS  Objective:     Vital Signs (Most Recent):  Temp: 98.3 °F (36.8 °C) (01/16/19 0300)  Pulse: 61 (01/16/19 1000)  Resp: (!) 21 (01/16/19 1000)  BP: 114/63 (01/15/19 0745)  SpO2: 100 % (01/16/19 1000) Vital Signs (24h Range):  Temp:  [97.6 °F (36.4 °C)-98.3 °F (36.8 °C)] 98.3 °F (36.8 °C)  Pulse:  [] 61  Resp:  [12-26] 21  SpO2:  [97 %-100 %] 100 %  Arterial Line BP: ()/(35-53) 105/36     Weight: 83.5 kg (184 lb 1.4 oz)  Body mass index is 23.64 kg/m².     SpO2: 100 %  O2 Device (Oxygen Therapy): nasal cannula    Physical Exam    Significant Labs:   EP:   Recent Labs   Lab 01/14/19  1224  01/15/19  0330 01/15/19  0409  01/15/19  2227 01/16/19  0310 01/16/19  0910      < >  --  139   < > 140 142  142 141   K 3.9   < >  --  4.3   < > 4.1 4.1  4.1 4.0      < >  --  97   < > 98 98  98 97   CO2 32*   < >  --  34*   < > 35* 33*  33* 35*   *   < >  --  183*   < > 180* 164*  164* 245*   BUN 38*   < >  --  35*   < > 34* 33*  33* 33*   CREATININE 1.4   < >  --  1.4   < > 1.4 1.4  1.4 1.4   CALCIUM 9.0   < >  --  8.6*   < > 8.7 8.5*  8.5* 8.7   PROT 6.5  --   --  6.1  --   --  6.2  --    ALBUMIN 2.3*  --   --  2.2*  --   --  2.2*  --    BILITOT 0.7  --   --  0.7  --   --  0.7  --    ALKPHOS 155*  --   --  155*  --   --  174*  --    AST 22  --   --  21  --   --  26  --    ALT 17  --   --  15  --   --  14  --    ANIONGAP 9   < >  --  8   < > 7* 11  11 9   ESTGFRAFRICA 56.0*   < >  --  56.0*   < > 56.0* 56.0*  56.0* 56.0*   EGFRNONAA 48.5*   < >  --  48.5*   < > 48.5* 48.5*  48.5* 48.5*   WBC 10.27  --  7.98  --   --   --  7.20  --    HGB 8.9*  --  8.7*  --   --   --  8.4*  --    HCT 28.7*  --  28.0*  --   --   --  27.3*  --    *  --  117*  --   --   --  104*  --    INR  --   --   --  1.5*  --   --  2.4*  --     < > = values in this interval not  displayed.

## 2019-01-16 NOTE — SUBJECTIVE & OBJECTIVE
Interval History: Patient did well last night and reports no new complaints.  This am; HR dropping into the 40's and patient reports feeling dizzy.  Weaning Levophed for SBP >100.  Patient reports had two bowel movements yesterday.  CVP: 8, SVO2: 58, CO: 6.33, CI: 3.03 and SVR: 632    Lines:  Right IJ 1/10/19    Continuous Infusions:   furosemide (LASIX) 2 mg/mL infusion (non-titrating) 10 mg/hr (01/16/19 1200)    norepinephrine bitartrate-D5W Stopped (01/16/19 1043)     Scheduled Meds:   acetaminophen  1,000 mg Oral TID    ampicillin (OMNIPEN) 4 g in  mL EXTENDED INFUSION  4,000 mg Intravenous Q8H    artificial tears  1 drop Both Eyes QID    cefTRIAXone (ROCEPHIN) IVPB  2 g Intravenous Q12H    clopidogrel  75 mg Oral Daily    insulin aspart U-100  6 Units Subcutaneous TIDWM    insulin detemir U-100  13 Units Subcutaneous QHS    levothyroxine  25 mcg Oral Before breakfast    magnesium oxide  800 mg Oral BID    polyethylene glycol  17 g Oral Daily    rosuvastatin  20 mg Oral QHS    senna-docusate 8.6-50 mg  2 tablet Oral BID    tamsulosin  0.4 mg Oral Daily    warfarin  2.5 mg Oral Once     PRN Meds:albuterol-ipratropium, dextrose 50%, dextrose 50%, glucagon (human recombinant), glucose, glucose, insulin aspart U-100, magnesium sulfate IVPB, magnesium sulfate IVPB, ondansetron, promethazine (PHENERGAN) IVPB, ramelteon, sodium chloride 0.9%, sodium chloride 0.9%, traMADol    Review of patient's allergies indicates:   Allergen Reactions    Adhesive Other (See Comments)     blisters    Codeine Other (See Comments)     Blurred vision    Latex Hives    Ace inhibitors     Lipitor [atorvastatin] Other (See Comments)     Muscle spasms     Objective:     Vital Signs (Most Recent):  Temp: 98.3 °F (36.8 °C) (01/16/19 0300)  Pulse: (!) 48 (01/16/19 1130)  Resp: (!) 23 (01/16/19 1130)  BP: 114/63 (01/15/19 0745)  SpO2: 100 % (01/16/19 1130) Vital Signs (24h Range):  Temp:  [97.6 °F (36.4 °C)-98.3 °F  (36.8 °C)] 98.3 °F (36.8 °C)  Pulse:  [] 48  Resp:  [12-26] 23  SpO2:  [97 %-100 %] 100 %  Arterial Line BP: ()/(32-53) 96/35     Patient Vitals for the past 72 hrs (Last 3 readings):   Weight   01/14/19 0600 83.5 kg (184 lb 1.4 oz)     Body mass index is 23.64 kg/m².      Intake/Output Summary (Last 24 hours) at 1/16/2019 1203  Last data filed at 1/16/2019 1200  Gross per 24 hour   Intake 2004.52 ml   Output 2810 ml   Net -805.48 ml     Physical Exam  Constitutional: Sitting in bed NAD; wife at bedside.  Patient is hard of hearing.   Neck: JVD elevation to mid neck    Cardiovascular:Irregularly irregular rhythm, bradycardic   and intact distal pulses. Exam reveals no gallop and no friction rub. No murmur heard. Pulmonary/Chest: Effort normal. No respiratory distress. He has wheezes. He has rales.   Abdominal: Soft. Bowel sounds are normal. He exhibits no distension. There is no tenderness.   Musculoskeletal: He exhibits edema (1+ bilateral lower extremity edema).   Neurological: He is alert and oriented to person, place, and time.   Skin: He is not diaphoretic.     Significant Labs:  CBC:  Recent Labs   Lab 01/14/19  1224 01/15/19  0330 01/16/19  0310   WBC 10.27 7.98 7.20   RBC 3.19* 3.05* 2.98*   HGB 8.9* 8.7* 8.4*   HCT 28.7* 28.0* 27.3*   * 117* 104*   MCV 90 92 92   MCH 27.9 28.5 28.2   MCHC 31.0* 31.1* 30.8*     BNP:  Recent Labs   Lab 01/10/19  1141   BNP 2,076*     CMP:  Recent Labs   Lab 01/14/19  1224  01/15/19  0409  01/15/19  2227 01/16/19  0310 01/16/19  0910   *   < > 183*   < > 180* 164*  164* 245*   CALCIUM 9.0   < > 8.6*   < > 8.7 8.5*  8.5* 8.7   ALBUMIN 2.3*  --  2.2*  --   --  2.2*  --    PROT 6.5  --  6.1  --   --  6.2  --       < > 139   < > 140 142  142 141   K 3.9   < > 4.3   < > 4.1 4.1  4.1 4.0   CO2 32*   < > 34*   < > 35* 33*  33* 35*      < > 97   < > 98 98  98 97   BUN 38*   < > 35*   < > 34* 33*  33* 33*   CREATININE 1.4   < > 1.4   < > 1.4  1.4  1.4 1.4   ALKPHOS 155*  --  155*  --   --  174*  --    ALT 17  --  15  --   --  14  --    AST 22  --  21  --   --  26  --    BILITOT 0.7  --  0.7  --   --  0.7  --     < > = values in this interval not displayed.      Coagulation:   Recent Labs   Lab 01/14/19  0321  01/14/19  1808 01/15/19  0409 01/16/19  0310   INR 1.2  --   --  1.5* 2.4*   APTT 55.0*  55.0*   < > 46.6* 48.6* 52.8*    < > = values in this interval not displayed.     LDH:  No results for input(s): LDH in the last 72 hours.  Microbiology:  Microbiology Results (last 7 days)     Procedure Component Value Units Date/Time    Culture, Anaerobe [[id number]] Collected:  01/09/19 1124    Order Status:  Completed Specimen:  Tissue from Chest, Left Updated:  01/16/19 0838     Anaerobic Culture No anaerobes isolated    Narrative:       ICD pocket tissue    Culture, Anaerobe [[id number]] Collected:  01/09/19 1314    Order Status:  Completed Specimen:  Other from Chest, Left Updated:  01/16/19 0838     Anaerobic Culture No anaerobes isolated    Narrative:       #2 RA Lead tip    Culture, Anaerobe [[id number]] Collected:  01/09/19 1322    Order Status:  Completed Specimen:  Other from Chest, Left Updated:  01/16/19 0838     Anaerobic Culture No anaerobes isolated    Narrative:       #3 LV lead tip    Blood culture [[id number]] Collected:  01/15/19 0320    Order Status:  Completed Specimen:  Blood from Peripheral, Antecubital, Left Updated:  01/16/19 0812     Blood Culture, Routine No Growth to date     Blood Culture, Routine No Growth to date    Blood culture [[id number]] Collected:  01/15/19 0328    Order Status:  Completed Specimen:  Blood from Peripheral, Forearm, Left Updated:  01/16/19 0812     Blood Culture, Routine No Growth to date     Blood Culture, Routine No Growth to date    Blood culture [[id number]] Collected:  01/10/19 1401    Order Status:  Completed Specimen:  Blood from Peripheral, Hand, Left Updated:  01/15/19 1612     Blood Culture, Routine  No growth after 5 days.    Blood culture [[id number]] Collected:  01/10/19 1401    Order Status:  Completed Specimen:  Blood from Peripheral, Forearm, Left Updated:  01/15/19 1612     Blood Culture, Routine No growth after 5 days.    Blood culture [[id number]] Collected:  01/09/19 1657    Order Status:  Completed Specimen:  Blood from Peripheral, Hand, Right Updated:  01/14/19 2012     Blood Culture, Routine No growth after 5 days.    Culture, Anaerobe [[id number]] Collected:  01/09/19 1402    Order Status:  Completed Specimen:  Other from Chest, Left Updated:  01/14/19 1047     Anaerobic Culture No anaerobes isolated    Narrative:       #4 RV lead tip    Aerobic culture [[id number]]  (Susceptibility) Collected:  01/09/19 1402    Order Status:  Completed Specimen:  Other from Chest, Left Updated:  01/14/19 1003     Aerobic Bacterial Culture --     ENTEROCOCCUS FAECALIS  Rare      Narrative:       #4 RV lead tip    Aerobic culture [[id number]] Collected:  01/09/19 1322    Order Status:  Completed Specimen:  Other from Chest, Left Updated:  01/12/19 1023     Aerobic Bacterial Culture No growth    Narrative:       #3 LV lead tip    Aerobic culture [[id number]] Collected:  01/09/19 1124    Order Status:  Completed Specimen:  Tissue from Chest, Left Updated:  01/12/19 1023     Aerobic Bacterial Culture No growth    Narrative:       ICD pocket tissue    Aerobic culture [[id number]] Collected:  01/09/19 1314    Order Status:  Completed Specimen:  Other from Chest, Left Updated:  01/12/19 1023     Aerobic Bacterial Culture No growth    Narrative:       #2 RA Lead tip    Blood culture (site 1) [[id number]] Collected:  01/07/19 0154    Order Status:  Completed Specimen:  Blood Updated:  01/12/19 0812     Blood Culture, Routine No growth after 5 days.    Narrative:       Site # 1, aerobic and anaerobic    Blood culture (site 2) [[id number]] Collected:  01/07/19 0154    Order Status:  Completed Specimen:  Blood Updated:  01/12/19 0812      Blood Culture, Routine No growth after 5 days.    Narrative:       Site # 2, aerobic only    Urine culture [[id number]] Collected:  01/11/19 0115    Order Status:  Completed Specimen:  Urine, Catheterized Updated:  01/12/19 0418     Urine Culture, Routine No growth          I have reviewed all pertinent labs within the past 24 hours.    Estimated Creatinine Clearance: 52.2 mL/min (based on SCr of 1.4 mg/dL).    Diagnostic Results:  I have reviewed all pertinent imaging results/findings within the past 24 hours.

## 2019-01-16 NOTE — ASSESSMENT & PLAN NOTE
-S/P Unsuccessful RICARDO guided/DCCV 1/14. Remains in A fib with variety of aberrant conduction  -EP considering CRT-D replacement once off Levo for 48 hours depending on stability, culture data and ongoing discussion with ID  -Will resume Dopamine today at low dose as HR now intermittently in the 40's and patient is symptomatic.   -CHADS VASc 6  -Holding eliquis s/p ICD explant 1/9. Per EP, anticoagulation resumed 1/12  -Cardiac monitoring

## 2019-01-16 NOTE — PLAN OF CARE
Problem: Adult Inpatient Plan of Care  Goal: Patient-Specific Goal (Individualization)  Outcome: Ongoing (interventions implemented as appropriate)  POC reviewed with patient and spouse at bedside. Monitor free from alarms since ~1000. No change in BP & asymptomatic during events. Titrating Levophed for SBP >=100, electrolytes replaced prn. Lasix infusing; CVP: 9,9,&7. Afebrile; Amoxicillin infusing, Rocephin IVPB administered as order; PPM site CDI. 2 BM this shift. All medications thoroughly explained. No skin breakdown noted on assessment. No further questions at this time. WCTM

## 2019-01-16 NOTE — PROGRESS NOTES
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    Tele: Junctional rhythm with HR 65-80, with intermittent AF    ROS  Objective:     Vital Signs (Most Recent):  Temp: 98.3 °F (36.8 °C) (01/16/19 0300)  Pulse: 61 (01/16/19 1000)  Resp: (!) 21 (01/16/19 1000)  BP: 114/63 (01/15/19 0745)  SpO2: 100 % (01/16/19 1000) Vital Signs (24h Range):  Temp:  [97.6 °F (36.4 °C)-98.3 °F (36.8 °C)] 98.3 °F (36.8 °C)  Pulse:  [] 61  Resp:  [12-26] 21  SpO2:  [97 %-100 %] 100 %  Arterial Line BP: ()/(35-53) 105/36     Weight: 83.5 kg (184 lb 1.4 oz)  Body mass index is 23.64 kg/m².     SpO2: 100 %  O2 Device (Oxygen Therapy): nasal cannula    Physical Exam    Significant Labs:   EP:   Recent Labs   Lab 01/14/19  1224  01/15/19  0330 01/15/19  0409  01/15/19  2227 01/16/19  0310 01/16/19  0910      < >  --  139   < > 140 142  142 141   K 3.9   < >  --  4.3   < > 4.1 4.1  4.1 4.0      < >  --  97   < > 98 98  98 97   CO2 32*   < >  --  34*   < > 35* 33*  33* 35*   *   < >  --  183*   < > 180* 164*  164* 245*   BUN 38*   < >  --  35*   < > 34* 33*  33* 33*   CREATININE 1.4   < >  --  1.4   < > 1.4 1.4  1.4 1.4   CALCIUM 9.0   < >  --  8.6*   < > 8.7 8.5*  8.5* 8.7   PROT 6.5  --   --  6.1  --   --  6.2  --    ALBUMIN 2.3*  --   --  2.2*  --   --  2.2*  --    BILITOT 0.7  --   --  0.7  --   --  0.7  --    ALKPHOS 155*  --   --  155*  --   --  174*  --    AST 22  --   --  21  --   --  26  --    ALT 17  --   --  15  --   --  14  --    ANIONGAP 9   < >  --  8   < > 7* 11  11 9   ESTGFRAFRICA 56.0*   < >  --  56.0*   < > 56.0* 56.0*  56.0* 56.0*   EGFRNONAA 48.5*   < >  --  48.5*   <  > 48.5* 48.5*  48.5* 48.5*   WBC 10.27  --  7.98  --   --   --  7.20  --    HGB 8.9*  --  8.7*  --   --   --  8.4*  --    HCT 28.7*  --  28.0*  --   --   --  27.3*  --    *  --  117*  --   --   --  104*  --    INR  --   --   --  1.5*  --   --  2.4*  --     < > = values in this interval not displayed.           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 Likewise has a CardioMeMMS device in place. Is not pacemaker dependent. ID believes the primary source is the vegetation, he is s/p extraction on 01/09, RV lead tip grew E. Faecalis (resistant to tetracyclines). Recovery complicated by slow junctional escape rhythm responding to dopamine, no response to DCCV. Now with undetermined atrial rhythm which is either sinus bradycardia with aberrantly conducted PAC or AT, or persistent AF with junctional escape and intermittent aberrant conduction of AF, the differences can be attributed due to AV manolo stimulation by inotrope/chronotrope medications resulting in conduction changes. With weaning of chronotropic medications, he is back in a msotly junctional rhythm    - continue to wean inotropes as tolerated, with goal of stable renal perfusion  - ID states they feel that when needed a new device can be placed, a gallium-scan can help assess whether cardio-MEMS is involved and whether suppression therapy after antibiotic completion is warranted. They are ordering this test.  - Continue coumadin with target INR 2-3  - volume/HF management as per primary team  - will tentatively plan for new device implantation for next week         Oniel George MD  Cardiac Electrophysiology  [facility]  [place]

## 2019-01-16 NOTE — ASSESSMENT & PLAN NOTE
- Dopamine D/C'd after unsuccessful DCCV 1/14. Wean Levo keeping   -1/16 low dose Dopamine restarted for bradycardia

## 2019-01-16 NOTE — PROGRESS NOTES
"  [facility]  Adult Nutrition  Consult Note    SUMMARY     Recommendations    1. Continue current Mechanical soft diet + Boost Glucose Control ONS (fluid restriction per MD).    - Consider adding Low sodium/Diabetic diet restrictions to current diet order.  2. RD to monitor & follow-up.    Goals: PO intake >50%  Nutrition Goal Status: new  Communication of RD Recs: reviewed with RN    Reason for Assessment    Reason For Assessment: length of stay  Diagnosis: other (see comments)(Infection of automatic implatable cardioverter-defibrillator)  Relevant Medical History: HF, DM  Interdisciplinary Rounds: did not attend    General Information Comments: Pt w/ improving appetite, consuming 100% of most meals, tolerating diet. Boost Glucose Control ONS ordered. Pt reports 12-15# wt loss PTA. Shows no physical signs of malnutrition, but will continue to monitor.  Nutrition Discharge Planning: Adequate PO intake    Nutrition/Diet History    Patient Reported Diet/Restrictions/Preferences: general, diabetic diet  Spiritual, Cultural Beliefs, Cheondoism Practices, Values that Affect Care: no  Factors Affecting Nutritional Intake: None identified at this time    Anthropometrics    Temp: 97.5 °F (36.4 °C)  Height Method: Stated  Height: 6' 2" (188 cm)  Height (inches): 74 in  Weight Method: Bed Scale  Weight: 83.5 kg (184 lb 1.4 oz)  Weight (lb): 184.09 lb  Ideal Body Weight (IBW), Male: 190 lb  % Ideal Body Weight, Male (lb): 96.89 lb  BMI (Calculated): 23.7  BMI Grade: 18.5-24.9 - normal  Usual Body Weight (UBW), k kg  % Usual Body Weight: 94.02  % Weight Change From Usual Weight: -6.18 %    Lab/Procedures/Meds    Pertinent Labs Reviewed: reviewed  Pertinent Labs Comments: BUN 33, GFR 48.5, Gluc 245, A1C 8.1  Pertinent Medications Reviewed: reviewed  Pertinent Medications Comments: Warfarin, Lasix, Levophed, Dopamine    Estimated/Assessed Needs    Weight Used For Calorie Calculations: 83.5 kg (184 lb 1.4 " oz)     Energy Calorie Requirements (kcal): 2043 kcal/d  Energy Need Method: [place]-St Jeor(1.25 PAL)     Protein Requirements:  g/d (1-1.2 g/kg)  Weight Used For Protein Calculations: 83.5 kg (184 lb 1.4 oz)     Estimated Fluid Requirement Method: other (see comments)(Per MD or 1 mL/kcal)    Nutrition Prescription Ordered    Current Diet Order: Mechanical soft - 1500 mL FR  Oral Nutrition Supplement: Boost Glucose Control ONS    Evaluation of Received Nutrient/Fluid Intake    Comments: LBM: 1/15    Tolerance: tolerating    Nutrition Risk    Level of Risk/Frequency of Follow-up: (1x/week)     Assessment and Plan    No nutritional dx at this time.      Monitor and Evaluation    Food and Nutrient Intake: energy intake, food and beverage intake  Food and Nutrient Adminstration: diet order  Physical Activity and Function: nutrition-related ADLs and IADLs  Anthropometric Measurements: weight, weight change  Biochemical Data, Medical Tests and Procedures: lipid profile, inflammatory profile, glucose/endocrine profile, gastrointestinal profile, electrolyte and renal panel  Nutrition-Focused Physical Findings: overall appearance     Nutrition Follow-Up    RD Follow-up?: Yes

## 2019-01-16 NOTE — PROGRESS NOTES
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  This am; HR dropping into the 40's and patient reports feeling dizzy.  Weaning Levophed for SBP >100.  Patient reports had two bowel movements yesterday.  CVP: 8, SVO2: 58, CO: 6.33, CI: 3.03 and SVR: 632    Lines:  Right IJ 1/10/19    Continuous Infusions:   furosemide (LASIX) 2 mg/mL infusion (non-titrating) 10 mg/hr (01/16/19 1200)    norepinephrine bitartrate-D5W Stopped (01/16/19 1043)     Scheduled Meds:   acetaminophen  1,000 mg Oral TID    ampicillin (OMNIPEN) 4 g in  mL EXTENDED INFUSION  4,000 mg Intravenous Q8H    artificial tears  1 drop Both Eyes QID    cefTRIAXone (ROCEPHIN) IVPB  2 g Intravenous Q12H    clopidogrel  75 mg Oral Daily    insulin aspart U-100  6 Units Subcutaneous TIDWM    insulin detemir U-100  13 Units Subcutaneous QHS    levothyroxine  25 mcg Oral Before breakfast    magnesium oxide  800 mg Oral BID    polyethylene glycol  17 g Oral Daily    rosuvastatin  20 mg Oral QHS    senna-docusate 8.6-50 mg  2 tablet Oral BID    tamsulosin  0.4 mg Oral Daily    warfarin  2.5 mg Oral Once     PRN Meds:albuterol-ipratropium, dextrose 50%, dextrose 50%, glucagon (human recombinant), glucose, glucose, insulin aspart U-100, magnesium sulfate IVPB, magnesium sulfate IVPB, ondansetron, promethazine (PHENERGAN) IVPB, ramelteon, sodium chloride 0.9%, sodium chloride 0.9%, traMADol    Review of patient's allergies indicates:   Allergen Reactions    Adhesive Other (See Comments)     blisters    Codeine Other (See Comments)     Blurred vision    Latex  Hives    Ace inhibitors     Lipitor [atorvastatin] Other (See Comments)     Muscle spasms     Objective:     Vital Signs (Most Recent):  Temp: 98.3 °F (36.8 °C) (01/16/19 0300)  Pulse: (!) 48 (01/16/19 1130)  Resp: (!) 23 (01/16/19 1130)  BP: 114/63 (01/15/19 0745)  SpO2: 100 % (01/16/19 1130) Vital Signs (24h Range):  Temp:  [97.6 °F (36.4 °C)-98.3 °F (36.8 °C)] 98.3 °F (36.8 °C)  Pulse:  [] 48  Resp:  [12-26] 23  SpO2:  [97 %-100 %] 100 %  Arterial Line BP: ()/(32-53) 96/35     Patient Vitals for the past 72 hrs (Last 3 readings):   Weight   01/14/19 0600 83.5 kg (184 lb 1.4 oz)     Body mass index is 23.64 kg/m².      Intake/Output Summary (Last 24 hours) at 1/16/2019 1203  Last data filed at 1/16/2019 1200  Gross per 24 hour   Intake 2004.52 ml   Output 2810 ml   Net -805.48 ml     Physical Exam  Constitutional: Sitting in bed NAD; wife at bedside.  Patient is hard of hearing.   Neck: JVD elevation to mid neck    Cardiovascular:Irregularly irregular rhythm, bradycardic   and intact distal pulses. Exam reveals no gallop and no friction rub. No murmur heard. Pulmonary/Chest: Effort normal. No respiratory distress. He has wheezes. He has rales.   Abdominal: Soft. Bowel sounds are normal. He exhibits no distension. There is no tenderness.   Musculoskeletal: He exhibits edema (1+ bilateral lower extremity edema).   Neurological: He is alert and oriented to person, place, and time.   Skin: He is not diaphoretic.     Significant Labs:  CBC:  Recent Labs   Lab 01/14/19  1224 01/15/19  0330 01/16/19  0310   WBC 10.27 7.98 7.20   RBC 3.19* 3.05* 2.98*   HGB 8.9* 8.7* 8.4*   HCT 28.7* 28.0* 27.3*   * 117* 104*   MCV 90 92 92   MCH 27.9 28.5 28.2   MCHC 31.0* 31.1* 30.8*     BNP:  Recent Labs   Lab 01/10/19  1141   BNP 2,076*     CMP:  Recent Labs   Lab 01/14/19  1224  01/15/19  0409  01/15/19  2227 01/16/19  0310 01/16/19  0910   *   < > 183*   < > 180* 164*  164* 245*   CALCIUM 9.0   < > 8.6*    < > 8.7 8.5*  8.5* 8.7   ALBUMIN 2.3*  --  2.2*  --   --  2.2*  --    PROT 6.5  --  6.1  --   --  6.2  --       < > 139   < > 140 142  142 141   K 3.9   < > 4.3   < > 4.1 4.1  4.1 4.0   CO2 32*   < > 34*   < > 35* 33*  33* 35*      < > 97   < > 98 98  98 97   BUN 38*   < > 35*   < > 34* 33*  33* 33*   CREATININE 1.4   < > 1.4   < > 1.4 1.4  1.4 1.4   ALKPHOS 155*  --  155*  --   --  174*  --    ALT 17  --  15  --   --  14  --    AST 22  --  21  --   --  26  --    BILITOT 0.7  --  0.7  --   --  0.7  --     < > = values in this interval not displayed.      Coagulation:   Recent Labs   Lab 01/14/19  0321  01/14/19  1808 01/15/19  0409 01/16/19  0310   INR 1.2  --   --  1.5* 2.4*   APTT 55.0*  55.0*   < > 46.6* 48.6* 52.8*    < > = values in this interval not displayed.     LDH:  No results for input(s): LDH in the last 72 hours.  Microbiology:  Microbiology Results (last 7 days)     Procedure Component Value Units Date/Time    Culture, Anaerobe [[id number]] Collected:  01/09/19 1124    Order Status:  Completed Specimen:  Tissue from Chest, Left Updated:  01/16/19 0838     Anaerobic Culture No anaerobes isolated    Narrative:       ICD pocket tissue    Culture, Anaerobe [[id number]] Collected:  01/09/19 1314    Order Status:  Completed Specimen:  Other from Chest, Left Updated:  01/16/19 0838     Anaerobic Culture No anaerobes isolated    Narrative:       #2 RA Lead tip    Culture, Anaerobe [[id number]] Collected:  01/09/19 1322    Order Status:  Completed Specimen:  Other from Chest, Left Updated:  01/16/19 0838     Anaerobic Culture No anaerobes isolated    Narrative:       #3 LV lead tip    Blood culture [[id number]] Collected:  01/15/19 0320    Order Status:  Completed Specimen:  Blood from Peripheral, Antecubital, Left Updated:  01/16/19 0812     Blood Culture, Routine No Growth to date     Blood Culture, Routine No Growth to date    Blood culture [[id number]] Collected:  01/15/19 0328    Order  Status:  Completed Specimen:  Blood from Peripheral, Forearm, Left Updated:  01/16/19 0812     Blood Culture, Routine No Growth to date     Blood Culture, Routine No Growth to date    Blood culture [[id number]] Collected:  01/10/19 1401    Order Status:  Completed Specimen:  Blood from Peripheral, Hand, Left Updated:  01/15/19 1612     Blood Culture, Routine No growth after 5 days.    Blood culture [[id number]] Collected:  01/10/19 1401    Order Status:  Completed Specimen:  Blood from Peripheral, Forearm, Left Updated:  01/15/19 1612     Blood Culture, Routine No growth after 5 days.    Blood culture [[id number]] Collected:  01/09/19 1657    Order Status:  Completed Specimen:  Blood from Peripheral, Hand, Right Updated:  01/14/19 2012     Blood Culture, Routine No growth after 5 days.    Culture, Anaerobe [[id number]] Collected:  01/09/19 1402    Order Status:  Completed Specimen:  Other from Chest, Left Updated:  01/14/19 1047     Anaerobic Culture No anaerobes isolated    Narrative:       #4 RV lead tip    Aerobic culture [[id number]]  (Susceptibility) Collected:  01/09/19 1402    Order Status:  Completed Specimen:  Other from Chest, Left Updated:  01/14/19 1003     Aerobic Bacterial Culture --     ENTEROCOCCUS FAECALIS  Rare      Narrative:       #4 RV lead tip    Aerobic culture [[id number]] Collected:  01/09/19 1322    Order Status:  Completed Specimen:  Other from Chest, Left Updated:  01/12/19 1023     Aerobic Bacterial Culture No growth    Narrative:       #3 LV lead tip    Aerobic culture [[id number]] Collected:  01/09/19 1124    Order Status:  Completed Specimen:  Tissue from Chest, Left Updated:  01/12/19 1023     Aerobic Bacterial Culture No growth    Narrative:       ICD pocket tissue    Aerobic culture [[id number]] Collected:  01/09/19 1314    Order Status:  Completed Specimen:  Other from Chest, Left Updated:  01/12/19 1023     Aerobic Bacterial Culture No growth    Narrative:       #2 RA Lead tip    Blood  culture (site 1) [[id number]] Collected:  01/07/19 0154    Order Status:  Completed Specimen:  Blood Updated:  01/12/19 0812     Blood Culture, Routine No growth after 5 days.    Narrative:       Site # 1, aerobic and anaerobic    Blood culture (site 2) [[id number]] Collected:  01/07/19 0154    Order Status:  Completed Specimen:  Blood Updated:  01/12/19 0812     Blood Culture, Routine No growth after 5 days.    Narrative:       Site # 2, aerobic only    Urine culture [[id number]] Collected:  01/11/19 0115    Order Status:  Completed Specimen:  Urine, Catheterized Updated:  01/12/19 0418     Urine Culture, Routine No growth          I have reviewed all pertinent labs within the past 24 hours.    Estimated Creatinine Clearance: 52.2 mL/min (based on SCr of 1.4 mg/dL).    Diagnostic Results:  I have reviewed all pertinent imaging results/findings within the past 24 hours.    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 Discussed with Dr. Russo. Blood cultures repeated  -Appreciate EP's help. 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 Patient had 2 vegetations on RV lead which is most likely source of bacteremia. Lead tip and ICD pocket cultured with RV lead +ve for Enteroccocus. Blood cultures are negative here thus far.  -Continue Ampicillin and Ceftriaxone per ID recs Will treat for 6 weeks post extraction.  Estimated end date 2/20/19.    -If urgently necessary from a cardiac standpoint, it is okay to proceed with implanting new device as repeat blood cultures taken post extraction remain negative X 72 hours.  Suspect known vegetations on RV leads, now removed, were primary source of infection; cardioMems likely not infected  -Per EP; ok'd to start heparin and Coumadin on 1/12.  No Eliquis for now as he may require implant of new  device  -Will need Life Vest prior to discharge  -Appreciate GI's help. Plan for outpatient EGD and C-scope for eval of anemia and Enterococcus bacteremia       Bacteremia    -see above     TREY (acute kidney injury)    - Appreciate Nephrology's help  - Was oliguric and creatinine jumped to 4.1 likely 2/2 ATN/hypotension following ICD explant.   - CVP 8, UOP and creatinine improving. (was 1.2 on admit)  - Continue Lasix gtt, wean Levo to keep   - Hold Entresto for now     Atrial fibrillation    -S/P Unsuccessful RICARDO guided/DCCV 1/14. Remains in A fib with variety of aberrant conduction  -EP considering CRT-D replacement once off Levo for 48 hours depending on stability, culture data and ongoing discussion with ID  -Will resume Dopamine today at low dose as HR now intermittently in the 40's and patient is symptomatic.   -CHADS VASc 6  -Holding eliquis s/p ICD explant 1/9. Per EP, anticoagulation resumed 1/12  -Cardiac monitoring     Type 2 diabetes mellitus with complication    -A1c 8.1  -Target -180 while hospitalized  -detemir 13, aspart 6 with sliding scale  -Appreciate Endocrine's help with management     Acute on chronic combined systolic and diastolic CHF, NYHA class 3    -ICM  -Last full 2D Echo 1/7/19: LVEF 20%, LVEDD 7.24 cm. Limited bedside echo done after ICD explant showed no pericardial effusion  -CVP 8. Continue diuresing with Lasix gtt at 10 mg/hr  -sVO2 58  -GDMT: Dig and Toprol D/C'd 2/2 junctional rhythm. Entresto D/C'd 2/2 transient hypotension during/after ICD explant and TREY  -Self declined for LVAD after w/u last April 2018       Hypotension    - Dopamine D/C'd after unsuccessful DCCV 1/14. Wean Levo keeping   -1/16 low dose Dopamine restarted for bradycardia     Dysuria    - Resolved  - Urine culture -ve         Dysphagia    - Consult SLP     Nausea & vomiting    - Resolved  - Antiemetics prn  - Appreciate speech therapy consult.  Advanced diet     Junctional bradycardia    -  Se A fib     Endocarditis    - See ICD lead infection       Uninterrupted Critical Care/Counseling Time (not including procedures): 65 minutes       STAR Mahan  Heart Transplant  [facility]

## 2019-01-17 PROBLEM — R13.10 DYSPHAGIA: Status: RESOLVED | Noted: 2019-01-01 | Resolved: 2019-01-01

## 2019-01-17 PROBLEM — R11.2 NAUSEA & VOMITING: Status: RESOLVED | Noted: 2019-01-01 | Resolved: 2019-01-01

## 2019-01-17 NOTE — PROGRESS NOTES
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 sVO2 56. Net +ve. He has no complaints. Dopa continues at 5 mcg and Levo has been weaned to 0.02. No sustained, significant bradycardia overnight. INR has jumped to 4.8    Continuous Infusions:   DOPamine 5 mcg/kg/min (01/17/19 0800)    furosemide (LASIX) 2 mg/mL infusion (non-titrating) 10 mg/hr (01/17/19 0800)    norepinephrine bitartrate-D5W 0.04 mcg/kg/min (01/17/19 0800)     Scheduled Meds:   ampicillin (OMNIPEN) 4 g in  mL EXTENDED INFUSION  4,000 mg Intravenous Q8H    artificial tears  1 drop Both Eyes QID    cefTRIAXone (ROCEPHIN) IVPB  2 g Intravenous Q12H    clopidogrel  75 mg Oral Daily    insulin aspart U-100  6 Units Subcutaneous TIDWM    insulin detemir U-100  13 Units Subcutaneous QHS    levothyroxine  25 mcg Oral Before breakfast    magnesium oxide  800 mg Oral BID    polyethylene glycol  17 g Oral Daily    rosuvastatin  20 mg Oral QHS    senna-docusate 8.6-50 mg  2 tablet Oral BID    tamsulosin  0.4 mg Oral Daily     PRN Meds:albuterol-ipratropium, dextrose 50%, dextrose 50%, glucagon (human recombinant), glucose, glucose, insulin aspart U-100, magnesium sulfate IVPB, magnesium sulfate IVPB, ondansetron, promethazine (PHENERGAN) IVPB, ramelteon, sodium chloride 0.9%, sodium chloride 0.9%, traMADol    Review of patient's allergies indicates:   Allergen Reactions    Adhesive Other (See Comments)     blisters    Codeine Other (See Comments)     Blurred vision    Latex Hives    Ace inhibitors     Lipitor [atorvastatin] Other (See Comments)     Muscle spasms      Objective:     Vital Signs (Most Recent):  Temp: 97.6 °F (36.4 °C) (01/17/19 0701)  Pulse: 60 (01/17/19 0804)  Resp: 18 (01/17/19 0804)  BP: 114/63 (01/15/19 0745)  SpO2: 100 % (01/17/19 0804) Vital Signs (24h Range):  Temp:  [97.5 °F (36.4 °C)-97.9 °F (36.6 °C)] 97.6 °F (36.4 °C)  Pulse:  [48-94] 60  Resp:  [12-34] 18  SpO2:  [94 %-100 %] 100 %  Arterial Line BP: ()/(32-49) 91/37     Patient Vitals for the past 72 hrs (Last 3 readings):   Weight   01/16/19 1400 83.5 kg (184 lb 1.4 oz)     Body mass index is 23.64 kg/m².      Intake/Output Summary (Last 24 hours) at 1/17/2019 0932  Last data filed at 1/17/2019 0800  Gross per 24 hour   Intake 2236.56 ml   Output 1555 ml   Net 681.56 ml       Hemodynamic Parameters:       Telemetry: Atrial fib with variable conduction    Physical Exam   Constitutional: He is oriented to person, place, and time. He appears well-developed and well-nourished.   HENT:   Head: Normocephalic and atraumatic.   Eyes: Conjunctivae and EOM are normal.   Neck: Normal range of motion. Neck supple. No JVD present. No thyromegaly present.   Cardiovascular: Regular rhythm.   Irregular, bradycardic   Pulmonary/Chest: Effort normal and breath sounds normal.   Abdominal: Soft. Bowel sounds are normal.   Musculoskeletal: Normal range of motion.   Trace HEATHER   Neurological: He is alert and oriented to person, place, and time.   Skin: Skin is warm and dry. Capillary refill takes 2 to 3 seconds.   Psychiatric: He has a normal mood and affect. His behavior is normal. Judgment and thought content normal.       Significant Labs:  CBC:  Recent Labs   Lab 01/15/19  0330 01/16/19  0310 01/17/19  0411   WBC 7.98 7.20 10.08   RBC 3.05* 2.98* 2.92*   HGB 8.7* 8.4* 8.1*   HCT 28.0* 27.3* 26.5*   * 104* 108*   MCV 92 92 91   MCH 28.5 28.2 27.7   MCHC 31.1* 30.8* 30.6*     BNP:  Recent Labs   Lab 01/10/19  1141   BNP 2,076*     CMP:  Recent Labs   Lab 01/15/19  0409  01/16/19  0310  01/16/19  1610  01/16/19  2210 01/17/19  0411   *   < > 164*  164*   < > 177* 203* 193*  193*   CALCIUM 8.6*   < > 8.5*  8.5*   < > 8.5* 8.6* 8.7  8.7   ALBUMIN 2.2*  --  2.2*  --   --   --  2.4*   PROT 6.1  --  6.2  --   --   --  6.5      < > 142  142   < > 141 141 140  140   K 4.3   < > 4.1  4.1   < > 4.1 4.3 4.0  4.0   CO2 34*   < > 33*  33*   < > 36* 35* 35*  35*   CL 97   < > 98  98   < > 98 97 97  97   BUN 35*   < > 33*  33*   < > 35* 36* 37*  37*   CREATININE 1.4   < > 1.4  1.4   < > 1.5* 1.5* 1.6*  1.6*   ALKPHOS 155*  --  174*  --   --   --  176*   ALT 15  --  14  --   --   --  16   AST 21  --  26  --   --   --  28   BILITOT 0.7  --  0.7  --   --   --  0.9    < > = values in this interval not displayed.      Coagulation:   Recent Labs   Lab 01/14/19  1808 01/15/19  0409 01/16/19  0310 01/17/19  0411 01/17/19  0811   INR  --  1.5* 2.4* 4.2* 4.8*   APTT 46.6* 48.6* 52.8*  --   --      LDH:  No results for input(s): LDH in the last 72 hours.  Microbiology:  Microbiology Results (last 7 days)     Procedure Component Value Units Date/Time    Blood culture [[id number]] Collected:  01/15/19 0320    Order Status:  Completed Specimen:  Blood from Peripheral, Antecubital, Left Updated:  01/17/19 0812     Blood Culture, Routine No Growth to date     Blood Culture, Routine No Growth to date     Blood Culture, Routine No Growth to date    Blood culture [[id number]] Collected:  01/15/19 0328    Order Status:  Completed Specimen:  Blood from Peripheral, Forearm, Left Updated:  01/17/19 0812     Blood Culture, Routine No Growth to date     Blood Culture, Routine No Growth to date     Blood Culture, Routine No Growth to date    Culture, Anaerobe [[id number]] Collected:  01/09/19 1124    Order Status:  Completed Specimen:  Tissue from Chest, Left Updated:  01/16/19 0838     Anaerobic Culture No anaerobes isolated    Narrative:       ICD pocket tissue    Culture, Anaerobe [[id number]] Collected:  01/09/19 1314     Order Status:  Completed Specimen:  Other from Chest, Left Updated:  01/16/19 0838     Anaerobic Culture No anaerobes isolated    Narrative:       #2 RA Lead tip    Culture, Anaerobe [[id number]] Collected:  01/09/19 1322    Order Status:  Completed Specimen:  Other from Chest, Left Updated:  01/16/19 0838     Anaerobic Culture No anaerobes isolated    Narrative:       #3 LV lead tip    Blood culture [[id number]] Collected:  01/10/19 1401    Order Status:  Completed Specimen:  Blood from Peripheral, Hand, Left Updated:  01/15/19 1612     Blood Culture, Routine No growth after 5 days.    Blood culture [[id number]] Collected:  01/10/19 1401    Order Status:  Completed Specimen:  Blood from Peripheral, Forearm, Left Updated:  01/15/19 1612     Blood Culture, Routine No growth after 5 days.    Blood culture [[id number]] Collected:  01/09/19 1657    Order Status:  Completed Specimen:  Blood from Peripheral, Hand, Right Updated:  01/14/19 2012     Blood Culture, Routine No growth after 5 days.    Culture, Anaerobe [[id number]] Collected:  01/09/19 1402    Order Status:  Completed Specimen:  Other from Chest, Left Updated:  01/14/19 1047     Anaerobic Culture No anaerobes isolated    Narrative:       #4 RV lead tip    Aerobic culture [[id number]]  (Susceptibility) Collected:  01/09/19 1402    Order Status:  Completed Specimen:  Other from Chest, Left Updated:  01/14/19 1003     Aerobic Bacterial Culture --     ENTEROCOCCUS FAECALIS  Rare      Narrative:       #4 RV lead tip    Aerobic culture [[id number]] Collected:  01/09/19 1322    Order Status:  Completed Specimen:  Other from Chest, Left Updated:  01/12/19 1023     Aerobic Bacterial Culture No growth    Narrative:       #3 LV lead tip    Aerobic culture [[id number]] Collected:  01/09/19 1124    Order Status:  Completed Specimen:  Tissue from Chest, Left Updated:  01/12/19 1023     Aerobic Bacterial Culture No growth    Narrative:       ICD pocket tissue    Aerobic culture  [[id number]] Collected:  01/09/19 1314    Order Status:  Completed Specimen:  Other from Chest, Left Updated:  01/12/19 1023     Aerobic Bacterial Culture No growth    Narrative:       #2 RA Lead tip    Blood culture (site 1) [[id number]] Collected:  01/07/19 0154    Order Status:  Completed Specimen:  Blood Updated:  01/12/19 0812     Blood Culture, Routine No growth after 5 days.    Narrative:       Site # 1, aerobic and anaerobic    Blood culture (site 2) [[id number]] Collected:  01/07/19 0154    Order Status:  Completed Specimen:  Blood Updated:  01/12/19 0812     Blood Culture, Routine No growth after 5 days.    Narrative:       Site # 2, aerobic only    Urine culture [[id number]] Collected:  01/11/19 0115    Order Status:  Completed Specimen:  Urine, Catheterized Updated:  01/12/19 0418     Urine Culture, Routine No growth          I have reviewed all pertinent labs within the past 24 hours.    Estimated Creatinine Clearance: 45.7 mL/min (A) (based on SCr of 1.6 mg/dL (H)).    Diagnostic Results:  I have reviewed all pertinent imaging results/findings within the past 24 hours.    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 Discussed with Dr. Russo. Blood cultures repeated and show NGTD  -Appreciate EP's help. 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 Patient had 2 vegetations on RV lead which is most likely source of bacteremia. Lead tip and ICD pocket cultured with RV lead +ve for Enteroccocus. Blood cultures are negative here thus far.  -Continue Ampicillin and Ceftriaxone per ID recs Will treat for 6 weeks post extraction.  Estimated end date 2/20/19.    -If urgently necessary from a cardiac standpoint, it is okay to proceed with implanting new device as repeat blood cultures taken post extraction remain negative X 72 hours.   Suspect known vegetations on RV leads, now removed, were primary source of infection; cardioMems likely not infected, but Indium Scan in progress to look at Cardiomems. If negative, no need for oral antibiotic suppression or surveillance blood cultures following 6 weeks of IV therapy. If positive, re-consult ID and would give patient oral antibiotic suppression following his 6 weeks of therapy.  -INR 4.8 (re-checked). Coumadin 2.5 mg given last night - will hold for now. No Eliquis for now as he may require implant of new device  -Will need Life Vest prior to discharge if CRT-D not replaced  -Appreciate GI's help. Plan for outpatient EGD and C-scope for eval of anemia and Enterococcus bacteremia       Acute on chronic combined systolic and diastolic CHF, NYHA class 3    -ICM  -Last full 2D Echo 1/7/19: LVEF 20%, LVEDD 7.24 cm. Limited bedside echo done after ICD explant showed no pericardial effusion  -CVP 12-13. Given increase in INR and +ve net I&O balance overnight, will increase Lasix gtt to 20 mg/hr. Will also check BMP  -sVO2 56  -GDMT: Dig and Toprol D/C'd 2/2 junctional rhythm. Entresto D/C'd 2/2 transient hypotension during/after ICD explant and TREY  -Self declined for LVAD after w/u last April 2018       Atrial fibrillation    -S/P Unsuccessful RICARDO guided/DCCV 1/14. Remains in A fib with variety of aberrant conduction  -EP considering CRT-D replacement once off Levo for 48 hours depending on stability, culture data and ongoing discussion with ID  -Dopamine resumed 1/16 2/2 bradycardia.  -CHADS VASc 6  -Holding eliquis s/p ICD explant 1/9. Per EP, anticoagulation resumed 1/12. INR has jumped to 4.8 - will hold Coumadin (see acute on chronic systolic heart failure)  -Cardiac monitoring     Type 2 diabetes mellitus with complication    -A1c 8.1  -Target -180 while hospitalized  -detemir 13, aspart 6 with sliding scale  -Appreciate Endocrine's help with management     Hypotension    - Wean Levo keeping    -1/16 Dopamine restarted for bradycardia     Dysuria    - Resolved  - Urine culture -ve  - Flomax started 1/10. Will try to D/C Morin today         Junctional bradycardia    - Se A fib     Endocarditis    - See ICD lead infection     Bacteremia    -see above     TREY (acute kidney injury)    - Appreciate Nephrology's help  - Was oliguric and creatinine jumped to 4.1 likely 2/2 ATN/hypotension following ICD explant.   - CVP 12-13 and creatinine has increased to 1.6. See acute on chronic systolic heart failure  - Continue Dopamine, wean Levo to keep   - Hold Entresto for now       Uninterrupted Critical Care/Counseling Time (not including procedures): 45 [place] [id number]  Heart Transplant  [facility]

## 2019-01-17 NOTE — ASSESSMENT & PLAN NOTE
-Vegetation seen on RICARDO  -2 mobile masses seen on CVC by RICARDO 1/14 (has both RUE PICC and RIJ TLC). Discussed with Dr. Russo. Blood cultures repeated and show NGTD  -Appreciate EP's help. 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 Patient had 2 vegetations on RV lead which is most likely source of bacteremia. Lead tip and ICD pocket cultured with RV lead +ve for Enteroccocus. Blood cultures are negative here thus far.  -Continue Ampicillin and Ceftriaxone per ID recs Will treat for 6 weeks post extraction.  Estimated end date 2/20/19.    -If urgently necessary from a cardiac standpoint, it is okay to proceed with implanting new device as repeat blood cultures taken post extraction remain negative X 72 hours.  Suspect known vegetations on RV leads, now removed, were primary source of infection; cardioMems likely not infected, but Indium Scan in progress to look at Cardiomems. If negative, no need for oral antibiotic suppression or surveillance blood cultures following 6 weeks of IV therapy. If positive, re-consult ID and would give patient oral antibiotic suppression following his 6 weeks of therapy.  -INR 4.8 (re-checked). Coumadin 2.5 mg given last night - will hold for now. No Eliquis for now as he may require implant of new device  -Will need Life Vest prior to discharge if CRT-D not replaced  -Appreciate GI's help. Plan for outpatient EGD and C-scope for eval of anemia and Enterococcus bacteremia

## 2019-01-17 NOTE — ASSESSMENT & PLAN NOTE
-S/P Unsuccessful RICARDO guided/DCCV 1/14. Remains in A fib with variety of aberrant conduction  -EP considering CRT-D replacement once off Levo for 48 hours depending on stability, culture data and ongoing discussion with ID  -Dopamine resumed 1/16 2/2 bradycardia.  -CHADS VASc 6  -Holding eliquis s/p ICD explant 1/9. Per EP, anticoagulation resumed 1/12. INR has jumped to 4.8 - will hold Coumadin (see acute on chronic systolic heart failure)  -Cardiac monitoring   Spouse

## 2019-01-17 NOTE — ASSESSMENT & PLAN NOTE
BG goal 140-180    Decrease Levemir to 14 units q HS  Increase Novolog 6-8 units with meals  Low dose correction scale  BG monitoring AC/HS    Discharge planning:  TBD

## 2019-01-17 NOTE — NURSING
Pt has to be tagged today for Indium scan tomorrow.  Blood was already given to nuclear med tech.  Will return blood approx 12 noon and scan will be planned for approx 0830 in the am ob Friday.  Pt and wife and HTS team (Dionna and Dr Baron) are also aware.   Will practice with pt laying flat for 30 min.  HTS team also aware that pt may need a prn dose of anxiety med in order to tolerate this scan.      MD will return to bedside to pull TLC this afternoon.  Meds will be changed to PICC R UA that was placed at outside facility and POA.

## 2019-01-17 NOTE — ASSESSMENT & PLAN NOTE
- Appreciate Nephrology's help  - Was oliguric and creatinine jumped to 4.1 likely 2/2 ATN/hypotension following ICD explant.   - CVP 12-13 and creatinine has increased to 1.6. See acute on chronic systolic heart failure  - Continue Dopamine, wean Levo to keep   - Hold Entresto for now

## 2019-01-17 NOTE — SUBJECTIVE & OBJECTIVE
"Interval HPI:   Overnight events:  Remains in CMICU. BG at goal with scheduled insulin. Creatinine elevated at 1.6.   Eatin%  Nausea: No  Hypoglycemia and intervention: No  Fever: No  TPN and/or TF: No      /63 (BP Location: Left arm, Patient Position: Lying)   Pulse 60   Temp 97.6 °F (36.4 °C) (Oral)   Resp 18   Ht 6' 2" (1.88 m)   Wt 83.5 kg (184 lb 1.4 oz)   SpO2 100%   BMI 23.64 kg/m²     Labs Reviewed and Include    Recent Labs   Lab 19  0411   *  193*   CALCIUM 8.7  8.7   ALBUMIN 2.4*   PROT 6.5     140   K 4.0  4.0   CO2 35*  35*   CL 97  97   BUN 37*  37*   CREATININE 1.6*  1.6*   ALKPHOS 176*   ALT 16   AST 28   BILITOT 0.9     Lab Results   Component Value Date    WBC 10.08 2019    HGB 8.1 (L) 2019    HCT 26.5 (L) 2019    MCV 91 2019     (L) 2019     No results for input(s): TSH, FREET4 in the last 168 hours.  Lab Results   Component Value Date    HGBA1C 8.1 (H) 2019       Nutritional status:   Body mass index is 23.64 kg/m².  Lab Results   Component Value Date    ALBUMIN 2.4 (L) 2019    ALBUMIN 2.2 (L) 2019    ALBUMIN 2.2 (L) 01/15/2019     No results found for: PREALBUMIN    Estimated Creatinine Clearance: 45.7 mL/min (A) (based on SCr of 1.6 mg/dL (H)).    Accu-Checks  Recent Labs     19  2145 01/15/19  0755 01/15/19  1216 01/15/19  1802 01/15/19  2228 19  0735 19  0926 19  1617 19  2045 19  0752   POCTGLUCOSE 161* 161* 150* 188* 185* 167* 268* 182* 184* 138*       Current Medications and/or Treatments Impacting Glycemic Control  Immunotherapy:    Immunosuppressants     None        Steroids:   Hormones (From admission, onward)    None        Pressors:    Autonomic Drugs (From admission, onward)    Start     Stop Route Frequency Ordered    19 1330  DOPamine 400 mg in dextrose 5 % 250 mL infusion (premix) (NON-TITRATING)      -- IV Continuous 19 1226    " 01/15/19 1515  norepinephrine 4 mg in dextrose 5% 250 mL infusion (premix) (titrating)     Question Answer Comment   Titrate by: (in mcg/kg/min) 0.02    Titrate interval: (in minutes) 5    Titrate to maintain: (MAP or SBP) SBP    Greater than: (in mmHg) 100    Maximum dose: (in mcg/kg/min) 3        -- IV Continuous 01/15/19 1512        Hyperglycemia/Diabetes Medications:   Antihyperglycemics (From admission, onward)    Start     Stop Route Frequency Ordered    01/13/19 2100  insulin detemir U-100 pen 13 Units      -- SubQ Nightly 01/13/19 0949    01/11/19 1645  insulin aspart U-100 pen 6 Units      -- SubQ 3 times daily with meals 01/11/19 1259    01/11/19 1358  insulin aspart U-100 pen 0-5 Units      -- SubQ Before meals & nightly PRN 01/11/19 1259

## 2019-01-17 NOTE — PROGRESS NOTES
Update:  SW met with pt and pt's wife Mague in pt's room in order to provide continuity of care and support. Pt was alert but had just woken up from sleeping for the last few hours. Pt's wife coping adequately but both pt and wife frustrated because they were told today by Dr. Boggs that pt would not be able to place new ICD until next Thurs. Wife stated that HTS told her today that the procedure would occur Monday. SW provided validation, support and reflective listening as pt's wife frustrated because pt's son had planned to be at hospital Monday. SW notified HTS of the updated information. Pt's wife reports that their daughter will be coming to visit tomorrow. SW provided wife with information on shower locations as wife planning to remain in pt's room in the hospital. Pt and wife denied any further SW needs at this time. SW remains available for continued psychosocial support, education, resources, and additional d/c planning as needed.

## 2019-01-17 NOTE — PROGRESS NOTES
"[facility]  Endocrinology  Progress Note    Admit Date: 2019     Reason for Consult: Management of T2DM, Hyperglycemia     Surgical Procedure and Date: Lead extraction 19    Diabetes diagnosis year: approximately 10 years ago    Lab Results   Component Value Date    HGBA1C 8.1 (H) 2019       Home Diabetes Medications: Lantus 50 units q HS, Novolog 25 units with meals (vials)    How often checking glucose at home? 3-4x per day   BG readings on regimen: AM 120s  Hypoglycemia on the regimen?  Yes - 2-3x per month  Missed doses on regimen?  Yes - 2x per week    Diabetes Complications include:     Hyperglycemia, Hypoglycemia , Diabetic retinopathy , Diabetic cataract  and Diabetic peripheral neuropathy     Complicating diabetes co morbidities:   CHF and History of CVA      HPI:   Patient is a 78 y.o. male with a diagnosis of DM2 and CHF who was admitted on 19 for possible RV lead infection.  Patient is s/p lead extraction on 19.  Patient's DM managed by PCP, Dr. Hdz.  Endocrine consulted for DM/BG management.            Interval HPI:   Overnight events:  Remains in CMICU. BG at goal with scheduled insulin. Creatinine elevated at 1.6.   Eatin%  Nausea: No  Hypoglycemia and intervention: No  Fever: No  TPN and/or TF: No      /63 (BP Location: Left arm, Patient Position: Lying)   Pulse 60   Temp 97.6 °F (36.4 °C) (Oral)   Resp 18   Ht 6' 2" (1.88 m)   Wt 83.5 kg (184 lb 1.4 oz)   SpO2 100%   BMI 23.64 kg/m²      Labs Reviewed and Include    Recent Labs   Lab 19  0411   *  193*   CALCIUM 8.7  8.7   ALBUMIN 2.4*   PROT 6.5     140   K 4.0  4.0   CO2 35*  35*   CL 97  97   BUN 37*  37*   CREATININE 1.6*  1.6*   ALKPHOS 176*   ALT 16   AST 28   BILITOT 0.9     Lab Results   Component Value Date    WBC 10.08 2019    HGB 8.1 (L) 2019    HCT 26.5 (L) 2019    MCV 91 2019     (L) 2019     No results for " input(s): TSH, FREET4 in the last 168 hours.  Lab Results   Component Value Date    HGBA1C 8.1 (H) 01/07/2019       Nutritional status:   Body mass index is 23.64 kg/m².  Lab Results   Component Value Date    ALBUMIN 2.4 (L) 01/17/2019    ALBUMIN 2.2 (L) 01/16/2019    ALBUMIN 2.2 (L) 01/15/2019     No results found for: PREALBUMIN    Estimated Creatinine Clearance: 45.7 mL/min (A) (based on SCr of 1.6 mg/dL (H)).    Accu-Checks  Recent Labs     01/14/19  2145 01/15/19  0755 01/15/19  1216 01/15/19  1802 01/15/19  2228 01/16/19  0735 01/16/19  0926 01/16/19  1617 01/16/19  2045 01/17/19  0752   POCTGLUCOSE 161* 161* 150* 188* 185* 167* 268* 182* 184* 138*       Current Medications and/or Treatments Impacting Glycemic Control  Immunotherapy:    Immunosuppressants     None        Steroids:   Hormones (From admission, onward)    None        Pressors:    Autonomic Drugs (From admission, onward)    Start     Stop Route Frequency Ordered    01/16/19 1330  DOPamine 400 mg in dextrose 5 % 250 mL infusion (premix) (NON-TITRATING)      -- IV Continuous 01/16/19 1226    01/15/19 1515  norepinephrine 4 mg in dextrose 5% 250 mL infusion (premix) (titrating)     Question Answer Comment   Titrate by: (in mcg/kg/min) 0.02    Titrate interval: (in minutes) 5    Titrate to maintain: (MAP or SBP) SBP    Greater than: (in mmHg) 100    Maximum dose: (in mcg/kg/min) 3        -- IV Continuous 01/15/19 1512        Hyperglycemia/Diabetes Medications:   Antihyperglycemics (From admission, onward)    Start     Stop Route Frequency Ordered    01/13/19 2100  insulin detemir U-100 pen 13 Units      -- SubQ Nightly 01/13/19 0949    01/11/19 1645  insulin aspart U-100 pen 6 Units      -- SubQ 3 times daily with meals 01/11/19 1259    01/11/19 1358  insulin aspart U-100 pen 0-5 Units      -- SubQ Before meals & nightly PRN 01/11/19 2548          ASSESSMENT and PLAN    * 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         Atrial fibrillation    May affect BG readings if uncontrolled  S/p cardioversion.          Cathie Eli NP  Endocrinology  [facility]

## 2019-01-17 NOTE — ASSESSMENT & PLAN NOTE
-ICM  -Last full 2D Echo 1/7/19: LVEF 20%, LVEDD 7.24 cm. Limited bedside echo done after ICD explant showed no pericardial effusion  -CVP 12-13. Given increase in INR and +ve net I&O balance overnight, will increase Lasix gtt to 20 mg/hr. Will also check BMP  -sVO2 56  -GDMT: Dig and Toprol D/C'd 2/2 junctional rhythm. Entresto D/C'd 2/2 transient hypotension during/after ICD explant and TREY  -Self declined for LVAD after w/u last April 2018

## 2019-01-17 NOTE — SIGNIFICANT EVENT
HTS SIGNIFICANT EVENT    I was called to f/u CXR for desating on movement. Worsening pulm edema. Give 1 x diuril 250  Patient seen and examined.   Discussed with staff    DEUCE Baig MD  [id number]  Cardiology Fellow, PGY 4

## 2019-01-17 NOTE — SUBJECTIVE & OBJECTIVE
**Interval History: CVP via RIJ 12-13 (correlates with PICC). sVO2 56. Net +ve. He has no complaints. Dopa continues at 5 mcg and Levo has been weaned to 0.02. No sustained, significant bradycardia overnight. INR has jumped to 4.8    Continuous Infusions:   DOPamine 5 mcg/kg/min (01/17/19 0800)    furosemide (LASIX) 2 mg/mL infusion (non-titrating) 10 mg/hr (01/17/19 0800)    norepinephrine bitartrate-D5W 0.04 mcg/kg/min (01/17/19 0800)     Scheduled Meds:   ampicillin (OMNIPEN) 4 g in  mL EXTENDED INFUSION  4,000 mg Intravenous Q8H    artificial tears  1 drop Both Eyes QID    cefTRIAXone (ROCEPHIN) IVPB  2 g Intravenous Q12H    clopidogrel  75 mg Oral Daily    insulin aspart U-100  6 Units Subcutaneous TIDWM    insulin detemir U-100  13 Units Subcutaneous QHS    levothyroxine  25 mcg Oral Before breakfast    magnesium oxide  800 mg Oral BID    polyethylene glycol  17 g Oral Daily    rosuvastatin  20 mg Oral QHS    senna-docusate 8.6-50 mg  2 tablet Oral BID    tamsulosin  0.4 mg Oral Daily     PRN Meds:albuterol-ipratropium, dextrose 50%, dextrose 50%, glucagon (human recombinant), glucose, glucose, insulin aspart U-100, magnesium sulfate IVPB, magnesium sulfate IVPB, ondansetron, promethazine (PHENERGAN) IVPB, ramelteon, sodium chloride 0.9%, sodium chloride 0.9%, traMADol    Review of patient's allergies indicates:   Allergen Reactions    Adhesive Other (See Comments)     blisters    Codeine Other (See Comments)     Blurred vision    Latex Hives    Ace inhibitors     Lipitor [atorvastatin] Other (See Comments)     Muscle spasms     Objective:     Vital Signs (Most Recent):  Temp: 97.6 °F (36.4 °C) (01/17/19 0701)  Pulse: 60 (01/17/19 0804)  Resp: 18 (01/17/19 0804)  BP: 114/63 (01/15/19 0745)  SpO2: 100 % (01/17/19 0804) Vital Signs (24h Range):  Temp:  [97.5 °F (36.4 °C)-97.9 °F (36.6 °C)] 97.6 °F (36.4 °C)  Pulse:  [48-94] 60  Resp:  [12-34] 18  SpO2:  [94 %-100 %] 100 %  Arterial Line  BP: ()/(32-49) 91/37     Patient Vitals for the past 72 hrs (Last 3 readings):   Weight   01/16/19 1400 83.5 kg (184 lb 1.4 oz)     Body mass index is 23.64 kg/m².      Intake/Output Summary (Last 24 hours) at 1/17/2019 0932  Last data filed at 1/17/2019 0800  Gross per 24 hour   Intake 2236.56 ml   Output 1555 ml   Net 681.56 ml       Hemodynamic Parameters:       Telemetry: Atrial fib with variable conduction    Physical Exam   Constitutional: He is oriented to person, place, and time. He appears well-developed and well-nourished.   HENT:   Head: Normocephalic and atraumatic.   Eyes: Conjunctivae and EOM are normal.   Neck: Normal range of motion. Neck supple. No JVD present. No thyromegaly present.   Cardiovascular: Regular rhythm.   Irregular, bradycardic   Pulmonary/Chest: Effort normal and breath sounds normal.   Abdominal: Soft. Bowel sounds are normal.   Musculoskeletal: Normal range of motion.   Trace HEATHER   Neurological: He is alert and oriented to person, place, and time.   Skin: Skin is warm and dry. Capillary refill takes 2 to 3 seconds.   Psychiatric: He has a normal mood and affect. His behavior is normal. Judgment and thought content normal.       Significant Labs:  CBC:  Recent Labs   Lab 01/15/19  0330 01/16/19  0310 01/17/19  0411   WBC 7.98 7.20 10.08   RBC 3.05* 2.98* 2.92*   HGB 8.7* 8.4* 8.1*   HCT 28.0* 27.3* 26.5*   * 104* 108*   MCV 92 92 91   MCH 28.5 28.2 27.7   MCHC 31.1* 30.8* 30.6*     BNP:  Recent Labs   Lab 01/10/19  1141   BNP 2,076*     CMP:  Recent Labs   Lab 01/15/19  0409  01/16/19  0310  01/16/19  1610 01/16/19  2210 01/17/19  0411   *   < > 164*  164*   < > 177* 203* 193*  193*   CALCIUM 8.6*   < > 8.5*  8.5*   < > 8.5* 8.6* 8.7  8.7   ALBUMIN 2.2*  --  2.2*  --   --   --  2.4*   PROT 6.1  --  6.2  --   --   --  6.5      < > 142  142   < > 141 141 140  140   K 4.3   < > 4.1  4.1   < > 4.1 4.3 4.0  4.0   CO2 34*   < > 33*  33*   < > 36* 35* 35*   35*   CL 97   < > 98  98   < > 98 97 97  97   BUN 35*   < > 33*  33*   < > 35* 36* 37*  37*   CREATININE 1.4   < > 1.4  1.4   < > 1.5* 1.5* 1.6*  1.6*   ALKPHOS 155*  --  174*  --   --   --  176*   ALT 15  --  14  --   --   --  16   AST 21  --  26  --   --   --  28   BILITOT 0.7  --  0.7  --   --   --  0.9    < > = values in this interval not displayed.      Coagulation:   Recent Labs   Lab 01/14/19  1808 01/15/19  0409 01/16/19  0310 01/17/19  0411 01/17/19  0811   INR  --  1.5* 2.4* 4.2* 4.8*   APTT 46.6* 48.6* 52.8*  --   --      LDH:  No results for input(s): LDH in the last 72 hours.  Microbiology:  Microbiology Results (last 7 days)     Procedure Component Value Units Date/Time    Blood culture [[id number]] Collected:  01/15/19 0320    Order Status:  Completed Specimen:  Blood from Peripheral, Antecubital, Left Updated:  01/17/19 0812     Blood Culture, Routine No Growth to date     Blood Culture, Routine No Growth to date     Blood Culture, Routine No Growth to date    Blood culture [[id number]] Collected:  01/15/19 0328    Order Status:  Completed Specimen:  Blood from Peripheral, Forearm, Left Updated:  01/17/19 0812     Blood Culture, Routine No Growth to date     Blood Culture, Routine No Growth to date     Blood Culture, Routine No Growth to date    Culture, Anaerobe [[id number]] Collected:  01/09/19 1124    Order Status:  Completed Specimen:  Tissue from Chest, Left Updated:  01/16/19 0838     Anaerobic Culture No anaerobes isolated    Narrative:       ICD pocket tissue    Culture, Anaerobe [[id number]] Collected:  01/09/19 1314    Order Status:  Completed Specimen:  Other from Chest, Left Updated:  01/16/19 0838     Anaerobic Culture No anaerobes isolated    Narrative:       #2 RA Lead tip    Culture, Anaerobe [[id number]] Collected:  01/09/19 1322    Order Status:  Completed Specimen:  Other from Chest, Left Updated:  01/16/19 0814     Anaerobic Culture No anaerobes isolated    Narrative:       #3  LV lead tip    Blood culture [[id number]] Collected:  01/10/19 1401    Order Status:  Completed Specimen:  Blood from Peripheral, Hand, Left Updated:  01/15/19 1612     Blood Culture, Routine No growth after 5 days.    Blood culture [[id number]] Collected:  01/10/19 1401    Order Status:  Completed Specimen:  Blood from Peripheral, Forearm, Left Updated:  01/15/19 1612     Blood Culture, Routine No growth after 5 days.    Blood culture [[id number]] Collected:  01/09/19 1657    Order Status:  Completed Specimen:  Blood from Peripheral, Hand, Right Updated:  01/14/19 2012     Blood Culture, Routine No growth after 5 days.    Culture, Anaerobe [[id number]] Collected:  01/09/19 1402    Order Status:  Completed Specimen:  Other from Chest, Left Updated:  01/14/19 1047     Anaerobic Culture No anaerobes isolated    Narrative:       #4 RV lead tip    Aerobic culture [[id number]]  (Susceptibility) Collected:  01/09/19 1402    Order Status:  Completed Specimen:  Other from Chest, Left Updated:  01/14/19 1003     Aerobic Bacterial Culture --     ENTEROCOCCUS FAECALIS  Rare      Narrative:       #4 RV lead tip    Aerobic culture [[id number]] Collected:  01/09/19 1322    Order Status:  Completed Specimen:  Other from Chest, Left Updated:  01/12/19 1023     Aerobic Bacterial Culture No growth    Narrative:       #3 LV lead tip    Aerobic culture [[id number]] Collected:  01/09/19 1124    Order Status:  Completed Specimen:  Tissue from Chest, Left Updated:  01/12/19 1023     Aerobic Bacterial Culture No growth    Narrative:       ICD pocket tissue    Aerobic culture [[id number]] Collected:  01/09/19 1314    Order Status:  Completed Specimen:  Other from Chest, Left Updated:  01/12/19 1023     Aerobic Bacterial Culture No growth    Narrative:       #2 RA Lead tip    Blood culture (site 1) [[id number]] Collected:  01/07/19 0154    Order Status:  Completed Specimen:  Blood Updated:  01/12/19 0812     Blood Culture, Routine No growth after 5  days.    Narrative:       Site # 1, aerobic and anaerobic    Blood culture (site 2) [[id number]] Collected:  01/07/19 0154    Order Status:  Completed Specimen:  Blood Updated:  01/12/19 0812     Blood Culture, Routine No growth after 5 days.    Narrative:       Site # 2, aerobic only    Urine culture [[id number]] Collected:  01/11/19 0115    Order Status:  Completed Specimen:  Urine, Catheterized Updated:  01/12/19 0418     Urine Culture, Routine No growth          I have reviewed all pertinent labs within the past 24 hours.    Estimated Creatinine Clearance: 45.7 mL/min (A) (based on SCr of 1.6 mg/dL (H)).    Diagnostic Results:  I have reviewed all pertinent imaging results/findings within the past 24 hours.

## 2019-01-17 NOTE — PLAN OF CARE
Problem: Adult Inpatient Plan of Care  Goal: Plan of Care Review  Outcome: Ongoing (interventions implemented as appropriate)  No acute events throughout day. See vital signs and assessments in flowsheets. See below for updates on today's progress.  Pulmonary: pt was stated he felt very short of breath, breathing treatment was given 2-3L NC during shift, unable to lay flat at times when pt stated it was hard to breath but sats were 100% at the time of being flat.  Cardiovascular: Afit HR 60s, titrated levo for SBP > 95. Unable to titrate off levo during the night   Neurological: AAOx4  Gastrointestinal: BM x 2, soft diet, bowl sounds in all quadrants   Genitourinary: gtz in place, good flow 650 UO/shift   Endocrine: WNL, will continue to monitor and give insulin per sliding scale   Integumentary/Other: preventative foam dressing on sacrum and heels  Infusions: dop, levo, lasix,  Patient progressing towards goals as tolerated, plan of care communicated and reviewed with Jerry Solis and family. All concerns addressed. Will continue to monitor.

## 2019-01-17 NOTE — ASSESSMENT & PLAN NOTE
Caution with insulin stacking  Estimated Creatinine Clearance: 48.7 mL/min (A) (based on SCr of 1.5 mg/dL (H)).

## 2019-01-18 NOTE — ASSESSMENT & PLAN NOTE
-ICM  -Last full 2D Echo 1/7/19: LVEF 20%, LVEDD 7.24 cm. Limited bedside echo done after ICD explant showed no pericardial effusion  -CVP 10-12. CXR overnight with significant pulmonary edema. Net -ve only 818 cc despite increasing Lasix gtt yesterday and getting IV Diuril overnight. Will give Lasix 80 mg IVP, then increase gtt to 40 mg/hr.  -sVO2 56  -GDMT: Dig and Toprol D/C'd 2/2 junctional rhythm. Entresto D/C'd 2/2 transient hypotension during/after ICD explant and TREY  -Self declined for LVAD after w/u last April 2018

## 2019-01-18 NOTE — CARE UPDATE
BG goal 140-180. BG reasonably well controlled with scheduled insulin and correction scale.     FBG slightly above goal; increase Levemir to 15 units q HS  Novolog 6 units with meals  Low dose correction scale  BG monitoring AC/HS      ** Please call Endocrine for any BG related issues **

## 2019-01-18 NOTE — PROGRESS NOTES
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    Subjective:     Interval History: No events overnight, doing well, required additional diuretics for pulmonary edema    Tele: Junction rhythm ~60bpm    ROS  Objective:     Vital Signs (Most Recent):  Temp: 97.7 °F (36.5 °C) (01/18/19 0705)  Pulse: 61 (01/18/19 1000)  Resp: 20 (01/18/19 1000)  BP: 114/63 (01/15/19 0745)  SpO2: 97 % (01/18/19 1000) Vital Signs (24h Range):  Temp:  [97.6 °F (36.4 °C)-98.3 °F (36.8 °C)] 97.7 °F (36.5 °C)  Pulse:  [58-80] 61  Resp:  [12-38] 20  SpO2:  [78 %-100 %] 97 %  Arterial Line BP: ()/(37-59) 102/37     Weight: 83.5 kg (184 lb 1.4 oz)  Body mass index is 23.64 kg/m².     SpO2: 97 %  O2 Device (Oxygen Therapy): nasal cannula w/ humidification    Physical Exam   Constitutional: He is oriented to person, place, and time. He appears well-developed and well-nourished. No distress.   HENT:   Head: Normocephalic and atraumatic.   Mouth/Throat: Oropharynx is clear and moist.   Eyes: Conjunctivae and EOM are normal. Pupils are equal, round, and reactive to light. No scleral icterus.   Neck: Normal range of motion. Neck supple. No JVD present.   Cardiovascular: Normal rate, normal heart sounds and intact distal pulses. An irregularly irregular rhythm present.   No murmur heard.  Pulses:       Radial pulses are 2+ on the right side, and 2+ on the left side.   Pulmonary/Chest: Effort normal and breath sounds normal. No respiratory distress.   Symmetrical expansion  Left chest incision is clean   Abdominal: Soft. Bowel sounds are normal. There is no hepatosplenomegaly. There is no tenderness.   Musculoskeletal: Normal range of motion.   Groin healed   Neurological: He is alert and oriented to person,  place, and time. No cranial nerve deficit.   Skin: Skin is warm and dry. No rash noted. He is not diaphoretic.   Well perfused in upper and lower extremities with good capillary refill   Psychiatric: He has a normal mood and affect. Judgment and thought content normal.       Significant Labs:   EP:   Recent Labs   Lab 01/17/19  0411 01/17/19  0811  01/17/19  1741 01/17/19  2226 01/18/19  0417     140  --    < > 139 135* 138  138   K 4.0  4.0  --    < > 4.2 4.0 3.8  3.8   CL 97  97  --    < > 96 92* 92*  92*   CO2 35*  35*  --    < > 35* 35* 36*  36*   *  193*  --    < > 123* 327* 175*  175*   BUN 37*  37*  --    < > 39* 39* 42*  42*   CREATININE 1.6*  1.6*  --    < > 1.5* 1.6* 1.6*  1.6*   CALCIUM 8.7  8.7  --    < > 8.6* 8.2* 8.9  8.9   PROT 6.5  --   --   --   --  6.8   ALBUMIN 2.4*  --   --   --   --  2.4*   BILITOT 0.9  --   --   --   --  0.9   ALKPHOS 176*  --   --   --   --  165*   AST 28  --   --   --   --  22   ALT 16  --   --   --   --  14   ANIONGAP 8  8  --    < > 8 8 10  10   ESTGFRAFRICA 47.7*  47.7*  --    < > 51.5* 47.7* 47.7*  47.7*   EGFRNONAA 41.2*  41.2*  --    < > 44.6* 41.2* 41.2*  41.2*   WBC 10.08  --   --   --   --  9.98   HGB 8.1*  --   --   --   --  8.0*   HCT 26.5*  --   --   --   --  26.4*   *  --   --   --   --  109*   INR 4.2* 4.8*  --   --   --  4.3*    < > = values in this interval not displayed.           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 Likewise has a CardioMeMMS device in place. Is not pacemaker dependent. ID believes the primary source is the vegetation, he is s/p extraction on 01/09, RV lead tip grew E. Faecalis  (resistant to tetracyclines). Recovery complicated by slow junctional escape rhythm responding to dopamine, no response to DCCV. Now with undetermined atrial rhythm which is either sinus bradycardia with aberrantly conducted PAC or AT, or persistent AF with junctional escape and intermittent aberrant conduction of AF, the differences can be attributed due to AV manolo stimulation by inotrope/chronotrope medications resulting in conduction changes. With weaning of chronotropic medications, he is back in a msotly junctional rhythm. Indium scan was negative for signs of CardioMEMs infection.    - continue to wean inotropes as tolerated, with goal of stable renal perfusion  - Continue coumadin with target INR 2-3  - volume/HF management as per primary team  - will plan for device implantation for next Thursday         Oniel George MD  Cardiac Electrophysiology  [facility]

## 2019-01-18 NOTE — NURSING
Called and reported to Dionna that pt has episode where he desated and lo bp.  Resolved with levo titration and increased o2 delivery (added facemask to NC delivery)-and resolved.  Pt had become lethargic and not answering questions as before.  (pt had been more sleepy today as a result of xanax and lack of good rest for a few days and this increased but this lethargy and slowed response was worse).

## 2019-01-18 NOTE — ASSESSMENT & PLAN NOTE
-S/P Unsuccessful RICARDO guided/DCCV 1/14. Remains in A fib with variety of aberrant conduction  -EP considering CRT-D replacement once off Levo for 48 hours depending on stability, culture data and ongoing discussion with ID  -Dopamine resumed 1/16 2/2 bradycardia.  -CHADS VASc 6  -Holding eliquis s/p ICD explant 1/9. Per EP, anticoagulation resumed 1/12. INR remains elevated at 4.3 likely 2/2 volume overload - continue hodling Coumadin (see acute on chronic systolic heart failure)  -Cardiac monitoring

## 2019-01-18 NOTE — SUBJECTIVE & OBJECTIVE
Interval History: No events overnight, doing well, required additional diuretics for pulmonary edema    Tele: Junction rhythm ~60bpm    ROS  Objective:     Vital Signs (Most Recent):  Temp: 97.7 °F (36.5 °C) (01/18/19 0705)  Pulse: 61 (01/18/19 1000)  Resp: 20 (01/18/19 1000)  BP: 114/63 (01/15/19 0745)  SpO2: 97 % (01/18/19 1000) Vital Signs (24h Range):  Temp:  [97.6 °F (36.4 °C)-98.3 °F (36.8 °C)] 97.7 °F (36.5 °C)  Pulse:  [58-80] 61  Resp:  [12-38] 20  SpO2:  [78 %-100 %] 97 %  Arterial Line BP: ()/(37-59) 102/37     Weight: 83.5 kg (184 lb 1.4 oz)  Body mass index is 23.64 kg/m².     SpO2: 97 %  O2 Device (Oxygen Therapy): nasal cannula w/ humidification    Physical Exam   Constitutional: He is oriented to person, place, and time. He appears well-developed and well-nourished. No distress.   HENT:   Head: Normocephalic and atraumatic.   Mouth/Throat: Oropharynx is clear and moist.   Eyes: Conjunctivae and EOM are normal. Pupils are equal, round, and reactive to light. No scleral icterus.   Neck: Normal range of motion. Neck supple. No JVD present.   Cardiovascular: Normal rate, normal heart sounds and intact distal pulses. An irregularly irregular rhythm present.   No murmur heard.  Pulses:       Radial pulses are 2+ on the right side, and 2+ on the left side.   Pulmonary/Chest: Effort normal and breath sounds normal. No respiratory distress.   Symmetrical expansion  Left chest incision is clean   Abdominal: Soft. Bowel sounds are normal. There is no hepatosplenomegaly. There is no tenderness.   Musculoskeletal: Normal range of motion.   Groin healed   Neurological: He is alert and oriented to person, place, and time. No cranial nerve deficit.   Skin: Skin is warm and dry. No rash noted. He is not diaphoretic.   Well perfused in upper and lower extremities with good capillary refill   Psychiatric: He has a normal mood and affect. Judgment and thought content normal.       Significant Labs:   EP:   Recent  Labs   Lab 01/17/19  0411 01/17/19  0811  01/17/19  1741 01/17/19 2226 01/18/19 0417     140  --    < > 139 135* 138  138   K 4.0  4.0  --    < > 4.2 4.0 3.8  3.8   CL 97  97  --    < > 96 92* 92*  92*   CO2 35*  35*  --    < > 35* 35* 36*  36*   *  193*  --    < > 123* 327* 175*  175*   BUN 37*  37*  --    < > 39* 39* 42*  42*   CREATININE 1.6*  1.6*  --    < > 1.5* 1.6* 1.6*  1.6*   CALCIUM 8.7  8.7  --    < > 8.6* 8.2* 8.9  8.9   PROT 6.5  --   --   --   --  6.8   ALBUMIN 2.4*  --   --   --   --  2.4*   BILITOT 0.9  --   --   --   --  0.9   ALKPHOS 176*  --   --   --   --  165*   AST 28  --   --   --   --  22   ALT 16  --   --   --   --  14   ANIONGAP 8  8  --    < > 8 8 10  10   ESTGFRAFRICA 47.7*  47.7*  --    < > 51.5* 47.7* 47.7*  47.7*   EGFRNONAA 41.2*  41.2*  --    < > 44.6* 41.2* 41.2*  41.2*   WBC 10.08  --   --   --   --  9.98   HGB 8.1*  --   --   --   --  8.0*   HCT 26.5*  --   --   --   --  26.4*   *  --   --   --   --  109*   INR 4.2* 4.8*  --   --   --  4.3*    < > = values in this interval not displayed.

## 2019-01-18 NOTE — NURSING
Pt with nose bleed. Venti mask placed @ 6 L for comfort. Pt having episodes of panic attacks where sats drop to low 80s. FARIDEH Lora notified, prn xanax ordered. Pt administered med 45 min ago, work of breathing increased to 30 breaths per min, pt still having episodes. MD called, ABG ordered. WCTM.

## 2019-01-18 NOTE — ASSESSMENT & PLAN NOTE
This is a 79yo gentleman with long-standing history of ICM with EF 35%, a 20-year history of ICD with upgrade to CRT-D in 2014 who presents for device extraction due to RV lead vegetation ~0.5cm in the setting of VRE bacteremia with a likely initial GI source. Likewise has a CardioMeMMS device in place. Is not pacemaker dependent. ID believes the primary source is the vegetation, he is s/p extraction on 01/09, RV lead tip grew E. Faecalis (resistant to tetracyclines). Recovery complicated by slow junctional escape rhythm responding to dopamine, no response to DCCV. Now with undetermined atrial rhythm which is either sinus bradycardia with aberrantly conducted PAC or AT, or persistent AF with junctional escape and intermittent aberrant conduction of AF, the differences can be attributed due to AV manolo stimulation by inotrope/chronotrope medications resulting in conduction changes. With weaning of chronotropic medications, he is back in a msotly junctional rhythm. Indium scan was negative for signs of CardioMEMs infection.    - continue to wean inotropes as tolerated, with goal of stable renal perfusion  - Continue coumadin with target INR 2-3  - volume/HF management as per primary team  - will plan for device implantation for next Thursday

## 2019-01-18 NOTE — PROGRESS NOTES
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 Now on 5 LPM via N/C with adequate sats. CVP 10-12 and sVO2 56. Net -ve only 818 cc past 24 hours despite increasing Lasix gtt yesterday and getting one time dose of IV Diuril. Off Levo since earlier this morning. Feels OK    Continuous Infusions:   DOPamine 5 mcg/kg/min (01/18/19 0800)    furosemide (LASIX) 2 mg/mL infusion (non-titrating) 20 mg/hr (01/18/19 0800)    norepinephrine bitartrate-D5W Stopped (01/18/19 0705)     Scheduled Meds:   ampicillin (OMNIPEN) 4 g in  mL EXTENDED INFUSION  4,000 mg Intravenous Q8H    artificial tears  1 drop Both Eyes QID    cefTRIAXone (ROCEPHIN) IVPB  2 g Intravenous Q12H    clopidogrel  75 mg Oral Daily    insulin aspart U-100  6 Units Subcutaneous TIDWM    insulin detemir U-100  15 Units Subcutaneous QHS    levothyroxine  25 mcg Oral Before breakfast    magnesium oxide  800 mg Oral BID    polyethylene glycol  17 g Oral Daily    potassium chloride SA  20 mEq Oral BID    rosuvastatin  20 mg Oral QHS    senna-docusate 8.6-50 mg  2 tablet Oral BID    tamsulosin  0.4 mg Oral Daily     PRN Meds:albuterol-ipratropium, dextrose 50%, dextrose 50%, glucagon (human recombinant), glucose, glucose, insulin aspart U-100, magnesium sulfate IVPB, magnesium sulfate IVPB, ondansetron, promethazine (PHENERGAN) IVPB, ramelteon, sodium chloride 0.9%, sodium chloride 0.9%, traMADol    Review of patient's allergies indicates:   Allergen Reactions    Adhesive Other (See Comments)     blisters    Codeine Other (See Comments)     Blurred vision    Latex Hives    Ace inhibitors      Lipitor [atorvastatin] Other (See Comments)     Muscle spasms     Objective:     Vital Signs (Most Recent):  Temp: 97.7 °F (36.5 °C) (01/18/19 0705)  Pulse: (!) 58 (01/18/19 0800)  Resp: 20 (01/18/19 0800)  BP: 114/63 (01/15/19 0745)  SpO2: 95 % (01/18/19 0800) Vital Signs (24h Range):  Temp:  [97.6 °F (36.4 °C)-98.3 °F (36.8 °C)] 97.7 °F (36.5 °C)  Pulse:  [58-80] 58  Resp:  [12-38] 20  SpO2:  [78 %-100 %] 95 %  Arterial Line BP: ()/(38-59) 112/51     Patient Vitals for the past 72 hrs (Last 3 readings):   Weight   01/16/19 1400 83.5 kg (184 lb 1.4 oz)     Body mass index is 23.64 kg/m².      Intake/Output Summary (Last 24 hours) at 1/18/2019 0930  Last data filed at 1/18/2019 0800  Gross per 24 hour   Intake 2014.92 ml   Output 3250 ml   Net -1235.08 ml       Hemodynamic Parameters:       Telemetry: Atrial fib with variable aberrant conduction    Physical Exam   Constitutional: He is oriented to person, place, and time. He appears well-developed and well-nourished.   HENT:   Head: Normocephalic and atraumatic.   Eyes: Conjunctivae and EOM are normal.   Neck: Normal range of motion. Neck supple. No thyromegaly present.   RIJ line   Cardiovascular: Regular rhythm.   Irregular, bradycardic   Pulmonary/Chest: Effort normal and breath sounds normal.   Abdominal: Soft. Bowel sounds are normal.   Musculoskeletal: Normal range of motion.   Trace HEATHER   Neurological: He is alert and oriented to person, place, and time.   Skin: Skin is warm and dry. Capillary refill takes 2 to 3 seconds.   Psychiatric: He has a normal mood and affect. His behavior is normal. Judgment and thought content normal.       Significant Labs:  CBC:  Recent Labs   Lab 01/16/19  0310 01/17/19  0411 01/18/19  0417   WBC 7.20 10.08 9.98   RBC 2.98* 2.92* 2.84*   HGB 8.4* 8.1* 8.0*   HCT 27.3* 26.5* 26.4*   * 108* 109*   MCV 92 91 93   MCH 28.2 27.7 28.2   MCHC 30.8* 30.6* 30.3*     BNP:  Recent Labs   Lab 01/17/19  1000   BNP 1,357*      CMP:  Recent Labs   Lab 01/16/19  0310  01/17/19  0411  01/17/19  1741 01/17/19  2226 01/18/19  0417   *  164*   < > 193*  193*   < > 123* 327* 175*  175*   CALCIUM 8.5*  8.5*   < > 8.7  8.7   < > 8.6* 8.2* 8.9  8.9   ALBUMIN 2.2*  --  2.4*  --   --   --  2.4*   PROT 6.2  --  6.5  --   --   --  6.8     142   < > 140  140   < > 139 135* 138  138   K 4.1  4.1   < > 4.0  4.0   < > 4.2 4.0 3.8  3.8   CO2 33*  33*   < > 35*  35*   < > 35* 35* 36*  36*   CL 98  98   < > 97  97   < > 96 92* 92*  92*   BUN 33*  33*   < > 37*  37*   < > 39* 39* 42*  42*   CREATININE 1.4  1.4   < > 1.6*  1.6*   < > 1.5* 1.6* 1.6*  1.6*   ALKPHOS 174*  --  176*  --   --   --  165*   ALT 14  --  16  --   --   --  14   AST 26  --  28  --   --   --  22   BILITOT 0.7  --  0.9  --   --   --  0.9    < > = values in this interval not displayed.      Coagulation:   Recent Labs   Lab 01/14/19  1808 01/15/19  0409 01/16/19 0310 01/17/19  0411 01/17/19  0811 01/18/19 0417   INR  --  1.5* 2.4* 4.2* 4.8* 4.3*   APTT 46.6* 48.6* 52.8*  --   --   --      LDH:  No results for input(s): LDH in the last 72 hours.  Microbiology:  Microbiology Results (last 7 days)     Procedure Component Value Units Date/Time    Blood culture [[id number]] Collected:  01/15/19 0320    Order Status:  Completed Specimen:  Blood from Peripheral, Antecubital, Left Updated:  01/18/19 0812     Blood Culture, Routine No Growth to date     Blood Culture, Routine No Growth to date     Blood Culture, Routine No Growth to date     Blood Culture, Routine No Growth to date    Blood culture [[id number]] Collected:  01/15/19 0328    Order Status:  Completed Specimen:  Blood from Peripheral, Forearm, Left Updated:  01/18/19 0812     Blood Culture, Routine No Growth to date     Blood Culture, Routine No Growth to date     Blood Culture, Routine No Growth to date     Blood Culture, Routine No Growth to date    Culture, Anaerobe [[id number]] Collected:   01/09/19 1124    Order Status:  Completed Specimen:  Tissue from Chest, Left Updated:  01/16/19 0838     Anaerobic Culture No anaerobes isolated    Narrative:       ICD pocket tissue    Culture, Anaerobe [[id number]] Collected:  01/09/19 1314    Order Status:  Completed Specimen:  Other from Chest, Left Updated:  01/16/19 0838     Anaerobic Culture No anaerobes isolated    Narrative:       #2 RA Lead tip    Culture, Anaerobe [[id number]] Collected:  01/09/19 1322    Order Status:  Completed Specimen:  Other from Chest, Left Updated:  01/16/19 0838     Anaerobic Culture No anaerobes isolated    Narrative:       #3 LV lead tip    Blood culture [[id number]] Collected:  01/10/19 1401    Order Status:  Completed Specimen:  Blood from Peripheral, Hand, Left Updated:  01/15/19 1612     Blood Culture, Routine No growth after 5 days.    Blood culture [[id number]] Collected:  01/10/19 1401    Order Status:  Completed Specimen:  Blood from Peripheral, Forearm, Left Updated:  01/15/19 1612     Blood Culture, Routine No growth after 5 days.    Blood culture [[id number]] Collected:  01/09/19 1657    Order Status:  Completed Specimen:  Blood from Peripheral, Hand, Right Updated:  01/14/19 2012     Blood Culture, Routine No growth after 5 days.    Culture, Anaerobe [[id number]] Collected:  01/09/19 1402    Order Status:  Completed Specimen:  Other from Chest, Left Updated:  01/14/19 1047     Anaerobic Culture No anaerobes isolated    Narrative:       #4 RV lead tip    Aerobic culture [[id number]]  (Susceptibility) Collected:  01/09/19 1402    Order Status:  Completed Specimen:  Other from Chest, Left Updated:  01/14/19 1003     Aerobic Bacterial Culture --     ENTEROCOCCUS FAECALIS  Rare      Narrative:       #4 RV lead tip    Aerobic culture [[id number]] Collected:  01/09/19 1322    Order Status:  Completed Specimen:  Other from Chest, Left Updated:  01/12/19 1023     Aerobic Bacterial Culture No growth    Narrative:       #3 LV lead tip     Aerobic culture [[id number]] Collected:  01/09/19 1124    Order Status:  Completed Specimen:  Tissue from Chest, Left Updated:  01/12/19 1023     Aerobic Bacterial Culture No growth    Narrative:       ICD pocket tissue    Aerobic culture [[id number]] Collected:  01/09/19 1314    Order Status:  Completed Specimen:  Other from Chest, Left Updated:  01/12/19 1023     Aerobic Bacterial Culture No growth    Narrative:       #2 RA Lead tip    Blood culture (site 1) [[id number]] Collected:  01/07/19 0154    Order Status:  Completed Specimen:  Blood Updated:  01/12/19 0812     Blood Culture, Routine No growth after 5 days.    Narrative:       Site # 1, aerobic and anaerobic    Blood culture (site 2) [[id number]] Collected:  01/07/19 0154    Order Status:  Completed Specimen:  Blood Updated:  01/12/19 0812     Blood Culture, Routine No growth after 5 days.    Narrative:       Site # 2, aerobic only    Urine culture [[id number]] Collected:  01/11/19 0115    Order Status:  Completed Specimen:  Urine, Catheterized Updated:  01/12/19 0418     Urine Culture, Routine No growth          I have reviewed all pertinent labs within the past 24 hours.    Estimated Creatinine Clearance: 45.7 mL/min (A) (based on SCr of 1.6 mg/dL (H)).    Diagnostic Results:  I have reviewed all pertinent imaging results/findings within the past 24 hours.    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 Discussed with Dr. Russo. Blood cultures repeated and show NGTD  -Appreciate EP's help. 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 Patient had 2 vegetations on RV lead which is most likely source of bacteremia. Lead tip and ICD pocket cultured with RV lead +ve for Enteroccocus. Blood cultures are negative here thus far.  -Continue Ampicillin and Ceftriaxone per ID recs Will  treat for 6 weeks post extraction.  Estimated end date 2/20/19.    -If urgently necessary from a cardiac standpoint, it is okay to proceed with implanting new device as repeat blood cultures taken post extraction remain negative X 72 hours.  Suspect known vegetations on RV leads, now removed, were primary source of infection; cardioMems likely not infected, but Indium Scan in progress to look at Cardiomems. If negative, no need for oral antibiotic suppression or surveillance blood cultures following 6 weeks of IV therapy. If positive, re-consult ID and would give patient oral antibiotic suppression following his 6 weeks of therapy.  -INR 4.3 (re-checked). Coumadin remains on hold. No Eliquis for now as he may require implant of new device  -Will need Life Vest prior to discharge if CRT-D not replaced  -Appreciate GI's help. Plan for outpatient EGD and C-scope for eval of anemia and Enterococcus bacteremia       Acute on chronic combined systolic and diastolic CHF, NYHA class 3    -ICM  -Last full 2D Echo 1/7/19: LVEF 20%, LVEDD 7.24 cm. Limited bedside echo done after ICD explant showed no pericardial effusion  -CVP 10-12. CXR overnight with significant pulmonary edema. Net -ve only 818 cc despite increasing Lasix gtt yesterday and getting IV Diuril overnight. Will give Lasix 80 mg IVP, then increase gtt to 40 mg/hr.  -sVO2 56  -GDMT: Dig and Toprol D/C'd 2/2 junctional rhythm. Entresto D/C'd 2/2 transient hypotension during/after ICD explant and TREY  -Self declined for LVAD after w/u last April 2018       Atrial fibrillation    -S/P Unsuccessful RICARDO guided/DCCV 1/14. Remains in A fib with variety of aberrant conduction  -EP considering CRT-D replacement once off Levo for 48 hours depending on stability, culture data and ongoing discussion with ID  -Dopamine resumed 1/16 2/2 bradycardia.  -CHADS VASc 6  -Holding eliquis s/p ICD explant 1/9. Per EP, anticoagulation resumed 1/12. INR remains elevated at 4.3 likely 2/2  volume overload - continue hodling Coumadin (see acute on chronic systolic heart failure)  -Cardiac monitoring     Type 2 diabetes mellitus with complication    -A1c 8.1  -Target -180 while hospitalized  -detemir 13, aspart 6 with sliding scale  -Appreciate Endocrine's help with management     Hypotension    -Off Levo since early this morning  -1/16 Dopamine restarted for bradycardia     Dysuria    - Resolved  - Urine culture -ve  - Flomax started 1/10. Will try to D/C Morin today         Junctional bradycardia    - Se A fib     Endocarditis    - See ICD lead infection     Bacteremia    -see above     TREY (acute kidney injury)    - Appreciate Nephrology's help  - Was oliguric and creatinine jumped to 4.1 likely 2/2 ATN/hypotension following ICD explant.   - CVP 10-12 and creatinine remains elevated at 1.6. See acute on chronic systolic heart failure  - Continue Dopamine, off Levo since early this morning  - Hold Entresto for now       Uninterrupted Critical Care/Counseling Time (not including procedures): 45 [place] [id number]  Heart Transplant  [facility]

## 2019-01-18 NOTE — SUBJECTIVE & OBJECTIVE
**Interval History: Events overnight noted. Now on 5 LPM via N/C with adequate sats. CVP 10-12 and sVO2 56. Net -ve only 818 cc past 24 hours despite increasing Lasix gtt yesterday and getting one time dose of IV Diuril. Off Levo since earlier this morning. Feels OK    Continuous Infusions:   DOPamine 5 mcg/kg/min (01/18/19 0800)    furosemide (LASIX) 2 mg/mL infusion (non-titrating) 20 mg/hr (01/18/19 0800)    norepinephrine bitartrate-D5W Stopped (01/18/19 0705)     Scheduled Meds:   ampicillin (OMNIPEN) 4 g in  mL EXTENDED INFUSION  4,000 mg Intravenous Q8H    artificial tears  1 drop Both Eyes QID    cefTRIAXone (ROCEPHIN) IVPB  2 g Intravenous Q12H    clopidogrel  75 mg Oral Daily    insulin aspart U-100  6 Units Subcutaneous TIDWM    insulin detemir U-100  15 Units Subcutaneous QHS    levothyroxine  25 mcg Oral Before breakfast    magnesium oxide  800 mg Oral BID    polyethylene glycol  17 g Oral Daily    potassium chloride SA  20 mEq Oral BID    rosuvastatin  20 mg Oral QHS    senna-docusate 8.6-50 mg  2 tablet Oral BID    tamsulosin  0.4 mg Oral Daily     PRN Meds:albuterol-ipratropium, dextrose 50%, dextrose 50%, glucagon (human recombinant), glucose, glucose, insulin aspart U-100, magnesium sulfate IVPB, magnesium sulfate IVPB, ondansetron, promethazine (PHENERGAN) IVPB, ramelteon, sodium chloride 0.9%, sodium chloride 0.9%, traMADol    Review of patient's allergies indicates:   Allergen Reactions    Adhesive Other (See Comments)     blisters    Codeine Other (See Comments)     Blurred vision    Latex Hives    Ace inhibitors     Lipitor [atorvastatin] Other (See Comments)     Muscle spasms     Objective:     Vital Signs (Most Recent):  Temp: 97.7 °F (36.5 °C) (01/18/19 0705)  Pulse: (!) 58 (01/18/19 0800)  Resp: 20 (01/18/19 0800)  BP: 114/63 (01/15/19 0745)  SpO2: 95 % (01/18/19 0800) Vital Signs (24h Range):  Temp:  [97.6 °F (36.4 °C)-98.3 °F (36.8 °C)] 97.7 °F (36.5  °C)  Pulse:  [58-80] 58  Resp:  [12-38] 20  SpO2:  [78 %-100 %] 95 %  Arterial Line BP: ()/(38-59) 112/51     Patient Vitals for the past 72 hrs (Last 3 readings):   Weight   01/16/19 1400 83.5 kg (184 lb 1.4 oz)     Body mass index is 23.64 kg/m².      Intake/Output Summary (Last 24 hours) at 1/18/2019 0930  Last data filed at 1/18/2019 0800  Gross per 24 hour   Intake 2014.92 ml   Output 3250 ml   Net -1235.08 ml       Hemodynamic Parameters:       Telemetry: Atrial fib with variable aberrant conduction    Physical Exam   Constitutional: He is oriented to person, place, and time. He appears well-developed and well-nourished.   HENT:   Head: Normocephalic and atraumatic.   Eyes: Conjunctivae and EOM are normal.   Neck: Normal range of motion. Neck supple. No thyromegaly present.   RIJ line   Cardiovascular: Regular rhythm.   Irregular, bradycardic   Pulmonary/Chest: Effort normal and breath sounds normal.   Abdominal: Soft. Bowel sounds are normal.   Musculoskeletal: Normal range of motion.   Trace HEATHER   Neurological: He is alert and oriented to person, place, and time.   Skin: Skin is warm and dry. Capillary refill takes 2 to 3 seconds.   Psychiatric: He has a normal mood and affect. His behavior is normal. Judgment and thought content normal.       Significant Labs:  CBC:  Recent Labs   Lab 01/16/19  0310 01/17/19  0411 01/18/19 0417   WBC 7.20 10.08 9.98   RBC 2.98* 2.92* 2.84*   HGB 8.4* 8.1* 8.0*   HCT 27.3* 26.5* 26.4*   * 108* 109*   MCV 92 91 93   MCH 28.2 27.7 28.2   MCHC 30.8* 30.6* 30.3*     BNP:  Recent Labs   Lab 01/17/19  1000   BNP 1,357*     CMP:  Recent Labs   Lab 01/16/19  0310  01/17/19  0411  01/17/19  1741 01/17/19  2226 01/18/19 0417   *  164*   < > 193*  193*   < > 123* 327* 175*  175*   CALCIUM 8.5*  8.5*   < > 8.7  8.7   < > 8.6* 8.2* 8.9  8.9   ALBUMIN 2.2*  --  2.4*  --   --   --  2.4*   PROT 6.2  --  6.5  --   --   --  6.8     142   < > 140  140   < >  139 135* 138  138   K 4.1  4.1   < > 4.0  4.0   < > 4.2 4.0 3.8  3.8   CO2 33*  33*   < > 35*  35*   < > 35* 35* 36*  36*   CL 98  98   < > 97  97   < > 96 92* 92*  92*   BUN 33*  33*   < > 37*  37*   < > 39* 39* 42*  42*   CREATININE 1.4  1.4   < > 1.6*  1.6*   < > 1.5* 1.6* 1.6*  1.6*   ALKPHOS 174*  --  176*  --   --   --  165*   ALT 14  --  16  --   --   --  14   AST 26  --  28  --   --   --  22   BILITOT 0.7  --  0.9  --   --   --  0.9    < > = values in this interval not displayed.      Coagulation:   Recent Labs   Lab 01/14/19  1808 01/15/19  0409 01/16/19  0310 01/17/19  0411 01/17/19  0811 01/18/19  0417   INR  --  1.5* 2.4* 4.2* 4.8* 4.3*   APTT 46.6* 48.6* 52.8*  --   --   --      LDH:  No results for input(s): LDH in the last 72 hours.  Microbiology:  Microbiology Results (last 7 days)     Procedure Component Value Units Date/Time    Blood culture [[id number]] Collected:  01/15/19 0320    Order Status:  Completed Specimen:  Blood from Peripheral, Antecubital, Left Updated:  01/18/19 0812     Blood Culture, Routine No Growth to date     Blood Culture, Routine No Growth to date     Blood Culture, Routine No Growth to date     Blood Culture, Routine No Growth to date    Blood culture [[id number]] Collected:  01/15/19 0328    Order Status:  Completed Specimen:  Blood from Peripheral, Forearm, Left Updated:  01/18/19 0812     Blood Culture, Routine No Growth to date     Blood Culture, Routine No Growth to date     Blood Culture, Routine No Growth to date     Blood Culture, Routine No Growth to date    Culture, Anaerobe [[id number]] Collected:  01/09/19 1124    Order Status:  Completed Specimen:  Tissue from Chest, Left Updated:  01/16/19 0838     Anaerobic Culture No anaerobes isolated    Narrative:       ICD pocket tissue    Culture, Anaerobe [[id number]] Collected:  01/09/19 1314    Order Status:  Completed Specimen:  Other from Chest, Left Updated:  01/16/19 0838     Anaerobic Culture No  anaerobes isolated    Narrative:       #2 RA Lead tip    Culture, Anaerobe [[id number]] Collected:  01/09/19 1322    Order Status:  Completed Specimen:  Other from Chest, Left Updated:  01/16/19 0838     Anaerobic Culture No anaerobes isolated    Narrative:       #3 LV lead tip    Blood culture [[id number]] Collected:  01/10/19 1401    Order Status:  Completed Specimen:  Blood from Peripheral, Hand, Left Updated:  01/15/19 1612     Blood Culture, Routine No growth after 5 days.    Blood culture [[id number]] Collected:  01/10/19 1401    Order Status:  Completed Specimen:  Blood from Peripheral, Forearm, Left Updated:  01/15/19 1612     Blood Culture, Routine No growth after 5 days.    Blood culture [[id number]] Collected:  01/09/19 1657    Order Status:  Completed Specimen:  Blood from Peripheral, Hand, Right Updated:  01/14/19 2012     Blood Culture, Routine No growth after 5 days.    Culture, Anaerobe [[id number]] Collected:  01/09/19 1402    Order Status:  Completed Specimen:  Other from Chest, Left Updated:  01/14/19 1047     Anaerobic Culture No anaerobes isolated    Narrative:       #4 RV lead tip    Aerobic culture [[id number]]  (Susceptibility) Collected:  01/09/19 1402    Order Status:  Completed Specimen:  Other from Chest, Left Updated:  01/14/19 1003     Aerobic Bacterial Culture --     ENTEROCOCCUS FAECALIS  Rare      Narrative:       #4 RV lead tip    Aerobic culture [[id number]] Collected:  01/09/19 1322    Order Status:  Completed Specimen:  Other from Chest, Left Updated:  01/12/19 1023     Aerobic Bacterial Culture No growth    Narrative:       #3 LV lead tip    Aerobic culture [[id number]] Collected:  01/09/19 1124    Order Status:  Completed Specimen:  Tissue from Chest, Left Updated:  01/12/19 1023     Aerobic Bacterial Culture No growth    Narrative:       ICD pocket tissue    Aerobic culture [[id number]] Collected:  01/09/19 1314    Order Status:  Completed Specimen:  Other from Chest, Left Updated:   01/12/19 1023     Aerobic Bacterial Culture No growth    Narrative:       #2 RA Lead tip    Blood culture (site 1) [[id number]] Collected:  01/07/19 0154    Order Status:  Completed Specimen:  Blood Updated:  01/12/19 0812     Blood Culture, Routine No growth after 5 days.    Narrative:       Site # 1, aerobic and anaerobic    Blood culture (site 2) [[id number]] Collected:  01/07/19 0154    Order Status:  Completed Specimen:  Blood Updated:  01/12/19 0812     Blood Culture, Routine No growth after 5 days.    Narrative:       Site # 2, aerobic only    Urine culture [[id number]] Collected:  01/11/19 0115    Order Status:  Completed Specimen:  Urine, Catheterized Updated:  01/12/19 0418     Urine Culture, Routine No growth          I have reviewed all pertinent labs within the past 24 hours.    Estimated Creatinine Clearance: 45.7 mL/min (A) (based on SCr of 1.6 mg/dL (H)).    Diagnostic Results:  I have reviewed all pertinent imaging results/findings within the past 24 hours.

## 2019-01-18 NOTE — ASSESSMENT & PLAN NOTE
-Vegetation seen on RICARDO  -2 mobile masses seen on CVC by RICARDO 1/14 (has both RUE PICC and RIJ TLC). Discussed with Dr. Russo. Blood cultures repeated and show NGTD  -Appreciate EP's help. 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 Patient had 2 vegetations on RV lead which is most likely source of bacteremia. Lead tip and ICD pocket cultured with RV lead +ve for Enteroccocus. Blood cultures are negative here thus far.  -Continue Ampicillin and Ceftriaxone per ID recs Will treat for 6 weeks post extraction.  Estimated end date 2/20/19.    -If urgently necessary from a cardiac standpoint, it is okay to proceed with implanting new device as repeat blood cultures taken post extraction remain negative X 72 hours.  Suspect known vegetations on RV leads, now removed, were primary source of infection; cardioMems likely not infected, but Indium Scan in progress to look at Cardiomems. If negative, no need for oral antibiotic suppression or surveillance blood cultures following 6 weeks of IV therapy. If positive, re-consult ID and would give patient oral antibiotic suppression following his 6 weeks of therapy.  -INR 4.3 (re-checked). Coumadin remains on hold. No Eliquis for now as he may require implant of new device  -Will need Life Vest prior to discharge if CRT-D not replaced  -Appreciate GI's help. Plan for outpatient EGD and C-scope for eval of anemia and Enterococcus bacteremia

## 2019-01-18 NOTE — ASSESSMENT & PLAN NOTE
- Appreciate Nephrology's help  - Was oliguric and creatinine jumped to 4.1 likely 2/2 ATN/hypotension following ICD explant.   - CVP 10-12 and creatinine remains elevated at 1.6. See acute on chronic systolic heart failure  - Continue Dopamine, off Levo since early this morning  - Hold Entresto for now

## 2019-01-18 NOTE — SUBJECTIVE & OBJECTIVE
"Interval HPI:   Overnight events: Remains in CMICU. IV antibiotics. BG at or slightly above goal with scheduled insulin and correction scale.   Eatin%  Nausea: No  Hypoglycemia and intervention: No  Fever: No  TPN and/or TF: No      /63 (BP Location: Left arm, Patient Position: Lying)   Pulse (!) 58   Temp 97.7 °F (36.5 °C) (Oral)   Resp 20   Ht 6' 2" (1.88 m)   Wt 83.5 kg (184 lb 1.4 oz)   SpO2 95%   BMI 23.64 kg/m²     Labs Reviewed and Include    Recent Labs   Lab 19  0417   *  175*   CALCIUM 8.9  8.9   ALBUMIN 2.4*   PROT 6.8     138   K 3.8  3.8   CO2 36*  36*   CL 92*  92*   BUN 42*  42*   CREATININE 1.6*  1.6*   ALKPHOS 165*   ALT 14   AST 22   BILITOT 0.9     Lab Results   Component Value Date    WBC 9.98 2019    HGB 8.0 (L) 2019    HCT 26.4 (L) 2019    MCV 93 2019     (L) 2019     No results for input(s): TSH, FREET4 in the last 168 hours.  Lab Results   Component Value Date    HGBA1C 8.1 (H) 2019       Nutritional status:   Body mass index is 23.64 kg/m².  Lab Results   Component Value Date    ALBUMIN 2.4 (L) 2019    ALBUMIN 2.4 (L) 2019    ALBUMIN 2.2 (L) 2019     No results found for: PREALBUMIN    Estimated Creatinine Clearance: 45.7 mL/min (A) (based on SCr of 1.6 mg/dL (H)).    Accu-Checks  Recent Labs     01/15/19  2228 19  0735 19  0926 19  1617 19  2045 19  0752 19  1208 19  1743 19  0755   POCTGLUCOSE 185* 167* 268* 182* 184* 138* 200* 114* 184* 210*       Current Medications and/or Treatments Impacting Glycemic Control  Immunotherapy:    Immunosuppressants     None        Steroids:   Hormones (From admission, onward)    None        Pressors:    Autonomic Drugs (From admission, onward)    Start     Stop Route Frequency Ordered    19 1330  DOPamine 400 mg in dextrose 5 % 250 mL infusion (premix) (NON-TITRATING)      -- IV " Continuous 01/16/19 1226    01/15/19 1515  norepinephrine 4 mg in dextrose 5% 250 mL infusion (premix) (titrating)     Question Answer Comment   Titrate by: (in mcg/kg/min) 0.02    Titrate interval: (in minutes) 5    Titrate to maintain: (MAP or SBP) SBP    Greater than: (in mmHg) 100    Maximum dose: (in mcg/kg/min) 3        -- IV Continuous 01/15/19 1512        Hyperglycemia/Diabetes Medications:   Antihyperglycemics (From admission, onward)    Start     Stop Route Frequency Ordered    01/13/19 2100  insulin detemir U-100 pen 13 Units      -- SubQ Nightly 01/13/19 0949    01/11/19 1645  insulin aspart U-100 pen 6 Units      -- SubQ 3 times daily with meals 01/11/19 1259    01/11/19 1358  insulin aspart U-100 pen 0-5 Units      -- SubQ Before meals & nightly PRN 01/11/19 1259

## 2019-01-19 NOTE — PROGRESS NOTES
"[facility]  Endocrinology  Progress Note    Admit Date: 2019     Reason for Consult: Management of T2DM, Hyperglycemia     Surgical Procedure and Date: Lead extraction 19    Diabetes diagnosis year: approximately 10 years ago    Lab Results   Component Value Date    HGBA1C 8.1 (H) 2019       Home Diabetes Medications: Lantus 50 units q HS, Novolog 25 units with meals (vials)    How often checking glucose at home? 3-4x per day   BG readings on regimen: AM 120s  Hypoglycemia on the regimen?  Yes - 2-3x per month  Missed doses on regimen?  Yes - 2x per week    Diabetes Complications include:     Hyperglycemia, Hypoglycemia , Diabetic retinopathy , Diabetic cataract  and Diabetic peripheral neuropathy     Complicating diabetes co morbidities:   CHF and History of CVA      HPI:   Patient is a 78 y.o. male with a diagnosis of DM2 and CHF who was admitted on 19 for possible RV lead infection.  Patient is s/p lead extraction on 19.  Patient's DM managed by PCP, Dr. Hdz.  Endocrine consulted for DM/BG management.            Interval HPI:   Overnight events: Remains in CMICU, respiratory issues noted overnight.  BG elevated yesterday during the day, trended down below goal this am.  On IV antibiotics.  Eatin-75%  Nausea: No  Hypoglycemia and intervention: No  Fever: No  TPN and/or TF: No    BP (!) 111/49   Pulse (!) 55   Temp 97.6 °F (36.4 °C) (Axillary)   Resp (!) 23   Ht 6' 2" (1.88 m)   Wt 83.5 kg (184 lb 1.4 oz)   SpO2 95%   BMI 23.64 kg/m²      Labs Reviewed and Include    Recent Labs   Lab 19  0437   *   CALCIUM 8.9   ALBUMIN 2.3*   PROT 6.8      K 3.8   CO2 36*   CL 95   BUN 49*   CREATININE 1.6*   ALKPHOS 169*   ALT 14   AST 21   BILITOT 0.8     Lab Results   Component Value Date    WBC 10.53 2019    HGB 7.5 (L) 2019    HCT 24.8 (L) 2019    MCV 92 2019     (L) 2019     No results for input(s): TSH, " FREET4 in the last 168 hours.  Lab Results   Component Value Date    HGBA1C 8.1 (H) 01/07/2019       Nutritional status:   Body mass index is 23.64 kg/m².  Lab Results   Component Value Date    ALBUMIN 2.3 (L) 01/19/2019    ALBUMIN 2.4 (L) 01/18/2019    ALBUMIN 2.4 (L) 01/17/2019     No results found for: PREALBUMIN    Estimated Creatinine Clearance: 45.7 mL/min (A) (based on SCr of 1.6 mg/dL (H)).    Accu-Checks  Recent Labs     01/16/19  1617 01/16/19  2045 01/17/19  0752 01/17/19  1208 01/17/19  1743 01/17/19  2017 01/18/19  0755 01/18/19  1310 01/18/19  1813 01/19/19  0743   POCTGLUCOSE 182* 184* 138* 200* 114* 184* 210* 287* 218* 107       Current Medications and/or Treatments Impacting Glycemic Control  Immunotherapy:    Immunosuppressants     None        Steroids:   Hormones (From admission, onward)    None        Pressors:    Autonomic Drugs (From admission, onward)    Start     Stop Route Frequency Ordered    01/16/19 1330  DOPamine 400 mg in dextrose 5 % 250 mL infusion (premix) (NON-TITRATING)      -- IV Continuous 01/16/19 1226    01/15/19 1515  norepinephrine 4 mg in dextrose 5% 250 mL infusion (premix) (titrating)     Question Answer Comment   Titrate by: (in mcg/kg/min) 0.02    Titrate interval: (in minutes) 5    Titrate to maintain: (MAP or SBP) SBP    Greater than: (in mmHg) 100    Maximum dose: (in mcg/kg/min) 3        -- IV Continuous 01/15/19 1512        Hyperglycemia/Diabetes Medications:   Antihyperglycemics (From admission, onward)    Start     Stop Route Frequency Ordered    01/18/19 2100  insulin detemir U-100 pen 15 Units      -- SubQ Nightly 01/18/19 0827    01/11/19 1645  insulin aspart U-100 pen 6 Units      -- SubQ 3 times daily with meals 01/11/19 1259    01/11/19 1358  insulin aspart U-100 pen 0-5 Units      -- SubQ Before meals & nightly PRN 01/11/19 1259          ASSESSMENT and PLAN    * 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         Atrial fibrillation    May affect BG readings if uncontrolled  S/p cardioversion.          Timo Avalos NP  Endocrinology  [facility]

## 2019-01-19 NOTE — SUBJECTIVE & OBJECTIVE
Interval History: Not responding well to increase in Lasix gtt to 40/hr. CVP elevated to 16. UOP ~30-70 cc/hr. Only net negative 200 cc in past 24 hours. Remains congested with persistent O2 requirements. Clinically feels like he cannot catch his breath.     CVP 16; SVO2 40; CO 5.0; CI 2.3; .     Continuous Infusions:   DOPamine 5 mcg/kg/min (01/19/19 1200)    epinephrine infusion (NON-TITRATING) 0.02 mcg/kg/min (01/19/19 1200)    furosemide (LASIX) 2 mg/mL infusion (non-titrating) 40 mg/hr (01/19/19 1200)     Scheduled Meds:   ampicillin (OMNIPEN) 4 g in  mL EXTENDED INFUSION  4,000 mg Intravenous Q8H    artificial tears  1 drop Both Eyes QID    cefTRIAXone (ROCEPHIN) IVPB  2 g Intravenous Q12H    clopidogrel  75 mg Oral Daily    insulin aspart U-100  6-8 Units Subcutaneous TIDWM    insulin detemir U-100  14 Units Subcutaneous QHS    levothyroxine  25 mcg Oral Before breakfast    magnesium oxide  800 mg Oral BID    polyethylene glycol  17 g Oral Daily    potassium chloride SA  20 mEq Oral BID    rosuvastatin  20 mg Oral QHS    senna-docusate 8.6-50 mg  2 tablet Oral BID    tamsulosin  0.4 mg Oral Daily     PRN Meds:albuterol-ipratropium, ALPRAZolam, dextrose 50%, dextrose 50%, glucagon (human recombinant), glucose, glucose, insulin aspart U-100, magnesium sulfate IVPB, magnesium sulfate IVPB, ondansetron, promethazine (PHENERGAN) IVPB, ramelteon, sodium chloride 0.9%, sodium chloride 0.9%, traMADol    Review of patient's allergies indicates:   Allergen Reactions    Adhesive Other (See Comments)     blisters    Codeine Other (See Comments)     Blurred vision    Latex Hives    Ace inhibitors     Lipitor [atorvastatin] Other (See Comments)     Muscle spasms     Objective:     Vital Signs (Most Recent):  Temp: 97.5 °F (36.4 °C) (01/19/19 1100)  Pulse: 69 (01/19/19 1200)  Resp: (!) 23 (01/19/19 1200)  BP: (!) 111/44 (01/19/19 1100)  SpO2: 95 % (01/19/19 1200) Vital Signs (24h  Range):  Temp:  [97.5 °F (36.4 °C)-98 °F (36.7 °C)] 97.5 °F (36.4 °C)  Pulse:  [54-72] 69  Resp:  [12-28] 23  SpO2:  [90 %-100 %] 95 %  BP: (111)/(44-49) 111/44  Arterial Line BP: ()/(42-62) 110/48     Patient Vitals for the past 72 hrs (Last 3 readings):   Weight   01/16/19 1400 83.5 kg (184 lb 1.4 oz)     Body mass index is 23.64 kg/m².      Intake/Output Summary (Last 24 hours) at 1/19/2019 1254  Last data filed at 1/19/2019 1200  Gross per 24 hour   Intake 2209.49 ml   Output 1615 ml   Net 594.49 ml         Telemetry: Atrial fib with variable conduction    Physical Exam   Constitutional: He is oriented to person, place, and time. He appears well-developed and well-nourished.   HENT:   Head: Normocephalic and atraumatic.   Eyes: Conjunctivae and EOM are normal.   Neck: Normal range of motion. Neck supple. No JVD present. No thyromegaly present.   Cardiovascular: Regular rhythm.   Irregular, bradycardic   Pulmonary/Chest: Effort normal and breath sounds normal.   Abdominal: Soft. Bowel sounds are normal.   Musculoskeletal: Normal range of motion.   Trace HEATHER   Neurological: He is alert and oriented to person, place, and time.   Skin: Skin is warm and dry. Capillary refill takes 2 to 3 seconds.   Psychiatric: He has a normal mood and affect. His behavior is normal. Judgment and thought content normal.       Significant Labs:  CBC:  Recent Labs   Lab 01/17/19 0411 01/18/19  0417 01/19/19  0437   WBC 10.08 9.98 10.53   RBC 2.92* 2.84* 2.71*   HGB 8.1* 8.0* 7.5*   HCT 26.5* 26.4* 24.8*   * 109* 126*   MCV 91 93 92   MCH 27.7 28.2 27.7   MCHC 30.6* 30.3* 30.2*     BNP:  Recent Labs   Lab 01/17/19  1000   BNP 1,357*     CMP:  Recent Labs   Lab 01/17/19 0411  01/18/19  0417 01/18/19  1527 01/19/19  0437   *  193*   < > 175*  175* 243* 111*   CALCIUM 8.7  8.7   < > 8.9  8.9 8.5* 8.9   ALBUMIN 2.4*  --  2.4*  --  2.3*   PROT 6.5  --  6.8  --  6.8     140   < > 138  138 137 140   K 4.0   4.0   < > 3.8  3.8 4.3 3.8   CO2 35*  35*   < > 36*  36* 36* 36*   CL 97  97   < > 92*  92* 93* 95   BUN 37*  37*   < > 42*  42* 48* 49*   CREATININE 1.6*  1.6*   < > 1.6*  1.6* 1.7* 1.6*   ALKPHOS 176*  --  165*  --  169*   ALT 16  --  14  --  14   AST 28  --  22  --  21   BILITOT 0.9  --  0.9  --  0.8    < > = values in this interval not displayed.      Coagulation:   Recent Labs   Lab 01/14/19  1808 01/15/19  0409 01/16/19  0310  01/17/19  0811 01/18/19  0417 01/19/19  0437   INR  --  1.5* 2.4*   < > 4.8* 4.3* 4.1*   APTT 46.6* 48.6* 52.8*  --   --   --   --     < > = values in this interval not displayed.     LDH:  No results for input(s): LDH in the last 72 hours.  Microbiology:  Microbiology Results (last 7 days)     Procedure Component Value Units Date/Time    Blood culture [[id number]] Collected:  01/15/19 0320    Order Status:  Completed Specimen:  Blood from Peripheral, Antecubital, Left Updated:  01/19/19 0812     Blood Culture, Routine No Growth to date     Blood Culture, Routine No Growth to date     Blood Culture, Routine No Growth to date     Blood Culture, Routine No Growth to date     Blood Culture, Routine No Growth to date    Blood culture [[id number]] Collected:  01/15/19 0328    Order Status:  Completed Specimen:  Blood from Peripheral, Forearm, Left Updated:  01/19/19 0812     Blood Culture, Routine No Growth to date     Blood Culture, Routine No Growth to date     Blood Culture, Routine No Growth to date     Blood Culture, Routine No Growth to date     Blood Culture, Routine No Growth to date    Culture, Anaerobe [[id number]] Collected:  01/09/19 1124    Order Status:  Completed Specimen:  Tissue from Chest, Left Updated:  01/16/19 0838     Anaerobic Culture No anaerobes isolated    Narrative:       ICD pocket tissue    Culture, Anaerobe [[id number]] Collected:  01/09/19 1314    Order Status:  Completed Specimen:  Other from Chest, Left Updated:  01/16/19 0883     Anaerobic Culture No  anaerobes isolated    Narrative:       #2 RA Lead tip    Culture, Anaerobe [[id number]] Collected:  01/09/19 1322    Order Status:  Completed Specimen:  Other from Chest, Left Updated:  01/16/19 0838     Anaerobic Culture No anaerobes isolated    Narrative:       #3 LV lead tip    Blood culture [[id number]] Collected:  01/10/19 1401    Order Status:  Completed Specimen:  Blood from Peripheral, Hand, Left Updated:  01/15/19 1612     Blood Culture, Routine No growth after 5 days.    Blood culture [[id number]] Collected:  01/10/19 1401    Order Status:  Completed Specimen:  Blood from Peripheral, Forearm, Left Updated:  01/15/19 1612     Blood Culture, Routine No growth after 5 days.    Blood culture [[id number]] Collected:  01/09/19 1657    Order Status:  Completed Specimen:  Blood from Peripheral, Hand, Right Updated:  01/14/19 2012     Blood Culture, Routine No growth after 5 days.    Culture, Anaerobe [[id number]] Collected:  01/09/19 1402    Order Status:  Completed Specimen:  Other from Chest, Left Updated:  01/14/19 1047     Anaerobic Culture No anaerobes isolated    Narrative:       #4 RV lead tip    Aerobic culture [[id number]]  (Susceptibility) Collected:  01/09/19 1402    Order Status:  Completed Specimen:  Other from Chest, Left Updated:  01/14/19 1003     Aerobic Bacterial Culture --     ENTEROCOCCUS FAECALIS  Rare      Narrative:       #4 RV lead tip          I have reviewed all pertinent labs within the past 24 hours.    Estimated Creatinine Clearance: 45.7 mL/min (A) (based on SCr of 1.6 mg/dL (H)).    Diagnostic Results:  I have reviewed all pertinent imaging results/findings within the past 24 hours.

## 2019-01-19 NOTE — PROGRESS NOTES
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 CVP elevated to 16. UOP ~30-70 cc/hr. Only net negative 200 cc in past 24 hours. Remains congested with persistent O2 requirements. Clinically feels like he cannot catch his breath.     CVP 16; SVO2 40; CO 5.0; CI 2.3; .     ABG this morning WNL / compensated --> pH 7.45, pCO2 55, pO2 88, pHCO3 38    Continuous Infusions:   DOPamine 5 mcg/kg/min (01/19/19 1200)    epinephrine infusion (NON-TITRATING) 0.02 mcg/kg/min (01/19/19 1200)    furosemide (LASIX) 2 mg/mL infusion (non-titrating) 40 mg/hr (01/19/19 1200)     Scheduled Meds:   ampicillin (OMNIPEN) 4 g in  mL EXTENDED INFUSION  4,000 mg Intravenous Q8H    artificial tears  1 drop Both Eyes QID    cefTRIAXone (ROCEPHIN) IVPB  2 g Intravenous Q12H    clopidogrel  75 mg Oral Daily    insulin aspart U-100  6-8 Units Subcutaneous TIDWM    insulin detemir U-100  14 Units Subcutaneous QHS    levothyroxine  25 mcg Oral Before breakfast    magnesium oxide  800 mg Oral BID    polyethylene glycol  17 g Oral Daily    potassium chloride SA  20 mEq Oral BID    rosuvastatin  20 mg Oral QHS    senna-docusate 8.6-50 mg  2 tablet Oral BID    tamsulosin  0.4 mg Oral Daily     PRN Meds:albuterol-ipratropium, ALPRAZolam, dextrose 50%, dextrose 50%, glucagon (human recombinant), glucose, glucose, insulin aspart U-100, magnesium sulfate IVPB, magnesium sulfate IVPB, ondansetron, promethazine (PHENERGAN) IVPB, ramelteon, sodium chloride 0.9%, sodium chloride 0.9%, traMADol    Review of patient's allergies indicates:   Allergen  Reactions    Adhesive Other (See Comments)     blisters    Codeine Other (See Comments)     Blurred vision    Latex Hives    Ace inhibitors     Lipitor [atorvastatin] Other (See Comments)     Muscle spasms     Objective:     Vital Signs (Most Recent):  Temp: 97.5 °F (36.4 °C) (01/19/19 1100)  Pulse: 69 (01/19/19 1200)  Resp: (!) 23 (01/19/19 1200)  BP: (!) 111/44 (01/19/19 1100)  SpO2: 95 % (01/19/19 1200) Vital Signs (24h Range):  Temp:  [97.5 °F (36.4 °C)-98 °F (36.7 °C)] 97.5 °F (36.4 °C)  Pulse:  [54-72] 69  Resp:  [12-28] 23  SpO2:  [90 %-100 %] 95 %  BP: (111)/(44-49) 111/44  Arterial Line BP: ()/(42-62) 110/48     Patient Vitals for the past 72 hrs (Last 3 readings):   Weight   01/16/19 1400 83.5 kg (184 lb 1.4 oz)     Body mass index is 23.64 kg/m².      Intake/Output Summary (Last 24 hours) at 1/19/2019 1254  Last data filed at 1/19/2019 1200  Gross per 24 hour   Intake 2209.49 ml   Output 1615 ml   Net 594.49 ml         Telemetry: Atrial fib with variable conduction    Physical Exam   Constitutional: He is oriented to person, place, and time. He appears well-developed and well-nourished.   HENT:   Head: Normocephalic and atraumatic.   Eyes: Conjunctivae and EOM are normal.   Neck: Normal range of motion. Neck supple. No JVD present. No thyromegaly present.   Cardiovascular: Regular rhythm.   Irregular, bradycardic   Pulmonary/Chest: Effort normal and breath sounds normal.   Abdominal: Soft. Bowel sounds are normal.   Musculoskeletal: Normal range of motion.   Trace HEATHER   Neurological: He is alert and oriented to person, place, and time.   Skin: Skin is warm and dry. Capillary refill takes 2 to 3 seconds.   Psychiatric: He has a normal mood and affect. His behavior is normal. Judgment and thought content normal.       Significant Labs:  CBC:  Recent Labs   Lab 01/17/19  0411 01/18/19  0417 01/19/19  0437   WBC 10.08 9.98 10.53   RBC 2.92* 2.84* 2.71*   HGB 8.1* 8.0* 7.5*   HCT 26.5* 26.4* 24.8*    * 109* 126*   MCV 91 93 92   MCH 27.7 28.2 27.7   MCHC 30.6* 30.3* 30.2*     BNP:  Recent Labs   Lab 01/17/19  1000   BNP 1,357*     CMP:  Recent Labs   Lab 01/17/19  0411  01/18/19  0417 01/18/19  1527 01/19/19  0437   *  193*   < > 175*  175* 243* 111*   CALCIUM 8.7  8.7   < > 8.9  8.9 8.5* 8.9   ALBUMIN 2.4*  --  2.4*  --  2.3*   PROT 6.5  --  6.8  --  6.8     140   < > 138  138 137 140   K 4.0  4.0   < > 3.8  3.8 4.3 3.8   CO2 35*  35*   < > 36*  36* 36* 36*   CL 97  97   < > 92*  92* 93* 95   BUN 37*  37*   < > 42*  42* 48* 49*   CREATININE 1.6*  1.6*   < > 1.6*  1.6* 1.7* 1.6*   ALKPHOS 176*  --  165*  --  169*   ALT 16  --  14  --  14   AST 28  --  22  --  21   BILITOT 0.9  --  0.9  --  0.8    < > = values in this interval not displayed.      Coagulation:   Recent Labs   Lab 01/14/19  1808 01/15/19  0409 01/16/19  0310  01/17/19  0811 01/18/19  0417 01/19/19  0437   INR  --  1.5* 2.4*   < > 4.8* 4.3* 4.1*   APTT 46.6* 48.6* 52.8*  --   --   --   --     < > = values in this interval not displayed.     LDH:  No results for input(s): LDH in the last 72 hours.  Microbiology:  Microbiology Results (last 7 days)     Procedure Component Value Units Date/Time    Blood culture [[id number]] Collected:  01/15/19 0320    Order Status:  Completed Specimen:  Blood from Peripheral, Antecubital, Left Updated:  01/19/19 0812     Blood Culture, Routine No Growth to date     Blood Culture, Routine No Growth to date     Blood Culture, Routine No Growth to date     Blood Culture, Routine No Growth to date     Blood Culture, Routine No Growth to date    Blood culture [[id number]] Collected:  01/15/19 0328    Order Status:  Completed Specimen:  Blood from Peripheral, Forearm, Left Updated:  01/19/19 0812     Blood Culture, Routine No Growth to date     Blood Culture, Routine No Growth to date     Blood Culture, Routine No Growth to date     Blood Culture, Routine No Growth to date     Blood  Culture, Routine No Growth to date    Culture, Anaerobe [[id number]] Collected:  01/09/19 1124    Order Status:  Completed Specimen:  Tissue from Chest, Left Updated:  01/16/19 0838     Anaerobic Culture No anaerobes isolated    Narrative:       ICD pocket tissue    Culture, Anaerobe [[id number]] Collected:  01/09/19 1314    Order Status:  Completed Specimen:  Other from Chest, Left Updated:  01/16/19 0838     Anaerobic Culture No anaerobes isolated    Narrative:       #2 RA Lead tip    Culture, Anaerobe [[id number]] Collected:  01/09/19 1322    Order Status:  Completed Specimen:  Other from Chest, Left Updated:  01/16/19 0838     Anaerobic Culture No anaerobes isolated    Narrative:       #3 LV lead tip    Blood culture [[id number]] Collected:  01/10/19 1401    Order Status:  Completed Specimen:  Blood from Peripheral, Hand, Left Updated:  01/15/19 1612     Blood Culture, Routine No growth after 5 days.    Blood culture [[id number]] Collected:  01/10/19 1401    Order Status:  Completed Specimen:  Blood from Peripheral, Forearm, Left Updated:  01/15/19 1612     Blood Culture, Routine No growth after 5 days.    Blood culture [[id number]] Collected:  01/09/19 1657    Order Status:  Completed Specimen:  Blood from Peripheral, Hand, Right Updated:  01/14/19 2012     Blood Culture, Routine No growth after 5 days.    Culture, Anaerobe [[id number]] Collected:  01/09/19 1402    Order Status:  Completed Specimen:  Other from Chest, Left Updated:  01/14/19 1047     Anaerobic Culture No anaerobes isolated    Narrative:       #4 RV lead tip    Aerobic culture [[id number]]  (Susceptibility) Collected:  01/09/19 1402    Order Status:  Completed Specimen:  Other from Chest, Left Updated:  01/14/19 1003     Aerobic Bacterial Culture --     ENTEROCOCCUS FAECALIS  Rare      Narrative:       #4 RV lead tip          I have reviewed all pertinent labs within the past 24 hours.    Estimated Creatinine Clearance: 45.7 mL/min (A) (based on SCr of  1.6 mg/dL (H)).    Diagnostic Results:  I have reviewed all pertinent imaging results/findings within the past 24 hours.    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 Discussed with Dr. Russo. Blood cultures repeated and show NGTD  -Appreciate EP's help. 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 Patient had 2 vegetations on RV lead which is most likely source of bacteremia. Lead tip and ICD pocket cultured with RV lead +ve for Enteroccocus. Blood cultures are negative here thus far.  -Continue Ampicillin and Ceftriaxone per ID recs Will treat for 6 weeks post extraction.  Estimated end date 2/20/19.    -If urgently necessary from a cardiac standpoint, it is okay to proceed with implanting new device as repeat blood cultures taken post extraction remain negative X 72 hours.  Suspect known vegetations on RV leads, now removed, were primary source of infection; cardioMems likely not infected, but Indium Scan in progress to look at Cardiomems. If negative, no need for oral antibiotic suppression or surveillance blood cultures following 6 weeks of IV therapy. If positive, re-consult ID and would give patient oral antibiotic suppression following his 6 weeks of therapy.  -INR 4.3 (re-checked). Coumadin remains on hold. No Eliquis for now as he may require implant of new device  -Will need Life Vest prior to discharge if CRT-D not replaced  -Appreciate GI's help. Plan for outpatient EGD and C-scope for eval of anemia and Enterococcus bacteremia       Hypotension    -Off Levo since early this morning  -1/16 Dopamine restarted for bradycardia     Dysuria    - Resolved  - Urine culture -ve  - Flomax started 1/10. Will try to D/C [place] today         Junctional bradycardia    - Se A fib     Endocarditis    - See ICD lead infection      Bacteremia    -see above     TREY (acute kidney injury)    - Appreciate Nephrology's help  - Was oliguric and creatinine jumped to 4.1 likely 2/2 ATN/hypotension following ICD explant.   - CVP 10-12 and creatinine remains elevated at 1.6. See acute on chronic systolic heart failure  - Continue Dopamine, off Levo since early this morning  - Hold Entresto for now     Atrial fibrillation    -S/P Unsuccessful RICARDO guided/DCCV 1/14. Remains in A fib with variety of aberrant conduction  -EP considering CRT-D replacement once off Levo for 48 hours depending on stability, culture data and ongoing discussion with ID  -Dopamine resumed 1/16 2/2 bradycardia.  -CHADS VASc 6  -Holding eliquis s/p ICD explant 1/9. Per EP, anticoagulation resumed 1/12. INR remains elevated at 4.3 likely 2/2 volume overload - continue hodling Coumadin (see acute on chronic systolic heart failure)  -Cardiac monitoring     Type 2 diabetes mellitus with complication    -A1c 8.1  -Target -180 while hospitalized  -detemir 13, aspart 6 with sliding scale  -Appreciate Endocrine's help with management     Acute on chronic combined systolic and diastolic CHF, NYHA class 3    -ICM  -Last full 2D Echo 1/7/19: LVEF 20%, LVEDD 7.24 cm. Limited bedside echo done after ICD explant showed no pericardial effusion  -CVP trending upward to 16. CXR overnight with significant pulmonary edema. Net -ve only 200 cc despite increasing Lasix gtt yesterday. Will give Diuril 250 mg IV and continue Lasix 40/hr.  -sVO2 40 with low normal CO/CI. Initiated Epi 0.02.  - If patient does not respond to these therapies will consider trial of IABP. D/w patient's wife regarding overall guarded prognosis given multiple issues/comorbidites who seems to be understanding.   -GDMT: Dig and Toprol D/C'd 2/2 junctional rhythm. Entresto D/C'd 2/2 transient hypotension during/after ICD explant and TREY  -Self declined for LVAD after w/u last April 2018           Sherry Davidson,  MD  Heart Transplant  [facility]

## 2019-01-19 NOTE — PLAN OF CARE
Problem: Adult Inpatient Plan of Care  Goal: Plan of Care Review  Outcome: Ongoing (interventions implemented as appropriate)  Pt remains in CMICU. AAOx4. Respiratory status declined on shift, ABG drawn, pt placed on BIPAP. Indium scan completed. Pt's lasix increased to 40 mg/hr. UOP 60-70 cc/hr. Pt with nose bleed today. Levo remains off throughout entire shift. HR SR-SB. Swelling remains to right extremity.  1 BM on shift. Xanax administered for anxiety. BG monitored AC/HS, insulin administered. Foam dressings applied to heels/sacrum. CVP 10,10 today. Plan for ICD placement on Thursday. POC updated with pt and pt's family @ bedside, all questions and concerns addressed.     Problem: Infection  Goal: Infection Symptom Resolution    Intervention: Prevent or Manage Infection  Continuous IV abx in place.

## 2019-01-19 NOTE — PLAN OF CARE
Problem: Adult Inpatient Plan of Care  Goal: Plan of Care Review  Outcome: Ongoing (interventions implemented as appropriate)    No acute events throughout day. See vital signs and assessments in flowsheets. See below for updates on today's progress.     Pulmonary: tried to ween from bipap to venti mask at start of shift but O2 dropps to low 80s, pt repeatedly took off bipap mask and continued to reoriented him as to why he needed it,  was able to switch to venti mask around 430, pt got more restless throughout shift  Cardiovascular: bradycardiac, SBP >95   Neurological: AAOx4   Gastrointestinal: low sodium diet, takes meds w/ pudding  Genitourinary: gtz in place 950 UO/ shift  Integumentary/Other: pt nose started to bleed, clot formed and informed pt to not pick or blow nose  Infusions: lasix, DOP  Patient progressing towards goals as tolerated, plan of care communicated and reviewed with Jerry Solis and family. All concerns addressed. Will continue to monitor.

## 2019-01-19 NOTE — SUBJECTIVE & OBJECTIVE
"Interval HPI:   Overnight events: Remains in CMICU, respiratory issues noted overnight.  BG elevated yesterday during the day, trended down below goal this am.  On IV antibiotics.  Eatin-75%  Nausea: No  Hypoglycemia and intervention: No  Fever: No  TPN and/or TF: No    BP (!) 111/49   Pulse (!) 55   Temp 97.6 °F (36.4 °C) (Axillary)   Resp (!) 23   Ht 6' 2" (1.88 m)   Wt 83.5 kg (184 lb 1.4 oz)   SpO2 95%   BMI 23.64 kg/m²     Labs Reviewed and Include    Recent Labs   Lab 19  0437   *   CALCIUM 8.9   ALBUMIN 2.3*   PROT 6.8      K 3.8   CO2 36*   CL 95   BUN 49*   CREATININE 1.6*   ALKPHOS 169*   ALT 14   AST 21   BILITOT 0.8     Lab Results   Component Value Date    WBC 10.53 2019    HGB 7.5 (L) 2019    HCT 24.8 (L) 2019    MCV 92 2019     (L) 2019     No results for input(s): TSH, FREET4 in the last 168 hours.  Lab Results   Component Value Date    HGBA1C 8.1 (H) 2019       Nutritional status:   Body mass index is 23.64 kg/m².  Lab Results   Component Value Date    ALBUMIN 2.3 (L) 2019    ALBUMIN 2.4 (L) 2019    ALBUMIN 2.4 (L) 2019     No results found for: PREALBUMIN    Estimated Creatinine Clearance: 45.7 mL/min (A) (based on SCr of 1.6 mg/dL (H)).    Accu-Checks  Recent Labs     19  1617 19  2045 19  0752 19  1208 19  1743 19  2017 19  0755 19  1310 19  1813 19  0743   POCTGLUCOSE 182* 184* 138* 200* 114* 184* 210* 287* 218* 107       Current Medications and/or Treatments Impacting Glycemic Control  Immunotherapy:    Immunosuppressants     None        Steroids:   Hormones (From admission, onward)    None        Pressors:    Autonomic Drugs (From admission, onward)    Start     Stop Route Frequency Ordered    19 1330  DOPamine 400 mg in dextrose 5 % 250 mL infusion (premix) (NON-TITRATING)      -- IV Continuous 19 1226    01/15/19 1515  " norepinephrine 4 mg in dextrose 5% 250 mL infusion (premix) (titrating)     Question Answer Comment   Titrate by: (in mcg/kg/min) 0.02    Titrate interval: (in minutes) 5    Titrate to maintain: (MAP or SBP) SBP    Greater than: (in mmHg) 100    Maximum dose: (in mcg/kg/min) 3        -- IV Continuous 01/15/19 1512        Hyperglycemia/Diabetes Medications:   Antihyperglycemics (From admission, onward)    Start     Stop Route Frequency Ordered    01/18/19 2100  insulin detemir U-100 pen 15 Units      -- SubQ Nightly 01/18/19 0827    01/11/19 1645  insulin aspart U-100 pen 6 Units      -- SubQ 3 times daily with meals 01/11/19 1259    01/11/19 1358  insulin aspart U-100 pen 0-5 Units      -- SubQ Before meals & nightly PRN 01/11/19 1259

## 2019-01-20 NOTE — PLAN OF CARE
Discussed update with Dr. Boggs. Noted the patient's improvement with the addition of diuril in terms of UOP. Now on epi 0.02 and continued on dopamine. Telemetry still shows same rhythm as before accelerated junctional.    Plan:  - Still plan on device implantation on Thursday of this week. Maybe Tuesday if scheduling allows.    Jj Farr MD, PGY-5  EP

## 2019-01-20 NOTE — SUBJECTIVE & OBJECTIVE
Interval History: UOP dropped overnight ~70 cc/hr. Received Diuril 250 mg IV x1 with subsequent improvement in UOP to 200-300 cc/hr. No other issues. Patient clinically feels better this morning, breathing improved.    CVP 16; SVO2 52; CO 5.9; CI 2.8; .     Continuous Infusions:   DOPamine 5 mcg/kg/min (01/20/19 1200)    epinephrine infusion (NON-TITRATING) 0.02 mcg/kg/min (01/20/19 1200)    furosemide (LASIX) 2 mg/mL infusion (non-titrating) 40 mg/hr (01/20/19 1200)     Scheduled Meds:   ampicillin (OMNIPEN) 4 g in  mL EXTENDED INFUSION  4,000 mg Intravenous Q8H    artificial tears  1 drop Both Eyes QID    cefTRIAXone (ROCEPHIN) IVPB  2 g Intravenous Q12H    chlorothiazide (DIURIL) IVPB  250 mg Intravenous Q12H    clopidogrel  75 mg Oral Daily    insulin aspart U-100  6-8 Units Subcutaneous TIDWM    insulin detemir U-100  14 Units Subcutaneous QHS    levothyroxine  25 mcg Oral Before breakfast    magnesium oxide  800 mg Oral BID    polyethylene glycol  17 g Oral Daily    potassium chloride SA  20 mEq Oral BID    rosuvastatin  20 mg Oral QHS    senna-docusate 8.6-50 mg  2 tablet Oral BID    tamsulosin  0.4 mg Oral Daily     PRN Meds:albuterol-ipratropium, ALPRAZolam, dextrose 50%, dextrose 50%, glucagon (human recombinant), glucose, glucose, insulin aspart U-100, magnesium sulfate IVPB, magnesium sulfate IVPB, ondansetron, promethazine (PHENERGAN) IVPB, ramelteon, sodium chloride 0.9%, sodium chloride 0.9%, traMADol    Review of patient's allergies indicates:   Allergen Reactions    Adhesive Other (See Comments)     blisters    Codeine Other (See Comments)     Blurred vision    Latex Hives    Ace inhibitors     Lipitor [atorvastatin] Other (See Comments)     Muscle spasms     Objective:     Vital Signs (Most Recent):  Temp: 97.7 °F (36.5 °C) (01/20/19 1100)  Pulse: 82 (01/20/19 1210)  Resp: (!) 27 (01/20/19 1210)  BP: (!) 132/57 (01/20/19 1100)  SpO2: (!) 92 % (01/20/19 1210) Vital  Signs (24h Range):  Temp:  [97.3 °F (36.3 °C)-97.8 °F (36.6 °C)] 97.7 °F (36.5 °C)  Pulse:  [64-93] 82  Resp:  [12-29] 27  SpO2:  [85 %-100 %] 92 %  BP: (101-132)/(45-61) 132/57  Arterial Line BP: ()/(37-61) 124/53     Patient Vitals for the past 72 hrs (Last 3 readings):   Weight   01/20/19 0300 84.5 kg (186 lb 4.6 oz)     Body mass index is 23.92 kg/m².      Intake/Output Summary (Last 24 hours) at 1/20/2019 1229  Last data filed at 1/20/2019 1200  Gross per 24 hour   Intake 3108 ml   Output 3990 ml   Net -882 ml         Telemetry: Atrial fib with variable conduction    Physical Exam   Constitutional: He is oriented to person, place, and time. He appears well-developed and well-nourished.   HENT:   Head: Normocephalic and atraumatic.   Eyes: Conjunctivae and EOM are normal.   Neck: Normal range of motion. Neck supple. JVD present. No thyromegaly present.   Cardiovascular: Regular rhythm.   Irregular, bradycardic   Pulmonary/Chest: Effort normal. He has rales.   Abdominal: Soft. Bowel sounds are normal.   Musculoskeletal: Normal range of motion.   Trace HEATHER   Neurological: He is alert and oriented to person, place, and time.   Skin: Skin is warm and dry. Capillary refill takes 2 to 3 seconds.   Psychiatric: He has a normal mood and affect. His behavior is normal. Judgment and thought content normal.       Significant Labs:  CBC:  Recent Labs   Lab 01/18/19  0417 01/19/19  0437 01/20/19  0146   WBC 9.98 10.53 12.66   RBC 2.84* 2.71* 2.72*   HGB 8.0* 7.5* 7.7*   HCT 26.4* 24.8* 25.5*   * 126* 155   MCV 93 92 94   MCH 28.2 27.7 28.3   MCHC 30.3* 30.2* 30.2*     BNP:  Recent Labs   Lab 01/17/19  1000   BNP 1,357*     CMP:  Recent Labs   Lab 01/18/19  0417 01/18/19  1527 01/19/19  0437 01/20/19  0146   *  175* 243* 111* 135*  135*   CALCIUM 8.9  8.9 8.5* 8.9 8.9  8.9   ALBUMIN 2.4*  --  2.3* 2.3*  2.3*   PROT 6.8  --  6.8 6.9  6.9     138 137 140 141  141   K 3.8  3.8 4.3 3.8 3.9  3.9    CO2 36*  36* 36* 36* 34*  34*   CL 92*  92* 93* 95 94*  94*   BUN 42*  42* 48* 49* 53*  53*   CREATININE 1.6*  1.6* 1.7* 1.6* 1.6*  1.6*   ALKPHOS 165*  --  169* 179*  179*   ALT 14  --  14 13  13   AST 22  --  21 21  21   BILITOT 0.9  --  0.8 0.8  0.8      Coagulation:   Recent Labs   Lab 01/14/19  1808 01/15/19  0409 01/16/19  0310  01/18/19  0417 01/19/19  0437 01/20/19  0146   INR  --  1.5* 2.4*   < > 4.3* 4.1* 3.8*   APTT 46.6* 48.6* 52.8*  --   --   --   --     < > = values in this interval not displayed.     LDH:  No results for input(s): LDH in the last 72 hours.  Microbiology:  Microbiology Results (last 7 days)     Procedure Component Value Units Date/Time    Blood culture [[id number]] Collected:  01/15/19 0328    Order Status:  Completed Specimen:  Blood from Peripheral, Forearm, Left Updated:  01/20/19 0812     Blood Culture, Routine No growth after 5 days.    Blood culture [[id number]] Collected:  01/15/19 0320    Order Status:  Completed Specimen:  Blood from Peripheral, Antecubital, Left Updated:  01/20/19 0812     Blood Culture, Routine No growth after 5 days.    Culture, Anaerobe [[id number]] Collected:  01/09/19 1124    Order Status:  Completed Specimen:  Tissue from Chest, Left Updated:  01/16/19 0838     Anaerobic Culture No anaerobes isolated    Narrative:       ICD pocket tissue    Culture, Anaerobe [[id number]] Collected:  01/09/19 1314    Order Status:  Completed Specimen:  Other from Chest, Left Updated:  01/16/19 0838     Anaerobic Culture No anaerobes isolated    Narrative:       #2 RA Lead tip    Culture, Anaerobe [[id number]] Collected:  01/09/19 1322    Order Status:  Completed Specimen:  Other from Chest, Left Updated:  01/16/19 0838     Anaerobic Culture No anaerobes isolated    Narrative:       #3 LV lead tip    Blood culture [[id number]] Collected:  01/10/19 1401    Order Status:  Completed Specimen:  Blood from Peripheral, Hand, Left Updated:  01/15/19 1612     Blood  Culture, Routine No growth after 5 days.    Blood culture [[id number]] Collected:  01/10/19 1401    Order Status:  Completed Specimen:  Blood from Peripheral, Forearm, Left Updated:  01/15/19 1612     Blood Culture, Routine No growth after 5 days.    Blood culture [[id number]] Collected:  01/09/19 1657    Order Status:  Completed Specimen:  Blood from Peripheral, Hand, Right Updated:  01/14/19 2012     Blood Culture, Routine No growth after 5 days.    Culture, Anaerobe [[id number]] Collected:  01/09/19 1402    Order Status:  Completed Specimen:  Other from Chest, Left Updated:  01/14/19 1047     Anaerobic Culture No anaerobes isolated    Narrative:       #4 RV lead tip    Aerobic culture [[id number]]  (Susceptibility) Collected:  01/09/19 1402    Order Status:  Completed Specimen:  Other from Chest, Left Updated:  01/14/19 1003     Aerobic Bacterial Culture --     ENTEROCOCCUS FAECALIS  Rare      Narrative:       #4 RV lead tip          I have reviewed all pertinent labs within the past 24 hours.    Estimated Creatinine Clearance: 45.7 mL/min (A) (based on SCr of 1.6 mg/dL (H)).    Diagnostic Results:  I have reviewed all pertinent imaging results/findings within the past 24 hours.

## 2019-01-20 NOTE — PLAN OF CARE
Problem: Adult Inpatient Plan of Care  Goal: Plan of Care Review  Outcome: Ongoing (interventions implemented as appropriate)  See vital signs and assessments in flowsheets. See below for updates on today's progress.     Pulmonary: Patient on venti mask 15L @50% start of shift. Placed on Bipap overnight. Sat 100%. Coarse crackles in lower lung fields.    Cardiovascular: Left radial art line. Positional. Correlating w/ cuff pressures. CVP 16,17,16. SVO2 52.    Neurological: AAOx4.. Right pupil sluggish and irregular due to prior cataract surgery. Pt became restlessanxious throughout the night and requested his PRN med for insomnia. Ramelteon and alprazolam given over shift.     Gastrointestinal: Two loose bowel movements throughout shift.     Genitourinary: Morin intact and patent. See flowsheet. One dose diuril given due to unchanged CVP and urine output.     Endocrine: Accuchecks    Integumentary/Other: Pale. Bruising to bilateral upper extremities. Foam dressing CDI to left upper chest wall.     Infusions: Epi. Lasix, Dopamine, Ampicillin     Patient progressing towards goals as tolerated, plan of care communicated and reviewed with Jerry Solis and family. All concerns addressed. Will continue to monitor.

## 2019-01-20 NOTE — SIGNIFICANT EVENT
The patient reports no improvement in his symptoms. Urine output has not increased and CVP unchanged (16) with added Epi and lasix drip. Will give an additional dose of diuril 250 mg IV.    Valentin Farah MD  PGY-IV

## 2019-01-20 NOTE — CARE UPDATE
BG goal 140-180    Patient's BG within goal on current insulin regimen  Recommend continuing Levemir 14 units q HS and Novolog 6-8 units with meals plus low dose correction scale  BG monitoring AC/HS    Endocrine to continue to follow    ** Please call Endocrine for any BG related issues **

## 2019-01-20 NOTE — PLAN OF CARE
Problem: Adult Inpatient Plan of Care  Goal: Plan of Care Review  Outcome: Ongoing (interventions implemented as appropriate)    No acute events throughout day. See vital signs and assessments in flowsheets. See below for updates on today's progress.     Pulmonary: Pt remains on ventimask on 15L at 50%, spo2 > 90%, intermittent complaints of SOB. ABG revealed pt is tolerating ventimask well.    Cardiovascular: Pt remains in atrial rhythm with a rate 50-80s. SBP 90-120s per arterial line. Dopamine gtt ongoing. Svo2 40% this AM, Epi added at 0.02 and Svo2 increased to 54%. If Epi gtt does not continue to improve pt's Svo2, IABP placement may be considered. Ampicillin gtt ongoing. Planning for pacemaker placement at some point next week. CVP elevated at 16-17.      Neurological: Lethargic at times but is oriented x 4 when awake and alert.    Gastrointestinal: Pt had 2 small, loose bowel movements today. Tolerating PO diet well.     Genitourinary: Morin remains in place, voided 1.5L. Lasix drip ongoing. Diuril 250mg given x 1 today, urine output increased to about 150-200cc/hr since admin.    Endocrine: 6 units insulin given prior to meals     Integumentary/Other: Bilateral groin sites    Infusions: dopamine, lasix, epi, ampicillin, kvo    Patient progressing towards goals as tolerated, plan of care communicated and reviewed with Jerry Solis and family. All concerns addressed. Will continue to monitor.

## 2019-01-20 NOTE — PROGRESS NOTES
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 Received Diuril 250 mg IV x1 with subsequent improvement in UOP to 200-300 cc/hr. No other issues. Patient clinically feels better this morning, breathing improved.    CVP 16; SVO2 52; CO 5.9; CI 2.8; .     Continuous Infusions:   DOPamine 5 mcg/kg/min (01/20/19 1200)    epinephrine infusion (NON-TITRATING) 0.02 mcg/kg/min (01/20/19 1200)    furosemide (LASIX) 2 mg/mL infusion (non-titrating) 40 mg/hr (01/20/19 1200)     Scheduled Meds:   ampicillin (OMNIPEN) 4 g in  mL EXTENDED INFUSION  4,000 mg Intravenous Q8H    artificial tears  1 drop Both Eyes QID    cefTRIAXone (ROCEPHIN) IVPB  2 g Intravenous Q12H    chlorothiazide (DIURIL) IVPB  250 mg Intravenous Q12H    clopidogrel  75 mg Oral Daily    insulin aspart U-100  6-8 Units Subcutaneous TIDWM    insulin detemir U-100  14 Units Subcutaneous QHS    levothyroxine  25 mcg Oral Before breakfast    magnesium oxide  800 mg Oral BID    polyethylene glycol  17 g Oral Daily    potassium chloride SA  20 mEq Oral BID    rosuvastatin  20 mg Oral QHS    senna-docusate 8.6-50 mg  2 tablet Oral BID    tamsulosin  0.4 mg Oral Daily     PRN Meds:albuterol-ipratropium, ALPRAZolam, dextrose 50%, dextrose 50%, glucagon (human recombinant), glucose, glucose, insulin aspart U-100, magnesium sulfate IVPB, magnesium sulfate IVPB, ondansetron, promethazine (PHENERGAN) IVPB, ramelteon, sodium chloride 0.9%, sodium chloride 0.9%, traMADol    Review of patient's allergies indicates:   Allergen Reactions    Adhesive Other (See Comments)     blisters     Codeine Other (See Comments)     Blurred vision    Latex Hives    Ace inhibitors     Lipitor [atorvastatin] Other (See Comments)     Muscle spasms     Objective:     Vital Signs (Most Recent):  Temp: 97.7 °F (36.5 °C) (01/20/19 1100)  Pulse: 82 (01/20/19 1210)  Resp: (!) 27 (01/20/19 1210)  BP: (!) 132/57 (01/20/19 1100)  SpO2: (!) 92 % (01/20/19 1210) Vital Signs (24h Range):  Temp:  [97.3 °F (36.3 °C)-97.8 °F (36.6 °C)] 97.7 °F (36.5 °C)  Pulse:  [64-93] 82  Resp:  [12-29] 27  SpO2:  [85 %-100 %] 92 %  BP: (101-132)/(45-61) 132/57  Arterial Line BP: ()/(37-61) 124/53     Patient Vitals for the past 72 hrs (Last 3 readings):   Weight   01/20/19 0300 84.5 kg (186 lb 4.6 oz)     Body mass index is 23.92 kg/m².      Intake/Output Summary (Last 24 hours) at 1/20/2019 1229  Last data filed at 1/20/2019 1200  Gross per 24 hour   Intake 3108 ml   Output 3990 ml   Net -882 ml         Telemetry: Atrial fib with variable conduction    Physical Exam   Constitutional: He is oriented to person, place, and time. He appears well-developed and well-nourished.   HENT:   Head: Normocephalic and atraumatic.   Eyes: Conjunctivae and EOM are normal.   Neck: Normal range of motion. Neck supple. JVD present. No thyromegaly present.   Cardiovascular: Regular rhythm.   Irregular, bradycardic   Pulmonary/Chest: Effort normal. He has rales.   Abdominal: Soft. Bowel sounds are normal.   Musculoskeletal: Normal range of motion.   Trace HEATHER   Neurological: He is alert and oriented to person, place, and time.   Skin: Skin is warm and dry. Capillary refill takes 2 to 3 seconds.   Psychiatric: He has a normal mood and affect. His behavior is normal. Judgment and thought content normal.       Significant Labs:  CBC:  Recent Labs   Lab 01/18/19  0417 01/19/19  0437 01/20/19  0146   WBC 9.98 10.53 12.66   RBC 2.84* 2.71* 2.72*   HGB 8.0* 7.5* 7.7*   HCT 26.4* 24.8* 25.5*   * 126* 155   MCV 93 92 94   MCH 28.2 27.7 28.3   MCHC 30.3*  30.2* 30.2*     BNP:  Recent Labs   Lab 01/17/19  1000   BNP 1,357*     CMP:  Recent Labs   Lab 01/18/19  0417 01/18/19  1527 01/19/19  0437 01/20/19  0146   *  175* 243* 111* 135*  135*   CALCIUM 8.9  8.9 8.5* 8.9 8.9  8.9   ALBUMIN 2.4*  --  2.3* 2.3*  2.3*   PROT 6.8  --  6.8 6.9  6.9     138 137 140 141  141   K 3.8  3.8 4.3 3.8 3.9  3.9   CO2 36*  36* 36* 36* 34*  34*   CL 92*  92* 93* 95 94*  94*   BUN 42*  42* 48* 49* 53*  53*   CREATININE 1.6*  1.6* 1.7* 1.6* 1.6*  1.6*   ALKPHOS 165*  --  169* 179*  179*   ALT 14  --  14 13  13   AST 22  --  21 21  21   BILITOT 0.9  --  0.8 0.8  0.8      Coagulation:   Recent Labs   Lab 01/14/19  1808 01/15/19  0409 01/16/19  0310  01/18/19  0417 01/19/19  0437 01/20/19  0146   INR  --  1.5* 2.4*   < > 4.3* 4.1* 3.8*   APTT 46.6* 48.6* 52.8*  --   --   --   --     < > = values in this interval not displayed.     LDH:  No results for input(s): LDH in the last 72 hours.  Microbiology:  Microbiology Results (last 7 days)     Procedure Component Value Units Date/Time    Blood culture [[id number]] Collected:  01/15/19 0328    Order Status:  Completed Specimen:  Blood from Peripheral, Forearm, Left Updated:  01/20/19 0812     Blood Culture, Routine No growth after 5 days.    Blood culture [[id number]] Collected:  01/15/19 0320    Order Status:  Completed Specimen:  Blood from Peripheral, Antecubital, Left Updated:  01/20/19 0812     Blood Culture, Routine No growth after 5 days.    Culture, Anaerobe [[id number]] Collected:  01/09/19 1124    Order Status:  Completed Specimen:  Tissue from Chest, Left Updated:  01/16/19 0838     Anaerobic Culture No anaerobes isolated    Narrative:       ICD pocket tissue    Culture, Anaerobe [[id number]] Collected:  01/09/19 1314    Order Status:  Completed Specimen:  Other from Chest, Left Updated:  01/16/19 0838     Anaerobic Culture No anaerobes isolated    Narrative:       #2 RA Lead tip    Culture, Anaerobe  [[id number]] Collected:  01/09/19 1322    Order Status:  Completed Specimen:  Other from Chest, Left Updated:  01/16/19 0838     Anaerobic Culture No anaerobes isolated    Narrative:       #3 LV lead tip    Blood culture [[id number]] Collected:  01/10/19 1401    Order Status:  Completed Specimen:  Blood from Peripheral, Hand, Left Updated:  01/15/19 1612     Blood Culture, Routine No growth after 5 days.    Blood culture [[id number]] Collected:  01/10/19 1401    Order Status:  Completed Specimen:  Blood from Peripheral, Forearm, Left Updated:  01/15/19 1612     Blood Culture, Routine No growth after 5 days.    Blood culture [[id number]] Collected:  01/09/19 1657    Order Status:  Completed Specimen:  Blood from Peripheral, Hand, Right Updated:  01/14/19 2012     Blood Culture, Routine No growth after 5 days.    Culture, Anaerobe [[id number]] Collected:  01/09/19 1402    Order Status:  Completed Specimen:  Other from Chest, Left Updated:  01/14/19 1047     Anaerobic Culture No anaerobes isolated    Narrative:       #4 RV lead tip    Aerobic culture [[id number]]  (Susceptibility) Collected:  01/09/19 1402    Order Status:  Completed Specimen:  Other from Chest, Left Updated:  01/14/19 1003     Aerobic Bacterial Culture --     ENTEROCOCCUS FAECALIS  Rare      Narrative:       #4 RV lead tip          I have reviewed all pertinent labs within the past 24 hours.    Estimated Creatinine Clearance: 45.7 mL/min (A) (based on SCr of 1.6 mg/dL (H)).    Diagnostic Results:  I have reviewed all pertinent imaging results/findings within the past 24 hours.    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 Discussed with Dr. Russo. Blood cultures repeated and show NGTD  -Appreciate EP's help. 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 Patient had 2 vegetations on RV  lead which is most likely source of bacteremia. Lead tip and ICD pocket cultured with RV lead +ve for Enteroccocus. Blood cultures are negative here thus far.  -Continue Ampicillin and Ceftriaxone per ID recs Will treat for 6 weeks post extraction.  Estimated end date 2/20/19.    -If urgently necessary from a cardiac standpoint, it is okay to proceed with implanting new device as repeat blood cultures taken post extraction remain negative X 72 hours.  Suspect known vegetations on RV leads, now removed, were primary source of infection; cardioMems likely not infected, but Indium Scan in progress to look at Cardiomems. If negative, no need for oral antibiotic suppression or surveillance blood cultures following 6 weeks of IV therapy. If positive, re-consult ID and would give patient oral antibiotic suppression following his 6 weeks of therapy.  -INR 4.3 (re-checked). Coumadin remains on hold. No Eliquis for now as he may require implant of new device  -Will need Life Vest prior to discharge if CRT-D not replaced although per discussion with EP this morning plan on CRT-D implantation on 1/24.  -Appreciate GI's help. Plan for outpatient EGD and C-scope for eval of anemia and Enterococcus bacteremia         Hypotension     -Off Levo, on dopamine 1/16 restarted for bradycardia      Dysuria     - Resolved  - Urine culture -ve  - Flomax started 1/10. Will try to D/C Morin today            Junctional bradycardia     - Se A fib      Endocarditis     - See ICD lead infection      Bacteremia     -see above      TREY (acute kidney injury)     - Appreciate Nephrology's help  - Was oliguric and creatinine jumped to 4.1 likely 2/2 ATN/hypotension following ICD explant.   - CVP 10-12 and creatinine remains elevated at 1.6. See acute on chronic systolic heart failure  - Continue Dopamine and Epi  - Hold Entresto for now      Atrial fibrillation     -S/P Unsuccessful RICARDO guided/DCCV 1/14. Remains in A fib with variety of aberrant  conduction  -EP considering CRT-D replacement once off Levo for 48 hours depending on stability, culture data and ongoing discussion with ID  -Dopamine resumed 1/16 2/2 bradycardia.  -CHADS VASc 6  -Holding eliquis s/p ICD explant 1/9. Per EP, anticoagulation resumed 1/12. INR remains elevated at 4.3 likely 2/2 volume overload - continue hodling Coumadin (see acute on chronic systolic heart failure)  -Cardiac monitoring      Type 2 diabetes mellitus with complication     -A1c 8.1  -Target -180 while hospitalized  -detemir 13, aspart 6 with sliding scale  -Appreciate Endocrine's help with management      Acute on chronic combined systolic and diastolic CHF, NYHA class 3     -ICM  -Last full 2D Echo 1/7/19: LVEF 20%, LVEDD 7.24 cm. Limited bedside echo done after ICD explant showed no pericardial effusion  -CVP trending upward to 16. CXR overnight with significant pulmonary edema. Responded to Diuril 250 mg IV overnight.  -- Continue Lasix 40 mg/hr. Scheduled Diuril 250 mg IV BID for 24 hours.   -sVO2 40 with low normal CO/CI improved with initiation of low dose Epi on 1/19. Continue low dose Epi 0.02 for additional day.  - If patient does not respond to these therapies will consider trial of IABP. D/w patient's wife regarding overall guarded prognosis given multiple issues/comorbidites who seems to be understanding.   -GDMT: Dig and Toprol D/C'd 2/2 junctional rhythm. Entresto D/C'd 2/2 transient hypotension during/after ICD explant and TREY  -Self declined for LVAD after w/u last April 2018                 Sherry Davidson MD  Heart Transplant  [facility]

## 2019-01-21 NOTE — PLAN OF CARE
Problem: Physical Therapy Goal  Goal: Physical Therapy Goal  Goals to be met by: 19    Patient will increase functional independence with mobility by performin. Supine to sit with Modified [place], with HOB flat   2. Sit to supine with Modified [place], with HOB flat   3. Sit to stand transfer with Supervision  4. Gait  x 100 feet with Supervision with or without using least restrictive AD.   5. Lower extremity exercise program x20 reps per handout, with supervision    Outcome: Ongoing (interventions implemented as appropriate)  PT evaluation completed- see note for details. Goals established and POC initiated.     Diana Burgos, PT, DPT   2019  Pager: [phone]

## 2019-01-21 NOTE — ASSESSMENT & PLAN NOTE
BG goal 140-180    Decrease Levemir to 13 units q HS  Decrease Novolog to 5-7 units with meals  Low dose correction scale  BG monitoring AC/HS    Do not administer Novolog closer than every 4 hours    Discharge planning:  TBD

## 2019-01-21 NOTE — SUBJECTIVE & OBJECTIVE
**Interval History: Continues to feel SOB with anxiety attacks. O2 sats are good on 8L/31% VM. CVP remains 14 although he is net -ve > 2.2L since IV Diuril added yesterday to Lasix 40 mg/hr. sVO2 49 with calculated CO 5.79 and     Continuous Infusions:   DOPamine 5 mcg/kg/min (01/21/19 0900)    epinephrine infusion (NON-TITRATING) 0.02 mcg/kg/min (01/21/19 0900)    furosemide (LASIX) 2 mg/mL infusion (non-titrating) 40 mg/hr (01/21/19 0900)     Scheduled Meds:   ampicillin (OMNIPEN) 4 g in  mL EXTENDED INFUSION  4,000 mg Intravenous Q8H    artificial tears  1 drop Both Eyes QID    cefTRIAXone (ROCEPHIN) IVPB  2 g Intravenous Q12H    chlorothiazide (DIURIL) IVPB  250 mg Intravenous Q12H    clopidogrel  75 mg Oral Daily    ferrous sulfate  325 mg Oral BID    insulin aspart U-100  5-7 Units Subcutaneous TIDWM    insulin detemir U-100  13 Units Subcutaneous QHS    levothyroxine  25 mcg Oral Before breakfast    magnesium oxide  400 mg Oral BID    polyethylene glycol  17 g Oral Daily    potassium chloride SA  40 mEq Oral BID    rosuvastatin  20 mg Oral QHS    senna-docusate 8.6-50 mg  2 tablet Oral BID    tamsulosin  0.4 mg Oral Daily    warfarin  2.5 mg Oral Daily     PRN Meds:albuterol-ipratropium, ALPRAZolam, dextrose 50%, dextrose 50%, glucagon (human recombinant), glucose, glucose, insulin aspart U-100, magnesium sulfate IVPB, magnesium sulfate IVPB, ondansetron, promethazine (PHENERGAN) IVPB, ramelteon, sodium chloride 0.9%, sodium chloride 0.9%, traMADol    Review of patient's allergies indicates:   Allergen Reactions    Adhesive Other (See Comments)     blisters    Codeine Other (See Comments)     Blurred vision    Latex Hives    Ace inhibitors     Lipitor [atorvastatin] Other (See Comments)     Muscle spasms     Objective:     Vital Signs (Most Recent):  Temp: 97.5 °F (36.4 °C) (01/21/19 0705)  Pulse: 87 (01/21/19 0900)  Resp: (!) 25 (01/21/19 0900)  BP: (!) 96/52 (01/21/19  0705)  SpO2: 97 % (01/21/19 0900) Vital Signs (24h Range):  Temp:  [97.5 °F (36.4 °C)-97.7 °F (36.5 °C)] 97.5 °F (36.4 °C)  Pulse:  [21-94] 87  Resp:  [14-27] 25  SpO2:  [92 %-100 %] 97 %  BP: ()/(52-63) 96/52  Arterial Line BP: ()/(37-63) 103/53     Patient Vitals for the past 72 hrs (Last 3 readings):   Weight   01/20/19 0300 84.5 kg (186 lb 4.6 oz)     Body mass index is 23.92 kg/m².      Intake/Output Summary (Last 24 hours) at 1/21/2019 0917  Last data filed at 1/21/2019 0900  Gross per 24 hour   Intake 2517.2 ml   Output 4395 ml   Net -1877.8 ml       Hemodynamic Parameters:       Telemetry: Atrial fib with variable aberrant conduction    Physical Exam   Constitutional: He is oriented to person, place, and time. He appears well-developed and well-nourished.   HENT:   Head: Normocephalic and atraumatic.   Eyes: Conjunctivae and EOM are normal.   Neck: Normal range of motion. Neck supple. No thyromegaly present.   RIJ line   Cardiovascular: Regular rhythm.   Irregular, bradycardic   Pulmonary/Chest: Effort normal and breath sounds normal.   Abdominal: Soft. Bowel sounds are normal.   Musculoskeletal: Normal range of motion.   Trace HEATHER   Neurological: He is alert and oriented to person, place, and time.   Skin: Skin is warm and dry. Capillary refill takes 2 to 3 seconds.   Psychiatric: He has a normal mood and affect. His behavior is normal. Judgment and thought content normal.       Significant Labs:  CBC:  Recent Labs   Lab 01/19/19  0437 01/20/19  0146 01/21/19  0310   WBC 10.53 12.66 10.67   RBC 2.71* 2.72* 2.74*   HGB 7.5* 7.7* 7.6*   HCT 24.8* 25.5* 24.8*   * 155 168   MCV 92 94 91   MCH 27.7 28.3 27.7   MCHC 30.2* 30.2* 30.6*     BNP:  Recent Labs   Lab 01/17/19  1000   BNP 1,357*     CMP:  Recent Labs   Lab 01/19/19  0437 01/20/19  0146 01/21/19  0310   * 135*  135* 99   CALCIUM 8.9 8.9  8.9 9.2   ALBUMIN 2.3* 2.3*  2.3* 2.3*   PROT 6.8 6.9  6.9 7.0    141  141 140    K 3.8 3.9  3.9 3.6   CO2 36* 34*  34* 37*   CL 95 94*  94* 91*   BUN 49* 53*  53* 54*   CREATININE 1.6* 1.6*  1.6* 1.5*   ALKPHOS 169* 179*  179* 181*   ALT 14 13  13 14   AST 21 21  21 26   BILITOT 0.8 0.8  0.8 0.8      Coagulation:   Recent Labs   Lab 01/14/19  1808  01/15/19  0409 01/16/19  0310  01/19/19  0437 01/20/19  0146 01/21/19  0310   INR  --    < > 1.5* 2.4*   < > 4.1* 3.8* 2.9*   APTT 46.6*  --  48.6* 52.8*  --   --   --   --     < > = values in this interval not displayed.     LDH:  No results for input(s): LDH in the last 72 hours.  Microbiology:  Microbiology Results (last 7 days)     Procedure Component Value Units Date/Time    Blood culture [[id number]] Collected:  01/15/19 0328    Order Status:  Completed Specimen:  Blood from Peripheral, Forearm, Left Updated:  01/20/19 0812     Blood Culture, Routine No growth after 5 days.    Blood culture [[id number]] Collected:  01/15/19 0320    Order Status:  Completed Specimen:  Blood from Peripheral, Antecubital, Left Updated:  01/20/19 0812     Blood Culture, Routine No growth after 5 days.    Culture, Anaerobe [[id number]] Collected:  01/09/19 1124    Order Status:  Completed Specimen:  Tissue from Chest, Left Updated:  01/16/19 0838     Anaerobic Culture No anaerobes isolated    Narrative:       ICD pocket tissue    Culture, Anaerobe [[id number]] Collected:  01/09/19 1314    Order Status:  Completed Specimen:  Other from Chest, Left Updated:  01/16/19 0838     Anaerobic Culture No anaerobes isolated    Narrative:       #2 RA Lead tip    Culture, Anaerobe [[id number]] Collected:  01/09/19 1322    Order Status:  Completed Specimen:  Other from Chest, Left Updated:  01/16/19 0838     Anaerobic Culture No anaerobes isolated    Narrative:       #3 LV lead tip    Blood culture [[id number]] Collected:  01/10/19 1401    Order Status:  Completed Specimen:  Blood from Peripheral, Hand, Left Updated:  01/15/19 1612     Blood Culture, Routine No growth after 5  days.    Blood culture [[id number]] Collected:  01/10/19 1401    Order Status:  Completed Specimen:  Blood from Peripheral, Forearm, Left Updated:  01/15/19 1612     Blood Culture, Routine No growth after 5 days.    Blood culture [[id number]] Collected:  01/09/19 1657    Order Status:  Completed Specimen:  Blood from Peripheral, Hand, Right Updated:  01/14/19 2012     Blood Culture, Routine No growth after 5 days.    Culture, Anaerobe [[id number]] Collected:  01/09/19 1402    Order Status:  Completed Specimen:  Other from Chest, Left Updated:  01/14/19 1047     Anaerobic Culture No anaerobes isolated    Narrative:       #4 RV lead tip    Aerobic culture [[id number]]  (Susceptibility) Collected:  01/09/19 1402    Order Status:  Completed Specimen:  Other from Chest, Left Updated:  01/14/19 1003     Aerobic Bacterial Culture --     ENTEROCOCCUS FAECALIS  Rare      Narrative:       #4 RV lead tip          I have reviewed all pertinent labs within the past 24 hours.    Estimated Creatinine Clearance: 48.7 mL/min (A) (based on SCr of 1.5 mg/dL (H)).    Diagnostic Results:  I have reviewed all pertinent imaging results/findings within the past 24 hours.

## 2019-01-21 NOTE — PROGRESS NOTES
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 O2 sats are good on 8L/31% VM. CVP remains 14 although he is net -ve > 2.2L since IV Diuril added yesterday to Lasix 40 mg/hr. sVO2 49 with calculated CO 5.79 and     Continuous Infusions:   DOPamine 5 mcg/kg/min (01/21/19 0900)    epinephrine infusion (NON-TITRATING) 0.02 mcg/kg/min (01/21/19 0900)    furosemide (LASIX) 2 mg/mL infusion (non-titrating) 40 mg/hr (01/21/19 0900)     Scheduled Meds:   ampicillin (OMNIPEN) 4 g in  mL EXTENDED INFUSION  4,000 mg Intravenous Q8H    artificial tears  1 drop Both Eyes QID    cefTRIAXone (ROCEPHIN) IVPB  2 g Intravenous Q12H    chlorothiazide (DIURIL) IVPB  250 mg Intravenous Q12H    clopidogrel  75 mg Oral Daily    ferrous sulfate  325 mg Oral BID    insulin aspart U-100  5-7 Units Subcutaneous TIDWM    insulin detemir U-100  13 Units Subcutaneous QHS    levothyroxine  25 mcg Oral Before breakfast    magnesium oxide  400 mg Oral BID    polyethylene glycol  17 g Oral Daily    potassium chloride SA  40 mEq Oral BID    rosuvastatin  20 mg Oral QHS    senna-docusate 8.6-50 mg  2 tablet Oral BID    tamsulosin  0.4 mg Oral Daily    warfarin  2.5 mg Oral Daily     PRN Meds:albuterol-ipratropium, ALPRAZolam, dextrose 50%, dextrose 50%, glucagon (human recombinant), glucose, glucose, insulin aspart U-100, magnesium sulfate IVPB, magnesium sulfate IVPB, ondansetron, promethazine (PHENERGAN) IVPB, ramelteon, sodium chloride 0.9%, sodium chloride 0.9%, traMADol    Review of patient's allergies indicates:   Allergen Reactions     Adhesive Other (See Comments)     blisters    Codeine Other (See Comments)     Blurred vision    Latex Hives    Ace inhibitors     Lipitor [atorvastatin] Other (See Comments)     Muscle spasms     Objective:     Vital Signs (Most Recent):  Temp: 97.5 °F (36.4 °C) (01/21/19 0705)  Pulse: 87 (01/21/19 0900)  Resp: (!) 25 (01/21/19 0900)  BP: (!) 96/52 (01/21/19 0705)  SpO2: 97 % (01/21/19 0900) Vital Signs (24h Range):  Temp:  [97.5 °F (36.4 °C)-97.7 °F (36.5 °C)] 97.5 °F (36.4 °C)  Pulse:  [21-94] 87  Resp:  [14-27] 25  SpO2:  [92 %-100 %] 97 %  BP: ()/(52-63) 96/52  Arterial Line BP: ()/(37-63) 103/53     Patient Vitals for the past 72 hrs (Last 3 readings):   Weight   01/20/19 0300 84.5 kg (186 lb 4.6 oz)     Body mass index is 23.92 kg/m².      Intake/Output Summary (Last 24 hours) at 1/21/2019 0917  Last data filed at 1/21/2019 0900  Gross per 24 hour   Intake 2517.2 ml   Output 4395 ml   Net -1877.8 ml       Hemodynamic Parameters:       Telemetry: Atrial fib with variable aberrant conduction    Physical Exam   Constitutional: He is oriented to person, place, and time. He appears well-developed and well-nourished.   HENT:   Head: Normocephalic and atraumatic.   Eyes: Conjunctivae and EOM are normal.   Neck: Normal range of motion. Neck supple. No thyromegaly present.   RIJ line   Cardiovascular: Regular rhythm.   Irregular, bradycardic   Pulmonary/Chest: Effort normal and breath sounds normal.   Abdominal: Soft. Bowel sounds are normal.   Musculoskeletal: Normal range of motion.   Trace HEATHER   Neurological: He is alert and oriented to person, place, and time.   Skin: Skin is warm and dry. Capillary refill takes 2 to 3 seconds.   Psychiatric: He has a normal mood and affect. His behavior is normal. Judgment and thought content normal.       Significant Labs:  CBC:  Recent Labs   Lab 01/19/19  0437 01/20/19  0146 01/21/19  0310   WBC 10.53 12.66 10.67   RBC 2.71* 2.72* 2.74*   HGB 7.5* 7.7* 7.6*    HCT 24.8* 25.5* 24.8*   * 155 168   MCV 92 94 91   MCH 27.7 28.3 27.7   MCHC 30.2* 30.2* 30.6*     BNP:  Recent Labs   Lab 01/17/19  1000   BNP 1,357*     CMP:  Recent Labs   Lab 01/19/19  0437 01/20/19  0146 01/21/19  0310   * 135*  135* 99   CALCIUM 8.9 8.9  8.9 9.2   ALBUMIN 2.3* 2.3*  2.3* 2.3*   PROT 6.8 6.9  6.9 7.0    141  141 140   K 3.8 3.9  3.9 3.6   CO2 36* 34*  34* 37*   CL 95 94*  94* 91*   BUN 49* 53*  53* 54*   CREATININE 1.6* 1.6*  1.6* 1.5*   ALKPHOS 169* 179*  179* 181*   ALT 14 13  13 14   AST 21 21  21 26   BILITOT 0.8 0.8  0.8 0.8      Coagulation:   Recent Labs   Lab 01/14/19  1808  01/15/19  0409 01/16/19  0310  01/19/19  0437 01/20/19  0146 01/21/19  0310   INR  --    < > 1.5* 2.4*   < > 4.1* 3.8* 2.9*   APTT 46.6*  --  48.6* 52.8*  --   --   --   --     < > = values in this interval not displayed.     LDH:  No results for input(s): LDH in the last 72 hours.  Microbiology:  Microbiology Results (last 7 days)     Procedure Component Value Units Date/Time    Blood culture [[id number]] Collected:  01/15/19 0328    Order Status:  Completed Specimen:  Blood from Peripheral, Forearm, Left Updated:  01/20/19 0812     Blood Culture, Routine No growth after 5 days.    Blood culture [[id number]] Collected:  01/15/19 0320    Order Status:  Completed Specimen:  Blood from Peripheral, Antecubital, Left Updated:  01/20/19 0812     Blood Culture, Routine No growth after 5 days.    Culture, Anaerobe [[id number]] Collected:  01/09/19 1124    Order Status:  Completed Specimen:  Tissue from Chest, Left Updated:  01/16/19 0838     Anaerobic Culture No anaerobes isolated    Narrative:       ICD pocket tissue    Culture, Anaerobe [[id number]] Collected:  01/09/19 1314    Order Status:  Completed Specimen:  Other from Chest, Left Updated:  01/16/19 0838     Anaerobic Culture No anaerobes isolated    Narrative:       #2 RA Lead tip    Culture, Anaerobe [[id number]] Collected:  01/09/19  1322    Order Status:  Completed Specimen:  Other from Chest, Left Updated:  01/16/19 0838     Anaerobic Culture No anaerobes isolated    Narrative:       #3 LV lead tip    Blood culture [[id number]] Collected:  01/10/19 1401    Order Status:  Completed Specimen:  Blood from Peripheral, Hand, Left Updated:  01/15/19 1612     Blood Culture, Routine No growth after 5 days.    Blood culture [[id number]] Collected:  01/10/19 1401    Order Status:  Completed Specimen:  Blood from Peripheral, Forearm, Left Updated:  01/15/19 1612     Blood Culture, Routine No growth after 5 days.    Blood culture [[id number]] Collected:  01/09/19 1657    Order Status:  Completed Specimen:  Blood from Peripheral, Hand, Right Updated:  01/14/19 2012     Blood Culture, Routine No growth after 5 days.    Culture, Anaerobe [[id number]] Collected:  01/09/19 1402    Order Status:  Completed Specimen:  Other from Chest, Left Updated:  01/14/19 1047     Anaerobic Culture No anaerobes isolated    Narrative:       #4 RV lead tip    Aerobic culture [[id number]]  (Susceptibility) Collected:  01/09/19 1402    Order Status:  Completed Specimen:  Other from Chest, Left Updated:  01/14/19 1003     Aerobic Bacterial Culture --     ENTEROCOCCUS FAECALIS  Rare      Narrative:       #4 RV lead tip          I have reviewed all pertinent labs within the past 24 hours.    Estimated Creatinine Clearance: 48.7 mL/min (A) (based on SCr of 1.5 mg/dL (H)).    Diagnostic Results:  I have reviewed all pertinent imaging results/findings within the past 24 hours.    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 Discussed with Dr. Russo. Blood cultures repeated 1/15 and were -ve  -Appreciate EP's help. 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 Patient had 2 vegetations on RV lead which is most  likely source of bacteremia. Lead tip and ICD pocket cultured with RV lead +ve for Enteroccocus. Blood cultures are negative here thus far.  -Continue Ampicillin and Ceftriaxone per ID recs Will treat for 6 weeks post extraction.  Estimated end date 2/20/19.    -If urgently necessary from a cardiac standpoint, it is okay to proceed with implanting new device as repeat blood cultures taken post extraction remain negative X 72 hours.  Suspect known vegetations on RV leads, now removed, were primary source of infection; cardioMems likely not infected, but Indium Scan done 1.18 and was -ve. If negative, no need for oral antibiotic suppression or surveillance blood cultures following 6 weeks of IV therapy.  -INR has trended down to 2.9 - will resume low dose Coumadin (re-checked). No Eliquis for now as he may require implant of new device  -Will need Life Vest prior to discharge if CRT-D not replaced (EP plans device replacement this Thursday)  -Appreciate GI's help. Plan for outpatient EGD and C-scope for eval of anemia and Enterococcus bacteremia       Acute on chronic combined systolic and diastolic CHF, NYHA class 3    -ICM  -Last full 2D Echo 1/7/19: LVEF 20%, LVEDD 7.24 cm. Limited bedside echo done after ICD explant showed no pericardial effusion  -CVP 14. Net -ve > 2.2L since adding IV Diuril bid to Lasix 40 mg/hr yesterday. Will continue same  -sVO2 49 with calculated CO 5.79 and . Continue Epi that was added 1/19 with plan to wean once CVP closer to 12  -GDMT: Dig and Toprol D/C'd 2/2 junctional rhythm. Entresto D/C'd 2/2 transient hypotension during/after ICD explant and TREY  -Self declined for LVAD after w/u last April 2018       Atrial fibrillation    -S/P Unsuccessful RICARDO guided/DCCV 1/14. Remains in A fib with variety of aberrant conduction  -EP planning CRT-D replacement this Thurday  -Dopamine resumed 1/16 2/2 bradycardia. Continue at 5 mcg  -CHADS VASc 6  -Holding eliquis s/p ICD explant 1/9. Per  EP, anticoagulation resumed 1/12. Coumadin has been on hold for several days 2/2 elevated INR. INR has trended down to 2.9 - will resume low dose Coumadin  -Cardiac monitoring     Type 2 diabetes mellitus with complication    -A1c 8.1  -Target -180 while hospitalized  -detemir 14, aspart 6-8 with sliding scale  -Appreciate Endocrine's help with management     Dysuria    - Resolved  - Urine culture -ve  - Flomax started 1/10. Will try to D/C Morin once off aggressive diuresis         Junctional bradycardia    - See A fib     Endocarditis    - See ICD lead infection     Bacteremia    -see above     TREY (acute kidney injury)    - Appreciate Nephrology's help  - Was oliguric and creatinine jumped to 4.1 likely 2/2 ATN/hypotension following ICD explant.   - CVP 14 and creatinine remains elevated at 1.5 (was 1.2 on admit). See acute on chronic systolic heart failure  - Continue Dopamine,   - Hold Entresto for now       Uninterrupted Critical Care/Counseling Time (not including procedures): 45 minutes      Dionna Lora NP [id number]  Heart Transplant  [facility]

## 2019-01-21 NOTE — NURSING
See vital signs and assessments in flowsheets. See below for updates on today's progress.      Pulmonary:  8 liters, 31% Venti mask, oxygen saturations >95% throughout shift.     Cardiovascular: Systolic's: ~100-110's, HR:~80'S , CVP: 14,15,      Neurological: AAOX4, Afebrile, Follows commands, Moves all extremities, Left pupil slow/sluggish,       Gastrointestinal: BMx 4, bowel sounds present x4 quadrants, soft/nontender     Genitourinary: Voids via gtz catheter, total urine output: 2.3 liters/shift     Endocrine: Acuchecks, sliding scale coverage      Integumentary/Other: pale, dry skin, right nare dried blood.     Infusions:  LASIX, EPI, DOPAMINE, ampicillin     POC: Continue to diuresis patient.

## 2019-01-21 NOTE — PROGRESS NOTES
"[facility]  Endocrinology  Progress Note    Admit Date: 1/6/2019     Reason for Consult: Management of T2DM, Hyperglycemia     Surgical Procedure and Date: Lead extraction 1/9/19    Diabetes diagnosis year: approximately 10 years ago    Lab Results   Component Value Date    HGBA1C 8.1 (H) 01/07/2019       Home Diabetes Medications: Lantus 50 units q HS, Novolog 25 units with meals (vials)    How often checking glucose at home? 3-4x per day   BG readings on regimen: AM 120s  Hypoglycemia on the regimen?  Yes - 2-3x per month  Missed doses on regimen?  Yes - 2x per week    Diabetes Complications include:     Hyperglycemia, Hypoglycemia , Diabetic retinopathy , Diabetic cataract  and Diabetic peripheral neuropathy     Complicating diabetes co morbidities:   CHF and History of CVA      HPI:   Patient is a 78 y.o. male with a diagnosis of DM2 and CHF who was admitted on 1/6/19 for possible RV lead infection.  Patient is s/p lead extraction on 1/9/19.  Patient's DM managed by PCP, Dr. Hdz.  Endocrine consulted for DM/BG management.            Interval HPI:   Overnight events: Remains in CMICU, mild hypoglycemic event likely due to insulin stacking.  BG  below goal most of the day yesterday and overnight.  On IV antibiotics.  Eating:   <25%  Nausea: No  Hypoglycemia and intervention: Yes - BG 52, juice and crackers given and mealtime insulin held  Fever: No  TPN and/or TF: No    BP (!) 115/55 (BP Location: Left arm, Patient Position: Lying)   Pulse 79   Temp 97.6 °F (36.4 °C) (Axillary)   Resp 20   Ht 6' 2" (1.88 m)   Wt 84.5 kg (186 lb 4.6 oz)   SpO2 99%   BMI 23.92 kg/m²      Labs Reviewed and Include    Recent Labs   Lab 01/21/19  0310   GLU 99   CALCIUM 9.2   ALBUMIN 2.3*   PROT 7.0      K 3.6   CO2 37*   CL 91*   BUN 54*   CREATININE 1.5*   ALKPHOS 181*   ALT 14   AST 26   BILITOT 0.8     Lab Results   Component Value Date    WBC 10.67 01/21/2019    HGB 7.6 (L) 01/21/2019    HCT " 24.8 (L) 01/21/2019    MCV 91 01/21/2019     01/21/2019     No results for input(s): TSH, FREET4 in the last 168 hours.  Lab Results   Component Value Date    HGBA1C 8.1 (H) 01/07/2019       Nutritional status:   Body mass index is 23.92 kg/m².  Lab Results   Component Value Date    ALBUMIN 2.3 (L) 01/21/2019    ALBUMIN 2.3 (L) 01/20/2019    ALBUMIN 2.3 (L) 01/20/2019     No results found for: PREALBUMIN    Estimated Creatinine Clearance: 48.7 mL/min (A) (based on SCr of 1.5 mg/dL (H)).    Accu-Checks  Recent Labs     01/19/19  1131 01/19/19  1713 01/19/19  2133 01/19/19  2136 01/20/19  0733 01/20/19  1116 01/20/19  1511 01/20/19  1615 01/20/19  2055 01/21/19  0037   POCTGLUCOSE 158* 174* 365* 154* 108 130* 52* 104 122* 129*       Current Medications and/or Treatments Impacting Glycemic Control  Immunotherapy:    Immunosuppressants     None        Steroids:   Hormones (From admission, onward)    None        Pressors:    Autonomic Drugs (From admission, onward)    Start     Stop Route Frequency Ordered    01/19/19 1100  EPINEPHrine (ADRENALIN) 5 mg in sodium chloride 0.9% 250 mL infusion      -- IV Continuous 01/19/19 0957    01/16/19 1330  DOPamine 400 mg in dextrose 5 % 250 mL infusion (premix) (NON-TITRATING)      -- IV Continuous 01/16/19 1226        Hyperglycemia/Diabetes Medications:   Antihyperglycemics (From admission, onward)    Start     Stop Route Frequency Ordered    01/19/19 2100  insulin detemir U-100 pen 14 Units      -- SubQ Nightly 01/19/19 0917    01/19/19 1130  insulin aspart U-100 pen 6-8 Units      -- SubQ 3 times daily with meals 01/19/19 0917    01/11/19 1358  insulin aspart U-100 pen 0-5 Units      -- SubQ Before meals & nightly PRN 01/11/19 1259          ASSESSMENT and PLAN    * 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         Atrial fibrillation    May affect BG readings if uncontrolled  S/p cardioversion.          Timo Avalos NP  Endocrinology  [facility]

## 2019-01-21 NOTE — PLAN OF CARE
Problem: Adult Inpatient Plan of Care  Goal: Plan of Care Review  Outcome: Ongoing (interventions implemented as appropriate)  No acute events throughout day. See vital signs and assessments in flowsheets. See below for updates on today's progress.     Pulmonary: Venti mask 8L @31% spo>90% during shift.      Cardiovascular: HR 70s. Left radial art line. Positional. Correlates with cuff pressures. Noted drop in art line pressures when pt is repositioned. CVPs 14, 13, 14    Neurological: AAOx4. Left pupil sluggish/irregular shape D/T post cataract surgery. Pt anxious throughout shift. Request and given  PRN ramelteon for insomnia. Also given alprazolam twice during shift for verbalized and noted anxious state.     Gastrointestinal: BS active. No BM during shift.     Genitourinary: Morin intact and patent.  2,185ml output during shift.     Endocrine:  Accuchecks.    Integumentary/Other: No signs of breakdown. 2+ edema left arm. Bruising to bilateral upper ext. Bruising to bilateral groin sites. Left upper chest wall dressing CDI. Blood clot to right nare.     Infusions: Epi. Dopamine. Lasix. Ampicillin.     Patient progressing towards goals as tolerated, plan of care communicated and reviewed with Jerry Solis and family. All concerns addressed. POC pacemaker. Will continue to monitor.

## 2019-01-21 NOTE — ASSESSMENT & PLAN NOTE
- Resolved  - Urine culture -ve  - Flomax started 1/10. Will try to D/C Morin once off aggressive diuresis

## 2019-01-21 NOTE — PLAN OF CARE
Problem: Occupational Therapy Goal  Goal: Occupational Therapy Goal  Goals to be met by: 2/4/19     Patient will increase functional independence with ADLs by performing:    UE Dressing with Supervision.  LE Dressing with Supervision.  Grooming while standing at sink with Stand-by Assistance.  Toileting from toilet with Stand-by Assistance for hygiene and clothing management.   Toilet transfer to toilet with Stand-by Assistance.    Outcome: Ongoing (interventions implemented as appropriate)  Initial eval completed   POC implemented and goals established     Juli Morris, OTR/L  [phone]   1/21/2019

## 2019-01-21 NOTE — ASSESSMENT & PLAN NOTE
-ICM  -Last full 2D Echo 1/7/19: LVEF 20%, LVEDD 7.24 cm. Limited bedside echo done after ICD explant showed no pericardial effusion  -CVP 14. Net -ve > 2.2L since adding IV Diuril bid to Lasix 40 mg/hr yesterday. Will continue same  -sVO2 49 with calculated CO 5.79 and . Continue Epi that was added 1/19 with plan to wean once CVP closer to 12  -GDMT: Dig and Toprol D/C'd 2/2 junctional rhythm. Entresto D/C'd 2/2 transient hypotension during/after ICD explant and TREY  -Self declined for LVAD after w/u last April 2018

## 2019-01-21 NOTE — PT/OT/SLP EVAL
Physical Therapy Evaluation    Patient Name:  Jerry Solis   MRN:  [id number]    Recommendations:     Discharge Recommendations: Home with home health PT/OT pending progress  Discharge Equipment Recommendations: (TBD pending further mobility )   Barriers to discharge: increased level of assistance required; poor endurance     Assessment:     Jerry Solis is a 78 y.o. 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 Pt motivated to regain independence and participate in therapy. Mr. Solis tolerated OOB activity well, with vital signs stable throughout.  He presents with the following impairments/functional limitations:  weakness, impaired endurance, impaired functional mobilty, gait instability, impaired balance, impaired cardiopulmonary response to activity. Pt would benefit from skilled PT intervention to address listed functional deficits, reduce fall risk, and maximize (I) and safety with functional mobility.     Rehab Prognosis: Good; patient would benefit from acute skilled PT services to address these deficits and reach maximum level of function.      Recent Surgery: Procedure(s) (LRB):  CARDIOVERSION (N/A)  ECHOCARDIOGRAM,TRANSESOPHAGEAL (N/A) 7 Days Post-Op    Plan:     During this hospitalization, patient to be seen 4 x/week to address the identified rehab impairments via gait training, therapeutic activities, therapeutic exercises, neuromuscular re-education and progress toward the following goals:    · Plan of Care Expires:  02/19/19    Subjective     Patient/Family Comments/goals: return to PLOF; sit EOB today   Pain/Comfort:  · Pain Rating 1: 0/10  · Pain Rating Post-Intervention 1: 0/10    Patients cultural, spiritual, Jehovah's witness conflicts given the current situation:  no conflicts noted    Patient History:     Living Environment: Pt lives with spouse in [facility] with ramp to enter. Bathroom: tub/shower.   Prior Level of Function:  independent with mobility and ADLs   DME owned: none  Caregiver Assistance: assistance from spouse as needed     Objective:     Communicated with RN (Sylvester) prior to session.  Patient found HOB elevated with telemetry, peripheral IV, central line, gtz catheter, oxygen, arterial line, pulse ox (continuous)  upon PT entry to room. Pt's spouse at bedside.     General Precautions: Standard, fall   Orthopedic Precautions:N/A   Braces: N/A     Exams:  · Cognitive Exam:  Patient is oriented to Person, Place, Time and Situation  · Postural Exam:  Patient presented with the following abnormalities:    · -       Rounded shoulders  · -       Forward head  · Sensation:    · -       Intact  · Skin Integrity/Edema:      · -       Skin integrity: Visible skin intact  · -       Edema: None noted BLEs  · RLE ROM: WFL  · RLE Strength: 5/5  · LLE ROM: WFL  · LLE Strength: 5/5    Functional Mobility:    Bed Mobility:   · Supine > Sit: minimum assistance with HOB elevated  · Sit > Supine: minimum assistance     Transfers:   · Sit <> Stand Transfer: minimum assistance from EOB x 2 trials with no AD; HHA provided in standing                  Sitting Balance:   Pt tolerated sitting EOB ~ 10 minutes with SBA-CGA. Vital signs stable throughout and RN present.     Gait:  · Distance: Pt performed marching in place x ~5 reps, ~5 side steps to L at EOB   · Assistance level: minimum assistance  · Assistive Device: no AD, HHA provided   · Gait Assessment: mild posterior instability noted, decreased toe-to-floor clearance     Therapeutic Activities and Exercises:  Pt and spouse educated on:  - Role of PT and POC/goals for therapy   - Safety with mobility  - Instructed to call nursing staff for assistance with mobility as needed 2/2 fall risk     Pt verbalized understanding and expressed no further concerns/questions.     AM-PAC 6 CLICK MOBILITY  Total Score:17     Patient left with bed in chair position with all lines intact, call button in  reach and RN and spouse present.    GOALS:   Multidisciplinary Problems     Physical Therapy Goals        Problem: Physical Therapy Goal    Goal Priority Disciplines Outcome Goal Variances Interventions   Physical Therapy Goal     PT, PT/OT Ongoing (interventions implemented as appropriate)     Description:  Goals to be met by: 19    Patient will increase functional independence with mobility by performin. Supine to sit with Modified [place], with HOB flat   2. Sit to supine with Modified [place], with HOB flat   3. Sit to stand transfer with Supervision  4. Gait  x 100 feet with Supervision with or without using least restrictive AD.   5. Lower extremity exercise program x20 reps per handout, with supervision                      History:     Past Medical History:   Diagnosis Date    Chronic systolic congestive heart failure 3/22/2018    Coronary artery disease involving native coronary artery of native heart without angina pectoris 3/22/2018    ICD (implantable cardioverter-defibrillator) in place 3/22/2018    Ischemic cardiomyopathy 3/22/2018    Mixed hyperlipidemia 3/22/2018    Type 2 diabetes mellitus with complication 3/22/2018    VT (ventricular tachycardia) 3/22/2018       Past Surgical History:   Procedure Laterality Date    CARDIOVERSION N/A 2019    Performed by [facility] at [facility] EP [facility]    ECHOCARDIOGRAM,TRANSESOPHAGEAL N/A 2019    Performed by Ryan [facility]ley [facility] at [facility] EP LAB    ECHOCARDIOGRAM,TRANSESOPHAGEAL N/A 2019    Performed by [facility] Diagnostic Provider at [facility] EP LAB    EXTRACTION, ELECTRODE LEAD Left 2019    Performed by Werner Boggs MD at [facility] EP LAB    HEART CATH-RIGHT Right 2018    Performed by Elizabeth Wise MD at [facility] CATH LAB    Insertion, Pacemaker, Temporary Transvenous  2019    Performed by Werner Boggs MD at [facility] EP LAB       Clinical Decision Making:     Moderate complexity evaluation:   · Evolving clinical  presentation   · 2-3 personal factors/comorbidities affecting treatment   · Examining and addressing 3+ functional deficits, activity limitations, and participation restrictions    Time Tracking:     PT Received On: 01/21/19  PT Start Time: 1440     PT Stop Time: 1501  PT Total Time (min): 21 min     Billable Minutes: Evaluation 21 min     Diana Burgos PT, DPT   1/21/2019  Pager: [phone]

## 2019-01-21 NOTE — ASSESSMENT & PLAN NOTE
-Vegetation seen on RICARDO  -2 mobile masses seen on CVC by RICARDO 1/14 (has both RUE PICC and RIJ TLC). Discussed with Dr. Russo. Blood cultures repeated 1/15 and were -ve  -Appreciate EP's help. 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 Patient had 2 vegetations on RV lead which is most likely source of bacteremia. Lead tip and ICD pocket cultured with RV lead +ve for Enteroccocus. Blood cultures are negative here thus far.  -Continue Ampicillin and Ceftriaxone per ID recs Will treat for 6 weeks post extraction.  Estimated end date 2/20/19.    -If urgently necessary from a cardiac standpoint, it is okay to proceed with implanting new device as repeat blood cultures taken post extraction remain negative X 72 hours.  Suspect known vegetations on RV leads, now removed, were primary source of infection; cardioMems likely not infected, but Indium Scan done 1.18 and was -ve. If negative, no need for oral antibiotic suppression or surveillance blood cultures following 6 weeks of IV therapy.  -INR has trended down to 2.9 - will resume low dose Coumadin (re-checked). No Eliquis for now as he may require implant of new device  -Will need Life Vest prior to discharge if CRT-D not replaced (EP plans device replacement this Thursday)  -Appreciate GI's help. Plan for outpatient EGD and C-scope for eval of anemia and Enterococcus bacteremia

## 2019-01-21 NOTE — ASSESSMENT & PLAN NOTE
-S/P Unsuccessful RICARDO guided/DCCV 1/14. Remains in A fib with variety of aberrant conduction  -EP planning CRT-D replacement this Thurday  -Dopamine resumed 1/16 2/2 bradycardia. Continue at 5 mcg  -CHADS VASc 6  -Holding eliquis s/p ICD explant 1/9. Per EP, anticoagulation resumed 1/12. Coumadin has been on hold for several days 2/2 elevated INR. INR has trended down to 2.9 - will resume low dose Coumadin  -Cardiac monitoring

## 2019-01-21 NOTE — PT/OT/SLP EVAL
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  He presents with performance deficits affecting function: weakness, impaired endurance, impaired self care skills, impaired functional mobilty, gait instability, impaired balance, impaired cardiopulmonary response to activity.  Pt presents with decreased endurance and generalized weakness in which inhibits pt's functional mobility and (I) to safety participate in functional tasks. Upon d/c to the home environment, pt would benefit from HH PT/OT to complete a home safety assessment and continue to further maximize pt's safety within the home environment and maximize pt's overall strength/endruance in order to safety complete functional mobility and ADLs.     Rehab Prognosis: Good; patient would benefit from acute skilled OT services to address these deficits and reach maximum level of function.       Plan:     Patient to be seen 3 x/week to address the above listed problems via self-care/home management, therapeutic activities, therapeutic exercises  · Plan of Care Expires: 02/19/19  · Plan of Care Reviewed with: patient, spouse    Subjective     Chief Complaint: to scratch back   Patient/Family Comments/goals: to return home     Occupational Profile:  Living Environment: Pt reports he lives with his wife in a [facility] with ramp access. Pt has bath a tub/shower combo and walk-in shower with no DME present. Pt still works as an  and was driving.   Previous level of function: PTA, pt was (I) with ADLs and functional  mobility   Roles and Routines: , community dweller   Equipment Used at Home:  none  Assistance upon Discharge: pt's wife is able to assist pt upon d/c.     Pain/Comfort:  · Pain Rating 1: 0/10  · Pain Rating Post-Intervention 1: 0/10    Patients cultural, spiritual, Taoist conflicts given the current situation: no    Objective:     Communicated with: RN prior to session.  Patient found HOB elevated with arterial line, central line, gtz catheter, oxygen, peripheral IV, telemetry, blood pressure cuff, pulse ox (continuous), PICC line upon OT entry to room.    General Precautions: Standard, fall, hearing impaired   Orthopedic Precautions:N/A   Braces: N/A     Occupational Performance:    Bed Mobility:    · Patient completed Scooting/Bridging with minimum assistance  · Patient completed Supine to Sit with minimum assistance with HOB elevated   · Patient completed Sit to Supine with minimum assistance    Functional Mobility/Transfers:  · Patient completed x 2 reps Sit <> Stand Transfer from EOB with minimum assistance  with  no assistive device   Functional Mobility: Pt engaged in functional mobility simulating household/community mobility with min A using HHA.   · Pt took lateral side steps to the L with min A using HHA x 2 trials.   · OOB mobility limited due to lines and fatigue     Activities of Daily Living:  · Upper Body Dressing: maximal assistance for line management   · Lower Body Dressing: maximal assistance to don B  socks     Cognitive/Visual Perceptual:  Cognitive/Psychosocial Skills:     -       Oriented to: Person, Place, Time and Situation   -       Follows Commands/attention:Follows multistep  commands  -       Communication: clear/fluent  -       Memory: No Deficits noted  -       Safety awareness/insight to disability: intact   -       Mood/Affect/Coping skills/emotional control: Appropriate to situation  Visual/Perceptual:      -Intact      Physical Exam:  Balance:    - Static sit:  SBA  -  Dynamic sit: SBA  - Static standing: min A  - Dynamic Standing: min A    Postural examination/scapula alignment:    -       No postural abnormalities identified  Skin integrity: Visible skin intact  Edema:  None noted  Sensation:    -       Intact  light/touch BUEs  Dominant hand:    -       right  Upper Extremity Range of Motion:     -       Right Upper Extremity: WFL  -       Left Upper Extremity: WFL  Upper Extremity Strength:    -       Right Upper Extremity: WFL  -       Left Upper Extremity: WFL   Strength:    -       Right Upper Extremity: WFL  -       Left Upper Extremity: WFL    AMPAC 6 Click ADL:  AMPAC Total Score: 16    Treatment & Education:  Pt educated by therapist on:   - Pt educated on role of OT, POC, and goals for therapy.    - RN to hold on transferring pt to chair this date  - Patient and family aware of patient's deficits and therapy progression.   - Educated pt on being appropriate to transfer with nsg and PCT with min A  - Time provided for therapeutic counseling and discussion of health disposition.   - Importance of OOB ax's with staff member assistance and sitting OOB majority of day.   - Pt completed ADLs and functional mobility for treatment session as noted above   - Pt verbalized understanding. Pt expressed no further concerns/questions.  - whiteboard updated   Education:    Patient left HOB elevated with all lines intact, call button in reach and RN and wife  present    GOALS:   Multidisciplinary Problems     Occupational Therapy Goals        Problem: Occupational Therapy Goal    Goal Priority Disciplines Outcome Interventions   Occupational Therapy Goal     OT, PT/OT Ongoing (interventions implemented as appropriate)    Description:  Goals to be met by: 2/4/19     Patient will increase functional independence with ADLs by performing:    UE Dressing with Supervision.  LE Dressing with Supervision.  Grooming while standing at sink with Stand-by Assistance.  Toileting from  "toilet with Stand-by Assistance for hygiene and clothing management.   Toilet transfer to toilet with Stand-by Assistance.                      History:     Past Medical History:   Diagnosis Date    Chronic systolic congestive heart failure 3/22/2018    Coronary artery disease involving native coronary artery of native heart without angina pectoris 3/22/2018    ICD (implantable cardioverter-defibrillator) in place 3/22/2018    Ischemic cardiomyopathy 3/22/2018    Mixed hyperlipidemia 3/22/2018    Type 2 diabetes mellitus with complication 3/22/2018    VT (ventricular tachycardia) 3/22/2018       Past Surgical History:   Procedure Laterality Date    CARDIOVERSION N/A 2019    Performed by Ryan [facility]ley MD at [facility] EP LAB    ECHOCARDIOGRAM,TRANSESOPHAGEAL N/A 2019    Performed by Ryan Henley MD at [facility] EP LAB    ECHOCARDIOGRAM,TRANSESOPHAGEAL N/A 2019    Performed by [facility] Diagnostic Provider at [facility] EP LAB    EXTRACTION, ELECTRODE LEAD Left 2019    Performed by Werner Boggs MD at [facility] EP LAB    HEART CATH-RIGHT Right 2018    Performed by Elizabeth Wise MD at [facility] CATH LAB    Insertion, Pacemaker, Temporary Transvenous  2019    Performed by Werner Boggs MD at [facility] EP LAB       Clinical Decision Makin.  OT Low:  "Pt evaluation falls under low complexity for evaluation coding due to performance deficits noted in 1-3 areas as stated above and 0 co-morbities affecting current functional status. Data obtained from problem focused assessments. No modifications or assistance was required for completion of evaluation. Only brief occupational profile and history review completed."     Time Tracking:     OT Date of Treatment: 19  OT Start Time: 1440  OT Stop Time: 1500  OT Total Time (min): 20 min (co-eval with PT)     Billable Minutes:Evaluation 20    Juli M Mensi, OT  2019    "

## 2019-01-21 NOTE — SUBJECTIVE & OBJECTIVE
"Interval HPI:   Overnight events: Remains in CMICU, mild hypoglycemic event likely due to insulin stacking.  BG below goal most of the day yesterday and overnight.  On IV antibiotics.  Eating:   <25%  Nausea: No  Hypoglycemia and intervention: Yes - BG 52, juice and crackers given and mealtime insulin held  Fever: No  TPN and/or TF: No    BP (!) 115/55 (BP Location: Left arm, Patient Position: Lying)   Pulse 79   Temp 97.6 °F (36.4 °C) (Axillary)   Resp 20   Ht 6' 2" (1.88 m)   Wt 84.5 kg (186 lb 4.6 oz)   SpO2 99%   BMI 23.92 kg/m²     Labs Reviewed and Include    Recent Labs   Lab 01/21/19  0310   GLU 99   CALCIUM 9.2   ALBUMIN 2.3*   PROT 7.0      K 3.6   CO2 37*   CL 91*   BUN 54*   CREATININE 1.5*   ALKPHOS 181*   ALT 14   AST 26   BILITOT 0.8     Lab Results   Component Value Date    WBC 10.67 01/21/2019    HGB 7.6 (L) 01/21/2019    HCT 24.8 (L) 01/21/2019    MCV 91 01/21/2019     01/21/2019     No results for input(s): TSH, FREET4 in the last 168 hours.  Lab Results   Component Value Date    HGBA1C 8.1 (H) 01/07/2019       Nutritional status:   Body mass index is 23.92 kg/m².  Lab Results   Component Value Date    ALBUMIN 2.3 (L) 01/21/2019    ALBUMIN 2.3 (L) 01/20/2019    ALBUMIN 2.3 (L) 01/20/2019     No results found for: PREALBUMIN    Estimated Creatinine Clearance: 48.7 mL/min (A) (based on SCr of 1.5 mg/dL (H)).    Accu-Checks  Recent Labs     01/19/19  1131 01/19/19  1713 01/19/19  2133 01/19/19  2136 01/20/19  0733 01/20/19  1116 01/20/19  1511 01/20/19  1615 01/20/19  2055 01/21/19  0037   POCTGLUCOSE 158* 174* 365* 154* 108 130* 52* 104 122* 129*       Current Medications and/or Treatments Impacting Glycemic Control  Immunotherapy:    Immunosuppressants     None        Steroids:   Hormones (From admission, onward)    None        Pressors:    Autonomic Drugs (From admission, onward)    Start     Stop Route Frequency Ordered    01/19/19 1100  EPINEPHrine (ADRENALIN) 5 mg in sodium " chloride 0.9% 250 mL infusion      -- IV Continuous 01/19/19 0957    01/16/19 1330  DOPamine 400 mg in dextrose 5 % 250 mL infusion (premix) (NON-TITRATING)      -- IV Continuous 01/16/19 1226        Hyperglycemia/Diabetes Medications:   Antihyperglycemics (From admission, onward)    Start     Stop Route Frequency Ordered    01/19/19 2100  insulin detemir U-100 pen 14 Units      -- SubQ Nightly 01/19/19 0917    01/19/19 1130  insulin aspart U-100 pen 6-8 Units      -- SubQ 3 times daily with meals 01/19/19 0917    01/11/19 1358  insulin aspart U-100 pen 0-5 Units      -- SubQ Before meals & nightly PRN 01/11/19 1259

## 2019-01-21 NOTE — ASSESSMENT & PLAN NOTE
- Appreciate Nephrology's help  - Was oliguric and creatinine jumped to 4.1 likely 2/2 ATN/hypotension following ICD explant.   - CVP 14 and creatinine remains elevated at 1.5 (was 1.2 on admit). See acute on chronic systolic heart failure  - Continue Dopamine,   - Hold Entresto for now

## 2019-01-21 NOTE — PLAN OF CARE
Problem: Adult Inpatient Plan of Care  Goal: Plan of Care Review  Outcome: Ongoing (interventions implemented as appropriate)  Problem: Adult Inpatient Plan of Care  Goal: Plan of Care Review  Outcome: Ongoing (interventions implemented as appropriate)     No acute events throughout day. See vital signs and assessments in flowsheets. See below for updates on today's progress.      Pulmonary: Pt remains on ventimask on 8L at 31%, spo2 > 90%     Cardiovascular: Pt remains in atrial rhythm with a rate 50-80s. SBP 90-120s per arterial line. Dopamine and Epi gtts ongoing. CVP trending down 15-14.       Neurological: Lethargic at times but is oriented x 4 when awake and alert.     Gastrointestinal: Pt had 2 small, loose bowel movements today. Tolerating PO diet well, appetite declines throughout day.      Genitourinary: Morin remains in place, voided 2.4L. Lasix drip ongoing. Diuril 250mg given x 1 today and another 250mg to be given tonight.     Endocrine: Scheduled insulin given with meals, but pt became hypoglycemic after lunch with BG 54. Orange juice and rosamaria crackers were administered and BG increased to 104. Meal dose insulin for dinner held.    Integumentary/Other: Bilateral groin sites     Infusions: dopamine, lasix, epi, ampicillin, kvo     Patient progressing towards goals as tolerated, plan of care communicated and reviewed with Jerry Solis and family. All concerns addressed. Will continue to monitor.

## 2019-01-22 NOTE — ASSESSMENT & PLAN NOTE
-Vegetation seen on RICARDO  -2 mobile masses seen on CVC by RICARDO 1/14 (has both RUE PICC and RIJ TLC). Discussed with Dr. Russo. Blood cultures repeated 1/15 and were -ve  -Appreciate EP's help. 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 Patient had 2 vegetations on RV lead which is most likely source of bacteremia. Lead tip and ICD pocket cultured with RV lead +ve for Enteroccocus. Blood cultures are negative here are -ve with last culture done 1/15  -Continue Ampicillin (renally dosed today given TREY) and Ceftriaxone per ID recs Will treat for 6 weeks post extraction.  Estimated end date 2/20/19.    -If urgently necessary from a cardiac standpoint, it is okay to proceed with implanting new device as repeat blood cultures taken post extraction remain negative X 72 hours.  Suspect known vegetations on RV leads, now removed, were primary source of infection; cardioMems likely not infected, but Indium Scan done 1.18 and was -ve. If negative, no need for oral antibiotic suppression or surveillance blood cultures following 6 weeks of IV therapy.  -INR has trended down to 2.8 - continue Coumadin. No Eliquis for now as he may require implant of new device  -Will need Life Vest prior to discharge if CRT-D not replaced (EP plans device replacement this Thursday)  -Appreciate GI's help. Plan for outpatient EGD and C-scope for eval of anemia and Enterococcus bacteremia

## 2019-01-22 NOTE — SUBJECTIVE & OBJECTIVE
Interval History: No events overnight, patient states he feels mildly dyspneic. Looking forward to getting his device    Tele: Junctional rhythm with PVC bigeminy and intermittent AF with aberrant conduction.    ROS  Objective:     Vital Signs (Most Recent):  Temp: 97 °F (36.1 °C) (01/22/19 1505)  Pulse: 77 (01/22/19 1505)  Resp: (!) 23 (01/22/19 1505)  BP: 139/63 (01/22/19 1505)  SpO2: 100 % (01/22/19 1505) Vital Signs (24h Range):  Temp:  [96.1 °F (35.6 °C)-97.8 °F (36.6 °C)] 97 °F (36.1 °C)  Pulse:  [60-83] 77  Resp:  [15-35] 23  SpO2:  [88 %-100 %] 100 %  BP: ()/(53-69) 139/63  Arterial Line BP: ()/(44-67) 135/60     Weight: 84.5 kg (186 lb 4.6 oz)  Body mass index is 23.92 kg/m².     SpO2: 100 %  O2 Device (Oxygen Therapy): venti mask    Physical Exam   Constitutional: He is oriented to person, place, and time. He appears well-developed and well-nourished. No distress.   HENT:   Head: Normocephalic and atraumatic.   Mouth/Throat: Oropharynx is clear and moist.   Eyes: Conjunctivae and EOM are normal. Pupils are equal, round, and reactive to light. No scleral icterus.   Neck: Normal range of motion. Neck supple. No JVD present.   Cardiovascular: Normal rate, normal heart sounds and intact distal pulses. A regularly irregular rhythm present.   No murmur heard.  Pulses:       Radial pulses are 2+ on the right side, and 2+ on the left side.   Pulmonary/Chest: Effort normal and breath sounds normal. No respiratory distress.   Symmetrical expansion  On venti mask   Abdominal: Soft. Bowel sounds are normal. There is no hepatosplenomegaly. There is no tenderness.   Musculoskeletal: Normal range of motion.   Neurological: He is alert and oriented to person, place, and time. No cranial nerve deficit.   Skin: Skin is warm and dry. No rash noted. He is not diaphoretic.   Psychiatric: He has a normal mood and affect. Judgment and thought content normal.       Significant Labs:   EP:   Recent Labs   Lab  01/21/19  0310 01/21/19  1414 01/22/19  0308 01/22/19  1432     --  141 139   K 3.6 3.5 4.1 4.3   CL 91*  --  91* 91*   CO2 37*  --  38* 35*   GLU 99  --  145* 194*   BUN 54*  --  60* 67*   CREATININE 1.5*  --  1.9* 2.0*   CALCIUM 9.2  --  9.2 8.9   PROT 7.0  --  7.0 6.9   ALBUMIN 2.3*  --  2.4* 2.4*   BILITOT 0.8  --  0.8 0.8   ALKPHOS 181*  --  182* 177*   AST 26  --  29 25   ALT 14  --  16 14   ANIONGAP 12  --  12 13   ESTGFRAFRICA 51.5*  --  38.7* 36.4*   EGFRNONAA 44.6*  --  33.5* 31.5*   WBC 10.67  --  10.07 10.51   HGB 7.6*  --  7.5* 7.4*   HCT 24.8*  --  24.7* 24.3*     --  196 230   INR 2.9*  --  2.8*  --        Significant Imaging:   CXR: stable edema

## 2019-01-22 NOTE — PROGRESS NOTES
"[facility]  Endocrinology  Progress Note    Admit Date: 2019     Reason for Consult: Management of T2DM, Hyperglycemia     Surgical Procedure and Date: Lead extraction 19    Diabetes diagnosis year: approximately 10 years ago    Lab Results   Component Value Date    HGBA1C 8.1 (H) 2019       Home Diabetes Medications: Lantus 50 units q HS, Novolog 25 units with meals (vials)    How often checking glucose at home? 3-4x per day   BG readings on regimen: AM 120s  Hypoglycemia on the regimen?  Yes - 2-3x per month  Missed doses on regimen?  Yes - 2x per week    Diabetes Complications include:     Hyperglycemia, Hypoglycemia , Diabetic retinopathy , Diabetic cataract  and Diabetic peripheral neuropathy     Complicating diabetes co morbidities:   CHF and History of CVA      HPI:   Patient is a 78 y.o. male with a diagnosis of DM2 and CHF who was admitted on 19 for possible RV lead infection.  Patient is s/p lead extraction on 19.  Patient's DM managed by PCP, Dr. Hdz.  Endocrine consulted for DM/BG management.            Interval HPI:   Overnight events: Remains in CMICU, NAEON.  BG below goal most of the day yesterday, well controlled overnight.  On IV antibiotics.  Eatin%  Nausea: No  Hypoglycemia and intervention: No  Fever: No  TPN and/or TF: No    /67 (BP Location: Left arm, Patient Position: Lying)   Pulse 60   Temp 96.1 °F (35.6 °C) (Axillary)   Resp 19   Ht 6' 2" (1.88 m)   Wt 84.5 kg (186 lb 4.6 oz)   SpO2 96%   BMI 23.92 kg/m²      Labs Reviewed and Include    Recent Labs   Lab 19  0308   *   CALCIUM 9.2   ALBUMIN 2.4*   PROT 7.0      K 4.1   CO2 38*   CL 91*   BUN 60*   CREATININE 1.9*   ALKPHOS 182*   ALT 16   AST 29   BILITOT 0.8     Lab Results   Component Value Date    WBC 10.07 2019    HGB 7.5 (L) 2019    HCT 24.7 (L) 2019    MCV 92 2019     2019     No results for input(s): TSH, FREET4 " in the last 168 hours.  Lab Results   Component Value Date    HGBA1C 8.1 (H) 01/07/2019       Nutritional status:   Body mass index is 23.92 kg/m².  Lab Results   Component Value Date    ALBUMIN 2.4 (L) 01/22/2019    ALBUMIN 2.3 (L) 01/21/2019    ALBUMIN 2.3 (L) 01/20/2019    ALBUMIN 2.3 (L) 01/20/2019     No results found for: PREALBUMIN    Estimated Creatinine Clearance: 38.5 mL/min (A) (based on SCr of 1.9 mg/dL (H)).    Accu-Checks  Recent Labs     01/20/19  0733 01/20/19  1116 01/20/19  1511 01/20/19  1615 01/20/19  2055 01/21/19  0037 01/21/19  0820 01/21/19  1150 01/21/19  1653 01/21/19  2154   POCTGLUCOSE 108 130* 52* 104 122* 129* 78 122* 85 138*       Current Medications and/or Treatments Impacting Glycemic Control  Immunotherapy:    Immunosuppressants     None        Steroids:   Hormones (From admission, onward)    None        Pressors:    Autonomic Drugs (From admission, onward)    Start     Stop Route Frequency Ordered    01/19/19 1100  EPINEPHrine (ADRENALIN) 5 mg in sodium chloride 0.9% 250 mL infusion      -- IV Continuous 01/19/19 0957    01/16/19 1330  DOPamine 400 mg in dextrose 5 % 250 mL infusion (premix) (NON-TITRATING)      -- IV Continuous 01/16/19 1226        Hyperglycemia/Diabetes Medications:   Antihyperglycemics (From admission, onward)    Start     Stop Route Frequency Ordered    01/21/19 2100  insulin detemir U-100 pen 8 Units      -- SubQ Nightly 01/21/19 1716    01/21/19 0815  insulin aspart U-100 pen 5-7 Units      -- SubQ 3 times daily with meals 01/21/19 0810    01/11/19 1358  insulin aspart U-100 pen 0-5 Units      -- SubQ Before meals & nightly PRN 01/11/19 1259          ASSESSMENT and PLAN    * 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         Atrial fibrillation    May affect BG readings if uncontrolled  S/p cardioversion.          Timo Avalos NP  Endocrinology  [facility]

## 2019-01-22 NOTE — ASSESSMENT & PLAN NOTE
- Appreciate Nephrology's help  - Was oliguric and creatinine jumped to 4.1 likely 2/2 ATN/hypotension following ICD explant.   - CVP 17 and creatinine has climbed to 1.9  (was 1.2 on admit). See acute on chronic systolic heart failure  - Dopamine D/C'd, Epi increased to 0.04 for more RV support  - Hold Entresto for now

## 2019-01-22 NOTE — ASSESSMENT & PLAN NOTE
Caution with insulin stacking  Estimated Creatinine Clearance: 38.5 mL/min (A) (based on SCr of 1.9 mg/dL (H)).

## 2019-01-22 NOTE — ASSESSMENT & PLAN NOTE
BG goal 140-180    Levemir 8 units q HS  Novolog 5-7 units with meals  Low dose correction scale  BG monitoring AC/HS    Do not administer Novolog closer than every 4 hours    Discharge planning:  TBD

## 2019-01-22 NOTE — PROGRESS NOTES
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 Looking forward to getting his device    Tele: Junctional rhythm with PVC bigeminy and intermittent AF with aberrant conduction.    ROS  Objective:     Vital Signs (Most Recent):  Temp: 97 °F (36.1 °C) (01/22/19 1505)  Pulse: 77 (01/22/19 1505)  Resp: (!) 23 (01/22/19 1505)  BP: 139/63 (01/22/19 1505)  SpO2: 100 % (01/22/19 1505) Vital Signs (24h Range):  Temp:  [96.1 °F (35.6 °C)-97.8 °F (36.6 °C)] 97 °F (36.1 °C)  Pulse:  [60-83] 77  Resp:  [15-35] 23  SpO2:  [88 %-100 %] 100 %  BP: ()/(53-69) 139/63  Arterial Line BP: ()/(44-67) 135/60     Weight: 84.5 kg (186 lb 4.6 oz)  Body mass index is 23.92 kg/m².     SpO2: 100 %  O2 Device (Oxygen Therapy): venti mask    Physical Exam   Constitutional: He is oriented to person, place, and time. He appears well-developed and well-nourished. No distress.   HENT:   Head: Normocephalic and atraumatic.   Mouth/Throat: Oropharynx is clear and moist.   Eyes: Conjunctivae and EOM are normal. Pupils are equal, round, and reactive to light. No scleral icterus.   Neck: Normal range of motion. Neck supple. No JVD present.   Cardiovascular: Normal rate, normal heart sounds and intact distal pulses. A regularly irregular rhythm present.   No murmur heard.  Pulses:       Radial pulses are 2+ on the right side, and 2+ on the left side.   Pulmonary/Chest: Effort normal and breath sounds normal. No respiratory distress.   Symmetrical expansion  On venti mask   Abdominal: Soft. Bowel sounds are normal. There is no hepatosplenomegaly. There is no tenderness.   Musculoskeletal: Normal range of  motion.   Neurological: He is alert and oriented to person, place, and time. No cranial nerve deficit.   Skin: Skin is warm and dry. No rash noted. He is not diaphoretic.   Psychiatric: He has a normal mood and affect. Judgment and thought content normal.       Significant Labs:   EP:   Recent Labs   Lab 01/21/19  0310 01/21/19  1414 01/22/19  0308 01/22/19  1432     --  141 139   K 3.6 3.5 4.1 4.3   CL 91*  --  91* 91*   CO2 37*  --  38* 35*   GLU 99  --  145* 194*   BUN 54*  --  60* 67*   CREATININE 1.5*  --  1.9* 2.0*   CALCIUM 9.2  --  9.2 8.9   PROT 7.0  --  7.0 6.9   ALBUMIN 2.3*  --  2.4* 2.4*   BILITOT 0.8  --  0.8 0.8   ALKPHOS 181*  --  182* 177*   AST 26  --  29 25   ALT 14  --  16 14   ANIONGAP 12  --  12 13   ESTGFRAFRICA 51.5*  --  38.7* 36.4*   EGFRNONAA 44.6*  --  33.5* 31.5*   WBC 10.67  --  10.07 10.51   HGB 7.6*  --  7.5* 7.4*   HCT 24.8*  --  24.7* 24.3*     --  196 230   INR 2.9*  --  2.8*  --        Significant Imaging:   CXR: stable edema    Assessment and Plan:     * 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 Likewise has a CardioMeMMS device in place. Is not pacemaker dependent. ID believes the primary source is the vegetation, he is s/p extraction on 01/09, RV lead tip grew E. Faecalis (resistant to tetracyclines). Recovery complicated by slow junctional escape rhythm responding to dopamine, no response to DCCV. Now with undetermined atrial rhythm which is either sinus bradycardia with aberrantly conducted PAC or AT, or persistent AF with junctional escape and intermittent aberrant conduction of AF, the differences can be attributed due to AV manolo stimulation by inotrope/chronotrope medications  resulting in conduction changes. With weaning of chronotropic medications, he is back in a msotly junctional rhythm. Indium scan was negative for signs of CardioMEMs infection.    - Inotropes as per primary team with goal of stable renal perfusion and optimizing respiratory status for procedure  - Continue coumadin with target INR 2-3  - volume/HF management as per primary team  - Plan for CRT-D implantation on Thursday         Oniel George MD  Cardiac Electrophysiology  [facility]

## 2019-01-22 NOTE — SUBJECTIVE & OBJECTIVE
"Interval HPI:   Overnight events: Remains in CMICU, NAEON.  BG below goal most of the day yesterday, well controlled overnight.  On IV antibiotics.  Eatin%  Nausea: No  Hypoglycemia and intervention: No  Fever: No  TPN and/or TF: No    /67 (BP Location: Left arm, Patient Position: Lying)   Pulse 60   Temp 96.1 °F (35.6 °C) (Axillary)   Resp 19   Ht 6' 2" (1.88 m)   Wt 84.5 kg (186 lb 4.6 oz)   SpO2 96%   BMI 23.92 kg/m²     Labs Reviewed and Include    Recent Labs   Lab 19  0308   *   CALCIUM 9.2   ALBUMIN 2.4*   PROT 7.0      K 4.1   CO2 38*   CL 91*   BUN 60*   CREATININE 1.9*   ALKPHOS 182*   ALT 16   AST 29   BILITOT 0.8     Lab Results   Component Value Date    WBC 10.07 2019    HGB 7.5 (L) 2019    HCT 24.7 (L) 2019    MCV 92 2019     2019     No results for input(s): TSH, FREET4 in the last 168 hours.  Lab Results   Component Value Date    HGBA1C 8.1 (H) 2019       Nutritional status:   Body mass index is 23.92 kg/m².  Lab Results   Component Value Date    ALBUMIN 2.4 (L) 2019    ALBUMIN 2.3 (L) 2019    ALBUMIN 2.3 (L) 2019    ALBUMIN 2.3 (L) 2019     No results found for: PREALBUMIN    Estimated Creatinine Clearance: 38.5 mL/min (A) (based on SCr of 1.9 mg/dL (H)).    Accu-Checks  Recent Labs     19  0733 19  1116 19  1511 19  1615 19  2055 19  0037 19  0820 19  1150 19  1653 19  2154   POCTGLUCOSE 108 130* 52* 104 122* 129* 78 122* 85 138*       Current Medications and/or Treatments Impacting Glycemic Control  Immunotherapy:    Immunosuppressants     None        Steroids:   Hormones (From admission, onward)    None        Pressors:    Autonomic Drugs (From admission, onward)    Start     Stop Route Frequency Ordered    19 1100  EPINEPHrine (ADRENALIN) 5 mg in sodium chloride 0.9% 250 mL infusion      -- IV Continuous 19 0957    " 01/16/19 1330  DOPamine 400 mg in dextrose 5 % 250 mL infusion (premix) (NON-TITRATING)      -- IV Continuous 01/16/19 1226        Hyperglycemia/Diabetes Medications:   Antihyperglycemics (From admission, onward)    Start     Stop Route Frequency Ordered    01/21/19 2100  insulin detemir U-100 pen 8 Units      -- SubQ Nightly 01/21/19 1716    01/21/19 0815  insulin aspart U-100 pen 5-7 Units      -- SubQ 3 times daily with meals 01/21/19 0810    01/11/19 1358  insulin aspart U-100 pen 0-5 Units      -- SubQ Before meals & nightly PRN 01/11/19 1259

## 2019-01-22 NOTE — NURSING
See vital signs and assessments in flowsheets. See below for updates on today's progress.   Pulmonary  6 liters, 24% Venti mask, oxygen saturations >95% throughout shift.     Cardiovascular: Systolic's: ~100-110's, HR:~60'S , CVP: 16,17, 15      Neurological: AAOX4, Afebrile, Follows commands, Moves all extremities, Left pupil slow/sluggish,       Gastrointestinal: BMx 1, bowel sounds present x4 quadrants, soft/nontender     Genitourinary: Voids via gtz catheter, total urine output: 1.3 liters/shift     Endocrine: Acuchecks, sliding scale coverage      Integumentary/Other: pale, dry skin, right nare dried blood.     Infusions:  LASIX, EPI, KVO     POC: Continue to diuresis patient. Surgery possibly Thursday.

## 2019-01-22 NOTE — ASSESSMENT & PLAN NOTE
This is a 77yo gentleman with long-standing history of ICM with EF 35%, a 20-year history of ICD with upgrade to CRT-D in 2014 who presents for device extraction due to RV lead vegetation ~0.5cm in the setting of VRE bacteremia with a likely initial GI source. Likewise has a CardioMeMMS device in place. Is not pacemaker dependent. ID believes the primary source is the vegetation, he is s/p extraction on 01/09, RV lead tip grew E. Faecalis (resistant to tetracyclines). Recovery complicated by slow junctional escape rhythm responding to dopamine, no response to DCCV. Now with undetermined atrial rhythm which is either sinus bradycardia with aberrantly conducted PAC or AT, or persistent AF with junctional escape and intermittent aberrant conduction of AF, the differences can be attributed due to AV manolo stimulation by inotrope/chronotrope medications resulting in conduction changes. With weaning of chronotropic medications, he is back in a msotly junctional rhythm. Indium scan was negative for signs of CardioMEMs infection.    - Inotropes as per primary team with goal of stable renal perfusion and optimizing respiratory status for procedure  - Continue coumadin with target INR 2-3  - volume/HF management as per primary team  - Plan for CRT-D implantation on Thursday

## 2019-01-22 NOTE — ASSESSMENT & PLAN NOTE
-S/P Unsuccessful RICARDO guided/DCCV 1/14. Remains in A fib with variety of aberrant conduction  -EP planning CRT-D replacement this Thursday  -Epi increased to 0.04 for more RV support. D/C'd Dopamine  -CHADS VASc 6  -Holding eliquis s/p ICD explant 1/9. Per EP, anticoagulation resumed 1/12. INR today 2.8 (goal 2.0-3.0)  -Cardiac monitoring

## 2019-01-22 NOTE — SUBJECTIVE & OBJECTIVE
**Interval History: CVP has climbed to 17, and he has higher O2 requirements (was on 15L/40% VM when I saw him earlier. Per nurses, his IV Lasix gtt was found disconnected ~ midnight - not sure how long it had not been running. CXR looks better, however. Creatinine has climbed from 1.5 to 1.9.     Continuous Infusions:   epinephrine infusion (NON-TITRATING) 0.04 mcg/kg/min (01/22/19 0912)    furosemide (LASIX) 2 mg/mL infusion (non-titrating) 40 mg/hr (01/22/19 0905)     Scheduled Meds:   ampicillin IVPB  2 g Intravenous Q6H    artificial tears  1 drop Both Eyes QID    cefTRIAXone (ROCEPHIN) IVPB  2 g Intravenous Q12H    clopidogrel  75 mg Oral Daily    ferrous sulfate  325 mg Oral BID    insulin aspart U-100  5-7 Units Subcutaneous TIDWM    insulin detemir U-100  8 Units Subcutaneous QHS    levothyroxine  25 mcg Oral Before breakfast    polyethylene glycol  17 g Oral Daily    potassium chloride SA  20 mEq Oral BID    rosuvastatin  20 mg Oral QHS    senna-docusate 8.6-50 mg  2 tablet Oral BID    tamsulosin  0.4 mg Oral Daily    warfarin  2.5 mg Oral Daily     PRN Meds:albuterol-ipratropium, ALPRAZolam, dextrose 50%, dextrose 50%, glucagon (human recombinant), glucose, glucose, insulin aspart U-100, magnesium sulfate IVPB, magnesium sulfate IVPB, ondansetron, promethazine (PHENERGAN) IVPB, ramelteon, sodium chloride 0.9%, sodium chloride 0.9%, traMADol    Review of patient's allergies indicates:   Allergen Reactions    Adhesive Other (See Comments)     blisters    Codeine Other (See Comments)     Blurred vision    Latex Hives    Ace inhibitors     Lipitor [atorvastatin] Other (See Comments)     Muscle spasms     Objective:     Vital Signs (Most Recent):  Temp: 96.1 °F (35.6 °C) (01/22/19 0705)  Pulse: 76 (01/22/19 1000)  Resp: (!) 21 (01/22/19 1000)  BP: (!) 121/57 (01/22/19 1000)  SpO2: 97 % (01/22/19 1000) Vital Signs (24h Range):  Temp:  [96.1 °F (35.6 °C)-97.8 °F (36.6 °C)] 96.1 °F (35.6  °C)  Pulse:  [] 76  Resp:  [15-35] 21  SpO2:  [88 %-100 %] 97 %  BP: ()/(51-69) 121/57  Arterial Line BP: ()/(43-67) 135/56     Patient Vitals for the past 72 hrs (Last 3 readings):   Weight   01/20/19 0300 84.5 kg (186 lb 4.6 oz)     Body mass index is 23.92 kg/m².      Intake/Output Summary (Last 24 hours) at 1/22/2019 1043  Last data filed at 1/22/2019 0913  Gross per 24 hour   Intake 1699.88 ml   Output 2715 ml   Net -1015.12 ml       Hemodynamic Parameters:       Telemetry: A fib with variable aberrant conduction    Physical Exam   Constitutional: He is oriented to person, place, and time. He appears well-developed and well-nourished.   HENT:   Head: Normocephalic and atraumatic.   Eyes: Conjunctivae and EOM are normal.   Neck: Normal range of motion. Neck supple. No thyromegaly present.   RIJ line   Cardiovascular: Regular rhythm.   Irregular, bradycardic   Pulmonary/Chest: Effort normal and breath sounds normal.   Abdominal: Soft. Bowel sounds are normal.   Musculoskeletal: Normal range of motion.   Trace HEATHER   Neurological: He is alert and oriented to person, place, and time.   Skin: Skin is warm and dry. Capillary refill takes 2 to 3 seconds.   Psychiatric: He has a normal mood and affect. His behavior is normal. Judgment and thought content normal.       Significant Labs:  CBC:  Recent Labs   Lab 01/20/19  0146 01/21/19  0310 01/22/19  0308   WBC 12.66 10.67 10.07   RBC 2.72* 2.74* 2.70*   HGB 7.7* 7.6* 7.5*   HCT 25.5* 24.8* 24.7*    168 196   MCV 94 91 92   MCH 28.3 27.7 27.8   MCHC 30.2* 30.6* 30.4*     BNP:  Recent Labs   Lab 01/17/19  1000   BNP 1,357*     CMP:  Recent Labs   Lab 01/20/19  0146 01/21/19  0310 01/21/19  1414 01/22/19  0308   *  135* 99  --  145*   CALCIUM 8.9  8.9 9.2  --  9.2   ALBUMIN 2.3*  2.3* 2.3*  --  2.4*   PROT 6.9  6.9 7.0  --  7.0     141 140  --  141   K 3.9  3.9 3.6 3.5 4.1   CO2 34*  34* 37*  --  38*   CL 94*  94* 91*  --  91*    BUN 53*  53* 54*  --  60*   CREATININE 1.6*  1.6* 1.5*  --  1.9*   ALKPHOS 179*  179* 181*  --  182*   ALT 13  13 14  --  16   AST 21  21 26  --  29   BILITOT 0.8  0.8 0.8  --  0.8      Coagulation:   Recent Labs   Lab 01/16/19  0310  01/20/19  0146 01/21/19  0310 01/22/19  0308   INR 2.4*   < > 3.8* 2.9* 2.8*   APTT 52.8*  --   --   --   --     < > = values in this interval not displayed.     LDH:  No results for input(s): LDH in the last 72 hours.  Microbiology:  Microbiology Results (last 7 days)     Procedure Component Value Units Date/Time    Blood culture [[id number]] Collected:  01/15/19 0328    Order Status:  Completed Specimen:  Blood from Peripheral, Forearm, Left Updated:  01/20/19 0812     Blood Culture, Routine No growth after 5 days.    Blood culture [[id number]] Collected:  01/15/19 0320    Order Status:  Completed Specimen:  Blood from Peripheral, Antecubital, Left Updated:  01/20/19 0812     Blood Culture, Routine No growth after 5 days.    Culture, Anaerobe [[id number]] Collected:  01/09/19 1124    Order Status:  Completed Specimen:  Tissue from Chest, Left Updated:  01/16/19 0838     Anaerobic Culture No anaerobes isolated    Narrative:       ICD pocket tissue    Culture, Anaerobe [[id number]] Collected:  01/09/19 1314    Order Status:  Completed Specimen:  Other from Chest, Left Updated:  01/16/19 0838     Anaerobic Culture No anaerobes isolated    Narrative:       #2 RA Lead tip    Culture, Anaerobe [[id number]] Collected:  01/09/19 1322    Order Status:  Completed Specimen:  Other from Chest, Left Updated:  01/16/19 0838     Anaerobic Culture No anaerobes isolated    Narrative:       #3 LV lead tip    Blood culture [[id number]] Collected:  01/10/19 1401    Order Status:  Completed Specimen:  Blood from Peripheral, Hand, Left Updated:  01/15/19 1612     Blood Culture, Routine No growth after 5 days.    Blood culture [[id number]] Collected:  01/10/19 1401    Order Status:  Completed Specimen:   Blood from Peripheral, Forearm, Left Updated:  01/15/19 1612     Blood Culture, Routine No growth after 5 days.          I have reviewed all pertinent labs within the past 24 hours.    Estimated Creatinine Clearance: 38.5 mL/min (A) (based on SCr of 1.9 mg/dL (H)).    Diagnostic Results:  I have reviewed all pertinent imaging results/findings within the past 24 hours.

## 2019-01-22 NOTE — ASSESSMENT & PLAN NOTE
-ICM  -Last full 2D Echo 1/7/19: LVEF 20%, LVEDD 7.24 cm. Limited bedside echo done after ICD explant showed no pericardial effusion  -CVP has climbed to 17. Continue Lasix 40 mg/hr and give one time dose Metolazone 5 mg  -sVO2 4o with calculated CO 5.25 and SVR 1005. Will increase Epi to 0.04 for RV support, and D/C Dopamine  -GDMT: Dig and Toprol D/C'd 2/2 junctional rhythm. Entresto D/C'd 2/2 transient hypotension during/after ICD explant and TREY  -Self declined for LVAD after w/u last April 2018

## 2019-01-22 NOTE — PROGRESS NOTES
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 Per nurses, his IV Lasix gtt was found disconnected ~ midnight - not sure how long it had not been running. CXR looks better, however. Creatinine has climbed from 1.5 to 1.9.     Continuous Infusions:   epinephrine infusion (NON-TITRATING) 0.04 mcg/kg/min (01/22/19 0912)    furosemide (LASIX) 2 mg/mL infusion (non-titrating) 40 mg/hr (01/22/19 0905)     Scheduled Meds:   ampicillin IVPB  2 g Intravenous Q6H    artificial tears  1 drop Both Eyes QID    cefTRIAXone (ROCEPHIN) IVPB  2 g Intravenous Q12H    clopidogrel  75 mg Oral Daily    ferrous sulfate  325 mg Oral BID    insulin aspart U-100  5-7 Units Subcutaneous TIDWM    insulin detemir U-100  8 Units Subcutaneous QHS    levothyroxine  25 mcg Oral Before breakfast    polyethylene glycol  17 g Oral Daily    potassium chloride SA  20 mEq Oral BID    rosuvastatin  20 mg Oral QHS    senna-docusate 8.6-50 mg  2 tablet Oral BID    tamsulosin  0.4 mg Oral Daily    warfarin  2.5 mg Oral Daily     PRN Meds:albuterol-ipratropium, ALPRAZolam, dextrose 50%, dextrose 50%, glucagon (human recombinant), glucose, glucose, insulin aspart U-100, magnesium sulfate IVPB, magnesium sulfate IVPB, ondansetron, promethazine (PHENERGAN) IVPB, ramelteon, sodium chloride 0.9%, sodium chloride 0.9%, traMADol    Review of patient's allergies indicates:   Allergen Reactions    Adhesive Other (See Comments)     blisters    Codeine Other (See Comments)     Blurred vision    Latex Hives    Ace inhibitors      Lipitor [atorvastatin] Other (See Comments)     Muscle spasms     Objective:     Vital Signs (Most Recent):  Temp: 96.1 °F (35.6 °C) (01/22/19 0705)  Pulse: 76 (01/22/19 1000)  Resp: (!) 21 (01/22/19 1000)  BP: (!) 121/57 (01/22/19 1000)  SpO2: 97 % (01/22/19 1000) Vital Signs (24h Range):  Temp:  [96.1 °F (35.6 °C)-97.8 °F (36.6 °C)] 96.1 °F (35.6 °C)  Pulse:  [] 76  Resp:  [15-35] 21  SpO2:  [88 %-100 %] 97 %  BP: ()/(51-69) 121/57  Arterial Line BP: ()/(43-67) 135/56     Patient Vitals for the past 72 hrs (Last 3 readings):   Weight   01/20/19 0300 84.5 kg (186 lb 4.6 oz)     Body mass index is 23.92 kg/m².      Intake/Output Summary (Last 24 hours) at 1/22/2019 1043  Last data filed at 1/22/2019 0913  Gross per 24 hour   Intake 1699.88 ml   Output 2715 ml   Net -1015.12 ml       Hemodynamic Parameters:       Telemetry: A fib with variable aberrant conduction    Physical Exam   Constitutional: He is oriented to person, place, and time. He appears well-developed and well-nourished.   HENT:   Head: Normocephalic and atraumatic.   Eyes: Conjunctivae and EOM are normal.   Neck: Normal range of motion. Neck supple. No thyromegaly present.   RIJ line   Cardiovascular: Regular rhythm.   Irregular, bradycardic   Pulmonary/Chest: Effort normal and breath sounds normal.   Abdominal: Soft. Bowel sounds are normal.   Musculoskeletal: Normal range of motion.   Trace HEATHER   Neurological: He is alert and oriented to person, place, and time.   Skin: Skin is warm and dry. Capillary refill takes 2 to 3 seconds.   Psychiatric: He has a normal mood and affect. His behavior is normal. Judgment and thought content normal.       Significant Labs:  CBC:  Recent Labs   Lab 01/20/19  0146 01/21/19  0310 01/22/19  0308   WBC 12.66 10.67 10.07   RBC 2.72* 2.74* 2.70*   HGB 7.7* 7.6* 7.5*   HCT 25.5* 24.8* 24.7*    168 196   MCV 94 91 92   MCH 28.3 27.7 27.8   MCHC 30.2* 30.6* 30.4*     BNP:  Recent Labs   Lab  01/17/19  1000   BNP 1,357*     CMP:  Recent Labs   Lab 01/20/19  0146 01/21/19  0310 01/21/19  1414 01/22/19  0308   *  135* 99  --  145*   CALCIUM 8.9  8.9 9.2  --  9.2   ALBUMIN 2.3*  2.3* 2.3*  --  2.4*   PROT 6.9  6.9 7.0  --  7.0     141 140  --  141   K 3.9  3.9 3.6 3.5 4.1   CO2 34*  34* 37*  --  38*   CL 94*  94* 91*  --  91*   BUN 53*  53* 54*  --  60*   CREATININE 1.6*  1.6* 1.5*  --  1.9*   ALKPHOS 179*  179* 181*  --  182*   ALT 13  13 14  --  16   AST 21  21 26  --  29   BILITOT 0.8  0.8 0.8  --  0.8      Coagulation:   Recent Labs   Lab 01/16/19  0310  01/20/19  0146 01/21/19  0310 01/22/19  0308   INR 2.4*   < > 3.8* 2.9* 2.8*   APTT 52.8*  --   --   --   --     < > = values in this interval not displayed.     LDH:  No results for input(s): LDH in the last 72 hours.  Microbiology:  Microbiology Results (last 7 days)     Procedure Component Value Units Date/Time    Blood culture [[id number]] Collected:  01/15/19 0328    Order Status:  Completed Specimen:  Blood from Peripheral, Forearm, Left Updated:  01/20/19 0812     Blood Culture, Routine No growth after 5 days.    Blood culture [[id number]] Collected:  01/15/19 0320    Order Status:  Completed Specimen:  Blood from Peripheral, Antecubital, Left Updated:  01/20/19 0812     Blood Culture, Routine No growth after 5 days.    Culture, Anaerobe [[id number]] Collected:  01/09/19 1124    Order Status:  Completed Specimen:  Tissue from Chest, Left Updated:  01/16/19 0838     Anaerobic Culture No anaerobes isolated    Narrative:       ICD pocket tissue    Culture, Anaerobe [[id number]] Collected:  01/09/19 1314    Order Status:  Completed Specimen:  Other from Chest, Left Updated:  01/16/19 0838     Anaerobic Culture No anaerobes isolated    Narrative:       #2 RA Lead tip    Culture, Anaerobe [[id number]] Collected:  01/09/19 1322    Order Status:  Completed Specimen:  Other from Chest, Left Updated:  01/16/19 0838     Anaerobic  Culture No anaerobes isolated    Narrative:       #3 LV lead tip    Blood culture [[id number]] Collected:  01/10/19 1401    Order Status:  Completed Specimen:  Blood from Peripheral, Hand, Left Updated:  01/15/19 1612     Blood Culture, Routine No growth after 5 days.    Blood culture [[id number]] Collected:  01/10/19 1401    Order Status:  Completed Specimen:  Blood from Peripheral, Forearm, Left Updated:  01/15/19 1612     Blood Culture, Routine No growth after 5 days.          I have reviewed all pertinent labs within the past 24 hours.    Estimated Creatinine Clearance: 38.5 mL/min (A) (based on SCr of 1.9 mg/dL (H)).    Diagnostic Results:  I have reviewed all pertinent imaging results/findings within the past 24 hours.    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 Discussed with Dr. Russo. Blood cultures repeated 1/15 and were -ve  -Appreciate EP's help. 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 Patient had 2 vegetations on RV lead which is most likely source of bacteremia. Lead tip and ICD pocket cultured with RV lead +ve for Enteroccocus. Blood cultures are negative here are -ve with last culture done 1/15  -Continue Ampicillin (renally dosed today given TREY) and Ceftriaxone per ID recs Will treat for 6 weeks post extraction.  Estimated end date 2/20/19.    -If urgently necessary from a cardiac standpoint, it is okay to proceed with implanting new device as repeat blood cultures taken post extraction remain negative X 72 hours.  Suspect known vegetations on RV leads, now removed, were primary source of infection; cardioMems likely not infected, but Indium Scan done 1.18 and was -ve. If negative, no need for oral antibiotic suppression or surveillance blood cultures following 6 weeks of IV therapy.  -INR has trended down to  2.8 - continue Coumadin. No Eliquis for now as he may require implant of new device  -Will need Life Vest prior to discharge if CRT-D not replaced (EP plans device replacement this Thursday)  -Appreciate GI's help. Plan for outpatient EGD and C-scope for eval of anemia and Enterococcus bacteremia       Acute on chronic combined systolic and diastolic CHF, NYHA class 3    -ICM  -Last full 2D Echo 1/7/19: LVEF 20%, LVEDD 7.24 cm. Limited bedside echo done after ICD explant showed no pericardial effusion  -CVP has climbed to 17. Continue Lasix 40 mg/hr and give one time dose Metolazone 5 mg  -sVO2 4o with calculated CO 5.25 and SVR 1005. Will increase Epi to 0.04 for RV support, and D/C Dopamine  -GDMT: Dig and Toprol D/C'd 2/2 junctional rhythm. Entresto D/C'd 2/2 transient hypotension during/after ICD explant and TREY  -Self declined for LVAD after w/u last April 2018       Atrial fibrillation    -S/P Unsuccessful RICARDO guided/DCCV 1/14. Remains in A fib with variety of aberrant conduction  -EP planning CRT-D replacement this Thursday  -Epi increased to 0.04 for more RV support. D/C'd Dopamine  -CHADS VASc 6  -Holding eliquis s/p ICD explant 1/9. Per EP, anticoagulation resumed 1/12. INR today 2.8 (goal 2.0-3.0)  -Cardiac monitoring     Type 2 diabetes mellitus with complication    -A1c 8.1  -Target -180 while hospitalized  -detemir 14, aspart 6-8 with sliding scale  -Appreciate Endocrine's help with management     Dysuria    - Resolved  - Urine culture -ve  - Flomax started 1/10. Will try to D/C Morin once off aggressive diuresis         Junctional bradycardia    - See A fib     Endocarditis    - See ICD lead infection     Bacteremia    -see above     TREY (acute kidney injury)    - Appreciate Nephrology's help  - Was oliguric and creatinine jumped to 4.1 likely 2/2 ATN/hypotension following ICD explant.   - CVP 17 and creatinine has climbed to 1.9  (was 1.2 on admit). See acute on chronic systolic heart  failure  - Dopamine D/C'd, Epi increased to 0.04 for more RV support  - Hold Entresto for now       Uninterrupted Critical Care/Counseling Time (not including procedures): 45 minutes      Dionna Lora NP [id number]  Heart Transplant  [facility]

## 2019-01-23 NOTE — ASSESSMENT & PLAN NOTE
-Vegetation seen on RICARDO  -2 mobile masses seen on CVC by RICARDO 1/14 (has both RUE PICC and RIJ TLC). Discussed with Dr. Russo. Blood cultures repeated 1/15 and were -ve  -Appreciate EP's help. 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 Patient had 2 vegetations on RV lead which is most likely source of bacteremia. Lead tip and ICD pocket cultured with RV lead +ve for Enteroccocus. Blood cultures are negative here are -ve with last culture done 1/15  -Continue Ampicillin (renally dosed given TREY) and Ceftriaxone per ID recs Will treat for 6 weeks post extraction.  Estimated end date 2/20/19.    -If urgently necessary from a cardiac standpoint, it is okay to proceed with implanting new device as repeat blood cultures taken post extraction remain negative X 72 hours.  Suspect known vegetations on RV leads, now removed, were primary source of infection; cardioMems likely not infected, but Indium Scan done 1.18 and was -ve. If negative, no need for oral antibiotic suppression or surveillance blood cultures following 6 weeks of IV therapy.  -INR has trended back up at 3.6 - hold Coumadin. No Eliquis for now as he may require implant of new device  -Will need Life Vest prior to discharge if CRT-D not replaced (EP planned device replacement this Thursday, but given degree of volume overload, this has been postponed until next Tuesday if euvolemic  -Appreciate GI's help. Plan for outpatient EGD and C-scope for eval of anemia and Enterococcus bacteremia

## 2019-01-23 NOTE — PROGRESS NOTES
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 CVP 18, and CXR shows increased pulmonary edema. O2 had been increased to 15L/50% with sats in the 90's overnight, now back on N/C. N/V this morning. sVO2 48 with calculated CO 6.00 and .    Continuous Infusions:   epinephrine infusion (NON-TITRATING) 0.04 mcg/kg/min (01/23/19 0800)    furosemide (LASIX) 2 mg/mL infusion (non-titrating) 40 mg/hr (01/23/19 0800)     Scheduled Meds:   ampicillin IVPB  2 g Intravenous Q6H    artificial tears  1 drop Both Eyes QID    cefTRIAXone (ROCEPHIN) IVPB  2 g Intravenous Q12H    chlorothiazide (DIURIL) IVPB  500 mg Intravenous Q12H    clopidogrel  75 mg Oral Daily    ferrous sulfate  325 mg Oral BID    insulin aspart U-100  5-7 Units Subcutaneous TIDWM    insulin detemir U-100  8 Units Subcutaneous QHS    levothyroxine  25 mcg Oral Before breakfast    polyethylene glycol  17 g Oral Daily    potassium chloride SA  40 mEq Oral BID    rosuvastatin  20 mg Oral QHS    senna-docusate 8.6-50 mg  2 tablet Oral BID    tamsulosin  0.4 mg Oral Daily     PRN Meds:dextrose 50%, dextrose 50%, glucagon (human recombinant), glucose, glucose, hydrOXYzine HCl, insulin aspart U-100, magnesium sulfate IVPB, magnesium sulfate IVPB, ondansetron, ramelteon, sodium chloride 0.9%, sodium chloride 0.9%, traMADol    Review of patient's allergies indicates:   Allergen Reactions    Adhesive Other (See Comments)     blisters    Codeine Other (See Comments)     Blurred vision    Latex Hives     Ace inhibitors     Lipitor [atorvastatin] Other (See Comments)     Muscle spasms     Objective:     Vital Signs (Most Recent):  Temp: 96.6 °F (35.9 °C) (01/23/19 0301)  Pulse: 77 (01/23/19 0829)  Resp: (!) 26 (01/23/19 0829)  BP: 128/68 (01/23/19 0800)  SpO2: 98 % (01/23/19 0829) Vital Signs (24h Range):  Temp:  [96.5 °F (35.8 °C)-97.1 °F (36.2 °C)] 96.6 °F (35.9 °C)  Pulse:  [] 77  Resp:  [16-37] 26  SpO2:  [90 %-100 %] 98 %  BP: ()/(36-86) 128/68  Arterial Line BP: ()/(45-70) 128/59     Patient Vitals for the past 72 hrs (Last 3 readings):   Weight   01/23/19 0701 83.9 kg (184 lb 15.5 oz)     Body mass index is 23.75 kg/m².      Intake/Output Summary (Last 24 hours) at 1/23/2019 0852  Last data filed at 1/23/2019 0800  Gross per 24 hour   Intake 1627.99 ml   Output 3305 ml   Net -1677.01 ml       Hemodynamic Parameters:       Telemetry: Atrial fib    Physical Exam   Constitutional: He is oriented to person, place, and time. He appears well-developed and well-nourished.   HENT:   Head: Normocephalic and atraumatic.   Eyes: Conjunctivae and EOM are normal.   Neck: Normal range of motion. Neck supple. No thyromegaly present.   RIJ line   Cardiovascular: Regular rhythm.   Irregular, bradycardic   Pulmonary/Chest: Effort normal and breath sounds normal.   Abdominal: Soft. Bowel sounds are normal.   Pulsatile liver, RUQ fullness   Musculoskeletal: Normal range of motion.   Trace HEATHER   Neurological: He is alert and oriented to person, place, and time.   Skin: Skin is warm and dry. Capillary refill takes 2 to 3 seconds.   Psychiatric: He has a normal mood and affect. His behavior is normal. Judgment and thought content normal.       Significant Labs:  CBC:  Recent Labs   Lab 01/22/19  0308 01/22/19  1432 01/23/19  0429   WBC 10.07 10.51 12.05   RBC 2.70* 2.66* 2.69*   HGB 7.5* 7.4* 7.5*   HCT 24.7* 24.3* 24.7*    230 250   MCV 92 91 92   MCH 27.8 27.8 27.9   MCHC 30.4* 30.5* 30.4*      BNP:  Recent Labs   Lab 01/17/19  1000 01/23/19  0429   BNP 1,357* 1,522*     CMP:  Recent Labs   Lab 01/22/19  0308 01/22/19  1432 01/23/19  0429   * 194* 73   CALCIUM 9.2 8.9 8.9   ALBUMIN 2.4* 2.4* 2.3*   PROT 7.0 6.9 6.7    139 143   K 4.1 4.3 3.6   CO2 38* 35* 32*   CL 91* 91* 94*   BUN 60* 67* 71*   CREATININE 1.9* 2.0* 2.0*   ALKPHOS 182* 177* 172*   ALT 16 14 17   AST 29 25 32   BILITOT 0.8 0.8 0.8      Coagulation:   Recent Labs   Lab 01/21/19  0310 01/22/19  0308 01/23/19 0429   INR 2.9* 2.8* 3.6*     LDH:  No results for input(s): LDH in the last 72 hours.  Microbiology:  Microbiology Results (last 7 days)     Procedure Component Value Units Date/Time    Blood culture [[id number]] Collected:  01/15/19 0328    Order Status:  Completed Specimen:  Blood from Peripheral, Forearm, Left Updated:  01/20/19 0812     Blood Culture, Routine No growth after 5 days.    Blood culture [[id number]] Collected:  01/15/19 0320    Order Status:  Completed Specimen:  Blood from Peripheral, Antecubital, Left Updated:  01/20/19 0812     Blood Culture, Routine No growth after 5 days.          I have reviewed all pertinent labs within the past 24 hours.    Estimated Creatinine Clearance: 36.5 mL/min (A) (based on SCr of 2 mg/dL (H)).    Diagnostic Results:  I have reviewed all pertinent imaging results/findings within the past 24 hours.    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 Discussed with Dr. Russo. Blood cultures repeated 1/15 and were -ve  -Appreciate EP's help. 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 Patient had 2 vegetations on RV lead which is most likely source of bacteremia. Lead tip and ICD pocket cultured with RV lead +ve for Enteroccocus. Blood cultures are negative here are -ve with last culture done  1/15  -Continue Ampicillin (renally dosed given TREY) and Ceftriaxone per ID recs Will treat for 6 weeks post extraction.  Estimated end date 2/20/19.    -If urgently necessary from a cardiac standpoint, it is okay to proceed with implanting new device as repeat blood cultures taken post extraction remain negative X 72 hours.  Suspect known vegetations on RV leads, now removed, were primary source of infection; cardioMems likely not infected, but Indium Scan done 1.18 and was -ve. If negative, no need for oral antibiotic suppression or surveillance blood cultures following 6 weeks of IV therapy.  -INR has trended back up at 3.6 - hold Coumadin. No Eliquis for now as he may require implant of new device  -Will need Life Vest prior to discharge if CRT-D not replaced (EP planned device replacement this Thursday, but given degree of volume overload, this has been postponed until next Tuesday if euvolemic  -Appreciate GI's help. Plan for outpatient EGD and C-scope for eval of anemia and Enterococcus bacteremia       Acute on chronic combined systolic and diastolic CHF, NYHA class 3    -ICM  -Last full 2D Echo 1/7/19: LVEF 20%, LVEDD 7.24 cm. Limited bedside echo done after ICD explant showed no pericardial effusion  -CVP still 18. Increase Lasix to 60 mg/hr and start IV Diuril 500 mg bid  -sVO2 48 with calculated CO 5.25 and SVR 1005. Increase Epi to 0.06 and add Miesha 10 ppm for RV support  -GDMT: Dig and Toprol D/C'd 2/2 junctional rhythm. Entresto D/C'd 2/2 transient hypotension during/after ICD explant and TREY  -Self declined for LVAD after w/u last April 2018       A-fib    -S/P Unsuccessful RICARDO guided/DCCV 1/14. Remains in A fib with variety of aberrant conduction  -EP planning CRT-D replacement likely next Tuesday if euvolemic  -Epi increased to 0.04 for more RV support. Adding Miesha 10 ppm  -CHADS VASc 6  -Holding eliquis s/p ICD explant 1/9. Per EP, anticoagulation resumed 1/12. INR today 2.8 (goal 2.0-3.0)  -Cardiac  monitoring     Type 2 diabetes mellitus with complication    -A1c 8.1  -Target -180 while hospitalized  -detemir 14, aspart 6-8 with sliding scale  -Appreciate Endocrine's help with management     Dysuria    - Resolved  - Urine culture -ve  - Flomax started 1/10. Will try to D/C Morin once off aggressive diuresis         Junctional bradycardia    - See A fib     Endocarditis    - See ICD lead infection     Bacteremia    -see above     TREY (acute kidney injury)    - Appreciate Nephrology's help  - Was oliguric and creatinine jumped to 4.1 likely 2/2 ATN/hypotension following ICD explant.   - CVP 18 and creatinine has climbed to 2.0  (was 1.2 on admit). See acute on chronic systolic heart failure  - Dopamine D/C'd, Epi increased to 0.04 for more RV support yesterday and adding Miesha today  - Hold Entresto for now       Uninterrupted Critical Care/Counseling Time (not including procedures): [place] [id number]  Heart Transplant  [facility]

## 2019-01-23 NOTE — PT/OT/SLP PROGRESS
Physical Therapy Treatment    Patient Name:  Jerry Solis   MRN:  [id number]    Recommendations:     Discharge Recommendations:  (home with HH pending progress)   Discharge Equipment Recommendations: (TBD)   Barriers to discharge: increased level of assistance required; poor endurance     Assessment:     Jerry Solis is a 78 y.o. 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  He presents with the following impairments/functional limitations:  weakness, impaired endurance, impaired self care skills, impaired functional mobilty, gait instability, impaired balance.    Rehab Prognosis: Good; patient would benefit from acute skilled PT services to address these deficits and reach maximum level of function.    Recent Surgery: Procedure(s) (LRB):  CARDIOVERSION (N/A)  ECHOCARDIOGRAM,TRANSESOPHAGEAL (N/A) 9 Days Post-Op    Plan:     During this hospitalization, patient to be seen 4 x/week to address the identified rehab impairments via gait training, therapeutic activities, therapeutic exercises, neuromuscular re-education and progress toward the following goals:    · Plan of Care Expires:  02/20/19    Subjective     Chief Complaint: none reported   Patient/Family Comments/goals: to get better and return home   Pain/Comfort:  · Pain Rating 1: 0/10  · Pain Rating Post-Intervention 1: 0/10      Objective:     Communicated with RN prior to session and wife present in room.  Patient found in bed with telemetry, pulse ox (continuous), blood pressure cuff, oxygen, PICC line, central line, gtz catheter, arterial line  upon PT entry to room.     General Precautions: Standard, fall   Orthopedic Precautions:N/A   Braces: N/A     Functional Mobility:  · Bed Mobility:     · Scooting: stand by assistance  · Supine to Sit: minimum assistance  · Transfers:     · Sit to Stand:  minimum assistance with no AD from EOB  · Bed to chair: CGA using step transfer; pt demo'd  inconsistent foot placement at times, decreased steve, decreased step length   · Gait: not performed       AM-PAC 6 CLICK MOBILITY  Turning over in bed (including adjusting bedclothes, sheets and blankets)?: 3  Sitting down on and standing up from a chair with arms (e.g., wheelchair, bedside commode, etc.): 3  Moving from lying on back to sitting on the side of the bed?: 3  Moving to and from a bed to a chair (including a wheelchair)?: 3  Need to walk in hospital room?: 3  Climbing 3-5 steps with a railing?: 2  Basic Mobility Total Score: 17       Therapeutic Activities and Exercises:  Educated pt on PT role/POC  Educate pt on importance of OOB activity and daily ambulation   Pt verbalized understanding     T/f to chair to increase tolerance to OOB activity and to create optimal positioning for lung expansion    ADL's with OT    Patient left up in chair with all lines intact, call button in reach and RN notified..    GOALS:   Multidisciplinary Problems     Physical Therapy Goals        Problem: Physical Therapy Goal    Goal Priority Disciplines Outcome Goal Variances Interventions   Physical Therapy Goal     PT, PT/OT Ongoing (interventions implemented as appropriate)     Description:  Goals to be met by: 19    Patient will increase functional independence with mobility by performin. Supine to sit with Modified [place], with HOB flat   2. Sit to supine with Modified [place], with HOB flat   3. Sit to stand transfer with Supervision  4. Gait  x 100 feet with Supervision with or without using least restrictive AD.   5. Lower extremity exercise program x20 reps per handout, with supervision                      Time Tracking:     PT Received On: 19  PT Start Time: 924     PT Stop Time: 943  PT Total Time (min): 19 min     Billable Minutes: Therapeutic Activity 15    PTA Visit Number: 0       Kacy Garcia PT, DPT  2019  [phone]

## 2019-01-23 NOTE — PLAN OF CARE
Problem: Adult Inpatient Plan of Care  Goal: Plan of Care Review  Outcome: Ongoing (interventions implemented as appropriate)    No acute events throughout day. See vital signs and assessments in flowsheets. See below for updates on today's progress.     Pulmonary: Pt on 6L 28% on venti mask which was increased to 15L 50% to maintain sats >90%    Cardiovascular: HR stable , -140/50-70, MAP >59 pulses palplable 1+/1+    Neurological: AAOX4, restless throughout shift    Gastrointestinal: 1x BM on shift, 2G low sodium diet    Genitourinary: Morin UOP 1615cc/shift    Endocrine:  no coverage needed    Integumentary/Other: bruises present, skin intact     Infusions: Lasix @ 40, Epi @ .04    Potassium 3.6 replaced with 60 mEq PO    Patient progressing towards goals as tolerated, plan of care communicated and reviewed with Jerry Solis and family. All concerns addressed. Will continue to monitor.

## 2019-01-23 NOTE — ASSESSMENT & PLAN NOTE
- Appreciate Nephrology's help  - Was oliguric and creatinine jumped to 4.1 likely 2/2 ATN/hypotension following ICD explant.   - CVP 18 and creatinine has climbed to 2.0  (was 1.2 on admit). See acute on chronic systolic heart failure  - Dopamine D/C'd, Epi increased to 0.04 for more RV support yesterday and adding Miesha today  - Hold Entresto for now

## 2019-01-23 NOTE — PROGRESS NOTES
Update:  SW met with pt and pt's wife Mague in pt's room in order to provide continuity of care and support. Pt was asleep but sitting up in the chair. Wife reports that pt had a rough night due to restless leg syndrome. Wife reports that cardio MEMS not infected and pt can have procedure tomorrow to re-implant ICD. Wife reports that her children will be coming up to stay in the [facility] and provide support to her and pt during procedure. Wife inquired with SW if there was any sort of discount at the . SW explained that for this type of procedure, [facility] could not provide any discounted rate at  but did offer more affordable hotel/lodging options. Wife states that she and her children prefer to stay at the  in order to be right next to the hospital. SW expressed understanding. Pt's wife denied further questions at this time. SW remains available for continued psychosocial support, education, resources, and additional d/c planning as needed.

## 2019-01-23 NOTE — PROGRESS NOTES
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 Dopa weaned off, on epi, Miesha started.    Tele: Junctional with intermittent bigeminy    ROS  Objective:     Vital Signs (Most Recent):  Temp: 97.1 °F (36.2 °C) (01/23/19 0701)  Pulse: 76 (01/23/19 0900)  Resp: 18 (01/23/19 0900)  BP: 128/68 (01/23/19 0800)  SpO2: 97 % (01/23/19 0900) Vital Signs (24h Range):  Temp:  [96.5 °F (35.8 °C)-97.1 °F (36.2 °C)] 97.1 °F (36.2 °C)  Pulse:  [] 76  Resp:  [16-37] 18  SpO2:  [90 %-100 %] 97 %  BP: ()/(36-86) 128/68  Arterial Line BP: ()/(45-70) 125/54     Weight: 83.9 kg (184 lb 15.5 oz)  Body mass index is 23.75 kg/m².     SpO2: 97 %  O2 Device (Oxygen Therapy): nasal cannula    Physical Exam   Constitutional: He is oriented to person, place, and time. He appears well-developed and well-nourished. No distress.   HENT:   Head: Normocephalic and atraumatic.   Mouth/Throat: Oropharynx is clear and moist.   Eyes: Conjunctivae and EOM are normal. Pupils are equal, round, and reactive to light. No scleral icterus.   Neck: Normal range of motion. Neck supple. No JVD present.   Cardiovascular: Normal rate, normal heart sounds and intact distal pulses. A regularly irregular rhythm present.   No murmur heard.  Pulses:       Radial pulses are 2+ on the right side, and 2+ on the left side.   Pulmonary/Chest: Effort normal and breath sounds normal. No respiratory distress.   Symmetrical expansion  On venti mask   Abdominal: Soft. Bowel sounds are normal. There is no hepatosplenomegaly. There is no tenderness.   Musculoskeletal: Normal range of motion.   Neurological: He is alert and oriented to person, place, and time.  No cranial nerve deficit.   Skin: Skin is warm and dry. No rash noted. He is not diaphoretic.   Psychiatric: He has a normal mood and affect. Judgment and thought content normal.       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 Likewise has a CardioMeMMS device in place. Is not pacemaker dependent. ID believes the primary source is the vegetation, he is s/p extraction on 01/09, RV lead tip grew E. Faecalis (resistant to tetracyclines). Recovery complicated by slow junctional escape rhythm responding to dopamine, no response to DCCV. Now with undetermined atrial rhythm which is either sinus bradycardia with aberrantly conducted PAC or AT, or persistent AF with junctional escape and intermittent aberrant conduction of AF, the differences can be attributed due to AV manolo stimulation by inotrope/chronotrope medications resulting in conduction changes. With weaning of chronotropic medications, he is back in a msotly junctional rhythm. Indium scan was negative for signs of CardioMEMs infection. Has had slow decline in respiratory status over several days,  necessitating a delay in placement    - volume/HF management as per primary team  - Inotropes as per primary team with goal of stable renal perfusion and optimizing respiratory status for procedure  - Continue coumadin with target INR 2-3  - Delay BIV-ICD implantation to next Tuesday  - Patient will need to undergo DCCV for device testing danyelle-implantation, please obtain ischemic workup to ensure risk of ischemic VF/VT is low.         Oniel George MD  Cardiac Electrophysiology  [facility]

## 2019-01-23 NOTE — ASSESSMENT & PLAN NOTE
Caution with insulin stacking  Estimated Creatinine Clearance: 36.5 mL/min (A) (based on SCr of 2 mg/dL (H)).

## 2019-01-23 NOTE — PLAN OF CARE
Problem: Physical Therapy Goal  Goal: Physical Therapy Goal  Goals to be met by: 19    Patient will increase functional independence with mobility by performin. Supine to sit with Modified [place], with HOB flat   2. Sit to supine with Modified [place], with HOB flat   3. Sit to stand transfer with Supervision  4. Gait  x 100 feet with Supervision with or without using least restrictive AD.   5. Lower extremity exercise program x20 reps per handout, with supervision     Outcome: Ongoing (interventions implemented as appropriate)  Treatment completed and no goals met. Goals appropriate

## 2019-01-23 NOTE — ASSESSMENT & PLAN NOTE
BG goal 140-180    Decrease Levemir to 4 units q HS  Novolog 5-7 units with meals  Low dose correction scale  BG monitoring AC/HS    Do not administer Novolog closer than every 4 hours    Discharge planning:  TBD

## 2019-01-23 NOTE — SUBJECTIVE & OBJECTIVE
Interval History: More short of breath overnight. Dopa weaned off, on epi, Miesha started.    Tele: Junctional with intermittent bigeminy    ROS  Objective:     Vital Signs (Most Recent):  Temp: 97.1 °F (36.2 °C) (01/23/19 0701)  Pulse: 76 (01/23/19 0900)  Resp: 18 (01/23/19 0900)  BP: 128/68 (01/23/19 0800)  SpO2: 97 % (01/23/19 0900) Vital Signs (24h Range):  Temp:  [96.5 °F (35.8 °C)-97.1 °F (36.2 °C)] 97.1 °F (36.2 °C)  Pulse:  [] 76  Resp:  [16-37] 18  SpO2:  [90 %-100 %] 97 %  BP: ()/(36-86) 128/68  Arterial Line BP: ()/(45-70) 125/54     Weight: 83.9 kg (184 lb 15.5 oz)  Body mass index is 23.75 kg/m².     SpO2: 97 %  O2 Device (Oxygen Therapy): nasal cannula    Physical Exam   Constitutional: He is oriented to person, place, and time. He appears well-developed and well-nourished. No distress.   HENT:   Head: Normocephalic and atraumatic.   Mouth/Throat: Oropharynx is clear and moist.   Eyes: Conjunctivae and EOM are normal. Pupils are equal, round, and reactive to light. No scleral icterus.   Neck: Normal range of motion. Neck supple. No JVD present.   Cardiovascular: Normal rate, normal heart sounds and intact distal pulses. A regularly irregular rhythm present.   No murmur heard.  Pulses:       Radial pulses are 2+ on the right side, and 2+ on the left side.   Pulmonary/Chest: Effort normal and breath sounds normal. No respiratory distress.   Symmetrical expansion  On venti mask   Abdominal: Soft. Bowel sounds are normal. There is no hepatosplenomegaly. There is no tenderness.   Musculoskeletal: Normal range of motion.   Neurological: He is alert and oriented to person, place, and time. No cranial nerve deficit.   Skin: Skin is warm and dry. No rash noted. He is not diaphoretic.   Psychiatric: He has a normal mood and affect. Judgment and thought content normal.       Significant Labs:   EP:   Recent Labs   Lab 01/22/19  0308 01/22/19  1432 01/23/19  0429    139 143   K 4.1 4.3 3.6    CL 91* 91* 94*   CO2 38* 35* 32*   * 194* 73   BUN 60* 67* 71*   CREATININE 1.9* 2.0* 2.0*   CALCIUM 9.2 8.9 8.9   PROT 7.0 6.9 6.7   ALBUMIN 2.4* 2.4* 2.3*   BILITOT 0.8 0.8 0.8   ALKPHOS 182* 177* 172*   AST 29 25 32   ALT 16 14 17   ANIONGAP 12 13 17*   ESTGFRAFRICA 38.7* 36.4* 36.4*   EGFRNONAA 33.5* 31.5* 31.5*   WBC 10.07 10.51 12.05   HGB 7.5* 7.4* 7.5*   HCT 24.7* 24.3* 24.7*    230 250   INR 2.8*  --  3.6*

## 2019-01-23 NOTE — SUBJECTIVE & OBJECTIVE
**Interval History: Net -ve > 1.1L past 24 hours after getting a total of 10 mg Metolazone yesterday in addition to the Lasix gtt at 40 mg/hr. CVP 18, and CXR shows increased pulmonary edema. O2 had been increased to 15L/50% with sats in the 90's overnight, now back on N/C. N/V this morning. sVO2 48 with calculated CO 6.00 and .    Continuous Infusions:   epinephrine infusion (NON-TITRATING) 0.04 mcg/kg/min (01/23/19 0800)    furosemide (LASIX) 2 mg/mL infusion (non-titrating) 40 mg/hr (01/23/19 0800)     Scheduled Meds:   ampicillin IVPB  2 g Intravenous Q6H    artificial tears  1 drop Both Eyes QID    cefTRIAXone (ROCEPHIN) IVPB  2 g Intravenous Q12H    chlorothiazide (DIURIL) IVPB  500 mg Intravenous Q12H    clopidogrel  75 mg Oral Daily    ferrous sulfate  325 mg Oral BID    insulin aspart U-100  5-7 Units Subcutaneous TIDWM    insulin detemir U-100  8 Units Subcutaneous QHS    levothyroxine  25 mcg Oral Before breakfast    polyethylene glycol  17 g Oral Daily    potassium chloride SA  40 mEq Oral BID    rosuvastatin  20 mg Oral QHS    senna-docusate 8.6-50 mg  2 tablet Oral BID    tamsulosin  0.4 mg Oral Daily     PRN Meds:dextrose 50%, dextrose 50%, glucagon (human recombinant), glucose, glucose, hydrOXYzine HCl, insulin aspart U-100, magnesium sulfate IVPB, magnesium sulfate IVPB, ondansetron, ramelteon, sodium chloride 0.9%, sodium chloride 0.9%, traMADol    Review of patient's allergies indicates:   Allergen Reactions    Adhesive Other (See Comments)     blisters    Codeine Other (See Comments)     Blurred vision    Latex Hives    Ace inhibitors     Lipitor [atorvastatin] Other (See Comments)     Muscle spasms     Objective:     Vital Signs (Most Recent):  Temp: 96.6 °F (35.9 °C) (01/23/19 0301)  Pulse: 77 (01/23/19 0829)  Resp: (!) 26 (01/23/19 0829)  BP: 128/68 (01/23/19 0800)  SpO2: 98 % (01/23/19 0829) Vital Signs (24h Range):  Temp:  [96.5 °F (35.8 °C)-97.1 °F (36.2 °C)]  96.6 °F (35.9 °C)  Pulse:  [] 77  Resp:  [16-37] 26  SpO2:  [90 %-100 %] 98 %  BP: ()/(36-86) 128/68  Arterial Line BP: ()/(45-70) 128/59     Patient Vitals for the past 72 hrs (Last 3 readings):   Weight   01/23/19 0701 83.9 kg (184 lb 15.5 oz)     Body mass index is 23.75 kg/m².      Intake/Output Summary (Last 24 hours) at 1/23/2019 0852  Last data filed at 1/23/2019 0800  Gross per 24 hour   Intake 1627.99 ml   Output 3305 ml   Net -1677.01 ml       Hemodynamic Parameters:       Telemetry: Atrial fib    Physical Exam   Constitutional: He is oriented to person, place, and time. He appears well-developed and well-nourished.   HENT:   Head: Normocephalic and atraumatic.   Eyes: Conjunctivae and EOM are normal.   Neck: Normal range of motion. Neck supple. No thyromegaly present.   RIJ line   Cardiovascular: Regular rhythm.   Irregular, bradycardic   Pulmonary/Chest: Effort normal and breath sounds normal.   Abdominal: Soft. Bowel sounds are normal.   Pulsatile liver, RUQ fullness   Musculoskeletal: Normal range of motion.   Trace HEATHER   Neurological: He is alert and oriented to person, place, and time.   Skin: Skin is warm and dry. Capillary refill takes 2 to 3 seconds.   Psychiatric: He has a normal mood and affect. His behavior is normal. Judgment and thought content normal.       Significant Labs:  CBC:  Recent Labs   Lab 01/22/19  0308 01/22/19  1432 01/23/19  0429   WBC 10.07 10.51 12.05   RBC 2.70* 2.66* 2.69*   HGB 7.5* 7.4* 7.5*   HCT 24.7* 24.3* 24.7*    230 250   MCV 92 91 92   MCH 27.8 27.8 27.9   MCHC 30.4* 30.5* 30.4*     BNP:  Recent Labs   Lab 01/17/19  1000 01/23/19  0429   BNP 1,357* 1,522*     CMP:  Recent Labs   Lab 01/22/19  0308 01/22/19  1432 01/23/19  0429   * 194* 73   CALCIUM 9.2 8.9 8.9   ALBUMIN 2.4* 2.4* 2.3*   PROT 7.0 6.9 6.7    139 143   K 4.1 4.3 3.6   CO2 38* 35* 32*   CL 91* 91* 94*   BUN 60* 67* 71*   CREATININE 1.9* 2.0* 2.0*   ALKPHOS 182*  177* 172*   ALT 16 14 17   AST 29 25 32   BILITOT 0.8 0.8 0.8      Coagulation:   Recent Labs   Lab 01/21/19  0310 01/22/19  0308 01/23/19  0429   INR 2.9* 2.8* 3.6*     LDH:  No results for input(s): LDH in the last 72 hours.  Microbiology:  Microbiology Results (last 7 days)     Procedure Component Value Units Date/Time    Blood culture [[id number]] Collected:  01/15/19 0328    Order Status:  Completed Specimen:  Blood from Peripheral, Forearm, Left Updated:  01/20/19 0812     Blood Culture, Routine No growth after 5 days.    Blood culture [[id number]] Collected:  01/15/19 0320    Order Status:  Completed Specimen:  Blood from Peripheral, Antecubital, Left Updated:  01/20/19 0812     Blood Culture, Routine No growth after 5 days.          I have reviewed all pertinent labs within the past 24 hours.    Estimated Creatinine Clearance: 36.5 mL/min (A) (based on SCr of 2 mg/dL (H)).    Diagnostic Results:  I have reviewed all pertinent imaging results/findings within the past 24 hours.

## 2019-01-23 NOTE — ASSESSMENT & PLAN NOTE
-ICM  -Last full 2D Echo 1/7/19: LVEF 20%, LVEDD 7.24 cm. Limited bedside echo done after ICD explant showed no pericardial effusion  -CVP still 18. Increase Lasix to 60 mg/hr and start IV Diuril 500 mg bid  -sVO2 4o with calculated CO 5.25 and SVR 1005. Increase Epi to 0.06 and add Miesha 10 ppm for RV support  -GDMT: Dig and Toprol D/C'd 2/2 junctional rhythm. Entresto D/C'd 2/2 transient hypotension during/after ICD explant and TREY  -Self declined for LVAD after w/u last April 2018

## 2019-01-23 NOTE — SUBJECTIVE & OBJECTIVE
"Interval HPI:   Overnight events: Remains in CMICU, NAEON.  BG well controlled yesterday, below goal this am. On IV antibiotics.  CRT-D implantation per EP likely tomorrow.  Eating:   <25%  Nausea: No  Hypoglycemia and intervention: No  Fever: No  TPN and/or TF: No    /75 (BP Location: Left arm, Patient Position: Lying)   Pulse 94   Temp 96.6 °F (35.9 °C) (Axillary)   Resp (!) 26   Ht 6' 2" (1.88 m)   Wt 84.5 kg (186 lb 4.6 oz)   SpO2 (!) 93%   BMI 23.92 kg/m²     Labs Reviewed and Include    Recent Labs   Lab 01/23/19  0429   GLU 73   CALCIUM 8.9   ALBUMIN 2.3*   PROT 6.7      K 3.6   CO2 32*   CL 94*   BUN 71*   CREATININE 2.0*   ALKPHOS 172*   ALT 17   AST 32   BILITOT 0.8     Lab Results   Component Value Date    WBC 12.05 01/23/2019    HGB 7.5 (L) 01/23/2019    HCT 24.7 (L) 01/23/2019    MCV 92 01/23/2019     01/23/2019     No results for input(s): TSH, FREET4 in the last 168 hours.  Lab Results   Component Value Date    HGBA1C 8.1 (H) 01/07/2019       Nutritional status:   Body mass index is 23.92 kg/m².  Lab Results   Component Value Date    ALBUMIN 2.3 (L) 01/23/2019    ALBUMIN 2.4 (L) 01/22/2019    ALBUMIN 2.4 (L) 01/22/2019     No results found for: PREALBUMIN    Estimated Creatinine Clearance: 36.5 mL/min (A) (based on SCr of 2 mg/dL (H)).    Accu-Checks  Recent Labs     01/21/19  0037 01/21/19  0820 01/21/19  1150 01/21/19  1653 01/21/19  2154 01/22/19  0806 01/22/19  1107 01/22/19  1432 01/22/19  1650 01/22/19  2223   POCTGLUCOSE 129* 78 122* 85 138* 165* 217* 218* 204* 182*       Current Medications and/or Treatments Impacting Glycemic Control  Immunotherapy:    Immunosuppressants     None        Steroids:   Hormones (From admission, onward)    None        Pressors:    Autonomic Drugs (From admission, onward)    Start     Stop Route Frequency Ordered    01/19/19 1100  EPINEPHrine (ADRENALIN) 5 mg in sodium chloride 0.9% 250 mL infusion      -- IV Continuous 01/19/19 0957    "     Hyperglycemia/Diabetes Medications:   Antihyperglycemics (From admission, onward)    Start     Stop Route Frequency Ordered    01/21/19 2100  insulin detemir U-100 pen 8 Units      -- SubQ Nightly 01/21/19 1716    01/21/19 0815  insulin aspart U-100 pen 5-7 Units      -- SubQ 3 times daily with meals 01/21/19 0810    01/11/19 1358  insulin aspart U-100 pen 0-5 Units      -- SubQ Before meals & nightly PRN 01/11/19 1259

## 2019-01-23 NOTE — PLAN OF CARE
01/23/2019  12:19 PM    Records of LHC from Dr. Frankel at [facility].  03/2018                Oniel George

## 2019-01-23 NOTE — ASSESSMENT & PLAN NOTE
This is a 79yo gentleman with long-standing history of ICM with EF 35%, a 20-year history of ICD with upgrade to CRT-D in 2014 who presents for device extraction due to RV lead vegetation ~0.5cm in the setting of VRE bacteremia with a likely initial GI source. Likewise has a CardioMeMMS device in place. Is not pacemaker dependent. ID believes the primary source is the vegetation, he is s/p extraction on 01/09, RV lead tip grew E. Faecalis (resistant to tetracyclines). Recovery complicated by slow junctional escape rhythm responding to dopamine, no response to DCCV. Now with undetermined atrial rhythm which is either sinus bradycardia with aberrantly conducted PAC or AT, or persistent AF with junctional escape and intermittent aberrant conduction of AF, the differences can be attributed due to AV manolo stimulation by inotrope/chronotrope medications resulting in conduction changes. With weaning of chronotropic medications, he is back in a msotly junctional rhythm. Indium scan was negative for signs of CardioMEMs infection. Has had slow decline in respiratory status over several days, necessitating a delay in placement    - volume/HF management as per primary team  - Inotropes as per primary team with goal of stable renal perfusion and optimizing respiratory status for procedure  - Continue coumadin with target INR 2-3  - Delay BIV-ICD implantation to next Tuesday  - Patient will need to undergo DCCV for device testing danyelle-implantation, please obtain ischemic workup to ensure risk of ischemic VF/VT is low.

## 2019-01-23 NOTE — PROGRESS NOTES
"[facility]  Endocrinology  Progress Note    Admit Date: 1/6/2019     Reason for Consult: Management of T2DM, Hyperglycemia     Surgical Procedure and Date: Lead extraction 1/9/19    Diabetes diagnosis year: approximately 10 years ago    Lab Results   Component Value Date    HGBA1C 8.1 (H) 01/07/2019       Home Diabetes Medications: Lantus 50 units q HS, Novolog 25 units with meals (vials)    How often checking glucose at home? 3-4x per day   BG readings on regimen: AM 120s  Hypoglycemia on the regimen?  Yes - 2-3x per month  Missed doses on regimen?  Yes - 2x per week    Diabetes Complications include:     Hyperglycemia, Hypoglycemia , Diabetic retinopathy , Diabetic cataract  and Diabetic peripheral neuropathy     Complicating diabetes co morbidities:   CHF and History of CVA      HPI:   Patient is a 78 y.o. male with a diagnosis of DM2 and CHF who was admitted on 1/6/19 for possible RV lead infection.  Patient is s/p lead extraction on 1/9/19.  Patient's DM managed by PCP, Dr. Hdz.  Endocrine consulted for DM/BG management.            Interval HPI:   Overnight events: Remains in CMICU, NAEON.  BG well controlled yesterday, below goal this am. On IV antibiotics.  CRT-D implantation per EP likely tomorrow.  Eating:   <25%  Nausea: No  Hypoglycemia and intervention: No  Fever: No  TPN and/or TF: No    /75 (BP Location: Left arm, Patient Position: Lying)   Pulse 94   Temp 96.6 °F (35.9 °C) (Axillary)   Resp (!) 26   Ht 6' 2" (1.88 m)   Wt 84.5 kg (186 lb 4.6 oz)   SpO2 (!) 93%   BMI 23.92 kg/m²      Labs Reviewed and Include    Recent Labs   Lab 01/23/19  0429   GLU 73   CALCIUM 8.9   ALBUMIN 2.3*   PROT 6.7      K 3.6   CO2 32*   CL 94*   BUN 71*   CREATININE 2.0*   ALKPHOS 172*   ALT 17   AST 32   BILITOT 0.8     Lab Results   Component Value Date    WBC 12.05 01/23/2019    HGB 7.5 (L) 01/23/2019    HCT 24.7 (L) 01/23/2019    MCV 92 01/23/2019     01/23/2019     No " results for input(s): TSH, FREET4 in the last 168 hours.  Lab Results   Component Value Date    HGBA1C 8.1 (H) 01/07/2019       Nutritional status:   Body mass index is 23.92 kg/m².  Lab Results   Component Value Date    ALBUMIN 2.3 (L) 01/23/2019    ALBUMIN 2.4 (L) 01/22/2019    ALBUMIN 2.4 (L) 01/22/2019     No results found for: PREALBUMIN    Estimated Creatinine Clearance: 36.5 mL/min (A) (based on SCr of 2 mg/dL (H)).    Accu-Checks  Recent Labs     01/21/19  0037 01/21/19  0820 01/21/19  1150 01/21/19  1653 01/21/19  2154 01/22/19  0806 01/22/19  1107 01/22/19  1432 01/22/19  1650 01/22/19  2223   POCTGLUCOSE 129* 78 122* 85 138* 165* 217* 218* 204* 182*       Current Medications and/or Treatments Impacting Glycemic Control  Immunotherapy:    Immunosuppressants     None        Steroids:   Hormones (From admission, onward)    None        Pressors:    Autonomic Drugs (From admission, onward)    Start     Stop Route Frequency Ordered    01/19/19 1100  EPINEPHrine (ADRENALIN) 5 mg in sodium chloride 0.9% 250 mL infusion      -- IV Continuous 01/19/19 0957        Hyperglycemia/Diabetes Medications:   Antihyperglycemics (From admission, onward)    Start     Stop Route Frequency Ordered    01/21/19 2100  insulin detemir U-100 pen 8 Units      -- SubQ Nightly 01/21/19 1716    01/21/19 0815  insulin aspart U-100 pen 5-7 Units      -- SubQ 3 times daily with meals 01/21/19 0810    01/11/19 1358  insulin aspart U-100 pen 0-5 Units      -- SubQ Before meals & nightly PRN 01/11/19 1259          ASSESSMENT and PLAN    * 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         A-fib    May affect BG readings if uncontrolled  S/p cardioversion.          Timo Avalos NP  Endocrinology  [facility]

## 2019-01-23 NOTE — ASSESSMENT & PLAN NOTE
-S/P Unsuccessful RICARDO guided/DCCV 1/14. Remains in A fib with variety of aberrant conduction  -EP planning CRT-D replacement likely next Tuesday if euvolemic  -Epi increased to 0.04 for more RV support. Adding Miesha 10 ppm  -CHADS VASc 6  -Holding eliquis s/p ICD explant 1/9. Per EP, anticoagulation resumed 1/12. INR today 2.8 (goal 2.0-3.0)  -Cardiac monitoring

## 2019-01-23 NOTE — PLAN OF CARE
Problem: Occupational Therapy Goal  Goal: Occupational Therapy Goal  Goals to be met by: 2/4/19     Patient will increase functional independence with ADLs by performing:    UE Dressing with Supervision.  LE Dressing with Supervision.  Grooming while standing at sink with Stand-by Assistance.  Toileting from toilet with Stand-by Assistance for hygiene and clothing management.   Toilet transfer to toilet with Stand-by Assistance.     Goals remain appropriate.

## 2019-01-23 NOTE — NURSING
MD Bahena notified of erik not sutured in, tubing due to be changed now. VO from MD to keep existing tubing.

## 2019-01-23 NOTE — PT/OT/SLP PROGRESS
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 Performance deficits affecting function are weakness, gait instability, impaired endurance, impaired self care skills, impaired functional mobilty, impaired balance. Pt tolerated session fairly well. Pt with good effort and stable vital signs.     Rehab Prognosis:  Good; patient would benefit from acute skilled OT services to address these deficits and reach maximum level of function.       Plan:     Patient to be seen 3 x/week to address the above listed problems via self-care/home management, therapeutic activities, therapeutic exercises  · Plan of Care Expires: 02/19/19  · Plan of Care Reviewed with: patient    Subjective     Pt agreeable to therapy and did not have complaints.     Pain/Comfort:  · Pain Rating 1: 0/10    Objective:       Communicated with: nsg prior to session.   Pt found supine in bed on 5 LPM oxygen via NC with saturation remaining >95% during session.    General Precautions: Standard, fall     Occupational Performance:     Bed Mobility:    · Supine>sit with MIN A     Functional Mobility/Transfers:  · Sit>stand with MIN A   · Stand pivot with CGA with cues for technique/hand placement.     Activities of Daily Living:  · G/H: seated with set-up  · Eating: set-up  · UE dressing: MAX A due to extensive lines  · LE dressing: TOTAL A       AMPAC 6 Click ADL: 14    Treatment & Education:  Pt demo Fair+ sitting balance EOB and able to t/f to chair with CGA.  Education provided re: safety with functional mobility/ADL  skills and importance of continued activity with nsg assist as he remains in hospital.     Patient left up in chair with all lines intact, call button in reach and spouse and nsg presentEducation:      GOALS:   Multidisciplinary Problems     Occupational Therapy Goals        Problem: Occupational Therapy Goal    Goal Priority Disciplines Outcome Interventions   Occupational Therapy Goal     OT, PT/OT Ongoing (interventions implemented as appropriate)    Description:  Goals to be met by: 2/4/19     Patient will increase functional independence with ADLs by performing:    UE Dressing with Supervision.  LE Dressing with Supervision.  Grooming while standing at sink with Stand-by Assistance.  Toileting from toilet with Stand-by Assistance for hygiene and clothing management.   Toilet transfer to toilet with Stand-by Assistance.                      Time Tracking:     OT Date of Treatment: 01/23/19  OT Start Time: 0930  OT Stop Time: 0950  OT Total Time (min): 20 min    Billable Minutes:Self Care/Home Management 15    ELAYNE Gnosales  1/23/2019

## 2019-01-23 NOTE — PLAN OF CARE
Problem: Adult Inpatient Plan of Care  Goal: Plan of Care Review  Outcome: Ongoing (interventions implemented as appropriate)    No acute events throughout day. See vital signs and assessments in flowsheets. See below for updates on today's progress.     Pulmonary: Pt on 8L 34% on venti mask, sats maintained >88%    Cardiovascular: HR stable 60-80, BP /50-60, MAP >65 pulses palplable 1+/1+    Neurological: AAOX4, restless throughout shift    Gastrointestinal: No BM on shift, 2G low sodium diet    Genitourinary: Morin UOP 980cc/shift    Endocrine:  no coverage needed    Integumentary/Other: bruises present, skin intact     Infusions: Lasix @ 40, Epi @ .02, Dopamine @ 2    Patient progressing towards goals as tolerated, plan of care communicated and reviewed with Jerry Solis and family. All concerns addressed. Will continue to monitor.

## 2019-01-24 PROBLEM — E44.0 MODERATE MALNUTRITION: Status: ACTIVE | Noted: 2019-01-01

## 2019-01-24 NOTE — ASSESSMENT & PLAN NOTE
BG goal 140-180    Levemir 4 units q HS  Decrease Novolog to 3-5 units with meals, patient below goal yesterday  Low dose correction scale  BG monitoring AC/HS    Do not administer Novolog closer than every 4 hours    Discharge planning:  TBJERMAIN

## 2019-01-24 NOTE — PROGRESS NOTES
"  [facility]  Adult Nutrition  Consult Note    SUMMARY     Recommendations    1. Continue current Cardiac diet + Glucerna ONS (fluid restriction per MD).   2. RD to monitor & follow-up.    Goals: PO intake >50%  Nutrition Goal Status: progressing towards goal  Communication of RD Recs: reviewed with RN    Reason for Assessment    Reason For Assessment: RD follow-up  Diagnosis: other (see comments)(Infection of automatic implatable cardioverter-defibrillator)  Relevant Medical History: HF, DM  Interdisciplinary Rounds: did not attend    General Information Comments: Pt reports fair appetite, consuming ~50% of meals. Pt's wife bringing Glucerna ONS from home also. Wife reports pt w/ 20# wt loss PTA (-9%). NFPE complete, pt noted w/ moderate malnutrition. Please see PES statement for details. 9 days post-op cardioversion.   Nutrition Discharge Planning: Adequate PO intake    Nutrition/Diet History    Patient Reported Diet/Restrictions/Preferences: general, diabetic diet  Spiritual, Cultural Beliefs, Mandaen Practices, Values that Affect Care: no  Factors Affecting Nutritional Intake: decreased appetite    Anthropometrics    Temp: 97.8 °F (36.6 °C)  Height Method: Stated  Height: 6' 2" (188 cm)  Height (inches): 74 in  Weight Method: Bed Scale  Weight: 83.9 kg (184 lb 15.5 oz)  Weight (lb): 184.97 lb  Ideal Body Weight (IBW), Male: 190 lb  % Ideal Body Weight, Male (lb): 97.35 lb  BMI (Calculated): 23.8  BMI Grade: 18.5-24.9 - normal  Usual Body Weight (UBW), k.7 kg  % Usual Body Weight: 90.7  % Weight Change From Usual Weight: -9.49 %    Lab/Procedures/Meds    Pertinent Labs Reviewed: reviewed  Pertinent Labs Comments: BUN 70, Creat 2.2, GFR 28.1  Pertinent Medications Reviewed: reviewed  Pertinent Medications Comments: Epi, Lasix    Estimated/Assessed Needs    Weight Used For Calorie Calculations: 83.5 kg (184 lb 1.4 oz)(Dosing wt)     Energy Calorie Requirements (kcal): 2043 kcal/d  Energy " Need Method: [place]-St Jeor(1.25 PAL)     Protein Requirements: 100 g/d (1.2 g/kg)  Weight Used For Protein Calculations: 83.5 kg (184 lb 1.4 oz)     Estimated Fluid Requirement Method: other (see comments)(Per MD or 1 mL/kcal)    Nutrition Prescription Ordered    Current Diet Order: Cardiac - 1500 mL FR  Oral Nutrition Supplement: Glucerna ONS (from home)    Evaluation of Received Nutrient/Fluid Intake    Comments: LBM: 1/23    Tolerance: tolerating     Nutrition Risk    Level of Risk/Frequency of Follow-up: (1x/week)     Assessment and Plan    Moderate malnutrition      Malnutrition in the context of Acute Illness/Injury    Related to (etiology):  CHF    Signs and Symptoms (as evidenced by):  Energy Intake: <75% of estimated energy requirement for 6 months  Body Fat Depletion: moderate depletion of orbitals and triceps   Muscle Mass Depletion: moderate depletion of temples, clavicle region, scapular region and interosseous muscle   Weight Loss: 9% x 6 months    Nutrition Diagnosis Status:  New      Monitor and Evaluation    Food and Nutrient Intake: food and beverage intake, energy intake  Food and Nutrient Adminstration: diet order  Physical Activity and Function: nutrition-related ADLs and IADLs  Anthropometric Measurements: weight, weight change  Biochemical Data, Medical Tests and Procedures: lipid profile, inflammatory profile, glucose/endocrine profile, gastrointestinal profile, electrolyte and renal panel  Nutrition-Focused Physical Findings: overall appearance     Nutrition Follow-Up    RD Follow-up?: Yes

## 2019-01-24 NOTE — PT/OT/SLP PROGRESS
Physical Therapy      Patient Name:  Jerry Solis   MRN:  [id number]    Attempted to see pt in AM. Pt had received his breakfast late and requested PT to come back after he finished eating. PT explained importance of OOB mobility and sitting up in chair during the day. PT explained possibility of inability to return for second attempt today. Pt verbalized understanding. Unable to return for second attempt. Will follow-up as appropriate according to POC.    Cathie Randall, PT   1/24/2019

## 2019-01-24 NOTE — ASSESSMENT & PLAN NOTE
Caution with insulin stacking  Estimated Creatinine Clearance: 33.2 mL/min (A) (based on SCr of 2.2 mg/dL (H)).

## 2019-01-24 NOTE — ASSESSMENT & PLAN NOTE
-A1c 8.1  -Target -180 while hospitalized  -detemir 14, aspart 6-8 with sliding scale  -Appreciate Endocrine's help with management

## 2019-01-24 NOTE — ASSESSMENT & PLAN NOTE
-ICM  -Last full 2D Echo 1/7/19: LVEF 20%, LVEDD 7.24 cm. Limited bedside echo done after ICD explant showed no pericardial effusion  - will order repeat Echo today to better assess TR  -CVP decreased to 13--> 9 on exam today. Decrease lasix ggt to 40/hour and decrease diuruil to 250 Q 12.  -sVO2 55 with calculated CO 5.8 and . Continue Epi @ 0.06 and continue Miesha 10 ppm for RV support  -GDMT: Dig and Toprol D/C'd 2/2 junctional rhythm. Entresto D/C'd 2/2 transient hypotension during/after ICD explant and TREY  -Self declined for LVAD after w/u last April 2018

## 2019-01-24 NOTE — SUBJECTIVE & OBJECTIVE
"Interval HPI:   Overnight events: Remains in CMICU, NAEON.  BG well controlled yesterday. On IV antibiotics.  Eatin% - appetite improved  Nausea: No  Hypoglycemia and intervention: No  Fever: No  TPN and/or TF: No    /68 (BP Location: Left arm, Patient Position: Lying)   Pulse 71   Temp 98 °F (36.7 °C) (Oral)   Resp (!) 22   Ht 6' 2" (1.88 m)   Wt 83.9 kg (184 lb 15.5 oz)   SpO2 95%   BMI 23.75 kg/m²     Labs Reviewed and Include    Recent Labs   Lab 19  0440   *   CALCIUM 9.1   ALBUMIN 2.4*   PROT 6.8      K 3.9   CO2 37*   CL 92*   BUN 70*   CREATININE 2.2*   ALKPHOS 169*   ALT 18   AST 36   BILITOT 0.9     Lab Results   Component Value Date    WBC 15.97 (H) 2019    HGB 7.8 (L) 2019    HCT 26.2 (L) 2019    MCV 95 2019     2019     No results for input(s): TSH, FREET4 in the last 168 hours.  Lab Results   Component Value Date    HGBA1C 8.1 (H) 2019       Nutritional status:   Body mass index is 23.75 kg/m².  Lab Results   Component Value Date    ALBUMIN 2.4 (L) 2019    ALBUMIN 2.3 (L) 2019    ALBUMIN 2.3 (L) 2019     No results found for: PREALBUMIN    Estimated Creatinine Clearance: 33.2 mL/min (A) (based on SCr of 2.2 mg/dL (H)).    Accu-Checks  Recent Labs     19  1653 19  2154 19  0806 19  1107 19  1432 19  1650 19  2223 19  0836 19  1106 19   POCTGLUCOSE 85 138* 165* 217* 218* 204* 182* 95 150* 90       Current Medications and/or Treatments Impacting Glycemic Control  Immunotherapy:    Immunosuppressants     None        Steroids:   Hormones (From admission, onward)    None        Pressors:    Autonomic Drugs (From admission, onward)    Start     Stop Route Frequency Ordered    19 1100  EPINEPHrine (ADRENALIN) 10 mg in sodium chloride 0.9% 250 mL infusion      -- IV Continuous 19 0911        Hyperglycemia/Diabetes Medications: "   Antihyperglycemics (From admission, onward)    Start     Stop Route Frequency Ordered    01/23/19 2100  insulin detemir U-100 pen 4 Units      -- SubQ Nightly 01/23/19 1123    01/21/19 0815  insulin aspart U-100 pen 5-7 Units      -- SubQ 3 times daily with meals 01/21/19 0810    01/11/19 1358  insulin aspart U-100 pen 0-5 Units      -- SubQ Before meals & nightly PRN 01/11/19 1259

## 2019-01-24 NOTE — SUBJECTIVE & OBJECTIVE
**Interval History: No complaints overnight. Diuresed extremely well. Net -ve > 5.5L past 24 hours on Diuril 500 Q12 and 60/hour of lasix.  CVP 9. sVO2 55 with calculated CO 5.8 and .    Continuous Infusions:   epinephrine infusion (NON-TITRATING) 0.06 mcg/kg/min (01/24/19 1400)    furosemide (LASIX) 2 mg/mL infusion (non-titrating) 40 mg/hr (01/24/19 1400)    nitric oxide gas       Scheduled Meds:   ampicillin IVPB  2 g Intravenous Q6H    artificial tears  1 drop Both Eyes QID    cefTRIAXone (ROCEPHIN) IVPB  2 g Intravenous Q12H    chlorothiazide (DIURIL) IVPB  250 mg Intravenous Q12H    clopidogrel  75 mg Oral Daily    ferrous sulfate  325 mg Oral BID    insulin aspart U-100  3-5 Units Subcutaneous TIDWM    insulin detemir U-100  4 Units Subcutaneous QHS    levothyroxine  25 mcg Oral Before breakfast    polyethylene glycol  17 g Oral Daily    potassium chloride SA  40 mEq Oral BID    rosuvastatin  20 mg Oral QHS    senna-docusate 8.6-50 mg  2 tablet Oral BID    tamsulosin  0.4 mg Oral Daily     PRN Meds:dextrose 50%, dextrose 50%, glucagon (human recombinant), glucose, glucose, hydrOXYzine HCl, insulin aspart U-100, magnesium sulfate IVPB, magnesium sulfate IVPB, ondansetron, ramelteon, sodium chloride 0.9%, sodium chloride 0.9%, traMADol    Review of patient's allergies indicates:   Allergen Reactions    Adhesive Other (See Comments)     blisters    Codeine Other (See Comments)     Blurred vision    Latex Hives    Ace inhibitors     Lipitor [atorvastatin] Other (See Comments)     Muscle spasms     Objective:     Vital Signs (Most Recent):  Temp: 97.9 °F (36.6 °C) (01/24/19 1100)  Pulse: 68 (01/24/19 1400)  Resp: (!) 37 (01/24/19 1400)  BP: 128/68 (01/23/19 0800)  SpO2: 98 % (01/24/19 1400) Vital Signs (24h Range):  Temp:  [97.8 °F (36.6 °C)-98.5 °F (36.9 °C)] 97.9 °F (36.6 °C)  Pulse:  [67-85] 68  Resp:  [14-37] 37  SpO2:  [87 %-99 %] 98 %  Arterial Line BP: ()/(43-57) 105/43      Patient Vitals for the past 72 hrs (Last 3 readings):   Weight   01/24/19 1100 83.9 kg (184 lb 15.5 oz)   01/23/19 0701 83.9 kg (184 lb 15.5 oz)     Body mass index is 23.75 kg/m².      Intake/Output Summary (Last 24 hours) at 1/24/2019 1510  Last data filed at 1/24/2019 1400  Gross per 24 hour   Intake 2747 ml   Output 7395 ml   Net -4648 ml       Hemodynamic Parameters:     Telemetry: Atrial fib    Physical Exam   Constitutional: He is oriented to person, place, and time. He appears well-developed and well-nourished.   HENT:   Head: Normocephalic and atraumatic.   Eyes: Conjunctivae and EOM are normal.   Neck: Normal range of motion. Neck supple. JVD (mid neck lying flat) present. No thyromegaly present.   RIJ line   Cardiovascular: Regular rhythm.   Irregular, bradycardic   Pulmonary/Chest: Effort normal and breath sounds normal.   Abdominal: Soft. Bowel sounds are normal.   Pulsatile liver, RUQ fullness   Musculoskeletal: Normal range of motion.   Trace HEATHER   Neurological: He is alert and oriented to person, place, and time.   Skin: Skin is warm and dry. Capillary refill takes 2 to 3 seconds.   Psychiatric: He has a normal mood and affect. His behavior is normal. Judgment and thought content normal.       Significant Labs:  CBC:  Recent Labs   Lab 01/22/19  1432 01/23/19  0429 01/24/19  0440   WBC 10.51 12.05 15.97*   RBC 2.66* 2.69* 2.76*   HGB 7.4* 7.5* 7.8*   HCT 24.3* 24.7* 26.2*    250 280   MCV 91 92 95   MCH 27.8 27.9 28.3   MCHC 30.5* 30.4* 29.8*     BNP:  Recent Labs   Lab 01/23/19 0429   BNP 1,522*     CMP:  Recent Labs   Lab 01/23/19  0429 01/23/19  1532 01/23/19  2359 01/24/19  0440   GLU 73 127* 82 116*   CALCIUM 8.9 9.0 9.2 9.1   ALBUMIN 2.3* 2.3*  --  2.4*   PROT 6.7 6.6  --  6.8    142 143 145   K 3.6 3.6 3.8 3.9   CO2 32* 36* 40* 37*   CL 94* 92* 92* 92*   BUN 71* 74* 72* 70*   CREATININE 2.0* 2.1* 2.2* 2.2*   ALKPHOS 172* 170*  --  169*   ALT 17 19  --  18   AST 32 35  --  36    BILITOT 0.8 0.8  --  0.9      Coagulation:   Recent Labs   Lab 01/22/19  0308 01/23/19  0429 01/24/19  0440   INR 2.8* 3.6* 3.5*     LDH:  No results for input(s): LDH in the last 72 hours.  Microbiology:  Microbiology Results (last 7 days)     Procedure Component Value Units Date/Time    IV catheter culture [[id number]] Collected:  01/24/19 1120    Order Status:  Sent Specimen:  Catheter Tip, Intrajugular Updated:  01/24/19 1308    Blood culture [[id number]] Collected:  01/24/19 0950    Order Status:  Sent Specimen:  Blood from Peripheral, Forearm, Right Updated:  01/24/19 1014    Blood culture [[id number]] Collected:  01/24/19 0940    Order Status:  Sent Specimen:  Blood from Peripheral, Hand, Right Updated:  01/24/19 1013    Blood culture [[id number]] Collected:  01/15/19 0328    Order Status:  Completed Specimen:  Blood from Peripheral, Forearm, Left Updated:  01/20/19 0812     Blood Culture, Routine No growth after 5 days.    Blood culture [[id number]] Collected:  01/15/19 0320    Order Status:  Completed Specimen:  Blood from Peripheral, Antecubital, Left Updated:  01/20/19 0812     Blood Culture, Routine No growth after 5 days.          I have reviewed all pertinent labs within the past 24 hours.    Estimated Creatinine Clearance: 33.2 mL/min (A) (based on SCr of 2.2 mg/dL (H)).    Diagnostic Results:  I have reviewed all pertinent imaging results/findings within the past 24 hours.

## 2019-01-24 NOTE — PROGRESS NOTES
"[facility]  Endocrinology  Progress Note    Admit Date: 2019     Reason for Consult: Management of T2DM, Hyperglycemia     Surgical Procedure and Date: Lead extraction 19    Diabetes diagnosis year: approximately 10 years ago    Lab Results   Component Value Date    HGBA1C 8.1 (H) 2019       Home Diabetes Medications: Lantus 50 units q HS, Novolog 25 units with meals (vials)    How often checking glucose at home? 3-4x per day   BG readings on regimen: AM 120s  Hypoglycemia on the regimen?  Yes - 2-3x per month  Missed doses on regimen?  Yes - 2x per week    Diabetes Complications include:     Hyperglycemia, Hypoglycemia , Diabetic retinopathy , Diabetic cataract  and Diabetic peripheral neuropathy     Complicating diabetes co morbidities:   CHF and History of CVA      HPI:   Patient is a 78 y.o. male with a diagnosis of DM2 and CHF who was admitted on 19 for possible RV lead infection.  Patient is s/p lead extraction on 19.  Patient's DM managed by PCP, Dr. Hdz.  Endocrine consulted for DM/BG management.            Interval HPI:   Overnight events: Remains in CMICU, NAEON.  BG well controlled yesterday. On IV antibiotics.  Eatin% - appetite improved  Nausea: No  Hypoglycemia and intervention: No  Fever: No  TPN and/or TF: No    /68 (BP Location: Left arm, Patient Position: Lying)   Pulse 71   Temp 98 °F (36.7 °C) (Oral)   Resp (!) 22   Ht 6' 2" (1.88 m)   Wt 83.9 kg (184 lb 15.5 oz)   SpO2 95%   BMI 23.75 kg/m²      Labs Reviewed and Include    Recent Labs   Lab 19  0440   *   CALCIUM 9.1   ALBUMIN 2.4*   PROT 6.8      K 3.9   CO2 37*   CL 92*   BUN 70*   CREATININE 2.2*   ALKPHOS 169*   ALT 18   AST 36   BILITOT 0.9     Lab Results   Component Value Date    WBC 15.97 (H) 2019    HGB 7.8 (L) 2019    HCT 26.2 (L) 2019    MCV 95 2019     2019     No results for input(s): TSH, FREET4 in the last 168 " hours.  Lab Results   Component Value Date    HGBA1C 8.1 (H) 01/07/2019       Nutritional status:   Body mass index is 23.75 kg/m².  Lab Results   Component Value Date    ALBUMIN 2.4 (L) 01/24/2019    ALBUMIN 2.3 (L) 01/23/2019    ALBUMIN 2.3 (L) 01/23/2019     No results found for: PREALBUMIN    Estimated Creatinine Clearance: 33.2 mL/min (A) (based on SCr of 2.2 mg/dL (H)).    Accu-Checks  Recent Labs     01/21/19  1653 01/21/19  2154 01/22/19  0806 01/22/19  1107 01/22/19  1432 01/22/19  1650 01/22/19  2223 01/23/19  0836 01/23/19  1106 01/23/19 2020   POCTGLUCOSE 85 138* 165* 217* 218* 204* 182* 95 150* 90       Current Medications and/or Treatments Impacting Glycemic Control  Immunotherapy:    Immunosuppressants     None        Steroids:   Hormones (From admission, onward)    None        Pressors:    Autonomic Drugs (From admission, onward)    Start     Stop Route Frequency Ordered    01/23/19 1100  EPINEPHrine (ADRENALIN) 10 mg in sodium chloride 0.9% 250 mL infusion      -- IV Continuous 01/23/19 0956        Hyperglycemia/Diabetes Medications:   Antihyperglycemics (From admission, onward)    Start     Stop Route Frequency Ordered    01/23/19 2100  insulin detemir U-100 pen 4 Units      -- SubQ Nightly 01/23/19 1123    01/21/19 0815  insulin aspart U-100 pen 5-7 Units      -- SubQ 3 times daily with meals 01/21/19 0810    01/11/19 1358  insulin aspart U-100 pen 0-5 Units      -- SubQ Before meals & nightly PRN 01/11/19 1259          ASSESSMENT and PLAN    * 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         A-fib    May affect BG readings if uncontrolled  S/p cardioversion.          Timo Avalos NP  Endocrinology  [facility]

## 2019-01-24 NOTE — ASSESSMENT & PLAN NOTE
-Vegetation seen on RICARDO  -2 mobile masses seen on CVC by RICARDO 1/14 (has both RUE PICC and RIJ TLC). Discussed with Dr. Russo. Blood cultures repeated 1/15 and were -ve  -Appreciate EP's help. 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 Patient had 2 vegetations on RV lead which is most likely source of bacteremia. Lead tip and ICD pocket cultured with RV lead +ve for Enteroccocus. Blood cultures are negative here are -ve with last culture done 1/15  -Continue Ampicillin (renally dosed given TREY) and Ceftriaxone per ID recs Will treat for 6 weeks post extraction.  Estimated end date 2/20/19.    -If urgently necessary from a cardiac standpoint, it is okay to proceed with implanting new device as repeat blood cultures taken post extraction remain negative X 72 hours.  Suspect known vegetations on RV leads, now removed, were primary source of infection; cardioMems likely not infected, but Indium Scan done 1.18 and was -ve. If negative, no need for oral antibiotic suppression or surveillance blood cultures following 6 weeks of IV therapy.  -INR has trended back up at 3.6 - hold Coumadin. No Eliquis for now as he may require implant of new device  -Will need Life Vest prior to discharge if CRT-D not replaced (EP planned device replacement this Thursday, but given degree of volume overload, this has been postponed until next Tuesday if euvolemic  -Appreciate GI's help. Plan for outpatient EGD and C-scope for eval of anemia and Enterococcus bacteremia   - repeated blood cultures today and UA with reflex due to WBCs increased (afebrile)

## 2019-01-24 NOTE — PROGRESS NOTES
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 Diuresed extremely well. Net -ve > 5.5L past 24 hours on Diuril 500 Q12 and 60/hour of lasix.  CVP 9. sVO2 55 with calculated CO 5.8 and .    Continuous Infusions:   epinephrine infusion (NON-TITRATING) 0.06 mcg/kg/min (01/24/19 1400)    furosemide (LASIX) 2 mg/mL infusion (non-titrating) 40 mg/hr (01/24/19 1400)    nitric oxide gas       Scheduled Meds:   ampicillin IVPB  2 g Intravenous Q6H    artificial tears  1 drop Both Eyes QID    cefTRIAXone (ROCEPHIN) IVPB  2 g Intravenous Q12H    chlorothiazide (DIURIL) IVPB  250 mg Intravenous Q12H    clopidogrel  75 mg Oral Daily    ferrous sulfate  325 mg Oral BID    insulin aspart U-100  3-5 Units Subcutaneous TIDWM    insulin detemir U-100  4 Units Subcutaneous QHS    levothyroxine  25 mcg Oral Before breakfast    polyethylene glycol  17 g Oral Daily    potassium chloride SA  40 mEq Oral BID    rosuvastatin  20 mg Oral QHS    senna-docusate 8.6-50 mg  2 tablet Oral BID    tamsulosin  0.4 mg Oral Daily     PRN Meds:dextrose 50%, dextrose 50%, glucagon (human recombinant), glucose, glucose, hydrOXYzine HCl, insulin aspart U-100, magnesium sulfate IVPB, magnesium sulfate IVPB, ondansetron, ramelteon, sodium chloride 0.9%, sodium chloride 0.9%, traMADol    Review of patient's allergies indicates:   Allergen Reactions    Adhesive Other (See Comments)     blisters    Codeine Other (See Comments)     Blurred vision    Latex Hives    Ace inhibitors     Lipitor [atorvastatin] Other (See Comments)     Muscle spasms     Objective:     Vital Signs (Most  Recent):  Temp: 97.9 °F (36.6 °C) (01/24/19 1100)  Pulse: 68 (01/24/19 1400)  Resp: (!) 37 (01/24/19 1400)  BP: 128/68 (01/23/19 0800)  SpO2: 98 % (01/24/19 1400) Vital Signs (24h Range):  Temp:  [97.8 °F (36.6 °C)-98.5 °F (36.9 °C)] 97.9 °F (36.6 °C)  Pulse:  [67-85] 68  Resp:  [14-37] 37  SpO2:  [87 %-99 %] 98 %  Arterial Line BP: ()/(43-57) 105/43     Patient Vitals for the past 72 hrs (Last 3 readings):   Weight   01/24/19 1100 83.9 kg (184 lb 15.5 oz)   01/23/19 0701 83.9 kg (184 lb 15.5 oz)     Body mass index is 23.75 kg/m².      Intake/Output Summary (Last 24 hours) at 1/24/2019 1510  Last data filed at 1/24/2019 1400  Gross per 24 hour   Intake 2747 ml   Output 7395 ml   Net -4648 ml       Hemodynamic Parameters:     Telemetry: Atrial fib    Physical Exam   Constitutional: He is oriented to person, place, and time. He appears well-developed and well-nourished.   HENT:   Head: Normocephalic and atraumatic.   Eyes: Conjunctivae and EOM are normal.   Neck: Normal range of motion. Neck supple. JVD (mid neck lying flat) present. No thyromegaly present.   RIJ line   Cardiovascular: Regular rhythm.   Irregular, bradycardic   Pulmonary/Chest: Effort normal and breath sounds normal.   Abdominal: Soft. Bowel sounds are normal.   Pulsatile liver, RUQ fullness   Musculoskeletal: Normal range of motion.   Trace HEATHER   Neurological: He is alert and oriented to person, place, and time.   Skin: Skin is warm and dry. Capillary refill takes 2 to 3 seconds.   Psychiatric: He has a normal mood and affect. His behavior is normal. Judgment and thought content normal.       Significant Labs:  CBC:  Recent Labs   Lab 01/22/19  1432 01/23/19  0429 01/24/19  0440   WBC 10.51 12.05 15.97*   RBC 2.66* 2.69* 2.76*   HGB 7.4* 7.5* 7.8*   HCT 24.3* 24.7* 26.2*    250 280   MCV 91 92 95   MCH 27.8 27.9 28.3   MCHC 30.5* 30.4* 29.8*     BNP:  Recent Labs   Lab 01/23/19  0429   BNP 1,522*     CMP:  Recent Labs   Lab  01/23/19  0429 01/23/19  1532 01/23/19  2359 01/24/19  0440   GLU 73 127* 82 116*   CALCIUM 8.9 9.0 9.2 9.1   ALBUMIN 2.3* 2.3*  --  2.4*   PROT 6.7 6.6  --  6.8    142 143 145   K 3.6 3.6 3.8 3.9   CO2 32* 36* 40* 37*   CL 94* 92* 92* 92*   BUN 71* 74* 72* 70*   CREATININE 2.0* 2.1* 2.2* 2.2*   ALKPHOS 172* 170*  --  169*   ALT 17 19  --  18   AST 32 35  --  36   BILITOT 0.8 0.8  --  0.9      Coagulation:   Recent Labs   Lab 01/22/19  0308 01/23/19  0429 01/24/19  0440   INR 2.8* 3.6* 3.5*     LDH:  No results for input(s): LDH in the last 72 hours.  Microbiology:  Microbiology Results (last 7 days)     Procedure Component Value Units Date/Time    IV catheter culture [[id number]] Collected:  01/24/19 1120    Order Status:  Sent Specimen:  Catheter Tip, Intrajugular Updated:  01/24/19 1308    Blood culture [[id number]] Collected:  01/24/19 0950    Order Status:  Sent Specimen:  Blood from Peripheral, Forearm, Right Updated:  01/24/19 1014    Blood culture [[id number]] Collected:  01/24/19 0940    Order Status:  Sent Specimen:  Blood from Peripheral, Hand, Right Updated:  01/24/19 1013    Blood culture [[id number]] Collected:  01/15/19 0328    Order Status:  Completed Specimen:  Blood from Peripheral, Forearm, Left Updated:  01/20/19 0812     Blood Culture, Routine No growth after 5 days.    Blood culture [[id number]] Collected:  01/15/19 0320    Order Status:  Completed Specimen:  Blood from Peripheral, Antecubital, Left Updated:  01/20/19 0812     Blood Culture, Routine No growth after 5 days.          I have reviewed all pertinent labs within the past 24 hours.    Estimated Creatinine Clearance: 33.2 mL/min (A) (based on SCr of 2.2 mg/dL (H)).    Diagnostic Results:  I have reviewed all pertinent imaging results/findings within the past 24 hours.    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 Discussed with Dr. Russo. Blood cultures repeated 1/15 and were -ve  -Appreciate EP's help. 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 Patient had 2 vegetations on RV lead which is most likely source of bacteremia. Lead tip and ICD pocket cultured with RV lead +ve for Enteroccocus. Blood cultures are negative here are -ve with last culture done 1/15  -Continue Ampicillin (renally dosed given TREY) and Ceftriaxone per ID recs Will treat for 6 weeks post extraction.  Estimated end date 2/20/19.    -If urgently necessary from a cardiac standpoint, it is okay to proceed with implanting new device as repeat blood cultures taken post extraction remain negative X 72 hours.  Suspect known vegetations on RV leads, now removed, were primary source of infection; cardioMems likely not infected, but Indium Scan done 1.18 and was -ve. If negative, no need for oral antibiotic suppression or surveillance blood cultures following 6 weeks of IV therapy.  -INR has trended back up at 3.6 - hold Coumadin. No Eliquis for now as he may require implant of new device  -Will need Life Vest prior to discharge if CRT-D not replaced (EP planned device replacement this Thursday, but given degree of volume overload, this has been postponed until next Tuesday if euvolemic  -Appreciate GI's help. Plan for outpatient EGD and C-scope for eval of anemia and Enterococcus bacteremia   - repeated blood cultures today and UA with reflex due to WBCs increased (afebrile)     Dysuria    - Resolved  - Urine culture -ve  - Flomax started 1/10. Will try to D/C Morin once off aggressive diuresis         Junctional bradycardia    - See A fib     Endocarditis    - See ICD lead infection     Bacteremia    -see above     TREY (acute kidney injury)    - Appreciate Nephrology's help  - Was oliguric and creatinine jumped to 4.1 likely 2/2 ATN/hypotension following ICD explant.   - CVP 9 and creatinine has  climbed to 2.2  (was 1.2 on admit). See acute on chronic systolic heart failure  - Dopamine D/C'd, Epi increased to 0.06 for more RV support and added Miesha   - Hold Entresto for now     A-fib    -S/P Unsuccessful RICARDO guided/DCCV 1/14. Remains in A fib with variety of aberrant conduction  -EP planning CRT-D replacement likely next Tuesday if euvolemic  -Epi increased to 0.06 for more RV support. Added Miesha 10 ppm  -CHADS VASc 6  -Holding eliquis s/p ICD explant 1/9. Per EP, anticoagulation resumed 1/12. INR today 3.5 (goal 2.0-3.0)  -Cardiac monitoring     Type 2 diabetes mellitus with complication    -A1c 8.1  -Target -180 while hospitalized  -detemir 14, aspart 6-8 with sliding scale  -Appreciate Endocrine's help with management     Acute on chronic combined systolic and diastolic CHF, NYHA class 3    -ICM  -Last full 2D Echo 1/7/19: LVEF 20%, LVEDD 7.24 cm. Limited bedside echo done after ICD explant showed no pericardial effusion  - will order repeat Echo today to better assess TR  -CVP decreased to 13--> 9 on exam today. Decrease lasix ggt to 40/hour and decrease diuruil to 250 Q 12.  -sVO2 55 with calculated CO 5.8 and . Continue Epi @ 0.06 and continue Miesha 10 ppm for RV support  -GDMT: Dig and Toprol D/C'd 2/2 junctional rhythm. Entresto D/C'd 2/2 transient hypotension during/after ICD explant and TREY  -Self declined for LVAD after w/u last April 2018           WANDY LuciaC  Heart Transplant  [facility]    Uninterrupted Critical Care/Counseling Time (not including procedures): 45 minutes

## 2019-01-24 NOTE — ASSESSMENT & PLAN NOTE
- Appreciate Nephrology's help  - Was oliguric and creatinine jumped to 4.1 likely 2/2 ATN/hypotension following ICD explant.   - CVP 9 and creatinine has climbed to 2.2  (was 1.2 on admit). See acute on chronic systolic heart failure  - Dopamine D/C'd, Epi increased to 0.06 for more RV support and added Miesha   - Hold Entresto for now

## 2019-01-24 NOTE — PLAN OF CARE
No acute events throughout day. See vital signs and assessments in flowsheets. See below for updates on today's progress....    Pulmonary: Nitric Oxide @ 10ppm/ 4L NC, SOB reported when pt is lying flat     Cardiovascular: A Fib 70-80s, SBP 95-120s, CVP 13/8    Neurological: alert/drowsy @ times/O X4     Gastrointestinal: Cardiac/Diabetic Diet/1500cc FR, Last BM 1/23    Genitourinary: gtz catheter remains in place, was placed by urology, UOP >200/hr     Endocrine: BG checked AC/HS, scheduled/ SS insulin given as ordered, Last BG was elevated to 363, endocrine on board and will adjust as needed     Integumentary/Other:   Bruises/thin skin/foam dressing to sacrum/heels    Rt IJ TLC removed today, blood cultures X2 & UA collected due to slight increase in WBCs     Rt upper arm PICC   Lt radial A-line     Infusions:   epi @ .06 mcg/kg/min  Lasix @ 40mg/hr    Patient progressing towards goals as tolerated, plan of care communicated and reviewed with Jerry Solis and family. Plan is for ICD to be replaced on Tuesday of next week. All questions and concerns addressed. Will continue to monitor.

## 2019-01-24 NOTE — ASSESSMENT & PLAN NOTE
-S/P Unsuccessful RICARDO guided/DCCV 1/14. Remains in A fib with variety of aberrant conduction  -EP planning CRT-D replacement likely next Tuesday if euvolemic  -Epi increased to 0.06 for more RV support. Added Miesha 10 ppm  -CHADS VASc 6  -Holding eliquis s/p ICD explant 1/9. Per EP, anticoagulation resumed 1/12. INR today 3.5 (goal 2.0-3.0)  -Cardiac monitoring

## 2019-01-24 NOTE — ASSESSMENT & PLAN NOTE
Malnutrition in the context of Acute Illness/Injury    Related to (etiology):  CHF    Signs and Symptoms (as evidenced by):  Energy Intake: <75% of estimated energy requirement for 6 months  Body Fat Depletion: moderate depletion of orbitals and triceps   Muscle Mass Depletion: moderate depletion of temples, clavicle region, scapular region and interosseous muscle   Weight Loss: 9% x 6 months    Nutrition Diagnosis Status:  New

## 2019-01-25 PROBLEM — D72.829 LEUKOCYTOSIS: Status: ACTIVE | Noted: 2019-01-01

## 2019-01-25 NOTE — SUBJECTIVE & OBJECTIVE
"Interval HPI:   Overnight events: Remains in CMICU, NAEON.  BG trending up yesterday likely secondary to increasing appetite. On IV antibiotics.  Eatin% - appetite improved  Nausea: No  Hypoglycemia and intervention: No  Fever: No  TPN and/or TF: No    BP (!) 121/58   Pulse 71   Temp 98.4 °F (36.9 °C) (Oral)   Resp 13   Ht 6' 2" (1.88 m)   Wt 83.9 kg (184 lb 15.5 oz)   SpO2 97%   BMI 23.75 kg/m²     Labs Reviewed and Include    Recent Labs   Lab 19  0330   *   CALCIUM 9.5   ALBUMIN 2.4*   PROT 6.9      K 3.7   CO2 42*   CL 89*   BUN 73*   CREATININE 2.3*   ALKPHOS 175*   ALT 17   AST 32   BILITOT 0.9     Lab Results   Component Value Date    WBC 14.37 (H) 2019    HGB 7.9 (L) 2019    HCT 25.8 (L) 2019    MCV 96 2019     2019     No results for input(s): TSH, FREET4 in the last 168 hours.  Lab Results   Component Value Date    HGBA1C 8.1 (H) 2019       Nutritional status:   Body mass index is 23.75 kg/m².  Lab Results   Component Value Date    ALBUMIN 2.4 (L) 2019    ALBUMIN 2.4 (L) 2019    ALBUMIN 2.3 (L) 2019     No results found for: PREALBUMIN    Estimated Creatinine Clearance: 31.8 mL/min (A) (based on SCr of 2.3 mg/dL (H)).    Accu-Checks  Recent Labs     19  1432 19  1650 19  2223 19  0836 19  1106 19  2020 19  0817 19  1017 19  1621 19  2107   POCTGLUCOSE 218* 204* 182* 95 150* 90 155* 209* 363* 244*       Current Medications and/or Treatments Impacting Glycemic Control  Immunotherapy:    Immunosuppressants     None        Steroids:   Hormones (From admission, onward)    None        Pressors:    Autonomic Drugs (From admission, onward)    Start     Stop Route Frequency Ordered    19 1100  EPINEPHrine (ADRENALIN) 10 mg in sodium chloride 0.9% 250 mL infusion      -- IV Continuous 19 0957        Hyperglycemia/Diabetes Medications: "   Antihyperglycemics (From admission, onward)    Start     Stop Route Frequency Ordered    01/24/19 2100  insulin detemir U-100 pen 4 Units      -- SubQ Nightly 01/24/19 0840    01/24/19 0745  insulin aspart U-100 pen 3-5 Units      -- SubQ 3 times daily with meals 01/24/19 0714    01/11/19 1358  insulin aspart U-100 pen 0-5 Units      -- SubQ Before meals & nightly PRN 01/11/19 1250

## 2019-01-25 NOTE — NURSING
"Assisted patient into the chair.  SBP on A line reading 80s/90s.  Patient reports some dizziness but told RN "It's not bad".  Called FARIDEH Villareal.  Per Dionna, patient's SBP goal should be 100.  If patient does not normalize in the chair to goal, return him to the bed.  Patient's SBP staying around 89-95.  Assisted patient back into bed.  Will try again in a few hours.    "

## 2019-01-25 NOTE — PROGRESS NOTES
Update:  JOSE E met with pt and wife Mague in order to provide continuity of care and support. Pt AAOx4, sitting up in the chair with some tremors. Pt reports that he is doing okay at this time. Both pt and wife aware that pt will likely have ICD re-implant next Tues. Wife states that two of their children came down Wed night even though Thursday's procedure was postponed. She reports that they stayed at the [facility] and she was able to stay with them to get in some rest and relaxation. Wife reports that family will stay there again when pt has ICD re-implant next week. Wife reports that they are able to get a decent discount at the hotel. SW inquired if pt and wife had any further needs at this time. Pt and wife denied anything further. SW remains available for continued psychosocial support, education, resources, and additional d/c planning as needed.

## 2019-01-25 NOTE — ASSESSMENT & PLAN NOTE
-Vegetation seen on RICARDO  -2 mobile masses seen on CVC by RICARDO 1/14 (has both RUE PICC and RIJ TLC). Discussed with Dr. Russo. Blood cultures repeated 1/15 and were -ve  -Appreciate EP's help. 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 Patient had 2 vegetations on RV lead which is most likely source of bacteremia. Lead tip and ICD pocket cultured with RV lead +ve for Enteroccocus. Blood cultures are negative here are -ve with last culture done 1/15  -Continue Ampicillin (renally dosed given TREY) and Ceftriaxone per ID recs Will treat for 6 weeks post extraction.  Estimated end date 2/20/19.    -If urgently necessary from a cardiac standpoint, it is okay to proceed with implanting new device as repeat blood cultures taken post extraction remain negative X 72 hours.  Suspect known vegetations on RV leads, now removed, were primary source of infection; cardioMems likely not infected, but Indium Scan done 1.18 and was -ve. If negative, no need for oral antibiotic suppression or surveillance blood cultures following 6 weeks of IV therapy.  -INR has trended back down to 2.9 - resume Coumadin at 1 mg qd. No Eliquis for now as he may require implant of new device  -Will need Life Vest prior to discharge if CRT-D not replaced (EP planned device replacement this Thursday, but given degree of volume overload, this has been postponed until next Tuesday if euvolemic  -Appreciate GI's help. Plan for outpatient EGD and C-scope for eval of anemia and Enterococcus bacteremia   - repeated blood cultures today and UA with reflex due to WBCs increased (afebrile)

## 2019-01-25 NOTE — ASSESSMENT & PLAN NOTE
- WCC back down a bit at 14.37. RIJ D/C'd 1/24 and tip cultured - NGTD. Blood cultures also from 1/24 with NGTD. UA OK

## 2019-01-25 NOTE — ASSESSMENT & PLAN NOTE
-ICM  -Last full 2D Echo 1/7/19: LVEF 20%, LVEDD 7.24 cm. Limited bedside echo done after ICD explant showed no pericardial effusion  - will order repeat Echo today to better assess TR  -CVP is 12, but BUN/creatinine and venous CO2 climbing. Decrease Lasix to 20 mg/hr  -sVO2 60 with calculated CO 7.49 and . Continue Epi @ 0.06 and continue Miesha 10 ppm for RV support  -GDMT: Dig and Toprol D/C'd 2/2 junctional rhythm. Entresto D/C'd 2/2 transient hypotension during/after ICD explant and TREY  -Self declined for LVAD after w/u last April 2018

## 2019-01-25 NOTE — ASSESSMENT & PLAN NOTE
BG goal 140-180    Increase Levemir to 6 units q HS  Increase Novolog to 5-7 units with meals, patient below goal yesterday  Low dose correction scale  BG monitoring AC/HS    ADDENDUM: Increase prandial Novolog to 6-8 units with meals based on hyperglycemia    Do not administer Novolog closer than every 4 hours    Discharge planning:  TBD

## 2019-01-25 NOTE — PLAN OF CARE
Problem: Physical Therapy Goal  Goal: Physical Therapy Goal  Goals to be met by: 19    Patient will increase functional independence with mobility by performin. Supine to sit with Modified [place], with HOB flat   2. Sit to supine with Modified [place], with HOB flat   3. Sit to stand transfer with Supervision  4. Gait  x 100 feet with Supervision with or without using least restrictive AD.   5. Lower extremity exercise program x20 reps per handout, with supervision     Outcome: Ongoing (interventions implemented as appropriate)  Pt is progressing toward goals. All goals remain appropriate.

## 2019-01-25 NOTE — PROGRESS NOTES
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 CVP is 12 via PICC, but BUN/creatinine and venous CO2 are climbing. Feels generally well, and is in good spirits. Family at bedside. Ate a good breakfast    Continuous Infusions:   epinephrine infusion (NON-TITRATING) 0.06 mcg/kg/min (01/25/19 0600)    furosemide (LASIX) 2 mg/mL infusion (non-titrating) 20 mg/hr (01/25/19 0854)    nitric oxide gas       Scheduled Meds:   ampicillin IVPB  2 g Intravenous Q6H    artificial tears  1 drop Both Eyes QID    cefTRIAXone (ROCEPHIN) IVPB  2 g Intravenous Q12H    clopidogrel  75 mg Oral Daily    ferrous sulfate  325 mg Oral BID    insulin aspart U-100  5-7 Units Subcutaneous TIDWM    insulin detemir U-100  6 Units Subcutaneous QHS    levothyroxine  25 mcg Oral Before breakfast    polyethylene glycol  17 g Oral Daily    potassium chloride SA  40 mEq Oral BID    rosuvastatin  20 mg Oral QHS    senna-docusate 8.6-50 mg  2 tablet Oral BID    tamsulosin  0.4 mg Oral Daily    warfarin  1 mg Oral Daily     PRN Meds:dextrose 50%, dextrose 50%, glucagon (human recombinant), glucose, glucose, hydrOXYzine HCl, insulin aspart U-100, magnesium sulfate IVPB, magnesium sulfate IVPB, ondansetron, ramelteon, sodium chloride 0.9%, sodium chloride 0.9%, traMADol    Review of patient's allergies indicates:   Allergen Reactions    Adhesive Other (See Comments)     blisters    Codeine Other (See Comments)     Blurred vision    Latex Hives    Ace inhibitors     Lipitor [atorvastatin] Other (See  Comments)     Muscle spasms    Xanax [alprazolam] Hallucinations     Objective:     Vital Signs (Most Recent):  Temp: 97.7 °F (36.5 °C) (01/25/19 0700)  Pulse: 75 (01/25/19 0837)  Resp: (!) 39 (01/25/19 0837)  BP: (!) 121/58 (01/25/19 0400)  SpO2: 98 % (01/25/19 0837) Vital Signs (24h Range):  Temp:  [97.7 °F (36.5 °C)-98.7 °F (37.1 °C)] 97.7 °F (36.5 °C)  Pulse:  [68-80] 75  Resp:  [13-39] 39  SpO2:  [90 %-99 %] 98 %  BP: ()/(52-65) 121/58  Arterial Line BP: ()/(36-55) 112/50     Patient Vitals for the past 72 hrs (Last 3 readings):   Weight   01/24/19 1100 83.9 kg (184 lb 15.5 oz)   01/23/19 0701 83.9 kg (184 lb 15.5 oz)     Body mass index is 23.75 kg/m².      Intake/Output Summary (Last 24 hours) at 1/25/2019 0955  Last data filed at 1/25/2019 0900  Gross per 24 hour   Intake 2768 ml   Output 6260 ml   Net -3492 ml       Hemodynamic Parameters:       Telemetry: A fib with variable aberrant conduction    Physical Exam   Constitutional: He is oriented to person, place, and time. He appears well-developed and well-nourished.   HENT:   Head: Normocephalic and atraumatic.   Eyes: Conjunctivae and EOM are normal.   Neck: Normal range of motion. Neck supple. No JVD present. No thyromegaly present.   Cardiovascular: Regular rhythm.   Irregular, bradycardic   Pulmonary/Chest: Effort normal and breath sounds normal.   Abdominal: Soft. Bowel sounds are normal.   Pulsatile liver, RUQ fullness   Musculoskeletal: Normal range of motion.   Trace HEATHER   Neurological: He is alert and oriented to person, place, and time.   Skin: Skin is warm and dry. Capillary refill takes 2 to 3 seconds.   Psychiatric: He has a normal mood and affect. His behavior is normal. Judgment and thought content normal.       Significant Labs:  CBC:  Recent Labs   Lab 01/23/19  0429 01/24/19  0440 01/25/19  0330   WBC 12.05 15.97* 14.37*   RBC 2.69* 2.76* 2.68*   HGB 7.5* 7.8* 7.9*   HCT 24.7* 26.2* 25.8*    280 280   MCV 92 95 96    MCH 27.9 28.3 29.5   MCHC 30.4* 29.8* 30.6*     BNP:  Recent Labs   Lab 01/23/19  0429   BNP 1,522*     CMP:  Recent Labs   Lab 01/23/19  1532 01/23/19  2359 01/24/19  0440 01/24/19  1821 01/25/19  0330   * 82 116*  --  194*   CALCIUM 9.0 9.2 9.1  --  9.5   ALBUMIN 2.3*  --  2.4*  --  2.4*   PROT 6.6  --  6.8  --  6.9    143 145  --  142   K 3.6 3.8 3.9 4.2 3.7   CO2 36* 40* 37*  --  42*   CL 92* 92* 92*  --  89*   BUN 74* 72* 70*  --  73*   CREATININE 2.1* 2.2* 2.2*  --  2.3*   ALKPHOS 170*  --  169*  --  175*   ALT 19  --  18  --  17   AST 35  --  36  --  32   BILITOT 0.8  --  0.9  --  0.9      Coagulation:   Recent Labs   Lab 01/23/19  0429 01/24/19  0440 01/25/19  0330   INR 3.6* 3.5* 2.9*     LDH:  No results for input(s): LDH in the last 72 hours.  Microbiology:  Microbiology Results (last 7 days)     Procedure Component Value Units Date/Time    IV catheter culture [[id number]] Collected:  01/24/19 1120    Order Status:  Completed Specimen:  Catheter Tip, Intrajugular Updated:  01/25/19 0757     Aerobic Culture - Cath tip No growth    Blood culture [[id number]] Collected:  01/24/19 0940    Order Status:  Completed Specimen:  Blood from Peripheral, Hand, Right Updated:  01/24/19 1715     Blood Culture, Routine No Growth to date    Blood culture [[id number]] Collected:  01/24/19 0950    Order Status:  Completed Specimen:  Blood from Peripheral, Forearm, Right Updated:  01/24/19 1715     Blood Culture, Routine No Growth to date    Blood culture [[id number]] Collected:  01/15/19 0328    Order Status:  Completed Specimen:  Blood from Peripheral, Forearm, Left Updated:  01/20/19 0812     Blood Culture, Routine No growth after 5 days.    Blood culture [[id number]] Collected:  01/15/19 0320    Order Status:  Completed Specimen:  Blood from Peripheral, Antecubital, Left Updated:  01/20/19 0812     Blood Culture, Routine No growth after 5 days.          I have reviewed all pertinent labs within the past 24  hours.    Estimated Creatinine Clearance: 31.8 mL/min (A) (based on SCr of 2.3 mg/dL (H)).    Diagnostic Results:  I have reviewed all pertinent imaging results/findings within the past 24 hours.    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 Discussed with Dr. Russo. Blood cultures repeated 1/15 and were -ve  -Appreciate EP's help. 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 Patient had 2 vegetations on RV lead which is most likely source of bacteremia. Lead tip and ICD pocket cultured with RV lead +ve for Enteroccocus. Blood cultures are negative here are -ve with last culture done 1/15  -Continue Ampicillin (renally dosed given TREY) and Ceftriaxone per ID recs Will treat for 6 weeks post extraction.  Estimated end date 2/20/19.    -If urgently necessary from a cardiac standpoint, it is okay to proceed with implanting new device as repeat blood cultures taken post extraction remain negative X 72 hours.  Suspect known vegetations on RV leads, now removed, were primary source of infection; cardioMems likely not infected, but Indium Scan done 1.18 and was -ve. If negative, no need for oral antibiotic suppression or surveillance blood cultures following 6 weeks of IV therapy.  -INR has trended back down to 2.9 - resume Coumadin at 1 mg qd. No Eliquis for now as he may require implant of new device  -Will need Life Vest prior to discharge if CRT-D not replaced (EP planned device replacement this Thursday, but given degree of volume overload, this has been postponed until next Tuesday if euvolemic  -Appreciate GI's help. Plan for outpatient EGD and C-scope for eval of anemia and Enterococcus bacteremia   - repeated blood cultures today and UA with reflex due to WBCs increased (afebrile)     Acute on chronic combined systolic and  diastolic CHF, NYHA class 3    -ICM  -Last full 2D Echo 1/7/19: LVEF 20%, LVEDD 7.24 cm. Limited bedside echo done after ICD explant showed no pericardial effusion  - will order repeat Echo today to better assess TR  -CVP is 12, but BUN/creatinine and venous CO2 climbing. Decrease Lasix to 20 mg/hr  -sVO2 60 with calculated CO 7.49 and . Continue Epi @ 0.06 and continue Miesha 10 ppm for RV support  -GDMT: Dig and Toprol D/C'd 2/2 junctional rhythm. Entresto D/C'd 2/2 transient hypotension during/after ICD explant and TREY  -Self declined for LVAD after w/u last April 2018       A-fib    -S/P Unsuccessful RICARDO guided/DCCV 1/14. Remains in A fib with variety of aberrant conduction  -EP planning CRT-D replacement likely next Tuesday if euvolemic  -Epi increased to 0.06 for more RV support. Added Miesha 10 ppm  -CHADS VASc 6  -Holding eliquis s/p ICD explant 1/9. Per EP, anticoagulation resumed 1/12. INR today 2.9 (goal 2.0-3.0)  -Cardiac monitoring     Type 2 diabetes mellitus with complication    -A1c 8.1  -Target -180 while hospitalized  -detemir 14, aspart 6-8 with sliding scale  -Appreciate Endocrine's help with management     Leukocytosis    - WCC back down a bit at 14.37. RIJ D/C'd 1/24 and tip cultured - NGTD. Blood cultures also from 1/24 with NGTD. UA OK     Dysuria    - Resolved  - Urine culture -ve  - Flomax started 1/10. Will try to D/C Morin once off aggressive diuresis         Junctional bradycardia    - See A fib     Endocarditis    - See ICD lead infection     Bacteremia    -see above     TREY (acute kidney injury)    - Appreciate Nephrology's help  - Was oliguric and creatinine jumped to 4.1 likely 2/2 ATN/hypotension following ICD explant.   - BUN/creatinine have climbed to 73/2.3  (was 1.2 on admit). See acute on chronic systolic heart failure  - Dopamine D/C'd, Epi increased to 0.06 for more RV support and added Miesha 1/23  - Hold Entresto for now       Uninterrupted Critical Care/Counseling Time  (not including procedures): 45 minutes      Dionna Lora NP [id number]  Heart Transplant  [facility]

## 2019-01-25 NOTE — SUBJECTIVE & OBJECTIVE
Interval History: No events overnight, doing well on current regimen of epi, lasix, Miesha. Now back on NC.    Tele: Junctional rhythm with occasional PVC and runs of AF with aberrancy    ROS  Objective:     Vital Signs (Most Recent):  Temp: 97.6 °F (36.4 °C) (01/25/19 1100)  Pulse: 73 (01/25/19 1100)  Resp: (!) 25 (01/25/19 1100)  BP: (!) 109/50 (01/25/19 1100)  SpO2: 100 % (01/25/19 1100) Vital Signs (24h Range):  Temp:  [97.6 °F (36.4 °C)-98.7 °F (37.1 °C)] 97.6 °F (36.4 °C)  Pulse:  [68-80] 73  Resp:  [13-39] 25  SpO2:  [90 %-100 %] 100 %  BP: ()/(50-65) 109/50  Arterial Line BP: ()/(36-56) 107/44     Weight: 83.9 kg (184 lb 15.5 oz)  Body mass index is 23.75 kg/m².     SpO2: 100 %  O2 Device (Oxygen Therapy): nasal cannula    Physical Exam   Constitutional: He is oriented to person, place, and time. He appears well-developed and well-nourished. No distress.   HENT:   Head: Normocephalic and atraumatic.   Mouth/Throat: Oropharynx is clear and moist.   Eyes: Conjunctivae and EOM are normal. Pupils are equal, round, and reactive to light. No scleral icterus.   Neck: Normal range of motion. Neck supple. No JVD present.   Cardiovascular: Normal rate, normal heart sounds and intact distal pulses. A regularly irregular rhythm present.   No murmur heard.  Pulses:       Radial pulses are 2+ on the right side, and 2+ on the left side.   Pulmonary/Chest: Effort normal and breath sounds normal. No respiratory distress.   Symmetrical expansion  On NC   Abdominal: Soft. Bowel sounds are normal. There is no hepatosplenomegaly. There is no tenderness.   Musculoskeletal: Normal range of motion.   Neurological: He is alert and oriented to person, place, and time. No cranial nerve deficit.   Skin: Skin is warm and dry. No rash noted. He is not diaphoretic.   Psychiatric: He has a normal mood and affect. Judgment and thought content normal.       Significant Labs:   EP:   Recent Labs   Lab 01/23/19  1532 01/23/19  9130   01/24/19  0440 01/24/19  1821 01/25/19  0330    143  --  145  --  142   K 3.6 3.8  --  3.9 4.2 3.7   CL 92* 92*  --  92*  --  89*   CO2 36* 40*  --  37*  --  42*   * 82  --  116*  --  194*   BUN 74* 72*  --  70*  --  73*   CREATININE 2.1* 2.2*  --  2.2*  --  2.3*   CALCIUM 9.0 9.2  --  9.1  --  9.5   PROT 6.6  --   --  6.8  --  6.9   ALBUMIN 2.3*  --   --  2.4*  --  2.4*   BILITOT 0.8  --   --  0.9  --  0.9   ALKPHOS 170*  --   --  169*  --  175*   AST 35  --   --  36  --  32   ALT 19  --   --  18  --  17   ANIONGAP 14 11  --  16  --  11   ESTGFRAFRICA 34.3* 32.4*  --  32.4*  --  30.7*   EGFRNONAA 29.7* 28.1*  --  28.1*  --  26.6*   WBC  --   --   --  15.97*  --  14.37*   HGB  --   --   --  7.8*  --  7.9*   HCT  --   --    < > 26.2*  --  25.8*   PLT  --   --   --  280  --  280   INR  --   --   --  3.5*  --  2.9*    < > = values in this interval not displayed.

## 2019-01-25 NOTE — ASSESSMENT & PLAN NOTE
Caution with insulin stacking  Estimated Creatinine Clearance: 31.8 mL/min (A) (based on SCr of 2.3 mg/dL (H)).

## 2019-01-25 NOTE — SUBJECTIVE & OBJECTIVE
**Interval History: Continues to briskly diurese since decreasing Lasix to 40 mg/hr and Diuril to 250 mg IV bid yesterday. CVP is 12 via PICC, but BUN/creatinine and venous CO2 are climbing. Feels generally well, and is in good spirits. Family at bedside. Ate a good breakfast    Continuous Infusions:   epinephrine infusion (NON-TITRATING) 0.06 mcg/kg/min (01/25/19 0600)    furosemide (LASIX) 2 mg/mL infusion (non-titrating) 20 mg/hr (01/25/19 0854)    nitric oxide gas       Scheduled Meds:   ampicillin IVPB  2 g Intravenous Q6H    artificial tears  1 drop Both Eyes QID    cefTRIAXone (ROCEPHIN) IVPB  2 g Intravenous Q12H    clopidogrel  75 mg Oral Daily    ferrous sulfate  325 mg Oral BID    insulin aspart U-100  5-7 Units Subcutaneous TIDWM    insulin detemir U-100  6 Units Subcutaneous QHS    levothyroxine  25 mcg Oral Before breakfast    polyethylene glycol  17 g Oral Daily    potassium chloride SA  40 mEq Oral BID    rosuvastatin  20 mg Oral QHS    senna-docusate 8.6-50 mg  2 tablet Oral BID    tamsulosin  0.4 mg Oral Daily    warfarin  1 mg Oral Daily     PRN Meds:dextrose 50%, dextrose 50%, glucagon (human recombinant), glucose, glucose, hydrOXYzine HCl, insulin aspart U-100, magnesium sulfate IVPB, magnesium sulfate IVPB, ondansetron, ramelteon, sodium chloride 0.9%, sodium chloride 0.9%, traMADol    Review of patient's allergies indicates:   Allergen Reactions    Adhesive Other (See Comments)     blisters    Codeine Other (See Comments)     Blurred vision    Latex Hives    Ace inhibitors     Lipitor [atorvastatin] Other (See Comments)     Muscle spasms    Xanax [alprazolam] Hallucinations     Objective:     Vital Signs (Most Recent):  Temp: 97.7 °F (36.5 °C) (01/25/19 0700)  Pulse: 75 (01/25/19 0837)  Resp: (!) 39 (01/25/19 0837)  BP: (!) 121/58 (01/25/19 0400)  SpO2: 98 % (01/25/19 0837) Vital Signs (24h Range):  Temp:  [97.7 °F (36.5 °C)-98.7 °F (37.1 °C)] 97.7 °F (36.5 °C)  Pulse:   [68-80] 75  Resp:  [13-39] 39  SpO2:  [90 %-99 %] 98 %  BP: ()/(52-65) 121/58  Arterial Line BP: ()/(36-55) 112/50     Patient Vitals for the past 72 hrs (Last 3 readings):   Weight   01/24/19 1100 83.9 kg (184 lb 15.5 oz)   01/23/19 0701 83.9 kg (184 lb 15.5 oz)     Body mass index is 23.75 kg/m².      Intake/Output Summary (Last 24 hours) at 1/25/2019 0955  Last data filed at 1/25/2019 0900  Gross per 24 hour   Intake 2768 ml   Output 6260 ml   Net -3492 ml       Hemodynamic Parameters:       Telemetry: A fib with variable aberrant conduction    Physical Exam   Constitutional: He is oriented to person, place, and time. He appears well-developed and well-nourished.   HENT:   Head: Normocephalic and atraumatic.   Eyes: Conjunctivae and EOM are normal.   Neck: Normal range of motion. Neck supple. No JVD present. No thyromegaly present.   Cardiovascular: Regular rhythm.   Irregular, bradycardic   Pulmonary/Chest: Effort normal and breath sounds normal.   Abdominal: Soft. Bowel sounds are normal.   Pulsatile liver, RUQ fullness   Musculoskeletal: Normal range of motion.   Trace HEATHER   Neurological: He is alert and oriented to person, place, and time.   Skin: Skin is warm and dry. Capillary refill takes 2 to 3 seconds.   Psychiatric: He has a normal mood and affect. His behavior is normal. Judgment and thought content normal.       Significant Labs:  CBC:  Recent Labs   Lab 01/23/19  0429 01/24/19  0440 01/25/19  0330   WBC 12.05 15.97* 14.37*   RBC 2.69* 2.76* 2.68*   HGB 7.5* 7.8* 7.9*   HCT 24.7* 26.2* 25.8*    280 280   MCV 92 95 96   MCH 27.9 28.3 29.5   MCHC 30.4* 29.8* 30.6*     BNP:  Recent Labs   Lab 01/23/19  0429   BNP 1,522*     CMP:  Recent Labs   Lab 01/23/19  1532 01/23/19  2359 01/24/19  0440 01/24/19  1821 01/25/19  0330   * 82 116*  --  194*   CALCIUM 9.0 9.2 9.1  --  9.5   ALBUMIN 2.3*  --  2.4*  --  2.4*   PROT 6.6  --  6.8  --  6.9    143 145  --  142   K 3.6 3.8 3.9  4.2 3.7   CO2 36* 40* 37*  --  42*   CL 92* 92* 92*  --  89*   BUN 74* 72* 70*  --  73*   CREATININE 2.1* 2.2* 2.2*  --  2.3*   ALKPHOS 170*  --  169*  --  175*   ALT 19  --  18  --  17   AST 35  --  36  --  32   BILITOT 0.8  --  0.9  --  0.9      Coagulation:   Recent Labs   Lab 01/23/19  0429 01/24/19  0440 01/25/19  0330   INR 3.6* 3.5* 2.9*     LDH:  No results for input(s): LDH in the last 72 hours.  Microbiology:  Microbiology Results (last 7 days)     Procedure Component Value Units Date/Time    IV catheter culture [[id number]] Collected:  01/24/19 1120    Order Status:  Completed Specimen:  Catheter Tip, Intrajugular Updated:  01/25/19 0757     Aerobic Culture - Cath tip No growth    Blood culture [[id number]] Collected:  01/24/19 0940    Order Status:  Completed Specimen:  Blood from Peripheral, Hand, Right Updated:  01/24/19 1715     Blood Culture, Routine No Growth to date    Blood culture [[id number]] Collected:  01/24/19 0950    Order Status:  Completed Specimen:  Blood from Peripheral, Forearm, Right Updated:  01/24/19 1715     Blood Culture, Routine No Growth to date    Blood culture [[id number]] Collected:  01/15/19 0328    Order Status:  Completed Specimen:  Blood from Peripheral, Forearm, Left Updated:  01/20/19 0812     Blood Culture, Routine No growth after 5 days.    Blood culture [[id number]] Collected:  01/15/19 0320    Order Status:  Completed Specimen:  Blood from Peripheral, Antecubital, Left Updated:  01/20/19 0812     Blood Culture, Routine No growth after 5 days.          I have reviewed all pertinent labs within the past 24 hours.    Estimated Creatinine Clearance: 31.8 mL/min (A) (based on SCr of 2.3 mg/dL (H)).    Diagnostic Results:  I have reviewed all pertinent imaging results/findings within the past 24 hours.

## 2019-01-25 NOTE — NURSING
Patient identified by 2 identifiers. Denies previous reactions to blood transfusions, allergies reviewed & procedure explained.  PICC IV in place to RA, flushed w/ 10cc NS pre & post contrast administration.  3cc Optison administered, echo images obtained.  Pt tolerated procedure well.

## 2019-01-25 NOTE — PHYSICIAN QUERY
"PT Name: Jerry Solis  MR #: [id number]    Physician Query Form - Consultant Diagnosis Clarification     CDS/: Elvia Bar RN              Contact information: [phone]  This form is a permanent document in the medical record.     Query Date: January 25, 2019      By submitting this query, we are merely seeking further clarification of documentation.  Please utilize your independent clinical judgment when addressing the question(s) below.      The Medical record contains the following:   Diagnosis Supporting Clinical Information Location in Medical Record      Moderate malnutrition     General Information Comments: Pt reports fair appetite, consuming ~50% of meals. Pt's wife bringing Glucerna ONS from home also. Wife reports pt w/ 20# wt loss PTA (-9%). NFPE complete, pt noted w/ moderate malnutrition. Please see PES statement for details.  9 days post-op cardioversion.   Nutrition Discharge Planning: Adequate PO intake       Moderate malnutrition    Malnutrition in the context of Acute Illness/Injury       Related to (etiology):   CHF       Signs and Symptoms (as evidenced by):   Energy Intake: <75% of estimated energy requirement for 6 months   Body Fat Depletion: moderate depletion of orbitals and triceps   Muscle Mass Depletion: moderate depletion of temples, clavicle region, scapular region and interosseous muscle   Weight Loss: 9% x 6 months       Nutrition Diagnosis Status:   New        BMI (Calculated): 23.8   Weight (lb): 184.97 lb   Height: 6' 2" (188 cm)            Progress Note 1/24       Nutrition         Do you agree with the Consultants diagnosis of ______Moderate malnutrition  ________?    [xxx  ] Yes   [  ] No   [  ] Clinically insignificant   [  ] Other/Clarification of findings:   [  ] Clinically undetermined                      "

## 2019-01-25 NOTE — PROGRESS NOTES
"[facility]  Endocrinology  Progress Note    Admit Date: 2019     Reason for Consult: Management of T2DM, Hyperglycemia     Surgical Procedure and Date: Lead extraction 19    Diabetes diagnosis year: approximately 10 years ago    Lab Results   Component Value Date    HGBA1C 8.1 (H) 2019       Home Diabetes Medications: Lantus 50 units q HS, Novolog 25 units with meals (vials)    How often checking glucose at home? 3-4x per day   BG readings on regimen: AM 120s  Hypoglycemia on the regimen?  Yes - 2-3x per month  Missed doses on regimen?  Yes - 2x per week    Diabetes Complications include:     Hyperglycemia, Hypoglycemia , Diabetic retinopathy , Diabetic cataract  and Diabetic peripheral neuropathy     Complicating diabetes co morbidities:   CHF and History of CVA      HPI:   Patient is a 78 y.o. male with a diagnosis of DM2 and CHF who was admitted on 19 for possible RV lead infection.  Patient is s/p lead extraction on 19.  Patient's DM managed by PCP, Dr. Hdz.  Endocrine consulted for DM/BG management.            Interval HPI:   Overnight events: Remains in CMICU, NAEON.  BG trending up yesterday likely secondary to increasing appetite. On IV antibiotics.  Eatin% - appetite improved  Nausea: No  Hypoglycemia and intervention: No  Fever: No  TPN and/or TF: No    BP (!) 121/58   Pulse 71   Temp 98.4 °F (36.9 °C) (Oral)   Resp 13   Ht 6' 2" (1.88 m)   Wt 83.9 kg (184 lb 15.5 oz)   SpO2 97%   BMI 23.75 kg/m²      Labs Reviewed and Include    Recent Labs   Lab 19  0330   *   CALCIUM 9.5   ALBUMIN 2.4*   PROT 6.9      K 3.7   CO2 42*   CL 89*   BUN 73*   CREATININE 2.3*   ALKPHOS 175*   ALT 17   AST 32   BILITOT 0.9     Lab Results   Component Value Date    WBC 14.37 (H) 2019    HGB 7.9 (L) 2019    HCT 25.8 (L) 2019    MCV 96 2019     2019     No results for input(s): TSH, FREET4 in the last 168 " hours.  Lab Results   Component Value Date    HGBA1C 8.1 (H) 01/07/2019       Nutritional status:   Body mass index is 23.75 kg/m².  Lab Results   Component Value Date    ALBUMIN 2.4 (L) 01/25/2019    ALBUMIN 2.4 (L) 01/24/2019    ALBUMIN 2.3 (L) 01/23/2019     No results found for: PREALBUMIN    Estimated Creatinine Clearance: 31.8 mL/min (A) (based on SCr of 2.3 mg/dL (H)).    Accu-Checks  Recent Labs     01/22/19  1432 01/22/19  1650 01/22/19  2223 01/23/19  0836 01/23/19  1106 01/23/19  2020 01/24/19  0817 01/24/19  1017 01/24/19  1621 01/24/19  2107   POCTGLUCOSE 218* 204* 182* 95 150* 90 155* 209* 363* 244*       Current Medications and/or Treatments Impacting Glycemic Control  Immunotherapy:    Immunosuppressants     None        Steroids:   Hormones (From admission, onward)    None        Pressors:    Autonomic Drugs (From admission, onward)    Start     Stop Route Frequency Ordered    01/23/19 1100  EPINEPHrine (ADRENALIN) 10 mg in sodium chloride 0.9% 250 mL infusion      -- IV Continuous 01/23/19 0956        Hyperglycemia/Diabetes Medications:   Antihyperglycemics (From admission, onward)    Start     Stop Route Frequency Ordered    01/24/19 2100  insulin detemir U-100 pen 4 Units      -- SubQ Nightly 01/24/19 0840    01/24/19 0745  insulin aspart U-100 pen 3-5 Units      -- SubQ 3 times daily with meals 01/24/19 0714    01/11/19 1358  insulin aspart U-100 pen 0-5 Units      -- SubQ Before meals & nightly PRN 01/11/19 1259          ASSESSMENT and PLAN    * 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    Managed per primary team  Avoid hypoglycemia  S/p lead extraction  Infection may elevate BG readings         Type 2 diabetes mellitus with complication    BG goal 140-180    Increase Levemir to 6 units q HS due to elevated FBG  Increase Novolog to 5-7 units with meals, patient below goal yesterday  Low dose correction scale  BG monitoring AC/HS    ADDENDUM: Increase prandial Novolog to  6-8 units with meals based on hyperglycemia    Do not administer Novolog closer than every 4 hours    Discharge planning:  TBD       TREY (acute kidney injury)    Caution with insulin stacking  Estimated Creatinine Clearance: 31.8 mL/min (A) (based on SCr of 2.3 mg/dL (H)).         A-fib    May affect BG readings if uncontrolled  S/p cardioversion.          Timo Avalos NP  Endocrinology  [facility]

## 2019-01-25 NOTE — ASSESSMENT & PLAN NOTE
- Appreciate Nephrology's help  - Was oliguric and creatinine jumped to 4.1 likely 2/2 ATN/hypotension following ICD explant.   - BUN/creatinine have climbed to 73/2.3  (was 1.2 on admit). See acute on chronic systolic heart failure  - Dopamine D/C'd, Epi increased to 0.06 for more RV support and added Miesha 1/23  - Hold Entresto for now

## 2019-01-25 NOTE — ASSESSMENT & PLAN NOTE
This is a 77yo gentleman with long-standing history of ICM ([facility] with non-obstructive disease s/p intervention on 03/2018) with EF 35%, a 20-year history of ICD with upgrade to CRT-D in 2014 who presents for device extraction due to RV lead vegetation ~0.5cm in the setting of VRE bacteremia with a likely initial GI source. Likewise has a CardioMeMMS device in place. Is not pacemaker dependent. ID believes the primary source is the vegetation, he is s/p extraction on 01/09, RV lead tip grew E. Faecalis (resistant to tetracyclines). Recovery complicated by slow junctional escape rhythm responding to dopamine, no response to DCCV. Now with undetermined atrial rhythm which is either sinus bradycardia with aberrantly conducted PAC or AT, or persistent AF with junctional escape and intermittent aberrant conduction of AF, the differences can be attributed due to AV manolo stimulation by inotrope/chronotrope medications resulting in conduction changes. With weaning of chronotropic medications, he is back in a msotly junctional rhythm. Indium scan was negative for signs of CardioMEMs infection. Has had slow decline in respiratory status over several days, necessitating a delay in placement    - volume/HF management as per primary team  - Inotropes as per primary team with goal of stable renal perfusion and optimizing respiratory status for procedure  - Continue coumadin with target INR 2-3  - Tentative plan for BIV-ICD implantation next Tuesday

## 2019-01-25 NOTE — ASSESSMENT & PLAN NOTE
-S/P Unsuccessful RICARDO guided/DCCV 1/14. Remains in A fib with variety of aberrant conduction  -EP planning CRT-D replacement likely next Tuesday if euvolemic  -Epi increased to 0.06 for more RV support. Added Miesha 10 ppm  -CHADS VASc 6  -Holding eliquis s/p ICD explant 1/9. Per EP, anticoagulation resumed 1/12. INR today 2.9 (goal 2.0-3.0)  -Cardiac monitoring

## 2019-01-25 NOTE — PROGRESS NOTES
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 Now back on NC.    Tele: Junctional rhythm with occasional PVC and runs of AF with aberrancy    ROS  Objective:     Vital Signs (Most Recent):  Temp: 97.6 °F (36.4 °C) (01/25/19 1100)  Pulse: 73 (01/25/19 1100)  Resp: (!) 25 (01/25/19 1100)  BP: (!) 109/50 (01/25/19 1100)  SpO2: 100 % (01/25/19 1100) Vital Signs (24h Range):  Temp:  [97.6 °F (36.4 °C)-98.7 °F (37.1 °C)] 97.6 °F (36.4 °C)  Pulse:  [68-80] 73  Resp:  [13-39] 25  SpO2:  [90 %-100 %] 100 %  BP: ()/(50-65) 109/50  Arterial Line BP: ()/(36-56) 107/44     Weight: 83.9 kg (184 lb 15.5 oz)  Body mass index is 23.75 kg/m².     SpO2: 100 %  O2 Device (Oxygen Therapy): nasal cannula    Physical Exam   Constitutional: He is oriented to person, place, and time. He appears well-developed and well-nourished. No distress.   HENT:   Head: Normocephalic and atraumatic.   Mouth/Throat: Oropharynx is clear and moist.   Eyes: Conjunctivae and EOM are normal. Pupils are equal, round, and reactive to light. No scleral icterus.   Neck: Normal range of motion. Neck supple. No JVD present.   Cardiovascular: Normal rate, normal heart sounds and intact distal pulses. A regularly irregular rhythm present.   No murmur heard.  Pulses:       Radial pulses are 2+ on the right side, and 2+ on the left side.   Pulmonary/Chest: Effort normal and breath sounds normal. No respiratory distress.   Symmetrical expansion  On NC   Abdominal: Soft. Bowel sounds are normal. There is no hepatosplenomegaly. There is no tenderness.   Musculoskeletal: Normal range of motion.   Neurological: He is  alert and oriented to person, place, and time. No cranial nerve deficit.   Skin: Skin is warm and dry. No rash noted. He is not diaphoretic.   Psychiatric: He has a normal mood and affect. Judgment and thought content normal.       Significant Labs:   EP:   Recent Labs   Lab 01/23/19  1532 01/23/19  2359  01/24/19  0440 01/24/19  1821 01/25/19  0330    143  --  145  --  142   K 3.6 3.8  --  3.9 4.2 3.7   CL 92* 92*  --  92*  --  89*   CO2 36* 40*  --  37*  --  42*   * 82  --  116*  --  194*   BUN 74* 72*  --  70*  --  73*   CREATININE 2.1* 2.2*  --  2.2*  --  2.3*   CALCIUM 9.0 9.2  --  9.1  --  9.5   PROT 6.6  --   --  6.8  --  6.9   ALBUMIN 2.3*  --   --  2.4*  --  2.4*   BILITOT 0.8  --   --  0.9  --  0.9   ALKPHOS 170*  --   --  169*  --  175*   AST 35  --   --  36  --  32   ALT 19  --   --  18  --  17   ANIONGAP 14 11  --  16  --  11   ESTGFRAFRICA 34.3* 32.4*  --  32.4*  --  30.7*   EGFRNONAA 29.7* 28.1*  --  28.1*  --  26.6*   WBC  --   --   --  15.97*  --  14.37*   HGB  --   --   --  7.8*  --  7.9*   HCT  --   --    < > 26.2*  --  25.8*   PLT  --   --   --  280  --  280   INR  --   --   --  3.5*  --  2.9*    < > = values in this interval not displayed.         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 Likewise has a CardioMeMMS device in place. Is not pacemaker dependent. ID believes the primary source is the vegetation, he is s/p extraction on 01/09, RV lead tip grew E. Faecalis (resistant to tetracyclines). Recovery complicated by slow junctional escape rhythm responding to dopamine, no response to DCCV. Now with  undetermined atrial rhythm which is either sinus bradycardia with aberrantly conducted PAC or AT, or persistent AF with junctional escape and intermittent aberrant conduction of AF, the differences can be attributed due to AV manolo stimulation by inotrope/chronotrope medications resulting in conduction changes. With weaning of chronotropic medications, he is back in a msotly junctional rhythm. Indium scan was negative for signs of CardioMEMs infection. Has had slow decline in respiratory status over several days, necessitating a delay in placement    - volume/HF management as per primary team  - Inotropes as per primary team with goal of stable renal perfusion and optimizing respiratory status for procedure  - Continue coumadin with target INR 2-3  - Tentative plan for BIV-ICD implantation next Tuesday         Oniel George MD  Cardiac Electrophysiology  [facility]

## 2019-01-26 NOTE — PLAN OF CARE
Problem: Adult Inpatient Plan of Care  Goal: Plan of Care Review  Outcome: Ongoing (interventions implemented as appropriate)    No acute events throughout shift. See vital signs and assessments in flowsheets. See below for updates on today's progress.     Pulmonary: 4L NC [id number]; sats>92    Cardiovascular: DC61-772v Afib, SBP >100, all pulses palpable, cvp 9-10, svo2 58    Neurological: aaox4, afebrile, moves all extremities purposefully    Gastrointestinal: no bm; uses bedside commode, cardiac diet, take meds with yogurt     Genitourinary: gtz 100cc/hr    Endocrine: BG achs; mg and k replaced x1    Integumentary/Other: skin intact; bruising on arms; dry skin; dry eyes    Infusions: lasix, epi    Plan for pacemaker Tuesday.    Patient progressing towards goals as tolerated, plan of care communicated and reviewed with Jerry Solis and family. All concerns addressed. Will continue to monitor.

## 2019-01-26 NOTE — PROGRESS NOTES
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    Continuous Infusions:   epinephrine infusion (NON-TITRATING) 0.06 mcg/kg/min (01/26/19 0700)    furosemide (LASIX) 2 mg/mL infusion (non-titrating) 20 mg/hr (01/26/19 0700)    nitric oxide gas       Scheduled Meds:   ampicillin IVPB  2 g Intravenous Q6H    artificial tears  1 drop Both Eyes QID    cefTRIAXone (ROCEPHIN) IVPB  2 g Intravenous Q12H    clopidogrel  75 mg Oral Daily    ferrous sulfate  325 mg Oral BID    insulin aspart U-100  6-8 Units Subcutaneous TIDWM    insulin detemir U-100  8 Units Subcutaneous QHS    levothyroxine  25 mcg Oral Before breakfast    polyethylene glycol  17 g Oral Daily    potassium chloride SA  40 mEq Oral BID    potassium chloride  40 mEq Oral Once    rosuvastatin  20 mg Oral QHS    senna-docusate 8.6-50 mg  2 tablet Oral BID    tamsulosin  0.4 mg Oral Daily    warfarin  1 mg Oral Daily     PRN Meds:dextrose 50%, dextrose 50%, glucagon (human recombinant), glucose, glucose, hydrOXYzine HCl, insulin aspart U-100, magnesium sulfate IVPB, magnesium sulfate IVPB, ondansetron, ramelteon, sodium chloride 0.9%, sodium chloride 0.9%, traMADol    Review of patient's allergies indicates:   Allergen Reactions    Adhesive Other (See Comments)     blisters    Codeine Other (See Comments)     Blurred vision    Latex Hives    Ace inhibitors     Lipitor [atorvastatin] Other (See Comments)     Muscle spasms    Xanax [alprazolam] Hallucinations     Objective:     Vital Signs (Most Recent):  Temp: 98.3 °F (36.8 °C) (01/26/19 0800)  Pulse: 79 (01/26/19  0800)  Resp: 20 (01/26/19 0800)  BP: (!) 114/53 (01/25/19 1300)  SpO2: 100 % (01/26/19 0800) Vital Signs (24h Range):  Temp:  [97.1 °F (36.2 °C)-98.3 °F (36.8 °C)] 98.3 °F (36.8 °C)  Pulse:  [67-88] 79  Resp:  [12-39] 20  SpO2:  [95 %-100 %] 100 %  BP: ()/(50-58) 114/53  Arterial Line BP: ()/(38-59) 131/50     Patient Vitals for the past 72 hrs (Last 3 readings):   Weight   01/25/19 1300 83.5 kg (184 lb)   01/24/19 1100 83.9 kg (184 lb 15.5 oz)     Body mass index is 23.62 kg/m².      Intake/Output Summary (Last 24 hours) at 1/26/2019 0819  Last data filed at 1/26/2019 0700  Gross per 24 hour   Intake 1577.5 ml   Output 4240 ml   Net -2662.5 ml          Telemetry: A fib with variable aberrant conduction    Physical Exam   Constitutional: He is oriented to person, place, and time. He appears well-developed and well-nourished.   HENT:   Head: Normocephalic and atraumatic.   Eyes: Conjunctivae and EOM are normal.   Neck: Normal range of motion. Neck supple. No JVD present. No thyromegaly present.   Cardiovascular: Regular rhythm.   Irregular, bradycardic   Pulmonary/Chest: Effort normal and breath sounds normal.   Abdominal: Soft. Bowel sounds are normal.   Pulsatile liver, RUQ fullness   Musculoskeletal: Normal range of motion.   Trace HEATHER   Neurological: He is alert and oriented to person, place, and time.   Skin: Skin is warm and dry. Capillary refill takes 2 to 3 seconds.   Psychiatric: He has a normal mood and affect. His behavior is normal. Judgment and thought content normal.       Significant Labs:  CBC:  Recent Labs   Lab 01/24/19  0440 01/25/19  0330 01/26/19  0316   WBC 15.97* 14.37* 13.76*   RBC 2.76* 2.68* 2.63*   HGB 7.8* 7.9* 7.6*   HCT 26.2* 25.8* 24.9*    280 273   MCV 95 96 95   MCH 28.3 29.5 28.9   MCHC 29.8* 30.6* 30.5*     BNP:  Recent Labs   Lab 01/23/19  0429   BNP 1,522*     CMP:  Recent Labs   Lab 01/24/19  0440  01/25/19  0330 01/25/19  1513 01/26/19  0316   *  --   194*  --  230*   CALCIUM 9.1  --  9.5  --  8.9   ALBUMIN 2.4*  --  2.4*  --  2.3*   PROT 6.8  --  6.9  --  6.5     --  142  --  142   K 3.9   < > 3.7 3.9 3.9   CO2 37*  --  42*  --  34*   CL 92*  --  89*  --  93*   BUN 70*  --  73*  --  66*   CREATININE 2.2*  --  2.3*  --  2.1*   ALKPHOS 169*  --  175*  --  158*   ALT 18  --  17  --  16   AST 36  --  32  --  26   BILITOT 0.9  --  0.9  --  0.9    < > = values in this interval not displayed.      Coagulation:   Recent Labs   Lab 01/24/19  0440 01/25/19  0330 01/26/19  0316   INR 3.5* 2.9* 2.2*     LDH:  No results for input(s): LDH in the last 72 hours.  Microbiology:  Microbiology Results (last 7 days)     Procedure Component Value Units Date/Time    Blood culture [[id number]] Collected:  01/24/19 0940    Order Status:  Completed Specimen:  Blood from Peripheral, Hand, Right Updated:  01/25/19 1212     Blood Culture, Routine No Growth to date     Blood Culture, Routine No Growth to date    Blood culture [[id number]] Collected:  01/24/19 0950    Order Status:  Completed Specimen:  Blood from Peripheral, Forearm, Right Updated:  01/25/19 1212     Blood Culture, Routine No Growth to date     Blood Culture, Routine No Growth to date    IV catheter culture [[id number]] Collected:  01/24/19 1120    Order Status:  Completed Specimen:  Catheter Tip, Intrajugular Updated:  01/25/19 0757     Aerobic Culture - Cath tip No growth    Blood culture [[id number]] Collected:  01/15/19 0328    Order Status:  Completed Specimen:  Blood from Peripheral, Forearm, Left Updated:  01/20/19 0812     Blood Culture, Routine No growth after 5 days.    Blood culture [[id number]] Collected:  01/15/19 0320    Order Status:  Completed Specimen:  Blood from Peripheral, Antecubital, Left Updated:  01/20/19 0812     Blood Culture, Routine No growth after 5 days.          I have reviewed all pertinent labs within the past 24 hours.    Estimated Creatinine Clearance: 34.8 mL/min (A) (based on SCr of  2.1 mg/dL (H)).    Diagnostic Results:  I have reviewed all pertinent imaging results/findings within the past 24 hours.    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 Discussed with Dr. Russo. Blood cultures repeated 1/15 and were -ve  -Appreciate EP's help. 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 Patient had 2 vegetations on RV lead which is most likely source of bacteremia. Lead tip and ICD pocket cultured with RV lead +ve for Enteroccocus. Blood cultures are negative here are -ve with last culture done 1/15  -Continue Ampicillin (renally dosed given TREY) and Ceftriaxone per ID recs Will treat for 6 weeks post extraction.  Estimated end date 2/20/19.    -If urgently necessary from a cardiac standpoint, it is okay to proceed with implanting new device as repeat blood cultures taken post extraction remain negative X 72 hours.  Suspect known vegetations on RV leads, now removed, were primary source of infection; cardioMems likely not infected, but Indium Scan done 1.18 and was -ve. If negative, no need for oral antibiotic suppression or surveillance blood cultures following 6 weeks of IV therapy.  -INR has trended back down to 2.9 - resume Coumadin at 1 mg qd. No Eliquis for now as he may require implant of new device  -Will need Life Vest prior to discharge if CRT-D not replaced (EP planned device replacement this Thursday, but given degree of volume overload, this has been postponed until next Tuesday if euvolemic  -Appreciate GI's help. Plan for outpatient EGD and C-scope for eval of anemia and Enterococcus bacteremia  - repeated blood cultures 1/25  and UA with reflex due to WBCs increased (afebrile)     Acute on chronic combined systolic and diastolic CHF, NYHA class 3    -ICM.  -Last full 2D Echo 1/7/19: LVEF 20%, LVEDD  7.24 cm. Limited bedside echo done after ICD explant showed no pericardial effusion.  - will order repeat Echo today to better assess TR.  -CVP is 12, but BUN/creatinine and venous CO2 climbing. Decrease Lasix to 20 mg/hr.  -sVO2 58. Continue Epi @ 0.06 and continue Miesha 10 ppm for RV support.  -GDMT: Dig and Toprol D/C'd 2/2 junctional rhythm. Entresto D/C'd 2/2 transient hypotension during/after ICD explant and TREY.  -Self declined for LVAD after w/u last April 2018.     A-fib    -S/P Unsuccessful RICARDO guided/DCCV 1/14. Remains in A fib with variety of aberrant conduction  -EP planning CRT-D replacement likely next Tuesday if euvolemic  -Epi increased to 0.06 for more RV support. Added Miesha 10 ppm  -CHADS VASc 6  -Holding eliquis s/p ICD explant 1/9. Per EP, anticoagulation resumed 1/12. INR today 2.9 (goal 2.0-3.0)  -Cardiac monitoring     TREY (acute kidney injury)    - Appreciate Nephrology's help  - Was oliguric and creatinine jumped to 4.1 likely 2/2 ATN/hypotension following ICD explant.   - BUN/creatinine have climbed to 73/2.3  (was 1.2 on admit). See acute on chronic systolic heart failure  - Dopamine D/C'd, Epi increased to 0.06 for more RV support and added Miesha 1/23  - Hold Entresto for now     Leukocytosis    - WCC back down a bit.   -RIJ D/C'd 1/24 and tip cultured - NGTD. Blood cultures also from 1/24 with NGTD. UA OK     Dysuria    - Resolved  - Urine culture -ve  - Flomax started 1/10. Will try to D/C Morin once off aggressive diuresis         Junctional bradycardia    - See A fib     Endocarditis    - See ICD lead infection     Bacteremia    -see above     Type 2 diabetes mellitus with complication    -A1c 8.1  -Target -180 while hospitalized  -detemir 14, aspart 6-8 with sliding scale  -Appreciate Endocrine's help with management     Uninterrupted Critical Care/Counseling Time (not including procedures): 45mn      Chris Merchant, NP  Heart Transplant  [facility]

## 2019-01-26 NOTE — SUBJECTIVE & OBJECTIVE
"Interval HPI:   Overnight events: Remains in [facility], NAEON.  BG trending up yesterday likely secondary to increasing appetite. On IV antibiotics.  Eatin%  Nausea: No  Hypoglycemia and intervention: No  Fever: No  TPN and/or TF: No    BP (!) 114/53 (BP Location: Left arm, Patient Position: Lying)   Pulse 76   Temp 98 °F (36.7 °C) (Oral)   Resp 17   Ht 6' 2" (1.88 m)   Wt 83.5 kg (184 lb)   SpO2 97%   BMI 23.62 kg/m²     Labs Reviewed and Include    Recent Labs   Lab 19  0316   *   CALCIUM 8.9   ALBUMIN 2.3*   PROT 6.5      K 3.9   CO2 34*   CL 93*   BUN 66*   CREATININE 2.1*   ALKPHOS 158*   ALT 16   AST 26   BILITOT 0.9     Lab Results   Component Value Date    WBC 13.76 (H) 2019    HGB 7.6 (L) 2019    HCT 24.9 (L) 2019    MCV 95 2019     2019     No results for input(s): TSH, FREET4 in the last 168 hours.  Lab Results   Component Value Date    HGBA1C 8.1 (H) 2019       Nutritional status:   Body mass index is 23.62 kg/m².  Lab Results   Component Value Date    ALBUMIN 2.3 (L) 2019    ALBUMIN 2.4 (L) 2019    ALBUMIN 2.4 (L) 2019     No results found for: PREALBUMIN    Estimated Creatinine Clearance: 34.8 mL/min (A) (based on SCr of 2.1 mg/dL (H)).    Accu-Checks  Recent Labs     19  1106 19  2020 19  0817 19  1017 19  1621 19  2107 19  0742 19  1118 19  1734 19  2211   POCTGLUCOSE 150* 90 155* 209* 363* 244* 240* 327* 235* 260*       Current Medications and/or Treatments Impacting Glycemic Control  Immunotherapy:    Immunosuppressants     None        Steroids:   Hormones (From admission, onward)    None        Pressors:    Autonomic Drugs (From admission, onward)    Start     Stop Route Frequency Ordered    19 1100  EPINEPHrine (ADRENALIN) 10 mg in sodium chloride 0.9% 250 mL infusion      -- IV Continuous 19 9505        Hyperglycemia/Diabetes " Medications:   Antihyperglycemics (From admission, onward)    Start     Stop Route Frequency Ordered    01/25/19 2100  insulin detemir U-100 pen 6 Units      -- SubQ Nightly 01/25/19 0838    01/25/19 1645  insulin aspart U-100 pen 6-8 Units      -- SubQ 3 times daily with meals 01/25/19 1234    01/11/19 1358  insulin aspart U-100 pen 0-5 Units      -- SubQ Before meals & nightly PRN 01/11/19 1259

## 2019-01-26 NOTE — SUBJECTIVE & OBJECTIVE
Interval History: No complaints this morning or issues overnight.    Continuous Infusions:   epinephrine infusion (NON-TITRATING) 0.06 mcg/kg/min (01/26/19 0700)    furosemide (LASIX) 2 mg/mL infusion (non-titrating) 20 mg/hr (01/26/19 0700)    nitric oxide gas       Scheduled Meds:   ampicillin IVPB  2 g Intravenous Q6H    artificial tears  1 drop Both Eyes QID    cefTRIAXone (ROCEPHIN) IVPB  2 g Intravenous Q12H    clopidogrel  75 mg Oral Daily    ferrous sulfate  325 mg Oral BID    insulin aspart U-100  6-8 Units Subcutaneous TIDWM    insulin detemir U-100  8 Units Subcutaneous QHS    levothyroxine  25 mcg Oral Before breakfast    polyethylene glycol  17 g Oral Daily    potassium chloride SA  40 mEq Oral BID    potassium chloride  40 mEq Oral Once    rosuvastatin  20 mg Oral QHS    senna-docusate 8.6-50 mg  2 tablet Oral BID    tamsulosin  0.4 mg Oral Daily    warfarin  1 mg Oral Daily     PRN Meds:dextrose 50%, dextrose 50%, glucagon (human recombinant), glucose, glucose, hydrOXYzine HCl, insulin aspart U-100, magnesium sulfate IVPB, magnesium sulfate IVPB, ondansetron, ramelteon, sodium chloride 0.9%, sodium chloride 0.9%, traMADol    Review of patient's allergies indicates:   Allergen Reactions    Adhesive Other (See Comments)     blisters    Codeine Other (See Comments)     Blurred vision    Latex Hives    Ace inhibitors     Lipitor [atorvastatin] Other (See Comments)     Muscle spasms    Xanax [alprazolam] Hallucinations     Objective:     Vital Signs (Most Recent):  Temp: 98.3 °F (36.8 °C) (01/26/19 0800)  Pulse: 79 (01/26/19 0800)  Resp: 20 (01/26/19 0800)  BP: (!) 114/53 (01/25/19 1300)  SpO2: 100 % (01/26/19 0800) Vital Signs (24h Range):  Temp:  [97.1 °F (36.2 °C)-98.3 °F (36.8 °C)] 98.3 °F (36.8 °C)  Pulse:  [67-88] 79  Resp:  [12-39] 20  SpO2:  [95 %-100 %] 100 %  BP: ()/(50-58) 114/53  Arterial Line BP: ()/(38-59) 131/50     Patient Vitals for the past 72 hrs (Last  3 readings):   Weight   01/25/19 1300 83.5 kg (184 lb)   01/24/19 1100 83.9 kg (184 lb 15.5 oz)     Body mass index is 23.62 kg/m².      Intake/Output Summary (Last 24 hours) at 1/26/2019 0819  Last data filed at 1/26/2019 0700  Gross per 24 hour   Intake 1577.5 ml   Output 4240 ml   Net -2662.5 ml          Telemetry: A fib with variable aberrant conduction    Physical Exam   Constitutional: He is oriented to person, place, and time. He appears well-developed and well-nourished.   HENT:   Head: Normocephalic and atraumatic.   Eyes: Conjunctivae and EOM are normal.   Neck: Normal range of motion. Neck supple. No JVD present. No thyromegaly present.   Cardiovascular: Regular rhythm.   Irregular, bradycardic   Pulmonary/Chest: Effort normal and breath sounds normal.   Abdominal: Soft. Bowel sounds are normal.   Pulsatile liver, RUQ fullness   Musculoskeletal: Normal range of motion.   Trace HEATHER   Neurological: He is alert and oriented to person, place, and time.   Skin: Skin is warm and dry. Capillary refill takes 2 to 3 seconds.   Psychiatric: He has a normal mood and affect. His behavior is normal. Judgment and thought content normal.       Significant Labs:  CBC:  Recent Labs   Lab 01/24/19 0440 01/25/19  0330 01/26/19  0316   WBC 15.97* 14.37* 13.76*   RBC 2.76* 2.68* 2.63*   HGB 7.8* 7.9* 7.6*   HCT 26.2* 25.8* 24.9*    280 273   MCV 95 96 95   MCH 28.3 29.5 28.9   MCHC 29.8* 30.6* 30.5*     BNP:  Recent Labs   Lab 01/23/19  0429   BNP 1,522*     CMP:  Recent Labs   Lab 01/24/19  0440  01/25/19  0330 01/25/19  1513 01/26/19  0316   *  --  194*  --  230*   CALCIUM 9.1  --  9.5  --  8.9   ALBUMIN 2.4*  --  2.4*  --  2.3*   PROT 6.8  --  6.9  --  6.5     --  142  --  142   K 3.9   < > 3.7 3.9 3.9   CO2 37*  --  42*  --  34*   CL 92*  --  89*  --  93*   BUN 70*  --  73*  --  66*   CREATININE 2.2*  --  2.3*  --  2.1*   ALKPHOS 169*  --  175*  --  158*   ALT 18  --  17  --  16   AST 36  --  32  --   26   BILITOT 0.9  --  0.9  --  0.9    < > = values in this interval not displayed.      Coagulation:   Recent Labs   Lab 01/24/19  0440 01/25/19  0330 01/26/19  0316   INR 3.5* 2.9* 2.2*     LDH:  No results for input(s): LDH in the last 72 hours.  Microbiology:  Microbiology Results (last 7 days)     Procedure Component Value Units Date/Time    Blood culture [[id number]] Collected:  01/24/19 0940    Order Status:  Completed Specimen:  Blood from Peripheral, Hand, Right Updated:  01/25/19 1212     Blood Culture, Routine No Growth to date     Blood Culture, Routine No Growth to date    Blood culture [[id number]] Collected:  01/24/19 0950    Order Status:  Completed Specimen:  Blood from Peripheral, Forearm, Right Updated:  01/25/19 1212     Blood Culture, Routine No Growth to date     Blood Culture, Routine No Growth to date    IV catheter culture [[id number]] Collected:  01/24/19 1120    Order Status:  Completed Specimen:  Catheter Tip, Intrajugular Updated:  01/25/19 0757     Aerobic Culture - Cath tip No growth    Blood culture [[id number]] Collected:  01/15/19 0328    Order Status:  Completed Specimen:  Blood from Peripheral, Forearm, Left Updated:  01/20/19 0812     Blood Culture, Routine No growth after 5 days.    Blood culture [[id number]] Collected:  01/15/19 0320    Order Status:  Completed Specimen:  Blood from Peripheral, Antecubital, Left Updated:  01/20/19 0812     Blood Culture, Routine No growth after 5 days.          I have reviewed all pertinent labs within the past 24 hours.    Estimated Creatinine Clearance: 34.8 mL/min (A) (based on SCr of 2.1 mg/dL (H)).    Diagnostic Results:  I have reviewed all pertinent imaging results/findings within the past 24 hours.

## 2019-01-26 NOTE — ASSESSMENT & PLAN NOTE
BG goal 140-180    Increase Levemir to 8 units q HS due to elevated FBG  Increase Novolog to 10 units with meals due to post prandial hyperglycemia  Low dose correction scale  BG monitoring AC/HS    Do not administer Novolog closer than every 4 hours    Discharge planning:  TBJERMAIN

## 2019-01-26 NOTE — PLAN OF CARE
Problem: Adult Inpatient Plan of Care  Goal: Plan of Care Review  Outcome: Ongoing (interventions implemented as appropriate)  Patient alert and oriented x 4.      Respiratory -  4L NC with nitric 10ppm.  O2 sat >95% all shift.      Cardiovascular - A fib - rate controlled.  Had some orthostatic BP this morning when OOB (see note).   Able to get OOB this afternoon and maintain a SBP of 100.  Mr. Solis sat OOB in the recliner x ~ 4 hours.  CVP 12/14/9.  Radial pulses palpable.  Pedal pulses audible with the doppler.      GI - No BM this shift.  He sat up on BSC but was unable to have a BM.      - Morin intact and patent - draining 100-250cc/hr of clear yellow urine.      Denied pain all shift.      Wife @ bedside all day.          Problem: Fall Injury Risk  Goal: Absence of Fall and Fall-Related Injury    Intervention: Identify and Manage Contributors to Fall Injury Risk  No falls this shift.  Patient complains of dizziness with getting OOB.  His BP reflects some orthostatic issues when sitting/standing but patient normalizes within about 2-3 minutes.  He knows to call for staff assistance with getting up. Non-skid socks for safety.

## 2019-01-26 NOTE — PROGRESS NOTES
"[facility]  Endocrinology  Progress Note    Admit Date: 2019     Reason for Consult: Management of T2DM, Hyperglycemia     Surgical Procedure and Date: Lead extraction 19    Diabetes diagnosis year: approximately 10 years ago    Lab Results   Component Value Date    HGBA1C 8.1 (H) 2019       Home Diabetes Medications: Lantus 50 units q HS, Novolog 25 units with meals (vials)    How often checking glucose at home? 3-4x per day   BG readings on regimen: AM 120s  Hypoglycemia on the regimen?  Yes - 2-3x per month  Missed doses on regimen?  Yes - 2x per week    Diabetes Complications include:     Hyperglycemia, Hypoglycemia , Diabetic retinopathy , Diabetic cataract  and Diabetic peripheral neuropathy     Complicating diabetes co morbidities:   CHF and History of CVA      HPI:   Patient is a 78 y.o. male with a diagnosis of DM2 and CHF who was admitted on 19 for possible RV lead infection.  Patient is s/p lead extraction on 19.  Patient's DM managed by PCP, Dr. Hdz.  Endocrine consulted for DM/BG management.            Interval HPI:   Overnight events: Remains in CMICU, NAEON.  BG trending up yesterday likely secondary to increasing appetite. On IV antibiotics.  Eatin%  Nausea: No  Hypoglycemia and intervention: No  Fever: No  TPN and/or TF: No    BP (!) 114/53 (BP Location: Left arm, Patient Position: Lying)   Pulse 76   Temp 98 °F (36.7 °C) (Oral)   Resp 17   Ht 6' 2" (1.88 m)   Wt 83.5 kg (184 lb)   SpO2 97%   BMI 23.62 kg/m²      Labs Reviewed and Include    Recent Labs   Lab 19  0316   *   CALCIUM 8.9   ALBUMIN 2.3*   PROT 6.5      K 3.9   CO2 34*   CL 93*   BUN 66*   CREATININE 2.1*   ALKPHOS 158*   ALT 16   AST 26   BILITOT 0.9     Lab Results   Component Value Date    WBC 13.76 (H) 2019    HGB 7.6 (L) 2019    HCT 24.9 (L) 2019    MCV 95 2019     2019     No results for input(s): TSH, FREET4 in the " last 168 hours.  Lab Results   Component Value Date    HGBA1C 8.1 (H) 01/07/2019       Nutritional status:   Body mass index is 23.62 kg/m².  Lab Results   Component Value Date    ALBUMIN 2.3 (L) 01/26/2019    ALBUMIN 2.4 (L) 01/25/2019    ALBUMIN 2.4 (L) 01/24/2019     No results found for: PREALBUMIN    Estimated Creatinine Clearance: 34.8 mL/min (A) (based on SCr of 2.1 mg/dL (H)).    Accu-Checks  Recent Labs     01/23/19  1106 01/23/19  2020 01/24/19  0817 01/24/19  1017 01/24/19  1621 01/24/19  2107 01/25/19  0742 01/25/19  1118 01/25/19  1734 01/25/19  2211   POCTGLUCOSE 150* 90 155* 209* 363* 244* 240* 327* 235* 260*       Current Medications and/or Treatments Impacting Glycemic Control  Immunotherapy:    Immunosuppressants     None        Steroids:   Hormones (From admission, onward)    None        Pressors:    Autonomic Drugs (From admission, onward)    Start     Stop Route Frequency Ordered    01/23/19 1100  EPINEPHrine (ADRENALIN) 10 mg in sodium chloride 0.9% 250 mL infusion      -- IV Continuous 01/23/19 0956        Hyperglycemia/Diabetes Medications:   Antihyperglycemics (From admission, onward)    Start     Stop Route Frequency Ordered    01/25/19 2100  insulin detemir U-100 pen 6 Units      -- SubQ Nightly 01/25/19 0838    01/25/19 1645  insulin aspart U-100 pen 6-8 Units      -- SubQ 3 times daily with meals 01/25/19 1234    01/11/19 1358  insulin aspart U-100 pen 0-5 Units      -- SubQ Before meals & nightly PRN 01/11/19 1259          ASSESSMENT and PLAN    * 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         A-fib    May affect BG readings if uncontrolled  S/p cardioversion.          Timo Avalos NP  Endocrinology  [facility]

## 2019-01-26 NOTE — ASSESSMENT & PLAN NOTE
- WCC back down a bit.   -RIJ D/C'd 1/24 and tip cultured - NGTD. Blood cultures also from 1/24 with NGTD. CLAUDIA OK

## 2019-01-26 NOTE — ASSESSMENT & PLAN NOTE
-Vegetation seen on RICARDO  -2 mobile masses seen on CVC by RICARDO 1/14 (has both RUE PICC and RIJ TLC). Discussed with Dr. Russo. Blood cultures repeated 1/15 and were -ve  -Appreciate EP's help. 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 Patient had 2 vegetations on RV lead which is most likely source of bacteremia. Lead tip and ICD pocket cultured with RV lead +ve for Enteroccocus. Blood cultures are negative here are -ve with last culture done 1/15  -Continue Ampicillin (renally dosed given TREY) and Ceftriaxone per ID recs Will treat for 6 weeks post extraction.  Estimated end date 2/20/19.    -If urgently necessary from a cardiac standpoint, it is okay to proceed with implanting new device as repeat blood cultures taken post extraction remain negative X 72 hours.  Suspect known vegetations on RV leads, now removed, were primary source of infection; cardioMems likely not infected, but Indium Scan done 1.18 and was -ve. If negative, no need for oral antibiotic suppression or surveillance blood cultures following 6 weeks of IV therapy.  -INR has trended back down to 2.9 - resume Coumadin at 1 mg qd. No Eliquis for now as he may require implant of new device  -Will need Life Vest prior to discharge if CRT-D not replaced (EP planned device replacement this Thursday, but given degree of volume overload, this has been postponed until next Tuesday if euvolemic  -Appreciate GI's help. Plan for outpatient EGD and C-scope for eval of anemia and Enterococcus bacteremia  - repeated blood cultures 1/25  and UA with reflex due to WBCs increased (afebrile)

## 2019-01-26 NOTE — ASSESSMENT & PLAN NOTE
-ICM.  -Last full 2D Echo 1/7/19: LVEF 20%, LVEDD 7.24 cm. Limited bedside echo done after ICD explant showed no pericardial effusion.  - will order repeat Echo today to better assess TR.  -CVP is 12, but BUN/creatinine and venous CO2 climbing. Decrease Lasix to 20 mg/hr.  -sVO2 58. Continue Epi @ 0.06 and continue Miesha 10 ppm for RV support.  -GDMT: Dig and Toprol D/C'd 2/2 junctional rhythm. Entresto D/C'd 2/2 transient hypotension during/after ICD explant and TREY.  -Self declined for LVAD after w/u last April 2018.

## 2019-01-27 NOTE — SUBJECTIVE & OBJECTIVE
Interval History: No complaints this morning or issues overnight.    Continuous Infusions:   epinephrine infusion (NON-TITRATING) 0.06 mcg/kg/min (01/27/19 0800)    furosemide (LASIX) 2 mg/mL infusion (non-titrating) 20 mg/hr (01/27/19 0800)    nitric oxide gas       Scheduled Meds:   ampicillin IVPB  2 g Intravenous Q6H    artificial tears  1 drop Both Eyes QID    cefTRIAXone (ROCEPHIN) IVPB  2 g Intravenous Q12H    clopidogrel  75 mg Oral Daily    ferrous sulfate  325 mg Oral BID    insulin aspart U-100  8 Units Subcutaneous TIDWM    insulin detemir U-100  8 Units Subcutaneous QHS    levothyroxine  25 mcg Oral Before breakfast    polyethylene glycol  17 g Oral Daily    potassium chloride SA  40 mEq Oral BID    potassium chloride  40 mEq Oral Once    rosuvastatin  20 mg Oral QHS    senna-docusate 8.6-50 mg  2 tablet Oral BID    tamsulosin  0.4 mg Oral Daily    warfarin  2 mg Oral Daily     PRN Meds:dextrose 50%, dextrose 50%, glucagon (human recombinant), glucose, glucose, glycerin adult, hydrOXYzine HCl, insulin aspart U-100, magnesium sulfate IVPB, magnesium sulfate IVPB, ondansetron, ramelteon, sodium chloride 0.9%, sodium chloride 0.9%, traMADol    Review of patient's allergies indicates:   Allergen Reactions    Adhesive Other (See Comments)     blisters    Codeine Other (See Comments)     Blurred vision    Latex Hives    Ace inhibitors     Lipitor [atorvastatin] Other (See Comments)     Muscle spasms    Xanax [alprazolam] Hallucinations     Objective:     Vital Signs (Most Recent):  Temp: 97.3 °F (36.3 °C) (01/27/19 0700)  Pulse: 71 (01/27/19 0800)  Resp: (!) 34 (01/27/19 0800)  BP: (!) 102/58 (01/26/19 0740)  SpO2: 100 % (01/27/19 0800) Vital Signs (24h Range):  Temp:  [97.3 °F (36.3 °C)-98.6 °F (37 °C)] 97.3 °F (36.3 °C)  Pulse:  [71-93] 71  Resp:  [15-47] 34  SpO2:  [94 %-100 %] 100 %  Arterial Line BP: ()/(35-55) 118/50     Patient Vitals for the past 72 hrs (Last 3  readings):   Weight   01/25/19 1300 83.5 kg (184 lb)   01/24/19 1100 83.9 kg (184 lb 15.5 oz)     Body mass index is 23.62 kg/m².      Intake/Output Summary (Last 24 hours) at 1/27/2019 0843  Last data filed at 1/27/2019 0800  Gross per 24 hour   Intake 2432.5 ml   Output 3405 ml   Net -972.5 ml          Telemetry: A fib with variable aberrant conduction    Physical Exam   Constitutional: He is oriented to person, place, and time. He appears well-developed and well-nourished.   HENT:   Head: Normocephalic and atraumatic.   Eyes: Conjunctivae and EOM are normal.   Neck: Normal range of motion. Neck supple. No JVD present. No thyromegaly present.   Cardiovascular: Regular rhythm.   Irregular, bradycardic   Pulmonary/Chest: Effort normal and breath sounds normal.   Abdominal: Soft. Bowel sounds are normal.   Pulsatile liver, RUQ fullness   Musculoskeletal: Normal range of motion.   Trace HEATHER   Neurological: He is alert and oriented to person, place, and time.   Skin: Skin is warm and dry. Capillary refill takes 2 to 3 seconds.   Psychiatric: He has a normal mood and affect. His behavior is normal. Judgment and thought content normal.     Significant Labs:  CBC:  Recent Labs   Lab 01/25/19  0330 01/26/19  0316 01/27/19  0344   WBC 14.37* 13.76* 14.40*   RBC 2.68* 2.63* 2.74*   HGB 7.9* 7.6* 7.8*   HCT 25.8* 24.9* 26.2*    273 256   MCV 96 95 96   MCH 29.5 28.9 28.5   MCHC 30.6* 30.5* 29.8*     BNP:  Recent Labs   Lab 01/23/19  0429   BNP 1,522*     CMP:  Recent Labs   Lab 01/25/19  0330 01/25/19  1513 01/26/19  0316 01/27/19  0344   *  --  230* 110   CALCIUM 9.5  --  8.9 9.2   ALBUMIN 2.4*  --  2.3* 2.4*   PROT 6.9  --  6.5 6.7     --  142 141   K 3.7 3.9 3.9 4.0   CO2 42*  --  34* 37*   CL 89*  --  93* 93*   BUN 73*  --  66* 61*   CREATININE 2.3*  --  2.1* 2.0*   ALKPHOS 175*  --  158* 153*   ALT 17  --  16 16   AST 32  --  26 27   BILITOT 0.9  --  0.9 0.8      Coagulation:   Recent Labs   Lab  01/25/19  0330 01/26/19  0316 01/27/19  0344   INR 2.9* 2.2* 2.1*     LDH:  No results for input(s): LDH in the last 72 hours.  Microbiology:  Microbiology Results (last 7 days)     Procedure Component Value Units Date/Time    Blood culture [[id number]] Collected:  01/24/19 0940    Order Status:  Completed Specimen:  Blood from Peripheral, Hand, Right Updated:  01/26/19 1212     Blood Culture, Routine No Growth to date     Blood Culture, Routine No Growth to date     Blood Culture, Routine No Growth to date    Blood culture [[id number]] Collected:  01/24/19 0950    Order Status:  Completed Specimen:  Blood from Peripheral, Forearm, Right Updated:  01/26/19 1212     Blood Culture, Routine No Growth to date     Blood Culture, Routine No Growth to date     Blood Culture, Routine No Growth to date    IV catheter culture [[id number]] Collected:  01/24/19 1120    Order Status:  Completed Specimen:  Catheter Tip, Intrajugular Updated:  01/26/19 1138     Aerobic Culture - Cath tip No growth        I have reviewed all pertinent labs within the past 24 hours.    Estimated Creatinine Clearance: 36.5 mL/min (A) (based on SCr of 2 mg/dL (H)).    Diagnostic Results:  I have reviewed all pertinent imaging results/findings within the past 24 hours.

## 2019-01-27 NOTE — PROGRESS NOTES
"[facility]  Endocrinology  Progress Note    Admit Date: 2019     Reason for Consult: Management of T2DM, Hyperglycemia     Surgical Procedure and Date: Lead extraction 19    Diabetes diagnosis year: approximately 10 years ago    Lab Results   Component Value Date    HGBA1C 8.1 (H) 2019       Home Diabetes Medications: Lantus 50 units q HS, Novolog 25 units with meals (vials)    How often checking glucose at home? 3-4x per day   BG readings on regimen: AM 120s  Hypoglycemia on the regimen?  Yes - 2-3x per month  Missed doses on regimen?  Yes - 2x per week    Diabetes Complications include:     Hyperglycemia, Hypoglycemia , Diabetic retinopathy , Diabetic cataract  and Diabetic peripheral neuropathy     Complicating diabetes co morbidities:   CHF and History of CVA      HPI:   Patient is a 78 y.o. male with a diagnosis of DM2 and CHF who was admitted on 19 for possible RV lead infection.  Patient is s/p lead extraction on 19.  Patient's DM managed by PCP, Dr. Hdz.  Endocrine consulted for DM/BG management.            Interval HPI:   Overnight events: Remains in CMICU, NAEON.  Mild hypoglycemic event last night.  BG elevated yesterday but trended down overnight. On IV antibiotics.  Eatin%  Nausea: No  Hypoglycemia and intervention: Yes - BG 61, ate yogurt  Fever: No  TPN and/or TF: No    BP (!) 102/58 (BP Location: Left arm, Patient Position: Lying)   Pulse 81   Temp 98.1 °F (36.7 °C) (Oral)   Resp (!) 33   Ht 6' 2" (1.88 m)   Wt 83.5 kg (184 lb)   SpO2 100%   BMI 23.62 kg/m²      Labs Reviewed and Include    Recent Labs   Lab 19  0344      CALCIUM 9.2   ALBUMIN 2.4*   PROT 6.7      K 4.0   CO2 37*   CL 93*   BUN 61*   CREATININE 2.0*   ALKPHOS 153*   ALT 16   AST 27   BILITOT 0.8     Lab Results   Component Value Date    WBC 14.40 (H) 2019    HGB 7.8 (L) 2019    HCT 26.2 (L) 2019    MCV 96 2019     2019 "     No results for input(s): TSH, FREET4 in the last 168 hours.  Lab Results   Component Value Date    HGBA1C 8.1 (H) 01/07/2019       Nutritional status:   Body mass index is 23.62 kg/m².  Lab Results   Component Value Date    ALBUMIN 2.4 (L) 01/27/2019    ALBUMIN 2.3 (L) 01/26/2019    ALBUMIN 2.4 (L) 01/25/2019     No results found for: PREALBUMIN    Estimated Creatinine Clearance: 36.5 mL/min (A) (based on SCr of 2 mg/dL (H)).    Accu-Checks  Recent Labs     01/24/19  1621 01/24/19  2107 01/25/19  0742 01/25/19  1118 01/25/19  1734 01/25/19  2211 01/26/19  0810 01/26/19  1137 01/26/19  1616 01/26/19  2049   POCTGLUCOSE 363* 244* 240* 327* 235* 260* 286* 366* 162* 61*       Current Medications and/or Treatments Impacting Glycemic Control  Immunotherapy:    Immunosuppressants     None        Steroids:   Hormones (From admission, onward)    None        Pressors:    Autonomic Drugs (From admission, onward)    Start     Stop Route Frequency Ordered    01/23/19 1100  EPINEPHrine (ADRENALIN) 10 mg in sodium chloride 0.9% 250 mL infusion      -- IV Continuous 01/23/19 0956        Hyperglycemia/Diabetes Medications:   Antihyperglycemics (From admission, onward)    Start     Stop Route Frequency Ordered    01/26/19 2100  insulin detemir U-100 pen 8 Units      -- SubQ Nightly 01/26/19 0759    01/26/19 1215  insulin aspart U-100 pen 10 Units      -- SubQ 3 times daily with meals 01/26/19 1204    01/11/19 1358  insulin aspart U-100 pen 0-5 Units      -- SubQ Before meals & nightly PRN 01/11/19 1259          ASSESSMENT and PLAN    * 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         A-fib    May affect BG readings if uncontrolled  S/p cardioversion.          Timo Avalos NP  Endocrinology  [facility]

## 2019-01-27 NOTE — PLAN OF CARE
Problem: Fall Injury Risk  Goal: Absence of Fall and Fall-Related Injury    Intervention: Identify and Manage Contributors to Fall Injury Risk  No acute events throughout day. See vital signs and assessments in flowsheets. See below for updates on today's progress.    Pulmonary: Pt 4L via nasal cannula with nitric 10 ppm. SaO2 .     Cardiovascular: HR . A fib on continuous cardiac monitoring. -120.    Neurological: Pt oriented and cooperative. Pupils unequal but brisk.    Gastrointestinal: BM present during shift.    Genitourinary: Morin catheter in place.  ]  Endocrine: Acu checks ACHS    Integumentary/Other:  No skin breakdown noted    Infusions: Epi 0.06mcg, Lasix 20mg  Patient progressing towards goals as tolerated, plan of care communicated and reviewed with pt and family. All concerns addressed. Will continue to monitor.

## 2019-01-27 NOTE — PROGRESS NOTES
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    Continuous Infusions:   epinephrine infusion (NON-TITRATING) 0.06 mcg/kg/min (01/27/19 0800)    furosemide (LASIX) 2 mg/mL infusion (non-titrating) 20 mg/hr (01/27/19 0800)    nitric oxide gas       Scheduled Meds:   ampicillin IVPB  2 g Intravenous Q6H    artificial tears  1 drop Both Eyes QID    cefTRIAXone (ROCEPHIN) IVPB  2 g Intravenous Q12H    clopidogrel  75 mg Oral Daily    ferrous sulfate  325 mg Oral BID    insulin aspart U-100  8 Units Subcutaneous TIDWM    insulin detemir U-100  8 Units Subcutaneous QHS    levothyroxine  25 mcg Oral Before breakfast    polyethylene glycol  17 g Oral Daily    potassium chloride SA  40 mEq Oral BID    potassium chloride  40 mEq Oral Once    rosuvastatin  20 mg Oral QHS    senna-docusate 8.6-50 mg  2 tablet Oral BID    tamsulosin  0.4 mg Oral Daily    warfarin  2 mg Oral Daily     PRN Meds:dextrose 50%, dextrose 50%, glucagon (human recombinant), glucose, glucose, glycerin adult, hydrOXYzine HCl, insulin aspart U-100, magnesium sulfate IVPB, magnesium sulfate IVPB, ondansetron, ramelteon, sodium chloride 0.9%, sodium chloride 0.9%, traMADol    Review of patient's allergies indicates:   Allergen Reactions    Adhesive Other (See Comments)     blisters    Codeine Other (See Comments)     Blurred vision    Latex Hives    Ace inhibitors     Lipitor [atorvastatin] Other (See Comments)     Muscle spasms    Xanax [alprazolam] Hallucinations     Objective:     Vital Signs (Most Recent):  Temp: 97.3 °F (36.3 °C) (01/27/19 0700)  Pulse: 71  (01/27/19 0800)  Resp: (!) 34 (01/27/19 0800)  BP: (!) 102/58 (01/26/19 0740)  SpO2: 100 % (01/27/19 0800) Vital Signs (24h Range):  Temp:  [97.3 °F (36.3 °C)-98.6 °F (37 °C)] 97.3 °F (36.3 °C)  Pulse:  [71-93] 71  Resp:  [15-47] 34  SpO2:  [94 %-100 %] 100 %  Arterial Line BP: ()/(35-55) 118/50     Patient Vitals for the past 72 hrs (Last 3 readings):   Weight   01/25/19 1300 83.5 kg (184 lb)   01/24/19 1100 83.9 kg (184 lb 15.5 oz)     Body mass index is 23.62 kg/m².      Intake/Output Summary (Last 24 hours) at 1/27/2019 0843  Last data filed at 1/27/2019 0800  Gross per 24 hour   Intake 2432.5 ml   Output 3405 ml   Net -972.5 ml          Telemetry: A fib with variable aberrant conduction    Physical Exam   Constitutional: He is oriented to person, place, and time. He appears well-developed and well-nourished.   HENT:   Head: Normocephalic and atraumatic.   Eyes: Conjunctivae and EOM are normal.   Neck: Normal range of motion. Neck supple. No JVD present. No thyromegaly present.   Cardiovascular: Regular rhythm.   Irregular, bradycardic   Pulmonary/Chest: Effort normal and breath sounds normal.   Abdominal: Soft. Bowel sounds are normal.   Pulsatile liver, RUQ fullness   Musculoskeletal: Normal range of motion.   Trace HEATHER   Neurological: He is alert and oriented to person, place, and time.   Skin: Skin is warm and dry. Capillary refill takes 2 to 3 seconds.   Psychiatric: He has a normal mood and affect. His behavior is normal. Judgment and thought content normal.     Significant Labs:  CBC:  Recent Labs   Lab 01/25/19  0330 01/26/19  0316 01/27/19  0344   WBC 14.37* 13.76* 14.40*   RBC 2.68* 2.63* 2.74*   HGB 7.9* 7.6* 7.8*   HCT 25.8* 24.9* 26.2*    273 256   MCV 96 95 96   MCH 29.5 28.9 28.5   MCHC 30.6* 30.5* 29.8*     BNP:  Recent Labs   Lab 01/23/19  0429   BNP 1,522*     CMP:  Recent Labs   Lab 01/25/19  0330 01/25/19  1513 01/26/19  0316 01/27/19  0344   *  --  230* 110   CALCIUM 9.5   --  8.9 9.2   ALBUMIN 2.4*  --  2.3* 2.4*   PROT 6.9  --  6.5 6.7     --  142 141   K 3.7 3.9 3.9 4.0   CO2 42*  --  34* 37*   CL 89*  --  93* 93*   BUN 73*  --  66* 61*   CREATININE 2.3*  --  2.1* 2.0*   ALKPHOS 175*  --  158* 153*   ALT 17  --  16 16   AST 32  --  26 27   BILITOT 0.9  --  0.9 0.8      Coagulation:   Recent Labs   Lab 01/25/19  0330 01/26/19  0316 01/27/19  0344   INR 2.9* 2.2* 2.1*     LDH:  No results for input(s): LDH in the last 72 hours.  Microbiology:  Microbiology Results (last 7 days)     Procedure Component Value Units Date/Time    Blood culture [[id number]] Collected:  01/24/19 0940    Order Status:  Completed Specimen:  Blood from Peripheral, Hand, Right Updated:  01/26/19 1212     Blood Culture, Routine No Growth to date     Blood Culture, Routine No Growth to date     Blood Culture, Routine No Growth to date    Blood culture [[id number]] Collected:  01/24/19 0950    Order Status:  Completed Specimen:  Blood from Peripheral, Forearm, Right Updated:  01/26/19 1212     Blood Culture, Routine No Growth to date     Blood Culture, Routine No Growth to date     Blood Culture, Routine No Growth to date    IV catheter culture [[id number]] Collected:  01/24/19 1120    Order Status:  Completed Specimen:  Catheter Tip, Intrajugular Updated:  01/26/19 1138     Aerobic Culture - Cath tip No growth        I have reviewed all pertinent labs within the past 24 hours.    Estimated Creatinine Clearance: 36.5 mL/min (A) (based on SCr of 2 mg/dL (H)).    Diagnostic Results:  I have reviewed all pertinent imaging results/findings within the past 24 hours.    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 Discussed with Dr. Russo. Blood cultures repeated 1/15 and were -ve  -Appreciate EP's help. 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 Patient had 2 vegetations on RV lead which is most likely source of bacteremia. Lead tip and ICD pocket cultured with RV lead +ve for Enteroccocus. Blood cultures are negative here are -ve with last culture done 1/15  -Continue Ampicillin (renally dosed given TREY) and Ceftriaxone per ID recs Will treat for 6 weeks post extraction.  Estimated end date 2/20/19.    -If urgently necessary from a cardiac standpoint, it is okay to proceed with implanting new device as repeat blood cultures taken post extraction remain negative X 72 hours.  Suspect known vegetations on RV leads, now removed, were primary source of infection; cardioMems likely not infected, but Indium Scan done 1.18 and was -ve. If negative, no need for oral antibiotic suppression or surveillance blood cultures following 6 weeks of IV therapy.  -INR has trended back down to 2.9 - resume Coumadin at 1 mg qd. No Eliquis for now as he may require implant of new device  -Will need Life Vest prior to discharge if CRT-D not replaced (EP planned device replacement this Thursday, but given degree of volume overload, this has been postponed until next Tuesday if euvolemic  -Appreciate GI's help. Plan for outpatient EGD and C-scope for eval of anemia and Enterococcus bacteremia  - repeated blood cultures 1/25  and UA with reflex due to WBCs increased (afebrile)     Acute on chronic combined systolic and diastolic CHF, NYHA class 3    -ICM.  -Last full 2D Echo 1/7/19: LVEF 20%, LVEDD 7.24 cm. Limited bedside echo done after ICD explant showed no pericardial effusion.  - will order repeat Echo today to better assess TR.  -CVP 10. Continue Lasix to 20 mg/hr.  -sVO 75. Continue Epi @ 0.06 and continue Miesha 10 ppm for RV support.  -GDMT: Dig and Toprol D/C'd 2/2 junctional rhythm. Entresto D/C'd 2/2 transient hypotension during/after ICD explant and TREY.  -Self declined for LVAD after w/u last April 2018.     A-fib    -S/P Unsuccessful RICARDO guided/DCCV  1/14. Remains in A fib with variety of aberrant conduction  -EP planning CRT-D replacement likely next Tuesday if euvolemic  -Epi increased to 0.06 for more RV support. Added Miesha 10 ppm  -CHADS VASc 6  -Holding eliquis s/p ICD explant 1/9. Per EP, anticoagulation resumed 1/12. INR today 2.9 (goal 2.0-3.0)  -Cardiac monitoring     TREY (acute kidney injury)    - Appreciate Nephrology's help  - Was oliguric and creatinine jumped to 4.1 likely 2/2 ATN/hypotension following ICD explant.   - BUN/creatinine have climbed to 73/2.3  (was 1.2 on admit). See acute on chronic systolic heart failure  - Dopamine D/C'd, Epi increased to 0.06 for more RV support and added Miesha 1/23  - Hold Entresto for now     Leukocytosis    - WCC back down a bit.   -RIJ D/C'd 1/24 and tip cultured - NGTD. Blood cultures also from 1/24 with NGTD. UA OK     Dysuria    - Resolved  - Urine culture -ve  - Flomax started 1/10. Will try to D/C Morin once off aggressive diuresis         Junctional bradycardia    - See A fib     Endocarditis    - See ICD lead infection     Bacteremia    -see above     Type 2 diabetes mellitus with complication    -A1c 8.1  -Target -180 while hospitalized  -detemir 14, aspart 6-8 with sliding scale  -Appreciate Endocrine's help with management     Uninterrupted Critical Care/Counseling Time (not including procedures): 45mn      Chris Merchant, NP  Heart Transplant  [facility]

## 2019-01-27 NOTE — NURSING
Cards/HTS notified of pt having hard and formed small BM with bright red blood streaks. Pt constantly straining while having a BM. No distress noted, will continue to monitor.

## 2019-01-27 NOTE — ASSESSMENT & PLAN NOTE
BG goal 140-180    Levemir 8 units q HS  Decrease Novolog to 8 units with meals due to episode of mild hypoglycemia  Low dose correction scale  BG monitoring AC/HS    Do not administer Novolog closer than every 4 hours    Discharge planning:  TBD

## 2019-01-27 NOTE — SUBJECTIVE & OBJECTIVE
"Interval HPI:   Overnight events: Remains in CMICU, NAEON.  Mild hypoglycemic event last night.  BG elevated yesterday but trended down overnight. On IV antibiotics.  Eatin%  Nausea: No  Hypoglycemia and intervention: Yes - BG 61, ate yogurt  Fever: No  TPN and/or TF: No    BP (!) 102/58 (BP Location: Left arm, Patient Position: Lying)   Pulse 81   Temp 98.1 °F (36.7 °C) (Oral)   Resp (!) 33   Ht 6' 2" (1.88 m)   Wt 83.5 kg (184 lb)   SpO2 100%   BMI 23.62 kg/m²     Labs Reviewed and Include    Recent Labs   Lab 19  0344      CALCIUM 9.2   ALBUMIN 2.4*   PROT 6.7      K 4.0   CO2 37*   CL 93*   BUN 61*   CREATININE 2.0*   ALKPHOS 153*   ALT 16   AST 27   BILITOT 0.8     Lab Results   Component Value Date    WBC 14.40 (H) 2019    HGB 7.8 (L) 2019    HCT 26.2 (L) 2019    MCV 96 2019     2019     No results for input(s): TSH, FREET4 in the last 168 hours.  Lab Results   Component Value Date    HGBA1C 8.1 (H) 2019       Nutritional status:   Body mass index is 23.62 kg/m².  Lab Results   Component Value Date    ALBUMIN 2.4 (L) 2019    ALBUMIN 2.3 (L) 2019    ALBUMIN 2.4 (L) 2019     No results found for: PREALBUMIN    Estimated Creatinine Clearance: 36.5 mL/min (A) (based on SCr of 2 mg/dL (H)).    Accu-Checks  Recent Labs     19  1621 19  2107 19  0742 19  1118 19  1734 19  2211 19  0810 19  1137 19  1616 19  2049   POCTGLUCOSE 363* 244* 240* 327* 235* 260* 286* 366* 162* 61*       Current Medications and/or Treatments Impacting Glycemic Control  Immunotherapy:    Immunosuppressants     None        Steroids:   Hormones (From admission, onward)    None        Pressors:    Autonomic Drugs (From admission, onward)    Start     Stop Route Frequency Ordered    19 1100  EPINEPHrine (ADRENALIN) 10 mg in sodium chloride 0.9% 250 mL infusion      -- IV Continuous " 01/23/19 0956        Hyperglycemia/Diabetes Medications:   Antihyperglycemics (From admission, onward)    Start     Stop Route Frequency Ordered    01/26/19 2100  insulin detemir U-100 pen 8 Units      -- SubQ Nightly 01/26/19 0759    01/26/19 1215  insulin aspart U-100 pen 10 Units      -- SubQ 3 times daily with meals 01/26/19 1204    01/11/19 1358  insulin aspart U-100 pen 0-5 Units      -- SubQ Before meals & nightly PRN 01/11/19 1259

## 2019-01-27 NOTE — PLAN OF CARE
Problem: Adult Inpatient Plan of Care  Goal: Plan of Care Review  Outcome: Ongoing (interventions implemented as appropriate)  Patient alert and oriented x 4.       Respiratory -  4L NC with nitric 10ppm.  O2 sat >95% all shift.       Cardiovascular - HR - 70-80s.  Patient has some orthostatic issues with getting OOB but quickly normalizes.  Mr. Solis sat OOB in the recliner x ~ 10 hours.  He worked hard on his exercises in the chair (given to him by PT yesterday).  CVP 11/15.   Radial pulses palpable.  Pedal pulses audible with the doppler.       GI - No BM this shift.  Patient complaining of constipation.  He sat up on BSC three times but only passed gas.  He ate 100% of his breakfast and dinner trays.  Mr. Solis ate ~ 50% of his lunch tray.        - Morin intact and patent - draining 100-250cc/hr of clear yellow urine.       Reports some hip pain from doing leg exercises in the chair but denies the need for pain medication or even tylenol.       Wife @ bedside all day.  Multiple family members visited him today.      Problem: Fall Injury Risk  Goal: Absence of Fall and Fall-Related Injury    Intervention: Identify and Manage Contributors to Fall Injury Risk  No falls this shift.  Patient knows to call for staff assistance with getting OOB/out of the chair.  Non-skid socks for safety.

## 2019-01-27 NOTE — ASSESSMENT & PLAN NOTE
-ICM.  -Last full 2D Echo 1/7/19: LVEF 20%, LVEDD 7.24 cm. Limited bedside echo done after ICD explant showed no pericardial effusion.  - will order repeat Echo today to better assess TR.  -CVP 10. Continue Lasix to 20 mg/hr.  -sVO 75. Continue Epi @ 0.06 and continue Miesha 10 ppm for RV support.  -GDMT: Dig and Toprol D/C'd 2/2 junctional rhythm. Entresto D/C'd 2/2 transient hypotension during/after ICD explant and TREY.  -Self declined for LVAD after w/u last April 2018.

## 2019-01-27 NOTE — PLAN OF CARE
Problem: Adult Inpatient Plan of Care  Goal: Plan of Care Review  Outcome: Ongoing (interventions implemented as appropriate)  NAEON. See flowsheets for vital signs and assessments. See below for updates on progress made.       Neuro: AAOx4, slept well    Cardiovascular: HR in 80s, rhythm has P waves intermittently; BP 100s/50s; SVO2 75; CVPs 8-10      Pulmonary: 4L NC with nitric 10 ppm     Gastrointestinal: no BM; appetite improving    Genitourinary: voided 1685 mL via gtz    Endocrine: K 4, mg 2    Integumentary/other: HAPI prevention bundle in place;     Infusions: nitric at 10, lasix at 20, epi at 0.06    POC: PPM vs lifevest Tuesday    Patient progressing towards goals as tolerated, plan of care communicated and reviewed with Jerry Solis. All concerns addressed. Will continue to monitor.

## 2019-01-28 NOTE — ASSESSMENT & PLAN NOTE
BG goal 140-180    Increase Levemir to 9 units q HS - due to elevated FBG  Increase Novolog to 9 units with meals  Low dose correction scale  BG monitoring AC/HS    Do not administer Novolog closer than every 4 hours    Discharge planning: Patient is on considerably less insulin than home dosing.  Patient and spouse both agree patient patient is likely on too much insulin but patient reports no episodes of hypoglycemia at home.  Patient expressing interest to take less shots as he forgets to bring his vial and syring to work - consider other drug therapy upon discharge if possible.

## 2019-01-28 NOTE — PROGRESS NOTES
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    Continuous Infusions:   epinephrine infusion (NON-TITRATING) 0.06 mcg/kg/min (01/28/19 1000)    furosemide (LASIX) 2 mg/mL infusion (non-titrating) 20 mg/hr (01/28/19 1000)    nitric oxide gas       Scheduled Meds:   ampicillin IVPB  2 g Intravenous Q6H    artificial tears  1 drop Both Eyes QID    cefTRIAXone (ROCEPHIN) IVPB  2 g Intravenous Q12H    clopidogrel  75 mg Oral Daily    ferrous sulfate  325 mg Oral BID    insulin aspart U-100  8 Units Subcutaneous TIDWM    insulin detemir U-100  8 Units Subcutaneous QHS    levothyroxine  25 mcg Oral Before breakfast    polyethylene glycol  17 g Oral Daily    potassium chloride SA  40 mEq Oral BID    potassium chloride  40 mEq Oral Once    rosuvastatin  20 mg Oral QHS    senna-docusate 8.6-50 mg  2 tablet Oral BID    tamsulosin  0.4 mg Oral Daily    warfarin  2 mg Oral Daily     PRN Meds:dextrose 50%, dextrose 50%, glucagon (human recombinant), glucose, glucose, glycerin adult, hydrOXYzine HCl, insulin aspart U-100, magnesium sulfate IVPB, magnesium sulfate IVPB, ondansetron, ramelteon, sodium chloride 0.9%, sodium chloride 0.9%, traMADol    Review of patient's allergies indicates:   Allergen Reactions    Adhesive Other (See Comments)     blisters    Codeine Other (See Comments)     Blurred vision    Latex Hives    Ace inhibitors     Lipitor [atorvastatin] Other (See Comments)     Muscle spasms    Xanax [alprazolam] Hallucinations     Objective:     Vital Signs (Most Recent):  Temp: 97.7 °F (36.5 °C) (01/28/19 0700)  Pulse: 76  (01/28/19 1000)  Resp: 19 (01/28/19 1000)  BP: (!) 102/58 (01/26/19 0740)  SpO2: 100 % (01/28/19 1000) Vital Signs (24h Range):  Temp:  [97.3 °F (36.3 °C)-98.1 °F (36.7 °C)] 97.7 °F (36.5 °C)  Pulse:  [75-88] 76  Resp:  [13-28] 19  SpO2:  [80 %-100 %] 100 %  Arterial Line BP: ()/(43-53) 112/50     Patient Vitals for the past 72 hrs (Last 3 readings):   Weight   01/25/19 1300 83.5 kg (184 lb)     Body mass index is 23.62 kg/m².      Intake/Output Summary (Last 24 hours) at 1/28/2019 1052  Last data filed at 1/28/2019 1000  Gross per 24 hour   Intake 2165.01 ml   Output 3214 ml   Net -1048.99 ml          Telemetry: A fib with variable aberrant conduction    Physical Exam   Constitutional: He is oriented to person, place, and time. He appears well-developed and well-nourished.   HENT:   Head: Normocephalic and atraumatic.   Eyes: Conjunctivae and EOM are normal.   Neck: Normal range of motion. Neck supple. No JVD present. No thyromegaly present.   Cardiovascular: Regular rhythm.   Irregular, bradycardic   Pulmonary/Chest: Effort normal and breath sounds normal.   Abdominal: Soft. Bowel sounds are normal.   Pulsatile liver, RUQ fullness   Musculoskeletal: Normal range of motion.   Trace HEATHER   Neurological: He is alert and oriented to person, place, and time.   Skin: Skin is warm and dry. Capillary refill takes 2 to 3 seconds.   Psychiatric: He has a normal mood and affect. His behavior is normal. Judgment and thought content normal.     Significant Labs:  CBC:  Recent Labs   Lab 01/26/19  0316 01/27/19  0344 01/28/19  0423   WBC 13.76* 14.40* 13.57*   RBC 2.63* 2.74* 2.62*   HGB 7.6* 7.8* 7.6*   HCT 24.9* 26.2* 25.8*    256 258   MCV 95 96 99*   MCH 28.9 28.5 29.0   MCHC 30.5* 29.8* 29.5*     BNP:  Recent Labs   Lab 01/23/19 0429   BNP 1,522*     CMP:  Recent Labs   Lab 01/26/19  0316 01/27/19 0344 01/28/19 0423   * 110 173*   CALCIUM 8.9 9.2 8.8   ALBUMIN 2.3* 2.4* 2.3*   PROT 6.5 6.7 6.5   NA  142 141 141   K 3.9 4.0 3.7   CO2 34* 37* 33*   CL 93* 93* 96   BUN 66* 61* 55*   CREATININE 2.1* 2.0* 1.9*   ALKPHOS 158* 153* 147*   ALT 16 16 17   AST 26 27 27   BILITOT 0.9 0.8 0.7      Coagulation:   Recent Labs   Lab 01/26/19  0316 01/27/19  0344 01/28/19  0423   INR 2.2* 2.1* 2.3*     LDH:  No results for input(s): LDH in the last 72 hours.  Microbiology:  Microbiology Results (last 7 days)     Procedure Component Value Units Date/Time    Blood culture [[id number]] Collected:  01/24/19 0950    Order Status:  Completed Specimen:  Blood from Peripheral, Forearm, Right Updated:  01/27/19 1212     Blood Culture, Routine No Growth to date     Blood Culture, Routine No Growth to date     Blood Culture, Routine No Growth to date     Blood Culture, Routine No Growth to date    Blood culture [[id number]] Collected:  01/24/19 0940    Order Status:  Completed Specimen:  Blood from Peripheral, Hand, Right Updated:  01/27/19 1212     Blood Culture, Routine No Growth to date     Blood Culture, Routine No Growth to date     Blood Culture, Routine No Growth to date     Blood Culture, Routine No Growth to date    IV catheter culture [[id number]] Collected:  01/24/19 1120    Order Status:  Completed Specimen:  Catheter Tip, Intrajugular Updated:  01/26/19 1138     Aerobic Culture - Cath tip No growth        I have reviewed all pertinent labs within the past 24 hours.    Estimated Creatinine Clearance: 38.5 mL/min (A) (based on SCr of 1.9 mg/dL (H)).    Diagnostic Results:  I have reviewed all pertinent imaging results/findings within the past 24 hours.    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 Discussed with Dr. Russo. Blood cultures repeated 1/15 and were -ve  -Appreciate EP's help. 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 Patient had 2  vegetations on RV lead which is most likely source of bacteremia. Lead tip and ICD pocket cultured with RV lead +ve for Enteroccocus. Blood cultures are negative here are -ve with last culture done 1/15  -Continue Ampicillin (renally dosed given TREY) and Ceftriaxone per ID recs Will treat for 6 weeks post extraction.  Estimated end date 2/20/19.    -If urgently necessary from a cardiac standpoint, it is okay to proceed with implanting new device as repeat blood cultures taken post extraction remain negative X 72 hours.  Suspect known vegetations on RV leads, now removed, were primary source of infection; cardioMems likely not infected, but Indium Scan done 1.18 and was -ve. If negative, no need for oral antibiotic suppression or surveillance blood cultures following 6 weeks of IV therapy.  -INR has trended back down to 2.9 - resume Coumadin at 1 mg qd. No Eliquis for now as he may require implant of new device  -Will need Life Vest prior to discharge if CRT-D not replaced (EP planned device replacement this Thursday, but given degree of volume overload, this has been postponed until next Tuesday if euvolemic  -Appreciate GI's help. Plan for outpatient EGD and C-scope for eval of anemia and Enterococcus bacteremia  - repeated blood cultures 1/25  and UA with reflex due to WBCs increased (afebrile)     Acute on chronic combined systolic and diastolic CHF, NYHA class 3    -ICM.  -Echo 1/25/19: LVEF 20%, LVEDD 6.85 cm. Mod TR, PHTN(PAS 79).  -CVP 7. Continue Lasix 20 mg/hr.  -scVO2 70. Continue Epi @ 0.06 and continue Miesha 10 ppm for RV support.  -GDMT: Dig and Toprol D/C'd 2/2 junctional rhythm. Entresto D/C'd 2/2 transient hypotension during/after ICD explant and TREY.  -Self declined for LVAD after w/u last April 2018.     A-fib    -S/P Unsuccessful RICARDO guided/DCCV 1/14. Remains in A fib with variety of aberrant conduction  -EP planning CRT-D replacement likely next Tuesday if euvolemic  -Epi increased to 0.06 for more  RV support. Added Miesha 10 ppm  -CHADS VASc 6  -Holding eliquis s/p ICD explant 1/9. Per EP, anticoagulation resumed 1/12. INR today 2.9 (goal 2.0-3.0)  -Cardiac monitoring     TREY (acute kidney injury)    - Appreciate Nephrology's help  - Was oliguric and creatinine jumped to 4.1 likely 2/2 ATN/hypotension following ICD explant.   - BUN/creatinine have climbed to 73/2.3  (was 1.2 on admit). See acute on chronic systolic heart failure  - Dopamine D/C'd, Epi increased to 0.06 for more RV support and added Miesha 1/23  - Hold Entresto for now     Leukocytosis    - WCC back down a bit.   -RIJ D/C'd 1/24 and tip cultured - NGTD. Blood cultures also from 1/24 with NGTD. UA OK     Dysuria    - Resolved  - Urine culture -ve  - Flomax started 1/10. Will try to D/C [place] once off aggressive diuresis         Junctional bradycardia    - See A fib     Endocarditis    - See ICD lead infection     Bacteremia    -see above     Type 2 diabetes mellitus with complication    -A1c 8.1  -Target -180 while hospitalized  -detemir 14, aspart 6-8 with sliding scale  -Appreciate Endocrine's help with management     Uninterrupted Critical Care/Counseling Time (not including procedures): 45mn    Chris Merchant, NP  Heart Transplant  [facility]

## 2019-01-28 NOTE — ASSESSMENT & PLAN NOTE
This is a 77yo gentleman with long-standing history of ICM ([facility] with non-obstructive disease s/p intervention on 03/2018) with EF 35%, a 20-year history of ICD with upgrade to CRT-D in 2014 who presents for device extraction due to RV lead vegetation ~0.5cm in the setting of VRE bacteremia with a likely initial GI source. Likewise has a CardioMeMMS device in place. Is not pacemaker dependent. ID believes the primary source is the vegetation, he is s/p extraction on 01/09, RV lead tip grew E. Faecalis (resistant to tetracyclines). Recovery complicated by slow junctional escape rhythm responding to dopamine, no response to DCCV. Now with undetermined atrial rhythm which is either sinus bradycardia with aberrantly conducted PAC or AT, or persistent AF with junctional escape and intermittent aberrant conduction of AF, the differences can be attributed due to AV manolo stimulation by inotrope/chronotrope medications resulting in conduction changes. With weaning of chronotropic medications, he is back in a msotly junctional rhythm. Indium scan was negative for signs of CardioMEMs infection. He had slow decline in respiratory status over several days, necessitating a delay in placement. This turned around with initiation of Miesha, and resulted in good diuresis and significant clinical improvement.    - volume/HF management as per primary team  - Inotropes as per primary team with goal of stable renal perfusion and optimizing respiratory status for procedure  - Continue coumadin with target INR 2-3  - Plan for BIV-ICD implantation on Tuesday, will perform DFT as an outpatient (once patient clinically stable)

## 2019-01-28 NOTE — SUBJECTIVE & OBJECTIVE
Interval History: Feeling well overnight,     ROS  Objective:     Vital Signs (Most Recent):  Temp: 97.7 °F (36.5 °C) (01/28/19 0700)  Pulse: 74 (01/28/19 1100)  Resp: 20 (01/28/19 1100)  BP: (!) 102/58 (01/26/19 0740)  SpO2: 100 % (01/28/19 1100) Vital Signs (24h Range):  Temp:  [97.3 °F (36.3 °C)-98.1 °F (36.7 °C)] 97.7 °F (36.5 °C)  Pulse:  [74-88] 74  Resp:  [13-28] 20  SpO2:  [80 %-100 %] 100 %  Arterial Line BP: ()/(43-53) 107/44     Weight: 83.5 kg (184 lb)  Body mass index is 23.62 kg/m².     SpO2: 100 %  O2 Device (Oxygen Therapy): nasal cannula w/ humidification    Physical Exam   Constitutional: He is oriented to person, place, and time. He appears well-developed and well-nourished. No distress.   HENT:   Head: Normocephalic and atraumatic.   Mouth/Throat: Oropharynx is clear and moist.   Eyes: Conjunctivae and EOM are normal. Pupils are equal, round, and reactive to light. No scleral icterus.   Neck: Normal range of motion. Neck supple. No JVD present.   Cardiovascular: Normal rate, normal heart sounds and intact distal pulses. An irregular rhythm present.   No murmur heard.  Pulses:       Radial pulses are 2+ on the right side, and 2+ on the left side.   Pulmonary/Chest: Effort normal and breath sounds normal. No respiratory distress.   Symmetrical expansion  On NC   Abdominal: Soft. Bowel sounds are normal. There is no hepatosplenomegaly. There is no tenderness.   Musculoskeletal: Normal range of motion.   Neurological: He is alert and oriented to person, place, and time. No cranial nerve deficit.   Skin: Skin is warm and dry. No rash noted. He is not diaphoretic.   Psychiatric: He has a normal mood and affect. Judgment and thought content normal.       Significant Labs:   EP:   Recent Labs   Lab 01/27/19  0344 01/28/19  0423    141   K 4.0 3.7   CL 93* 96   CO2 37* 33*    173*   BUN 61* 55*   CREATININE 2.0* 1.9*   CALCIUM 9.2 8.8   PROT 6.7 6.5   ALBUMIN 2.4* 2.3*   BILITOT 0.8 0.7    ALKPHOS 153* 147*   AST 27 27   ALT 16 17   ANIONGAP 11 12   ESTGFRAFRICA 36.4* 38.7*   EGFRNONAA 31.5* 33.5*   WBC 14.40* 13.57*   HGB 7.8* 7.6*   HCT 26.2* 25.8*    258   INR 2.1* 2.3*

## 2019-01-28 NOTE — PLAN OF CARE
Problem: Occupational Therapy Goal  Goal: Occupational Therapy Goal  Goals to be met by: 2/4/19     Patient will increase functional independence with ADLs by performing:    UE Dressing with Supervision.  LE Dressing with Supervision.  Grooming while standing at sink with Stand-by Assistance.  Toileting from toilet with Stand-by Assistance for hygiene and clothing management.   Toilet transfer to toilet with Stand-by Assistance.     Outcome: Ongoing (interventions implemented as appropriate)  Continue  POC    Juli Morris, OTR/L  [phone]  1/28/2019

## 2019-01-28 NOTE — PLAN OF CARE
Problem: Physical Therapy Goal  Goal: Physical Therapy Goal  Goals to be met by: 19    Patient will increase functional independence with mobility by performin. Supine to sit with Modified [place], with HOB flat   2. Sit to supine with Modified [place], with HOB flat   3. Sit to stand transfer with Supervision  4. Gait  x 100 feet with Supervision with or without using least restrictive AD.   5. Lower extremity exercise program x20 reps per handout, with supervision     Outcome: Ongoing (interventions implemented as appropriate)  Pt is progressing toward goals. All goals remain appropriate.

## 2019-01-28 NOTE — PLAN OF CARE
Problem: Adult Inpatient Plan of Care  Goal: Plan of Care Review  Outcome: Ongoing (interventions implemented as appropriate)  No acute events this shift. See assessments and vital signs in flowsheets. See below for updates on today's progress.    VSS. AAOx4. Afebrile. No c/o pain. HR 70-80's atrial arrhythmia (team aware). SBP remained > 90 per Left radial a-line with Epinephrine infusing at unchanged dose since last shift. Recent CVP=9. 4L NC w/ NO @ 10ppm continued. Lung sounds diminished. UO ~ 1L per gtz with Lasix gtt continued at unchanged dose from previous shift. 1 BM today. Cardiac/Diabetic diet with 1500cc FR tolerated well (to be NPO after MN). Blood glucose monitored AC/HS, SSI coverage needed in addition to scheduled coverage. Potassium replaced this shift (See MAR). Full chlorhexidine bath completed. Up in chair most of day. Walked in room with PT/OT.    Plan of care is to continue to diurese and plan for AICD placement tomorrow. POC reviewed with Jerry Solis and spouse. All questions and concerns addressed. Bed in low, locked position. Call light within reach. WCTM.

## 2019-01-28 NOTE — PLAN OF CARE
Problem: Adult Inpatient Plan of Care  Goal: Plan of Care Review  Outcome: Ongoing (interventions implemented as appropriate)  No acute events throughout shift. See vital signs and assessments in flowsheets. See below for updates on today's progress.     Neurological: AAOx4 slept well; rest encouraged.    Pulmonary: 4L NC with N.O. 10 ppm    Cardiovascular: HR 70s-80s, P waves seen intermittently; SBP >100; CVPs approx 7; SVO2 70    Gastrointestinal: no BM; good appetite, compliant with 1.5L FR    Genitourinary: voiding  cc/hr with total 1300 mL via gtz    Endocrine: K 4, mag 2; accuchecks ACHS with PRN SS coverage not needed    Integumentary/Other: HAPI prevention bundle in place;     Infusions: epi 0.06 (non-titrating), lasix 20, nitric 10 ppm    POC: ppm vs d/c with life vest Tuesday     Patient progressing towards goals as tolerated, plan of care communicated and reviewed with Jerry Solis and wife at bedside. All concerns addressed. Will continue to monitor.

## 2019-01-28 NOTE — PT/OT/SLP PROGRESS
"Physical Therapy Treatment    Patient Name:  Jerry Solis   MRN:  [id number]    Recommendations:     Discharge Recommendations:  (HHPT)   Discharge Equipment Recommendations: walker, rolling, shower chair   Barriers to discharge: None    Assessment:     Jerry Solis is a 78 y.o. 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  He presents with the following impairments/functional limitations:  weakness, impaired endurance, impaired self care skills, impaired functional mobilty, gait instability, impaired balance, impaired cardiopulmonary response to activity. Pt tolerated activity with improved mobility reflected by increased distance ambulated with decreased assistance required with use of RW. Pt with increased tolerance to OOB activity with VSS throughout. Pt would continue to benefit from acute skilled therapy intervention to address deficits and progress toward prior level of function.       Rehab Prognosis: Good; patient would benefit from acute skilled PT services to address these deficits and reach maximum level of function.    Recent Surgery: Procedure(s) (LRB):  CARDIOVERSION (N/A)  ECHOCARDIOGRAM,TRANSESOPHAGEAL (N/A) 14 Days Post-Op    Plan:     During this hospitalization, patient to be seen 4 x/week to address the identified rehab impairments via gait training, therapeutic activities, therapeutic exercises, neuromuscular re-education and progress toward the following goals:    · Plan of Care Expires:  02/20/19    Subjective     Chief Complaint: pt states "my legs are very weak"   Patient/Family Comments/goals: to get better and return home   Pain/Comfort:  · Pain Rating 1: 0/10  · Pain Rating Post-Intervention 1: 0/10      Objective:     Communicated with RN prior to session.  Patient found sitting up in chair, with  blood pressure cuff, pulse ox (continuous), telemetry, oxygen, PICC line, arterial line(nitric oxide)  upon PT entry to " room.     General Precautions: Standard, fall   Orthopedic Precautions:N/A   Braces: N/A     Functional Mobility:  · Transfers:     · Sit to Stand:  moderate assistance with rolling walker  · Gait: Pt ambulated 10 feet forward, then 10 feet backward with RW and CGA. Pt with decreased steve, decreased step size, flexed posture, decreased safety awareness, cues required for management of RW. Pt with no LOB, no SOB, no dizziness.       AM-PAC 6 CLICK MOBILITY  Turning over in bed (including adjusting bedclothes, sheets and blankets)?: 4  Sitting down on and standing up from a chair with arms (e.g., wheelchair, bedside commode, etc.): 3  Moving from lying on back to sitting on the side of the bed?: 4  Moving to and from a bed to a chair (including a wheelchair)?: 3  Need to walk in hospital room?: 3  Climbing 3-5 steps with a railing?: 2  Basic Mobility Total Score: 19       Therapeutic Activities and Exercises:   Pt educated on role of PT/POC. Pt verbalized understanding.   Pt performed static standing for 2 mins with RW and CGA. While standing, pt performed 10x B lateral weight shift, 10x B standing marching with RW and CGA. Pt with standing rest breaks between exercises.   While seated, pt performed UE exercises with OT. Pt able to initiate sitting balance with trunk unsupported with supervision.     Patient left up in chair with all lines intact, call button in reach and RN  present..    GOALS:   Multidisciplinary Problems     Physical Therapy Goals        Problem: Physical Therapy Goal    Goal Priority Disciplines Outcome Goal Variances Interventions   Physical Therapy Goal     PT, PT/OT Ongoing (interventions implemented as appropriate)     Description:  Goals to be met by: 19    Patient will increase functional independence with mobility by performin. Supine to sit with Modified [place], with HOB flat   2. Sit to supine with Modified [place], with HOB flat   3. Sit to stand transfer with  Supervision  4. Gait  x 100 feet with Supervision with or without using least restrictive AD.   5. Lower extremity exercise program x20 reps per handout, with supervision                      Time Tracking:     PT Received On: 01/28/19  PT Start Time: 1312     PT Stop Time: 1336  PT Total Time (min): 24 min     Billable Minutes: Gait Training 24 mins     Treatment Type: Treatment  PT/PTA: PT     PTA Visit Number: 0     Cathie Randall, PT  01/28/2019

## 2019-01-28 NOTE — PROGRESS NOTES
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     SpO2: 100 %  O2 Device (Oxygen Therapy): nasal cannula w/ humidification    Physical Exam   Constitutional: He is oriented to person, place, and time. He appears well-developed and well-nourished. No distress.   HENT:   Head: Normocephalic and atraumatic.   Mouth/Throat: Oropharynx is clear and moist.   Eyes: Conjunctivae and EOM are normal. Pupils are equal, round, and reactive to light. No scleral icterus.   Neck: Normal range of motion. Neck supple. No JVD present.   Cardiovascular: Normal rate, normal heart sounds and intact distal pulses. An irregular rhythm present.   No murmur heard.  Pulses:       Radial pulses are 2+ on the right side, and 2+ on the left side.   Pulmonary/Chest: Effort normal and breath sounds normal. No respiratory distress.   Symmetrical expansion  On NC   Abdominal: Soft. Bowel sounds are normal. There is no hepatosplenomegaly. There is no tenderness.   Musculoskeletal: Normal range of motion.   Neurological: He is alert and oriented to person, place, and time. No cranial nerve deficit.   Skin: Skin is warm and dry. No rash noted. He is not diaphoretic.   Psychiatric: He has a  normal mood and affect. Judgment and thought content normal.       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 Likewise has a CardioMeMMS device in place. Is not pacemaker dependent. ID believes the primary source is the vegetation, he is s/p extraction on 01/09, RV lead tip grew E. Faecalis (resistant to tetracyclines). Recovery complicated by slow junctional escape rhythm responding to dopamine, no response to DCCV. Now with undetermined atrial rhythm which is either sinus bradycardia with aberrantly conducted PAC or AT, or persistent AF with junctional escape and intermittent aberrant conduction of AF, the differences can be attributed due to AV manolo stimulation by inotrope/chronotrope medications resulting in conduction changes. With weaning of chronotropic medications, he is back in a msotly junctional rhythm. Indium scan was negative for signs of CardioMEMs infection. He had slow decline in respiratory status over several days, necessitating a delay in placement. This turned around with initiation of Miesha, and resulted in good diuresis and significant clinical improvement.    - volume/HF  management as per primary team  - Inotropes as per primary team with goal of stable renal perfusion and optimizing respiratory status for procedure  - Continue coumadin with target INR 2-3  - Plan for BIV-ICD implantation on Tuesday, will perform DFT as an outpatient (once patient clinically stable)         Oniel George MD  Cardiac Electrophysiology  [facility]

## 2019-01-28 NOTE — PROGRESS NOTES
Update:  SW met with pt and wife Mague in pt's room in order to provide continuity of care and support. Pt AAOx4, pleasant, sitting up in the chair and reports that he is feeling good today. Wife reiterated that pt seems to be doing well. Pt and wife both indicated that pt should be able to get his ICD re-implant tomorrow. Pt's wife states that her son is coming down to provide additional support and that her brothers came down this weekend. Pt and wife denying any current needs at this time. SW remains available for continued psychosocial support, education, resources, and additional d/c planning as needed.

## 2019-01-28 NOTE — SUBJECTIVE & OBJECTIVE
"Interval HPI:   Overnight events: Remains in CMICU, NAEON.  BG fairly well controlled yesterday on current insulin regimen.  Device replacement scheduled for this week once patient is euvolemic per primary team. On IV antibiotics.  Eatin%  Nausea: No  Hypoglycemia and intervention: No  Fever: No  TPN and/or TF: No    BP (!) 102/58 (BP Location: Left arm, Patient Position: Lying)   Pulse 79   Temp 97.7 °F (36.5 °C) (Oral)   Resp 20   Ht 6' 2" (1.88 m)   Wt 83.5 kg (184 lb)   SpO2 100%   BMI 23.62 kg/m²     Labs Reviewed and Include    Recent Labs   Lab 19  0423   *   CALCIUM 8.8   ALBUMIN 2.3*   PROT 6.5      K 3.7   CO2 33*   CL 96   BUN 55*   CREATININE 1.9*   ALKPHOS 147*   ALT 17   AST 27   BILITOT 0.7     Lab Results   Component Value Date    WBC 13.57 (H) 2019    HGB 7.6 (L) 2019    HCT 25.8 (L) 2019    MCV 99 (H) 2019     2019     No results for input(s): TSH, FREET4 in the last 168 hours.  Lab Results   Component Value Date    HGBA1C 8.1 (H) 2019       Nutritional status:   Body mass index is 23.62 kg/m².  Lab Results   Component Value Date    ALBUMIN 2.3 (L) 2019    ALBUMIN 2.4 (L) 2019    ALBUMIN 2.3 (L) 2019     No results found for: PREALBUMIN    Estimated Creatinine Clearance: 38.5 mL/min (A) (based on SCr of 1.9 mg/dL (H)).    Accu-Checks  Recent Labs     19  2211 19  0810 19  1137 19  1616 19  2049 19  0806 19  1134 19  1625 19  2047 19  0804   POCTGLUCOSE 260* 286* 366* 162* 61* 199* 254* 197* 171* 207*       Current Medications and/or Treatments Impacting Glycemic Control  Immunotherapy:    Immunosuppressants     None        Steroids:   Hormones (From admission, onward)    None        Pressors:    Autonomic Drugs (From admission, onward)    Start     Stop Route Frequency Ordered    19 1100  EPINEPHrine (ADRENALIN) 10 mg in sodium chloride " 0.9% 250 mL infusion      -- IV Continuous 01/23/19 0956        Hyperglycemia/Diabetes Medications:   Antihyperglycemics (From admission, onward)    Start     Stop Route Frequency Ordered    01/27/19 0715  insulin aspart U-100 pen 8 Units      -- SubQ 3 times daily with meals 01/27/19 0710    01/26/19 2100  insulin detemir U-100 pen 8 Units      -- SubQ Nightly 01/26/19 0759    01/11/19 1358  insulin aspart U-100 pen 0-5 Units      -- SubQ Before meals & nightly PRN 01/11/19 1259

## 2019-01-28 NOTE — PROGRESS NOTES
"[facility]  Endocrinology  Progress Note    Admit Date: 2019     Reason for Consult: Management of T2DM, Hyperglycemia     Surgical Procedure and Date: Lead extraction 19    Diabetes diagnosis year: approximately 10 years ago    Lab Results   Component Value Date    HGBA1C 8.1 (H) 2019       Home Diabetes Medications: Lantus 50 units q HS, Novolog 25 units with meals (vials)    How often checking glucose at home? 3-4x per day   BG readings on regimen: AM 120s  Hypoglycemia on the regimen?  Yes - 2-3x per month  Missed doses on regimen?  Yes - 2x per week    Diabetes Complications include:     Hyperglycemia, Hypoglycemia , Diabetic retinopathy , Diabetic cataract  and Diabetic peripheral neuropathy     Complicating diabetes co morbidities:   CHF and History of CVA      HPI:   Patient is a 78 y.o. male with a diagnosis of DM2 and CHF who was admitted on 19 for possible RV lead infection.  Patient is s/p lead extraction on 19.  Patient's DM managed by PCP, Dr. Hdz.  Endocrine consulted for DM/BG management.            Interval HPI:   Overnight events: Remains in CMICU, NAEON.  BG fairly well controlled yesterday on current insulin regimen.  Device replacement scheduled for this week once patient is euvolemic per primary team. On IV antibiotics.  Eatin%  Nausea: No  Hypoglycemia and intervention: No  Fever: No  TPN and/or TF: No    BP (!) 102/58 (BP Location: Left arm, Patient Position: Lying)   Pulse 79   Temp 97.7 °F (36.5 °C) (Oral)   Resp 20   Ht 6' 2" (1.88 m)   Wt 83.5 kg (184 lb)   SpO2 100%   BMI 23.62 kg/m²      Labs Reviewed and Include    Recent Labs   Lab 19  0423   *   CALCIUM 8.8   ALBUMIN 2.3*   PROT 6.5      K 3.7   CO2 33*   CL 96   BUN 55*   CREATININE 1.9*   ALKPHOS 147*   ALT 17   AST 27   BILITOT 0.7     Lab Results   Component Value Date    WBC 13.57 (H) 2019    HGB 7.6 (L) 2019    HCT 25.8 (L) 2019    " MCV 99 (H) 01/28/2019     01/28/2019     No results for input(s): TSH, FREET4 in the last 168 hours.  Lab Results   Component Value Date    HGBA1C 8.1 (H) 01/07/2019       Nutritional status:   Body mass index is 23.62 kg/m².  Lab Results   Component Value Date    ALBUMIN 2.3 (L) 01/28/2019    ALBUMIN 2.4 (L) 01/27/2019    ALBUMIN 2.3 (L) 01/26/2019     No results found for: PREALBUMIN    Estimated Creatinine Clearance: 38.5 mL/min (A) (based on SCr of 1.9 mg/dL (H)).    Accu-Checks  Recent Labs     01/25/19  2211 01/26/19  0810 01/26/19  1137 01/26/19  1616 01/26/19  2049 01/27/19  0806 01/27/19  1134 01/27/19  1625 01/27/19  2047 01/28/19  0804   POCTGLUCOSE 260* 286* 366* 162* 61* 199* 254* 197* 171* 207*       Current Medications and/or Treatments Impacting Glycemic Control  Immunotherapy:    Immunosuppressants     None        Steroids:   Hormones (From admission, onward)    None        Pressors:    Autonomic Drugs (From admission, onward)    Start     Stop Route Frequency Ordered    01/23/19 1100  EPINEPHrine (ADRENALIN) 10 mg in sodium chloride 0.9% 250 mL infusion      -- IV Continuous 01/23/19 0956        Hyperglycemia/Diabetes Medications:   Antihyperglycemics (From admission, onward)    Start     Stop Route Frequency Ordered    01/27/19 0715  insulin aspart U-100 pen 8 Units      -- SubQ 3 times daily with meals 01/27/19 0710    01/26/19 2100  insulin detemir U-100 pen 8 Units      -- SubQ Nightly 01/26/19 0759    01/11/19 1358  insulin aspart U-100 pen 0-5 Units      -- SubQ Before meals & nightly PRN 01/11/19 1259          ASSESSMENT and PLAN    * 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  Patient and spouse both agree patient patient is likely on too much insulin but patient reports no episodes of hypoglycemia at home.  Patient expressing interest to take less shots as he forgets to bring his vial and syring to work - consider other drug therapy upon discharge if possible.       TREY (acute kidney injury)    Caution with insulin stacking  Estimated Creatinine Clearance: 38.5 mL/min (A) (based on SCr of 1.9 mg/dL (H)).         A-fib    May affect BG readings if uncontrolled  S/p cardioversion.          Timo Avalos, FARIDEH  Endocrinology  [facility]

## 2019-01-28 NOTE — ASSESSMENT & PLAN NOTE
-ICM.  -Echo 1/25/19: LVEF 20%, LVEDD 6.85 cm. Mod TR, PHTN(PAS 79).  -CVP 7. Continue Lasix 20 mg/hr.  -scVO2 70. Continue Epi @ 0.06 and continue Miesha 10 ppm for RV support.  -GDMT: Dig and Toprol D/C'd 2/2 junctional rhythm. Entresto D/C'd 2/2 transient hypotension during/after ICD explant and TREY.  -Self declined for LVAD after w/u last April 2018.

## 2019-01-28 NOTE — SUBJECTIVE & OBJECTIVE
Interval History: No complaints this morning or issues overnight.    Continuous Infusions:   epinephrine infusion (NON-TITRATING) 0.06 mcg/kg/min (01/28/19 1000)    furosemide (LASIX) 2 mg/mL infusion (non-titrating) 20 mg/hr (01/28/19 1000)    nitric oxide gas       Scheduled Meds:   ampicillin IVPB  2 g Intravenous Q6H    artificial tears  1 drop Both Eyes QID    cefTRIAXone (ROCEPHIN) IVPB  2 g Intravenous Q12H    clopidogrel  75 mg Oral Daily    ferrous sulfate  325 mg Oral BID    insulin aspart U-100  8 Units Subcutaneous TIDWM    insulin detemir U-100  8 Units Subcutaneous QHS    levothyroxine  25 mcg Oral Before breakfast    polyethylene glycol  17 g Oral Daily    potassium chloride SA  40 mEq Oral BID    potassium chloride  40 mEq Oral Once    rosuvastatin  20 mg Oral QHS    senna-docusate 8.6-50 mg  2 tablet Oral BID    tamsulosin  0.4 mg Oral Daily    warfarin  2 mg Oral Daily     PRN Meds:dextrose 50%, dextrose 50%, glucagon (human recombinant), glucose, glucose, glycerin adult, hydrOXYzine HCl, insulin aspart U-100, magnesium sulfate IVPB, magnesium sulfate IVPB, ondansetron, ramelteon, sodium chloride 0.9%, sodium chloride 0.9%, traMADol    Review of patient's allergies indicates:   Allergen Reactions    Adhesive Other (See Comments)     blisters    Codeine Other (See Comments)     Blurred vision    Latex Hives    Ace inhibitors     Lipitor [atorvastatin] Other (See Comments)     Muscle spasms    Xanax [alprazolam] Hallucinations     Objective:     Vital Signs (Most Recent):  Temp: 97.7 °F (36.5 °C) (01/28/19 0700)  Pulse: 76 (01/28/19 1000)  Resp: 19 (01/28/19 1000)  BP: (!) 102/58 (01/26/19 0740)  SpO2: 100 % (01/28/19 1000) Vital Signs (24h Range):  Temp:  [97.3 °F (36.3 °C)-98.1 °F (36.7 °C)] 97.7 °F (36.5 °C)  Pulse:  [75-88] 76  Resp:  [13-28] 19  SpO2:  [80 %-100 %] 100 %  Arterial Line BP: ()/(43-53) 112/50     Patient Vitals for the past 72 hrs (Last 3 readings):    Weight   01/25/19 1300 83.5 kg (184 lb)     Body mass index is 23.62 kg/m².      Intake/Output Summary (Last 24 hours) at 1/28/2019 1052  Last data filed at 1/28/2019 1000  Gross per 24 hour   Intake 2165.01 ml   Output 3214 ml   Net -1048.99 ml          Telemetry: A fib with variable aberrant conduction    Physical Exam   Constitutional: He is oriented to person, place, and time. He appears well-developed and well-nourished.   HENT:   Head: Normocephalic and atraumatic.   Eyes: Conjunctivae and EOM are normal.   Neck: Normal range of motion. Neck supple. No JVD present. No thyromegaly present.   Cardiovascular: Regular rhythm.   Irregular, bradycardic   Pulmonary/Chest: Effort normal and breath sounds normal.   Abdominal: Soft. Bowel sounds are normal.   Pulsatile liver, RUQ fullness   Musculoskeletal: Normal range of motion.   Trace HEATHER   Neurological: He is alert and oriented to person, place, and time.   Skin: Skin is warm and dry. Capillary refill takes 2 to 3 seconds.   Psychiatric: He has a normal mood and affect. His behavior is normal. Judgment and thought content normal.     Significant Labs:  CBC:  Recent Labs   Lab 01/26/19 0316 01/27/19 0344 01/28/19 0423   WBC 13.76* 14.40* 13.57*   RBC 2.63* 2.74* 2.62*   HGB 7.6* 7.8* 7.6*   HCT 24.9* 26.2* 25.8*    256 258   MCV 95 96 99*   MCH 28.9 28.5 29.0   MCHC 30.5* 29.8* 29.5*     BNP:  Recent Labs   Lab 01/23/19 0429   BNP 1,522*     CMP:  Recent Labs   Lab 01/26/19 0316 01/27/19 0344 01/28/19 0423   * 110 173*   CALCIUM 8.9 9.2 8.8   ALBUMIN 2.3* 2.4* 2.3*   PROT 6.5 6.7 6.5    141 141   K 3.9 4.0 3.7   CO2 34* 37* 33*   CL 93* 93* 96   BUN 66* 61* 55*   CREATININE 2.1* 2.0* 1.9*   ALKPHOS 158* 153* 147*   ALT 16 16 17   AST 26 27 27   BILITOT 0.9 0.8 0.7      Coagulation:   Recent Labs   Lab 01/26/19  0316 01/27/19  0344 01/28/19  0423   INR 2.2* 2.1* 2.3*     LDH:  No results for input(s): LDH in the last 72  hours.  Microbiology:  Microbiology Results (last 7 days)     Procedure Component Value Units Date/Time    Blood culture [[id number]] Collected:  01/24/19 0950    Order Status:  Completed Specimen:  Blood from Peripheral, Forearm, Right Updated:  01/27/19 1212     Blood Culture, Routine No Growth to date     Blood Culture, Routine No Growth to date     Blood Culture, Routine No Growth to date     Blood Culture, Routine No Growth to date    Blood culture [[id number]] Collected:  01/24/19 0940    Order Status:  Completed Specimen:  Blood from Peripheral, Hand, Right Updated:  01/27/19 1212     Blood Culture, Routine No Growth to date     Blood Culture, Routine No Growth to date     Blood Culture, Routine No Growth to date     Blood Culture, Routine No Growth to date    IV catheter culture [[id number]] Collected:  01/24/19 1120    Order Status:  Completed Specimen:  Catheter Tip, Intrajugular Updated:  01/26/19 1138     Aerobic Culture - Cath tip No growth        I have reviewed all pertinent labs within the past 24 hours.    Estimated Creatinine Clearance: 38.5 mL/min (A) (based on SCr of 1.9 mg/dL (H)).    Diagnostic Results:  I have reviewed all pertinent imaging results/findings within the past 24 hours.

## 2019-01-28 NOTE — PT/OT/SLP PROGRESS
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  He presents with performance deficits affecting function are weakness, impaired endurance, impaired self care skills, impaired functional mobilty, gait instability, impaired balance, impaired cardiopulmonary response to activity. Pt tolerated session well. Pt c/o feeling dizzy/lightheaded during standing and during mobility despite vital stable throughout therapy session. Pt will continue to benefit from skilled OT in order to address the previously stated deficits.     Rehab Prognosis:  Good; patient would benefit from acute skilled OT services to address these deficits and reach maximum level of function.       Plan:     Patient to be seen 3 x/week to address the above listed problems via self-care/home management, therapeutic activities, therapeutic exercises  · Plan of Care Expires: 02/19/19  · Plan of Care Reviewed with: patient, spouse    Subjective     Pain/Comfort:  · Pain Rating 1: 0/10  · Pain Rating Post-Intervention 1: 0/10    Objective:     Communicated with: RN prior to session.  Patient found up in chair with blood pressure cuff, telemetry, oxygen, gtz catheter, pulse ox (continuous), PICC line, arterial line(nitric) upon OT entry to room. Pt agreeable to therapy session.    General Precautions: Standard, fall   Orthopedic Precautions:N/A   Braces: N/A     Occupational Performance:     Bed Mobility:    · Not performed, pt found seated UIC. Pt left seated UIC after therapy session.      Functional Mobility/Transfers:  · Patient completed x 2 reps Sit <> Stand Transfer with contact guard assistance  with  rolling walker   · Min verbal cues for hand placement on RW.   · Pt with slight knee buckling but was then noted to be pt purposely bending B knees.  Functional Mobility: Pt engaged in functional mobility in preparation for household/community mobility with CGA using RW.   · Pt completed mobility within pt's room with CGA using RW ~6-8 steps with no LOB noted   · Slight c/o dizziness and lightheadedness.     Activities of Daily Living:  · Feeding:  set-up A pt took sip of water from cup with good hand to mouth motor pattern   · Toileting: total assistance gtz cath in place     AMPA 6 Click ADL: 14    Treatment & Education:  Pt educated by therapist on:   - Pt educated on role of OT, POC, and goals for therapy.    - Patient and family aware of patient's deficits and therapy progression.   - pt performed x 50 reps of shoulder flexion to 90* while seated UIC with breaks as needed   - pt performed x 20 reps of cross body punches while sitting UIC with breaks as needed.   - OT reviewed al B LE exercises (seated marching, kick outs, and ankle pumps) with pt in order to ensure carryover and understanding.   - Educated pt on being appropriate to transfer with nsg and PCT x 1 person assist   - Time provided for therapeutic counseling and discussion of health disposition.   - OT reviewed pacemaker precautions with pt in preparation for upcoming PPM placement.   - Importance of OOB ax's with staff member assistance and sitting OOB majority of day.   - Pt completed ADLs and functional mobility for treatment session as noted above   - Pt verbalized understanding. Pt expressed no further concerns/questions.  - whiteboard updated     Patient left up in chair with all lines intact, call button in reach, RN notified and wife  presentEducation:      GOALS:   Multidisciplinary Problems     Occupational Therapy  Goals        Problem: Occupational Therapy Goal    Goal Priority Disciplines Outcome Interventions   Occupational Therapy Goal     OT, PT/OT Ongoing (interventions implemented as appropriate)    Description:  Goals to be met by: 2/4/19     Patient will increase functional independence with ADLs by performing:    UE Dressing with Supervision.  LE Dressing with Supervision.  Grooming while standing at sink with Stand-by Assistance.  Toileting from toilet with Stand-by Assistance for hygiene and clothing management.   Toilet transfer to toilet with Stand-by Assistance.                      Time Tracking:     OT Date of Treatment: 01/28/19  OT Start Time: 1314  OT Stop Time: 1344  OT Total Time (min): 30 min    Billable Minutes:Therapeutic Activity 10  Therapeutic Exercise 13    Juli Morris, OT  1/28/2019

## 2019-01-29 NOTE — CARE UPDATE
BG goal 140-180. BG above goal overnight; at goal this AM. NPO for AICD placement.     Continue Levemir to 9 units q HS   Change bg monitoring to every 6 hours while npo and low dose correction scale.     Will likely restart novolog at 10 units with meals when diet resumed.     ** Please call Endocrine for any BG related issues **

## 2019-01-29 NOTE — SUBJECTIVE & OBJECTIVE
**Interval History: No complaints or overnight events. To have BiV ICD replaced today. CXR shows increased pulmonary edema, and he is net -ve only 735 cc on Lasix 20 mg/hr. CVP 10-12.    Continuous Infusions:   epinephrine infusion (NON-TITRATING) 0.06 mcg/kg/min (01/29/19 1100)    furosemide (LASIX) 2 mg/mL infusion (non-titrating) 20 mg/hr (01/29/19 1100)    nitric oxide gas       Scheduled Meds:   ampicillin IVPB  2 g Intravenous Q6H    artificial tears  1 drop Both Eyes QID    cefTRIAXone (ROCEPHIN) IVPB  2 g Intravenous Q12H    clopidogrel  75 mg Oral Daily    ferrous sulfate  325 mg Oral BID    insulin detemir U-100  9 Units Subcutaneous QHS    levothyroxine  25 mcg Oral Before breakfast    metOLazone  5 mg Oral Daily    polyethylene glycol  17 g Oral Daily    potassium chloride SA  40 mEq Oral BID    potassium chloride  40 mEq Oral Once    rosuvastatin  20 mg Oral QHS    senna-docusate 8.6-50 mg  2 tablet Oral BID    tamsulosin  0.4 mg Oral Daily    warfarin  1 mg Oral Daily     PRN Meds:dextrose 50%, glucagon (human recombinant), glycerin adult, hydrOXYzine HCl, insulin aspart U-100, magnesium sulfate IVPB, magnesium sulfate IVPB, ondansetron, ramelteon, sodium chloride 0.9%, sodium chloride 0.9%, traMADol    Review of patient's allergies indicates:   Allergen Reactions    Adhesive Other (See Comments)     blisters    Codeine Other (See Comments)     Blurred vision    Latex Hives    Ace inhibitors     Lipitor [atorvastatin] Other (See Comments)     Muscle spasms    Xanax [alprazolam] Hallucinations     Objective:     Vital Signs (Most Recent):  Temp: 97.5 °F (36.4 °C) (01/29/19 1100)  Pulse: 87 (01/29/19 1100)  Resp: 18 (01/29/19 1100)  BP: (!) 102/58 (01/26/19 0740)  SpO2: 100 % (01/29/19 1100) Vital Signs (24h Range):  Temp:  [97.5 °F (36.4 °C)-98.6 °F (37 °C)] 97.5 °F (36.4 °C)  Pulse:  [] 87  Resp:  [11-32] 18  SpO2:  [95 %-100 %] 100 %  Arterial Line BP: ()/(40-62)  107/50     No data found.  Body mass index is 23.62 kg/m².      Intake/Output Summary (Last 24 hours) at 1/29/2019 1127  Last data filed at 1/29/2019 1117  Gross per 24 hour   Intake 1745 ml   Output 2855 ml   Net -1110 ml       Hemodynamic Parameters:       Telemetry: atrial fib with junctional escape    Physical Exam   Constitutional: He is oriented to person, place, and time. He appears well-developed and well-nourished.   HENT:   Head: Normocephalic and atraumatic.   Eyes: Conjunctivae and EOM are normal.   Neck: Normal range of motion. Neck supple. No thyromegaly present.   JVP ~ midneck while lying flat. + HJR   Cardiovascular: Regular rhythm.   Irregular, bradycardic   Pulmonary/Chest: Effort normal and breath sounds normal.   Abdominal: Soft. Bowel sounds are normal.   Pulsatile liver, RUQ fullness   Musculoskeletal: Normal range of motion.   Trace HEATHER   Neurological: He is alert and oriented to person, place, and time.   Skin: Skin is warm and dry. Capillary refill takes 2 to 3 seconds.   Psychiatric: He has a normal mood and affect. His behavior is normal. Judgment and thought content normal.       Significant Labs:  CBC:  Recent Labs   Lab 01/27/19  0344 01/28/19  0423 01/29/19  0309   WBC 14.40* 13.57* 13.55*   RBC 2.74* 2.62* 2.63*   HGB 7.8* 7.6* 7.3*   HCT 26.2* 25.8* 25.1*    258 231   MCV 96 99* 95   MCH 28.5 29.0 27.8   MCHC 29.8* 29.5* 29.1*     BNP:  Recent Labs   Lab 01/23/19  0429 01/29/19  0608   BNP 1,522* 1,219*     CMP:  Recent Labs   Lab 01/27/19  0344 01/28/19  0423 01/28/19  1740 01/29/19  0309    173*  --  141*   CALCIUM 9.2 8.8  --  8.6*   ALBUMIN 2.4* 2.3*  --  2.2*   PROT 6.7 6.5  --  6.5    141  --  142   K 4.0 3.7 3.7 3.4*   CO2 37* 33*  --  35*   CL 93* 96  --  96   BUN 61* 55*  --  54*   CREATININE 2.0* 1.9*  --  2.0*   ALKPHOS 153* 147*  --  152*   ALT 16 17  --  16   AST 27 27  --  26   BILITOT 0.8 0.7  --  0.7      Coagulation:   Recent Labs   Lab  01/27/19  0344 01/28/19  0423 01/29/19  0309   INR 2.1* 2.3* 2.6*     LDH:  No results for input(s): LDH in the last 72 hours.  Microbiology:  Microbiology Results (last 7 days)     Procedure Component Value Units Date/Time    Blood culture [[id number]] Collected:  01/24/19 0950    Order Status:  Completed Specimen:  Blood from Peripheral, Forearm, Right Updated:  01/28/19 1212     Blood Culture, Routine No Growth to date     Blood Culture, Routine No Growth to date     Blood Culture, Routine No Growth to date     Blood Culture, Routine No Growth to date     Blood Culture, Routine No Growth to date    Blood culture [[id number]] Collected:  01/24/19 0940    Order Status:  Completed Specimen:  Blood from Peripheral, Hand, Right Updated:  01/28/19 1212     Blood Culture, Routine No Growth to date     Blood Culture, Routine No Growth to date     Blood Culture, Routine No Growth to date     Blood Culture, Routine No Growth to date     Blood Culture, Routine No Growth to date    IV catheter culture [[id number]] Collected:  01/24/19 1120    Order Status:  Completed Specimen:  Catheter Tip, Intrajugular Updated:  01/26/19 1138     Aerobic Culture - Cath tip No growth          I have reviewed all pertinent labs within the past 24 hours.    Estimated Creatinine Clearance: 36.5 mL/min (A) (based on SCr of 2 mg/dL (H)).    Diagnostic Results:  I have reviewed all pertinent imaging results/findings within the past 24 hours.

## 2019-01-29 NOTE — ASSESSMENT & PLAN NOTE
This is a 77yo gentleman with long-standing history of ICM ([facility] with non-obstructive disease s/p intervention on 03/2018) with EF 35%, a 20-year history of ICD with upgrade to CRT-D in 2014 who presents for device extraction due to RV lead vegetation ~0.5cm in the setting of VRE bacteremia with a likely initial GI source. Likewise has a CardioMeMMS device in place. Is not pacemaker dependent. ID believes the primary source is the vegetation, he is s/p extraction on 01/09, RV lead tip grew E. Faecalis (resistant to tetracyclines). Recovery complicated by slow junctional escape rhythm responding to dopamine, no response to DCCV. Now with undetermined atrial rhythm which is either sinus bradycardia with aberrantly conducted PAC or AT, or persistent AF with junctional escape and intermittent aberrant conduction of AF, the differences can be attributed due to AV manolo stimulation by inotrope/chronotrope medications resulting in conduction changes. With weaning of chronotropic medications, he is back in a msotly junctional rhythm. Indium scan was negative for signs of CardioMEMs infection. He had slow decline in respiratory status over several days, necessitating a delay in placement. This turned around with initiation of Miesha, and resulted in good diuresis and significant clinical improvement.    - volume/HF management as per primary team  - Inotropes as per primary team with goal of stable renal perfusion and optimizing respiratory status for procedure  - Continue coumadin with target INR 2-3  - Plan for BIV-ICD implantation today, will perform DFT as an outpatient (once patient clinically stable)  - discussed with Dr. Lopez from ID, mild elevation in WBC count without fevers and continued antibiotics is not a concern for infection risk with a new device from her standpoint. This more likely represents stress response from his clinical condition.

## 2019-01-29 NOTE — ASSESSMENT & PLAN NOTE
-A1c 8.1  -Target -180 while hospitalized  -detemir 9, SSI  -Appreciate Endocrine's help with management

## 2019-01-29 NOTE — PLAN OF CARE
Problem: Adult Inpatient Plan of Care  Goal: Plan of Care Review  Outcome: Ongoing (interventions implemented as appropriate)  No acute events overnight, VSS. CVP 11, Svo2 61. Lasix, epi, and nitric infusing as ordered. Pt NPO after midnight in anticipation of AICD placement during day shift. UO ~ 125/hr. See flowsheets for details. POC for AICD placement during day shift. POC reviewed and discussed with pt and spouse. All questions and concerns were addressed. Pt and spouse verbalized understanding.

## 2019-01-29 NOTE — SUBJECTIVE & OBJECTIVE
"Interval HPI:   Overnight events: remains in CMICU. AICD placement yesterday. BG above goal this AM. Requiring correction scale. Creatinine elevated at 1.9.   Eatin%  Nausea: No  Hypoglycemia and intervention: No  Fever: No  TPN and/or TF: No      BP (!) 102/58 (BP Location: Left arm, Patient Position: Lying)   Pulse 80   Temp 98.3 °F (36.8 °C) (Oral)   Resp 20   Ht 6' 2" (1.88 m)   Wt 83.5 kg (184 lb)   SpO2 100%   BMI 23.62 kg/m²     Labs Reviewed and Include    Recent Labs   Lab 19  0309   *   CALCIUM 8.6*   ALBUMIN 2.2*   PROT 6.5      K 3.4*   CO2 35*   CL 96   BUN 54*   CREATININE 2.0*   ALKPHOS 152*   ALT 16   AST 26   BILITOT 0.7     Lab Results   Component Value Date    WBC 13.55 (H) 2019    HGB 7.3 (L) 2019    HCT 25.1 (L) 2019    MCV 95 2019     2019     No results for input(s): TSH, FREET4 in the last 168 hours.  Lab Results   Component Value Date    HGBA1C 8.1 (H) 2019       Nutritional status:   Body mass index is 23.62 kg/m².  Lab Results   Component Value Date    ALBUMIN 2.2 (L) 2019    ALBUMIN 2.3 (L) 2019    ALBUMIN 2.4 (L) 2019     No results found for: PREALBUMIN    Estimated Creatinine Clearance: 36.5 mL/min (A) (based on SCr of 2 mg/dL (H)).    Accu-Checks  Recent Labs     19  0806 19  1134 19  1625 19  2047 19  0804 19  1151 19  1628 19  2153 19  0617 19  0743   POCTGLUCOSE 199* 254* 197* 171* 207* 318* 297* 280* 120* 129*       Current Medications and/or Treatments Impacting Glycemic Control  Immunotherapy:    Immunosuppressants     None        Steroids:   Hormones (From admission, onward)    None        Pressors:    Autonomic Drugs (From admission, onward)    Start     Stop Route Frequency Ordered    19 1100  EPINEPHrine (ADRENALIN) 10 mg in sodium chloride 0.9% 250 mL infusion      -- IV Continuous 19 0956    "     Hyperglycemia/Diabetes Medications:   Antihyperglycemics (From admission, onward)    Start     Stop Route Frequency Ordered    01/28/19 2100  insulin detemir U-100 pen 9 Units      -- SubQ Nightly 01/28/19 1449    01/28/19 1645  insulin aspart U-100 pen 9 Units      -- SubQ 3 times daily with meals 01/28/19 1449    01/11/19 1358  insulin aspart U-100 pen 0-5 Units      -- SubQ Before meals & nightly PRN 01/11/19 1259

## 2019-01-29 NOTE — PLAN OF CARE
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    Type 2 diabetes mellitus with complication    A-fib    TREY (acute kidney injury)    CHF (congestive heart failure)    Hypokalemia    Bacteremia    Endocarditis    ICD (implantable cardioverter-defibrillator) in place    Infection associated with cardiac device    Junctional bradycardia    Dysuria    Bacteremia due to Enterococcus    Moderate malnutrition    Leukocytosis       Patient is homebound due to: Debility, endocarditis  Diet: Low Sodium with 1500 cc fluid restriction  Activities: As Tolerated    Nursing:   SN to complete comprehensive assessment including routine vital signs. Instruct on disease process and s/s of complications to report to MD. Review/verify medication list sent home with the patient at time of discharge  and instruct patient/caregiver as needed. Frequency may be adjusted depending on start of care date.    Notify MD if SBP > 160 or < 90; DBP > 90 or < 50; HR > 120 or < 50; Temp > 101; Weight gain >3lbs in 1 day or 5lbs in 1 week.    LABS:  SN to perform labs: INR as directed by Coumadin Clinic    Weekly CBC, CMP, ESR and CRP with results faxed to ID clinic    HOME INFUSION THERAPY:   SN to perform Infusion Therapy/Central Line Care.  Review Central Line Care & Central Line Flush with patient.    Administer (drug and dose): Ampicillin 2 grams IV every 6 hours with end date 2/20/19    Ceftriaxone 2 grams IV every 12 hours with end date 2/20/19    **For questions or concerns, please call [phone] and ask for Pre-Heart  transplant clinic, M-F 8-5. After hours, weekends, call [phone] and ask for the Heart Transplant Cardiologist on call.**    Central line care:  Scrub the Hub: Prior to accessing the line, always perform a 30 second alcohol scrub  Each lumen of the central line is to be flushed at least daily with 10 mL Normal Saline and 3 mL Heparin flush (100 units/mL)  Skilled Nurse (SN) may draw blood from IV access  Blood Draw Procedure:   - Aspirate at least 5 mL of blood   - Discard   - Obtain specimen   - Change posiflow cap   - Flush with 20 mL Normal Saline followed by a                 3-5 mL Heparin flush (100 units/mL)  Central :   - Sterile dressing changes are done weekly and as needed.   - Use chlor-hexadine scrub to cleanse site, apply Biopatch to insertion site,       apply securement device dressing   - Posi-flow caps are changed weekly and after EVERY lab draw.   - If sterile gauze is under dressing to control oozing,                 dressing change must be performed every 24 hours until gauze is not needed.    CONSULTS:      Physical Therapy to evaluate and treat. Evaluate for home safety and equipment needs; Establish/upgrade home exercise program. Perform / instruct on therapeutic exercises, gait training, transfer training, and Range of Motion.    Occupational Therapy to evaluate and treat. Evaluate home environment for safety and equipment needs. Perform/Instruct on transfers, ADL training, ROM, and therapeutic exercises.        Send initial Home Health orders to [facility] attending physician on call.  Send follow up questions to [phone] or fax:                                 Heart Failure:      [phone]

## 2019-01-29 NOTE — ASSESSMENT & PLAN NOTE
BG goal 140-180    Increase Levemir to 10 units q HS   BG monitoring ac/hs and low dose correction scale.   Restart novolog at 10 units with meals.    Discharge planning: Patient is on considerably less insulin than home dosing.  Patient and spouse both agree patient patient is likely on too much insulin but patient reports no episodes of hypoglycemia at home.  Patient expressing interest to take less shots as he forgets to bring his vial and syring to work - consider other drug therapy upon discharge if possible.

## 2019-01-29 NOTE — ASSESSMENT & PLAN NOTE
-ICM.  -Echo 1/25/19: LVEF 20%, LVEDD 6.85 cm. Mod TR, PHTN(PAS 79).  -CVP 10-12, and CXR with increased pulmonary edema. Continue Lasix 20 mg/hr and give Metolazone 5 mg X 1  -scVO2 61. Continue Epi @ 0.06 and continue Miesha 10 ppm for RV support.  -GDMT: Dig and Toprol D/C'd 2/2 junctional rhythm. Entresto D/C'd 2/2 transient hypotension during/after ICD explant and TREY.  -Self declined for LVAD after w/u last April 2018.

## 2019-01-29 NOTE — PROGRESS NOTES
Pt. Sent to EP lab for AICD placement.   AAOx4. Afebrile. VSS with Epi and Lasix gtts continued. 3L NC w/ NO @ 10 ppm continued. Chart sent with patient. Portable monitor intact. Pt. Transported with RN, RRT, and transport tech. Family sent to 3rd floor waiting area (EP RN aware). WCTM and update team appropriately.

## 2019-01-29 NOTE — PROGRESS NOTES
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 To have BiV ICD replaced today. CXR shows increased pulmonary edema, and he is net -ve only 735 cc on Lasix 20 mg/hr. CVP 10-12.    Continuous Infusions:   epinephrine infusion (NON-TITRATING) 0.06 mcg/kg/min (01/29/19 1100)    furosemide (LASIX) 2 mg/mL infusion (non-titrating) 20 mg/hr (01/29/19 1100)    nitric oxide gas       Scheduled Meds:   ampicillin IVPB  2 g Intravenous Q6H    artificial tears  1 drop Both Eyes QID    cefTRIAXone (ROCEPHIN) IVPB  2 g Intravenous Q12H    clopidogrel  75 mg Oral Daily    ferrous sulfate  325 mg Oral BID    insulin detemir U-100  9 Units Subcutaneous QHS    levothyroxine  25 mcg Oral Before breakfast    metOLazone  5 mg Oral Daily    polyethylene glycol  17 g Oral Daily    potassium chloride SA  40 mEq Oral BID    potassium chloride  40 mEq Oral Once    rosuvastatin  20 mg Oral QHS    senna-docusate 8.6-50 mg  2 tablet Oral BID    tamsulosin  0.4 mg Oral Daily    warfarin  1 mg Oral Daily     PRN Meds:dextrose 50%, glucagon (human recombinant), glycerin adult, hydrOXYzine HCl, insulin aspart U-100, magnesium sulfate IVPB, magnesium sulfate IVPB, ondansetron, ramelteon, sodium chloride 0.9%, sodium chloride 0.9%, traMADol    Review of patient's allergies indicates:   Allergen Reactions    Adhesive Other (See Comments)     blisters    Codeine Other (See Comments)     Blurred vision    Latex Hives    Ace inhibitors     Lipitor [atorvastatin] Other (See Comments)     Muscle spasms    Xanax [alprazolam] Hallucinations     Objective:     Vital Signs  (Most Recent):  Temp: 97.5 °F (36.4 °C) (01/29/19 1100)  Pulse: 87 (01/29/19 1100)  Resp: 18 (01/29/19 1100)  BP: (!) 102/58 (01/26/19 0740)  SpO2: 100 % (01/29/19 1100) Vital Signs (24h Range):  Temp:  [97.5 °F (36.4 °C)-98.6 °F (37 °C)] 97.5 °F (36.4 °C)  Pulse:  [] 87  Resp:  [11-32] 18  SpO2:  [95 %-100 %] 100 %  Arterial Line BP: ()/(40-62) 107/50     No data found.  Body mass index is 23.62 kg/m².      Intake/Output Summary (Last 24 hours) at 1/29/2019 1127  Last data filed at 1/29/2019 1117  Gross per 24 hour   Intake 1745 ml   Output 2855 ml   Net -1110 ml       Hemodynamic Parameters:       Telemetry: atrial fib with junctional escape    Physical Exam   Constitutional: He is oriented to person, place, and time. He appears well-developed and well-nourished.   HENT:   Head: Normocephalic and atraumatic.   Eyes: Conjunctivae and EOM are normal.   Neck: Normal range of motion. Neck supple. No thyromegaly present.   JVP ~ midneck while lying flat. + HJR   Cardiovascular: Regular rhythm.   Irregular, bradycardic   Pulmonary/Chest: Effort normal and breath sounds normal.   Abdominal: Soft. Bowel sounds are normal.   Pulsatile liver, RUQ fullness   Musculoskeletal: Normal range of motion.   Trace HEATHER   Neurological: He is alert and oriented to person, place, and time.   Skin: Skin is warm and dry. Capillary refill takes 2 to 3 seconds.   Psychiatric: He has a normal mood and affect. His behavior is normal. Judgment and thought content normal.       Significant Labs:  CBC:  Recent Labs   Lab 01/27/19  0344 01/28/19  0423 01/29/19  0309   WBC 14.40* 13.57* 13.55*   RBC 2.74* 2.62* 2.63*   HGB 7.8* 7.6* 7.3*   HCT 26.2* 25.8* 25.1*    258 231   MCV 96 99* 95   MCH 28.5 29.0 27.8   MCHC 29.8* 29.5* 29.1*     BNP:  Recent Labs   Lab 01/23/19  0429 01/29/19  0608   BNP 1,522* 1,219*     CMP:  Recent Labs   Lab 01/27/19  0344 01/28/19  0423 01/28/19  1740 01/29/19  0309    173*  --  141*    CALCIUM 9.2 8.8  --  8.6*   ALBUMIN 2.4* 2.3*  --  2.2*   PROT 6.7 6.5  --  6.5    141  --  142   K 4.0 3.7 3.7 3.4*   CO2 37* 33*  --  35*   CL 93* 96  --  96   BUN 61* 55*  --  54*   CREATININE 2.0* 1.9*  --  2.0*   ALKPHOS 153* 147*  --  152*   ALT 16 17  --  16   AST 27 27  --  26   BILITOT 0.8 0.7  --  0.7      Coagulation:   Recent Labs   Lab 01/27/19  0344 01/28/19  0423 01/29/19  0309   INR 2.1* 2.3* 2.6*     LDH:  No results for input(s): LDH in the last 72 hours.  Microbiology:  Microbiology Results (last 7 days)     Procedure Component Value Units Date/Time    Blood culture [[id number]] Collected:  01/24/19 0950    Order Status:  Completed Specimen:  Blood from Peripheral, Forearm, Right Updated:  01/28/19 1212     Blood Culture, Routine No Growth to date     Blood Culture, Routine No Growth to date     Blood Culture, Routine No Growth to date     Blood Culture, Routine No Growth to date     Blood Culture, Routine No Growth to date    Blood culture [[id number]] Collected:  01/24/19 0940    Order Status:  Completed Specimen:  Blood from Peripheral, Hand, Right Updated:  01/28/19 1212     Blood Culture, Routine No Growth to date     Blood Culture, Routine No Growth to date     Blood Culture, Routine No Growth to date     Blood Culture, Routine No Growth to date     Blood Culture, Routine No Growth to date    IV catheter culture [[id number]] Collected:  01/24/19 1120    Order Status:  Completed Specimen:  Catheter Tip, Intrajugular Updated:  01/26/19 1138     Aerobic Culture - Cath tip No growth          I have reviewed all pertinent labs within the past 24 hours.    Estimated Creatinine Clearance: 36.5 mL/min (A) (based on SCr of 2 mg/dL (H)).    Diagnostic Results:  I have reviewed all pertinent imaging results/findings within the past 24 hours.    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 Discussed with Dr. Russo. Blood cultures repeated 1/15 and were -ve  -Appreciate EP's help. 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 Patient had 2 vegetations on RV lead which is most likely source of bacteremia. Lead tip and ICD pocket cultured with RV lead +ve for Enteroccocus. Blood cultures are negative here are -ve with last culture done 1/15  -Continue Ampicillin (renally dosed given TREY) and Ceftriaxone per ID recs Will treat for 6 weeks post extraction.  Estimated end date 2/20/19.    -If urgently necessary from a cardiac standpoint, it is okay to proceed with implanting new device as repeat blood cultures taken post extraction remain negative X 72 hours.  Suspect known vegetations on RV leads, now removed, were primary source of infection; cardioMems likely not infected, but Indium Scan done 1.18 and was -ve. If negative, no need for oral antibiotic suppression or surveillance blood cultures following 6 weeks of IV therapy.  -INR has trended up to 2.6 from 2.1 2 days ago. Decrease Coumadin to 1 mg tonight  -EP plans CRT-D replacement today. Will perform DFT as an outpatient   -Appreciate GI's help. Plan for outpatient EGD and C-scope for eval of anemia and Enterococcus bacteremia  -Repeated blood cultures, cultured RIJ line tip 1/24 and UA with reflex due to WBCs increased (afebrile) - UA OK, other cultures with NGTD     Acute on chronic combined systolic and diastolic CHF, NYHA class 3    -ICM.  -Echo 1/25/19: LVEF 20%, LVEDD 6.85 cm. Mod TR, PHTN(PAS 79).  -CVP 10-12, and CXR with increased pulmonary edema. Continue Lasix 20 mg/hr and give Metolazone 5 mg X 1  -scVO2 61. Continue Epi @ 0.06 and continue Miesha 10 ppm for RV support.  -GDMT: Dig and Toprol D/C'd 2/2 junctional rhythm. Entresto D/C'd 2/2 transient hypotension during/after ICD explant and TREY.  -Self declined for LVAD after w/u last April 2018.     A-fib    -S/P  Unsuccessful RICARDO guided/DCCV 1/14. Remains in A fib with variety of aberrant conduction  -EP planning CRT-D replacement likely next Tuesday if euvolemic  -Epi increased to 0.06 for more RV support. Added Miesha 10 ppm  -CHADS VASc 6  -Holding eliquis s/p ICD explant 1/9. Per EP, anticoagulation resumed 1/12 with Coumadin. INR today 2.6 (goal 2.0-3.0)  -Cardiac monitoring     Type 2 diabetes mellitus with complication    -A1c 8.1  -Target -180 while hospitalized  -detemir 9, SSI  -Appreciate Endocrine's help with management     Leukocytosis    - WCC back down a bit.   -RIJ D/C'd 1/24 and tip cultured and -ve. Blood cultures also from 1/24 with NGTD. UA OK     Dysuria    - Resolved  - Urine culture -ve  - Flomax started 1/10. Will try to D/C Morin once off aggressive diuresis         Junctional bradycardia    - See A fib     Endocarditis    - See ICD lead infection     Bacteremia    -see above     TREY (acute kidney injury)    - Appreciate Nephrology's help  - Was oliguric and creatinine jumped to 4.1 likely 2/2 ATN/hypotension following ICD explant.   - BUN/creatinine essentially unchanged today at 54/2.0. See acute on chronic systolic heart failure  - Dopamine D/C'd, Epi increased to 0.06 for more RV support and added Miesha 1/23  - Hold Entresto for now       Uninterrupted Critical Care/Counseling Time (not including procedures): 45 minutes      Dionna Lora NP [id number]  Heart Transplant  [facility]

## 2019-01-29 NOTE — ASSESSMENT & PLAN NOTE
-S/P Unsuccessful RICARDO guided/DCCV 1/14. Remains in A fib with variety of aberrant conduction  -EP planning CRT-D replacement likely next Tuesday if euvolemic  -Epi increased to 0.06 for more RV support. Added Miesha 10 ppm  -CHADS VASc 6  -Holding eliquis s/p ICD explant 1/9. Per EP, anticoagulation resumed 1/12 with Coumadin. INR today 2.6 (goal 2.0-3.0)  -Cardiac monitoring

## 2019-01-29 NOTE — ANESTHESIA PREPROCEDURE EVALUATION
[facility]  Anesthesia Pre-Operative Evaluation         Patient Name: Jerry Solis  YOB: 1940  MRN: [id number]    SUBJECTIVE:     ICD placement     01/29/2019    Jerry Solis is a 78 y.o. male w/ a significant PMHx of CHF/ICM, s/p CRT-D placement, s/p CardioMEMS, AF, HTN and CVA who presented to OSH with hypotension, TREY, and shock. BCx 1/2 positive for Enterococcus faecalis. RICARDO showed a vegetation on the RV lead. [facility] reviewed the film and confirmed the presence of the vegetation. He was transferred here for device extraction. ID believes he likely has a GI source. The patient had his device extracted 1/9. RICARDO pre-extraction showed two vegetations on the RV lead. Post-extraction RICARDO showed no masses in the RA or attached to the TV. The patient is to be on abx through February. V.       Patient now presents for the above procedure(s).      LDA: None documented.       PICC Triple Lumen right brachial (Active)   Site Assessment Clean;Dry;Intact;No redness;No swelling 1/14/2019  4:00 AM   Lumen 1 Status Infusing 1/14/2019  4:00 AM   Lumen 2 Status Infusing 1/14/2019  4:00 AM   Lumen 3 Status Flushed;Normal saline locked 1/14/2019  4:00 AM   Dressing Type Transparent 1/14/2019  4:00 AM   Dressing Status Biopatch in place;Clean;Dry;Intact 1/14/2019  4:00 AM   Dressing Intervention New dressing 1/11/2019 10:08 AM   Dressing Change Due 01/18/19 1/14/2019  4:00 AM   Daily Line Review Performed 1/14/2019  4:00 AM   Number of days:             Percutaneous Central Line Insertion/Assessment - triple lumen  01/10/19 1710 right internal jugular (Active)   Dressing dressing changed;biopatch in place 1/14/2019  6:00 AM   Securement secured w/ sutures 1/14/2019  6:00 AM   Additional Site Signs no erythema;no warmth;no edema;no pain;no palpable cord;no streak formation;no drainage 1/13/2019  3:00 PM   Distal Patency/Care infusing 1/14/2019  4:00 AM   Medial Patency/Care infusing 1/14/2019  4:00  "AM   Proximal Patency/Care infusing 1/14/2019  4:00 AM   Waveform normal 1/14/2019  4:00 AM   Line Interventions line leveled/zeroed 1/14/2019 12:00 AM   Dressing Change Due 01/21/19 1/14/2019  6:00 AM   Daily Line Review Performed 1/14/2019  4:00 AM   Number of days: 3            Arterial Line 01/09/19 1016 Left Radial (Active)   Site Assessment Clean;Dry;Intact;No redness;No swelling 1/14/2019  4:00 AM   Line Status Pulsatile blood flow 1/14/2019  4:00 AM   Art Line Waveform Appropriate;Square wave test performed 1/14/2019  4:00 AM   Arterial Line Interventions Leveled;Connections checked and tightened 1/14/2019  4:00 AM   Color/Movement/Sensation Capillary refill less than 3 sec 1/14/2019  4:00 AM   Dressing Type Transparent 1/14/2019  4:00 AM   Dressing Status Biopatch in place;Clean;Dry;Intact 1/14/2019  4:00 AM   Dressing Intervention Dressing changed 1/13/2019  4:00 AM   Dressing Change Due 01/17/19 1/14/2019  4:00 AM   Number of days: 4            Urethral Catheter 01/10/19 2115 Non-latex 10 Fr. (Active)   Site Assessment Clean;Intact 1/14/2019  4:00 AM   Collection Container Urimeter 1/14/2019  4:00 AM   Securement Method secured to top of thigh w/ adhesive device 1/14/2019  4:00 AM   Catheter Care Performed yes 1/13/2019  8:05 PM   Reason for Continuing Urinary Catheterization Critically ill in ICU requiring intensive monitoring;Required immobilization 1/14/2019  4:00 AM   CAUTI Prevention Bundle Drainage bag off the floor;Green sheeting clip in use;No dependent loops or kinks;StatLock in place w 1" slack;Drainage bag not overfilled (<2/3 full);Drainage bag below bladder 1/13/2019  8:00 PM   Output (mL) 230 mL 1/14/2019  8:00 AM   Number of days: 3       Prev airway: None documented.    Drips: None documented.      Patient Active Problem List   Diagnosis    Acute on chronic combined systolic and diastolic CHF, NYHA class 3    Ischemic cardiomyopathy    VT (ventricular tachycardia)    Infection of " automatic implantable cardioverter-defibrillator lead    Type 2 diabetes mellitus with complication    A-fib    TREY (acute kidney injury)    CHF (congestive heart failure)    Hypokalemia    Bacteremia    Endocarditis    ICD (implantable cardioverter-defibrillator) in place    Infection associated with cardiac device    Junctional bradycardia    Dysuria    Bacteremia due to Enterococcus    Moderate malnutrition    Leukocytosis       Review of patient's allergies indicates:   Allergen Reactions    Adhesive Other (See Comments)     blisters    Codeine Other (See Comments)     Blurred vision    Latex Hives    Ace inhibitors     Lipitor [atorvastatin] Other (See Comments)     Muscle spasms       Current Inpatient Medications:      Current Facility-Administered Medications on File Prior to Visit   Medication Dose Route Frequency Provider Last Rate Last Dose    ampicillin 2 g in sodium chloride 0.9 % 100 mL IVPB (ready to mix system)  2 g Intravenous Q6H Ash Gunderson Jr.,  mL/hr at 01/29/19 1117 2 g at 01/29/19 1117    artificial tears 0.5 % ophthalmic solution 1 drop  1 drop Both Eyes QID Dionna Lora NP   1 drop at 01/29/19 1200    bupivacaine (PF) injection    PRN Werner Boggs MD   15 mL at 01/29/19 1524    cefTRIAXone (ROCEPHIN) 2 g in dextrose 5 % 50 mL IVPB  2 g Intravenous Q12H Crystal Umaña APRN, ANP 50 mL/hr at 01/27/19 0453 2 g at 01/29/19 0310    clopidogrel tablet 75 mg  75 mg Oral Daily Valentin Farah MD   75 mg at 01/29/19 0831    dextrose 50% injection 12.5 g  12.5 g Intravenous PRN Cathie Eli NP        EPINEPHrine (ADRENALIN) 10 mg in sodium chloride 0.9% 250 mL infusion  0.06 mcg/kg/min (Dosing Weight) Intravenous Continuous Dionna Lora NP 7.5 mL/hr at 01/29/19 1415 0.06 mcg/kg/min at 01/29/19 1415    ferrous sulfate EC tablet 325 mg  325 mg Oral BID Dionna Lora NP   325 mg at 01/29/19 0828    furosemide (LASIX) 2 mg/mL in sodium chloride  0.9% 100 mL infusion (conc: 2 mg/mL)  20 mg/hr Intravenous Continuous Dionna Lora NP 10 mL/hr at 01/29/19 1415 20 mg/hr at 01/29/19 1415    glucagon (human recombinant) injection 1 mg  1 mg Intramuscular PRN Cathie Eli NP        glycerin adult suppository 1 suppository  1 suppository Rectal Daily PRN Chris Merchant NP   1 suppository at 01/27/19 0913    hydrOXYzine HCl tablet 10 mg  10 mg Oral TID PRN Dionna Lora NP   10 mg at 01/22/19 2059    insulin aspart U-100 pen 0-5 Units  0-5 Units Subcutaneous Q6H PRN Cathie Eli NP        insulin detemir U-100 pen 9 Units  9 Units Subcutaneous QHS Cathie Eli NP        levothyroxine tablet 25 mcg  25 mcg Oral Before breakfast Valentin Farah MD   25 mcg at 01/29/19 0559    lidocaine HCL 20 mg/ml (2%) injection    PRN Werner Boggs MD   20 mg at 01/29/19 1519    magnesium sulfate 2g in water 50mL IVPB (premix)  2 g Intravenous PRN Dionna Lora, NP   2 g at 01/10/19 1039    magnesium sulfate 2g in water 50mL IVPB (premix)  4 g Intravenous PRN Dionna Lora NP        metOLazone tablet 5 mg  5 mg Oral Daily Dionna Conleyill, NP   5 mg at 01/29/19 0831    nitric oxide gas Gas 10 ppm  10 ppm Inhalation Continuous Dionna Lora NP   10 ppm at 01/23/19 1022    ondansetron injection 4 mg  4 mg Intravenous Q6H PRN Valentin Farah MD   4 mg at 01/23/19 0758    polyethylene glycol packet 17 g  17 g Oral Daily Dionna Conleyill, NP   Stopped at 01/28/19 0900    potassium chloride SA CR tablet 40 mEq  40 mEq Oral BID Dionna Conleyill, NP   40 mEq at 01/29/19 0828    potassium chloride SA CR tablet 40 mEq  40 mEq Oral Once Sherry Davidson MD   Stopped at 01/29/19 0559    ramelteon tablet 8 mg  8 mg Oral Nightly PRN Sherry Davidson MD   8 mg at 01/21/19 2223    rosuvastatin tablet 20 mg  20 mg Oral QHS Valentin Farah MD   20 mg at 01/28/19 2003    senna-docusate 8.6-50 mg per tablet 2 tablet  2 tablet Oral BID Dionna  FARIDEH Lora   2 tablet at 01/29/19 0831    sodium chloride 0.9% flush 3 mL  3 mL Intravenous PRN Rboert Bajwa Jr., MD        sodium chloride 0.9% flush 5 mL  5 mL Intravenous PRN Valentin Farah MD        tamsulosin 24 hr capsule 0.4 mg  0.4 mg Oral Daily Dionna Lora NP   0.4 mg at 01/29/19 0828    traMADol tablet 50 mg  50 mg Oral Q6H PRN Dionna Lora NP   50 mg at 01/16/19 2306    warfarin (COUMADIN) tablet 1 mg  1 mg Oral Daily Dionna Lora NP         Current Outpatient Medications on File Prior to Visit   Medication Sig Dispense Refill    bumetanide (BUMEX) 1 MG tablet Take 3 mg every morning and 2 mg every evening 450 tablet 3    clopidogrel (PLAVIX) 75 mg tablet Take 1 tablet (75 mg total) by mouth once daily. 90 tablet 1    digoxin (LANOXIN) 125 mcg tablet Take 0.5 tablets by mouth every morning.      ELIQUIS 2.5 mg Tab Take 1 tablet by mouth 2 (two) times daily.      LANTUS U-100 INSULIN 100 unit/mL injection Inject 50 Units into the skin 2 (two) times daily.      levothyroxine (SYNTHROID) 25 MCG tablet Take 1 tablet by mouth once daily.      loratadine (CLARITIN) 10 mg tablet Take 10 mg by mouth once daily.      metOLazone (ZAROXOLYN) 5 MG tablet Take one tablet by mouth now. Then only take as ordered per MD. (Patient taking differently: Take 5 mg by mouth as needed. Take one tablet by mouth now. Then only take as ordered per MD.) 20 tablet 1    metoprolol succinate (TOPROL-XL) 25 MG 24 hr tablet Take 1 tablet (25 mg total) by mouth every evening. 30 tablet 11    multivitamin (THERAGRAN) per tablet Take 1 tablet by mouth once daily.      NOVOLOG U-100 INSULIN ASPART 100 unit/mL injection Inject 5 Units into the skin 4 (four) times daily.      omega-3 acid ethyl esters (LOVAZA) 1 gram capsule Take 2 g by mouth 2 (two) times daily.      omeprazole (PRILOSEC) 40 MG capsule Take 40 mg by mouth once daily.      potassium chloride SA (KLOR-CON M20) 20 MEQ tablet Take 2-3  tablets by mouth daily as directed.  Take it when taking the bumetadine. 90 tablet 3    rosuvastatin (CRESTOR) 20 MG tablet Take 1 tablet (20 mg total) by mouth every evening. 30 tablet 11    sacubitril-valsartan (ENTRESTO) 24-26 mg per tablet Take 1 tablet by mouth 2 (two) times daily. 60 tablet 1    TRELEGY ELLIPTA 100-62.5-25 mcg DsDv once daily.       VENTOLIN HFA 90 mcg/actuation inhaler Inhale 2 puffs into the lungs every 4 (four) hours as needed.          Past Surgical History:   Procedure Laterality Date    CARDIOVERSION N/A 1/14/2019    Performed by Ryan Henley MD at [facility] EP LAB    ECHOCARDIOGRAM,TRANSESOPHAGEAL N/A 1/14/2019    Performed by Ryan Henley MD at [facility] EP LAB    ECHOCARDIOGRAM,TRANSESOPHAGEAL N/A 1/9/2019    Performed by [facility] Provider at [facility] EP LAB    EXTRACTION, ELECTRODE LEAD Left 1/9/2019    Performed by Werner Boggs MD at [facility] EP LAB    HEART CATH-RIGHT Right 4/5/2018    Performed by Elizabeth Wise MD at [facility] CATH LAB    Insertion, Pacemaker, Temporary Transvenous  1/9/2019    Performed by Werner Boggs MD at [facility] EP LAB       Social History     Socioeconomic History    Marital status:      Spouse name: Not on file    Number of children: Not on file    Years of education: Not on file    Highest education level: Not on file   Social Needs    Financial resource strain: Not on file    Food insecurity - worry: Not on file    Food insecurity - inability: Not on file    Transportation needs - medical: Not on file    Transportation needs - non-medical: Not on file   Occupational History    Not on file   Tobacco Use    Smoking status: Former Smoker    Smokeless tobacco: Former User   Substance and Sexual Activity    Alcohol use: Yes     Comment: former drinker    Drug use: No    Sexual activity: Not on file   Other Topics Concern    Not on file   Social History Narrative    Not on file       OBJECTIVE:     Vital Signs Range (Last  24H):  Temp:  [36.4 °C (97.5 °F)-37 °C (98.6 °F)]   Pulse:  []   Resp:  [11-32]   SpO2:  [97 %-100 %]   Arterial Line BP: ()/(44-62)       Significant Labs:  Lab Results   Component Value Date    WBC 13.55 (H) 01/29/2019    HGB 7.3 (L) 01/29/2019    HCT 25.1 (L) 01/29/2019     01/29/2019    ALT 16 01/29/2019    AST 26 01/29/2019     01/29/2019    K 3.4 (L) 01/29/2019    CL 96 01/29/2019    CREATININE 2.0 (H) 01/29/2019    BUN 54 (H) 01/29/2019    CO2 35 (H) 01/29/2019    TSH 2.459 04/06/2018    INR 2.6 (H) 01/29/2019    HGBA1C 8.1 (H) 01/07/2019       Diagnostic Studies: No relevant studies.    EKG: No recent studies available.    2D ECHO:  Results for orders placed or performed during the hospital encounter of 04/05/18   2D Echo w/ Color Flow Doppler   Result Value Ref Range    QEF 20 (A) 55 - 65    Mitral Valve Regurgitation MODERATE (A)     Aortic Valve Regurgitation TRIVIAL     Est. PA Systolic Pressure 96 (A)     Tricuspid Valve Regurgitation MODERATE (A)          ASSESSMENT/PLAN:         Pre-op Assessment    I have reviewed the Patient Summary Reports.     I have reviewed the Nursing Notes.   I have reviewed the Medications.     Review of Systems  Anesthesia Hx:  History of prior surgery of interest to airway management or planning: Denies Family Hx of Anesthesia complications.   Denies Personal Hx of Anesthesia complications.   Social:  Former Smoker, Alcohol Use    Hematology/Oncology:     Oncology Normal    -- Anemia:   EENT/Dental:EENT/Dental Normal   Cardiovascular:   Exercise tolerance: poor Pacemaker Past MI (ischemic carediomyopathy) CAD  Dysrhythmias (ventricular tachycardia )  CHF (chronic systolic heart failure ) hyperlipidemia    Pulmonary:  Pulmonary Normal    Renal/:   Chronic Renal Disease, ARF    Hepatic/GI:  Hepatic/GI Normal    Musculoskeletal:  Musculoskeletal Normal    Neurological:  Neurology Normal    Endocrine:   Diabetes, poorly controlled, type 2     Dermatological:  Skin Normal    Psych:  Psychiatric Normal           Physical Exam  General:  Well nourished    Airway/Jaw/Neck:  Airway Findings: Mouth Opening: Normal Tongue: Normal  Mallampati: II  TM Distance: Normal, at least 6 cm      Dental:  Dental Findings: Periodontal disease, Mild   Chest/Lungs:  Chest/Lungs Findings: Normal Respiratory Rate     Heart/Vascular:  Heart Findings: Rate: Normal  Rhythm: Regular Rhythm        Mental Status:  Mental Status Findings:  Cooperative, Alert and Oriented         Anesthesia Plan  Type of Anesthesia, risks & benefits discussed:  Anesthesia Type:  general, MAC  Patient's Preference:   Intra-op Monitoring Plan: standard ASA monitors  Intra-op Monitoring Plan Comments:   Post Op Pain Control Plan: multimodal analgesia, IV/PO Opioids PRN and per primary service following discharge from PACU  Post Op Pain Control Plan Comments:   Induction:   IV  Beta Blocker:  Patient is not currently on a Beta-Blocker (No further documentation required).       Informed Consent: Patient representative understands risks and agrees with Anesthesia plan.  Questions answered. Anesthesia consent signed with patient representative.  ASA Score: 4     Day of Surgery Review of History & Physical:    H&P update referred to the surgeon.         Ready For Surgery From Anesthesia Perspective.

## 2019-01-29 NOTE — ASSESSMENT & PLAN NOTE
-Vegetation seen on RICARDO  -2 mobile masses seen on CVC by RICARDO 1/14 (has both RUE PICC and RIJ TLC). Discussed with Dr. Russo. Blood cultures repeated 1/15 and were -ve  -Appreciate EP's help. 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 Patient had 2 vegetations on RV lead which is most likely source of bacteremia. Lead tip and ICD pocket cultured with RV lead +ve for Enteroccocus. Blood cultures are negative here are -ve with last culture done 1/15  -Continue Ampicillin (renally dosed given TREY) and Ceftriaxone per ID recs Will treat for 6 weeks post extraction.  Estimated end date 2/20/19.    -If urgently necessary from a cardiac standpoint, it is okay to proceed with implanting new device as repeat blood cultures taken post extraction remain negative X 72 hours.  Suspect known vegetations on RV leads, now removed, were primary source of infection; cardioMems likely not infected, but Indium Scan done 1.18 and was -ve. If negative, no need for oral antibiotic suppression or surveillance blood cultures following 6 weeks of IV therapy.  -INR has trended up to 2.6 from 2.1 2 days ago. Decrease Coumadin to 1 mg tonight  -EP plans CRT-D replacement today. Will perform DFT as an outpatient   -Appreciate GI's help. Plan for outpatient EGD and C-scope for eval of anemia and Enterococcus bacteremia  -Repeated blood cultures, cultured RIJ line tip 1/24 and UA with reflex due to WBCs increased (afebrile) - UA OK, other cultures with NGTD

## 2019-01-29 NOTE — ASSESSMENT & PLAN NOTE
- Appreciate Nephrology's help  - Was oliguric and creatinine jumped to 4.1 likely 2/2 ATN/hypotension following ICD explant.   - BUN/creatinine essentially unchanged today at 54/2.0. See acute on chronic systolic heart failure  - Dopamine D/C'd, Epi increased to 0.06 for more RV support and added Miesha 1/23  - Hold Entresto for now

## 2019-01-29 NOTE — PROGRESS NOTES
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     SpO2: 100 %  O2 Device (Oxygen Therapy): nasal cannula w/ humidification    Physical Exam   Constitutional: He is oriented to person, place, and time. He appears well-developed and well-nourished. No distress.   HENT:   Head: Normocephalic and atraumatic.   Mouth/Throat: Oropharynx is clear and moist.   Eyes: Conjunctivae and EOM are normal. Pupils are equal, round, and reactive to light. No scleral icterus.   Neck: Normal range of motion. Neck supple. No JVD present.   Cardiovascular: Normal rate, normal heart sounds and intact distal pulses. An irregular rhythm present.   No murmur heard.  Pulses:       Radial pulses are 2+ on the right side, and 2+ on the left side.   Pulmonary/Chest: Effort normal and breath sounds normal. No respiratory distress.   Symmetrical expansion  On NC   Abdominal: Soft. Bowel sounds are normal. There is no hepatosplenomegaly. There is no tenderness.   Musculoskeletal: Normal range of motion.   Neurological: He is alert and oriented to person, place, and time. No cranial nerve deficit.   Skin: Skin is warm and dry. No rash noted. He is  not diaphoretic.   Psychiatric: He has a normal mood and affect. Judgment and thought content normal.       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 Likewise has a CardioMeMMS device in place. Is not pacemaker dependent. ID believes the primary source is the vegetation, he is s/p extraction on 01/09, RV lead tip grew E. Faecalis (resistant to tetracyclines). Recovery complicated by slow junctional escape rhythm responding to dopamine, no response to DCCV. Now with undetermined atrial rhythm which is either sinus bradycardia with aberrantly conducted PAC or AT, or persistent AF with junctional escape and intermittent aberrant conduction of AF, the differences can be attributed due to AV manolo stimulation by inotrope/chronotrope medications resulting in conduction changes. With weaning of chronotropic medications, he is back in a msotly junctional rhythm. Indium scan was negative for signs of CardioMEMs infection. He had slow decline in respiratory status over several days,  necessitating a delay in placement. This turned around with initiation of Miesha, and resulted in good diuresis and significant clinical improvement.    - volume/HF management as per primary team  - Inotropes as per primary team with goal of stable renal perfusion and optimizing respiratory status for procedure  - Continue coumadin with target INR 2-3  - Plan for BIV-ICD implantation today, will perform DFT as an outpatient (once patient clinically stable)  - discussed with Dr. Lopez from ID, mild elevation in WBC count without fevers and continued antibiotics is not a concern for infection risk with a new device from her standpoint. This more likely represents stress response from his clinical condition.         Oniel George MD  Cardiac Electrophysiology  [facility]

## 2019-01-29 NOTE — SUBJECTIVE & OBJECTIVE
Interval History: Doing well overnight, no events    Rhythm: HR 80s, junctional with PVCs    ROS  Objective:     Vital Signs (Most Recent):  Temp: 98.3 °F (36.8 °C) (01/29/19 0700)  Pulse: 79 (01/29/19 0900)  Resp: 19 (01/29/19 0900)  BP: (!) 102/58 (01/26/19 0740)  SpO2: 100 % (01/29/19 0900) Vital Signs (24h Range):  Temp:  [97.6 °F (36.4 °C)-98.6 °F (37 °C)] 98.3 °F (36.8 °C)  Pulse:  [] 79  Resp:  [11-32] 19  SpO2:  [95 %-100 %] 100 %  Arterial Line BP: ()/(40-62) 121/51     Weight: 83.5 kg (184 lb)  Body mass index is 23.62 kg/m².     SpO2: 100 %  O2 Device (Oxygen Therapy): nasal cannula w/ humidification    Physical Exam   Constitutional: He is oriented to person, place, and time. He appears well-developed and well-nourished. No distress.   HENT:   Head: Normocephalic and atraumatic.   Mouth/Throat: Oropharynx is clear and moist.   Eyes: Conjunctivae and EOM are normal. Pupils are equal, round, and reactive to light. No scleral icterus.   Neck: Normal range of motion. Neck supple. No JVD present.   Cardiovascular: Normal rate, normal heart sounds and intact distal pulses. An irregular rhythm present.   No murmur heard.  Pulses:       Radial pulses are 2+ on the right side, and 2+ on the left side.   Pulmonary/Chest: Effort normal and breath sounds normal. No respiratory distress.   Symmetrical expansion  On NC   Abdominal: Soft. Bowel sounds are normal. There is no hepatosplenomegaly. There is no tenderness.   Musculoskeletal: Normal range of motion.   Neurological: He is alert and oriented to person, place, and time. No cranial nerve deficit.   Skin: Skin is warm and dry. No rash noted. He is not diaphoretic.   Psychiatric: He has a normal mood and affect. Judgment and thought content normal.       Significant Labs:   EP:   Recent Labs   Lab 01/28/19  0423 01/28/19  1740 01/29/19  0309     --  142   K 3.7 3.7 3.4*   CL 96  --  96   CO2 33*  --  35*   *  --  141*   BUN 55*  --  54*    CREATININE 1.9*  --  2.0*   CALCIUM 8.8  --  8.6*   PROT 6.5  --  6.5   ALBUMIN 2.3*  --  2.2*   BILITOT 0.7  --  0.7   ALKPHOS 147*  --  152*   AST 27  --  26   ALT 17  --  16   ANIONGAP 12  --  11   ESTGFRAFRICA 38.7*  --  36.4*   EGFRNONAA 33.5*  --  31.5*   WBC 13.57*  --  13.55*   HGB 7.6*  --  7.3*   HCT 25.8*  --  25.1*     --  231   INR 2.3*  --  2.6*

## 2019-01-29 NOTE — TRANSFER OF CARE
"Anesthesia Transfer of Care Note    Patient: Jerry Solis    Procedure(s) Performed: Procedure(s) (LRB):  INSERTION, ICD, BIVENTRICULAR (Left)    Patient location: ICU    Anesthesia Type: general    Transport from OR: Transported from OR on 2-3 L/min O2 by NC with adequate spontaneous ventilation. Continuous ECG monitoring in transport. Continuous SpO2 monitoring in transport. Continuos invasive BP monitoring in transport    Post pain: adequate analgesia    Post assessment: no apparent anesthetic complications and tolerated procedure well    Post vital signs: stable    Level of consciousness: awake and responds to stimulation    Nausea/Vomiting: no nausea/vomiting    Complications: none    Transfer of care protocol was followed      Last vitals:   Visit Vitals  /48   Pulse 80   Temp 36.4 °C (97.5 °F) (Oral)   Resp 20   Ht 6' 2" (1.88 m)   Wt 83.5 kg (184 lb)   SpO2 100%   BMI 23.62 kg/m²     "

## 2019-01-29 NOTE — ASSESSMENT & PLAN NOTE
- WCC back down a bit.   -RIJ D/C'd 1/24 and tip cultured and -ve. Blood cultures also from 1/24 with NGTD. UA OK

## 2019-01-30 NOTE — PLAN OF CARE
Problem: Adult Inpatient Plan of Care  Goal: Plan of Care Review  Outcome: Ongoing (interventions implemented as appropriate)  No acute events overnight. VSS during shift. Epi, lasix, and nitric continued as ordered. CVP 8, Svo2 75%. Diuresed well overnight. POC is to wean nitric and epi as tolerated. POC reviewed and discussed with pt. All questions and concerns were addressed. Pt and spouse verbalized understanding.

## 2019-01-30 NOTE — ASSESSMENT & PLAN NOTE
- Appreciate Nephrology's help  - Was oliguric and creatinine jumped to 4.1 likely 2/2 ATN/hypotension following ICD explant.   - BUN/creatinine essentially unchanged today at 50/1.9. See acute on chronic systolic heart failure  - Weaning Miesha and Epi  - Hold Entresto for now

## 2019-01-30 NOTE — PROGRESS NOTES
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 BP normalized once he sat in chair for a couple of minutes. Net -ve > 3.7L past 24 hours, and CVP is down to 5.     Continuous Infusions:   epinephrine infusion (NON-TITRATING) 0.04 mcg/kg/min (01/30/19 0900)     Scheduled Meds:   ampicillin IVPB  2 g Intravenous Q6H    artificial tears  1 drop Both Eyes QID    cefTRIAXone (ROCEPHIN) IVPB  2 g Intravenous Q12H    clopidogrel  75 mg Oral Daily    ferrous sulfate  325 mg Oral BID    insulin aspart U-100  10 Units Subcutaneous TIDWM    insulin detemir U-100  10 Units Subcutaneous QHS    levothyroxine  25 mcg Oral Before breakfast    metOLazone  5 mg Oral Daily    polyethylene glycol  17 g Oral Daily    potassium chloride SA  40 mEq Oral BID    potassium chloride  40 mEq Oral Once    rosuvastatin  20 mg Oral QHS    senna-docusate 8.6-50 mg  2 tablet Oral BID    tamsulosin  0.4 mg Oral Daily    warfarin  2 mg Oral Once     PRN Meds:acetaminophen, bupivacaine (PF), dextrose 50%, dextrose 50%, glucagon (human recombinant), glucose, glucose, glycerin adult, hydrOXYzine HCl, insulin aspart U-100, lidocaine HCL 20 mg/ml (2%), magnesium sulfate IVPB, magnesium sulfate IVPB, ondansetron, ramelteon, sodium chloride 0.9%, sodium chloride 0.9%, traMADol    Review of patient's allergies indicates:   Allergen Reactions    Adhesive Other (See Comments)     blisters    Codeine Other (See Comments)     Blurred vision    Latex Hives    Ace inhibitors     Lipitor [atorvastatin] Other (See  Comments)     Muscle spasms    Xanax [alprazolam] Hallucinations     Objective:     Vital Signs (Most Recent):  Temp: 97.4 °F (36.3 °C) (01/30/19 0700)  Pulse: 80 (01/30/19 1000)  Resp: (!) 22 (01/30/19 1000)  BP: (!) 108/56 (01/30/19 1000)  SpO2: 100 % (01/30/19 1000) Vital Signs (24h Range):  Temp:  [96.5 °F (35.8 °C)-97.6 °F (36.4 °C)] 97.4 °F (36.3 °C)  Pulse:  [80-86] 80  Resp:  [12-26] 22  SpO2:  [97 %-100 %] 100 %  BP: (105-121)/(53-64) 108/56  Arterial Line BP: ()/(34-51) 99/34     No data found.  Body mass index is 23.62 kg/m².      Intake/Output Summary (Last 24 hours) at 1/30/2019 1108  Last data filed at 1/30/2019 0900  Gross per 24 hour   Intake 2748.13 ml   Output 5685 ml   Net -2936.87 ml       Hemodynamic Parameters:       Telemetry: A fib, intermittently BiV paced    Physical Exam   Constitutional: He is oriented to person, place, and time. He appears well-developed and well-nourished.   HENT:   Head: Normocephalic and atraumatic.   Eyes: Conjunctivae and EOM are normal.   Neck: Normal range of motion. Neck supple. No JVD present. No thyromegaly present.   Cardiovascular: Regular rhythm.   Irregular   Pulmonary/Chest: Effort normal and breath sounds normal.   Abdominal: Soft. Bowel sounds are normal.   Musculoskeletal: Normal range of motion. He exhibits no edema.   Neurological: He is alert and oriented to person, place, and time.   Skin: Skin is warm and dry. Capillary refill takes 2 to 3 seconds.   Psychiatric: He has a normal mood and affect. His behavior is normal. Judgment and thought content normal.       Significant Labs:  CBC:  Recent Labs   Lab 01/28/19  0423 01/29/19  0309 01/30/19  0330   WBC 13.57* 13.55* 13.51*   RBC 2.62* 2.63* 2.62*   HGB 7.6* 7.3* 7.6*   HCT 25.8* 25.1* 25.8*    231 211   MCV 99* 95 99*   MCH 29.0 27.8 29.0   MCHC 29.5* 29.1* 29.5*     BNP:  Recent Labs   Lab 01/29/19  0608   BNP 1,219*     CMP:  Recent Labs   Lab 01/28/19  0423  01/29/19  0302  01/29/19  1853 01/30/19  0330   *  --  141*  --  245*   CALCIUM 8.8  --  8.6*  --  9.0   ALBUMIN 2.3*  --  2.2*  --  2.2*   PROT 6.5  --  6.5  --  6.4     --  142  --  142   K 3.7   < > 3.4* 3.4* 3.8   CO2 33*  --  35*  --  35*   CL 96  --  96  --  94*   BUN 55*  --  54*  --  50*   CREATININE 1.9*  --  2.0*  --  1.9*   ALKPHOS 147*  --  152*  --  144*   ALT 17  --  16  --  15   AST 27  --  26  --  24   BILITOT 0.7  --  0.7  --  0.9    < > = values in this interval not displayed.      Coagulation:   Recent Labs   Lab 01/28/19  0423 01/29/19  0309 01/30/19  0330   INR 2.3* 2.6* 2.6*     LDH:  No results for input(s): LDH in the last 72 hours.  Microbiology:  Microbiology Results (last 7 days)     Procedure Component Value Units Date/Time    Blood culture [[id number]] Collected:  01/24/19 0940    Order Status:  Completed Specimen:  Blood from Peripheral, Hand, Right Updated:  01/29/19 1212     Blood Culture, Routine No growth after 5 days.    Blood culture [[id number]] Collected:  01/24/19 0950    Order Status:  Completed Specimen:  Blood from Peripheral, Forearm, Right Updated:  01/29/19 1212     Blood Culture, Routine No growth after 5 days.    IV catheter culture [[id number]] Collected:  01/24/19 1120    Order Status:  Completed Specimen:  Catheter Tip, Intrajugular Updated:  01/26/19 1138     Aerobic Culture - Cath tip No growth          I have reviewed all pertinent labs within the past 24 hours.    Estimated Creatinine Clearance: 38.5 mL/min (A) (based on SCr of 1.9 mg/dL (H)).    Diagnostic Results:  I have reviewed all pertinent imaging results/findings within the past 24 hours.    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 Discussed with Dr. Russo. Blood cultures repeated 1/15 and were -ve  -Appreciate EP's help. 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 Patient had 2 vegetations on RV lead which is most likely source of bacteremia. Lead tip and ICD pocket cultured with RV lead +ve for Enteroccocus. Blood cultures are negative here are -ve with last culture done 1/24  -Continue Ampicillin (renally dosed given TREY) and Ceftriaxone per ID recs Will treat for 6 weeks post extraction.  Estimated end date 2/20/19.    -S/P successful reimplantation of BiV ICD 1/29. EP considering repeat RICARDO/DCCV A fib at some point. Will perform DFT as an outpatient   -Appreciate GI's help. Plan for outpatient EGD and C-scope for eval of anemia and Enterococcus bacteremia  -Repeated blood cultures, cultured RIJ line tip 1/24 and UA with reflex due to WBCs increased (afebrile) - UA OK, other cultures with NGTD     Acute on chronic combined systolic and diastolic CHF, NYHA class 3    -ICM.  -Echo 1/25/19: LVEF 20%, LVEDD 6.85 cm. Mod TR, PHTN(PAS 79).  -CVP 5 and orthostatic this morning. Net -ve > 3.7L past 24 hours. Lasix gtt D/C'd  -scVO2 75. Miesha weaned off. Weaning Epi to keep SBP 90  -GDMT: Dig and Toprol D/C'd 2/2 junctional rhythm. Entresto D/C'd 2/2 transient hypotension during/after ICD explant and TREY.  -Self declined for LVAD after w/u last April 2018.     A-fib    -S/P Unsuccessful RICARDO guided/DCCV 1/14. Remains in A fib  -S/P successful reimplantation of BiV ICD 1/29  -Weaning Miesha and Epi  -CHADS VASc 6  -Holding eliquis s/p ICD explant 1/9. Per EP, anticoagulation resumed 1/12 with Coumadin. INR today 2.6 (goal 2.0-3.0)  -Cardiac monitoring     Type 2 diabetes mellitus with complication    -A1c 8.1  -Target -180 while hospitalized  -detemir 9, SSI  -Appreciate Endocrine's help with management     Leukocytosis    - WCC back down a bit.   -RIJ D/C'd 1/24 and tip cultured and -ve. Blood cultures also from 1/24 with NGTD. UA OK     Dysuria    - Resolved  - Urine culture -ve  - Flomax started 1/10. Will try to D/C [place] once off  aggressive diuresis         Junctional bradycardia    - See A fib     Endocarditis    - See ICD lead infection     Bacteremia    -see above     TREY (acute kidney injury)    - Appreciate Nephrology's help  - Was oliguric and creatinine jumped to 4.1 likely 2/2 ATN/hypotension following ICD explant.   - BUN/creatinine essentially unchanged today at 50/1.9. See acute on chronic systolic heart failure  - Weaning Miesha and Epi  - Hold Entresto for now       Uninterrupted Critical Care/Counseling Time (not including procedures): 45 minutes      Dionna Lora NP [id number]  Heart Transplant  [facility]

## 2019-01-30 NOTE — PT/OT/SLP PROGRESS
Physical Therapy Treatment    Patient Name:  Jerry Solis   MRN:  [id number]    Recommendations:     Discharge Recommendations:  (HHPT)   Discharge Equipment Recommendations: shower chair   Barriers to discharge: None    Assessment:     Jerry Solis is a 78 y.o. 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  He presents with the following impairments/functional limitations:  weakness, impaired endurance, impaired self care skills, impaired functional mobilty, gait instability, impaired cardiopulmonary response to activity. Pt tolerated activity with symptoms consistent with orthostatic hypotension. Symptoms improved with seated rest and EOB exercises. Pt requires frequent cuing to maintain precautions to R UE following ICD placement. Pt would continue to benefit from acute skilled therapy intervention to address deficits and progress toward prior level of function.       Rehab Prognosis: Good; patient would benefit from acute skilled PT services to address these deficits and reach maximum level of function.    Recent Surgery: Procedure(s) (LRB):  INSERTION, ICD, BIVENTRICULAR (Left) 1 Day Post-Op    Plan:     During this hospitalization, patient to be seen 4 x/week to address the identified rehab impairments via gait training, therapeutic activities, therapeutic exercises, neuromuscular re-education and progress toward the following goals:    · Plan of Care Expires:  02/20/19    Subjective     Chief Complaint: pt c/o dizziness when standing   Patient/Family Comments/goals: to get better and return home   Pain/Comfort:  · Pain Rating 1: 0/10  · Pain Rating Post-Intervention 1: 0/10      Objective:     Communicated with RN prior to session.  Patient found sitting EOB with RN present  blood pressure cuff, pulse ox (continuous), telemetry, PICC line, arterial line, oxygen(nitric oxide)  upon PT entry to room.     General Precautions: Standard, fall,  pacemaker   Orthopedic Precautions:N/A   Braces: N/A     Functional Mobility:  · Bed Mobility:   NT 2/2 pt sitting EOB with RN upon arrival   · Transfers:     · Sit to Stand:  minimum assistance with hand-held assist, maintaining compliance with ICD precautions   · Bed to Chair: contact guard assistance with  hand-held assist  using  Stand Pivot      AM-PAC 6 CLICK MOBILITY  Turning over in bed (including adjusting bedclothes, sheets and blankets)?: 3  Sitting down on and standing up from a chair with arms (e.g., wheelchair, bedside commode, etc.): 3  Moving from lying on back to sitting on the side of the bed?: 3  Moving to and from a bed to a chair (including a wheelchair)?: 3  Need to walk in hospital room?: 3  Climbing 3-5 steps with a railing?: 2  Basic Mobility Total Score: 17       Therapeutic Activities and Exercises:   Pt educated on role of PT/POC. Pt verbalized understanding.   OT educated on proper donning of sling to R arm.   Pt educated on 3 precautions following ICD implant. Pt verbalized understanding.   Following first sit > stand, pt experienced dizziness, unable to determine BP 2/2 arterial line not in position. Pt sat EOB and performed 15 B LAQ, seated marching, and Ankle DF/PF. Pt with decreased dizziness for transfer to chair.     Patient left up in chair with all lines intact, call button in reach and MD  present..    GOALS:   Multidisciplinary Problems     Physical Therapy Goals        Problem: Physical Therapy Goal    Goal Priority Disciplines Outcome Goal Variances Interventions   Physical Therapy Goal     PT, PT/OT Ongoing (interventions implemented as appropriate)     Description:  Goals to be met by: 19    Patient will increase functional independence with mobility by performin. Supine to sit with Modified [place], with HOB flat   2. Sit to supine with Modified [place], with HOB flat   3. Sit to stand transfer with Supervision  4. Gait  x 100 feet with Supervision  with or without using least restrictive AD.   5. Lower extremity exercise program x20 reps per handout, with supervision                      Time Tracking:     PT Received On: 01/30/19  PT Start Time: 0928     PT Stop Time: 0948  PT Total Time (min): 20 min     Billable Minutes: Therapeutic Exercise 20 mins     Treatment Type: Treatment  PT/PTA: PT     PTA Visit Number: 0     Cathie Randall, PT  01/30/2019

## 2019-01-30 NOTE — ASSESSMENT & PLAN NOTE
BG goal 140-180    Change to levemir 4 units BID; first dose tonight.   BG monitoring ac/hs and low dose correction scale.   continue novolog at 10 units with meals.    Discharge planning: Patient is on considerably less insulin than home dosing.  Patient and spouse both agree patient patient is likely on too much insulin but patient reports no episodes of hypoglycemia at home.  Patient expressing interest to take less shots as he forgets to bring his vial and syring to work - consider other drug therapy upon discharge if possible.

## 2019-01-30 NOTE — PLAN OF CARE
Problem: Adult Inpatient Plan of Care  Goal: Plan of Care Review  Outcome: Ongoing (interventions implemented as appropriate)  Pt. Had AICD placed this shift. No other acute events today. See assessments and vital signs in flowsheets. See below for updates on today's progress.     VSS. AAOx4. Afebrile. No c/o pain. HR 80bm, 100% ventricular paced (right chest AICD placed today). SBP remains > 90 per Left radial a-line with Epinephrine infusing at unchanged dose since last shift. Recent CVP=9. 3L NC w/ NO @ 10ppm continued. UO ~ 2.3L per gtz with Lasix gtt continued at unchanged dose from previous shift. No BM today. NPO at this time. Blood glucose monitored every 6 hours and within goal of 120-180's., SSI coverage not needed. Potassium replaced this shift (See MAR).      Plan of care is to continue monitor cardiac function post AICD placement at this time. POC reviewed with Jerry Solis and spouse. All questions and concerns addressed. Bed in low, locked position. Call light within reach. WCTM.

## 2019-01-30 NOTE — ASSESSMENT & PLAN NOTE
-ICM.  -Echo 1/25/19: LVEF 20%, LVEDD 6.85 cm. Mod TR, PHTN(PAS 79).  -CVP 5 and orthostatic this morning. Net -ve > 3.7L past 24 hours. Lasix gtt D/C'd  -scVO2 75. Miesha weaned off. Weaning Epi to keep SBP 90  -GDMT: Dig and Toprol D/C'd 2/2 junctional rhythm. Entresto D/C'd 2/2 transient hypotension during/after ICD explant and TREY.  -Self declined for LVAD after w/u last April 2018.

## 2019-01-30 NOTE — BRIEF OP NOTE
R shoulder biV ICD implanted. No complication.  Atrial lead reveals AF, disambiguating the rhythm for the past >1 week.    ABx per ID  pCXR to r/o PTX.  Sling, pressure dressing.

## 2019-01-30 NOTE — PROGRESS NOTES
"[facility]  Endocrinology  Progress Note    Admit Date: 2019     Reason for Consult: Management of T2DM, Hyperglycemia     Surgical Procedure and Date: Lead extraction 19    Diabetes diagnosis year: approximately 10 years ago    Lab Results   Component Value Date    HGBA1C 8.1 (H) 2019       Home Diabetes Medications: Lantus 50 units q HS, Novolog 25 units with meals (vials)    How often checking glucose at home? 3-4x per day   BG readings on regimen: AM 120s  Hypoglycemia on the regimen?  Yes - 2-3x per month  Missed doses on regimen?  Yes - 2x per week    Diabetes Complications include:     Hyperglycemia, Hypoglycemia , Diabetic retinopathy , Diabetic cataract  and Diabetic peripheral neuropathy     Complicating diabetes co morbidities:   CHF and History of CVA      HPI:   Patient is a 78 y.o. male with a diagnosis of DM2 and CHF who was admitted on 19 for possible RV lead infection.  Patient is s/p lead extraction on 19.  Patient's DM managed by PCP, Dr. Hdz.  Endocrine consulted for DM/BG management.            Interval HPI:   Overnight events: remains in CMICU. AICD placement yesterday. BG above goal this AM. Requiring correction scale. Creatinine elevated at 1.9.   Eatin%  Nausea: No  Hypoglycemia and intervention: No  Fever: No  TPN and/or TF: No      BP (!) 102/58 (BP Location: Left arm, Patient Position: Lying)   Pulse 80   Temp 98.3 °F (36.8 °C) (Oral)   Resp 20   Ht 6' 2" (1.88 m)   Wt 83.5 kg (184 lb)   SpO2 100%   BMI 23.62 kg/m²      Labs Reviewed and Include    Recent Labs   Lab 19  0309   *   CALCIUM 8.6*   ALBUMIN 2.2*   PROT 6.5      K 3.4*   CO2 35*   CL 96   BUN 54*   CREATININE 2.0*   ALKPHOS 152*   ALT 16   AST 26   BILITOT 0.7     Lab Results   Component Value Date    WBC 13.55 (H) 2019    HGB 7.3 (L) 2019    HCT 25.1 (L) 2019    MCV 95 2019     2019     No results for input(s): " TSH, FREET4 in the last 168 hours.  Lab Results   Component Value Date    HGBA1C 8.1 (H) 01/07/2019       Nutritional status:   Body mass index is 23.62 kg/m².  Lab Results   Component Value Date    ALBUMIN 2.2 (L) 01/29/2019    ALBUMIN 2.3 (L) 01/28/2019    ALBUMIN 2.4 (L) 01/27/2019     No results found for: PREALBUMIN    Estimated Creatinine Clearance: 36.5 mL/min (A) (based on SCr of 2 mg/dL (H)).    Accu-Checks  Recent Labs     01/27/19  0806 01/27/19  1134 01/27/19  1625 01/27/19  2047 01/28/19  0804 01/28/19  1151 01/28/19  1628 01/28/19  2153 01/29/19  0617 01/29/19  0743   POCTGLUCOSE 199* 254* 197* 171* 207* 318* 297* 280* 120* 129*       Current Medications and/or Treatments Impacting Glycemic Control  Immunotherapy:    Immunosuppressants     None        Steroids:   Hormones (From admission, onward)    None        Pressors:    Autonomic Drugs (From admission, onward)    Start     Stop Route Frequency Ordered    01/23/19 1100  EPINEPHrine (ADRENALIN) 10 mg in sodium chloride 0.9% 250 mL infusion      -- IV Continuous 01/23/19 0956        Hyperglycemia/Diabetes Medications:   Antihyperglycemics (From admission, onward)    Start     Stop Route Frequency Ordered    01/28/19 2100  insulin detemir U-100 pen 9 Units      -- SubQ Nightly 01/28/19 1449    01/28/19 1645  insulin aspart U-100 pen 9 Units      -- SubQ 3 times daily with meals 01/28/19 1449    01/11/19 1358  insulin aspart U-100 pen 0-5 Units      -- SubQ Before meals & nightly PRN 01/11/19 1259          ASSESSMENT and PLAN    * 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   Restart novolog at 10 units with meals.    Discharge planning: Patient is on considerably less  insulin than home dosing.  Patient and spouse both agree patient patient is likely on too much insulin but patient reports no episodes of hypoglycemia at home.  Patient expressing interest to take less shots as he forgets to bring his vial and syring to work - consider other drug therapy upon discharge if possible.       TREY (acute kidney injury)    Caution with insulin stacking  Estimated Creatinine Clearance: 36.5 mL/min (A) (based on SCr of 2 mg/dL (H)).         A-fib    May affect BG readings if uncontrolled  S/p cardioversion.          Cathie Eli NP  Endocrinology  [facility]

## 2019-01-30 NOTE — PLAN OF CARE
Problem: Physical Therapy Goal  Goal: Physical Therapy Goal  Goals to be met by: 19    Patient will increase functional independence with mobility by performin. Supine to sit with Modified [place], with HOB flat   2. Sit to supine with Modified [place], with HOB flat   3. Sit to stand transfer with Supervision  4. Gait  x 100 feet with Supervision with or without using least restrictive AD.   5. Lower extremity exercise program x20 reps per handout, with supervision     Outcome: Ongoing (interventions implemented as appropriate)  Pt is progressing toward goals. All goals remain appropriate.

## 2019-01-30 NOTE — ASSESSMENT & PLAN NOTE
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 Likewise has a CardioMeMMS device in place. Is not pacemaker dependent. ID believes the primary source is the vegetation, he is s/p extraction on 01/09, RV lead tip grew E. Faecalis (resistant to tetracyclines). Recovery complicated by slow junctional escape rhythm responding to dopamine, no response to DCCV. Now with undetermined atrial rhythm which is either sinus bradycardia with aberrantly conducted PAC or AT, or persistent AF with junctional escape and intermittent aberrant conduction of AF, the differences can be attributed due to AV manolo stimulation by inotrope/chronotrope medications resulting in conduction changes. With weaning of chronotropic medications, he is back in a msotly junctional rhythm. Indium scan was negative for signs of CardioMEMs infection. He had slow decline in respiratory status over several days, necessitating a delay in placement. This turned around with initiation of Miesha, and resulted in good diuresis and significant clinical improvement. Doing well s/p implantation of new right side device on 01/30.    - volume/HF management as per primary team  - would recommend weaning inotropes as tolerated with the goal of reducing extra beats  - Continue coumadin with target INR 2-3, can transition back to Eliquis 2.5mg BID once renal function recovers/is stable    Recommendations:  -- upper extremity precautions as discussed with patient during rounds (please see below for additional instructions).  -- Follow up with Device Clinic in 1 week. Follow up with EP clinic (Dr. Boggs) in 3 months.   -- Will sign off, do not hesitate to contact EP consult service with  questions/concerns:     Post-Pacemaker Implantation Discharge Instructions       - Avoid lifting arm over head for approximately 2 weeks       - Avoid lifting or pushing > 10 lbs for approximately 6 weeks (including golf, bowling, etc)       - Avoid driving for 4 weeks post procedure       - Keep area clean and dry for 48 hours, then you may shower (avoid spraying/scrubbing the wound directly)       - Keep arm in sling for 3-4 days after procedure, then at night only       - Avoid swimming or submerging the area until scar is formed       - Follow up in Device Clinic for wound check as above with additional recommendations              --> Notify Cardiology/EP increased redness, warmth, drainage, or re-opening of the wound noted              --> please call [phone] option 2 during business hours or the main [facility] number and ask for on-call for device clinic after hours.

## 2019-01-30 NOTE — PROGRESS NOTES
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 NSVT x1    ROS  Objective:     Vital Signs (Most Recent):  Temp: 97.4 °F (36.3 °C) (01/30/19 0700)  Pulse: 80 (01/30/19 0906)  Resp: 18 (01/30/19 0906)  BP: (!) 110/55 (01/30/19 0906)  SpO2: 97 % (01/30/19 0906) Vital Signs (24h Range):  Temp:  [96.5 °F (35.8 °C)-97.6 °F (36.4 °C)] 97.4 °F (36.3 °C)  Pulse:  [80-87] 80  Resp:  [12-26] 18  SpO2:  [97 %-100 %] 97 %  BP: (105-121)/(53-64) 110/55  Arterial Line BP: ()/(43-51) 102/51     Weight: 83.5 kg (184 lb)  Body mass index is 23.62 kg/m².     SpO2: 97 %  O2 Device (Oxygen Therapy): nasal cannula    Physical Exam   Constitutional: He is oriented to person, place, and time. He appears well-developed and well-nourished. No distress.   Looking well   HENT:   Head: Normocephalic and atraumatic.   Mouth/Throat: Oropharynx is clear and moist.   Eyes: Conjunctivae and EOM are normal. Pupils are equal, round, and reactive to light. No scleral icterus.   Neck: Normal range of motion. Neck supple. No JVD present.   Cardiovascular: Normal rate, normal heart sounds and intact distal pulses. An irregular rhythm present.   No murmur heard.  Pulses:       Radial pulses are 2+ on the right side, and 2+ on the left side.   Pulmonary/Chest: Effort normal and breath sounds normal. No respiratory distress.   Symmetrical expansion  On NC  Right chest with intact dressing, no signs of hematoma   Abdominal: Soft. Bowel sounds are normal. There is no hepatosplenomegaly. There is no tenderness.   Musculoskeletal: Normal range of motion.   Neurological:  He is alert and oriented to person, place, and time. No cranial nerve deficit.   Skin: Skin is warm and dry. No rash noted. He is not diaphoretic.   Psychiatric: He has a normal mood and affect. Judgment and thought content normal.       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 Likewise has a CardioMeMMS device in place. Is not pacemaker dependent. ID believes the primary source is the vegetation, he is s/p extraction on 01/09, RV lead tip grew E. Faecalis (resistant to tetracyclines). Recovery complicated by slow junctional escape rhythm responding to dopamine, no response to DCCV. Now with undetermined atrial rhythm which is either sinus bradycardia with aberrantly conducted PAC or AT, or persistent AF with junctional escape and intermittent aberrant conduction of AF, the differences can be attributed due to AV manolo stimulation by inotrope/chronotrope medications resulting in conduction changes. With weaning of chronotropic medications, he is back in a msotly junctional  rhythm. Indium scan was negative for signs of CardioMEMs infection. He had slow decline in respiratory status over several days, necessitating a delay in placement. This turned around with initiation of Miesha, and resulted in good diuresis and significant clinical improvement. Doing well s/p implantation of new right side device on 01/30.    - volume/HF management as per primary team  - would recommend weaning inotropes as tolerated with the goal of reducing extra beats  - Continue coumadin with target INR 2-3, can transition back to Eliquis 2.5mg BID once renal function recovers/is stable    Recommendations:  -- upper extremity precautions as discussed with patient during rounds (please see below for additional instructions).  -- Follow up with Device Clinic in 1 week. Follow up with EP clinic (Dr. Boggs) in 3 months.   -- Will sign off, do not hesitate to contact EP consult service with questions/concerns:     Post-Pacemaker Implantation Discharge Instructions       - Avoid lifting arm over head for approximately 2 weeks       - Avoid lifting or pushing > 10 lbs for approximately 6 weeks (including golf, bowling, etc)       - Avoid driving for 4 weeks post procedure       - Keep area clean and dry for 48 hours, then you may shower (avoid spraying/scrubbing the wound directly)       - Keep arm in sling for 3-4 days after procedure, then at night only       - Avoid swimming or submerging the area until scar is formed       - Follow up in Device Clinic for wound check as above with additional recommendations              --> Notify Cardiology/EP increased redness, warmth, drainage, or re-opening of the wound noted              --> please call [phone] option 2 during business hours or the main [facility] number and ask for on-call for device clinic after hours.           Oniel George MD  Cardiac Electrophysiology  [facility]

## 2019-01-30 NOTE — ASSESSMENT & PLAN NOTE
-Vegetation seen on RICARDO  -2 mobile masses seen on CVC by RICARDO 1/14 (has both RUE PICC and RIJ TLC). Discussed with Dr. Russo. Blood cultures repeated 1/15 and were -ve  -Appreciate EP's help. 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 Patient had 2 vegetations on RV lead which is most likely source of bacteremia. Lead tip and ICD pocket cultured with RV lead +ve for Enteroccocus. Blood cultures are negative here are -ve with last culture done 1/24  -Continue Ampicillin (renally dosed given TREY) and Ceftriaxone per ID recs Will treat for 6 weeks post extraction.  Estimated end date 2/20/19.    -S/P successful reimplantation of BiV ICD 1/29. EP considering repeat RICARDO/DCCV A fib at some point. Will perform DFT as an outpatient   -Appreciate GI's help. Plan for outpatient EGD and C-scope for eval of anemia and Enterococcus bacteremia  -Repeated blood cultures, cultured RIJ line tip 1/24 and UA with reflex due to WBCs increased (afebrile) - UA OK, other cultures with NGTD

## 2019-01-30 NOTE — SUBJECTIVE & OBJECTIVE
**Interval History: Feels great after reimplantation of BiV ICD yesterday, but was orthostatic when getting OOB to chair. BP normalized once he sat in chair for a couple of minutes. Net -ve > 3.7L past 24 hours, and CVP is down to 5.     Continuous Infusions:   epinephrine infusion (NON-TITRATING) 0.04 mcg/kg/min (01/30/19 0900)     Scheduled Meds:   ampicillin IVPB  2 g Intravenous Q6H    artificial tears  1 drop Both Eyes QID    cefTRIAXone (ROCEPHIN) IVPB  2 g Intravenous Q12H    clopidogrel  75 mg Oral Daily    ferrous sulfate  325 mg Oral BID    insulin aspart U-100  10 Units Subcutaneous TIDWM    insulin detemir U-100  10 Units Subcutaneous QHS    levothyroxine  25 mcg Oral Before breakfast    metOLazone  5 mg Oral Daily    polyethylene glycol  17 g Oral Daily    potassium chloride SA  40 mEq Oral BID    potassium chloride  40 mEq Oral Once    rosuvastatin  20 mg Oral QHS    senna-docusate 8.6-50 mg  2 tablet Oral BID    tamsulosin  0.4 mg Oral Daily    warfarin  2 mg Oral Once     PRN Meds:acetaminophen, bupivacaine (PF), dextrose 50%, dextrose 50%, glucagon (human recombinant), glucose, glucose, glycerin adult, hydrOXYzine HCl, insulin aspart U-100, lidocaine HCL 20 mg/ml (2%), magnesium sulfate IVPB, magnesium sulfate IVPB, ondansetron, ramelteon, sodium chloride 0.9%, sodium chloride 0.9%, traMADol    Review of patient's allergies indicates:   Allergen Reactions    Adhesive Other (See Comments)     blisters    Codeine Other (See Comments)     Blurred vision    Latex Hives    Ace inhibitors     Lipitor [atorvastatin] Other (See Comments)     Muscle spasms    Xanax [alprazolam] Hallucinations     Objective:     Vital Signs (Most Recent):  Temp: 97.4 °F (36.3 °C) (01/30/19 0700)  Pulse: 80 (01/30/19 1000)  Resp: (!) 22 (01/30/19 1000)  BP: (!) 108/56 (01/30/19 1000)  SpO2: 100 % (01/30/19 1000) Vital Signs (24h Range):  Temp:  [96.5 °F (35.8 °C)-97.6 °F (36.4 °C)] 97.4 °F (36.3  °C)  Pulse:  [80-86] 80  Resp:  [12-26] 22  SpO2:  [97 %-100 %] 100 %  BP: (105-121)/(53-64) 108/56  Arterial Line BP: ()/(34-51) 99/34     No data found.  Body mass index is 23.62 kg/m².      Intake/Output Summary (Last 24 hours) at 1/30/2019 1108  Last data filed at 1/30/2019 0900  Gross per 24 hour   Intake 2748.13 ml   Output 5685 ml   Net -2936.87 ml       Hemodynamic Parameters:       Telemetry: A fib, intermittently BiV paced    Physical Exam   Constitutional: He is oriented to person, place, and time. He appears well-developed and well-nourished.   HENT:   Head: Normocephalic and atraumatic.   Eyes: Conjunctivae and EOM are normal.   Neck: Normal range of motion. Neck supple. No JVD present. No thyromegaly present.   Cardiovascular: Regular rhythm.   Irregular   Pulmonary/Chest: Effort normal and breath sounds normal.   Abdominal: Soft. Bowel sounds are normal.   Musculoskeletal: Normal range of motion. He exhibits no edema.   Neurological: He is alert and oriented to person, place, and time.   Skin: Skin is warm and dry. Capillary refill takes 2 to 3 seconds.   Psychiatric: He has a normal mood and affect. His behavior is normal. Judgment and thought content normal.       Significant Labs:  CBC:  Recent Labs   Lab 01/28/19 0423 01/29/19  0309 01/30/19  0330   WBC 13.57* 13.55* 13.51*   RBC 2.62* 2.63* 2.62*   HGB 7.6* 7.3* 7.6*   HCT 25.8* 25.1* 25.8*    231 211   MCV 99* 95 99*   MCH 29.0 27.8 29.0   MCHC 29.5* 29.1* 29.5*     BNP:  Recent Labs   Lab 01/29/19  0608   BNP 1,219*     CMP:  Recent Labs   Lab 01/28/19  0423  01/29/19  0309 01/29/19  1853 01/30/19  0330   *  --  141*  --  245*   CALCIUM 8.8  --  8.6*  --  9.0   ALBUMIN 2.3*  --  2.2*  --  2.2*   PROT 6.5  --  6.5  --  6.4     --  142  --  142   K 3.7   < > 3.4* 3.4* 3.8   CO2 33*  --  35*  --  35*   CL 96  --  96  --  94*   BUN 55*  --  54*  --  50*   CREATININE 1.9*  --  2.0*  --  1.9*   ALKPHOS 147*  --  152*  --   144*   ALT 17  --  16  --  15   AST 27  --  26  --  24   BILITOT 0.7  --  0.7  --  0.9    < > = values in this interval not displayed.      Coagulation:   Recent Labs   Lab 01/28/19  0423 01/29/19  0309 01/30/19  0330   INR 2.3* 2.6* 2.6*     LDH:  No results for input(s): LDH in the last 72 hours.  Microbiology:  Microbiology Results (last 7 days)     Procedure Component Value Units Date/Time    Blood culture [[id number]] Collected:  01/24/19 0940    Order Status:  Completed Specimen:  Blood from Peripheral, Hand, Right Updated:  01/29/19 1212     Blood Culture, Routine No growth after 5 days.    Blood culture [[id number]] Collected:  01/24/19 0950    Order Status:  Completed Specimen:  Blood from Peripheral, Forearm, Right Updated:  01/29/19 1212     Blood Culture, Routine No growth after 5 days.    IV catheter culture [[id number]] Collected:  01/24/19 1120    Order Status:  Completed Specimen:  Catheter Tip, Intrajugular Updated:  01/26/19 1138     Aerobic Culture - Cath tip No growth          I have reviewed all pertinent labs within the past 24 hours.    Estimated Creatinine Clearance: 38.5 mL/min (A) (based on SCr of 1.9 mg/dL (H)).    Diagnostic Results:  I have reviewed all pertinent imaging results/findings within the past 24 hours.

## 2019-01-30 NOTE — PT/OT/SLP PROGRESS
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 . Performance deficits affecting function are weakness, impaired functional mobilty, gait instability, impaired endurance, impaired balance, impaired self care skills. Pt tolerated session well. Pt with c/o of dizziness with decreased BP noted with activity. Pt with good effort and cooperation and OT anticipates pt will progress well.     Rehab Prognosis:  Good; patient would benefit from acute skilled OT services to address these deficits and reach maximum level of function.       Plan:     Patient to be seen 3 x/week to address the above listed problems via self-care/home management, therapeutic activities, therapeutic exercises  · Plan of Care Expires: 02/19/19  · Plan of Care Reviewed with: patient, spouse    Subjective     Pain/Comfort:  · Pain Rating 1: 0/10    Objective:     Communicated with: nsg prior to session.   Pt found seated EOB with nsg present and spouse present.     General Precautions: Standard, fall   Orthopedic Precautions:N/A       Occupational Performance:     Functional Mobility/Transfers:  · Sit>stand with MIN A   · Stand pivot with CGA.    Activities of Daily Living:  · Feeding: independent  · G/H: seated with set-up  · UE dressing: MAX A   · LE dressing: TOTAL A       AMPAC 6 Click ADL: 12    Treatment & Education:  Education provided to pt/spouse re: wearing schedule of sling/swath post ICD placement. OT demo to spouse the technique to ruel/doff sling/swath. OT educated  pt/spouse re: restrictions to right UE given new ICD placement and both verb understanding. Pt did need cues during functional mobility/ADl skills to adhere to restrictions.  Pt did have dizziness in stand and BP decreased with activity. Medical team arriving to room to make adjustments to meds. BP stabilized and deemed appropriate to remain in chair with spouse, medical team and nsg present.     Patient left up in chair with all lines intact, call button in reach and nsg, spouse and medical team presentEducation:    Sling/swathin in place upon OT departure.   GOALS:   Multidisciplinary Problems     Occupational Therapy Goals        Problem: Occupational Therapy Goal    Goal Priority Disciplines Outcome Interventions   Occupational Therapy Goal     OT, PT/OT Ongoing (interventions implemented as appropriate)    Description:  Goals to be met by: 2/4/19     Patient will increase functional independence with ADLs by performing:    UE Dressing with Supervision.  LE Dressing with Supervision.  Grooming while standing at sink with Stand-by Assistance.  Toileting from toilet with Stand-by Assistance for hygiene and clothing management.   Toilet transfer to toilet with Stand-by Assistance.                      Time Tracking:     OT Date of Treatment: 01/30/19  OT Start Time: 0930  OT Stop Time: 0949  OT Total Time (min): 19 min    Billable Minutes:Therapeutic Activity 10    ELAYNE Gonsales  1/30/2019

## 2019-01-30 NOTE — PROGRESS NOTES
[facility]  Right chest AICD intact with pressure dressing, sling intact. Pt. Drowsy but arouses to voice and follows commands, afebrile. Epi and Lasix gtts continued. 100% ventricular paced rhythm with HR 80bpm. Other VSS. 3L NC with NO @ 10ppm continued. WCTM and update team appropriately.

## 2019-01-30 NOTE — ASSESSMENT & PLAN NOTE
-S/P Unsuccessful RICARDO guided/DCCV 1/14. Remains in A fib  -S/P successful reimplantation of BiV ICD 1/29  -Weaning Miesha and Epi  -CHADS VASc 6  -Holding eliquis s/p ICD explant 1/9. Per EP, anticoagulation resumed 1/12 with Coumadin. INR today 2.6 (goal 2.0-3.0)  -Cardiac monitoring

## 2019-01-31 NOTE — ASSESSMENT & PLAN NOTE
-S/P Unsuccessful RICARDO guided/DCCV 1/14. Remains in A fib  -S/P successful reimplantation of BiV ICD 1/29  -Weaned off Epi and Miesha  -CHADS VASc 6  -Holding eliquis s/p ICD explant 1/9. Per EP, anticoagulation resumed 1/12 with Coumadin. INR today 2.8 (goal 2.0-3.0)  -Cardiac monitoring

## 2019-01-31 NOTE — ASSESSMENT & PLAN NOTE
-ICM.  -Echo 1/25/19: LVEF 20%, LVEDD 6.85 cm. Mod TR, PHTN(PAS 79).  -CVP 7. Lasix was D/C'd yesterday and he was given 250 cc IV NS for orthostatic LH. No orthostatic BP changes this morning  -Miesha weaned off 1/30, and Epi weaned off today  -GDMT: Dig and Toprol D/C'd 2/2 junctional rhythm. Entresto D/C'd 2/2 transient hypotension during/after ICD explant and TREY.  -Self declined for LVAD after w/u last April 2018.  -Transfer to step down today

## 2019-01-31 NOTE — ASSESSMENT & PLAN NOTE
- WCC has normalized.   -RIJ D/C'd 1/24 and tip cultured and -ve. Blood cultures also from 1/24 -ve. UA OK

## 2019-01-31 NOTE — NURSING TRANSFER
Nursing Transfer Note      1/31/2019     Transfer to [facility] room 322 from [facility] room 6081    Transfer via wheelchair    Transfer with patient belongings, wife's belongings    Transported by FLORENTINO Parker    Medicines sent: Novolog, Levemir, Silvadene cream and eye drops     Chart send with patient: Tubed to tube #99    Notified: CSU nurseAlejandra

## 2019-01-31 NOTE — PLAN OF CARE
Problem: Adult Inpatient Plan of Care  Goal: Plan of Care Review  Problem: Adult Inpatient Plan of Care  Goal: Plan of Care Review  Outcome: Ongoing (interventions implemented as appropriate)  Neurological: AAOx4  Pulmonary: on RA  Cardiovascular: CVP 8-10. Paced rhythm on monitor; underlying rhythm A fib. Symptomatic orthostatic hypotension when up to BSC.  Gastrointestinal: TLC/Diabetic diet  Genitourinary: Morin in place  Integumentary/Other: AICD to right subclavian.  Site covered with aquacel dressing.   Lines/Drains: L radial a line with appropriate waveform, tubing changed; FREDERICK PICC- Epi 0.02mcg/kg/min  Infusions:   Patient progressing towards goals as tolerated, plan of care communicated and reviewed with patient. All concerns addressed. Will continue to monitor.

## 2019-01-31 NOTE — PROGRESS NOTES
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 No orthostatic BP changes this morning. CVP 7. Epi D/C this morning. Less extra beats on tele.    Continuous Infusions:  Scheduled Meds:   ampicillin IVPB  2 g Intravenous Q6H    artificial tears  1 drop Both Eyes QID    cefTRIAXone (ROCEPHIN) IVPB  2 g Intravenous Q12H    clopidogrel  75 mg Oral Daily    ferrous sulfate  325 mg Oral BID    insulin aspart U-100  10 Units Subcutaneous TIDWM    insulin detemir U-100  10 Units Subcutaneous QHS    levothyroxine  25 mcg Oral Before breakfast    polyethylene glycol  17 g Oral Daily    potassium chloride SA  40 mEq Oral BID    potassium chloride  40 mEq Oral Once    rosuvastatin  20 mg Oral QHS    senna-docusate 8.6-50 mg  2 tablet Oral BID    tamsulosin  0.4 mg Oral Daily     PRN Meds:acetaminophen, bupivacaine (PF), dextrose 50%, dextrose 50%, glucagon (human recombinant), glucose, glucose, glycerin adult, hydrOXYzine HCl, insulin aspart U-100, lidocaine HCL 20 mg/ml (2%), magnesium sulfate IVPB, magnesium sulfate IVPB, ondansetron, ramelteon, sodium chloride 0.9%, sodium chloride 0.9%, traMADol    Review of patient's allergies indicates:   Allergen Reactions    Adhesive Other (See Comments)     blisters    Codeine Other (See Comments)     Blurred vision    Latex Hives    Ace inhibitors     Lipitor [atorvastatin] Other (See Comments)     Muscle spasms    Xanax [alprazolam] Hallucinations     Objective:     Vital Signs (Most Recent):  Temp: 98 °F (36.7 °C) (01/31/19 0800)  Pulse: 80 (01/31/19 1015)  Resp: 11  (01/31/19 1015)  BP: 107/62 (01/31/19 0800)  SpO2: 100 % (01/31/19 1015) Vital Signs (24h Range):  Temp:  [97.6 °F (36.4 °C)-98.5 °F (36.9 °C)] 98 °F (36.7 °C)  Pulse:  [79-81] 80  Resp:  [10-34] 11  SpO2:  [95 %-100 %] 100 %  BP: ()/(34-67) 107/62  Arterial Line BP: ()/(30-54) 127/53     No data found.  Body mass index is 23.62 kg/m².      Intake/Output Summary (Last 24 hours) at 1/31/2019 1039  Last data filed at 1/31/2019 0800  Gross per 24 hour   Intake 2027.25 ml   Output 1650 ml   Net 377.25 ml       Hemodynamic Parameters:       Telemetry: A fib, mostly BiV paced    Physical Exam   Constitutional: He is oriented to person, place, and time. He appears well-developed and well-nourished.   HENT:   Head: Normocephalic and atraumatic.   Eyes: Conjunctivae and EOM are normal.   Neck: Normal range of motion. Neck supple. No JVD present. No thyromegaly present.   Cardiovascular: Regular rhythm.   Irregular   Pulmonary/Chest: Effort normal and breath sounds normal.   Abdominal: Soft. Bowel sounds are normal.   Musculoskeletal: Normal range of motion. He exhibits no edema.   Neurological: He is alert and oriented to person, place, and time.   Skin: Skin is warm and dry. Capillary refill takes 2 to 3 seconds.   Psychiatric: He has a normal mood and affect. His behavior is normal. Judgment and thought content normal.       Significant Labs:  CBC:  Recent Labs   Lab 01/29/19  0309 01/30/19  0330 01/31/19  0406   WBC 13.55* 13.51* 10.50   RBC 2.63* 2.62* 2.60*   HGB 7.3* 7.6* 7.3*   HCT 25.1* 25.8* 24.8*    211 185   MCV 95 99* 95   MCH 27.8 29.0 28.1   MCHC 29.1* 29.5* 29.4*     BNP:  Recent Labs   Lab 01/29/19  0608   BNP 1,219*     CMP:  Recent Labs   Lab 01/29/19  0309 01/29/19  1853 01/30/19  0330 01/31/19  0406   *  --  245* 204*   CALCIUM 8.6*  --  9.0 8.7   ALBUMIN 2.2*  --  2.2* 2.2*   PROT 6.5  --  6.4 6.2     --  142 139   K 3.4* 3.4* 3.8 3.5   CO2 35*  --  35* 34*   CL 96  --   94* 95   BUN 54*  --  50* 47*   CREATININE 2.0*  --  1.9* 1.9*   ALKPHOS 152*  --  144* 148*   ALT 16  --  15 14   AST 26  --  24 23   BILITOT 0.7  --  0.9 0.7      Coagulation:   Recent Labs   Lab 01/29/19  0309 01/30/19  0330 01/31/19  0406   INR 2.6* 2.6* 2.8*     LDH:  No results for input(s): LDH in the last 72 hours.  Microbiology:  Microbiology Results (last 7 days)     Procedure Component Value Units Date/Time    Blood culture [[id number]] Collected:  01/24/19 0940    Order Status:  Completed Specimen:  Blood from Peripheral, Hand, Right Updated:  01/29/19 1212     Blood Culture, Routine No growth after 5 days.    Blood culture [[id number]] Collected:  01/24/19 0950    Order Status:  Completed Specimen:  Blood from Peripheral, Forearm, Right Updated:  01/29/19 1212     Blood Culture, Routine No growth after 5 days.    IV catheter culture [[id number]] Collected:  01/24/19 1120    Order Status:  Completed Specimen:  Catheter Tip, Intrajugular Updated:  01/26/19 1138     Aerobic Culture - Cath tip No growth          I have reviewed all pertinent labs within the past 24 hours.    Estimated Creatinine Clearance: 38.5 mL/min (A) (based on SCr of 1.9 mg/dL (H)).    Diagnostic Results:  I have reviewed all pertinent imaging results/findings within the past 24 hours.    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 Discussed with Dr. Russo. Blood cultures repeated 1/15 and were -ve  -Appreciate EP's help. 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 Patient had 2 vegetations on RV lead which is most likely source of bacteremia. Lead tip and ICD pocket cultured with RV lead +ve for Enteroccocus. Blood cultures are negative here are -ve with last culture done 1/24  -Continue Ampicillin (renally dosed given TREY) and Ceftriaxone per ID recs  Will treat for 6 weeks post extraction.  Estimated end date 2/20/19.    -S/P successful reimplantation of BiV ICD 1/29. EP considering repeat RICARDO/DCCV A fib at some point. Will perform DFT as an outpatient   -Appreciate GI's help. Plan for outpatient EGD and C-scope for eval of anemia and Enterococcus bacteremia  -Repeated blood cultures, cultured RIJ line tip 1/24 and UA with reflex due to WBCs increased (afebrile) - UA OK, other cultures -ve     Acute on chronic combined systolic and diastolic CHF, NYHA class 3    -ICM.  -Echo 1/25/19: LVEF 20%, LVEDD 6.85 cm. Mod TR, PHTN(PAS 79).  -CVP 7. Lasix was D/C'd yesterday and he was given 250 cc IV NS for orthostatic LH. No orthostatic BP changes this morning  -Miesha weaned off 1/30, and Epi weaned off today  -GDMT: Dig and Toprol D/C'd 2/2 junctional rhythm. Entresto D/C'd 2/2 transient hypotension during/after ICD explant and TREY.  -Self declined for LVAD after w/u last April 2018.  -Transfer to step down today      A-fib    -S/P Unsuccessful RICARDO guided/DCCV 1/14. Remains in A fib  -S/P successful reimplantation of BiV ICD 1/29  -Weaned off Epi and Miesha  -CHADS VASc 6  -Holding eliquis s/p ICD explant 1/9. Per EP, anticoagulation resumed 1/12 with Coumadin. INR today 2.8 (goal 2.0-3.0)  -Cardiac monitoring     Type 2 diabetes mellitus with complication    -A1c 8.1  -Target -180 while hospitalized  -detemir 10 q HS, Novolog 10 units with meals, SSI  -Appreciate Endocrine's help with management     Debility    - PT/OT     Leukocytosis    - WCC has normalized.   -RIJ D/C'd 1/24 and tip cultured and -ve. Blood cultures also from 1/24 -ve. UA OK     Dysuria    - Resolved  - Urine culture -ve  - Flomax started 1/10. Will try to D/C Morin since off aggressive diuresis         Junctional bradycardia    - See A fib     Endocarditis    - See ICD lead infection     Bacteremia    -see above     TREY (acute kidney injury)    - Appreciate Nephrology's help  - Was oliguric and  creatinine jumped to 4.1 likely 2/2 ATN/hypotension following ICD explant.   - BUN/creatinine essentially unchanged today at 47/1.9. See acute on chronic systolic heart failure  - Miesha and Epi weaned off  - Hold Entresto for now       Uninterrupted Critical Care/Counseling Time (not including procedures): 45 minutes      Dionna Lora NP [id number]  Heart Transplant  [facility]

## 2019-01-31 NOTE — NURSING
1040 Pt assisted to bsc without minimal assistance. Pt had episode of symptomatic orthostatic hypotension.     1045 Pt assisted back to bed and reports feeling better now. V/S WDL. WCTM.

## 2019-01-31 NOTE — ASSESSMENT & PLAN NOTE
-A1c 8.1  -Target -180 while hospitalized  -detemir 10 q HS, Novolog 10 units with meals, SSI  -Appreciate Endocrine's help with management

## 2019-01-31 NOTE — CARE UPDATE
BG goal 140-180. BG at or slightly above goal with scheduled insulin. Trending down today.     continue Levemir to 10 units q HS   BG monitoring ac/hs and low dose correction scale.   continue novolog at 10 units with meals.      ** Please call Endocrine for any BG related issues **

## 2019-01-31 NOTE — ANESTHESIA POSTPROCEDURE EVALUATION
"Anesthesia Post Evaluation    Patient: Jerry Solis    Procedure(s) Performed: Procedure(s) (LRB):  INSERTION, ICD, BIVENTRICULAR (Left)    Final Anesthesia Type: general  Patient location during evaluation: PACU  Patient participation: Yes- Able to Participate  Level of consciousness: awake and alert  Post-procedure vital signs: reviewed and stable  Pain management: adequate  Airway patency: patent  PONV status at discharge: No PONV  Anesthetic complications: no      Cardiovascular status: blood pressure returned to baseline  Respiratory status: unassisted, spontaneous ventilation and room air  Hydration status: euvolemic  Follow-up not needed.        Visit Vitals  /62 (BP Location: Left arm, Patient Position: Lying)   Pulse 80   Temp 36.7 °C (98 °F) (Axillary)   Resp 11   Ht 6' 2" (1.88 m)   Wt 83.5 kg (184 lb)   SpO2 100%   BMI 23.62 kg/m²       Pain/Danny Score: Pain Rating Prior to Med Admin: 5 (1/31/2019 12:40 PM)  Pain Rating Post Med Admin: 0 (1/30/2019  6:15 AM)        "

## 2019-01-31 NOTE — NURSING
Patient sitting in recliner.  He wants to use the BSC.  Due to Mr. Solis's orthostatic hypotension, RN had patient sit upright and march his legs for about a minute or so prior to standing.  Upon standing, patient c/o dizziness/gait unsteady/knees weak. Assisted patient back to the chair - patient c/o feeling weak and dizzy.  MAP dropped into the 40s.  Called Dionna Lora NP to notify.  Received a 1 time order for 250cc of NS (bolus).  RN  assisted patient back to bed with the help of FLORENTINO Varghese, and will monitor patient very closely.

## 2019-01-31 NOTE — ASSESSMENT & PLAN NOTE
- Resolved  - Urine culture -ve  - Flomax started 1/10. Will try to D/C Mikel since off aggressive diuresis

## 2019-01-31 NOTE — ASSESSMENT & PLAN NOTE
-Vegetation seen on RICARDO  -2 mobile masses seen on CVC by RICADRO 1/14 (has both RUE PICC and RIJ TLC). Discussed with Dr. Russo. Blood cultures repeated 1/15 and were -ve  -Appreciate EP's help. 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 Patient had 2 vegetations on RV lead which is most likely source of bacteremia. Lead tip and ICD pocket cultured with RV lead +ve for Enteroccocus. Blood cultures are negative here are -ve with last culture done 1/24  -Continue Ampicillin (renally dosed given TREY) and Ceftriaxone per ID recs Will treat for 6 weeks post extraction.  Estimated end date 2/20/19.    -S/P successful reimplantation of BiV ICD 1/29. EP considering repeat RICARDO/DCCV A fib at some point. Will perform DFT as an outpatient   -Appreciate GI's help. Plan for outpatient EGD and C-scope for eval of anemia and Enterococcus bacteremia  -Repeated blood cultures, cultured RIJ line tip 1/24 and UA with reflex due to WBCs increased (afebrile) - UA OK, other cultures -ve

## 2019-01-31 NOTE — SUBJECTIVE & OBJECTIVE
**Interval History: Had an episode of hypotension last night while sitting on BSC. No orthostatic BP changes this morning. CVP 7. Epi D/C this morning. Less extra beats on tele.    Continuous Infusions:  Scheduled Meds:   ampicillin IVPB  2 g Intravenous Q6H    artificial tears  1 drop Both Eyes QID    cefTRIAXone (ROCEPHIN) IVPB  2 g Intravenous Q12H    clopidogrel  75 mg Oral Daily    ferrous sulfate  325 mg Oral BID    insulin aspart U-100  10 Units Subcutaneous TIDWM    insulin detemir U-100  10 Units Subcutaneous QHS    levothyroxine  25 mcg Oral Before breakfast    polyethylene glycol  17 g Oral Daily    potassium chloride SA  40 mEq Oral BID    potassium chloride  40 mEq Oral Once    rosuvastatin  20 mg Oral QHS    senna-docusate 8.6-50 mg  2 tablet Oral BID    tamsulosin  0.4 mg Oral Daily     PRN Meds:acetaminophen, bupivacaine (PF), dextrose 50%, dextrose 50%, glucagon (human recombinant), glucose, glucose, glycerin adult, hydrOXYzine HCl, insulin aspart U-100, lidocaine HCL 20 mg/ml (2%), magnesium sulfate IVPB, magnesium sulfate IVPB, ondansetron, ramelteon, sodium chloride 0.9%, sodium chloride 0.9%, traMADol    Review of patient's allergies indicates:   Allergen Reactions    Adhesive Other (See Comments)     blisters    Codeine Other (See Comments)     Blurred vision    Latex Hives    Ace inhibitors     Lipitor [atorvastatin] Other (See Comments)     Muscle spasms    Xanax [alprazolam] Hallucinations     Objective:     Vital Signs (Most Recent):  Temp: 98 °F (36.7 °C) (01/31/19 0800)  Pulse: 80 (01/31/19 1015)  Resp: 11 (01/31/19 1015)  BP: 107/62 (01/31/19 0800)  SpO2: 100 % (01/31/19 1015) Vital Signs (24h Range):  Temp:  [97.6 °F (36.4 °C)-98.5 °F (36.9 °C)] 98 °F (36.7 °C)  Pulse:  [79-81] 80  Resp:  [10-34] 11  SpO2:  [95 %-100 %] 100 %  BP: ()/(34-67) 107/62  Arterial Line BP: ()/(30-54) 127/53     No data found.  Body mass index is 23.62 kg/m².      Intake/Output  Summary (Last 24 hours) at 1/31/2019 1039  Last data filed at 1/31/2019 0800  Gross per 24 hour   Intake 2027.25 ml   Output 1650 ml   Net 377.25 ml       Hemodynamic Parameters:       Telemetry: A fib, mostly BiV paced    Physical Exam   Constitutional: He is oriented to person, place, and time. He appears well-developed and well-nourished.   HENT:   Head: Normocephalic and atraumatic.   Eyes: Conjunctivae and EOM are normal.   Neck: Normal range of motion. Neck supple. No JVD present. No thyromegaly present.   Cardiovascular: Regular rhythm.   Irregular   Pulmonary/Chest: Effort normal and breath sounds normal.   Abdominal: Soft. Bowel sounds are normal.   Musculoskeletal: Normal range of motion. He exhibits no edema.   Neurological: He is alert and oriented to person, place, and time.   Skin: Skin is warm and dry. Capillary refill takes 2 to 3 seconds.   Psychiatric: He has a normal mood and affect. His behavior is normal. Judgment and thought content normal.       Significant Labs:  CBC:  Recent Labs   Lab 01/29/19  0309 01/30/19  0330 01/31/19  0406   WBC 13.55* 13.51* 10.50   RBC 2.63* 2.62* 2.60*   HGB 7.3* 7.6* 7.3*   HCT 25.1* 25.8* 24.8*    211 185   MCV 95 99* 95   MCH 27.8 29.0 28.1   MCHC 29.1* 29.5* 29.4*     BNP:  Recent Labs   Lab 01/29/19  0608   BNP 1,219*     CMP:  Recent Labs   Lab 01/29/19  0309 01/29/19  1853 01/30/19  0330 01/31/19  0406   *  --  245* 204*   CALCIUM 8.6*  --  9.0 8.7   ALBUMIN 2.2*  --  2.2* 2.2*   PROT 6.5  --  6.4 6.2     --  142 139   K 3.4* 3.4* 3.8 3.5   CO2 35*  --  35* 34*   CL 96  --  94* 95   BUN 54*  --  50* 47*   CREATININE 2.0*  --  1.9* 1.9*   ALKPHOS 152*  --  144* 148*   ALT 16  --  15 14   AST 26  --  24 23   BILITOT 0.7  --  0.9 0.7      Coagulation:   Recent Labs   Lab 01/29/19  0309 01/30/19  0330 01/31/19  0406   INR 2.6* 2.6* 2.8*     LDH:  No results for input(s): LDH in the last 72 hours.  Microbiology:  Microbiology Results (last 7  days)     Procedure Component Value Units Date/Time    Blood culture [[id number]] Collected:  01/24/19 0940    Order Status:  Completed Specimen:  Blood from Peripheral, Hand, Right Updated:  01/29/19 1212     Blood Culture, Routine No growth after 5 days.    Blood culture [[id number]] Collected:  01/24/19 0950    Order Status:  Completed Specimen:  Blood from Peripheral, Forearm, Right Updated:  01/29/19 1212     Blood Culture, Routine No growth after 5 days.    IV catheter culture [[id number]] Collected:  01/24/19 1120    Order Status:  Completed Specimen:  Catheter Tip, Intrajugular Updated:  01/26/19 1138     Aerobic Culture - Cath tip No growth          I have reviewed all pertinent labs within the past 24 hours.    Estimated Creatinine Clearance: 38.5 mL/min (A) (based on SCr of 1.9 mg/dL (H)).    Diagnostic Results:  I have reviewed all pertinent imaging results/findings within the past 24 hours.

## 2019-01-31 NOTE — NURSING
Orthostatic vitals obtained -     Lying - HR: 80, BP on A line: 104/40    Sitting - HR: 88, BP on A line: 106/48    Standing - HR: 80, BP on A line:  121/69    Patient's HR while sitting on BSC (after standing for 2 minutes) - 80, BP on A line:  82/37

## 2019-01-31 NOTE — PLAN OF CARE
Problem: Adult Inpatient Plan of Care  Goal: Plan of Care Review  Outcome: Ongoing (interventions implemented as appropriate)  Patient alert and oriented x 4.       Respiratory -  Nitric weaned off.  Now on RA with  O2 sat >92% all shift.       Cardiovascular - Paced on monitor.  Underlying rhythm A fib.  HR - 70-80s.  Patient symptomatically orthostatic today with getting OOB/standing.  1 x 250cc bolus given.  Mr. Solis sat OOB in the recliner x ~ 2 hours.  CVP 7/11/6.   Pulses palpable.      GI - 1 large BM this shift.  He ate 100% of his breakfast and dinner trays.        - Morin intact and patent - draining 100-300cc/hr of clear yellow urine.       AICD to right subclavian.  Site covered with aquacel dressing.  Reports some tenderness upon palpation but denied the need for pain meds all day.      Wife @ bedside all day.      Problem: Fall Injury Risk  Goal: Absence of Fall and Fall-Related Injury    Intervention: Identify and Manage Contributors to Fall Injury Risk  Patient became dizzy earlier today with a drop in BP.  Lasix stopped.  1 x 250cc NS bolus given with improvement.  Patient reports feeling much better this evening.

## 2019-01-31 NOTE — ASSESSMENT & PLAN NOTE
- Appreciate Nephrology's help  - Was oliguric and creatinine jumped to 4.1 likely 2/2 ATN/hypotension following ICD explant.   - BUN/creatinine essentially unchanged today at 47/1.9. See acute on chronic systolic heart failure  - Miesha and Epi weaned off  - Hold Entresto for now

## 2019-01-31 NOTE — PROGRESS NOTES
Update:    SW to pt's room for update. Pt and wife aaox4, calm, and pleasant. Pt and wife report coping adequately at this time with no needs from SW. SW providing ongoing psychosocial and counseling support, education, assistance, resources, and discharge planning as indicated. SW continuing to follow and remains available.

## 2019-01-31 NOTE — PLAN OF CARE
Interval update:   Patient progress well. Off Epi / Miesha. PPM pocket unchanged. Up in chair this morning, no new complaints.   - INR 2.8 ( coumadin 2mg 01/30/19).               Please call EP when patient is stable and optimized from HF standpoint and closer to DC for DFT testing.    Thank you    Dorys Garcia M.D.  Page # [phone]  Cardiovascular Fellow PGY-IV  [facility]

## 2019-01-31 NOTE — PROGRESS NOTES
"  [facility]  Adult Nutrition  Consult Note    SUMMARY     Recommendations    1. Continue current Cardiac diet + Glucerna ONS.  2. RD to monitor & follow-up.    Goals: PO intake >50%  Nutrition Goal Status: goal met  Communication of RD Recs: reviewed with RN    Reason for Assessment    Reason For Assessment: RD follow-up  Diagnosis: other (see comments)(Infection of automatic implatable cardioverter-defibrillator)  Relevant Medical History: HF, DM  Interdisciplinary Rounds: did not attend    General Information Comments: Pt w/ adequate PO intake, consuming % of meals. Pt's wife brings Glucerna ONS from home also. Wife reports pt w/ 20# wt loss PTA. NFPE complete , pt noted w/ moderate malnutrition. Please see PES statement for details.  Nutrition Discharge Planning: Adequate PO intake    Nutrition/Diet History    Patient Reported Diet/Restrictions/Preferences: general, diabetic diet  Spiritual, Cultural Beliefs, Adventism Practices, Values that Affect Care: no  Factors Affecting Nutritional Intake: None identified at this time    Anthropometrics    Temp: 98 °F (36.7 °C)  Height Method: Stated  Height: 6' 2" (188 cm)  Height (inches): 74 in  Weight Method: Bed Scale  Weight: 83.5 kg (184 lb 1.4 oz)  Weight (lb): 184.09 lb  Ideal Body Weight (IBW), Male: 190 lb  % Ideal Body Weight, Male (lb): 96.89 lb  BMI (Calculated): 23.7  BMI Grade: 18.5-24.9 - normal  Usual Body Weight (UBW), k.7 kg  % Usual Body Weight: 90.7  % Weight Change From Usual Weight: -9.49 %    Lab/Procedures/Meds    Pertinent Labs Reviewed: reviewed  Pertinent Labs Comments: BUN 47, Creat 1.9, GFR 33.5, Gluc 204  Pertinent Medications Reviewed: reviewed  Pertinent Medications Comments: Coumadin    Estimated/Assessed Needs    Weight Used For Calorie Calculations: 83.5 kg (184 lb 1.4 oz)(Dosing wt)     Energy Calorie Requirements (kcal): 2043 kcal/d  Energy Need Method: Ida-St Jeor(1.25 PAL)     Protein " Requirements: 100 g/d (1.2 g/kg)  Weight Used For Protein Calculations: 83.5 kg (184 lb 1.4 oz)     Estimated Fluid Requirement Method: other (see comments)(Per MD or 1 mL/kcal)    Nutrition Prescription Ordered    Current Diet Order: Cardiac  Oral Nutrition Supplement: Glucerna ONS (from home)    Evaluation of Received Nutrient/Fluid Intake    Comments: LBM: 1/30    Tolerance: tolerating    Nutrition Risk    Level of Risk/Frequency of Follow-up: (1x/week)     Assessment and Plan    Moderate malnutrition      Malnutrition in the context of Acute Illness/Injury    Related to (etiology):  CHF    Signs and Symptoms (as evidenced by):  Energy Intake: <75% of estimated energy requirement for 6 months  Body Fat Depletion: moderate depletion of orbitals and triceps   Muscle Mass Depletion: moderate depletion of temples, clavicle region, scapular region and interosseous muscle   Weight Loss: 9% x 6 months    Nutrition Diagnosis Status:  Continues, Improving      Monitor and Evaluation    Food and Nutrient Intake: energy intake, food and beverage intake  Food and Nutrient Adminstration: diet order  Physical Activity and Function: nutrition-related ADLs and IADLs  Anthropometric Measurements: weight, weight change  Biochemical Data, Medical Tests and Procedures: lipid profile, inflammatory profile, glucose/endocrine profile, gastrointestinal profile, electrolyte and renal panel  Nutrition-Focused Physical Findings: overall appearance     Nutrition Follow-Up    RD Follow-up?: Yes

## 2019-02-01 NOTE — PT/OT/SLP PROGRESS
"Physical Therapy Treatment    Patient Name:  Jerry Solis   MRN:  [id number]    Recommendations:     Discharge Recommendations:  Home health PT/OT (pending continued progress)   Discharge Equipment Recommendations: (shower chair)   Barriers to discharge: Decreased caregiver support    Assessment:     Jerry Solis is a 78 y.o. 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 Pt is s/p ICD insertion POD #3. Pt tolerated OOB activity well, but continues to display limited endurance. He requires moderate assistance when performing sit<>stand tranfers from low surfaces (I.e. Bedside chair and toilet). Pt would benefit from ambulation with nursing staff 3x/day with contact guard assistance to improve endurance.  He presents with the following impairments/functional limitations:  weakness, impaired endurance, gait instability, impaired functional mobilty, impaired balance, impaired cardiopulmonary response to activity. Pt would benefit from continued PT intervention to address listed functional deficits, reduce fall risk and maximize return to PLOF.    Rehab Prognosis: Good; patient would benefit from acute skilled PT services to address these deficits and reach maximum level of function.      Recent Surgery: Procedure(s) (LRB):  INSERTION, ICD, BIVENTRICULAR (Left) 3 Days Post-Op    Plan:     During this hospitalization, patient to be seen 4 x/week to address the identified rehab impairments via gait training, therapeutic activities, therapeutic exercises, neuromuscular re-education and progress toward the following goals:    · Plan of Care Expires:  02/20/19    Subjective     Chief Complaint: decreased endurance   Patient/Family Comments/goals: "It feels like a million bucks getting to that toilet"   Pain/Comfort:  · Pain Rating 1: 0/10  · Pain Rating Post-Intervention 1: 0/10      Objective:     Communicated with RN prior to session.  Patient found sitting " up in chair with telemetry, PICC line  upon PT entry to room.     General Precautions: Standard, fall, pacemaker   Orthopedic Precautions:N/A   Braces: N/A     Functional Mobility:    Bed Mobility:   · Sit > Supine: stand by assistance with HOB flat     Transfers:   · Sit <> Stand Transfer: moderate assistance from bedside chair x 1 trial, from toilet x 2 trials with no AD   *cueing provided to maintain pacemaker precautions     Standing Balance:   · Able to maintain static standing balance with SBA; requires CGA-min A to maintain dynamic standing balance. Pt and spouse educated on fall risk and pt's need for continued assistance with mobility.                  Gait:  · Distance: ~10 ft. + 10 ft. + 5 ft. Trials   · Assistance level: contact guard to minimum assistance  · Assistive Device: no AD, HHA provided on L   · Gait Assessment: pt ambulating with reciprocal gait pattern, decreased step length, narrow base of support, and mild instability noted      AM-PAC 6 CLICK MOBILITY  Turning over in bed (including adjusting bedclothes, sheets and blankets)?: 3  Sitting down on and standing up from a chair with arms (e.g., wheelchair, bedside commode, etc.): 2  Moving from lying on back to sitting on the side of the bed?: 3  Moving to and from a bed to a chair (including a wheelchair)?: 3  Need to walk in hospital room?: 3  Climbing 3-5 steps with a railing?: 2  Basic Mobility Total Score: 16       Therapeutic Activities and Exercises:   Therapeutic activities aimed to:  - increase pt's independence, safety, and efficiency with bed mobility and functional transfers. See above for assistance levels.   - reinforce education on pacemaker precautions, and activity recommendations   -  education on fall risk and pt's need for continued assistance with mobility  - improve static and dynamic standing balance and standing endurance. Pt able to perform standing self-hygiene following BM with CGA.     Therapist facilitated  progression of gait training to improve gait stability, endurance, and independence with functional ambulation. Distance limited 2/2 fatigue and pt with BMs. RN notified.     Patient left HOB elevated with all lines intact, call button in reach, RN notified and wife present.    GOALS:   Multidisciplinary Problems     Physical Therapy Goals        Problem: Physical Therapy Goal    Goal Priority Disciplines Outcome Goal Variances Interventions   Physical Therapy Goal     PT, PT/OT Ongoing (interventions implemented as appropriate)     Description:  Goals to be met by: 19    Patient will increase functional independence with mobility by performin. Supine to sit with Modified [place], with HOB flat   2. Sit to supine with Modified [place], with HOB flat   3. Sit to stand transfer with Supervision  4. Gait  x 100 feet with Supervision with or without using least restrictive AD.   5. Lower extremity exercise program x20 reps per handout, with supervision                      Time Tracking:     PT Received On: 19  PT Start Time: 1401     PT Stop Time: 1445  PT Total Time (min): 44 min     Billable Minutes: Gait Training 15 min and Therapeutic Activity 25 min    Treatment Type: Treatment  PT/PTA: PT     PTA Visit Number: 0     Diana Burgos PT, DPT   2019  Pager: [phone]

## 2019-02-01 NOTE — ASSESSMENT & PLAN NOTE
- WCC has normalized.   - RIJ D/C'd 1/24 and tip cultured and -ve. Blood cultures also from 1/24 -ve. UA OK

## 2019-02-01 NOTE — ASSESSMENT & PLAN NOTE
BG goal 140-180    Continue levemir 4 units BID; first dose tonight.   BG monitoring ac/hs and low dose correction scale.   decrease novolog  8 units with meals.    Discharge planning: Patient is on considerably less insulin than home dosing.  Patient and spouse both agree patient patient is likely on too much insulin but patient reports no episodes of hypoglycemia at home.  Patient expressing interest to take less shots as he forgets to bring his vial and syring to work - consider other drug therapy upon discharge if possible.

## 2019-02-01 NOTE — PLAN OF CARE
Problem: Physical Therapy Goal  Goal: Physical Therapy Goal  Goals to be met by: 19    Patient will increase functional independence with mobility by performin. Supine to sit with Modified [place], with HOB flat   2. Sit to supine with Modified [place], with HOB flat   3. Sit to stand transfer with Supervision  4. Gait  x 100 feet with Supervision with or without using least restrictive AD.   5. Lower extremity exercise program x20 reps per handout, with supervision     Outcome: Ongoing (interventions implemented as appropriate)  Goals remain appropriate; continue current POC.     Diana Burgos PT, DPT   2019  Pager: [phone]

## 2019-02-01 NOTE — ASSESSMENT & PLAN NOTE
-S/P Unsuccessful RICARDO guided/DCCV 1/14. Remains in A fib  -S/P successful reimplantation of BiV ICD 1/29  -Weaned off Epi and Miesha  -CHADS VASc 6  -Holding eliquis s/p ICD explant 1/9. Per EP, anticoagulation resumed 1/12 with Coumadin. Coumadin now D/C'd with plan to resume Eliquis once INR is 2.0 or less (2.4 today)  -Cardiac monitoring

## 2019-02-01 NOTE — PROGRESS NOTES
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 Creatinine has improved at 1.7. Per tele, now 100% BiV paced with no extra beats.    Continuous Infusions:  Scheduled Meds:   ampicillin IVPB  2 g Intravenous Q6H    artificial tears  1 drop Both Eyes QID    cefTRIAXone (ROCEPHIN) IVPB  2 g Intravenous Q12H    clopidogrel  75 mg Oral Daily    ferrous sulfate  325 mg Oral BID    insulin aspart U-100  10 Units Subcutaneous TIDWM    insulin detemir U-100  10 Units Subcutaneous QHS    levothyroxine  25 mcg Oral Before breakfast    polyethylene glycol  17 g Oral Daily    potassium chloride SA  40 mEq Oral BID    potassium chloride  40 mEq Oral Once    rosuvastatin  20 mg Oral QHS    senna-docusate 8.6-50 mg  2 tablet Oral BID    tamsulosin  0.4 mg Oral Daily     PRN Meds:dextrose 50%, dextrose 50%, glucagon (human recombinant), glucose, glucose, glycerin adult, hydrOXYzine HCl, insulin aspart U-100, ondansetron, ramelteon, sodium chloride 0.9%, sodium chloride 0.9%, traMADol    Review of patient's allergies indicates:   Allergen Reactions    Adhesive Other (See Comments)     blisters    Codeine Other (See Comments)     Blurred vision    Latex Hives    Ace inhibitors     Lipitor [atorvastatin] Other (See Comments)     Muscle spasms    Xanax [alprazolam] Hallucinations     Objective:     Vital Signs (Most Recent):  Temp: 99.1 °F (37.3 °C) (02/01/19 0747)  Pulse: 80 (02/01/19 1111)  Resp: 18 (02/01/19 0747)  BP: (!) 102/52 (02/01/19 0747)  SpO2: 98 % (02/01/19 0747) Vital Signs (24h Range):  Temp:  [96.8 °F (36 °C)-99.1 °F  (37.3 °C)] 99.1 °F (37.3 °C)  Pulse:  [79-81] 80  Resp:  [16-19] 18  SpO2:  [92 %-100 %] 98 %  BP: ()/(50-59) 102/52  Arterial Line BP: (95)/(38) 95/38     Patient Vitals for the past 72 hrs (Last 3 readings):   Weight   01/31/19 1300 83.5 kg (184 lb 1.4 oz)     Body mass index is 23.64 kg/m².      Intake/Output Summary (Last 24 hours) at 2/1/2019 1159  Last data filed at 2/1/2019 0600  Gross per 24 hour   Intake 750 ml   Output 470 ml   Net 280 ml       Hemodynamic Parameters:       Telemetry: BiV paced, underlying A fib    Physical Exam   Constitutional: He is oriented to person, place, and time. He appears well-developed and well-nourished.   HENT:   Head: Normocephalic and atraumatic.   Eyes: Conjunctivae and EOM are normal.   Neck: Normal range of motion. Neck supple. No JVD present. No thyromegaly present.   Cardiovascular: Normal rate and regular rhythm.   Pulmonary/Chest: Effort normal and breath sounds normal.   Abdominal: Soft. Bowel sounds are normal.   Musculoskeletal: Normal range of motion. He exhibits no edema.   Neurological: He is alert and oriented to person, place, and time.   Skin: Skin is warm and dry. Capillary refill takes 2 to 3 seconds.   Psychiatric: He has a normal mood and affect. His behavior is normal. Judgment and thought content normal.       Significant Labs:  CBC:  Recent Labs   Lab 01/30/19  0330 01/31/19  0406 02/01/19  0448   WBC 13.51* 10.50 9.23   RBC 2.62* 2.60* 2.46*   HGB 7.6* 7.3* 7.0*   HCT 25.8* 24.8* 23.6*    185 170   MCV 99* 95 96   MCH 29.0 28.1 28.5   MCHC 29.5* 29.4* 29.7*     BNP:  Recent Labs   Lab 01/29/19  0608   BNP 1,219*     CMP:  Recent Labs   Lab 01/30/19  0330 01/31/19  0406 01/31/19  1132 02/01/19  0448   * 204*  --  105   CALCIUM 9.0 8.7  --  8.6*   ALBUMIN 2.2* 2.2*  --  2.0*   PROT 6.4 6.2  --  6.0    139  --  138   K 3.8 3.5 4.1 3.6   CO2 35* 34*  --  34*   CL 94* 95  --  96   BUN 50* 47*  --  44*   CREATININE 1.9* 1.9*  --   1.7*   ALKPHOS 144* 148*  --  142*   ALT 15 14  --  15   AST 24 23  --  32   BILITOT 0.9 0.7  --  0.5      Coagulation:   Recent Labs   Lab 01/30/19  0330 01/31/19  0406 02/01/19  0448   INR 2.6* 2.8* 2.4*     LDH:  No results for input(s): LDH in the last 72 hours.  Microbiology:  Microbiology Results (last 7 days)     Procedure Component Value Units Date/Time    Blood culture [[id number]] Collected:  01/24/19 0940    Order Status:  Completed Specimen:  Blood from Peripheral, Hand, Right Updated:  01/29/19 1212     Blood Culture, Routine No growth after 5 days.    Blood culture [[id number]] Collected:  01/24/19 0950    Order Status:  Completed Specimen:  Blood from Peripheral, Forearm, Right Updated:  01/29/19 1212     Blood Culture, Routine No growth after 5 days.    IV catheter culture [[id number]] Collected:  01/24/19 1120    Order Status:  Completed Specimen:  Catheter Tip, Intrajugular Updated:  01/26/19 1138     Aerobic Culture - Cath tip No growth          I have reviewed all pertinent labs within the past 24 hours.    Estimated Creatinine Clearance: 43 mL/min (A) (based on SCr of 1.7 mg/dL (H)).    Diagnostic Results:  I have reviewed all pertinent imaging results/findings within the past 24 hours.    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 Discussed with Dr. Russo. Blood cultures repeated 1/15 and were -ve  -Appreciate EP's help. 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 Patient had 2 vegetations on RV lead which is most likely source of bacteremia. Lead tip and ICD pocket cultured with RV lead +ve for Enteroccocus. Blood cultures are negative here are -ve with last culture done 1/24  -Continue Ampicillin (renally dosed given TREY) and Ceftriaxone per ID recs Will treat for 6 weeks post extraction.  Estimated end date  2/20/19.    -S/P successful reimplantation of BiV ICD 1/29. EP considering repeat RICARDO/DCCV A fib at some point. Will perform DFT as an outpatient   -Appreciate GI's help. Plan for outpatient EGD and C-scope for eval of anemia and Enterococcus bacteremia  -Repeated blood cultures, cultured RIJ line tip 1/24 and UA with reflex due to WBCs increased (afebrile) - UA OK, other cultures -ve     Acute on chronic combined systolic and diastolic CHF, NYHA class 3    -ICM.  -Echo 1/25/19: LVEF 20%, LVEDD 6.85 cm. Mod TR, PHTN(PAS 79).  -CVP pending. Lasix was D/C'd 1/30 and he was given 250 cc IV NS for orthostatic LH. No orthostatic BP changes this morning, and creatinine has improved to 1.7  -Miesha weaned off 1/30, and Epi weaned off 1/31  -GDMT: Dig and Toprol D/C'd 2/2 junctional rhythm. Entresto D/C'd 2/2 transient hypotension during/after ICD explant and TREY. Will resume Entresto today  -Self declined for LVAD after w/u last April 2018.       A-fib    -S/P Unsuccessful RICARDO guided/DCCV 1/14. Remains in A fib  -S/P successful reimplantation of BiV ICD 1/29  -Weaned off Epi and Miesha  -CHADS VASc 6  -Holding eliquis s/p ICD explant 1/9. Per EP, anticoagulation resumed 1/12 with Coumadin. Coumadin now D/C'd with plan to resume Eliquis once INR is 2.0 or less (2.4 today)  -Cardiac monitoring     Type 2 diabetes mellitus with complication    -A1c 8.1  -Target -180 while hospitalized  -detemir 10 q HS, Novolog 10 units with meals, SSI  -Appreciate Endocrine's help with management     Debility    - PT/OT  - Plan to D/C home likely Monday or Tuesday with  PT/OT     Leukocytosis    - WCC has normalized.   - RIJ D/C'd 1/24 and tip cultured and -ve. Blood cultures also from 1/24 -ve. UA OK     Dysuria    - Resolved  - Urine culture -ve  - Flomax started 1/10. F/C D/C'd overnight         Junctional bradycardia    - See A fib     Endocarditis    - See ICD lead infection     Bacteremia    -see above     TREY (acute kidney injury)     - Appreciate Nephrology's help  - Was oliguric and creatinine jumped to 4.1 likely 2/2 ATN/hypotension following ICD explant.   - BUN/creatinine improving today at 44/1.7. See acute on chronic systolic heart failure  - Miesha and Epi weaned off  - [place] [id number]  Heart Transplant  [facility]

## 2019-02-01 NOTE — ASSESSMENT & PLAN NOTE
-Vegetation seen on RICARDO  -2 mobile masses seen on CVC by RICARDO 1/14 (has both RUE PICC and RIJ TLC). Discussed with Dr. Russo. Blood cultures repeated 1/15 and were -ve  -Appreciate EP's help. 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 Patient had 2 vegetations on RV lead which is most likely source of bacteremia. Lead tip and ICD pocket cultured with RV lead +ve for Enteroccocus. Blood cultures are negative here are -ve with last culture done 1/24  -Continue Ampicillin (renally dosed given TREY) and Ceftriaxone per ID recs Will treat for 6 weeks post extraction.  Estimated end date 2/20/19.    -S/P successful reimplantation of BiV ICD 1/29. EP considering repeat RICARDO/DCCV A fib at some point. Will perform DFT as an outpatient   -Appreciate GI's help. Plan for outpatient EGD and C-scope for eval of anemia and Enterococcus bacteremia  -Repeated blood cultures, cultured RIJ line tip 1/24 and UA with reflex due to WBCs increased (afebrile) - UA OK, other cultures -ve

## 2019-02-01 NOTE — SUBJECTIVE & OBJECTIVE
"Interval HPI:   Overnight events: Transferred to [facility]. [facility]. BG at or slightly below goal this AM with scheduled insulin; not requiring correction scale coverage. Creatinine 1.7.   Eatin% -100%; getting tired of hospital food  Nausea: No  Hypoglycemia and intervention: No  Fever: No  TPN and/or TF: No      BP (!) 112/56   Pulse 80   Temp 98.1 °F (36.7 °C) (Oral)   Resp 16   Ht 6' 2" (1.88 m)   Wt 83.5 kg (184 lb 1.4 oz)   SpO2 (!) 93%   BMI 23.64 kg/m²     Labs Reviewed and Include    Recent Labs   Lab 19  0448      CALCIUM 8.6*   ALBUMIN 2.0*   PROT 6.0      K 3.6   CO2 34*   CL 96   BUN 44*   CREATININE 1.7*   ALKPHOS 142*   ALT 15   AST 32   BILITOT 0.5     Lab Results   Component Value Date    WBC 9.23 2019    HGB 7.0 (L) 2019    HCT 23.6 (L) 2019    MCV 96 2019     2019     No results for input(s): TSH, FREET4 in the last 168 hours.  Lab Results   Component Value Date    HGBA1C 8.1 (H) 2019       Nutritional status:   Body mass index is 23.64 kg/m².  Lab Results   Component Value Date    ALBUMIN 2.0 (L) 2019    ALBUMIN 2.2 (L) 2019    ALBUMIN 2.2 (L) 2019     No results found for: PREALBUMIN    Estimated Creatinine Clearance: 43 mL/min (A) (based on SCr of 1.7 mg/dL (H)).    Accu-Checks  Recent Labs     19  2303 19  0624 19  0747 19  1113 19  1704 19  2139 19  0753 19  1127 19  1242 19  2218   POCTGLUCOSE 146* 234* 213* 340* 336* 260* 185* 136* 180* 171*       Current Medications and/or Treatments Impacting Glycemic Control  Immunotherapy:    Immunosuppressants     None        Steroids:   Hormones (From admission, onward)    None        Pressors:    Autonomic Drugs (From admission, onward)    None        Hyperglycemia/Diabetes Medications:   Antihyperglycemics (From admission, onward)    Start     Stop Route Frequency Ordered    19  insulin detemir " U-100 pen 10 Units      -- SubQ Nightly 01/30/19 0756    01/30/19 1130  insulin aspart U-100 pen 10 Units      -- SubQ 3 times daily with meals 01/30/19 0756    01/30/19 0855  insulin aspart U-100 pen 0-5 Units      -- SubQ Before meals & nightly PRN 01/30/19 0756

## 2019-02-01 NOTE — PLAN OF CARE
Problem: Adult Inpatient Plan of Care  Goal: Plan of Care Review  Outcome: Ongoing (interventions implemented as appropriate)  Reviewed plan of care with patient. Patient has Latex Allergy.  K+ = 3.6, KCl+ SA CR tablet 40 mEq oral BID.  CVP = 11.  Blood glucose monitoring will be completed 4 times daily before meals and at bedtime, SSI PRN.  Patient has remained free of falls/trauma/injury by using appropriate lighting, nonskid socks, by keeping area free of debris, and frequenting rounding of staff.  Patient verbalized understanding of all instructions.

## 2019-02-01 NOTE — SUBJECTIVE & OBJECTIVE
Interval History: Transferred to step down overnight, and is doing well. Creatinine has improved at 1.7. Per tele, now 100% BiV paced with no extra beats.    Continuous Infusions:  Scheduled Meds:   ampicillin IVPB  2 g Intravenous Q6H    artificial tears  1 drop Both Eyes QID    cefTRIAXone (ROCEPHIN) IVPB  2 g Intravenous Q12H    clopidogrel  75 mg Oral Daily    ferrous sulfate  325 mg Oral BID    insulin aspart U-100  10 Units Subcutaneous TIDWM    insulin detemir U-100  10 Units Subcutaneous QHS    levothyroxine  25 mcg Oral Before breakfast    polyethylene glycol  17 g Oral Daily    potassium chloride SA  40 mEq Oral BID    potassium chloride  40 mEq Oral Once    rosuvastatin  20 mg Oral QHS    senna-docusate 8.6-50 mg  2 tablet Oral BID    tamsulosin  0.4 mg Oral Daily     PRN Meds:dextrose 50%, dextrose 50%, glucagon (human recombinant), glucose, glucose, glycerin adult, hydrOXYzine HCl, insulin aspart U-100, ondansetron, ramelteon, sodium chloride 0.9%, sodium chloride 0.9%, traMADol    Review of patient's allergies indicates:   Allergen Reactions    Adhesive Other (See Comments)     blisters    Codeine Other (See Comments)     Blurred vision    Latex Hives    Ace inhibitors     Lipitor [atorvastatin] Other (See Comments)     Muscle spasms    Xanax [alprazolam] Hallucinations     Objective:     Vital Signs (Most Recent):  Temp: 99.1 °F (37.3 °C) (02/01/19 0747)  Pulse: 80 (02/01/19 1111)  Resp: 18 (02/01/19 0747)  BP: (!) 102/52 (02/01/19 0747)  SpO2: 98 % (02/01/19 0747) Vital Signs (24h Range):  Temp:  [96.8 °F (36 °C)-99.1 °F (37.3 °C)] 99.1 °F (37.3 °C)  Pulse:  [79-81] 80  Resp:  [16-19] 18  SpO2:  [92 %-100 %] 98 %  BP: ()/(50-59) 102/52  Arterial Line BP: (95)/(38) 95/38     Patient Vitals for the past 72 hrs (Last 3 readings):   Weight   01/31/19 1300 83.5 kg (184 lb 1.4 oz)     Body mass index is 23.64 kg/m².      Intake/Output Summary (Last 24 hours) at 2/1/2019 6354  Last  data filed at 2/1/2019 0600  Gross per 24 hour   Intake 750 ml   Output 470 ml   Net 280 ml       Hemodynamic Parameters:       Telemetry: BiV paced, underlying A fib    Physical Exam   Constitutional: He is oriented to person, place, and time. He appears well-developed and well-nourished.   HENT:   Head: Normocephalic and atraumatic.   Eyes: Conjunctivae and EOM are normal.   Neck: Normal range of motion. Neck supple. No JVD present. No thyromegaly present.   Cardiovascular: Normal rate and regular rhythm.   Pulmonary/Chest: Effort normal and breath sounds normal.   Abdominal: Soft. Bowel sounds are normal.   Musculoskeletal: Normal range of motion. He exhibits no edema.   Neurological: He is alert and oriented to person, place, and time.   Skin: Skin is warm and dry. Capillary refill takes 2 to 3 seconds.   Psychiatric: He has a normal mood and affect. His behavior is normal. Judgment and thought content normal.       Significant Labs:  CBC:  Recent Labs   Lab 01/30/19  0330 01/31/19  0406 02/01/19 0448   WBC 13.51* 10.50 9.23   RBC 2.62* 2.60* 2.46*   HGB 7.6* 7.3* 7.0*   HCT 25.8* 24.8* 23.6*    185 170   MCV 99* 95 96   MCH 29.0 28.1 28.5   MCHC 29.5* 29.4* 29.7*     BNP:  Recent Labs   Lab 01/29/19  0608   BNP 1,219*     CMP:  Recent Labs   Lab 01/30/19  0330 01/31/19  0406 01/31/19  1132 02/01/19  0448   * 204*  --  105   CALCIUM 9.0 8.7  --  8.6*   ALBUMIN 2.2* 2.2*  --  2.0*   PROT 6.4 6.2  --  6.0    139  --  138   K 3.8 3.5 4.1 3.6   CO2 35* 34*  --  34*   CL 94* 95  --  96   BUN 50* 47*  --  44*   CREATININE 1.9* 1.9*  --  1.7*   ALKPHOS 144* 148*  --  142*   ALT 15 14  --  15   AST 24 23  --  32   BILITOT 0.9 0.7  --  0.5      Coagulation:   Recent Labs   Lab 01/30/19  0330 01/31/19  0406 02/01/19  0448   INR 2.6* 2.8* 2.4*     LDH:  No results for input(s): LDH in the last 72 hours.  Microbiology:  Microbiology Results (last 7 days)     Procedure Component Value Units Date/Time     Blood culture [[id number]] Collected:  01/24/19 0940    Order Status:  Completed Specimen:  Blood from Peripheral, Hand, Right Updated:  01/29/19 1212     Blood Culture, Routine No growth after 5 days.    Blood culture [[id number]] Collected:  01/24/19 0950    Order Status:  Completed Specimen:  Blood from Peripheral, Forearm, Right Updated:  01/29/19 1212     Blood Culture, Routine No growth after 5 days.    IV catheter culture [[id number]] Collected:  01/24/19 1120    Order Status:  Completed Specimen:  Catheter Tip, Intrajugular Updated:  01/26/19 1138     Aerobic Culture - Cath tip No growth          I have reviewed all pertinent labs within the past 24 hours.    Estimated Creatinine Clearance: 43 mL/min (A) (based on SCr of 1.7 mg/dL (H)).    Diagnostic Results:  I have reviewed all pertinent imaging results/findings within the past 24 hours.

## 2019-02-01 NOTE — ASSESSMENT & PLAN NOTE
- Appreciate Nephrology's help  - Was oliguric and creatinine jumped to 4.1 likely 2/2 ATN/hypotension following ICD explant.   - BUN/creatinine improving today at 44/1.7. See acute on chronic systolic heart failure  - Miesha and Epi weaned off  - Resume Entresto today

## 2019-02-01 NOTE — ASSESSMENT & PLAN NOTE
Caution with insulin stacking  Estimated Creatinine Clearance: 43 mL/min (A) (based on SCr of 1.7 mg/dL (H)).

## 2019-02-01 NOTE — ASSESSMENT & PLAN NOTE
-ICM.  -Echo 1/25/19: LVEF 20%, LVEDD 6.85 cm. Mod TR, PHTN(PAS 79).  -CVP pending. Lasix was D/C'd 1/30 and he was given 250 cc IV NS for orthostatic LH. No orthostatic BP changes this morning, and creatinine has improved to 1.7  -Miesha weaned off 1/30, and Epi weaned off 1/31  -GDMT: Dig and Toprol D/C'd 2/2 junctional rhythm. Entresto D/C'd 2/2 transient hypotension during/after ICD explant and TREY. Will resume Entresto today  -Self declined for LVAD after w/u last April 2018.

## 2019-02-01 NOTE — PLAN OF CARE
Problem: Adult Inpatient Plan of Care  Goal: Plan of Care Review  Outcome: Ongoing (interventions implemented as appropriate)  Pt free of falls and injuries. POC discussed with patient/family, verbalized understanding. Questions answered, no distress at present.

## 2019-02-01 NOTE — PROGRESS NOTES
"[facility]  Endocrinology  Progress Note    Admit Date: 2019     Reason for Consult: Management of T2DM, Hyperglycemia     Surgical Procedure and Date: Lead extraction 19    Diabetes diagnosis year: approximately 10 years ago    Lab Results   Component Value Date    HGBA1C 8.1 (H) 2019       Home Diabetes Medications: Lantus 50 units q HS, Novolog 25 units with meals (vials)    How often checking glucose at home? 3-4x per day   BG readings on regimen: AM 120s  Hypoglycemia on the regimen?  Yes - 2-3x per month  Missed doses on regimen?  Yes - 2x per week    Diabetes Complications include:     Hyperglycemia, Hypoglycemia , Diabetic retinopathy , Diabetic cataract  and Diabetic peripheral neuropathy     Complicating diabetes co morbidities:   CHF and History of CVA      HPI:   Patient is a 78 y.o. male with a diagnosis of DM2 and CHF who was admitted on 19 for possible RV lead infection.  Patient is s/p lead extraction on 19.  Patient's DM managed by PCP, Dr. Hdz.  Endocrine consulted for DM/BG management.            Interval HPI:   Overnight events: Transferred to CSU. YARELIEON. BG at or slightly below goal this AM with scheduled insulin; not requiring correction scale coverage. Creatinine 1.7.   Eatin% -100%; getting tired of hospital food  Nausea: No  Hypoglycemia and intervention: No  Fever: No  TPN and/or TF: No      BP (!) 112/56   Pulse 80   Temp 98.1 °F (36.7 °C) (Oral)   Resp 16   Ht 6' 2" (1.88 m)   Wt 83.5 kg (184 lb 1.4 oz)   SpO2 (!) 93%   BMI 23.64 kg/m²      Labs Reviewed and Include    Recent Labs   Lab 19  0448      CALCIUM 8.6*   ALBUMIN 2.0*   PROT 6.0      K 3.6   CO2 34*   CL 96   BUN 44*   CREATININE 1.7*   ALKPHOS 142*   ALT 15   AST 32   BILITOT 0.5     Lab Results   Component Value Date    WBC 9.23 2019    HGB 7.0 (L) 2019    HCT 23.6 (L) 2019    MCV 96 2019     2019     No results " for input(s): TSH, FREET4 in the last 168 hours.  Lab Results   Component Value Date    HGBA1C 8.1 (H) 01/07/2019       Nutritional status:   Body mass index is 23.64 kg/m².  Lab Results   Component Value Date    ALBUMIN 2.0 (L) 02/01/2019    ALBUMIN 2.2 (L) 01/31/2019    ALBUMIN 2.2 (L) 01/30/2019     No results found for: PREALBUMIN    Estimated Creatinine Clearance: 43 mL/min (A) (based on SCr of 1.7 mg/dL (H)).    Accu-Checks  Recent Labs     01/29/19  2303 01/30/19  0624 01/30/19  0747 01/30/19  1113 01/30/19  1704 01/30/19  2139 01/31/19  0753 01/31/19  1127 01/31/19  1242 01/31/19  2218   POCTGLUCOSE 146* 234* 213* 340* 336* 260* 185* 136* 180* 171*       Current Medications and/or Treatments Impacting Glycemic Control  Immunotherapy:    Immunosuppressants     None        Steroids:   Hormones (From admission, onward)    None        Pressors:    Autonomic Drugs (From admission, onward)    None        Hyperglycemia/Diabetes Medications:   Antihyperglycemics (From admission, onward)    Start     Stop Route Frequency Ordered    01/30/19 2100  insulin detemir U-100 pen 10 Units      -- SubQ Nightly 01/30/19 0756    01/30/19 1130  insulin aspart U-100 pen 10 Units      -- SubQ 3 times daily with meals 01/30/19 0756    01/30/19 0855  insulin aspart U-100 pen 0-5 Units      -- SubQ Before meals & nightly PRN 01/30/19 0756          ASSESSMENT and PLAN    * 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   BG monitoring ac/hs and low dose correction scale.   continue novolog at 10 units with meals.    Discharge planning: Patient is on considerably less insulin than home dosing.  Patient and spouse both agree patient patient is likely on too much insulin but patient reports no  episodes of hypoglycemia at home.  Patient expressing interest to take less shots as he forgets to bring his vial and syring to work - consider other drug therapy upon discharge if possible.       TREY (acute kidney injury)    Caution with insulin stacking  Estimated Creatinine Clearance: 38.5 mL/min (A) (based on SCr of 1.9 mg/dL (H)).         A-fib    May affect BG readings if uncontrolled  S/p cardioversion.          Cathie Eli NP  Endocrinology  [facility]

## 2019-02-02 NOTE — PLAN OF CARE
Problem: Adult Inpatient Plan of Care  Goal: Plan of Care Review  Outcome: Ongoing (interventions implemented as appropriate)  Reviewed plan of care with patient and spouse.  Patient is alert and oriented x 4.  Patient has Latex Allergy.  Ampicillin 2g/100 ml IVPB q6h for Bacteremia, and CefTRIAXone (Rocephin) 2g/50 ml IVPB q12h.  K+ = 4.1, KCl+ SA CR tablet 40 mEq oral BID.  VTE High Risk:  Apixaban 5 mg oral BID.  Blood glucose monitoring will be completed 4 times daily before meals and at bedtime, SSI PRN, and 10 units Aspart with meals. Patient has remained free of falls/trauma/injury by using appropriate lighting, nonskid socks, by keeping area free of debris, and frequenting rounding of staff.  Patient is able to ambulate with one assist.  Patient verbalized understanding of all instructions.

## 2019-02-02 NOTE — NURSING
Patient ambulated to bathroom with 1 assist.  Instructed patient to use legs to stand and to avoid pushing up with his arms.  Patient is stronger today compared to yesterday.

## 2019-02-02 NOTE — PROGRESS NOTES
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  Pacing accordingly on tele review. Wife at bedside and mention that patient is becoming more active.  Denies chest pain, dizziness or SOB. PT/OT ongoing and recommended for rehab post d/c. INR 1.9 today.    Continuous Infusions:  Scheduled Meds:   ampicillin IVPB  2 g Intravenous Q6H    artificial tears  1 drop Both Eyes QID    cefTRIAXone (ROCEPHIN) IVPB  2 g Intravenous Q12H    clopidogrel  75 mg Oral Daily    ferrous sulfate  325 mg Oral BID    insulin aspart U-100  10 Units Subcutaneous TIDWM    insulin detemir U-100  4 Units Subcutaneous BID    levothyroxine  25 mcg Oral Before breakfast    polyethylene glycol  17 g Oral Daily    rosuvastatin  20 mg Oral QHS    sacubitril-valsartan  1 tablet Oral BID    senna-docusate 8.6-50 mg  2 tablet Oral BID    tamsulosin  0.4 mg Oral Daily     PRN Meds:dextrose 50%, dextrose 50%, glucagon (human recombinant), glucose, glucose, glycerin adult, hydrOXYzine HCl, insulin aspart U-100, ondansetron, ramelteon, sodium chloride 0.9%, sodium chloride 0.9%, traMADol    Review of patient's allergies indicates:   Allergen Reactions    Adhesive Other (See Comments)     blisters    Codeine Other (See Comments)     Blurred vision    Latex Hives    Ace inhibitors     Lipitor [atorvastatin] Other (See Comments)     Muscle spasms    Xanax [alprazolam] Hallucinations     Objective:     Vital Signs (Most Recent):  Temp: 97.8 °F (36.6 °C) (02/02/19 0729)  Pulse: 80 (02/02/19 0729)  Resp: 16 (02/02/19 0729)  BP: (!) 96/54 (02/02/19 0729)  SpO2: (!) 94 % (02/02/19 0729) Vital  Signs (24h Range):  Temp:  [97.8 °F (36.6 °C)-98.5 °F (36.9 °C)] 97.8 °F (36.6 °C)  Pulse:  [76-84] 80  Resp:  [16-20] 16  SpO2:  [93 %-97 %] 94 %  BP: ()/(47-60) 96/54     Patient Vitals for the past 72 hrs (Last 3 readings):   Weight   01/31/19 1300 83.5 kg (184 lb 1.4 oz)     Body mass index is 23.64 kg/m².      Intake/Output Summary (Last 24 hours) at 2/2/2019 0959  Last data filed at 2/2/2019 0500  Gross per 24 hour   Intake 1180 ml   Output 250 ml   Net 930 ml       Hemodynamic Parameters:       Telemetry: BIV pacing    Physical Exam   Constitutional: He is oriented to person, place, and time.   feeble   HENT:   Temporal muscle wasting   Eyes: No scleral icterus.   Neck: JVD (above clavicle. ) present.   Cardiovascular:   LVAD hum- smooth   Pulmonary/Chest: Effort normal and breath sounds normal. He has no wheezes. He has no rales. He exhibits no tenderness.   Abdominal: Soft. Bowel sounds are normal. He exhibits no mass. There is no guarding.   Dry DLES dressing.    Musculoskeletal: He exhibits no edema or tenderness.   Neurological: He is alert and oriented to person, place, and time.   Skin: Skin is dry.   Nursing note and vitals reviewed.     Significant Labs:  CBC:  Recent Labs   Lab 01/31/19  0406 02/01/19  0448 02/02/19  0434   WBC 10.50 9.23 9.05   RBC 2.60* 2.46* 2.34*   HGB 7.3* 7.0* 6.8*   HCT 24.8* 23.6* 23.1*    170 166   MCV 95 96 99*   MCH 28.1 28.5 29.1   MCHC 29.4* 29.7* 29.4*     BNP:  Recent Labs   Lab 01/29/19  0608   BNP 1,219*     CMP:  Recent Labs   Lab 01/31/19  0406 01/31/19  1132 02/01/19  0448 02/02/19  0434   *  --  105 132*   CALCIUM 8.7  --  8.6* 8.3*   ALBUMIN 2.2*  --  2.0* 2.0*   PROT 6.2  --  6.0 5.6*     --  138 139   K 3.5 4.1 3.6 4.1   CO2 34*  --  34* 31*   CL 95  --  96 100   BUN 47*  --  44* 40*   CREATININE 1.9*  --  1.7* 2.0*   ALKPHOS 148*  --  142* 133   ALT 14  --  15 15   AST 23  --  32 31   BILITOT 0.7  --  0.5 0.5      Coagulation:    Recent Labs   Lab 01/31/19  0406 02/01/19  0448 02/02/19  0434   INR 2.8* 2.4* 1.9*     LDH:  No results for input(s): LDH in the last 72 hours.  Microbiology:  Microbiology Results (last 7 days)     Procedure Component Value Units Date/Time    Blood culture [[id number]] Collected:  01/24/19 0940    Order Status:  Completed Specimen:  Blood from Peripheral, Hand, Right Updated:  01/29/19 1212     Blood Culture, Routine No growth after 5 days.    Blood culture [[id number]] Collected:  01/24/19 0950    Order Status:  Completed Specimen:  Blood from Peripheral, Forearm, Right Updated:  01/29/19 1212     Blood Culture, Routine No growth after 5 days.    IV catheter culture [[id number]] Collected:  01/24/19 1120    Order Status:  Completed Specimen:  Catheter Tip, Intrajugular Updated:  01/26/19 1138     Aerobic Culture - Cath tip No growth          I have reviewed all pertinent labs within the past 24 hours.    Estimated Creatinine Clearance: 36.5 mL/min (A) (based on SCr of 2 mg/dL (H)).    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 Discussed with Dr. Russo. Blood cultures repeated 1/15 and were -ve  -Appreciate EP's help. 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 Patient had 2 vegetations on RV lead which is most likely source of bacteremia. Lead tip and ICD pocket cultured with RV lead +ve for Enteroccocus. Blood cultures are negative here are -ve with last culture done 1/24  -Continue Ampicillin (renally dosed given TREY) and Ceftriaxone per ID recs Will treat for 6 weeks post extraction.  Estimated end date 2/20/19.    -S/P successful reimplantation of BiV ICD 1/29. EP considering repeat RICARDO/DCCV A fib at some point. Will perform DFT as an outpatient   -Appreciate GI's help. Plan for  outpatient EGD and C-scope for eval of anemia and Enterococcus bacteremia  -Repeated blood cultures, cultured RIJ line tip 1/24 and UA with reflex due to WBCs increased (afebrile) - UA OK, other cultures -ve     Debility    - PT/OT  - Plan to D/C home likely Monday or Tuesday with HH PT/OT     Leukocytosis    - WCC has normalized.   - RIJ D/C'd 1/24 and tip cultured and -ve. Blood cultures also from 1/24 -ve. UA OK     Dysuria    - Resolved  - Urine culture -ve  - Flomax started 1/10. F/C D/C'd overnight         Junctional bradycardia    - See A fib     Endocarditis    - See ICD lead infection     Bacteremia    -see above     TREY (acute kidney injury)    - Appreciate Nephrology's help  - Was oliguric and creatinine jumped to 4.1 likely 2/2 ATN/hypotension following ICD explant.   - BUN/creatinine improving today at 44/1.7. See acute on chronic systolic heart failure  - Miseha and Epi weaned off  - continue Entresto today  - creatinine close to baseline. Encourage oral intake. No diuretics     A-fib    -S/P Unsuccessful RICARDO guided/DCCV 1/14. Remains in A fib  -S/P successful reimplantation of BiV ICD 1/29  -Weaned off Epi and Miesha  -CHADS VASc 6  -Holding eliquis s/p ICD explant 1/9. Per EP, anticoagulation resumed 1/12 with Coumadin. Coumadin now D/C'd with plan to resume Eliquis once INR is 2.0 or less (1.9 today)  - started apixiban 5mg BID on 2/2/2019  -Cardiac monitoring     Type 2 diabetes mellitus with complication    -A1c 8.1  -Target -180 while hospitalized  -detemir 10 q HS, Novolog 10 units with meals, SSI  -Appreciate Endocrine's help with management     Acute on chronic combined systolic and diastolic CHF, NYHA class 3    -ICM.  -Echo 1/25/19: LVEF 20%, LVEDD 6.85 cm. Mod TR, PHTN(PAS 79).  -CVP pending. Lasix was D/C'd 1/30 and he was given 250 cc IV NS for orthostatic LH. No orthostatic BP changes this morning, and creatinine has improved to 1.7  -Miesha weaned off 1/30, and Epi weaned off 1/31  -GDMT:  Dig and Toprol D/C'd 2/2 junctional rhythm. Entresto D/C'd 2/2 transient hypotension during/after ICD explant and TREY. Continues entresto 24/26  -Self declined for LVAD after w/u last April 2018.             Andrie Ospina MD  Heart Transplant  [facility]

## 2019-02-02 NOTE — ASSESSMENT & PLAN NOTE
-Vegetation seen on RICARDO after admission  -2 mobile masses seen on CVC by RICARDO 1/14 (has both RUE PICC and RIJ TLC). Discussed with Dr. Russo. Blood cultures repeated 1/15 and were -ve  -Appreciate EP's help. 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 Patient had 2 vegetations on RV lead which is most likely source of bacteremia. Lead tip and ICD pocket cultured with RV lead +ve for Enteroccocus. Blood cultures are negative here are -ve with last culture done 1/24  -Continue Ampicillin (renally dosed given TREY) and Ceftriaxone per ID recs Will treat for 6 weeks post extraction.  Estimated end date 2/20/19.    -S/P successful reimplantation of BiV ICD 1/29. EP considering repeat RICARDO/DCCV A fib at some point. Will perform DFT as an outpatient   -Appreciate GI's help. Plan for outpatient EGD and C-scope for eval of anemia and Enterococcus bacteremia  -Repeated blood cultures, cultured RIJ line tip 1/24 and UA with reflex due to WBCs increased (afebrile) - UA OK, other cultures -ve

## 2019-02-02 NOTE — ASSESSMENT & PLAN NOTE
- Appreciate Nephrology's help  - Was oliguric and creatinine jumped to 4.1 likely 2/2 ATN/hypotension following ICD explant.   - BUN/creatinine improving today at 44/1.7. See acute on chronic systolic heart failure  - Miesha and Epi weaned off  - continue Entresto today  - creatinine close to baseline. Encourage oral intake. No diuretics

## 2019-02-02 NOTE — SUBJECTIVE & OBJECTIVE
Interval History:     Uneventful night.  Pacing accordingly on tele review. Wife at bedside and mention that patient is becoming more active.  Denies chest pain, dizziness or SOB. PT/OT ongoing and recommended for rehab post d/c    Continuous Infusions:  Scheduled Meds:   ampicillin IVPB  2 g Intravenous Q6H    artificial tears  1 drop Both Eyes QID    cefTRIAXone (ROCEPHIN) IVPB  2 g Intravenous Q12H    clopidogrel  75 mg Oral Daily    ferrous sulfate  325 mg Oral BID    insulin aspart U-100  10 Units Subcutaneous TIDWM    insulin detemir U-100  4 Units Subcutaneous BID    levothyroxine  25 mcg Oral Before breakfast    polyethylene glycol  17 g Oral Daily    rosuvastatin  20 mg Oral QHS    sacubitril-valsartan  1 tablet Oral BID    senna-docusate 8.6-50 mg  2 tablet Oral BID    tamsulosin  0.4 mg Oral Daily     PRN Meds:dextrose 50%, dextrose 50%, glucagon (human recombinant), glucose, glucose, glycerin adult, hydrOXYzine HCl, insulin aspart U-100, ondansetron, ramelteon, sodium chloride 0.9%, sodium chloride 0.9%, traMADol    Review of patient's allergies indicates:   Allergen Reactions    Adhesive Other (See Comments)     blisters    Codeine Other (See Comments)     Blurred vision    Latex Hives    Ace inhibitors     Lipitor [atorvastatin] Other (See Comments)     Muscle spasms    Xanax [alprazolam] Hallucinations     Objective:     Vital Signs (Most Recent):  Temp: 97.8 °F (36.6 °C) (02/02/19 0729)  Pulse: 80 (02/02/19 0729)  Resp: 16 (02/02/19 0729)  BP: (!) 96/54 (02/02/19 0729)  SpO2: (!) 94 % (02/02/19 0729) Vital Signs (24h Range):  Temp:  [97.8 °F (36.6 °C)-98.5 °F (36.9 °C)] 97.8 °F (36.6 °C)  Pulse:  [76-84] 80  Resp:  [16-20] 16  SpO2:  [93 %-97 %] 94 %  BP: ()/(47-60) 96/54     Patient Vitals for the past 72 hrs (Last 3 readings):   Weight   01/31/19 1300 83.5 kg (184 lb 1.4 oz)     Body mass index is 23.64 kg/m².      Intake/Output Summary (Last 24 hours) at 2/2/2019  0959  Last data filed at 2/2/2019 0500  Gross per 24 hour   Intake 1180 ml   Output 250 ml   Net 930 ml       Hemodynamic Parameters:       Telemetry: BIV pacing    Physical Exam   Constitutional: He is oriented to person, place, and time.   feeble   HENT:   Temporal muscle wasting   Eyes: No scleral icterus.   Neck: JVD (above clavicle. ) present.   Cardiovascular:   LVAD hum- smooth   Pulmonary/Chest: Effort normal and breath sounds normal. He has no wheezes. He has no rales. He exhibits no tenderness.   Abdominal: Soft. Bowel sounds are normal. He exhibits no mass. There is no guarding.   Dry DLES dressing.    Musculoskeletal: He exhibits no edema or tenderness.   Neurological: He is alert and oriented to person, place, and time.   Skin: Skin is dry.   Nursing note and vitals reviewed.     Significant Labs:  CBC:  Recent Labs   Lab 01/31/19 0406 02/01/19 0448 02/02/19 0434   WBC 10.50 9.23 9.05   RBC 2.60* 2.46* 2.34*   HGB 7.3* 7.0* 6.8*   HCT 24.8* 23.6* 23.1*    170 166   MCV 95 96 99*   MCH 28.1 28.5 29.1   MCHC 29.4* 29.7* 29.4*     BNP:  Recent Labs   Lab 01/29/19  0608   BNP 1,219*     CMP:  Recent Labs   Lab 01/31/19  0406 01/31/19  1132 02/01/19 0448 02/02/19  0434   *  --  105 132*   CALCIUM 8.7  --  8.6* 8.3*   ALBUMIN 2.2*  --  2.0* 2.0*   PROT 6.2  --  6.0 5.6*     --  138 139   K 3.5 4.1 3.6 4.1   CO2 34*  --  34* 31*   CL 95  --  96 100   BUN 47*  --  44* 40*   CREATININE 1.9*  --  1.7* 2.0*   ALKPHOS 148*  --  142* 133   ALT 14  --  15 15   AST 23  --  32 31   BILITOT 0.7  --  0.5 0.5      Coagulation:   Recent Labs   Lab 01/31/19 0406 02/01/19 0448 02/02/19  0434   INR 2.8* 2.4* 1.9*     LDH:  No results for input(s): LDH in the last 72 hours.  Microbiology:  Microbiology Results (last 7 days)     Procedure Component Value Units Date/Time    Blood culture [[id number]] Collected:  01/24/19 0940    Order Status:  Completed Specimen:  Blood from Peripheral, Hand, Right  Updated:  01/29/19 1212     Blood Culture, Routine No growth after 5 days.    Blood culture [[id number]] Collected:  01/24/19 0950    Order Status:  Completed Specimen:  Blood from Peripheral, Forearm, Right Updated:  01/29/19 1212     Blood Culture, Routine No growth after 5 days.    IV catheter culture [[id number]] Collected:  01/24/19 1120    Order Status:  Completed Specimen:  Catheter Tip, Intrajugular Updated:  01/26/19 1138     Aerobic Culture - Cath tip No growth          I have reviewed all pertinent labs within the past 24 hours.    Estimated Creatinine Clearance: 36.5 mL/min (A) (based on SCr of 2 mg/dL (H)).    Diagnostic Results:

## 2019-02-02 NOTE — NURSING
MD Farah notified of BP at 0016 86/47 MAP 62, taken manually BP was 90/52. Retaken at 0210 BP 86/51 MAP 65. Ordered to monitor patient, will continue to monitor patient. Pt stable, no signs of distress.

## 2019-02-02 NOTE — SUBJECTIVE & OBJECTIVE
"Interval HPI:   Overnight events: remains in CSU. Hypotension overnight; rapid response. BG reasonably well controlled with scheduled insulin and correction scale x1.  Creatinine 1.8.  Eatin%  Nausea: No  Hypoglycemia and intervention: No  Fever: No  TPN and/or TF: No      BP (!) 88/49 (BP Location: Left arm, Patient Position: Lying)   Pulse 80   Temp 97.9 °F (36.6 °C) (Oral)   Resp 18   Ht 6' 2" (1.88 m)   Wt 83.5 kg (184 lb 1.4 oz)   SpO2 95%   BMI 23.64 kg/m²     Labs Reviewed and Include    Recent Labs   Lab 19  0605      CALCIUM 7.8*   ALBUMIN 1.7*   PROT 5.0*   *   K 3.5   CO2 30*   CL 97   BUN 40*   CREATININE 1.8*   ALKPHOS 126   ALT 18   AST 39   BILITOT 0.4     Lab Results   Component Value Date    WBC 7.91 2019    HGB 6.5 (L) 2019    HCT 21.3 (L) 2019    MCV 97 2019     (L) 2019     No results for input(s): TSH, FREET4 in the last 168 hours.  Lab Results   Component Value Date    HGBA1C 8.1 (H) 2019       Nutritional status:   Body mass index is 23.64 kg/m².  Lab Results   Component Value Date    ALBUMIN 1.7 (L) 2019    ALBUMIN 2.0 (L) 2019    ALBUMIN 2.0 (L) 2019     No results found for: PREALBUMIN    Estimated Creatinine Clearance: 40.6 mL/min (A) (based on SCr of 1.8 mg/dL (H)).    Accu-Checks  Recent Labs     19  1242 19  2218 19  0750 19  1200 19  1941 19  0731 19  1149 19  1642 19  2042 19  0730   POCTGLUCOSE 180* 171* 86 186* 122* 126* 255* 179* 114* 115*       Current Medications and/or Treatments Impacting Glycemic Control  Immunotherapy:    Immunosuppressants     None        Steroids:   Hormones (From admission, onward)    None        Pressors:    Autonomic Drugs (From admission, onward)    None        Hyperglycemia/Diabetes Medications:   Antihyperglycemics (From admission, onward)    Start     Stop Route Frequency Ordered    19 2100  " insulin detemir U-100 pen 4 Units      -- SubQ 2 times daily 02/01/19 1551    01/30/19 1130  insulin aspart U-100 pen 10 Units      -- SubQ 3 times daily with meals 01/30/19 0756    01/30/19 0855  insulin aspart U-100 pen 0-5 Units      -- SubQ Before meals & nightly PRN 01/30/19 0756

## 2019-02-02 NOTE — ASSESSMENT & PLAN NOTE
-ICM.  -Echo 1/25/19: LVEF 20%, LVEDD 6.85 cm. Mod TR, PHTN(PAS 79).  -CVP pending. Lasix was D/C'd 1/30 and he was given 250 cc IV NS for orthostatic LH. No orthostatic BP changes this morning, and creatinine has improved to 1.7  -Miesha weaned off 1/30, and Epi weaned off 1/31  -GDMT: Dig and Toprol D/C'd 2/2 junctional rhythm. Entresto D/C'd 2/2 transient hypotension during/after ICD explant and TREY. Continues entresto 24/26  -Self declined for LVAD after w/u last April 2018.

## 2019-02-02 NOTE — NURSING
Attempted to assess blood glucose, patient sleeping.  Spouse asked to allow patient to sleep and not disturb.

## 2019-02-02 NOTE — PROGRESS NOTES
02/02/19 1559 02/02/19 1602   Vital Signs   Temp 97.6 °F (36.4 °C) --    Temp src Oral --    Pulse 80 --    Heart Rate Source Monitor --    Resp 16 --    SpO2 (!) 94 % --    Pulse Oximetry Type Intermittent --    O2 Device (Oxygen Therapy) room air --    BP (!) 73/50 (!) 80/48   MAP (mmHg) 58 --    BP Location Left arm Left arm   BP Method Automatic Manual   Patient Position Lying Lying   MARIN Ospina M.D. notified.  S/P NaCl+ 250 bolus.  Patient complains of lightheadedness and dizziness.  Encouraged fluids.  Silverio, Charge RN, notified.  Called Critical Care Response Team, [phone].

## 2019-02-02 NOTE — PROGRESS NOTES
02/02/19 1134 02/02/19 1137   Vital Signs   Temp 97.6 °F (36.4 °C) --    Temp src Oral --    Pulse 80 --    Heart Rate Source Monitor --    Resp 16 --    SpO2 (!) 93 % --    Pulse Oximetry Type Intermittent --    O2 Device (Oxygen Therapy) room air --    BP (!) 74/42 (!) 78/54   MAP (mmHg) 54 --    BP Location Left arm Left arm   BP Method Automatic Manual   Patient Position Sitting Sitting   MARIN Ospina M.D., [phone], notified.  Patient is complaining of dizziness and lightheadedness, repositioned to bed.  Orders to follow for NaCl+ 250 ml bolus.

## 2019-02-02 NOTE — CARE UPDATE
BG goal 140-180. BG at goal with scheduled insulin.     Continue levemir 4 units BID.  BG monitoring ac/hs and low dose correction scale.   continue novolog at 10 units with meals.       ** Please call Endocrine for any BG related issues **

## 2019-02-03 NOTE — PROGRESS NOTES
"   02/03/19 1545   Vital Signs   Temp 97.6 °F (36.4 °C)   Temp src Oral   Pulse 80   Heart Rate Source Monitor   Resp 18   SpO2 (!) 92 %   Pulse Oximetry Type Intermittent   O2 Device (Oxygen Therapy) room air   BP (!) 76/40   MAP (mmHg) 52   BP Location Left arm   BP Method Automatic   Patient Position Lying   Patient's vss were stable.  Patient was assisted to bedside commode.  Patient denied dizziness and lightheadedness.  Patient became very dizzy and lighthead upon standing after completion of bowel evacuation.  Patient became pale and was quickly repositioned to the bed.  Patient stated, "I felt really weird that time."   After 5 minutes patient stated his dizziness and lightheadedness was resolved.  "

## 2019-02-03 NOTE — PLAN OF CARE
Problem: Adult Inpatient Plan of Care  Goal: Plan of Care Review  Outcome: Ongoing (interventions implemented as appropriate)  Pt BP dropped @ 0000 BP: 79/45, CVP: 10, SVO2: 67, pt asymptomatic, ordered to monitor pt. Pt free of falls and injuries. POC discussed with patient/family, verbalized understanding. Questions answered, no distress at present.

## 2019-02-03 NOTE — PROGRESS NOTES
02/03/19 1048   Vital Signs   Pulse 80   Heart Rate Source Monitor   Resp 18   SpO2 (!) 92 %   Pulse Oximetry Type Intermittent   O2 Device (Oxygen Therapy) room air   BP (!) 74/46   MAP (mmHg) 56   BP Location Left arm   BP Method Automatic   Patient Position Lying   MARIN Ospina M.D. notified.  No new orders at this time.  Waffle mattress on bed.  Sacral foam dressing applied to buttocks.

## 2019-02-03 NOTE — SIGNIFICANT EVENT
Rapid Response Nurse Note     Rapid Response Metrics:     Admit Date: 2019  LOS: 28  Code Status: Full Code   Date of Consult: 2019  : 1940  Age: 78 y.o.  Weight:   Wt Readings from Last 1 Encounters:   19 83.5 kg (184 lb 1.4 oz)     Sex: male  Race: White   Bed: 322/322 A:   MRN: [id number]  Time Rapid Response Team page Received:   Time Rapid Response Team at Bedside: 0015  Time Rapid Response Team left Bedside: 0050  Was the patient discharged from an ICU this admission?   yes  Was the patient discharged from a PACU within last 24 hours?  no  Did the patient receive conscious sedation/general anesthesia within last 24 hours?  no  Was the patient in the ED within the past 24 hours?  no  Was the patient started on NIPPV within the past 24 hours?  no  Did this progress into an ARC or CPA:  no  Attending Physician: Ash Gunderson Jr.,*  Primary Service: [facility] HEART TRANSPLANT TEAM 2  Consult Requested By: Ash Gunderson Jr.,*  Rapid Response Indication(s): Hypotension    SITUATION:     Reason for Call:   Called to evaluate the patient for Circulatory    BACKGROUND:     Why is the patient in the hospital?: Infected PPM lead  What changed?: BP 78/58 pt symptomatic, dizziness    ASSESSMENT:     What did you find: Pt supine in bed. Awake alert takative. Hospitalist Dr. Gilbert had been previously notified, ScVO2 67%, CVP 10. Chest Xray was done- improved from previous.  According to Wilfredo JOHNSON RN, she has taken care of Mr. Solis for 4 days, the hypotension started with the initiation of Entresto.   pt was hypotensive and symptomatic after PM dose while attempting to ambulate to toilet, pt was assisted to toilet per 2 RN. Pt received fluids and recovered there after. Now again tonight after Entresto Pm dose hypotensive and symptomatic BP 78/58 HR 80 96% RA.   0025 BP improved 92/55 map 80. Pt states feeling better at this time.  RECOMMENDATIONS:     We recommend: Re-eval of Entresto  medication by primary team. Hold parameters for BP    FOLLOW-UP/CONTINGENCY PLAN:     Patient needs a second visit at : 0300    Call the rapid response Nurse at [phone] for additional questions or concerns.      PHYSICIAN ESCALATION:     Orders received and case discussed with Dr. Gilbert    Disposition: Remain in room 322A.

## 2019-02-03 NOTE — PLAN OF CARE
RN informed pt SBP 78/48 with MAP 57. Running 55- 65 since morning. Sleeping in bed, asymptomatic. Received 250 ml bolus in the afternoon when symptomatic, MAP 54.  Received Entresto at 9 pm, tamsulosin in the evening. Repeat MAP 80. Will continue to monitor if further drops. Seen and examined at bedside, JVP negative. Will get BNP, SVo2, CBC, CVP. Labs reviewed.  ml since 7 pm. Also with  hypoalbuminemia of 2. Will order nutrition consult.

## 2019-02-03 NOTE — ASSESSMENT & PLAN NOTE
- Appreciate Nephrology's help  - Was oliguric and creatinine jumped to 4.1 likely 2/2 ATN/hypotension following ICD explant.   - BUN/creatinine improving today at 44/1.7. See acute on chronic systolic heart failure  - Miesha and Epi weaned off  - continue Entresto as current  - creatinine trending back to baseline

## 2019-02-03 NOTE — PROGRESS NOTES
02/03/19 0724 02/03/19 0731   Vital Signs   Temp 97.9 °F (36.6 °C) --    Temp src Oral --    Pulse 80 --    Heart Rate Source Monitor --    Resp 18 --    Pulse Oximetry Type Intermittent --    O2 Device (Oxygen Therapy) room air --    BP (!) 65/34 (!) 68/48   MAP (mmHg) 43 55   BP Location Left arm Left arm   BP Method Automatic Automatic   Patient Position Sitting Lying   MARIN Ospina M.D. notified.  Oders to follow for NaCl+ Bolus 250 ml.  Patient is complaining of lightheadedness and dizziness.  Called FLORENTINO Sena, [phone], Critical Care Response Team.  Shobha Sawyer RN, notified.

## 2019-02-03 NOTE — SUBJECTIVE & OBJECTIVE
Interval History:   Had incidence of hypotension last night associated with mild dizziness. BP dropped to 78/48 during this time. No intervention was done and BP re-bounced back to normal range. Recommending continuation of entresto. This morning doing better and BP > 80 systolic. Still feels weak on mild activities. Mildly net positive (145mL).     Continuous Infusions:  Scheduled Meds:   ampicillin IVPB  2 g Intravenous Q6H    apixaban  5 mg Oral BID    artificial tears  1 drop Both Eyes QID    cefTRIAXone (ROCEPHIN) IVPB  2 g Intravenous Q12H    clopidogrel  75 mg Oral Daily    ferrous sulfate  325 mg Oral BID    insulin aspart U-100  10 Units Subcutaneous TIDWM    insulin detemir U-100  4 Units Subcutaneous BID    levothyroxine  25 mcg Oral Before breakfast    polyethylene glycol  17 g Oral Daily    rosuvastatin  20 mg Oral QHS    sacubitril-valsartan  1 tablet Oral BID    senna-docusate 8.6-50 mg  2 tablet Oral BID    tamsulosin  0.4 mg Oral Daily     PRN Meds:dextrose 50%, dextrose 50%, glucagon (human recombinant), glucose, glucose, glycerin adult, hydrOXYzine HCl, insulin aspart U-100, ondansetron, ramelteon, sodium chloride 0.9%, sodium chloride 0.9%, traMADol    Review of patient's allergies indicates:   Allergen Reactions    Adhesive Other (See Comments)     blisters    Codeine Other (See Comments)     Blurred vision    Latex Hives    Ace inhibitors     Lipitor [atorvastatin] Other (See Comments)     Muscle spasms    Xanax [alprazolam] Hallucinations     Objective:     Vital Signs (Most Recent):  Temp: 97.9 °F (36.6 °C) (02/03/19 1140)  Pulse: 80 (02/03/19 1140)  Resp: 18 (02/03/19 1140)  BP: (!) 93/54 (02/03/19 1140)  SpO2: (!) 92 % (02/03/19 1140) Vital Signs (24h Range):  Temp:  [97.3 °F (36.3 °C)-98.3 °F (36.8 °C)] 97.9 °F (36.6 °C)  Pulse:  [79-85] 80  Resp:  [16-20] 18  SpO2:  [92 %-97 %] 92 %  BP: (65-93)/(34-58) 93/54     Patient Vitals for the past 72 hrs (Last 3 readings):    Weight   01/31/19 1300 83.5 kg (184 lb 1.4 oz)     Body mass index is 23.64 kg/m².      Intake/Output Summary (Last 24 hours) at 2/3/2019 1145  Last data filed at 2/3/2019 0400  Gross per 24 hour   Intake 1078 ml   Output 750 ml   Net 328 ml       Hemodynamic Parameters:  CVP:  [8 mmHg-10 mmHg] 8 mmHg    Telemetry:  pacing    Physical Exam   Constitutional: He is oriented to person, place, and time.   feeble   HENT:   Temporal muscle wasting   Eyes: No scleral icterus.   Neck: absent  Cardiovascular:   LVAD hum- smooth   Pulmonary/Chest: Effort normal and breath sounds normal. He has no wheezes. He has no rales. He exhibits no tenderness.   Abdominal: Soft. Bowel sounds are normal. He exhibits no mass. There is no guarding.   Dry DLES dressing.    Musculoskeletal: He exhibits no edema or tenderness.   Neurological: He is alert and oriented to person, place, and time.   Skin: Skin is dry.   Nursing note and vitals reviewed.    Significant Labs:  CBC:  Recent Labs   Lab 02/02/19  0434 02/03/19  0017 02/03/19  0605   WBC 9.05 9.50 7.91   RBC 2.34* 2.38* 2.20*   HGB 6.8* 6.8* 6.5*   HCT 23.1* 22.9* 21.3*    157 145*   MCV 99* 96 97   MCH 29.1 28.6 29.5   MCHC 29.4* 29.7* 30.5*     BNP:  Recent Labs   Lab 01/29/19  0608 02/03/19  0017   BNP 1,219* 490*     CMP:  Recent Labs   Lab 02/01/19  0448 02/02/19  0434 02/03/19  0605    132* 110   CALCIUM 8.6* 8.3* 7.8*   ALBUMIN 2.0* 2.0* 1.7*   PROT 6.0 5.6* 5.0*    139 134*   K 3.6 4.1 3.5   CO2 34* 31* 30*   CL 96 100 97   BUN 44* 40* 40*   CREATININE 1.7* 2.0* 1.8*   ALKPHOS 142* 133 126   ALT 15 15 18   AST 32 31 39   BILITOT 0.5 0.5 0.4      Coagulation:   Recent Labs   Lab 02/01/19  0448 02/02/19  0434 02/03/19  0605   INR 2.4* 1.9* 1.7*     LDH:  No results for input(s): LDH in the last 72 hours.  Microbiology:  Microbiology Results (last 7 days)     Procedure Component Value Units Date/Time    Blood culture [[id number]] Collected:  01/24/19 0940     Order Status:  Completed Specimen:  Blood from Peripheral, Hand, Right Updated:  01/29/19 1212     Blood Culture, Routine No growth after 5 days.    Blood culture [[id number]] Collected:  01/24/19 0950    Order Status:  Completed Specimen:  Blood from Peripheral, Forearm, Right Updated:  01/29/19 1212     Blood Culture, Routine No growth after 5 days.              Estimated Creatinine Clearance: 40.6 mL/min (A) (based on SCr of 1.8 mg/dL (H)).    Diagnostic Results:  CXR: X-ray Chest 1 View    Result Date: 2/3/2019  AICD leads overlie the heart. Right PICC line the SVC. Stable cardiomegaly and bilateral edema. Electronically signed by: Mono Lara MD Date:    02/03/2019 Time:    00:46

## 2019-02-03 NOTE — NURSING
Pt req up to BR for BM --  Assisted to stand at bedside pt states slight dizzy--  after a few minutes less dizzy and assisted amb to BR.  Got to BR door and pt states more weak & legs starting to give out -- pt assisted to toilet and became less dizzy once sitting.  Pt successful to have BM and voided in toilet.  Pt assisted back to bed with assist of 2 staff -- became dizzy before reaching bed; dizzy left after pt lying in bed

## 2019-02-03 NOTE — CONSULTS
Nutrition consult received regarding low albumin.  Please see note from 1/31 for full RD assessment; RD to follow-up 2/7.    Recommendations  1. Continue current Cardiac diet + Glucerna ONS.  2. RD to monitor & follow-up.    Pt noted w/ moderate malnutrition 2/2 weight loss and poor appetite PTA. Albumin shouldn't be used as a nutrition indicator.     Thanks! NILESH Diaz

## 2019-02-03 NOTE — NURSING
Patient is still complaining of lightheadedness and dizziness.  Assisted patient to side of bed to urinate.  Will remain in room until patient is back in bed.  Spouse at bedside.

## 2019-02-03 NOTE — ASSESSMENT & PLAN NOTE
BG goal 140-180    Decrease Levemir to 3 units BID - due to FBG below goal  Novolog 10 units with meals  Low dose correction scale  BG monitoring AC/HS    Discharge planning: Patient is on considerably less insulin than home dosing.  Patient and spouse both agree patient patient is likely on too much insulin but patient reports no episodes of hypoglycemia at home.  Patient expressing interest to take less shots as he forgets to bring his vial and syring to work - consider other drug therapy upon discharge if possible.  If patient discharges on current insulins, recommend Levemir 8 units daily in AM and Novolog 10 units with meals plus correction scale.

## 2019-02-03 NOTE — PROGRESS NOTES
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 BP dropped to 78/48 during this time. No intervention was done and BP re-bounced back to normal range. Recommending continuation of entresto. This morning doing better and BP > 80 systolic. Still feels weak on mild activities. Mildly net positive (145mL).     Continuous Infusions:  Scheduled Meds:   ampicillin IVPB  2 g Intravenous Q6H    apixaban  5 mg Oral BID    artificial tears  1 drop Both Eyes QID    cefTRIAXone (ROCEPHIN) IVPB  2 g Intravenous Q12H    clopidogrel  75 mg Oral Daily    ferrous sulfate  325 mg Oral BID    insulin aspart U-100  10 Units Subcutaneous TIDWM    insulin detemir U-100  4 Units Subcutaneous BID    levothyroxine  25 mcg Oral Before breakfast    polyethylene glycol  17 g Oral Daily    rosuvastatin  20 mg Oral QHS    sacubitril-valsartan  1 tablet Oral BID    senna-docusate 8.6-50 mg  2 tablet Oral BID    tamsulosin  0.4 mg Oral Daily     PRN Meds:dextrose 50%, dextrose 50%, glucagon (human recombinant), glucose, glucose, glycerin adult, hydrOXYzine HCl, insulin aspart U-100, ondansetron, ramelteon, sodium chloride 0.9%, sodium chloride 0.9%, traMADol    Review of patient's allergies indicates:   Allergen Reactions    Adhesive Other (See Comments)     blisters    Codeine Other (See Comments)     Blurred vision    Latex Hives    Ace inhibitors     Lipitor [atorvastatin] Other (See Comments)     Muscle spasms    Xanax [alprazolam] Hallucinations     Objective:     Vital Signs (Most Recent):  Temp: 97.9 °F (36.6 °C)  (02/03/19 1140)  Pulse: 80 (02/03/19 1140)  Resp: 18 (02/03/19 1140)  BP: (!) 93/54 (02/03/19 1140)  SpO2: (!) 92 % (02/03/19 1140) Vital Signs (24h Range):  Temp:  [97.3 °F (36.3 °C)-98.3 °F (36.8 °C)] 97.9 °F (36.6 °C)  Pulse:  [79-85] 80  Resp:  [16-20] 18  SpO2:  [92 %-97 %] 92 %  BP: (65-93)/(34-58) 93/54     Patient Vitals for the past 72 hrs (Last 3 readings):   Weight   01/31/19 1300 83.5 kg (184 lb 1.4 oz)     Body mass index is 23.64 kg/m².      Intake/Output Summary (Last 24 hours) at 2/3/2019 1145  Last data filed at 2/3/2019 0400  Gross per 24 hour   Intake 1078 ml   Output 750 ml   Net 328 ml       Hemodynamic Parameters:  CVP:  [8 mmHg-10 mmHg] 8 mmHg    Telemetry:  pacing    Physical Exam   Constitutional: He is oriented to person, place, and time.   feeble   HENT:   Temporal muscle wasting   Eyes: No scleral icterus.   Neck: absent  Cardiovascular:   LVAD hum- smooth   Pulmonary/Chest: Effort normal and breath sounds normal. He has no wheezes. He has no rales. He exhibits no tenderness.   Abdominal: Soft. Bowel sounds are normal. He exhibits no mass. There is no guarding.   Dry DLES dressing.    Musculoskeletal: He exhibits no edema or tenderness.   Neurological: He is alert and oriented to person, place, and time.   Skin: Skin is dry.   Nursing note and vitals reviewed.    Significant Labs:  CBC:  Recent Labs   Lab 02/02/19  0434 02/03/19  0017 02/03/19  0605   WBC 9.05 9.50 7.91   RBC 2.34* 2.38* 2.20*   HGB 6.8* 6.8* 6.5*   HCT 23.1* 22.9* 21.3*    157 145*   MCV 99* 96 97   MCH 29.1 28.6 29.5   MCHC 29.4* 29.7* 30.5*     BNP:  Recent Labs   Lab 01/29/19  0608 02/03/19  0017   BNP 1,219* 490*     CMP:  Recent Labs   Lab 02/01/19  0448 02/02/19  0434 02/03/19  0605    132* 110   CALCIUM 8.6* 8.3* 7.8*   ALBUMIN 2.0* 2.0* 1.7*   PROT 6.0 5.6* 5.0*    139 134*   K 3.6 4.1 3.5   CO2 34* 31* 30*   CL 96 100 97   BUN 44* 40* 40*   CREATININE 1.7* 2.0* 1.8*   ALKPHOS 142* 133 126    ALT 15 15 18   AST 32 31 39   BILITOT 0.5 0.5 0.4      Coagulation:   Recent Labs   Lab 02/01/19  0448 02/02/19  0434 02/03/19  0605   INR 2.4* 1.9* 1.7*     LDH:  No results for input(s): LDH in the last 72 hours.  Microbiology:  Microbiology Results (last 7 days)     Procedure Component Value Units Date/Time    Blood culture [[id number]] Collected:  01/24/19 0940    Order Status:  Completed Specimen:  Blood from Peripheral, Hand, Right Updated:  01/29/19 1212     Blood Culture, Routine No growth after 5 days.    Blood culture [[id number]] Collected:  01/24/19 0950    Order Status:  Completed Specimen:  Blood from Peripheral, Forearm, Right Updated:  01/29/19 1212     Blood Culture, Routine No growth after 5 days.              Estimated Creatinine Clearance: 40.6 mL/min (A) (based on SCr of 1.8 mg/dL (H)).    Diagnostic Results:  CXR: X-ray Chest 1 View    Result Date: 2/3/2019  AICD leads overlie the heart. Right PICC line the SVC. Stable cardiomegaly and bilateral edema. 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 Discussed with Dr. Russo. Blood cultures repeated 1/15 and were -ve  -Appreciate EP's help. 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 Patient had 2 vegetations on RV lead which is most likely source of bacteremia. Lead tip and ICD pocket cultured with RV lead +ve for Enteroccocus. Blood cultures are negative here are -ve with last culture done 1/24  -Continue Ampicillin (renally dosed given TREY) and Ceftriaxone per ID recs Will treat for 6 weeks post extraction.  Estimated end date 2/20/19.    -S/P successful reimplantation of BiV ICD 1/29. EP considering repeat RICARDO/DCCV A fib at some point. Will perform DFT as an  outpatient   -Appreciate GI's help. Plan for outpatient EGD and C-scope for eval of anemia and Enterococcus bacteremia  -Repeated blood cultures, cultured RIJ line tip 1/24 and UA with reflex due to WBCs increased (afebrile) - UA OK, other cultures -ve     Debility    - PT/OT  -Plan to D/C home likely Monday or Tuesday with HH PT/OT     Leukocytosis    - WCC has normalized.   - RIJ D/C'd 1/24 and tip cultured and -ve. Blood cultures also from 1/24 -ve. UA OK     Dysuria    - Resolved  - Urine culture -ve  - Flomax started 1/10. F/C D/C'd overnight         Junctional bradycardia    - See A fib     Endocarditis    - See ICD lead infection     Bacteremia    -see above     TREY (acute kidney injury)    - Appreciate Nephrology's help  - Was oliguric and creatinine jumped to 4.1 likely 2/2 ATN/hypotension following ICD explant.   - BUN/creatinine improving today at 44/1.7. See acute on chronic systolic heart failure  - Miesha and Epi weaned off  - continue Entresto as current  - creatinine trending back to baseline     A-fib    -S/P Unsuccessful RICARDO guided/DCCV 1/14. Remains in A fib  -S/P successful reimplantation of BiV ICD 1/29  -Weaned off Epi and Miesha  -CHADS VASc 6  -Holding eliquis s/p ICD explant 1/9. Per EP, anticoagulation resumed 1/12 with Coumadin. Coumadin now D/C'd with plan to resume Eliquis once INR is 2.0 or less (2.4 today)  -Cardiac monitoring     Type 2 diabetes mellitus with complication    -A1c 8.1  -Target -180 while hospitalized  -detemir 10 q HS, Novolog 10 units with meals, SSI  -Appreciate Endocrine's help with management     Acute on chronic combined systolic and diastolic CHF, NYHA class 3    -ICM.  -Echo 1/25/19: LVEF 20%, LVEDD 6.85 cm. Mod TR, PHTN(PAS 79).  -CVP pending. Lasix was D/C'd 1/30 and he was given 250 cc IV NS for orthostatic LH. No orthostatic BP changes this morning, and creatinine has improved to 1.7  -Miesha weaned off 1/30, and Epi weaned off 1/31  -GDMT: Dig and Toprol D/C'd  2/2 junctional rhythm. Entresto D/C'd 2/2 transient hypotension during/after ICD explant and TREY.   - Continue Entresto as current. Will consider discontinuing if sxs (dizziness, hypotension) persist or becomes more severe  -Self declined for LVAD after w/u last April 2018.             Andrei Ospina MD  Heart Transplant  [facility]

## 2019-02-03 NOTE — NURSING
Vladimir PAULINO informed of 0000 BP vitals, BP: 79/45, taken manually BP: 78/58. Pt asymptomatic, free of distress. Ordered to continue to monitor pt

## 2019-02-03 NOTE — PROGRESS NOTES
"[facility]  Endocrinology  Progress Note    Admit Date: 2019     Reason for Consult: Management of T2DM, Hyperglycemia     Surgical Procedure and Date: Lead extraction 19    Diabetes diagnosis year: approximately 10 years ago    Lab Results   Component Value Date    HGBA1C 8.1 (H) 2019       Home Diabetes Medications: Lantus 50 units q HS, Novolog 25 units with meals (vials)    How often checking glucose at home? 3-4x per day   BG readings on regimen: AM 120s  Hypoglycemia on the regimen?  Yes - 2-3x per month  Missed doses on regimen?  Yes - 2x per week    Diabetes Complications include:     Hyperglycemia, Hypoglycemia , Diabetic retinopathy , Diabetic cataract  and Diabetic peripheral neuropathy     Complicating diabetes co morbidities:   CHF and History of CVA      HPI:   Patient is a 78 y.o. male with a diagnosis of DM2 and CHF who was admitted on 19 for possible RV lead infection.  Patient is s/p lead extraction on 19.  Patient's DM managed by PCP, Dr. Hdz.  Endocrine consulted for DM/BG management.            Interval HPI:   Overnight events: remains in CSU. Hypotension overnight; rapid response. BG reasonably well controlled with scheduled insulin and correction scale x1.  Creatinine 1.8.  Eatin%  Nausea: No  Hypoglycemia and intervention: No  Fever: No  TPN and/or TF: No      BP (!) 88/49 (BP Location: Left arm, Patient Position: Lying)   Pulse 80   Temp 97.9 °F (36.6 °C) (Oral)   Resp 18   Ht 6' 2" (1.88 m)   Wt 83.5 kg (184 lb 1.4 oz)   SpO2 95%   BMI 23.64 kg/m²      Labs Reviewed and Include    Recent Labs   Lab 19  0605      CALCIUM 7.8*   ALBUMIN 1.7*   PROT 5.0*   *   K 3.5   CO2 30*   CL 97   BUN 40*   CREATININE 1.8*   ALKPHOS 126   ALT 18   AST 39   BILITOT 0.4     Lab Results   Component Value Date    WBC 7.91 2019    HGB 6.5 (L) 2019    HCT 21.3 (L) 2019    MCV 97 2019     (L) 2019 "     No results for input(s): TSH, FREET4 in the last 168 hours.  Lab Results   Component Value Date    HGBA1C 8.1 (H) 01/07/2019       Nutritional status:   Body mass index is 23.64 kg/m².  Lab Results   Component Value Date    ALBUMIN 1.7 (L) 02/03/2019    ALBUMIN 2.0 (L) 02/02/2019    ALBUMIN 2.0 (L) 02/01/2019     No results found for: PREALBUMIN    Estimated Creatinine Clearance: 40.6 mL/min (A) (based on SCr of 1.8 mg/dL (H)).    Accu-Checks  Recent Labs     01/31/19  1242 01/31/19  2218 02/01/19  0750 02/01/19  1200 02/01/19  1941 02/02/19  0731 02/02/19  1149 02/02/19  1642 02/02/19  2042 02/03/19  0730   POCTGLUCOSE 180* 171* 86 186* 122* 126* 255* 179* 114* 115*       Current Medications and/or Treatments Impacting Glycemic Control  Immunotherapy:    Immunosuppressants     None        Steroids:   Hormones (From admission, onward)    None        Pressors:    Autonomic Drugs (From admission, onward)    None        Hyperglycemia/Diabetes Medications:   Antihyperglycemics (From admission, onward)    Start     Stop Route Frequency Ordered    02/01/19 2100  insulin detemir U-100 pen 4 Units      -- SubQ 2 times daily 02/01/19 1551    01/30/19 1130  insulin aspart U-100 pen 10 Units      -- SubQ 3 times daily with meals 01/30/19 0756    01/30/19 0855  insulin aspart U-100 pen 0-5 Units      -- SubQ Before meals & nightly PRN 01/30/19 0756          ASSESSMENT and PLAN    * 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   BG monitoring ac/hs and low dose correction scale.   decrease novolog  8 units with meals.    Discharge planning: Patient is on considerably less insulin than home dosing.  Patient and spouse both agree patient patient is likely on too much insulin but  patient reports no episodes of hypoglycemia at home.  Patient expressing interest to take less shots as he forgets to bring his vial and syring to work - consider other drug therapy upon discharge if possible.       TREY (acute kidney injury)    Caution with insulin stacking  Estimated Creatinine Clearance: 43 mL/min (A) (based on SCr of 1.7 mg/dL (H)).         A-fib    May affect BG readings if uncontrolled  S/p cardioversion.          Cathie Eli NP  Endocrinology  [facility]

## 2019-02-03 NOTE — ASSESSMENT & PLAN NOTE
-ICM.  -Echo 1/25/19: LVEF 20%, LVEDD 6.85 cm. Mod TR, PHTN(PAS 79).  -CVP pending. Lasix was D/C'd 1/30 and he was given 250 cc IV NS for orthostatic LH. No orthostatic BP changes this morning, and creatinine has improved to 1.7  -Miesha weaned off 1/30, and Epi weaned off 1/31  -GDMT: Dig and Toprol D/C'd 2/2 junctional rhythm. Entresto D/C'd 2/2 transient hypotension during/after ICD explant and TREY.   - Continue Entresto as current. Will consider discontinuing if sxs (dizziness, hypotension) persist or becomes more severe  -Self declined for LVAD after w/u last April 2018.

## 2019-02-03 NOTE — SIGNIFICANT EVENT
RN informed around midnight pt SBP 78/48 with MAP 57. Running 55- 65 since morning. Sleeping in bed, asymptomatic. Received 250 ml bolus in the afternoon when symptomatic, MAP 54.  Received Entresto at 9 pm, tamsulosin in the evening. Repeat MAP 80. Will continue to monitor if further drops. Seen and examined at bedside, JVP negative. Will get BNP, SVo2, CBC, CVP. Labs reviewed.  ml since 7 pm.   CVP 10 -> 8.  ml, has some urinary bladder issues in the past.    Plan:  Will get bladder scan, Monitor blood pressure, Keep SBP >80. C/W current medications.   Hypoalbuminemia of 2. Will order nutrition consult.     D/w Dr. Gunderson

## 2019-02-03 NOTE — PLAN OF CARE
Problem: Adult Inpatient Plan of Care  Goal: Plan of Care Review  Outcome: Ongoing (interventions implemented as appropriate)  Reviewed plan of care with patient and spouse.  Patient is a fall risk, low BPs.  Patient has Latex Allergy.  Patient is alert and oriented x 4.  Entresto held for low BPs.  Ampicillin 2g/100 ml IVPB q6h for Bacteremia, and CefTRIAXone (Rocephin) 2g/50 ml IVPB q12h.  K+ = 3.5, KCl+ SA CR tablet 20 mEq administered.  VTE High Risk:  Apixaban 5 mg oral BID.  Blood glucose monitoring will be completed 4 times daily before meals and at bedtime, SSI PRN, and 10 units Aspart with meals. Patient has remained free of falls/trauma/injury by using appropriate lighting, nonskid socks, by keeping area free of debris, and frequenting rounding of staff.  Patient's pupils are unequal due to injury of left eye (steel).  Recommend patient remain in bed for low BPs.  Patient and spouse verbalized understanding of all instructions.

## 2019-02-03 NOTE — PROGRESS NOTES
02/03/19 0902   Vital Signs   Pulse 80   Heart Rate Source Monitor   Resp 18   SpO2 95 %   Pulse Oximetry Type Intermittent   O2 Device (Oxygen Therapy) room air   BP (!) 78/43   MAP (mmHg) 54   BP Location Left arm   BP Method Automatic   Patient Position Lying   VICENTE Ospina notified.  Will hold ENTRESTO for low BPs.  NaCl+ 250 ml bolus infusing.

## 2019-02-03 NOTE — CARE UPDATE
Contacted at 750 by bedside RN, Alejandra [id number] to evaluate Mr. Jerry Solis for hypotension. Mr. Solis has been intermittently hypotensive and symptomatic when not lying down over the last several hours; his PO Enteresto was restarted yesterday and pt not tolerating. On arrival to pts room; 250 ml bolus was ordered and given this AM when bedside RN checked pressure and his BP was 68/48 and notified MD.  Will discuss with primary team about discontinuing medication and developing new POC.  Pt now resting in bed AAOx4 and not complaining of lightheadedness/dizzyness.  BP now 88/50s; sats 95%, RR 18, and Temp normal.  Will continue to monitor and follow; bedside RN Alejandra instructed to call RRT with any concerns or change in condition.

## 2019-02-03 NOTE — CARE UPDATE
Rapid Response Follow-up Note    Followed up with patient for proactive rounding.   Discussed pts progress with Dr. Ospina; holding all BP meds.    No acute issues at this time. Reviewed plan of care with primary RN, Alejandra. Please call Rapid Response RN with any questions or concerns at  [phone].

## 2019-02-04 PROBLEM — R30.0 DYSURIA: Status: RESOLVED | Noted: 2019-01-01 | Resolved: 2019-01-01

## 2019-02-04 NOTE — ASSESSMENT & PLAN NOTE
-ICM.  -Echo 1/25/19: LVEF 20%, LVEDD 6.85 cm. Mod TR, PHTN(PAS 79).  -CVP 8, but was again orthostatic and LH overnight requiring IVF. PM dose Entresto held. Will stop Entresto for now and give one unit PC's (see anemia)  -Miesha weaned off 1/30, and Epi weaned off 1/31  -GDMT: Dig and Toprol D/C'd 2/2 junctional rhythm. Entresto D/C'd 2/2 transient hypotension during/after ICD explant and TREY.   -Self declined for LVAD after w/u last April 2018.

## 2019-02-04 NOTE — SIGNIFICANT EVENT
RN notified patient SBP 68/40, MAP 49 on sitting and lying 75/43, MAP 55 on lying, HR 79 and 80 respectively. Entresto on hold. Having episodes of low blood pressure. Encouraged fluids. Repeat SBP 99/55. Will give fluids if drops.    D/W Dr. Gunderson

## 2019-02-04 NOTE — SUBJECTIVE & OBJECTIVE
"Interval HPI:   Overnight events: Remains in CSU, NAEON.  1 unit PRBCs given yesterday.  BG well controlled on current insulin regimen.  On IV antibiotics.  Eatin%  Nausea: No  Hypoglycemia and intervention: No  Fever: No  TPN and/or TF: No    BP (!) 91/52 (BP Location: Left arm, Patient Position: Lying)   Pulse 80   Temp 97.8 °F (36.6 °C) (Oral)   Resp 18   Ht 6' 2" (1.88 m)   Wt 83.5 kg (184 lb 1.4 oz)   SpO2 95%   BMI 23.64 kg/m²     Labs Reviewed and Include    No results for input(s): GLU, CALCIUM, ALBUMIN, PROT, NA, K, CO2, CL, BUN, CREATININE, ALKPHOS, ALT, AST, BILITOT in the last 24 hours.  Lab Results   Component Value Date    WBC 7.91 2019    HGB 6.5 (L) 2019    HCT 21.3 (L) 2019    MCV 97 2019     (L) 2019     No results for input(s): TSH, FREET4 in the last 168 hours.  Lab Results   Component Value Date    HGBA1C 8.1 (H) 2019       Nutritional status:   Body mass index is 23.64 kg/m².  Lab Results   Component Value Date    ALBUMIN 1.7 (L) 2019    ALBUMIN 2.0 (L) 2019    ALBUMIN 2.0 (L) 2019     No results found for: PREALBUMIN    Estimated Creatinine Clearance: 40.6 mL/min (A) (based on SCr of 1.8 mg/dL (H)).    Accu-Checks  Recent Labs     19  1200 19  1941 19  0731 19  1149 19  1642 19  2042 19  0730 19  1143 19  1852 19  2232   POCTGLUCOSE 186* 122* 126* 255* 179* 114* 115* 107 135* 202*       Current Medications and/or Treatments Impacting Glycemic Control  Immunotherapy:    Immunosuppressants     None        Steroids:   Hormones (From admission, onward)    None        Pressors:    Autonomic Drugs (From admission, onward)    None        Hyperglycemia/Diabetes Medications:   Antihyperglycemics (From admission, onward)    Start     Stop Route Frequency Ordered    19 1646  insulin aspart U-100 pen 8 Units      -- SubQ 3 times daily with meals 19 3796    " 02/01/19 2100  insulin detemir U-100 pen 4 Units      -- SubQ 2 times daily 02/01/19 1551    01/30/19 0855  insulin aspart U-100 pen 0-5 Units      -- SubQ Before meals & nightly PRN 01/30/19 075

## 2019-02-04 NOTE — SUBJECTIVE & OBJECTIVE
**Interval History: LH and orthostatic overnight requiring IVF. PM dose of Entresto held. Hgb has trended down    Continuous Infusions:  Scheduled Meds:   ampicillin IVPB  2 g Intravenous Q6H    apixaban  5 mg Oral BID    artificial tears  1 drop Both Eyes QID    cefTRIAXone (ROCEPHIN) IVPB  2 g Intravenous Q12H    clopidogrel  75 mg Oral Daily    ferrous sulfate  325 mg Oral BID    insulin aspart U-100  10 Units Subcutaneous TIDWM    insulin detemir U-100  4 Units Subcutaneous BID    levothyroxine  25 mcg Oral Before breakfast    polyethylene glycol  17 g Oral Daily    rosuvastatin  20 mg Oral QHS    senna-docusate 8.6-50 mg  2 tablet Oral BID    tamsulosin  0.4 mg Oral Daily     PRN Meds:sodium chloride, dextrose 50%, dextrose 50%, glucagon (human recombinant), glucose, glucose, glycerin adult, hydrOXYzine HCl, insulin aspart U-100, ondansetron, ramelteon, sodium chloride 0.9%, sodium chloride 0.9%, traMADol    Review of patient's allergies indicates:   Allergen Reactions    Adhesive Other (See Comments)     blisters    Codeine Other (See Comments)     Blurred vision    Latex Hives    Ace inhibitors     Lipitor [atorvastatin] Other (See Comments)     Muscle spasms    Xanax [alprazolam] Hallucinations     Objective:     Vital Signs (Most Recent):  Temp: 97.7 °F (36.5 °C) (02/04/19 0801)  Pulse: 80 (02/04/19 0948)  Resp: 18 (02/04/19 0943)  BP: (!) 85/48 (02/04/19 0948)  SpO2: (!) 94 % (02/04/19 0936) Vital Signs (24h Range):  Temp:  [97.6 °F (36.4 °C)-98.5 °F (36.9 °C)] 97.7 °F (36.5 °C)  Pulse:  [79-85] 80  Resp:  [16-18] 18  SpO2:  [92 %-99 %] 94 %  BP: ()/(40-56) 85/48     No data found.  Body mass index is 23.64 kg/m².      Intake/Output Summary (Last 24 hours) at 2/4/2019 1027  Last data filed at 2/4/2019 0600  Gross per 24 hour   Intake 1880 ml   Output 775 ml   Net 1105 ml       Hemodynamic Parameters:  CVP:  [8 mmHg] 8 mmHg    Telemetry: BiV paced with NSVT    Physical Exam    Constitutional: He is oriented to person, place, and time. He appears well-developed and well-nourished.   HENT:   Head: Normocephalic and atraumatic.   Eyes: Conjunctivae and EOM are normal.   Neck: Normal range of motion. Neck supple. No JVD present. No thyromegaly present.   Cardiovascular: Normal rate and regular rhythm.   Pulmonary/Chest: Effort normal and breath sounds normal.   Abdominal: Soft. Bowel sounds are normal.   Musculoskeletal: Normal range of motion. He exhibits no edema.   Neurological: He is alert and oriented to person, place, and time.   Skin: Skin is warm and dry. Capillary refill takes 2 to 3 seconds.   Psychiatric: He has a normal mood and affect. His behavior is normal. Judgment and thought content normal.       Significant Labs:  CBC:  Recent Labs   Lab 02/03/19 0017 02/03/19  0605 02/04/19  0649   WBC 9.50 7.91 8.52   RBC 2.38* 2.20* 2.34*   HGB 6.8* 6.5* 6.9*   HCT 22.9* 21.3* 23.0*    145* 141*   MCV 96 97 98   MCH 28.6 29.5 29.5   MCHC 29.7* 30.5* 30.0*     BNP:  Recent Labs   Lab 01/29/19  0608 02/03/19  0017   BNP 1,219* 490*     CMP:  Recent Labs   Lab 02/02/19 0434 02/03/19  0605 02/04/19  0649   * 110 127*   CALCIUM 8.3* 7.8* 7.8*   ALBUMIN 2.0* 1.7* 1.8*   PROT 5.6* 5.0* 5.2*    134* 134*   K 4.1 3.5 3.2*   CO2 31* 30* 28    97 99   BUN 40* 40* 40*   CREATININE 2.0* 1.8* 1.7*   ALKPHOS 133 126 128   ALT 15 18 20   AST 31 39 37   BILITOT 0.5 0.4 0.4      Coagulation:   Recent Labs   Lab 02/02/19 0434 02/03/19  0605 02/04/19  0649   INR 1.9* 1.7* 1.4*     LDH:  No results for input(s): LDH in the last 72 hours.  Microbiology:  Microbiology Results (last 7 days)     Procedure Component Value Units Date/Time    Blood culture [[id number]] Collected:  01/24/19 0940    Order Status:  Completed Specimen:  Blood from Peripheral, Hand, Right Updated:  01/29/19 1212     Blood Culture, Routine No growth after 5 days.    Blood culture [[id number]] Collected:   01/24/19 0950    Order Status:  Completed Specimen:  Blood from Peripheral, Forearm, Right Updated:  01/29/19 1212     Blood Culture, Routine No growth after 5 days.          I have reviewed all pertinent labs within the past 24 hours.    Estimated Creatinine Clearance: 43 mL/min (A) (based on SCr of 1.7 mg/dL (H)).    Diagnostic Results:  I have reviewed all pertinent imaging results/findings within the past 24 hours.

## 2019-02-04 NOTE — PROGRESS NOTES
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 PM dose of Entresto held. Hgb has trended down    Continuous Infusions:  Scheduled Meds:   ampicillin IVPB  2 g Intravenous Q6H    apixaban  5 mg Oral BID    artificial tears  1 drop Both Eyes QID    cefTRIAXone (ROCEPHIN) IVPB  2 g Intravenous Q12H    clopidogrel  75 mg Oral Daily    ferrous sulfate  325 mg Oral BID    insulin aspart U-100  10 Units Subcutaneous TIDWM    insulin detemir U-100  4 Units Subcutaneous BID    levothyroxine  25 mcg Oral Before breakfast    polyethylene glycol  17 g Oral Daily    rosuvastatin  20 mg Oral QHS    senna-docusate 8.6-50 mg  2 tablet Oral BID    tamsulosin  0.4 mg Oral Daily     PRN Meds:sodium chloride, dextrose 50%, dextrose 50%, glucagon (human recombinant), glucose, glucose, glycerin adult, hydrOXYzine HCl, insulin aspart U-100, ondansetron, ramelteon, sodium chloride 0.9%, sodium chloride 0.9%, traMADol    Review of patient's allergies indicates:   Allergen Reactions    Adhesive Other (See Comments)     blisters    Codeine Other (See Comments)     Blurred vision    Latex Hives    Ace inhibitors     Lipitor [atorvastatin] Other (See Comments)     Muscle spasms    Xanax [alprazolam] Hallucinations     Objective:     Vital Signs (Most Recent):  Temp: 97.7 °F (36.5 °C) (02/04/19 0801)  Pulse: 80 (02/04/19 0948)  Resp: 18 (02/04/19 0943)  BP: (!) 85/48 (02/04/19 0948)  SpO2: (!) 94 % (02/04/19 0936) Vital Signs (24h Range):  Temp:  [97.6 °F (36.4 °C)-98.5 °F (36.9 °C)] 97.7 °F (36.5 °C)  Pulse:  [79-85] 80  Resp:  [16-18] 18  SpO2:   [92 %-99 %] 94 %  BP: ()/(40-56) 85/48     No data found.  Body mass index is 23.64 kg/m².      Intake/Output Summary (Last 24 hours) at 2/4/2019 1027  Last data filed at 2/4/2019 0600  Gross per 24 hour   Intake 1880 ml   Output 775 ml   Net 1105 ml       Hemodynamic Parameters:  CVP:  [8 mmHg] 8 mmHg    Telemetry: BiV paced with NSVT    Physical Exam   Constitutional: He is oriented to person, place, and time. He appears well-developed and well-nourished.   HENT:   Head: Normocephalic and atraumatic.   Eyes: Conjunctivae and EOM are normal.   Neck: Normal range of motion. Neck supple. No JVD present. No thyromegaly present.   Cardiovascular: Normal rate and regular rhythm.   Pulmonary/Chest: Effort normal and breath sounds normal.   Abdominal: Soft. Bowel sounds are normal.   Musculoskeletal: Normal range of motion. He exhibits no edema.   Neurological: He is alert and oriented to person, place, and time.   Skin: Skin is warm and dry. Capillary refill takes 2 to 3 seconds.   Psychiatric: He has a normal mood and affect. His behavior is normal. Judgment and thought content normal.       Significant Labs:  CBC:  Recent Labs   Lab 02/03/19  0017 02/03/19  0605 02/04/19  0649   WBC 9.50 7.91 8.52   RBC 2.38* 2.20* 2.34*   HGB 6.8* 6.5* 6.9*   HCT 22.9* 21.3* 23.0*    145* 141*   MCV 96 97 98   MCH 28.6 29.5 29.5   MCHC 29.7* 30.5* 30.0*     BNP:  Recent Labs   Lab 01/29/19  0608 02/03/19  0017   BNP 1,219* 490*     CMP:  Recent Labs   Lab 02/02/19  0434 02/03/19  0605 02/04/19  0649   * 110 127*   CALCIUM 8.3* 7.8* 7.8*   ALBUMIN 2.0* 1.7* 1.8*   PROT 5.6* 5.0* 5.2*    134* 134*   K 4.1 3.5 3.2*   CO2 31* 30* 28    97 99   BUN 40* 40* 40*   CREATININE 2.0* 1.8* 1.7*   ALKPHOS 133 126 128   ALT 15 18 20   AST 31 39 37   BILITOT 0.5 0.4 0.4      Coagulation:   Recent Labs   Lab 02/02/19  0434 02/03/19  0605 02/04/19  0649   INR 1.9* 1.7* 1.4*     LDH:  No results for input(s): LDH in the  last 72 hours.  Microbiology:  Microbiology Results (last 7 days)     Procedure Component Value Units Date/Time    Blood culture [[id number]] Collected:  01/24/19 0940    Order Status:  Completed Specimen:  Blood from Peripheral, Hand, Right Updated:  01/29/19 1212     Blood Culture, Routine No growth after 5 days.    Blood culture [[id number]] Collected:  01/24/19 0950    Order Status:  Completed Specimen:  Blood from Peripheral, Forearm, Right Updated:  01/29/19 1212     Blood Culture, Routine No growth after 5 days.          I have reviewed all pertinent labs within the past 24 hours.    Estimated Creatinine Clearance: 43 mL/min (A) (based on SCr of 1.7 mg/dL (H)).    Diagnostic Results:  I have reviewed all pertinent imaging results/findings within the past 24 hours.    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 Discussed with Dr. Russo. Blood cultures repeated 1/15 and were -ve  -Appreciate EP's help. 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 Patient had 2 vegetations on RV lead which is most likely source of bacteremia. Lead tip and ICD pocket cultured with RV lead +ve for Enteroccocus. Blood cultures are negative here are -ve with last culture done 1/24  -Continue Ampicillin (renally dosed given TREY) and Ceftriaxone per ID recs Will treat for 6 weeks post extraction.  Estimated end date 2/20/19.    -S/P successful reimplantation of BiV ICD 1/29. EP considering repeat RICARDO/DCCV A fib at some point. Will perform DFT as an outpatient   -Appreciate GI's help. Plan for outpatient EGD and C-scope for eval of anemia and Enterococcus bacteremia  -All cultures from 1/7 - 1/24 -ve except for ICD lead tip +ve for Enterococcus on 1/9     Acute on chronic combined systolic and diastolic CHF, NYHA class 3    -ICM.  -Echo  1/25/19: LVEF 20%, LVEDD 6.85 cm. Mod TR, PHTN(PAS 79).  -CVP 8, but was again orthostatic and LH overnight requiring IVF. PM dose Entresto held. Will stop Entresto for now and give one unit PC's (see anemia)  -Miesha weaned off 1/30, and Epi weaned off 1/31  -GDMT: Dig and Toprol D/C'd 2/2 junctional rhythm. Entresto D/C'd 2/2 transient hypotension during/after ICD explant and TREY.   -Self declined for LVAD after w/u last April 2018.         A-fib    -S/P Unsuccessful RICARDO guided/DCCV 1/14. Remains in A fib  -S/P successful reimplantation of BiV ICD 1/29  -Weaned off Epi and Miesha  -CHADS VASc 6  -Eliquis resumed 2/2 once INR was < 2.0  -Cardiac monitoring     Type 2 diabetes mellitus with complication    -A1c 8.1  -Target -180 while hospitalized  -detemir 4 units bid, Novolog 10 units with meals, SSI  -Appreciate Endocrine's help with management     Debility    - PT/OT  -Plan to D/C home once stronger and no longer orthostatic     Leukocytosis    - WCC has normalized.   - RIJ D/C'd 1/24 and tip cultured and -ve. Blood cultures also from 1/24 -ve. UA OK     Junctional bradycardia    - See A fib     Endocarditis    - See ICD lead infection     Bacteremia    -see above     TREY (acute kidney injury)    - Appreciate Nephrology's help  - Was oliguric and creatinine jumped to 4.1 likely 2/2 ATN/hypotension following ICD explant.   - BUN/creatinine improving today at 40/1.7. See acute on chronic systolic heart failure  - Miesha and Epi weaned off  - Baseline creatinine ~ 1.2-1.6         [place] [id number]  Heart Transplant  [facility]

## 2019-02-04 NOTE — PLAN OF CARE
Problem: Adult Inpatient Plan of Care  Goal: Plan of Care Review  Outcome: Ongoing (interventions implemented as appropriate)  Plan of care reviewed with patient/family; all questions and concerns addressed; no distress at present.

## 2019-02-04 NOTE — PT/OT/SLP PROGRESS
Occupational Therapy      Patient Name:  Jerry Solis   MRN:  [id number]    Patient not seen today secondary to RN hold - pt requiring a blood transfusion before functional mobility. OT unable to return for 2nd attempt in PM. Will follow-up next scheduled OT session .    Juli Morris, OT  2/4/2019

## 2019-02-04 NOTE — ASSESSMENT & PLAN NOTE
-S/P Unsuccessful RICARDO guided/DCCV 1/14. Remains in A fib  -S/P successful reimplantation of BiV ICD 1/29  -Weaned off Epi and Miesha  -CHADS VASc 6  -Eliquis resumed 2/2 once INR was < 2.0  -Cardiac monitoring

## 2019-02-04 NOTE — ASSESSMENT & PLAN NOTE
-A1c 8.1  -Target -180 while hospitalized  -detemir 4 units bid, Novolog 10 units with meals, SSI  -Appreciate Endocrine's help with management

## 2019-02-04 NOTE — PT/OT/SLP PROGRESS
Physical Therapy      Patient Name:  Jerry Solis   MRN:  [id number]    Patient not seen today. RN hold; pt is requiring a blood transfusion before functional mobility. PT unable to return. Will follow-up when appropriate.    Kacy Garcia, PT, DPT  2/4/2019  [phone]

## 2019-02-04 NOTE — ASSESSMENT & PLAN NOTE
- Appreciate Nephrology's help  - Was oliguric and creatinine jumped to 4.1 likely 2/2 ATN/hypotension following ICD explant.   - BUN/creatinine improving today at 40/1.7. See acute on chronic systolic heart failure  - Miesha and Epi weaned off  - Baseline creatinine ~ 1.2-1.6

## 2019-02-04 NOTE — CARE UPDATE
Patient seen as follow up. A/A in room with family at bedside. O2 sats 97% on RA no distress noted. Denies dizziness. Doppler pressure 84/0. RN Wilfredo spoke with MD. Nurse is to recheck blood pressure within the hour and notify MD if SBP <80. Instructed to call for questions or concerns. Activate RR for acute changes.

## 2019-02-04 NOTE — CARE UPDATE
RN Proactive Rounding Note  Time of Visit: 932    Admit Date: 2019  LOS: 29  Code Status: Full Code   Date of Visit: 2019  : 1940  Age: 78 y.o.  Sex: male  Race: White  Bed: 322/322 A:   MRN: [id number]  Was the patient discharged from an ICU this admission? yes   Was the patient discharged from a PACU within last 24 hours?  no  Did the patient receive conscious sedation/general anesthesia in last 24 hours?  no  Was the patient in the ED within the past 24 hours?  no  Was the patient started on NIPPV within the past 24 hours?  no  Attending Physician: Ash Gunderson Jr.,*  Primary Service: [facility] HEART TRANSPLANT TEAM 2      ASSESSMENT:     Abnormal Vital Signs: hypotension  Clinical Issues: Circulatory; On arrival to bedside, patient sitting in chair. He is AAOx3. He reports dizziness. Manual BP 72/42. Patient was assisted with RN x 2 back to bed, placed in supine position with increase in BP 79/44, 85/48. He reports dizziness has improved upon lying in bed. Skin is warm and dry. Non-labored respirations. FLORENTINO Spangler at bedside reports type and screen has been drawn and patient will receive 1 unit RBCs.      INTERVENTIONS/ RECOMMENDATIONS:     Transfuse RBCs, strict bed rest.     Discussed plan of care with Crys GOOD    PHYSICIAN ESCALATION:     Yes/No  no    Disposition: Remain in room 322.    FOLLOW-UP/CONTINGENCY:     Call back the Rapid Response Nurse at [phone] for additional questions or concerns.

## 2019-02-04 NOTE — ASSESSMENT & PLAN NOTE
-Vegetation seen on RICARDO after admission  -2 mobile masses seen on CVC by RICARDO 1/14 (has both RUE PICC and RIJ TLC). Discussed with Dr. Russo. Blood cultures repeated 1/15 and were -ve  -Appreciate EP's help. 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 Patient had 2 vegetations on RV lead which is most likely source of bacteremia. Lead tip and ICD pocket cultured with RV lead +ve for Enteroccocus. Blood cultures are negative here are -ve with last culture done 1/24  -Continue Ampicillin (renally dosed given TREY) and Ceftriaxone per ID recs Will treat for 6 weeks post extraction.  Estimated end date 2/20/19.    -S/P successful reimplantation of BiV ICD 1/29. EP considering repeat RICARDO/DCCV A fib at some point. Will perform DFT as an outpatient   -Appreciate GI's help. Plan for outpatient EGD and C-scope for eval of anemia and Enterococcus bacteremia  -All cultures from 1/7 - 1/24 -ve except for ICD lead tip +ve for Enterococcus on 1/9

## 2019-02-04 NOTE — CARE UPDATE
BG goal 140-180.  BG at or slightly above goal overnight.     Continue levemir 4 units BID; first dose tonight.   BG monitoring ac/hs and low dose correction scale.   resume novolog  10 units with meals.      ** Please call Endocrine for any BG related issues **

## 2019-02-04 NOTE — PLAN OF CARE
Problem: Adult Inpatient Plan of Care  Goal: Plan of Care Review  Patient remains free of falls, injures and traumas.  Monitoring BP closley. All other VSS. Patient reports dizziness and lightheadedness when sitting. Recommended to remain in bed due to low BP.  Lupillo Ledesma. RR also came to bed to follow up. Patient H/H 6.9/23. 1 unit PRBC ordered and given. K-3.2 and Mg-1.9 PO replacement given.  Ampicillin 2g/100 ml IVPB q6h and Rocephin 2g/50 ml IVPB q12h. CBG AC/HS. SS and scheduled insulin given. Left chest wall CDI and Right chest wall dressing CDI. No c/o of pain or discomfort during shift. Plan of care reviewed w/ patient and spouse. Patient and spouse verbalized understanding. Will continue to monitor.

## 2019-02-05 PROBLEM — D72.829 LEUKOCYTOSIS: Status: RESOLVED | Noted: 2019-01-01 | Resolved: 2019-01-01

## 2019-02-05 NOTE — SUBJECTIVE & OBJECTIVE
Interval History: Pt is sitting up on side of bed eating breakfast. Wife at bedside. He feels better after blood transfusion yesterday. He is asking to try Entresto dose again today    Continuous Infusions:  Scheduled Meds:   ampicillin IVPB  2 g Intravenous Q6H    apixaban  5 mg Oral BID    artificial tears  1 drop Both Eyes QID    cefTRIAXone (ROCEPHIN) IVPB  2 g Intravenous Q12H    clopidogrel  75 mg Oral Daily    ferrous sulfate  325 mg Oral BID    insulin aspart U-100  10 Units Subcutaneous TIDWM    insulin detemir U-100  4 Units Subcutaneous BID    levothyroxine  25 mcg Oral Before breakfast    polyethylene glycol  17 g Oral Daily    rosuvastatin  20 mg Oral QHS    sacubitril-valsartan  1 tablet Oral BID    senna-docusate 8.6-50 mg  2 tablet Oral BID    tamsulosin  0.4 mg Oral Daily     PRN Meds:sodium chloride, dextrose 50%, dextrose 50%, glucagon (human recombinant), glucose, glucose, glycerin adult, hydrOXYzine HCl, insulin aspart U-100, ondansetron, ramelteon, sodium chloride 0.9%, sodium chloride 0.9%, traMADol    Review of patient's allergies indicates:   Allergen Reactions    Adhesive Other (See Comments)     blisters    Codeine Other (See Comments)     Blurred vision    Latex Hives    Ace inhibitors     Lipitor [atorvastatin] Other (See Comments)     Muscle spasms    Xanax [alprazolam] Hallucinations     Objective:     Vital Signs (Most Recent):  Temp: 97.3 °F (36.3 °C) (02/05/19 0752)  Pulse: 82 (02/05/19 1100)  Resp: 18 (02/05/19 0752)  BP: (!) 94/51 (02/05/19 0752)  SpO2: (!) 93 % (02/05/19 0752) Vital Signs (24h Range):  Temp:  [96.9 °F (36.1 °C)-97.9 °F (36.6 °C)] 97.3 °F (36.3 °C)  Pulse:  [79-91] 82  Resp:  [15-18] 18  SpO2:  [90 %-97 %] 93 %  BP: (82-98)/(49-59) 94/51     No data found.  Body mass index is 23.64 kg/m².      Intake/Output Summary (Last 24 hours) at 2/5/2019 1155  Last data filed at 2/5/2019 0508  Gross per 24 hour   Intake 1160 ml   Output 1200 ml   Net -40  ml       Hemodynamic Parameters:  CVP:  [9 mmHg] 9 mmHg    Telemetry: Reviewed    Physical Exam   Constitutional: He is oriented to person, place, and time. He appears well-developed and well-nourished.   HENT:   Head: Normocephalic and atraumatic.   Eyes: Conjunctivae and EOM are normal.   Neck: Normal range of motion. Neck supple. No JVD present. No thyromegaly present.   Cardiovascular: Normal rate and regular rhythm.   Pulmonary/Chest: Effort normal and breath sounds normal.   Abdominal: Soft. Bowel sounds are normal.   Musculoskeletal: Normal range of motion. He exhibits no edema.   Neurological: He is alert and oriented to person, place, and time.   Skin: Skin is warm and dry. Capillary refill takes 2 to 3 seconds.   Psychiatric: He has a normal mood and affect. His behavior is normal. Judgment and thought content normal.       Significant Labs:  CBC:  Recent Labs   Lab 02/03/19  0605 02/04/19  0649 02/05/19  0521   WBC 7.91 8.52 7.35   RBC 2.20* 2.34* 2.66*   HGB 6.5* 6.9* 7.9*   HCT 21.3* 23.0* 25.2*   * 141* 120*   MCV 97 98 95   MCH 29.5 29.5 29.7   MCHC 30.5* 30.0* 31.3*     BNP:  Recent Labs   Lab 02/03/19  0017   *     CMP:  Recent Labs   Lab 02/03/19  0605 02/04/19  0649 02/05/19  0521    127* 62*   CALCIUM 7.8* 7.8* 7.8*   ALBUMIN 1.7* 1.8* 1.9*   PROT 5.0* 5.2* 5.1*   * 134* 134*   K 3.5 3.2* 3.2*   CO2 30* 28 26   CL 97 99 101   BUN 40* 40* 35*   CREATININE 1.8* 1.7* 1.4   ALKPHOS 126 128 117   ALT 18 20 16   AST 39 37 32   BILITOT 0.4 0.4 0.5      Coagulation:   Recent Labs   Lab 02/03/19  0605 02/04/19  0649 02/05/19  0521   INR 1.7* 1.4* 1.2     LDH:  No results for input(s): LDH in the last 72 hours.  Microbiology:  Microbiology Results (last 7 days)     Procedure Component Value Units Date/Time    Blood culture [[id number]] Collected:  01/24/19 0940    Order Status:  Completed Specimen:  Blood from Peripheral, Hand, Right Updated:  01/29/19 1212     Blood Culture,  Routine No growth after 5 days.    Blood culture [[id number]] Collected:  01/24/19 0950    Order Status:  Completed Specimen:  Blood from Peripheral, Forearm, Right Updated:  01/29/19 1212     Blood Culture, Routine No growth after 5 days.          I have reviewed all pertinent labs within the past 24 hours.    Estimated Creatinine Clearance: 52.2 mL/min (based on SCr of 1.4 mg/dL).    Diagnostic Results:  I have reviewed all pertinent imaging results/findings within the past 24 hours.

## 2019-02-05 NOTE — ASSESSMENT & PLAN NOTE
-Vegetation seen on RICARDO after admission  -2 mobile masses seen on CVC by RICARDO 1/14 (has both RUE PICC and RIJ TLC- both have since been removed). Discussed with Dr. Russo. Blood cultures repeated 1/15 and were -ve  -Appreciate EP's help. 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 Patient had 2 vegetations on RV lead which is most likely source of bacteremia. Lead tip and ICD pocket cultured with RV lead +ve for Enteroccocus. Blood cultures are negative here are -ve with last culture done 1/24  -Continue Ampicillin (renally dosed given TREY) and Ceftriaxone per ID recs. Will treat for 6 weeks post extraction.  Estimated end date 2/20/19.    -S/P successful reimplantation of BiV ICD 1/29. EP considering repeat RICARDO/DCCV A fib at some point. Will perform DFT as an outpatient   -Appreciate GI's help. Plan for outpatient EGD and C-scope for eval of anemia and Enterococcus bacteremia  -All cultures from 1/7 - 1/24 -ve except for ICD lead tip +ve for Enterococcus on 1/9

## 2019-02-05 NOTE — PLAN OF CARE
Problem: Adult Inpatient Plan of Care  Goal: Plan of Care Review  Outcome: Ongoing (interventions implemented as appropriate)  Patient remains free from falls and injuries through out shift. Patient AAO. VSS. Patient denies chest pain and SOB. Patient's spouse is present at bedside. Rt upper chest wall incision dressing is CDI without any drainage. Sling in tact on Rt arm. Patient received 1 unit of PRBCs during day shift d/t H/H of 6.9/23. SBP have been between 83-94 and MD on call notified. Patient has c/o a little dizziness when standing. Ampcilin IVPB given on shift. Patient is daily CVPs and VBGs, will assess this monring.  Blood glucose checks are ACHS, last  and 4 units of scheduled Levemir given. Plan of care reviewed with patient. Patient verbalizes understanding of plan.  Will continue to monitor.

## 2019-02-05 NOTE — PT/OT/SLP PROGRESS
Physical Therapy Treatment    Patient Name:  Jerry Solis   MRN:  [id number]    Recommendations:     Discharge Recommendations:  (HHPT)   Discharge Equipment Recommendations: shower chair   Barriers to discharge: None    Assessment:     Jerry Solis is a 78 y.o. 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  He presents with the following impairments/functional limitations:  weakness, impaired endurance, impaired functional mobilty, gait instability, impaired balance, impaired cardiopulmonary response to activity. Pt tolerated activity with improved mobility reflected by increased distance ambulated and increased standing time with decreased assistance required for standing balance. Pt continues to experience dizziness while ambulating, which improves during session. Pt is progressing toward goals, however, still limited by decreased endurance. Pt would continue to benefit from acute skilled therapy intervention to address deficits and progress toward prior level of function.        Rehab Prognosis: Good; patient would benefit from acute skilled PT services to address these deficits and reach maximum level of function.    Recent Surgery: Procedure(s) (LRB):  INSERTION, ICD, BIVENTRICULAR (Left) 7 Days Post-Op    Plan:     During this hospitalization, patient to be seen 4 x/week to address the identified rehab impairments via gait training, therapeutic activities, therapeutic exercises, neuromuscular re-education and progress toward the following goals:    · Plan of Care Expires:  02/20/19    Subjective     Chief Complaint: Pt reporting dizziness, decreased throughout session  Patient/Family Comments/goals: to get better and return home   Pain/Comfort:  · Pain Rating 1: (pt reporting back pain at end of session. Did not quantify )      Objective:     Communicated with RN prior to session.  Patient found sitting EOB with  telemetry, PICC line  upon PT  entry to room.     General Precautions: Standard, fall, pacemaker   Orthopedic Precautions:N/A   Braces: N/A     Functional Mobility:  · Transfers:     · Sit to Stand:  minimum assistance with hand-held assist from EOB and from toilet   · Gait: Pt ambulated 20 feet, 15 feet, and 15 feet with CGA and HHA. Pt with decreased steve, decreased step size, increased lateral sway. Pt reported increased dizziness during first bout, improved throughout session   · Standing balance: static and dynamic balance with SBA. Pt stood next to toilet and performed toileting with SBA, then stood at sink to wash hands with SBA.       AM-PAC 6 CLICK MOBILITY  Turning over in bed (including adjusting bedclothes, sheets and blankets)?: 3  Sitting down on and standing up from a chair with arms (e.g., wheelchair, bedside commode, etc.): 3  Moving from lying on back to sitting on the side of the bed?: 3  Moving to and from a bed to a chair (including a wheelchair)?: 3  Need to walk in hospital room?: 3  Climbing 3-5 steps with a railing?: 2  Basic Mobility Total Score: 17       Therapeutic Activities and Exercises:   Pt educated on role of PT/POC. Pt verbalized understanding.    Reviewed sternal precautions with pt. Pt reports wearing swathe in PM.   Pt reporting increasing back pain following session. PT educated on lumbar rotations to perform while supine. Pt verbalized understanding.     Patient left up in chair with all lines intact, call button in reach and wife  present..    GOALS:   Multidisciplinary Problems     Physical Therapy Goals        Problem: Physical Therapy Goal    Goal Priority Disciplines Outcome Goal Variances Interventions   Physical Therapy Goal     PT, PT/OT Ongoing (interventions implemented as appropriate)     Description:  Goals to be met by: 19    Patient will increase functional independence with mobility by performin. Supine to sit with Modified [place], with HOB flat   2. Sit to supine with  Modified [place], with HOB flat   3. Sit to stand transfer with Supervision  4. Gait  x 100 feet with Supervision with or without using least restrictive AD.   5. Lower extremity exercise program x20 reps per handout, with supervision                      Time Tracking:     PT Received On: 02/05/19  PT Start Time: 1015     PT Stop Time: 1041  PT Total Time (min): 26 min     Billable Minutes: Gait Training 15 mins  and Therapeutic Activity 11 mins     Treatment Type: Treatment  PT/PTA: PT     PTA Visit Number: 0     Cathie Randall, PT  02/05/2019

## 2019-02-05 NOTE — PROGRESS NOTES
02/04/19 2110 02/04/19 2113   Vital Signs   BP (!) 87/53 (!) 84/53   MAP (mmHg) 64 64   BP Location Left arm Left arm   BP Method Automatic Automatic   Patient Position Lying Lying     Notified MD Ariel of patient above SBP per orders. MD gave orders to recheck patient BP in 30mins and call back if SBP is still less than 90mmHg. Will cont to monitor patient.

## 2019-02-05 NOTE — CARE UPDATE
Rapid Response Nurse Chart Check:    Chart check completed, abnormal VS noted, bedside RNAutumn contacted, no concerns verbalized at this time, instructed to call [phone] for further concerns or assistance.

## 2019-02-05 NOTE — PLAN OF CARE
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    Type 2 diabetes mellitus with complication    A-fib    TREY (acute kidney injury)    CHF (congestive heart failure)    Hypokalemia    Bacteremia    Endocarditis    ICD (implantable cardioverter-defibrillator) in place    Infection associated with cardiac device    Junctional bradycardia    Dysuria    Bacteremia due to Enterococcus    Moderate malnutrition    Leukocytosis         Patient is homebound due to: Debility, endocarditis  Diet: Low Sodium with 1500 cc fluid restriction  Activities: As Tolerated     Nursing:   SN to complete comprehensive assessment including routine vital signs. Instruct on disease process and s/s of complications to report to MD. Review/verify medication list sent home with the patient at time of discharge  and instruct patient/caregiver as needed. Frequency may be adjusted depending on start of care date.     Notify MD if SBP > 160 or < 90; DBP > 90 or < 50; HR > 120 or < 50; Temp > 101; Weight gain >3lbs in 1 day or 5lbs in 1 week.     LABS:  SN to perform labs:     Weekly CBC, CMP, ESR and CRP starting Mon 2/11/19 with results faxed to ID clinic [phone]     HOME INFUSION THERAPY:   SN to perform Infusion Therapy/Central Line Care.  Review Central Line Care & Central Line Flush with patient.     Administer (drug and dose):   Ampicillin 2 grams IV every 6 hours with end date 2/21/19     Ceftriaxone 2 grams IV every 12 hours with end date 2/21/19     **For questions or  concerns, please call [phone] and ask for Pre-Heart transplant clinic, M-F 8-5. After hours, weekends, call [phone] and ask for the Heart Transplant Cardiologist on call.**     Central line care:  Scrub the Hub: Prior to accessing the line, always perform a 30 second alcohol scrub  Each lumen of the central line is to be flushed at least daily with 10 mL Normal Saline and 3 mL Heparin flush (100 units/mL)  Skilled Nurse (SN) may draw blood from IV access  Blood Draw Procedure:              - Aspirate at least 5 mL of blood              - Discard              - Obtain specimen              - Change posiflow cap              - Flush with 20 mL Normal Saline followed by a                 3-5 mL Heparin flush (100 units/mL)  Central :              - Sterile dressing changes are done weekly and as needed.              - Use chlor-hexadine scrub to cleanse site, apply Biopatch to insertion site,                 apply securement device dressing              - Posi-flow caps are changed weekly and after EVERY lab draw.              - If sterile gauze is under dressing to control oozing,                 dressing change must be performed every 24 hours until gauze is not needed.     CONSULTS:       Physical Therapy to evaluate and treat. Evaluate for home safety and equipment needs; Establish/upgrade home exercise program. Perform / instruct on therapeutic exercises, gait training, transfer training, and Range of Motion.     Occupational Therapy to evaluate and treat. Evaluate home environment for safety and equipment needs. Perform/Instruct on transfers, ADL training, ROM, and therapeutic exercises.           Send initial Home Health orders to HTS attending physician on call.  Send follow up questions to [phone] or fax:                                                                                                                                                                                                                                                                     Heart Failure:      [phone]

## 2019-02-05 NOTE — PROGRESS NOTES
"[facility]  Endocrinology  Progress Note    Admit Date: 2019     Reason for Consult: Management of T2DM, Hyperglycemia     Surgical Procedure and Date: Lead extraction 19    Diabetes diagnosis year: approximately 10 years ago    Lab Results   Component Value Date    HGBA1C 8.1 (H) 2019       Home Diabetes Medications: Lantus 50 units q HS, Novolog 25 units with meals (vials)    How often checking glucose at home? 3-4x per day   BG readings on regimen: AM 120s  Hypoglycemia on the regimen?  Yes - 2-3x per month  Missed doses on regimen?  Yes - 2x per week    Diabetes Complications include:     Hyperglycemia, Hypoglycemia , Diabetic retinopathy , Diabetic cataract  and Diabetic peripheral neuropathy     Complicating diabetes co morbidities:   CHF and History of CVA      HPI:   Patient is a 78 y.o. male with a diagnosis of DM2 and CHF who was admitted on 19 for possible RV lead infection.  Patient is s/p lead extraction on 19.  Patient's DM managed by PCP, Dr. Hdz.  Endocrine consulted for DM/BG management.            Interval HPI:   Overnight events: Remains in CSU, NAEON.  1 unit PRBCs given yesterday.  BG well controlled on current insulin regimen.  On IV antibiotics.  Eatin%  Nausea: No  Hypoglycemia and intervention: No  Fever: No  TPN and/or TF: No    BP (!) 91/52 (BP Location: Left arm, Patient Position: Lying)   Pulse 80   Temp 97.8 °F (36.6 °C) (Oral)   Resp 18   Ht 6' 2" (1.88 m)   Wt 83.5 kg (184 lb 1.4 oz)   SpO2 95%   BMI 23.64 kg/m²      Labs Reviewed and Include    No results for input(s): GLU, CALCIUM, ALBUMIN, PROT, NA, K, CO2, CL, BUN, CREATININE, ALKPHOS, ALT, AST, BILITOT in the last 24 hours.  Lab Results   Component Value Date    WBC 7.91 2019    HGB 6.5 (L) 2019    HCT 21.3 (L) 2019    MCV 97 2019     (L) 2019     No results for input(s): TSH, FREET4 in the last 168 hours.  Lab Results   Component " Value Date    HGBA1C 8.1 (H) 01/07/2019       Nutritional status:   Body mass index is 23.64 kg/m².  Lab Results   Component Value Date    ALBUMIN 1.7 (L) 02/03/2019    ALBUMIN 2.0 (L) 02/02/2019    ALBUMIN 2.0 (L) 02/01/2019     No results found for: PREALBUMIN    Estimated Creatinine Clearance: 40.6 mL/min (A) (based on SCr of 1.8 mg/dL (H)).    Accu-Checks  Recent Labs     02/01/19  1200 02/01/19  1941 02/02/19  0731 02/02/19  1149 02/02/19  1642 02/02/19  2042 02/03/19  0730 02/03/19  1143 02/03/19  1852 02/03/19  2232   POCTGLUCOSE 186* 122* 126* 255* 179* 114* 115* 107 135* 202*       Current Medications and/or Treatments Impacting Glycemic Control  Immunotherapy:    Immunosuppressants     None        Steroids:   Hormones (From admission, onward)    None        Pressors:    Autonomic Drugs (From admission, onward)    None        Hyperglycemia/Diabetes Medications:   Antihyperglycemics (From admission, onward)    Start     Stop Route Frequency Ordered    02/03/19 1645  insulin aspart U-100 pen 8 Units      -- SubQ 3 times daily with meals 02/03/19 1455    02/01/19 2100  insulin detemir U-100 pen 4 Units      -- SubQ 2 times daily 02/01/19 1551    01/30/19 0855  insulin aspart U-100 pen 0-5 Units      -- SubQ Before meals & nightly PRN 01/30/19 0756          ASSESSMENT and PLAN    * 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  Patient and spouse both agree patient patient is likely on too much insulin but patient reports no episodes of hypoglycemia at home.  Patient expressing interest to take less shots  as he forgets to bring his vial and syring to work - consider other drug therapy upon discharge if possible.  If patient discharges on current insulins, recommend Levemir 8 units daily in AM and Novolog 10 units with meals plus correction scale.       TREY (acute kidney injury)    Caution with insulin stacking  Estimated Creatinine Clearance: 40.6 mL/min (A) (based on SCr of 1.8 mg/dL (H)).         A-fib    May affect BG readings if uncontrolled  S/p cardioversion.          Timo Avalos NP  Endocrinology  [facility]

## 2019-02-05 NOTE — NURSING
Notified on call MD for HTS of patient K 3.2 and Mg 1.9. MD stated that she will place orders for electrolyte replacement. Will cont to monitor patient.

## 2019-02-05 NOTE — PROGRESS NOTES
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 Wife at bedside. He feels better after blood transfusion yesterday. He is asking to try Entresto dose again today    Continuous Infusions:  Scheduled Meds:   ampicillin IVPB  2 g Intravenous Q6H    apixaban  5 mg Oral BID    artificial tears  1 drop Both Eyes QID    cefTRIAXone (ROCEPHIN) IVPB  2 g Intravenous Q12H    clopidogrel  75 mg Oral Daily    ferrous sulfate  325 mg Oral BID    insulin aspart U-100  10 Units Subcutaneous TIDWM    insulin detemir U-100  4 Units Subcutaneous BID    levothyroxine  25 mcg Oral Before breakfast    polyethylene glycol  17 g Oral Daily    rosuvastatin  20 mg Oral QHS    sacubitril-valsartan  1 tablet Oral BID    senna-docusate 8.6-50 mg  2 tablet Oral BID    tamsulosin  0.4 mg Oral Daily     PRN Meds:sodium chloride, dextrose 50%, dextrose 50%, glucagon (human recombinant), glucose, glucose, glycerin adult, hydrOXYzine HCl, insulin aspart U-100, ondansetron, ramelteon, sodium chloride 0.9%, sodium chloride 0.9%, traMADol    Review of patient's allergies indicates:   Allergen Reactions    Adhesive Other (See Comments)     blisters    Codeine Other (See Comments)     Blurred vision    Latex Hives    Ace inhibitors     Lipitor [atorvastatin] Other (See Comments)     Muscle spasms    Xanax [alprazolam] Hallucinations     Objective:     Vital Signs (Most Recent):  Temp: 97.3 °F (36.3 °C) (02/05/19 0752)  Pulse: 82 (02/05/19 1100)  Resp: 18 (02/05/19 0752)  BP: (!) 94/51 (02/05/19 0752)  SpO2: (!) 93 % (02/05/19 0752) Vital Signs (24h  Range):  Temp:  [96.9 °F (36.1 °C)-97.9 °F (36.6 °C)] 97.3 °F (36.3 °C)  Pulse:  [79-91] 82  Resp:  [15-18] 18  SpO2:  [90 %-97 %] 93 %  BP: (82-98)/(49-59) 94/51     No data found.  Body mass index is 23.64 kg/m².      Intake/Output Summary (Last 24 hours) at 2/5/2019 1155  Last data filed at 2/5/2019 0508  Gross per 24 hour   Intake 1160 ml   Output 1200 ml   Net -40 ml       Hemodynamic Parameters:  CVP:  [9 mmHg] 9 mmHg    Telemetry: Reviewed    Physical Exam   Constitutional: He is oriented to person, place, and time. He appears well-developed and well-nourished.   HENT:   Head: Normocephalic and atraumatic.   Eyes: Conjunctivae and EOM are normal.   Neck: Normal range of motion. Neck supple. No JVD present. No thyromegaly present.   Cardiovascular: Normal rate and regular rhythm.   Pulmonary/Chest: Effort normal and breath sounds normal.   Abdominal: Soft. Bowel sounds are normal.   Musculoskeletal: Normal range of motion. He exhibits no edema.   Neurological: He is alert and oriented to person, place, and time.   Skin: Skin is warm and dry. Capillary refill takes 2 to 3 seconds.   Psychiatric: He has a normal mood and affect. His behavior is normal. Judgment and thought content normal.       Significant Labs:  CBC:  Recent Labs   Lab 02/03/19  0605 02/04/19  0649 02/05/19  0521   WBC 7.91 8.52 7.35   RBC 2.20* 2.34* 2.66*   HGB 6.5* 6.9* 7.9*   HCT 21.3* 23.0* 25.2*   * 141* 120*   MCV 97 98 95   MCH 29.5 29.5 29.7   MCHC 30.5* 30.0* 31.3*     BNP:  Recent Labs   Lab 02/03/19  0017   *     CMP:  Recent Labs   Lab 02/03/19  0605 02/04/19  0649 02/05/19  0521    127* 62*   CALCIUM 7.8* 7.8* 7.8*   ALBUMIN 1.7* 1.8* 1.9*   PROT 5.0* 5.2* 5.1*   * 134* 134*   K 3.5 3.2* 3.2*   CO2 30* 28 26   CL 97 99 101   BUN 40* 40* 35*   CREATININE 1.8* 1.7* 1.4   ALKPHOS 126 128 117   ALT 18 20 16   AST 39 37 32   BILITOT 0.4 0.4 0.5      Coagulation:   Recent Labs   Lab 02/03/19  0605  02/04/19  0649 02/05/19  0521   INR 1.7* 1.4* 1.2     LDH:  No results for input(s): LDH in the last 72 hours.  Microbiology:  Microbiology Results (last 7 days)     Procedure Component Value Units Date/Time    Blood culture [[id number]] Collected:  01/24/19 0940    Order Status:  Completed Specimen:  Blood from Peripheral, Hand, Right Updated:  01/29/19 1212     Blood Culture, Routine No growth after 5 days.    Blood culture [[id number]] Collected:  01/24/19 0950    Order Status:  Completed Specimen:  Blood from Peripheral, Forearm, Right Updated:  01/29/19 1212     Blood Culture, Routine No growth after 5 days.          I have reviewed all pertinent labs within the past 24 hours.    Estimated Creatinine Clearance: 52.2 mL/min (based on SCr of 1.4 mg/dL).    Diagnostic Results:  I have reviewed all pertinent imaging results/findings within the past 24 hours.    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 Discussed with Dr. Russo. Blood cultures repeated 1/15 and were -ve  -Appreciate EP's help. 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 Patient had 2 vegetations on RV lead which is most likely source of bacteremia. Lead tip and ICD pocket cultured with RV lead +ve for Enteroccocus. Blood cultures are negative here are -ve with last culture done 1/24  -Continue Ampicillin (renally dosed given TREY) and Ceftriaxone per ID recs. Will treat for 6 weeks post extraction.  Estimated end date 2/20/19.    -S/P successful reimplantation of BiV ICD 1/29. EP considering repeat RICARDO/DCCV A fib at some point. Will perform DFT as an outpatient   -Appreciate GI's help. Plan for outpatient EGD and C-scope for eval of anemia and Enterococcus bacteremia  -All cultures from 1/7 - 1/24 -ve except for ICD lead  tip +ve for Enterococcus on 1/9     Acute on chronic combined systolic and diastolic CHF, NYHA class 3    -ICM.  -Echo 1/25/19: LVEF 20%, LVEDD 6.85 cm. Mod TR, PHTN(PAS 79).  -CVP stable, 9. Recent orthostatic hypotension so holding Lasix. Received 1 unit PRBCs yesterday  -Miesha weaned off 1/30, and Epi weaned off 1/31  -GDMT: Dig and Toprol D/C'd 2/2 junctional rhythm. Entresto D/C'd 2/2 transient hypotension during/after ICD explant and TREY but will try low dose again today and monitor closely.   -Self declined for LVAD after w/u last April 2018.         A-fib    -S/P Unsuccessful RICARDO guided/DCCV 1/14. Remains in A fib  -S/P successful reimplantation of BiV ICD 1/29  -Weaned off Epi and Miesha  -CHADS VASc 6  -Eliquis resumed 2/2 once INR was < 2.0  -Cardiac monitoring     TREY (acute kidney injury)    - Appreciate Nephrology's help  - Was oliguric and creatinine jumped to 4.1 likely 2/2 ATN/hypotension following ICD explant.   - BUN/creatinine improving, down to 35/1.4. See acute on chronic systolic heart failure  - Miesha and Epi weaned off  - Baseline creatinine ~ 1.2-1.6     Debility    - PT/OT  -Plan to D/C home once stronger and no longer orthostatic     Junctional bradycardia    - See A fib     Endocarditis    - See ICD lead infection     Bacteremia    -see above     Type 2 diabetes mellitus with complication    -A1c 8.1  -Target -180 while hospitalized  -detemir 4 units bid, Novolog 10 units with meals, SSI  -Appreciate Endocrine's help with management         Candy Forman PALAINA  Heart Transplant  [facility]

## 2019-02-05 NOTE — ASSESSMENT & PLAN NOTE
- Appreciate Nephrology's help  - Was oliguric and creatinine jumped to 4.1 likely 2/2 ATN/hypotension following ICD explant.   - BUN/creatinine improving, down to 35/1.4. See acute on chronic systolic heart failure  - Miesha and Epi weaned off  - Baseline creatinine ~ 1.2-1.6

## 2019-02-05 NOTE — ASSESSMENT & PLAN NOTE
-ICM.  -Echo 1/25/19: LVEF 20%, LVEDD 6.85 cm. Mod TR, PHTN(PAS 79).  -CVP stable, 9. Recent orthostatic hypotension so holding Lasix. Received 1 unit PRBCs yesterday  -Miesha weaned off 1/30, and Epi weaned off 1/31  -GDMT: Dig and Toprol D/C'd 2/2 junctional rhythm. Entresto D/C'd 2/2 transient hypotension during/after ICD explant and TREY but will try low dose again today and monitor closely.   -Self declined for LVAD after w/u last April 2018.

## 2019-02-05 NOTE — PROGRESS NOTES
Study: GUIDE-HF  Sponsor: [employer]  Follow-up Visit: Monthly Phone Contact #[phone]  Date of Visit: 1/14/2019     Patient wishes to continue in study: Yes  All study protocol required CRFs completed: Yes     Mr. Solis was contacted today 1/14/2019 by the blinded  Bossman Kent for his fourth monthly phone contact post-implant for the GUIDE-HF study. Subject contact sheet completed by this CRC and given to blinded coordinator. Patient has not been compliant with daily pressure transmissions and was reminded to take readings every day as required by the trial, even while hospitalized if possible. Patient reviewed by Mela Nails MD, and no medication changes are being made at this time. Patient instructed that care will be managed per his hospital team at this time. Patient again instructed to take readings while hospitalized and to have a family member bring his CardioMEMS system to the hospital if possible. Patient denied any questions, concerns, or symptoms at this time. Will follow.

## 2019-02-05 NOTE — PLAN OF CARE
Problem: Adult Inpatient Plan of Care  Goal: Plan of Care Review  Outcome: Ongoing (interventions implemented as appropriate)  Plan of care reviewed with patient. Patient states understanding of POC. VS stable. Rocephin and ampicillan IVPB continued. No acute events noted at this time. Patient remains free from injury. Will continue to monitor.

## 2019-02-05 NOTE — PROGRESS NOTES
02/05/19 0336 02/05/19 0636   Vital Signs   BP (!) 82/59 (!) 86/50   MAP (mmHg) 66 65   BP Location Right arm Left arm   BP Method --  Automatic   Patient Position Lying Sitting     On call for HTS notified of patient above BP per orders. No new orders received will cont to monitor patient.

## 2019-02-05 NOTE — PLAN OF CARE
Problem: Physical Therapy Goal  Goal: Physical Therapy Goal  Goals to be met by: 19    Patient will increase functional independence with mobility by performin. Supine to sit with Modified [place], with HOB flat   2. Sit to supine with Modified [place], with HOB flat   3. Sit to stand transfer with Supervision  4. Gait  x 100 feet with Supervision with or without using least restrictive AD.   5. Lower extremity exercise program x20 reps per handout, with supervision     Outcome: Ongoing (interventions implemented as appropriate)  Pt is progressing toward goals. All goals remain appropriate.

## 2019-02-06 NOTE — ASSESSMENT & PLAN NOTE
-ICM.  -Echo 1/25/19: LVEF 20%, LVEDD 6.85 cm. Mod TR, PHTN(PAS 79).  -CVP 10. Recent orthostatic hypotension so holding Lasix. Received 1 unit PRBCs 2/4/19. Plan to d/c home on Bumex 2 daily (lower than previous home dose, 3/2). Pt is home monitored with [facility] so can adjust Bumex as an outpt  -Miesha weaned off 1/30, and Epi weaned off 1/31  -GDMT: Dig and Toprol D/C'd 2/2 junctional rhythm. Entresto D/C'd 2/2 transient hypotension during/after ICD explant and TREY but restarted low dose yesterday   -Self declined for LVAD after w/u last April 2018.

## 2019-02-06 NOTE — PROGRESS NOTES
D/C note:    SW to pt's room for D/C. Pt, wife, and dtr all present, aaox4, calm, and pleasant. Pt and family report in agreement with plan to D/C home today with [facility] ([phone] ph, [phone] fax) for IV abx and [facility] ([phone] ph, [phone] fax). Yamil with [facility] informed of plan for D/C today. Yamil to access pt's records through [facility]. HH referral faxed to [place] , and SW confirmed receipt with [facility]. Pt's dtr and son in law to provide transport home. Pt and family report coping adequately at this time. Pt and family report no other needs, and no other needs identified. SW providing psychosocial and counseling support, education, assistance, resources, and D/C planning as indicated. SW remains available.

## 2019-02-06 NOTE — SUBJECTIVE & OBJECTIVE
Interval History: Feeling much better.    Continuous Infusions:  Scheduled Meds:   ampicillin IVPB  2 g Intravenous Q6H    apixaban  5 mg Oral BID    artificial tears  1 drop Both Eyes QID    cefTRIAXone (ROCEPHIN) IVPB  2 g Intravenous Q12H    clopidogrel  75 mg Oral Daily    ferrous sulfate  325 mg Oral BID    insulin aspart U-100  9 Units Subcutaneous TIDWM    insulin detemir U-100  3 Units Subcutaneous BID    levothyroxine  25 mcg Oral Before breakfast    polyethylene glycol  17 g Oral Daily    [START ON 2/7/2019] potassium chloride  20 mEq Oral Daily    rosuvastatin  20 mg Oral QHS    sacubitril-valsartan  1 tablet Oral BID    senna-docusate 8.6-50 mg  2 tablet Oral BID    tamsulosin  0.4 mg Oral Daily     PRN Meds:sodium chloride, dextrose 50%, dextrose 50%, glucagon (human recombinant), glucose, glucose, glycerin adult, hydrOXYzine HCl, insulin aspart U-100, ondansetron, ramelteon, sodium chloride 0.9%, sodium chloride 0.9%, traMADol    Review of patient's allergies indicates:   Allergen Reactions    Adhesive Other (See Comments)     blisters    Codeine Other (See Comments)     Blurred vision    Latex Hives    Ace inhibitors     Lipitor [atorvastatin] Other (See Comments)     Muscle spasms    Xanax [alprazolam] Hallucinations     Objective:     Vital Signs (Most Recent):  Temp: 97.4 °F (36.3 °C) (02/06/19 1146)  Pulse: 80 (02/06/19 1146)  Resp: 18 (02/06/19 1146)  BP: (!) 92/50 (02/06/19 1146)  SpO2: 98 % (02/06/19 1146) Vital Signs (24h Range):  Temp:  [96.1 °F (35.6 °C)-97.9 °F (36.6 °C)] 97.4 °F (36.3 °C)  Pulse:  [79-88] 80  Resp:  [16-18] 18  SpO2:  [93 %-98 %] 98 %  BP: ()/(46-58) 92/50     No data found.  Body mass index is 23.64 kg/m².    No intake or output data in the 24 hours ending 02/06/19 1301    Hemodynamic Parameters:  CVP:  [10 mmHg] 10 mmHg    Telemetry: Reviewed    Physical Exam   Constitutional: He is oriented to person, place, and time. He appears well-developed  and well-nourished.   HENT:   Head: Normocephalic and atraumatic.   Eyes: Conjunctivae and EOM are normal.   Neck: Normal range of motion. Neck supple. No JVD present. No thyromegaly present.   Cardiovascular: Normal rate and regular rhythm. Exam reveals no gallop and no friction rub.   No murmur heard.  Pulmonary/Chest: Effort normal and breath sounds normal.   Abdominal: Soft. Bowel sounds are normal.   Musculoskeletal: Normal range of motion. He exhibits no edema.   Neurological: He is alert and oriented to person, place, and time.   Skin: Skin is warm and dry. Capillary refill takes 2 to 3 seconds.   Psychiatric: He has a normal mood and affect. His behavior is normal. Judgment and thought content normal.       Significant Labs:  CBC:  Recent Labs   Lab 02/04/19 0649 02/05/19 0521 02/06/19 0338   WBC 8.52 7.35 6.08   RBC 2.34* 2.66* 2.75*   HGB 6.9* 7.9* 7.9*   HCT 23.0* 25.2* 25.9*   * 120* 108*   MCV 98 95 94   MCH 29.5 29.7 28.7   MCHC 30.0* 31.3* 30.5*     BNP:  Recent Labs   Lab 02/03/19  0017   *     CMP:  Recent Labs   Lab 02/04/19 0649 02/05/19 0521 02/06/19 0338   * 62* 112*   CALCIUM 7.8* 7.8* 8.0*   ALBUMIN 1.8* 1.9* 2.0*   PROT 5.2* 5.1* 5.5*   * 134* 137   K 3.2* 3.2* 3.7   CO2 28 26 27   CL 99 101 103   BUN 40* 35* 29*   CREATININE 1.7* 1.4 1.4   ALKPHOS 128 117 129   ALT 20 16 21   AST 37 32 37   BILITOT 0.4 0.5 0.4      Coagulation:   Recent Labs   Lab 02/04/19 0649 02/05/19 0521 02/06/19 0338   INR 1.4* 1.2 1.2     LDH:  No results for input(s): LDH in the last 72 hours.  Microbiology:  Microbiology Results (last 7 days)     ** No results found for the last 168 hours. **          I have reviewed all pertinent labs within the past 24 hours.    Estimated Creatinine Clearance: 52.2 mL/min (based on SCr of 1.4 mg/dL).    Diagnostic Results:  I have reviewed all pertinent imaging results/findings within the past 24 hours.

## 2019-02-06 NOTE — PROGRESS NOTES
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    Continuous Infusions:  Scheduled Meds:   ampicillin IVPB  2 g Intravenous Q6H    apixaban  5 mg Oral BID    artificial tears  1 drop Both Eyes QID    cefTRIAXone (ROCEPHIN) IVPB  2 g Intravenous Q12H    clopidogrel  75 mg Oral Daily    ferrous sulfate  325 mg Oral BID    insulin aspart U-100  9 Units Subcutaneous TIDWM    insulin detemir U-100  3 Units Subcutaneous BID    levothyroxine  25 mcg Oral Before breakfast    polyethylene glycol  17 g Oral Daily    [START ON 2/7/2019] potassium chloride  20 mEq Oral Daily    rosuvastatin  20 mg Oral QHS    sacubitril-valsartan  1 tablet Oral BID    senna-docusate 8.6-50 mg  2 tablet Oral BID    tamsulosin  0.4 mg Oral Daily     PRN Meds:sodium chloride, dextrose 50%, dextrose 50%, glucagon (human recombinant), glucose, glucose, glycerin adult, hydrOXYzine HCl, insulin aspart U-100, ondansetron, ramelteon, sodium chloride 0.9%, sodium chloride 0.9%, traMADol    Review of patient's allergies indicates:   Allergen Reactions    Adhesive Other (See Comments)     blisters    Codeine Other (See Comments)     Blurred vision    Latex Hives    Ace inhibitors     Lipitor [atorvastatin] Other (See Comments)     Muscle spasms    Xanax [alprazolam] Hallucinations     Objective:     Vital Signs (Most Recent):  Temp: 97.4 °F (36.3 °C) (02/06/19 1146)  Pulse: 80 (02/06/19 1146)  Resp: 18 (02/06/19 1146)  BP: (!) 92/50 (02/06/19 1146)  SpO2: 98 % (02/06/19 1146) Vital Signs (24h Range):  Temp:  [96.1 °F (35.6 °C)-97.9 °F (36.6 °C)] 97.4 °F (36.3 °C)  Pulse:  [79-88]  80  Resp:  [16-18] 18  SpO2:  [93 %-98 %] 98 %  BP: ()/(46-58) 92/50     No data found.  Body mass index is 23.64 kg/m².    No intake or output data in the 24 hours ending 02/06/19 1301    Hemodynamic Parameters:  CVP:  [10 mmHg] 10 mmHg    Telemetry: Reviewed    Physical Exam   Constitutional: He is oriented to person, place, and time. He appears well-developed and well-nourished.   HENT:   Head: Normocephalic and atraumatic.   Eyes: Conjunctivae and EOM are normal.   Neck: Normal range of motion. Neck supple. No JVD present. No thyromegaly present.   Cardiovascular: Normal rate and regular rhythm. Exam reveals no gallop and no friction rub.   No murmur heard.  Pulmonary/Chest: Effort normal and breath sounds normal.   Abdominal: Soft. Bowel sounds are normal.   Musculoskeletal: Normal range of motion. He exhibits no edema.   Neurological: He is alert and oriented to person, place, and time.   Skin: Skin is warm and dry. Capillary refill takes 2 to 3 seconds.   Psychiatric: He has a normal mood and affect. His behavior is normal. Judgment and thought content normal.       Significant Labs:  CBC:  Recent Labs   Lab 02/04/19  0649 02/05/19 0521 02/06/19  0338   WBC 8.52 7.35 6.08   RBC 2.34* 2.66* 2.75*   HGB 6.9* 7.9* 7.9*   HCT 23.0* 25.2* 25.9*   * 120* 108*   MCV 98 95 94   MCH 29.5 29.7 28.7   MCHC 30.0* 31.3* 30.5*     BNP:  Recent Labs   Lab 02/03/19  0017   *     CMP:  Recent Labs   Lab 02/04/19  0649 02/05/19  0521 02/06/19  0338   * 62* 112*   CALCIUM 7.8* 7.8* 8.0*   ALBUMIN 1.8* 1.9* 2.0*   PROT 5.2* 5.1* 5.5*   * 134* 137   K 3.2* 3.2* 3.7   CO2 28 26 27   CL 99 101 103   BUN 40* 35* 29*   CREATININE 1.7* 1.4 1.4   ALKPHOS 128 117 129   ALT 20 16 21   AST 37 32 37   BILITOT 0.4 0.5 0.4      Coagulation:   Recent Labs   Lab 02/04/19  0649 02/05/19  0521 02/06/19  0338   INR 1.4* 1.2 1.2     LDH:  No results for input(s): LDH in the last 72  hours.  Microbiology:  Microbiology Results (last 7 days)     ** No results found for the last 168 hours. **          I have reviewed all pertinent labs within the past 24 hours.    Estimated Creatinine Clearance: 52.2 mL/min (based on SCr of 1.4 mg/dL).    Diagnostic Results:  I have reviewed all pertinent imaging results/findings within the past 24 hours.    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 Patient had 2 vegetations on RV lead which is most likely source of bacteremia. Lead tip and ICD pocket cultured with RV lead +ve for Enteroccocus. Blood cultures are negative here are -ve with last culture done 1/24  -2 mobile masses seen on CVC by RICARDO 1/14 (has both RUE PICC and RIJ TLC- both have since been removed). Discussed with Dr. Russo. Blood cultures repeated 1/15 and were -ve  -Appreciate EP's help.-Continue Ampicillin (renally dosed given TREY) and Ceftriaxone per ID recs. Will treat for 6 weeks post extraction.  Estimated end date 2/21/19. PICC in place for home IV Abx    -S/P successful reimplantation of BiV ICD 1/29. EP considering repeat RICARDO/DCCV A fib at some point. Will perform DFT as an outpatient   -Appreciate GI's help. Plan for outpatient EGD and C-scope for eval of anemia and Enterococcus infxn per ID recs  -All cultures from 1/7 - 1/24 -ve except for ICD lead tip +ve for Enterococcus on 1/9     Acute on chronic combined systolic and diastolic CHF, NYHA class 3    -ICM.  -Echo 1/25/19: LVEF 20%, LVEDD 6.85 cm. Mod TR, PHTN(PAS 79).  -CVP 10. Recent orthostatic hypotension so holding Lasix. Received 1 unit PRBCs 2/4/19. Plan to d/c home on Bumex 2 daily (lower than previous home dose, 3/2). Pt is home monitored with [facility] so can adjust Bumex as an outpt  -Miesha weaned off 1/30, and Epi weaned off 1/31  -GDMT: Dig and  Toprol D/C'd 2/2 junctional rhythm. Entresto D/C'd 2/2 transient hypotension during/after ICD explant and TREY but restarted low dose yesterday   -Self declined for LVAD after w/u last April 2018.         A-fib    -S/P Unsuccessful RICARDO guided/DCCV 1/14. Remains in A fib  -S/P successful reimplantation of BiV ICD 1/29  -Weaned off Epi and Miesha  -CHADS VASc 6  -Eliquis resumed 2/2 once INR was < 2.0  -Cardiac monitoring     TREY (acute kidney injury)    -Now resolved  - Appreciate Nephrology's help  - Was oliguric and creatinine jumped to 4.1 likely 2/2 ATN/hypotension following ICD explant.   - BUN/creatinine improving, See acute on chronic systolic heart failure  - Miesha and Epi weaned off  - Baseline creatinine ~ 1.2-1.6     Debility    -Home Health PT/OT       Junctional bradycardia    - See A fib     Endocarditis    - See ICD lead infection     Bacteremia    -see above     Type 2 diabetes mellitus with complication    -A1c 8.1  -Target -180 while hospitalized  -Appreciate Endocrine's help with management         Candy Forman PAJosephineC  Heart Transplant  [facility]

## 2019-02-06 NOTE — ASSESSMENT & PLAN NOTE
-Now resolved  - Appreciate Nephrology's help  - Was oliguric and creatinine jumped to 4.1 likely 2/2 ATN/hypotension following ICD explant.   - BUN/creatinine improving, See acute on chronic systolic heart failure  - Miesha and Epi weaned off  - Baseline creatinine ~ 1.2-1.6

## 2019-02-06 NOTE — PROGRESS NOTES
"[facility]  Endocrinology  Progress Note    Admit Date: 2019     Reason for Consult: Management of T2DM, Hyperglycemia     Surgical Procedure and Date: Lead extraction 19    Diabetes diagnosis year: approximately 10 years ago    Lab Results   Component Value Date    HGBA1C 8.1 (H) 2019       Home Diabetes Medications: Lantus 50 units q HS, Novolog 25 units with meals (vials)    How often checking glucose at home? 3-4x per day   BG readings on regimen: AM 120s  Hypoglycemia on the regimen?  Yes - 2-3x per month  Missed doses on regimen?  Yes - 2x per week    Diabetes Complications include:     Hyperglycemia, Hypoglycemia , Diabetic retinopathy , Diabetic cataract  and Diabetic peripheral neuropathy     Complicating diabetes co morbidities:   CHF and History of CVA      HPI:   Patient is a 78 y.o. male with a diagnosis of DM2 and CHF who was admitted on 19 for possible RV lead infection.  Patient is s/p lead extraction on 19.  Patient's DM managed by PCP, Dr. Hdz.  Endocrine consulted for DM/BG management.            Interval HPI:   Overnight events: Remains in CSU, NAEON.  BG well controlled on current insulin regimen.  On IV antibiotics.  Eatin%  Nausea: No  Hypoglycemia and intervention: No  Fever: No  TPN and/or TF: No    BP (!) 106/58 (BP Location: Left arm, Patient Position: Lying)   Pulse 81   Temp 96.4 °F (35.8 °C) (Oral)   Resp 16   Ht 6' 2" (1.88 m)   Wt 83.5 kg (184 lb 1.4 oz)   SpO2 (!) 93%   BMI 23.64 kg/m²      Labs Reviewed and Include    Recent Labs   Lab 19  0338   *   CALCIUM 8.0*   ALBUMIN 2.0*   PROT 5.5*      K 3.7   CO2 27      BUN 29*   CREATININE 1.4   ALKPHOS 129   ALT 21   AST 37   BILITOT 0.4     Lab Results   Component Value Date    WBC 6.08 2019    HGB 7.9 (L) 2019    HCT 25.9 (L) 2019    MCV 94 2019     (L) 2019     No results for input(s): TSH, FREET4 in the last 168 " hours.  Lab Results   Component Value Date    HGBA1C 8.1 (H) 01/07/2019       Nutritional status:   Body mass index is 23.64 kg/m².  Lab Results   Component Value Date    ALBUMIN 2.0 (L) 02/06/2019    ALBUMIN 1.9 (L) 02/05/2019    ALBUMIN 1.8 (L) 02/04/2019     No results found for: PREALBUMIN    Estimated Creatinine Clearance: 52.2 mL/min (based on SCr of 1.4 mg/dL).    Accu-Checks  Recent Labs     02/03/19  1852 02/03/19  2232 02/04/19  0804 02/04/19  1205 02/04/19  1717 02/04/19  2126 02/05/19  0750 02/05/19  1221 02/05/19  1655 02/05/19  2039   POCTGLUCOSE 135* 202* 143* 143* 152* 125* 100 144* 138* 84       Current Medications and/or Treatments Impacting Glycemic Control  Immunotherapy:    Immunosuppressants     None        Steroids:   Hormones (From admission, onward)    None        Pressors:    Autonomic Drugs (From admission, onward)    None        Hyperglycemia/Diabetes Medications:   Antihyperglycemics (From admission, onward)    Start     Stop Route Frequency Ordered    02/05/19 2100  insulin detemir U-100 pen 3 Units      -- SubQ 2 times daily 02/05/19 1624    02/04/19 0730  insulin aspart U-100 pen 10 Units      -- SubQ 3 times daily with meals 02/04/19 0723    01/30/19 0855  insulin aspart U-100 pen 0-5 Units      -- SubQ Before meals & nightly PRN 01/30/19 0756          ASSESSMENT and PLAN    * 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  Patient and spouse both agree patient patient is likely on too much insulin but patient reports no episodes of hypoglycemia at home.  Patient expressing interest to take less  shots as he forgets to bring his vial and syring to work.  Patient switched to pens upon discharge per patient preference and to increase medication compliance.  Recommend patient discharge on Basaglar 6 units daily in AM and Novolog 10 units with meals plus low dose correction scale based on patient's insulin requirements while in patient.  Spoke with primary team, PA to order medications and diabetic supplies.  Patient would like to follow up with local endocrine provider near [place], [place].  BG logs provided to patient and family at bedside.  Reviewed topics related to DM including: the need for insulin, how insulin works, what makes it a high risk medication, the importance of follow up with either his PCP or endocrine provider, importance of checking BG at least three times daily, how to record BG on logs, how to administer insulin, importance of rotating injection sites, hyper/hypoglycemia, how and when to treat hypoglycemia, when to hold insulin, how the correction scale works, importance of storing unused insulin in the refrigerator, and when to seek medical attention.  Patient verbalized understanding, answered all questions to patient's satisfaction.     TREY (acute kidney injury)    Caution with insulin stacking  Estimated Creatinine Clearance: 52.2 mL/min (based on SCr of 1.4 mg/dL).         A-fib    May affect BG readings if uncontrolled  S/p cardioversion.          Timo Avalos, FARIDEH  Endocrinology  [facility]

## 2019-02-06 NOTE — ASSESSMENT & PLAN NOTE
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 Patient had 2 vegetations on RV lead which is most likely source of bacteremia. Lead tip and ICD pocket cultured with RV lead +ve for Enteroccocus. Blood cultures are negative here are -ve with last culture done 1/24  -2 mobile masses seen on CVC by RICARDO 1/14 (has both RUE PICC and RIJ TLC- both have since been removed). Discussed with Dr. Russo. Blood cultures repeated 1/15 and were -ve  -Appreciate EP's help.-Continue Ampicillin (renally dosed given TREY) and Ceftriaxone per ID recs. Will treat for 6 weeks post extraction.  Estimated end date 2/21/19. PICC in place for home IV Abx    -S/P successful reimplantation of BiV ICD 1/29. EP considering repeat RICARDO/DCCV A fib at some point. Will perform DFT as an outpatient   -Appreciate GI's help. Plan for outpatient EGD and C-scope for eval of anemia and Enterococcus infxn per ID recs  -All cultures from 1/7 - 1/24 -ve except for ICD lead tip +ve for Enterococcus on 1/9

## 2019-02-06 NOTE — PROGRESS NOTES
Arrhythmia Clinic    Assessed patient's R chest wall incision site while patient was inpatient in room 322.     Dressing removed, incision site healing well. Edges well approximated. No drainage noted. Pt reports no fever or pain to the site.     Educated patient on signs and symptoms of infection and informed patient to call clinic if any signs appear.   Home monitor given and explained to patient.   Reinforced and demonstrated Right sided arm restrictions for 6 weeks post-op.   Pt verbalized understanding of all of the above.  RTC in 3 months.

## 2019-02-06 NOTE — NURSING
Patient discharged per MD order. Telemetry removed. Home antibiotics at bedside. Family has received education on abx infusions. VS stable. Patient and family state understanding of discharge instructions. PICC to remain in place for home infusions. Family at bedside for transportation home.

## 2019-02-06 NOTE — ASSESSMENT & PLAN NOTE
BG goal 140-180    Levemir 3 units BID  Decrease Novolog to 9 units with meals  Low dose correction scale  BG monitoring AC/HS    Discharge planning: Patient is on considerably less insulin compared to home dosing.  Patient and spouse both agree patient patient is likely on too much insulin but patient reports no episodes of hypoglycemia at home.  Patient expressing interest to take less shots as he forgets to bring his vial and syring to work.  Patient switched to pens upon discharge per patient preference and to increase medication compliance.  Recommend patient discharge on Basaglar 6 units daily in AM and Novolog 10 units with meals plus low dose correction scale based on patient's insulin requirements while in patient.  Spoke with primary team, PA to order medications and diabetic supplies.  Patient would like to follow up with local endocrine provider near [place], [place].  BG logs provided to patient and family at bedside.  Reviewed topics related to DM including: the need for insulin, how insulin works, what makes it a high risk medication, the importance of follow up with either his PCP or endocrine provider, importance of checking BG at least three times daily, how to record BG on logs, how to administer insulin, importance of rotating injection sites, hyper/hypoglycemia, how and when to treat hypoglycemia, when to hold insulin, how the correction scale works, importance of storing unused insulin in the refrigerator, and when to seek medical attention.  Patient verbalized understanding, answered all questions to patient's satisfaction.

## 2019-02-06 NOTE — PLAN OF CARE
Problem: Adult Inpatient Plan of Care  Goal: Plan of Care Review  Plan of care discussed with patient. Patient ambulating independently with fall precautions in place.  Patient has no complaints of pain. Discussed medications and care. Patient has no questions at this time. Will continue to monitor.

## 2019-02-06 NOTE — PROGRESS NOTES
Results for IVY LUGO (MRN [id number]) as of 2/6/2019 06:04   Ref. Range 2/6/2019 03:38   Potassium Latest Ref Range: 3.5 - 5.1 mmol/L 3.7   Per Dr. George, order 40 mEq replacement.

## 2019-02-06 NOTE — NURSING
Patient and family state EP made adjudstment to ICD. All medication has been delivered to patient's bedside. Patient and family state understanding of discharge instructions. Family ready for patient transportation home.

## 2019-02-06 NOTE — SUBJECTIVE & OBJECTIVE
"Interval HPI:   Overnight events: Remains in CSU, NAEON.  BG well controlled on current insulin regimen.  On IV antibiotics.  Eatin%  Nausea: No  Hypoglycemia and intervention: No  Fever: No  TPN and/or TF: No    BP (!) 106/58 (BP Location: Left arm, Patient Position: Lying)   Pulse 81   Temp 96.4 °F (35.8 °C) (Oral)   Resp 16   Ht 6' 2" (1.88 m)   Wt 83.5 kg (184 lb 1.4 oz)   SpO2 (!) 93%   BMI 23.64 kg/m²     Labs Reviewed and Include    Recent Labs   Lab 19  0338   *   CALCIUM 8.0*   ALBUMIN 2.0*   PROT 5.5*      K 3.7   CO2 27      BUN 29*   CREATININE 1.4   ALKPHOS 129   ALT 21   AST 37   BILITOT 0.4     Lab Results   Component Value Date    WBC 6.08 2019    HGB 7.9 (L) 2019    HCT 25.9 (L) 2019    MCV 94 2019     (L) 2019     No results for input(s): TSH, FREET4 in the last 168 hours.  Lab Results   Component Value Date    HGBA1C 8.1 (H) 2019       Nutritional status:   Body mass index is 23.64 kg/m².  Lab Results   Component Value Date    ALBUMIN 2.0 (L) 2019    ALBUMIN 1.9 (L) 2019    ALBUMIN 1.8 (L) 2019     No results found for: PREALBUMIN    Estimated Creatinine Clearance: 52.2 mL/min (based on SCr of 1.4 mg/dL).    Accu-Checks  Recent Labs     19  1852 19  2232 19  0804 19  1205 19  1717 19  2126 19  0750 19  1221 19  1655 19   POCTGLUCOSE 135* 202* 143* 143* 152* 125* 100 144* 138* 84       Current Medications and/or Treatments Impacting Glycemic Control  Immunotherapy:    Immunosuppressants     None        Steroids:   Hormones (From admission, onward)    None        Pressors:    Autonomic Drugs (From admission, onward)    None        Hyperglycemia/Diabetes Medications:   Antihyperglycemics (From admission, onward)    Start     Stop Route Frequency Ordered    19  insulin detemir U-100 pen 3 Units      -- SubQ 2 times daily " 02/05/19 1624    02/04/19 0730  insulin aspart U-100 pen 10 Units      -- SubQ 3 times daily with meals 02/04/19 0723    01/30/19 0855  insulin aspart U-100 pen 0-5 Units      -- SubQ Before meals & nightly PRN 01/30/19 0756

## 2019-02-07 NOTE — PATIENT INSTRUCTIONS
Biventricular Pacemaker and ICD (Biventricular ICD)     You have a condition called heart failure. It is also known as congestive heart failure (CHF). This condition causes symptoms such as getting tired very quickly and being short of breath. To help treat these symptoms, your healthcare provider is recommending a biventricular pacemaker and implantable cardioverter defibrillator (ICD). This is sometimes called a biventricular ICD. Or it is called cardiac resynchronization pacing with an ICD (CRT-D).  A biventricular pacemaker and ICD is a small, lightweight device powered by batteries. This device helps keep your heart pumping normally. It also protects you from dangerous heart rhythms. Read on to learn more about this device and how it works.  Understanding the heart  The heart is made up of 4 sections (chambers) that pump to move blood through the heart. The top 2 chambers are the left atrium and right atrium. These are filling chambers of the heart. The bottom chambers are the left ventricle and right ventricle. These are the pumping chambers of the heart. The heart has an electrical system. This system sends signals that make the atria and ventricles work together. This causes the heart to beat and move blood through the heart and lungs and out to the body.  How the device works  Heart failure weakens your heart muscle. As a result, the ventricles dont pump as strongly as they should.  The pathways that carry the heart's electrical signals are located in the heart muscle. They can also be damaged by CHF. This can cause a bundle branch block. A bundle branch block can throw off the timing of the heart's contraction. This can make the heart's squeezing contraction even weaker.  A biventricular pacemaker and ICD help keep the heart pumping in a more normal way. The pacemaker device keeps the heart from beating too slowly. It tries to restore the normal squeezing pattern of the heart. This is called  "resynchronization pacing. This can lead to more efficient and stronger heart contraction. The ICD part of the device detects dangerously fast heart rhythms and stops them. If the device detects an abnormally fast heartbeat that can cause cardiac arrest, it will send a "shock" to the heart. The shock stops this dangerous heart rhythm and restores a normal heartbeat.  Before the procedure  Make sure to:  · Not eat or drink after midnight or 8 hours before your surgery  · Follow instructions from your doctor about bathing the night before and the morning of your procedure. You may need to use a special cleaning solution.  · Tell your doctor what medicines you take. This includes over-the-counter medicines such as ibuprofen. It also includes vitamins, herbs, and other supplements. You may need to stop taking some medicines before the procedure, such as blood thinners and aspirin.  · Tell your doctor if you are sensitive or allergic to anything. This includes medicines, latex, tape, and anesthetic medicines.  · Ask a family member or friend to take you home from the hospital  Tests before your procedure  Before your procedure you may need tests such as:  · Chest X-ray  · Blood tests, to test your general health  During the procedure  · You will get medicine (anesthesia) so you wont feel pain. Most likely, you will get medicine (sedation) that will make you drowsy or lightly asleep. The doctor will inject local pain medicine to numb the skin on your chest where the cut (incision) will be made.  · The doctor will make an incision where the device will be implanted. This is most often on the left side of the chest just below the collarbone (clavicle).  · The doctor makes a small pocket for the generator under the skin.  · The doctor will put a thin, flexible tube (catheter) into a vein leading to the right atrium. He or she will guide the devices wires (leads) through the catheter to the heart. The doctor will use an " X-ray monitor to move the leads into the right ventricle. He or she will usually also put a lead in the right atrium.  · The doctor will put the lead for the left ventricle into a vein that runs along the outside of this chamber. He or she will also use the X-ray monitor to put this lead in the correct spot. The device will stimulate the left ventricle from the outside. The other leads will stimulate the heart from inside the chambers.  · The doctor will attach the generator to the leads. He or she will send pulses through the leads to test the generator. This testing may cause your heart to race.  · The doctor will then put the generator into the pocket under your skin.  · The doctor will program the device based on the heart rate thats best for you. He or she will check the device to see that it is working.  · The doctor will close the skin with stitches (sutures), surgical glue, or staples. Any stitches used will dissolve over time. If glue is used, it will seal the cut and prevent infection. A bandage will be put on the area.  After the procedure  You will spend several hours in a recovery room. Once you are stable and awake, you will be put in a room that can monitor your heart rhythm. Your healthcare team will also watch your breathing and other vital signs. Youll be given pain medicine if you need it.  The team may place the arm on the side of the device in a sling. This is just for a short time, to keep the arm and shoulder still.  You will have a chest X-ray to make sure the leads are where they should be. The doctor will also check that your lungs were not injured during the procedure.  You can resume a normal diet as soon as you are able. You will be watched overnight. The team will check the device the next morning. You will likely go home the day after your procedure. You may need to take antibiotics for several days to prevent infection.  Recovering at home  Follow all the instructions your  healthcare provider gives you for medicines, bathing, exercise, diet, and wound care. Ask your doctor when you can go back to work or start driving again.  Dont raise your arm above your shoulder on the side of your incision until your doctor says its OK to do so. This gives the leads a chance to secure themselves inside your heart.  Follow-up care  Make sure to keep all your follow-up appointments. This is so your doctor can download information from your device and check its settings. Be sure to tell your doctor how the device is working for you.  Most devices can now be connected to a wireless home monitoring system via the internet. The monitor can download information from your device and send it to your doctor. This lets your doctor make sure the device is working as it should.  Life with a biventricular pacemaker and ICD  · Carry an ID card. When you first get your pacemaker, youll be given an ID card to carry. This card contains important information about the device. Show it to any doctor, dentist, or other provider you visit. Pacemakers may set off metal detectors. So you may need to show your card to security personnel.  · Be careful when using a cell phone. Hold it to the ear farthest from your pacemaker. Dont carry the phone in your breast pocket, even when its turned off.  · Avoid very strong magnets. These include those used for an MRI or in hand-held security wands. Show your ID card when you go through security.  · Avoid strong electrical fields. These are made by radio transmitting towers and ham radios. They are also made by heavy-duty electrical equipment. A running engine makes an electrical field. Don't lean over the open baptiste of a running car. Most household and yard appliances will not cause any problems. If you use any large power tools, such as an industrial , talk to your doctor.   · Keep an eye on the battery. The battery inside the device is checked regularly. It will last  5 to 7 years, depending on how often it paces or has to stop dangerous heart rhythms. Once it starts to run down, you will need to have it changed. This is like the implantation surgery, but it takes less time. This is because the battery/generator is the only part that needs to be replaced.  · Be careful when driving. Your device has a defibrillator built in. You may not be allowed to drive for the first 6 months after you get the device. You may also not be allowed to drive for 6 months after the defibrillator delivers a shock. You will not be allowed to work as a  if you have an ICD.     When should I call my healthcare provider?  Call 911 if you have chest pain or trouble breathing.  Call your healthcare provider if you have any of the following:  · Redness, severe swelling, severe pain, drainage, bleeding, or warmth at the incision site  · Incision site or opens up or does not heal well  · A fever of 100.4°F (38.0°C) or higher, or as directed by your healthcare provider  · Pain around the generator that gets worse  · Swelling in the arms or hands on the side of the incision  · Sudden weight gain  · Twitching chest or abdominal muscles  · Frequent or constant hiccups  · A shock from your device  · Very fast heartbeat that doesnt stop  · Generator feels loose or like it is wiggling in the pocket under the skin    Date Last Reviewed: 3/17/2016  © 2238-6393 [employer]. [address] [place] [address], [place] [id number]. All rights reserved. This information is not intended as a substitute for professional medical care. Always follow your healthcare professional's instructions.

## 2019-02-07 NOTE — PROGRESS NOTES
Cardiac device interrogation and/or reprogramming completed by industry representative, Oscar Hudson_; please refer to report located in the media tab.      Device check was done on 2/6/19.

## 2019-02-07 NOTE — TELEPHONE ENCOUNTER
Spoke to Bisi with [facility]. Patient is currently on 2g q 6 hours of Ampicillin and has been changed to 3g q 8 hours of Ampicillin. Patient's appointment to see us is 02/21/19.

## 2019-02-07 NOTE — HOSPITAL COURSE
Pt was admitted to [facility] and was continued on IV Abx. RICARDO done at OSH showed RV lead vegetation in setting of bacteremia. EP and ID were consulted. Blood cultures from OSH were found to be positive for Enterococcus faecalis. ID recommended to continue Ampicillin and ceftriaxone, and EP planned for extraction once Bl Cx cleared. ID also recommended EGD and cscope for w/u of enterococcus but this will be done as an outpt.  Pt underwent ICD extraction on 1/9/19. Patient had 2 vegetations on RV lead which is most likely source of bacteremia. Lead tip and ICD pocket cultured with RV lead +ve for Enteroccocus. Blood cultures remained negative here. Post op course was complicated by TREY likely due to ATN/hypotension. Pt had to be transferred to [facility] and started on Epi and Miesha, which were weaned off as kidneys improved.   Pt underwent successful reimplantation of BiV ICD 1/29/19. EP is considering repeat RICARDO/DCCV A fib at some point. Will perform DFT as an outpatient. ID's final recommendations were Ampicillin and Ceftriaxone for 6 weeks post extraction.  Estimated end date 2/21/19. PICC was placed for home IV Abx.  Pt d/c'ed home on IV Abx via PICC with Home Health care. He will f/u in EP in 2 months. He will have f/u in HF clinic with labs in 2 week.

## 2019-02-07 NOTE — HPI
78 M with PMH HFrEF/ICM s/p ICD S/p cardioMEMS implant (enrolled in GUIDE-HF study), CAD ([facility] 3/18 with patent stents in proximal OM and ostial to mid RCA) AF on eliquis and digoxin, COPD, DM, hypothyroidism, HLD, prior CVA transferred to [facility] for evaluation of possible RV lead infection. The patient developed a GI illness with nausea, vomiting and diarrhea prior to his initial presentation to the hospital. He was febrile and was admitted with sepsis at [facility]. On initial presentation BP was 78/44, Creatinine 2.63, was started on pressors and transferred to [facility] for a higher level of care. He was resuscitated with IV fluids at [facility] as he was thought to have volume depletion. Blood cultures grew G + cocci and the patient was treated with Vanc + Cefepime. He was net +6L and EF was found to be 30-35%. RICARDO with possible RV lead echodensity suggestive of vegetation.     Currently, the patient feels symptomatically improved, although he reported losing 15 lbs in past 2-3 weeks. He feels that since he has been hospitalized he has been becoming more short of breath. He denies any symptoms of angina, orthopnea, PND or peripheral edema. His diarrhea has subsided and he is afebrile, off all pressors.

## 2019-02-08 NOTE — DISCHARGE SUMMARY
[facility]  Heart Transplant  Discharge Summary      Patient Name: Jerry Solis  MRN: [id number]  Admission Date: 1/6/2019  Hospital Length of Stay: 31 days  Discharge Date and Time: 02/08/2019 3:46 PM  Attending Physician: Linda att. providers found   Discharging Provider: Candy Forman PA-C  Primary Care Provider: Primary Doctor Linda     HPI: 78 M with PMH HFrEF/ICM s/p ICD S/p cardioMEMS implant (enrolled in GUIDE-HF study), CAD ([facility] 3/18 with patent stents in proximal OM and ostial to mid RCA) AF on eliquis and digoxin, COPD, DM, hypothyroidism, HLD, prior CVA transferred to [facility] for evaluation of possible RV lead infection. The patient developed a GI illness with nausea, vomiting and diarrhea prior to his initial presentation to the hospital. He was febrile and was admitted with sepsis at [facility]. On initial presentation BP was 78/44, Creatinine 2.63, was started on pressors and transferred to [facility] for a higher level of care. He was resuscitated with IV fluids at [facility] as he was thought to have volume depletion. Blood cultures grew G + cocci and the patient was treated with Vanc + Cefepime. He was net +6L and EF was found to be 30-35%. RICARDO with possible RV lead echodensity suggestive of vegetation.     Currently, the patient feels symptomatically improved, although he reported losing 15 lbs in past 2-3 weeks. He feels that since he has been hospitalized he has been becoming more short of breath. He denies any symptoms of angina, orthopnea, PND or peripheral edema. His diarrhea has subsided and he is afebrile, off all pressors.           Procedure(s) (LRB):  INSERTION, ICD, BIVENTRICULAR (Left)     Hospital Course: Pt was admitted to [facility] and was continued on IV Abx. RICARDO done at OSH showed RV lead vegetation in setting of bacteremia. EP and ID were consulted. Blood cultures from OSH were found to be positive for Enterococcus faecalis. ID recommended to continue Ampicillin  and ceftriaxone, and EP planned for extraction once Bl Cx cleared. ID also recommended EGD and cscope for w/u of enterococcus but this will be done as an outpt.  Pt underwent ICD extraction on 1/9/19. Patient had 2 vegetations on RV lead which is most likely source of bacteremia. Lead tip and ICD pocket cultured with RV lead +ve for Enteroccocus. Blood cultures remained negative here. Post op course was complicated by TREY likely due to ATN/hypotension. Pt had to be transferred to [facility] and started on Epi and Miesha, which were weaned off as kidneys improved.   Pt underwent successful reimplantation of BiV ICD 1/29/19. EP is considering repeat RICARDO/DCCV A fib at some point. Will perform DFT as an outpatient. ID's final recommendations were Ampicillin and Ceftriaxone for 6 weeks post extraction.  Estimated end date 2/21/19. PICC  was placed for home IV Abx.  Pt d/c'ed home on IV Abx via PICC with Home Health care. He will f/u in EP in 2 months. He will have f/u in HF clinic with labs in 2 week.    Consults (From admission, onward)        Status Ordering Provider     Inpatient consult to Electrophysiology  Once     Provider:  (Not yet assigned)    Completed DONG JUAREZ     Inpatient consult to Endocrinology  Once     Provider:  (Not yet assigned)    Completed DONG JUAREZ     Inpatient consult to Infectious Diseases  Once     Provider:  (Not yet assigned)    Completed JAKE COOK     Inpatient consult to Nephrology  Once     Provider:  (Not yet assigned)    Completed TRISH WONG     Inpatient consult to Registered Dietitian/Nutritionist  Once     Provider:  (Not yet assigned)    Completed DARWIN OLSON            Pending Diagnostic Studies:     Procedure Component Value Units Date/Time    Lactic acid, plasma [[id number]] Collected:  01/19/19 0944    Order Status:  Sent Lab Status:  In process Updated:  01/19/19 0944    Specimen:  Blood         Final Active Diagnoses:    Diagnosis Date Noted POA  "   PRINCIPAL PROBLEM:  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 [T82.7XXA] 03/22/2018 Yes    Acute on chronic combined systolic and diastolic CHF, NYHA class 3 [I50.43] 05/18/2018 Yes    A-fib [I48.91] 04/06/2018 Yes    TREY (acute kidney injury) [N17.9] 04/07/2018 Yes    Debility [R53.81]  Yes    Infected pacemaker [T82.7XXA]  Yes    Moderate malnutrition [E44.0] 01/24/2019 Yes    Bacteremia due to Enterococcus [R78.81, B95.2]  Unknown    Junctional bradycardia [R00.1] 01/10/2019 Unknown    Endocarditis [I38]  Yes    ICD (implantable cardioverter-defibrillator) in place [Z95.810]  Unknown    Infection associated with cardiac device [T82.7XXA]  Yes    Bacteremia [R78.81] 01/06/2019 Yes    Type 2 diabetes mellitus with complication [E11.8] 03/22/2018 Yes      Problems Resolved During this Admission:    Diagnosis Date Noted Date Resolved POA    Leukocytosis [D72.829] 01/25/2019 02/05/2019 Yes    Nausea & vomiting [R11.2] 01/10/2019 01/17/2019 Unknown    Dysphagia [R13.10] 01/10/2019 01/17/2019 Unknown    Dysuria [R30.0] 01/10/2019 02/04/2019 Unknown    Hypotension [I95.9]  01/21/2019 Yes      Discharged Condition: stable    Disposition: Home-Health Care [facility]      Patient Instructions:      BATH/SHOWER CHAIR FOR HOME USE     Order Specific Question Answer Comments   Height: 6' 2" (1.88 m)    Weight: 83.5 kg (184 lb)    Does patient have medical equipment at home? none    Length of need (1-99 months): 99    Type: With back    Vendor: Other (use comments)    Expected Date of Delivery: 2/7/2019      WALKER FOR HOME USE     Order Specific Question Answer Comments   Type of Walker: Adult (5'4"-6'6")    With wheels? Yes    Height: 6' 2" (1.88 m)    Weight: 83.5 kg (184 lb)    Length of need (1-99 months): 99    Does patient have medical equipment at home? none    Please check all that apply: Patient's condition impairs ambulation.    Please check all that apply: Patient is unable to " safely ambulate without equipment.    Please check all that apply: Patient needs help to get in and out of chair.    Please check all that apply: Walker will be used for gait training.    Vendor: Other (use comments)    Expected Date of Delivery: 2/7/2019      Diet Adult Regular     Order Specific Question Answer Comments   Na restriction, if any: 2gNa    Fluid restriction: Fluid - 2000mL      Other restrictions (specify):   Order Comments: ICD wound restrictions per EP     Notify your health care provider if you experience any of the following:  temperature >100.4     Notify your health care provider if you experience any of the following:  persistent nausea and vomiting or diarrhea     Notify your health care provider if you experience any of the following:  severe uncontrolled pain     Notify your health care provider if you experience any of the following:  redness, tenderness, or signs of infection (pain, swelling, redness, odor or green/yellow discharge around incision site)     Notify your health care provider if you experience any of the following:  difficulty breathing or increased cough     Notify your health care provider if you experience any of the following:  severe persistent headache     Notify your health care provider if you experience any of the following:  worsening rash     Notify your health care provider if you experience any of the following:  persistent dizziness, light-headedness, or visual disturbances     Notify your health care provider if you experience any of the following:  increased confusion or weakness     Activity as tolerated     Medications:  Reconciled Home Medications:      Medication List      START taking these medications    AMPICILLIN 2 G/100 ML NS (READY TO MIX SYSTEM)  Inject 100 mLs (2 g total) into the vein every 6 (six) hours. for 15 days     BASAGLAR KWIKPEN U-100 INSULIN glargine 100 units/mL (3mL) SubQ pen  Generic drug:  insulin  Inject 6 Units into the skin once  "daily.  Replaces:  LANTUS U-100 INSULIN 100 unit/mL injection     BD ULTRA-FINE SHORT PEN NEEDLE 31 gauge x 5/16" Ndle  Generic drug:  pen needle, diabetic  1 each by Misc.(Non-Drug; Combo Route) route 4 (four) times daily.     cefTRIAXone 2 g in dextrose 5 % 50 mL 2 g/50 mL Pgbk IVPB  Commonly known as:  ROCEPHIN  Inject 50 mLs (2 g total) into the vein every 12 (twelve) hours. for 15 days     ferrous sulfate 325 (65 FE) MG EC tablet  Take 1 tablet (325 mg total) by mouth 2 (two) times daily.     NovoLOG Flexpen U-100 Insulin 100 unit/mL Inpn pen  Generic drug:  insulin aspart U-100  Inject 10 Units into the skin 3 (three) times daily with meals. With low dose sliding scale  Replaces:  NovoLOG U-100 Insulin aspart 100 unit/mL injection     tamsulosin 0.4 mg Cap  Commonly known as:  FLOMAX  Take 1 capsule (0.4 mg total) by mouth once daily.        CHANGE how you take these medications    bumetanide 1 MG tablet  Commonly known as:  BUMEX  Take 2 tablets (2 mg total) by mouth once daily.  What changed:    · how much to take  · how to take this  · when to take this  · additional instructions     ELIQUIS 5 mg Tab  Generic drug:  apixaban  Take 1 tablet (5 mg total) by mouth 2 (two) times daily.  What changed:    · medication strength  · how much to take     potassium chloride SA 20 MEQ tablet  Commonly known as:  KLOR-CON M20  Take 1 tablet (20 mEq total) by mouth once daily.  What changed:    · how much to take  · how to take this  · when to take this  · additional instructions        CONTINUE taking these medications    clopidogrel 75 mg tablet  Commonly known as:  PLAVIX  Take 1 tablet (75 mg total) by mouth once daily.     levothyroxine 25 MCG tablet  Commonly known as:  SYNTHROID  Take 1 tablet by mouth once daily.     loratadine 10 mg tablet  Commonly known as:  CLARITIN  Take 10 mg by mouth once daily.     multivitamin per tablet  Commonly known as:  THERAGRAN  Take 1 tablet by mouth once daily.     omega-3 acid " ethyl esters 1 gram capsule  Commonly known as:  LOVAZA  Take 2 g by mouth 2 (two) times daily.     omeprazole 40 MG capsule  Commonly known as:  PRILOSEC  Take 40 mg by mouth once daily.     rosuvastatin 20 MG tablet  Commonly known as:  CRESTOR  Take 1 tablet (20 mg total) by mouth every evening.     sacubitril-valsartan 24-26 mg per tablet  Commonly known as:  ENTRESTO  Take 1 tablet by mouth 2 (two) times daily.     TRELEGY ELLIPTA 100-62.5-25 mcg Dsdv  Generic drug:  fluticasone-umeclidin-vilanter  once daily.     VENTOLIN HFA 90 mcg/actuation inhaler  Generic drug:  albuterol  Inhale 2 puffs into the lungs every 4 (four) hours as needed.        STOP taking these medications    digoxin 125 mcg tablet  Commonly known as:  LANOXIN     LANTUS U-100 INSULIN 100 unit/mL injection  Generic drug:  insulin glargine  Replaced by:  BASAGLAR KWIKPEN U-100 INSULIN glargine 100 units/mL (3mL) SubQ pen     metOLazone 5 MG tablet  Commonly known as:  ZAROXOLYN     metoprolol succinate 25 MG 24 hr tablet  Commonly known as:  TOPROL-XL     NovoLOG U-100 Insulin aspart 100 unit/mL injection  Generic drug:  insulin aspart U-100  Replaced by:  NovoLOG Flexpen U-100 Insulin 100 unit/mL Inpn pen            Candy Forman PA-C  Heart Transplant  [facility]

## 2019-02-13 NOTE — TELEPHONE ENCOUNTER
2/13/19 - Received call from patient stating his weight is up to 195 lb from discharge, his feet are swollen and he can't put his shoes on and is more SOB.  Patient confirmed he takes Bumex 1mg 2 tablets by mouth daily and Potassium Chloride 20 meq 1 tablet by mouth daily.  Patient also request appt for 2/22/19 with NLEIA Nails M.D., be moved to Thursday 2/21/19, due to having appt with ID on Thursday.  Discussed/Reveiwed with NELIA Nails M.D.  VORB: NELIA Nails M.D./JV Gibson RN: Increase Bumex to 2 mg twice a day x 3 days, Potassium Chloride 60 meq daily x 3 days and repeat BMP Friday 2/15/19.  Ok to move appt to 2/21/19 at 1:00.  Above instructions given to patient and patient wrote down all instructions and repeated back correctly.  Appt moved to Thursday 2/21/19 at 1:00 and DEUCE Butler M.A., will send lab orders to [facility] and enter phone review.

## 2019-02-15 NOTE — TELEPHONE ENCOUNTER
5:40 pm:  F/U on today's nurse lab phone review:  See NN 2/13/19  Received BMP results today from collection today:  Cr:  1.78    K+:  3.7.

## 2019-02-16 NOTE — TELEPHONE ENCOUNTER
Pt was calling to give update from triage call last night. He states that swelling is better this morning and reports no shortness of breath at rest. Advised to continue with MD instructions- if swelling gets worse or shortness of breath report to ER. Pt has follow up appt scheduled nect week  Reason for Disposition   Information only question and nurse able to answer    Protocols used: ST NO PROTOCOL CALL: INFORMATION ONLY-A-OH

## 2019-02-16 NOTE — TELEPHONE ENCOUNTER
"  Reason for Disposition   [1] Difficulty breathing with exertion (e.g., walking) AND [2] new onset or worsening    Answer Assessment - Initial Assessment Questions  1. ONSET: "When did the swelling start?" (e.g., minutes, hours, days)     Daughter giving IV abx at home. Having swelling in stomach too. Has appt thurs.   2. LOCATION: "What part of the leg is swollen?"  "Are both legs swollen or just one leg?"     bilat legs to knees   3. SEVERITY: "How bad is the swelling?" (e.g., localized; mild, moderate, severe)   - Localized - small area of swelling localized to one leg   - MILD pedal edema - swelling limited to foot and ankle, pitting edema < 1/4 inch (6 mm) deep, rest and elevation eliminate most or all swelling   - MODERATE edema - swelling of lower leg to knee, pitting edema > 1/4 inch (6 mm) deep, rest and elevation only partially reduce swelling   - SEVERE edema - swelling extends above knee, facial or hand swelling present     Pt thought he was supposed to quit today  4. REDNESS: "Does the swelling look red or infected?"   no   5. PAIN: "Is the swelling painful to touch?" If so, ask: "How painful is it?"   (Scale 1-10; mild, moderate or severe)     nilda burns   6. FEVER: "Do you have a fever?" If so, ask: "What is it, how was it measured, and when did it start?"      afeb   7. CAUSE: "What do you think is causing the leg swelling?"     Heart   8. MEDICAL HISTORY: "Do you have a history of heart failure, kidney disease, liver failure, or cancer?"    chf   9. RECURRENT SYMPTOM: "Have you had leg swelling before?" If so, ask: "When was the last time?" "What happened that time?"    Not quite this bad, just got out of hosp. Pacer went bad   10. OTHER SYMPTOMS: "Do you have any other symptoms?" (e.g., chest pain, difficulty breathing)    No CP, SOB with exertion    Protocols used: ST LEG SWELLING AND EDEMA-A-AH  3 days ago re dr ibrahim. MD gave two am and evening. Had labs today. edema of leg, some SOB. Rec " local ED.  Pt has bumex and metasone at home -   This am took 2 bumex. None this evening. due to take two this eveing. Spoke with dr ibrahim re cont two bumex BID and cont extra potassium. Call back in am with update. rec ED if SOB at rest. no metalozone now. Pt notified. Call back with questions.

## 2019-02-19 NOTE — PROGRESS NOTES
FREDERICK PICC line removed, pt tolerated well. Pt instructed to leave dressing on for 24hrs, verbalized understanding. Pt left in NAD.

## 2019-02-19 NOTE — PROGRESS NOTES
Subjective:     HPI:  Mr. Solis is a very pleasant 76y/o male with HFrEF, ICMP (EF=20%), NYHA class III symptoms  S/p cardioMEMS implant (enrolled in GUIDE-HF study) who comes for a pos hospital discharge follow-up visit. He was recently discharged from our service after being treated for infective endocarditis. 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 Blood cultures remained negative here. Post op course was complicated by TREY likely due to ATN/hypotension. Pt had to be transferred to [facility] and started on Epi and Miesha, which were weaned off as kidneys improved. Pt underwent successful reimplantation of BiV ICD 1/29/19. EP is considering repeat RICARDO/DCCV A fib at some point. Will perform DFT as an outpatient. ID's final recommendations were Ampicillin and Ceftriaxone for 6 weeks post extraction.  Estimated end date 2/21/19. PICC  was placed for home IV Abx. Pt d/c'ed home on IV Abx via PICC with Home Health care. Since his hospital discharge his dyspnea has gotten worse (NYHA class III with PND and orthopnea)  and he has gained almost 19 LBS. His diuretic regimen was increased few days ago and yesterday he was given Metolazone. Today he states he diuresed well after the Metolazone and feels better. Labs today are pending.     Past Medical History:   Diagnosis Date    Chronic systolic congestive heart failure 3/22/2018    Coronary artery disease involving native coronary artery of native heart without angina pectoris 3/22/2018    ICD (implantable cardioverter-defibrillator) in place 3/22/2018    Ischemic cardiomyopathy 3/22/2018    Mixed hyperlipidemia 3/22/2018    Type 2 diabetes mellitus with complication 3/22/2018    VT (ventricular tachycardia) 3/22/2018     Past Surgical History:   Procedure Laterality Date    CARDIOVERSION N/A 1/14/2019    Performed by  "Ryan Henley MD at [facility] EP LAB    ECHOCARDIOGRAM,TRANSESOPHAGEAL N/A 1/14/2019    Performed by Ryan Henley MD at [facility] EP LAB    ECHOCARDIOGRAM,TRANSESOPHAGEAL N/A 1/9/2019    Performed by [facility] at [facility]    EXTRACTION, ELECTRODE LEAD Left 1/9/2019    Performed by Werner Boggs MD at [facility] EP LAB    HEART CATH-RIGHT Right 4/5/2018    Performed by Elizabeth Wise MD at [facility] CATH LAB    INSERTION, ICD, BIVENTRICULAR Left 1/29/2019    Performed by Werner Boggs MD at [facility] EP LAB    Insertion, Pacemaker, Temporary Transvenous  1/9/2019    Performed by Werner Boggs MD at [facility] EP LAB       Review of Systems   Constitution: Positive for weight gain. Negative for chills, decreased appetite, diaphoresis, fever, weakness, malaise/fatigue, night sweats and weight loss.   Eyes: Negative.    Cardiovascular: Positive for dyspnea on exertion, orthopnea and paroxysmal nocturnal dyspnea. Negative for chest pain, claudication, cyanosis, irregular heartbeat, leg swelling, near-syncope, palpitations and syncope.   Respiratory: Negative for cough, hemoptysis and shortness of breath.    Endocrine: Negative.    Hematologic/Lymphatic: Negative.    Skin: Negative for color change, dry skin and nail changes.   Musculoskeletal: Negative.    Gastrointestinal: Negative.    Genitourinary: Negative.        Objective:   Blood pressure (!) 81/52, pulse 80, height 6' 1" (1.854 m), weight 94.1 kg (207 lb 7.3 oz).body mass index is 27.37 kg/m².  Physical Exam   Constitutional: He appears well-developed.   BP (!) 81/52 (BP Location: Right arm, Patient Position: Sitting, BP Method: Medium (Automatic))   Pulse 80   Ht 6' 1" (1.854 m)   Wt 94.1 kg (207 lb 7.3 oz)   BMI 27.37 kg/m²      HENT:   Head: Normocephalic.   Neck: JVD present. Carotid bruit is not present.   Cardiovascular: Regular rhythm and normal heart sounds. PMI is displaced.   No murmur heard.  Pulmonary/Chest: Effort normal and breath " sounds normal. No respiratory distress. He has no wheezes. He has no rales.   Abdominal: Soft. Bowel sounds are normal. He exhibits no distension. There is no tenderness. There is no rebound.   Musculoskeletal: He exhibits edema.   +2   Neurological: He is alert.   Skin: Skin is warm.   Vitals reviewed.      Labs:    Chemistry        Component Value Date/Time     02/06/2019 0338    K 3.7 02/06/2019 0338     02/06/2019 0338    CO2 27 02/06/2019 0338    BUN 29 (H) 02/06/2019 0338    CREATININE 1.4 02/06/2019 0338     (H) 02/06/2019 0338        Component Value Date/Time    CALCIUM 8.0 (L) 02/06/2019 0338    ALKPHOS 129 02/06/2019 0338    AST 37 02/06/2019 0338    ALT 21 02/06/2019 0338    BILITOT 0.4 02/06/2019 0338    ESTGFRAFRICA 56.0 (A) 02/06/2019 0338    EGFRNONAA 48.5 (A) 02/06/2019 0338          Magnesium   Date Value Ref Range Status   02/06/2019 2.1 1.6 - 2.6 mg/dL Final     Lab Results   Component Value Date    WBC 6.08 02/06/2019    HGB 7.9 (L) 02/06/2019    HCT 25.9 (L) 02/06/2019     (L) 02/06/2019     Lab Results   Component Value Date    INR 1.2 02/06/2019    INR 1.2 02/05/2019    INR 1.4 (H) 02/04/2019     BNP   Date Value Ref Range Status   02/03/2019 490 (H) 0 - 99 pg/mL Final     Comment:     Values of less than 100 pg/ml are consistent with non-CHF populations.   01/29/2019 1,219 (H) 0 - 99 pg/mL Final     Comment:     Values of less than 100 pg/ml are consistent with non-CHF populations.   01/23/2019 1,522 (H) 0 - 99 pg/mL Final     Comment:     Values of less than 100 pg/ml are consistent with non-CHF populations.       Assessment:      1. Chronic systolic congestive heart failure    2. Ischemic cardiomyopathy    3. Infection of implantable cardioverter-defibrillator (ICD) lead, sequela    4. Persistent atrial fibrillation        Plan:   NYHA class III. Euvolemic on exam. Give Lasix 60 mg IV here in clinic.   Will review his labs.   Also has an appointment with ID this  Thursday but is requesting if he can be seen today as he lives 4-5 hours away. I have contacted the ID clinic to see if he can be seen today.   S/p Cardiomems implant. Enrolled in our GUIDE-HF study.   Recommend 2 gram sodium restriction and 1500cc fluid restriction.  Encourage physical activity with graded exercise program.  Requested patient to weigh themselves daily, and to notify us if their weight increases by more than 3 lbs in 1 day or 5 lbs in 1 week.   RTC in 1 month with labs.      Mela Nails MD

## 2019-02-19 NOTE — PROGRESS NOTES
I have reviewed the notes, assessments, and/or procedures performed by Dr. Ruiz, I concur with her documentation of Jerry Solis.

## 2019-02-19 NOTE — PROGRESS NOTES
Subjective:       Patient ID: Jerry Solis is a 78 y.o. male.    Chief Complaint: No chief complaint on file.  Case of a 79 y/o male with PMHx of CHF, s/p ICD placement and s/p cardioMems heart failure monitoring device , Afib, DM, HTN, COPD, history of CVA. Had an ICD/pacer placed >10 years ago.  [place] [facility] [place] for E faecalis infectious endocarditis. He was transferred from [facility]. RICARDO showed possible vegetation on RV lead. Blood culture results from [facility] of 1/2/19 - E. Faecalis - PCN, vancomycin sensitive.  PICC was placed 1/3/19.  Unclear when blood cultures cleared as blood cultures were not repeated. Culture from RV lead positive for E faecalis. Repeat blood cultures taken here on admission 1/7. Was sent home on ampicillin and ceftriaxone for 6 weeks course EOT 2/21/19. Patient denies any fevers, chills, pain in site of PICC line or diarrhea. Had ICD removed on 1/9/19 and new device placed 1/29/19. Here for clinic follow up.     HPI  Review of Systems   Constitutional: Negative for chills, fatigue and fever.   Respiratory: Positive for cough. Negative for shortness of breath.    Gastrointestinal: Negative for abdominal distention, abdominal pain, diarrhea, nausea and vomiting.       Objective:      Physical Exam   Constitutional: He is oriented to person, place, and time. He appears well-developed and well-nourished.   HENT:   Head: Normocephalic and atraumatic.   Eyes: Conjunctivae and EOM are normal. Pupils are equal, round, and reactive to light.   Neck: Normal range of motion. Neck supple.   Cardiovascular: Normal rate, regular rhythm and normal heart sounds.   Pulmonary/Chest: Effort normal and breath sounds normal.   Abdominal: Soft. Bowel sounds are normal. He exhibits no distension. There is no tenderness. There is no rebound.   Musculoskeletal: Normal range of motion. He exhibits no edema, tenderness or deformity.   Neurological: He is alert and oriented to  person, place, and time.   Skin: No rash noted. No erythema.       Assessment:       1. Acute viral endocarditis      79 y/o male seen here for follow up after IE with E faecalis. Patient completed 6 week course of IV ceftriaxone and ampicillin. Recommend to start PO amoxicillin for suppression in setting of new ICD placed after bacteremia clearance. Will remove PICC line today. Follow up in ID clinic in 3 months.    Plan:       Acute viral endocarditis  -     amoxicillin (AMOXIL) 500 MG capsule; Take 1 capsule (500 mg total) by mouth every 8 (eight) hours.  Dispense: 90 capsule; Refill: 11  -     RTC in 3 months

## 2019-02-26 NOTE — TELEPHONE ENCOUNTER
----- Message from Werner Trevino MD sent at 2/15/2019  4:03 PM CST -----  Homey Susy,  I think you tried to reach out to schedule this patient a few weeks ago but was inpatient. Looks like he was discharged the other day, can we reach out and schedule?  Olaf Hammonds  ----- Message -----  From: Oscar Cotter RN  Sent: 2/14/2019   4:39 PM  To: Werner Trevino MD        ----- Message -----  From: Lotus Núñez  Sent: 2/8/2019   5:09 PM  To: Uriel Caldera [facility],    Can you schedule pt for this? Orders are placed by Dr Trevino.     Thanks,  Lotus  [id number]  ----- Message -----  From: Candy Forman PA-C  Sent: 2/8/2019   3:49 PM  To: [facility]plant Schedulers    Pt also need GI appt- for colonoscopy and EGD for evaluation of anemia and enterococcus bacteremia.

## 2019-02-26 NOTE — TELEPHONE ENCOUNTER
Patient contacted to schedule EGD and colonoscopy.  He stated that he lives too far away to have it done here in [place]. He said he has a doctor that he will follow up with to have it done there.  Order canceled.

## 2019-03-01 NOTE — PROGRESS NOTES
Study: GUIDE-HF  Sponsor: [employer]  Follow-up Visit: Monthly Phone Contact #5  Date of Visit: 2/13/2019     Patient wishes to continue in study: Yes  All study protocol required CRFs completed: Yes     Mr. Solis was contacted today 2/13/2019 by the blinded  Bossman Kent for his fifth monthly phone contact post-implant for the GUIDE-HF study. Subject contact sheet completed by this CRC and given to blinded coordinator. Patient has not been compliant with daily pressure transmissions and was reminded to take daily readings every day as part of the trial. Patient reviewed by Mela Nails MD, and no medication changes are being made at this time. No additional instructions were given by study staff. Patient encouraged to continue taking daily pressure measurements. Patient reported that he was sent home from the hospital (discharged on 2/6/2019) on a different (lower) diuretic dose and has noticed swelling in his feet and legs. Patient denied any other questions, concerns, or other symptoms at this time. Mela Nails MD notified of patient's symptoms. Care to be managed per CHF nursing. Will follow.

## 2019-03-12 NOTE — PROGRESS NOTES
Study: GUIDE-HF  Sponsor: [employer]  Follow-up Visit: Monthly Phone Contact #6  Date of Visit: 3/12/2019     Patient wishes to continue in study: Yes  All study protocol required CRFs completed: Yes     Mr. Solis was contacted today 3/12/2019 by the blinded  Bossman Kent for his sixth monthly phone contact post-implant for the GUIDE-HF study. Subject contact sheet completed by this CRC and given to blinded coordinator. Patient has been compliant with daily pressure transmissions, but was reminded to take readings every day at around the same time. Patient reviewed by Chris Merchant NP, and no medication changes are being made at this time. No additional instructions given by study staff. Patient encouraged to continue taking daily pressure measurements. Patient denied any questions, concerns, or symptoms at this time. Will follow.

## 2019-04-10 NOTE — PROGRESS NOTES
Study: GUIDE-HF  Sponsor: [employer]  Follow-up Visit: Monthly Phone Contact #7  Date of Visit: 4/10/2019     Patient wishes to continue in study: Yes  All study protocol required CRFs completed: Yes     Mr. Solis was contacted today 4/10/2019 by the blinded  Bossman Kent for his seventh monthly phone contact post-implant for the GUIDE-HF study. Subject contact sheet completed by this CRC and given to blinded coordinator. Patient has been compliant with daily pressure transmissions, but was reminded to take readings every day at around the same time. Patient reviewed by Chris Merchant NP, and no medication changes are being made at this time. No additional instructions given by study staff. Patient encouraged to continue taking daily pressure measurements. Patient stated that he needs to speak with Dr. Nails's nurse. Patient refused to provide further details to blinded caller. Adriane Gibson RN in HF clinic notified that patient requesting a call from HF nursing. Understanding verbalized and HF nurse to contact patient. Patient denied any further questions, concerns, or symptoms at this time. Will follow.

## 2019-04-12 NOTE — TELEPHONE ENCOUNTER
4/12/19 - Received lab results ordered per NELIA Nails M.D. (See NN 4/12/19).  Cr - 3.3, K+ 5.2.  Discussed/Reviewed with NELIA Nails M.D.  VORB: NELIA Nails M.D./JV Gibson RN: Stop Bumex and Potassium and educate on a low Potassium diet.  Schedule appt for patient Monday with STAT BMP and BNP.  If patient can not make appt Monday or if he should become symptomatic, he should report to nearest ED for evaluation.  Above instructions given to patient. Patient discussed with his son and wife and they agreed to come for clinic appt Monday.  Appt scheduled for 11:00 with STAT labs for 10:00.  Also instructed patient to pack a bag in case he needed to be admitted, and I reiterated to patient if he should become symptomatic to report to ED.  Patient again verbalized understanding.

## 2019-04-12 NOTE — TELEPHONE ENCOUNTER
"4/11/19 returned pts call. Asked pt how he was feeling. Pt stated"feeling fat". Pt reported feeling like he has fluid, increased sob, "belly, legs and feet more swollen than normally".. Pt says his wt on 4/11 was 196lbs not exactly sure of wt gain. Says he saw his pcp a couple weeks ago and was given metolazone 5mg 10 tablets and MD told him to take one daily. Says he realized it was bad for his kidneys and only took it for 6 days and has 4 pills left.  Pt reports still feeling the same. Pt then said he saw another Dr and was told his kidney function was worse.  Informed pt that I will contact Mrs Solis, and notify Dr Nails but he will need to be seen Monday and have labs tomorrow. Pt said ok I can have labs at [place] but "zbigniew been feeling like this for a little while now". I then told pt that I understood and thats not good therefore this needs to be addressed promptly.  Pt said he was out getting a truck part and wasn't sure about other particular questions I was asking and I offered to call wife and he asked to do so. Spoke to wife who had eye surgery today but said she was going to get metolazone bottle to confirm how much he took. Pt did take 6 pills, Wife confirmed symptoms above, and says pt did have 4 pills left, confirmed they are 5mg tabs and are scored, and confirmed that pt had labs few days ago before colonoscopy and said she was reading paper work  whil letty phone with me and  Aid potassium was 4.2 and bun/cr was 68/2.6. States pts urinating normally without any difficulty. Notified Dr Nails. Per Dr Nails bmp on Monday Pts to take metolazone 2.5 with an extra 40 of K x 3 doses as of today, and come to clinic Monday. Wife initially agreed to appts, though reluctant due to distance. Meanwhile she was going to call Dr Perez( pts local Cardiologist whom they had not contacted about these symptoms to see if pt can get appt). WIfe initially told me she knew Dr Perez wouldn't be able to see pt next week. " When Ashley called to schedule pt wife said pt had appt with Dr Perez for Tuesday. I contacted Dr Carlos office and received a call back from Sheron informed her of the above she confirmed pt does have appts. Asked that Sheron fax last note, and fax next weeks visit and lab, and informed her we will do the same when pt comes to clinic. Wife verbalized understanding of plan at that time.

## 2019-04-15 PROBLEM — N17.9 ACUTE KIDNEY INJURY: Status: ACTIVE | Noted: 2019-01-01

## 2019-04-15 NOTE — H&P (VIEW-ONLY)
Subjective:     HPI:  Mr. Solis is a very pleasant 78y/o male with HFrEF, ICMP (EF=20%), NYHA class III symptoms  S/p cardioMEMS implant (enrolled in GUIDE-HF study) who comes for a pos hospital discharge follow-up visit. He was recently discharged from our service after being treated for infective endocarditis. Had a long hospital that require IV ATBX and ICD lead extraction (vegetation attached to his ICD lead was visualized by RICARDO), lead tip and ICD pocket cultured with RV lead +ve for Enteroccocus. Blood cultures remained negative here. Post op course was complicated by TREY likely due to ATN/hypotension. Pt had to be transferred to [facility] and started on Epi and Miesha, which were weaned off as kidneys improved. Pt underwent successful reimplantation of BiV ICD 1/29/19. EP is considering repeat RICARDO/DCCV A fib at some point. Will perform DFT as an outpatient. ID's final recommendations were Ampicillin and Ceftriaxone for 6 weeks post extraction.  Estimated end date 2/21/19. PICC  was placed for home IV Abx. Pt d/c'ed home on IV Abx via PICC with Home Health care. I saw him lat home in clinic after his hospital discharge at that time he required a dose of IV lasix. Since then he states his weight has gone up and his local cardiologist prescribed him Metolazone daily for a full week. Today he still reports NYHA class III symptoms. His weight is actually the same when compared to last clinic visit. His BUN and creat are significantly elevated today (83/2.9) from 29/1.4.      Past Medical History:   Diagnosis Date    Chronic systolic congestive heart failure 3/22/2018    Coronary artery disease involving native coronary artery of native heart without angina pectoris 3/22/2018    ICD (implantable cardioverter-defibrillator) in place 3/22/2018    Ischemic cardiomyopathy 3/22/2018    Mixed hyperlipidemia 3/22/2018    Type 2 diabetes mellitus with complication 3/22/2018    VT (ventricular tachycardia) 3/22/2018  "    Past Surgical History:   Procedure Laterality Date    CARDIOVERSION N/A 1/14/2019    Performed by Ryan [facility]ley MD at [facility] EP [facility]    ECHOCARDIOGRAM,TRANSESOPHAGEAL N/A 1/14/2019    Performed by Ryan [facility] MD at [facility] EP [facility]    ECHOCARDIOGRAM,TRANSESOPHAGEAL N/A 1/9/2019    Performed by [facility] Provider at [facility] EP [facility]    EXTRACTION, ELECTRODE LEAD Left 1/9/2019    Performed by Werner Boggs MD at [facility] EP [facility]    HEART CATH-RIGHT Right 4/5/2018    Performed by Elizabeth Wise MD at [facility] CATH LAB    INSERTION, ICD, BIVENTRICULAR Left 1/29/2019    Performed by Werner Boggs MD at [facility] EP [facility]    Insertion, Pacemaker, Temporary Transvenous  1/9/2019    Performed by Werner Boggs MD at [facility] EP [facility]       Review of Systems   Constitution: Positive for malaise/fatigue and weight gain. Negative for chills, decreased appetite, diaphoresis, fever, night sweats and weight loss.   Eyes: Negative.    Cardiovascular: Positive for dyspnea on exertion and orthopnea. Negative for chest pain, claudication, cyanosis, irregular heartbeat, leg swelling, near-syncope, palpitations, paroxysmal nocturnal dyspnea and syncope.   Respiratory: Negative for cough, hemoptysis and shortness of breath.    Endocrine: Negative.    Hematologic/Lymphatic: Negative.    Skin: Negative for color change, dry skin and nail changes.   Musculoskeletal: Negative.    Gastrointestinal: Negative.    Genitourinary: Negative.    Neurological: Negative for weakness.       Objective:   Blood pressure (!) 98/52, pulse 80, height 6' 1" (1.854 m), weight 93.3 kg (205 lb 11 oz).body mass index is 27.14 kg/m².  Physical Exam   Constitutional: He appears well-developed.   BP (!) 98/52 (BP Location: Left arm, Patient Position: Sitting, BP Method: Medium (Automatic))   Pulse 80   Ht 6' 1" (1.854 m)   Wt 93.3 kg (205 lb 11 oz)   BMI 27.14 kg/m²      HENT:   Head: Normocephalic.   Neck: No JVD present. Carotid bruit is not present. "   Cardiovascular: Regular rhythm, normal heart sounds and normal pulses. PMI is displaced.   No murmur heard.  Pulmonary/Chest: Effort normal and breath sounds normal. No respiratory distress. He has no wheezes. He has no rales.   Abdominal: Soft. Bowel sounds are normal. He exhibits no distension. There is no tenderness. There is no rebound.   Musculoskeletal: He exhibits no edema.   Neurological: He is alert.   Skin: Skin is warm.   Vitals reviewed.      Labs:    Chemistry        Component Value Date/Time     04/15/2019 0932    K 4.2 04/15/2019 0932     04/15/2019 0932    CO2 20 (L) 04/15/2019 0932    BUN 83 (H) 04/15/2019 0932    CREATININE 2.9 (H) 04/15/2019 0932     (H) 04/15/2019 0932        Component Value Date/Time    CALCIUM 9.0 04/15/2019 0932    ALKPHOS 129 02/06/2019 0338    AST 37 02/06/2019 0338    ALT 21 02/06/2019 0338    BILITOT 0.4 02/06/2019 0338    ESTGFRAFRICA 23.2 (A) 04/15/2019 0932    EGFRNONAA 20.1 (A) 04/15/2019 0932          Magnesium   Date Value Ref Range Status   02/06/2019 2.1 1.6 - 2.6 mg/dL Final     Lab Results   Component Value Date    WBC 6.08 02/06/2019    HGB 7.9 (L) 02/06/2019    HCT 25.9 (L) 02/06/2019     (L) 02/06/2019     Lab Results   Component Value Date    INR 1.2 02/06/2019    INR 1.2 02/05/2019    INR 1.4 (H) 02/04/2019     BNP   Date Value Ref Range Status   04/15/2019 1,185 (H) 0 - 99 pg/mL Final     Comment:     Values of less than 100 pg/ml are consistent with non-CHF populations.   02/03/2019 490 (H) 0 - 99 pg/mL Final     Comment:     Values of less than 100 pg/ml are consistent with non-CHF populations.   01/29/2019 1,219 (H) 0 - 99 pg/mL Final     Comment:     Values of less than 100 pg/ml are consistent with non-CHF populations.       Assessment:      1. Acute on chronic combined systolic and diastolic CHF, NYHA class 3    2. Ischemic cardiomyopathy    3. ICD (implantable cardioverter-defibrillator) in place        Plan:   Worsening  CORDOBA in the setting of acute on chronic renal failure.   Admit to HTS   2D echo   S/p Cardiomems implant. Enrolled in our GUIDE-HF study.   Recommend 2 gram sodium restriction and 1500cc fluid restriction.  Encourage physical activity with graded exercise program.  Requested patient to weigh themselves daily, and to notify us if their weight increases by more than 3 lbs in 1 day or 5 lbs in 1 week.     Mela Nails MD

## 2019-04-15 NOTE — PROGRESS NOTES
Procedure explained. optison given ivp via right hand sl. Denies transfusion rxn. Tolerated well. Sl flushed before and after with 10 cc ns.

## 2019-04-15 NOTE — INTERVAL H&P NOTE
The patient has been examined and the H&P has been reviewed:    I concur with the findings and no changes have occurred since H&P was written.   -2D echo ordered   -Will diurese with IV Lasix at 20mg/hr and check CMP at 8:00 pm  -He reports he is 10lbs above his dry weight which is approx 189lbs        Active Hospital Problems    Diagnosis  POA    A-fib [I48.91]  Yes     Priority: 4     Acute on chronic combined systolic and diastolic CHF, NYHA class 3 [I50.43]  Yes     Priority: 5     Type 2 diabetes mellitus with complication [E11.8]  Yes     Priority: 5     Acute kidney injury [N17.9]  Yes    Endocarditis [I38]  Yes      Resolved Hospital Problems   No resolved problems to display.

## 2019-04-15 NOTE — NURSING
Ambulated to unit accompanied by spouse. [email or web address] tele applied. Oriented to environment, verbalize understanding. Bed low and locked call bell in reach, will continue to monitor.

## 2019-04-15 NOTE — PLAN OF CARE
Problem: Adult Inpatient Plan of Care  Goal: Plan of Care Review  LASIX BOLUS GIVEN, FOLLOWED BY DRIP AT 20MG/H. WILL MONITOR I&0, DAILY WT AND LABS WORK. FALL PRECAUTION MAINTAINED, POC DISCUSSED WITH PT AND SPOUSE. BOTH VERBALIZE UNDERSTANDING.

## 2019-04-15 NOTE — PROGRESS NOTES
Subjective:     HPI:  Mr. Solis is a very pleasant 78y/o male with HFrEF, ICMP (EF=20%), NYHA class III symptoms  S/p cardioMEMS implant (enrolled in GUIDE-HF study) who comes for a pos hospital discharge follow-up visit. He was recently discharged from our service after being treated for infective endocarditis. Had a long hospital that require IV ATBX and ICD lead extraction (vegetation attached to his ICD lead was visualized by RICARDO), lead tip and ICD pocket cultured with RV lead +ve for Enteroccocus. Blood cultures remained negative here. Post op course was complicated by TREY likely due to ATN/hypotension. Pt had to be transferred to [facility] and started on Epi and Miesha, which were weaned off as kidneys improved. Pt underwent successful reimplantation of BiV ICD 1/29/19. EP is considering repeat RICARDO/DCCV A fib at some point. Will perform DFT as an outpatient. ID's final recommendations were Ampicillin and Ceftriaxone for 6 weeks post extraction.  Estimated end date 2/21/19. PICC  was placed for home IV Abx. Pt d/c'ed home on IV Abx via PICC with Home Health care. I saw him lat home in clinic after his hospital discharge at that time he required a dose of IV lasix. Since then he states his weight has gone up and his local cardiologist prescribed him Metolazone daily for a full week. Today he still reports NYHA class III symptoms. His weight is actually the same when compared to last clinic visit. His BUN and creat are significantly elevated today (83/2.9) from 29/1.4.      Past Medical History:   Diagnosis Date    Chronic systolic congestive heart failure 3/22/2018    Coronary artery disease involving native coronary artery of native heart without angina pectoris 3/22/2018    ICD (implantable cardioverter-defibrillator) in place 3/22/2018    Ischemic cardiomyopathy 3/22/2018    Mixed hyperlipidemia 3/22/2018    Type 2 diabetes mellitus with complication 3/22/2018    VT (ventricular tachycardia) 3/22/2018  "    Past Surgical History:   Procedure Laterality Date    CARDIOVERSION N/A 1/14/2019    Performed by [facility] at [facility] EP [facility]    ECHOCARDIOGRAM,TRANSESOPHAGEAL N/A 1/14/2019    Performed by Ryan [facility]ley MD at [facility] EP [facility]    ECHOCARDIOGRAM,TRANSESOPHAGEAL N/A 1/9/2019    Performed by [facility] Provider at [facility] EP [facility]    EXTRACTION, ELECTRODE LEAD Left 1/9/2019    Performed by Werner Boggs MD at [facility] EP [facility]    HEART CATH-RIGHT Right 4/5/2018    Performed by Elizabeth Wise MD at [facility] CATH LAB    INSERTION, ICD, BIVENTRICULAR Left 1/29/2019    Performed by Werner Boggs MD at [facility] EP [facility]    Insertion, Pacemaker, Temporary Transvenous  1/9/2019    Performed by Werner Boggs MD at [facility] EP [facility]       Review of Systems   Constitution: Positive for malaise/fatigue and weight gain. Negative for chills, decreased appetite, diaphoresis, fever, night sweats and weight loss.   Eyes: Negative.    Cardiovascular: Positive for dyspnea on exertion and orthopnea. Negative for chest pain, claudication, cyanosis, irregular heartbeat, leg swelling, near-syncope, palpitations, paroxysmal nocturnal dyspnea and syncope.   Respiratory: Negative for cough, hemoptysis and shortness of breath.    Endocrine: Negative.    Hematologic/Lymphatic: Negative.    Skin: Negative for color change, dry skin and nail changes.   Musculoskeletal: Negative.    Gastrointestinal: Negative.    Genitourinary: Negative.    Neurological: Negative for weakness.       Objective:   Blood pressure (!) 98/52, pulse 80, height 6' 1" (1.854 m), weight 93.3 kg (205 lb 11 oz).body mass index is 27.14 kg/m².  Physical Exam   Constitutional: He appears well-developed.   BP (!) 98/52 (BP Location: Left arm, Patient Position: Sitting, BP Method: Medium (Automatic))   Pulse 80   Ht 6' 1" (1.854 m)   Wt 93.3 kg (205 lb 11 oz)   BMI 27.14 kg/m²      HENT:   Head: Normocephalic.   Neck: No JVD present. Carotid bruit is not present. "   Cardiovascular: Regular rhythm, normal heart sounds and normal pulses. PMI is displaced.   No murmur heard.  Pulmonary/Chest: Effort normal and breath sounds normal. No respiratory distress. He has no wheezes. He has no rales.   Abdominal: Soft. Bowel sounds are normal. He exhibits no distension. There is no tenderness. There is no rebound.   Musculoskeletal: He exhibits no edema.   Neurological: He is alert.   Skin: Skin is warm.   Vitals reviewed.      Labs:    Chemistry        Component Value Date/Time     04/15/2019 0932    K 4.2 04/15/2019 0932     04/15/2019 0932    CO2 20 (L) 04/15/2019 0932    BUN 83 (H) 04/15/2019 0932    CREATININE 2.9 (H) 04/15/2019 0932     (H) 04/15/2019 0932        Component Value Date/Time    CALCIUM 9.0 04/15/2019 0932    ALKPHOS 129 02/06/2019 0338    AST 37 02/06/2019 0338    ALT 21 02/06/2019 0338    BILITOT 0.4 02/06/2019 0338    ESTGFRAFRICA 23.2 (A) 04/15/2019 0932    EGFRNONAA 20.1 (A) 04/15/2019 0932          Magnesium   Date Value Ref Range Status   02/06/2019 2.1 1.6 - 2.6 mg/dL Final     Lab Results   Component Value Date    WBC 6.08 02/06/2019    HGB 7.9 (L) 02/06/2019    HCT 25.9 (L) 02/06/2019     (L) 02/06/2019     Lab Results   Component Value Date    INR 1.2 02/06/2019    INR 1.2 02/05/2019    INR 1.4 (H) 02/04/2019     BNP   Date Value Ref Range Status   04/15/2019 1,185 (H) 0 - 99 pg/mL Final     Comment:     Values of less than 100 pg/ml are consistent with non-CHF populations.   02/03/2019 490 (H) 0 - 99 pg/mL Final     Comment:     Values of less than 100 pg/ml are consistent with non-CHF populations.   01/29/2019 1,219 (H) 0 - 99 pg/mL Final     Comment:     Values of less than 100 pg/ml are consistent with non-CHF populations.       Assessment:      1. Acute on chronic combined systolic and diastolic CHF, NYHA class 3    2. Ischemic cardiomyopathy    3. ICD (implantable cardioverter-defibrillator) in place        Plan:   Worsening  CORDOBA in the setting of acute on chronic renal failure.   Admit to HTS   2D echo   S/p Cardiomems implant. Enrolled in our GUIDE-HF study.   Recommend 2 gram sodium restriction and 1500cc fluid restriction.  Encourage physical activity with graded exercise program.  Requested patient to weigh themselves daily, and to notify us if their weight increases by more than 3 lbs in 1 day or 5 lbs in 1 week.     Mela Nails MD

## 2019-04-15 NOTE — NURSING
ENDO: . PT IS DIAETIC. PRIMARY TEAM NOTIFIED. MD TO WRITE ORDERS FOR INSULIN COVERAGE. WILL CONTINUE TO MONITOR.

## 2019-04-16 NOTE — ASSESSMENT & PLAN NOTE
-Creatinine on admit was 2.9  -Baseline appears 1.7-2.0  -Likely secondary to cardiorenal.  Will monitor response to diuresis

## 2019-04-16 NOTE — SUBJECTIVE & OBJECTIVE
Interval History: Patient reports diuresing well on IV lasix.  He is net negative 1.2L since starting the drip.  Renal function remains elevated but stable.      Continuous Infusions:   furosemide (LASIX) 2 mg/mL infusion (non-titrating) 20 mg/hr (04/16/19 0110)     Scheduled Meds:   amoxicillin  500 mg Oral Q8H    apixaban  5 mg Oral BID    cetirizine  5 mg Oral Daily    clopidogrel  75 mg Oral Daily    ferrous sulfate  325 mg Oral BID    insulin aspart U-100  3 Units Subcutaneous TIDWM    levothyroxine  25 mcg Oral Before breakfast    multivitamin  1 tablet Oral Daily    omega-3 acid ethyl esters  2 g Oral BID    pantoprazole  40 mg Oral Daily    potassium chloride SA  20 mEq Oral Daily    rOPINIRole  0.5 mg Oral QHS    rosuvastatin  20 mg Oral QHS    tamsulosin  0.4 mg Oral Daily     PRN Meds:acetaminophen, albuterol, dextrose 50%, dextrose 50%, glucagon (human recombinant), glucose, glucose, insulin aspart U-100, sodium chloride 0.9%    Review of patient's allergies indicates:   Allergen Reactions    Adhesive Other (See Comments)     blisters    Codeine Other (See Comments)     Blurred vision    Latex Hives    Ace inhibitors     Lipitor [atorvastatin] Other (See Comments)     Muscle spasms    Xanax [alprazolam] Hallucinations     Objective:     Vital Signs (Most Recent):  Temp: 97.8 °F (36.6 °C) (04/16/19 0735)  Pulse: 98 (04/16/19 0735)  Resp: 18 (04/16/19 0735)  BP: (!) 100/56 (04/16/19 0735)  SpO2: 97 % (04/16/19 0735) Vital Signs (24h Range):  Temp:  [97.5 °F (36.4 °C)-98.4 °F (36.9 °C)] 97.8 °F (36.6 °C)  Pulse:  [] 98  Resp:  [18-19] 18  SpO2:  [94 %-98 %] 97 %  BP: ()/(52-77) 100/56     Patient Vitals for the past 72 hrs (Last 3 readings):   Weight   04/16/19 0421 88.1 kg (194 lb 3.6 oz)   04/15/19 1548 89.8 kg (198 lb)   04/15/19 1433 89.9 kg (198 lb 3.1 oz)     Body mass index is 26.34 kg/m².      Intake/Output Summary (Last 24 hours) at 4/16/2019 1043  Last data filed  at 4/16/2019 0844  Gross per 24 hour   Intake 820 ml   Output 2175 ml   Net -1355 ml     Physical Exam  Constitutional: He appears well-developed.   Neck: No JVD present. Carotid bruit is not present.   Cardiovascular: Regular rhythm, normal heart sounds and normal pulses. PMI is displaced. No murmur heard.  Pulmonary/Chest: Effort normal and breath sounds normal. No respiratory distress. He has no wheezes. He has no rales.   Abdominal: Soft. Bowel sounds are normal. He exhibits no distension. There is no tenderness. There is no rebound.   Musculoskeletal: He exhibits no edema.   Neurological: He is alert.   Skin: Skin is warm.   Vitals reviewed.    Significant Labs:  CBC:  Recent Labs   Lab 04/16/19  0536   WBC 5.92   RBC 3.07*   HGB 9.0*   HCT 27.5*   PLT 82*   MCV 90   MCH 29.3   MCHC 32.7     BNP:  Recent Labs   Lab 04/15/19  0932   BNP 1,185*     CMP:  Recent Labs   Lab 04/15/19  0932 04/15/19  1855 04/16/19  0536   * 260* 213*   CALCIUM 9.0 8.9 8.9   ALBUMIN  --  3.1* 3.1*   PROT  --  6.5 6.4    137 137   K 4.2 4.7 4.1   CO2 20* 23 21*    105 105   BUN 83* 83* 84*   CREATININE 2.9* 2.9* 2.8*   ALKPHOS  --  117 108   ALT  --  12 11   AST  --  17 15   BILITOT  --  0.6 0.6      Coagulation:   No results for input(s): PT, INR, APTT in the last 168 hours.  LDH:  No results for input(s): LDH in the last 72 hours.  Microbiology:  Microbiology Results (last 7 days)     ** No results found for the last 168 hours. **          I have reviewed all pertinent labs within the past 24 hours.    Estimated Creatinine Clearance: 24.6 mL/min (A) (based on SCr of 2.8 mg/dL (H)).    Diagnostic Results:  I have reviewed all pertinent imaging results/findings within the past 24 hours.

## 2019-04-16 NOTE — PLAN OF CARE
Problem: Adult Inpatient Plan of Care  Goal: Plan of Care Review  Outcome: Ongoing (interventions implemented as appropriate)  Plan of care discussed with patient, no new questions at this time. VSS, denies SOB, no complaint of pain. Lasix gtt continued @ 20mg/hr. 1 time dose of Diuril given per MD. I's and O's measured (see flow sheet). Daily weights obtained. Safety precautions taken, no falls/trauma during the shift. Will continue to monitor.

## 2019-04-16 NOTE — ASSESSMENT & PLAN NOTE
-NICM  -Last 2D Echo 4/15/19: LVEF 20%, LVEDD 6.44 cm  - Hyerpvolemic on examination today  -Current diuretic regimen: Furosemide gtt. Net negative over the last 24 hours. Will continue current regimen and add another dose of Diuril this afternoon.   -GDMT with Holding home dose of Entresto due to TREY.    S/p Cardiomems implant. Enrolled in our GUIDE-HF study.   -Patient reports dry weight is approx 189lbs  -2g Na dietary restriction, 1500 mL fluid restriction, strict I/Os

## 2019-04-16 NOTE — PLAN OF CARE
Problem: Adult Inpatient Plan of Care  Goal: Plan of Care Review  Outcome: Ongoing (interventions implemented as appropriate)  Lasix drip maintained at 20 mg/h. Diuril 250 added. Ambulated in  Hallway approx 200 ft  accompanied by spouse, denies c/o sob. Continue to monitor daily weight, I&0 and daily labs. No fall related injury, CBG per sliding scale. POC discussed with pt and spouse, verbalize understanding. Will continue to monitor.

## 2019-04-16 NOTE — ASSESSMENT & PLAN NOTE
-E faecalis infectious endocarditis.   -RICARDO showed possible vegetation on RV lead. Blood culture results from [facility] of 1/2/19 - E. Faecalis.  Culture from RV lead positive for E faecalis.  Had ICD removed on 1/9/19 and new device placed 1/29/19.   -Was sent home on ampicillin and ceftriaxone for 6 weeks course EOT 2/21/19.  -On chronic abx with Amoxicillin

## 2019-04-16 NOTE — PROGRESS NOTES
[facility]  Heart Transplant  Progress Note    Patient Name: Jerry Solis  MRN: [id number]  Admission Date: 4/15/2019  Hospital Length of Stay: 1 days  Attending Physician: Mela Nails MD  Primary Care Provider: Primary Doctor No  Principal Problem:Acute on chronic combined systolic and diastolic CHF, NYHA class 3    Subjective:     Interval History: Patient reports diuresing well on IV lasix.  He is net negative 1.2L since starting the drip.  Renal function remains elevated but stable.      Continuous Infusions:   furosemide (LASIX) 2 mg/mL infusion (non-titrating) 20 mg/hr (04/16/19 0110)     Scheduled Meds:   amoxicillin  500 mg Oral Q8H    apixaban  5 mg Oral BID    cetirizine  5 mg Oral Daily    clopidogrel  75 mg Oral Daily    ferrous sulfate  325 mg Oral BID    insulin aspart U-100  3 Units Subcutaneous TIDWM    levothyroxine  25 mcg Oral Before breakfast    multivitamin  1 tablet Oral Daily    omega-3 acid ethyl esters  2 g Oral BID    pantoprazole  40 mg Oral Daily    potassium chloride SA  20 mEq Oral Daily    rOPINIRole  0.5 mg Oral QHS    rosuvastatin  20 mg Oral QHS    tamsulosin  0.4 mg Oral Daily     PRN Meds:acetaminophen, albuterol, dextrose 50%, dextrose 50%, glucagon (human recombinant), glucose, glucose, insulin aspart U-100, sodium chloride 0.9%    Review of patient's allergies indicates:   Allergen Reactions    Adhesive Other (See Comments)     blisters    Codeine Other (See Comments)     Blurred vision    Latex Hives    Ace inhibitors     Lipitor [atorvastatin] Other (See Comments)     Muscle spasms    Xanax [alprazolam] Hallucinations     Objective:     Vital Signs (Most Recent):  Temp: 97.8 °F (36.6 °C) (04/16/19 0735)  Pulse: 98 (04/16/19 0735)  Resp: 18 (04/16/19 0735)  BP: (!) 100/56 (04/16/19 0735)  SpO2: 97 % (04/16/19 0735) Vital Signs (24h Range):  Temp:  [97.5 °F (36.4 °C)-98.4 °F (36.9 °C)] 97.8 °F (36.6 °C)  Pulse:  [] 98  Resp:   [18-19] 18  SpO2:  [94 %-98 %] 97 %  BP: ()/(52-77) 100/56     Patient Vitals for the past 72 hrs (Last 3 readings):   Weight   04/16/19 0421 88.1 kg (194 lb 3.6 oz)   04/15/19 1548 89.8 kg (198 lb)   04/15/19 1433 89.9 kg (198 lb 3.1 oz)     Body mass index is 26.34 kg/m².      Intake/Output Summary (Last 24 hours) at 4/16/2019 1043  Last data filed at 4/16/2019 0844  Gross per 24 hour   Intake 820 ml   Output 2175 ml   Net -1355 ml     Physical Exam  Constitutional: He appears well-developed.   Neck: No JVD present. Carotid bruit is not present.   Cardiovascular: Regular rhythm, normal heart sounds and normal pulses. PMI is displaced. No murmur heard.  Pulmonary/Chest: Effort normal and breath sounds normal. No respiratory distress. He has no wheezes. He has no rales.   Abdominal: Soft. Bowel sounds are normal. He exhibits no distension. There is no tenderness. There is no rebound.   Musculoskeletal: He exhibits no edema.   Neurological: He is alert.   Skin: Skin is warm.   Vitals reviewed.    Significant Labs:  CBC:  Recent Labs   Lab 04/16/19  0536   WBC 5.92   RBC 3.07*   HGB 9.0*   HCT 27.5*   PLT 82*   MCV 90   MCH 29.3   MCHC 32.7     BNP:  Recent Labs   Lab 04/15/19  0932   BNP 1,185*     CMP:  Recent Labs   Lab 04/15/19  0932 04/15/19  1855 04/16/19  0536   * 260* 213*   CALCIUM 9.0 8.9 8.9   ALBUMIN  --  3.1* 3.1*   PROT  --  6.5 6.4    137 137   K 4.2 4.7 4.1   CO2 20* 23 21*    105 105   BUN 83* 83* 84*   CREATININE 2.9* 2.9* 2.8*   ALKPHOS  --  117 108   ALT  --  12 11   AST  --  17 15   BILITOT  --  0.6 0.6      Coagulation:   No results for input(s): PT, INR, APTT in the last 168 hours.  LDH:  No results for input(s): LDH in the last 72 hours.  Microbiology:  Microbiology Results (last 7 days)     ** No results found for the last 168 hours. **          I have reviewed all pertinent labs within the past 24 hours.    Estimated Creatinine Clearance: 24.6 mL/min (A) (based on SCr  of 2.8 mg/dL (H)).    Diagnostic Results:  I have reviewed all pertinent imaging results/findings within the past 24 hours.    Assessment and Plan:     No notes on file    * Acute on chronic combined systolic and diastolic CHF, NYHA class 3  -NICM  -Last 2D Echo 4/15/19: LVEF 20%, LVEDD 6.44 cm  - Hyerpvolemic on examination today  -Current diuretic regimen: Furosemide gtt. Net negative over the last 24 hours. Will continue current regimen and add another dose of Diuril this afternoon.   -GDMT with Holding home dose of Entresto due to TREY.    S/p Cardiomems implant. Enrolled in our GUIDE-HF study.   -Patient reports dry weight is approx 189lbs  -2g Na dietary restriction, 1500 mL fluid restriction, strict I/Os      Acute kidney injury  -Creatinine on admit was 2.9  -Baseline appears 1.7-2.0  -Likely secondary to cardiorenal.  Will monitor response to diuresis     A-fib  -[id number] of 5 on Eliquis  -Admit ECG is paced rhythm. Underlying may be atrial fib    Type 2 diabetes mellitus with complication  -Coverage with ISS    Endocarditis  -E faecalis infectious endocarditis.   -RICARDO showed possible vegetation on RV lead. Blood culture results from [facility] of 1/2/19 - E. Faecalis.  Culture from RV lead positive for E faecalis.  Had ICD removed on 1/9/19 and new device placed 1/29/19.   -Was sent home on ampicillin and ceftriaxone for 6 weeks course EOT 2/21/19.  -On chronic abx with Amoxicillin          STAR Mahan  Heart Transplant  [facility]

## 2019-04-17 NOTE — ASSESSMENT & PLAN NOTE
-NICM  -Last 2D Echo 4/15/19: LVEF 20%, LVEDD 6.44 cm  - Hyerpvolemic on examination today  -Current diuretic regimen: Furosemide gtt with BID Diuril dosing. Net negative over the last 24 hours. Will continue current regimen  -GDMT with Holding home dose of Entresto due to TREY.    S/p Cardiomems implant. Enrolled in our GUIDE-HF study.   -Patient reports dry weight is approx 189lbs  -2g Na dietary restriction, 1500 mL fluid restriction, strict I/Os

## 2019-04-17 NOTE — PLAN OF CARE
Problem: Adult Inpatient Plan of Care  Goal: Plan of Care Review  Outcome: Ongoing (interventions implemented as appropriate)  No acute changes overnight; A x O x 4, MAEW/ambulates without assistance; no c/o pain; Bps 120-s-130s, paced  70s 80s, PVCs, no edema; lung sounds DM /clear; lasix at 20 mg /hr ; room air sats > 95 %, tolerating diet; voids per urinal UOP > 1750 for this shift;  instructed on POC and progression toward goals; cont to monitor.

## 2019-04-17 NOTE — PROGRESS NOTES
Admit Note     SWI met with patient and spouse to assess needs. DEUCE Santiago LCSW was also in attendance. Patient is a 78 y.o.  male, admitted for CHF, ischemic cardiomyopathy, ICD, TREY, and endocarditis per medical record.     Patient admitted on 4/15/2019 .  At this time, patient presents as alert and oriented x 4, pleasant, good eye contact, calm, communicative, cooperative and asking and answering questions appropriately.  At this time, patients caregiver presents as alert and oriented x 4, pleasant, good eye contact, calm, communicative, cooperative and asking and answering questions appropriately.    Household/Family Systems     Patient resides with patient's spouse, at    Physical address:  [address] [place] [address]    Mailing address;    [address]  [place] [address]     Pt cell: [phone]  Pt home: [phone]  Mague Solis (wife) cell: [phone]  Jerry Solis Jr (son) cell: [phone]  Carissa Trejo (daughter) cell: [phone]    Support system includes spouse, three adult children and extended family. All of the pt's children live in [place] close to pt. One son, Jerry Washington, works with pt.  Patient does not have dependents that are need of being cared for.     Patients primary caregiver is self and wife Mague. During admission, patient's caregiver plans to stay in patient's room, but might go home to visit family for Easter. Confirmed patient and patients caregivers do have access to reliable transportation.    Cognitive Status/Learning     Patient reports reading ability as 12th grade and states patient does have difficulty with seeing, hearing and memory. Pt reports some difficulty with blurriness in his left eye, hearing loss, and some memory difficulties. Pt reports he is seeing his optometrist in [place] for his vision problems. Pt reports no difficulty with reading, writing, or learning. Patient reports patient learns best by one on one.   Needed: No.    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 The pt's son works with pt. Pt reports his son doesn't let him do alot of the work, but he goes anyways. Pt's wife does not work outside of the home. .    Adherence     Patient reports a high level of adherence to patients health care regimen.  Adherence counseling and education provided. Patient verbalizes understanding.    Substance Use    Patient reports the following substance usage.    Tobacco: none. Pt quit smoking in 1999. Pt used to smoke 2-3 ppd..  Alcohol: none, patient denies any use.  Illicit Drugs/Non-prescribed Medications: none, patient denies any use.  Patient states clear understanding of the potential impact of substance use.  Substance abstinence/cessation counseling, education and resources provided and reviewed.     Services Utilizing/ADLS    Infusion Service: Prior to admission, patient utilizing? no. Pt has used [facility] (ph: [phone]; fax: [phone]) for IV abx in the past and would like to use the same if needed at home.   Home Health: Prior to admission, patient utilizing? no. Pt has used [facility] (ph: [phone]) for HH in the past and would be okay with using the same company if needed at home.   DME: Prior to admission, yes. Pt reports he has a walker and wheelchair, but reports he does not need to use them.   Pulmonary/Cardiac Rehab: Prior to admission, no  Dialysis:  Prior to admission, no  Transplant Specialty Pharmacy:  Prior to admission, no.    Prior to admission, patient reports patient was independent with ADLS and was driving.  Patient reports patient is not able to care for self at this time due to compromised medical condition (as documented in medical record) and physical weakness.  Patient indicates a willingness to care for  self once medically cleared to do so.    Insurance/Medications    Insured by   Payor/Plan Subscr  Sex Relation Sub. Ins. ID Effective Group Num   1. MEDICARE - [place]* IVY LUGO 1940 Male  [id number] 10/1/07                                    [address]   2. GENERIC COMME* IVY LUGO 1940 Male  [id number]  1983                                    [address], [place] [address]      Primary Insurance (for UNOS reporting): Public Insurance - Medicare FFS (Fee For Service)  Secondary Insurance (for UNOS reporting): Private Insurance    Patient reports patient is able to obtain and afford medications at this time and at time of discharge. Pt reports he spends around $400 a month on his medications, which he reports is tough sometimes.    Living Will/Healthcare Power of     Patient states patient does not have a LW and/or HCPA.   provided education regarding LW and HCPA and the completion of forms.    Coping/Mental Health    Patient is coping adequately with the aid of  family members, friends and hillary. Patient denies mental health difficulties. Pt was emotional and tearful about missing Mar[place] with his family due to being in the hospital. SW provided general support.     Discharge Planning    At time of discharge, patient plans to return to patient's home under the care of self and wife.  Patient will drive himself home.  Per rounds today, expected discharge date has not been medically determined at this time. Patient and patients caregiver  verbalize understanding and are involved in treatment planning and discharge process.    Additional Concerns    Patient is being followed for needs, education, resources, information, emotional support, supportive counseling, and for supportive and skilled discharge plan of care.  providing ongoing psychosocial support, education, resources and d/c planning as needed.  SW remains available. Patient's caregiver verbalizes  understanding and agreement with information reviewed,  availability and how to access available resources as needed. Patient verbalizes understanding and agreement with information reviewed, social work availability, and how to access available resources as needed.

## 2019-04-17 NOTE — PROGRESS NOTES
[facility]  Heart Transplant  Progress Note    Patient Name: Jerry Solis  MRN: [id number]  Admission Date: 4/15/2019  Hospital Length of Stay: 2 days  Attending Physician: Mela Nails MD  Primary Care Provider: Primary Doctor No  Principal Problem:Acute on chronic combined systolic and diastolic CHF, NYHA class 3    Subjective:     Interval History: Patient reports continues to have a lot of urine output.  He has cough productive of clear thick sputum.  Still feels like is has some extra fluid.  He is net negative 2.3L in the last 24 hours (however, wife reports she missed recording some urine overnight). Weight is 186lbs down from 198lbs on admission    Continuous Infusions:   furosemide (LASIX) 2 mg/mL infusion (non-titrating) 20 mg/hr (04/17/19 8835)     Scheduled Meds:   amoxicillin  500 mg Oral Q8H    apixaban  5 mg Oral BID    cetirizine  5 mg Oral Daily    chlorothiazide (DIURIL) IVPB  250 mg Intravenous Q12H    clopidogrel  75 mg Oral Daily    ferrous sulfate  325 mg Oral BID    fluticasone-umeclidin-vilanter  1 puff Inhalation QHS    insulin aspart U-100  3 Units Subcutaneous TIDWM    levothyroxine  25 mcg Oral Before breakfast    multivitamin  1 tablet Oral Daily    omega-3 acid ethyl esters  2 g Oral BID    pantoprazole  40 mg Oral Daily    potassium chloride SA  20 mEq Oral Daily    rOPINIRole  0.5 mg Oral QHS    rosuvastatin  20 mg Oral QHS    tamsulosin  0.4 mg Oral Daily     PRN Meds:acetaminophen, albuterol, dextrose 50%, dextrose 50%, glucagon (human recombinant), glucose, glucose, insulin aspart U-100, sodium chloride 0.9%    Review of patient's allergies indicates:   Allergen Reactions    Adhesive Other (See Comments)     blisters    Codeine Other (See Comments)     Blurred vision    Latex Hives    Ace inhibitors     Lipitor [atorvastatin] Other (See Comments)     Muscle spasms    Xanax [alprazolam] Hallucinations     Objective:     Vital Signs (Most  Recent):  Temp: 98.3 °F (36.8 °C) (04/17/19 0755)  Pulse: 78 (04/17/19 0800)  Resp: 16 (04/17/19 0755)  BP: 105/64 (04/17/19 0755)  SpO2: 95 % (04/17/19 0755) Vital Signs (24h Range):  Temp:  [97.2 °F (36.2 °C)-98.3 °F (36.8 °C)] 98.3 °F (36.8 °C)  Pulse:  [71-83] 78  Resp:  [16-20] 16  SpO2:  [94 %-99 %] 95 %  BP: ()/(51-64) 105/64     Patient Vitals for the past 72 hrs (Last 3 readings):   Weight   04/17/19 0412 84.4 kg (186 lb 1.1 oz)   04/16/19 0421 88.1 kg (194 lb 3.6 oz)   04/15/19 1548 89.8 kg (198 lb)     Body mass index is 25.24 kg/m².      Intake/Output Summary (Last 24 hours) at 4/17/2019 1101  Last data filed at 4/17/2019 0755  Gross per 24 hour   Intake 1119.17 ml   Output 3200 ml   Net -2080.83 ml     Physical Exam  Constitutional: He appears well-developed.   Neck: JVD  elevation to jawline with patient sitting in bed. Carotid bruit is not present.   Cardiovascular: Regular rhythm, normal heart sounds and normal pulses. PMI is displaced. No murmur heard.  Pulmonary/Chest: Effort normal and breath sounds normal. No respiratory distress. He has no wheezes. He has no rales.   Abdominal: Soft. Bowel sounds are normal. He exhibits no distension. There is no tenderness. There is no rebound.   Musculoskeletal: He exhibits no edema.   Neurological: He is alert.   Skin: Skin is warm.   Vitals reviewed.    Significant Labs:  CBC:  Recent Labs   Lab 04/16/19  0536 04/17/19  0357   WBC 5.92 6.23   RBC 3.07* 3.18*   HGB 9.0* 9.3*   HCT 27.5* 28.6*   PLT 82* 79*   MCV 90 90   MCH 29.3 29.2   MCHC 32.7 32.5     BNP:  Recent Labs   Lab 04/15/19  0932 04/16/19 2032   BNP 1,185* 1,185*     CMP:  Recent Labs   Lab 04/15/19  1855 04/16/19  0536 04/16/19 2130 04/17/19  0357   * 213* 243* 225*   CALCIUM 8.9 8.9 9.2 9.0   ALBUMIN 3.1* 3.1*  --  3.1*   PROT 6.5 6.4  --  6.4    137 137 136   K 4.7 4.1 3.8 3.9   CO2 23 21* 23 21*    105 102 101   BUN 83* 84* 80* 84*   CREATININE 2.9* 2.8* 2.7* 2.7*    ALKPHOS 117 108  --  103   ALT 12 11  --  10   AST 17 15  --  15   BILITOT 0.6 0.6  --  0.6      Coagulation:   No results for input(s): PT, INR, APTT in the last 168 hours.  LDH:  No results for input(s): LDH in the last 72 hours.  Microbiology:  Microbiology Results (last 7 days)     ** No results found for the last 168 hours. **          I have reviewed all pertinent labs within the past 24 hours.    Estimated Creatinine Clearance: 25.5 mL/min (A) (based on SCr of 2.7 mg/dL (H)).    Diagnostic Results:  I have reviewed all pertinent imaging results/findings within the past 24 hours.    Assessment and Plan:     No notes on file    * Acute on chronic combined systolic and diastolic CHF, NYHA class 3  -NICM  -Last 2D Echo 4/15/19: LVEF 20%, LVEDD 6.44 cm  - Hyerpvolemic on examination today  -Current diuretic regimen: Furosemide gtt with BID Diuril dosing. Net negative over the last 24 hours. Will continue current regimen  -GDMT with Holding home dose of Entresto due to TREY.    S/p Cardiomems implant. Enrolled in our GUIDE-HF study.   -Patient reports dry weight is approx 189lbs  -2g Na dietary restriction, 1500 mL fluid restriction, strict I/Os      Acute kidney injury  -Creatinine on admit was 2.9  -Baseline appears 1.7-2.0  -Likely secondary to cardiorenal.  Will monitor response to diuresis     A-fib  -[id number] of 5 on Eliquis  -Admit ECG is paced rhythm. Underlying may be atrial fib    Type 2 diabetes mellitus with complication  -Coverage with ISS    Endocarditis  -E faecalis infectious endocarditis.   -RICARDO showed possible vegetation on RV lead. Blood culture results from [facility] of 1/2/19 - E. Faecalis.  Culture from RV lead positive for E faecalis.  Had ICD removed on 1/9/19 and new device placed 1/29/19.   -Was sent home on ampicillin and ceftriaxone for 6 weeks course EOT 2/21/19.  -On chronic abx with Amoxicillin        STAR Mahan  Heart Transplant  [facility]

## 2019-04-17 NOTE — PROGRESS NOTES
Pharmacist Renal Dose Adjustment Note    Jerry Solis is a 78 y.o. male being treated with the medication amoxicillin     Patient Data:    Vital Signs (Most Recent):  Temp: 98.3 °F (36.8 °C) (04/17/19 0755)  Pulse: 81 (04/17/19 1100)  Resp: 16 (04/17/19 0755)  BP: 105/64 (04/17/19 0755)  SpO2: 95 % (04/17/19 0755) Vital Signs (72h Range):  Temp:  [97.2 °F (36.2 °C)-98.4 °F (36.9 °C)]   Pulse:  []   Resp:  [16-20]   BP: ()/(51-77)   SpO2:  [94 %-99 %]      Recent Labs   Lab 04/16/19  0536 04/16/19  2130 04/17/19  0357   CREATININE 2.8* 2.7* 2.7*     Serum creatinine: 2.7 mg/dL (H) 04/17/19 0357  Estimated creatinine clearance: 25.5 mL/min (A)    Medication:amoxicillin dose: 500mg frequency TID will be changed to medication:amoxicillin dose:500 frequency:BID    Pharmacist's Name: Deepak Paz  Pharmacist's Extension: [id number]

## 2019-04-17 NOTE — PROGRESS NOTES
[facility]  Adult Nutrition  Education Note    Diet Education: Diabetes  Time Spent: 15 min  Learners: Pt, family member      Nutrition Education provided with handouts: Meal Planning Using the Plate Method    Diabetes Medications: insulin    Comments: Per glycemic index committee protocol, diabetes education provided for HbA1c of 9.1. Family member at bedside states they follow a diabetic diet most of the time. Pt states he drinks a lot of milk. Provided and explained handout detailing sources of carbohydrates, appropriate serving sizes, and the plate method for meal planning. Pt voiced understanding. All questions and concerns answered.      Follow-Up: 1x weekly    Please consult if other nutrition-related concerns are identified.

## 2019-04-17 NOTE — SUBJECTIVE & OBJECTIVE
Interval History: Patient reports continues to have a lot of urine output.  He has cough productive of clear thick sputum.  Still feels like is has some extra fluid.  He is net negative 2.3L in the last 24 hours (however, wife reports she missed recording some urine overnight). Weight is 186lbs down from 198lbs on admission    Continuous Infusions:   furosemide (LASIX) 2 mg/mL infusion (non-titrating) 20 mg/hr (04/17/19 0755)     Scheduled Meds:   amoxicillin  500 mg Oral Q8H    apixaban  5 mg Oral BID    cetirizine  5 mg Oral Daily    chlorothiazide (DIURIL) IVPB  250 mg Intravenous Q12H    clopidogrel  75 mg Oral Daily    ferrous sulfate  325 mg Oral BID    fluticasone-umeclidin-vilanter  1 puff Inhalation QHS    insulin aspart U-100  3 Units Subcutaneous TIDWM    levothyroxine  25 mcg Oral Before breakfast    multivitamin  1 tablet Oral Daily    omega-3 acid ethyl esters  2 g Oral BID    pantoprazole  40 mg Oral Daily    potassium chloride SA  20 mEq Oral Daily    rOPINIRole  0.5 mg Oral QHS    rosuvastatin  20 mg Oral QHS    tamsulosin  0.4 mg Oral Daily     PRN Meds:acetaminophen, albuterol, dextrose 50%, dextrose 50%, glucagon (human recombinant), glucose, glucose, insulin aspart U-100, sodium chloride 0.9%    Review of patient's allergies indicates:   Allergen Reactions    Adhesive Other (See Comments)     blisters    Codeine Other (See Comments)     Blurred vision    Latex Hives    Ace inhibitors     Lipitor [atorvastatin] Other (See Comments)     Muscle spasms    Xanax [alprazolam] Hallucinations     Objective:     Vital Signs (Most Recent):  Temp: 98.3 °F (36.8 °C) (04/17/19 0755)  Pulse: 78 (04/17/19 0800)  Resp: 16 (04/17/19 0755)  BP: 105/64 (04/17/19 0755)  SpO2: 95 % (04/17/19 0755) Vital Signs (24h Range):  Temp:  [97.2 °F (36.2 °C)-98.3 °F (36.8 °C)] 98.3 °F (36.8 °C)  Pulse:  [71-83] 78  Resp:  [16-20] 16  SpO2:  [94 %-99 %] 95 %  BP: ()/(51-64) 105/64     Patient  Vitals for the past 72 hrs (Last 3 readings):   Weight   04/17/19 0412 84.4 kg (186 lb 1.1 oz)   04/16/19 0421 88.1 kg (194 lb 3.6 oz)   04/15/19 1548 89.8 kg (198 lb)     Body mass index is 25.24 kg/m².      Intake/Output Summary (Last 24 hours) at 4/17/2019 1101  Last data filed at 4/17/2019 0755  Gross per 24 hour   Intake 1119.17 ml   Output 3200 ml   Net -2080.83 ml     Physical Exam  Constitutional: He appears well-developed.   Neck: JVD elevation to jawline with patient sitting in bed. Carotid bruit is not present.   Cardiovascular: Regular rhythm, normal heart sounds and normal pulses. PMI is displaced. No murmur heard.  Pulmonary/Chest: Effort normal and breath sounds normal. No respiratory distress. He has no wheezes. He has no rales.   Abdominal: Soft. Bowel sounds are normal. He exhibits no distension. There is no tenderness. There is no rebound.   Musculoskeletal: He exhibits no edema.   Neurological: He is alert.   Skin: Skin is warm.   Vitals reviewed.    Significant Labs:  CBC:  Recent Labs   Lab 04/16/19  0536 04/17/19  0357   WBC 5.92 6.23   RBC 3.07* 3.18*   HGB 9.0* 9.3*   HCT 27.5* 28.6*   PLT 82* 79*   MCV 90 90   MCH 29.3 29.2   MCHC 32.7 32.5     BNP:  Recent Labs   Lab 04/15/19  0932 04/16/19  2032   BNP 1,185* 1,185*     CMP:  Recent Labs   Lab 04/15/19  1855 04/16/19  0536 04/16/19  2130 04/17/19  0357   * 213* 243* 225*   CALCIUM 8.9 8.9 9.2 9.0   ALBUMIN 3.1* 3.1*  --  3.1*   PROT 6.5 6.4  --  6.4    137 137 136   K 4.7 4.1 3.8 3.9   CO2 23 21* 23 21*    105 102 101   BUN 83* 84* 80* 84*   CREATININE 2.9* 2.8* 2.7* 2.7*   ALKPHOS 117 108  --  103   ALT 12 11  --  10   AST 17 15  --  15   BILITOT 0.6 0.6  --  0.6      Coagulation:   No results for input(s): PT, INR, APTT in the last 168 hours.  LDH:  No results for input(s): LDH in the last 72 hours.  Microbiology:  Microbiology Results (last 7 days)     ** No results found for the last 168 hours. **          I have  reviewed all pertinent labs within the past 24 hours.    Estimated Creatinine Clearance: 25.5 mL/min (A) (based on SCr of 2.7 mg/dL (H)).    Diagnostic Results:  I have reviewed all pertinent imaging results/findings within the past 24 hours.

## 2019-04-18 NOTE — PLAN OF CARE
Problem: Adult Inpatient Plan of Care  Goal: Plan of Care Review  Outcome: Ongoing (interventions implemented as appropriate)  Plan of care reviewed with patient and family at 1400. Patient and spouse verbalized understanding. All questions and concerns addressed today. Patient remains free from injury and falls. No acute events today. Patient is progressing towards goals. Will continue to monitor. See flowsheets for full assessments and vitals throughout shift.    -Lasix infusion d/c today and changed to Lasix 80mg IVP three times daily  -Diuril d/c today  -Magnesium replaced today with 400mg PO, then Mag 3g/250mL IVBP

## 2019-04-18 NOTE — PLAN OF CARE
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 Instruct on disease process and s/s of complications to report to MD. Review/verify medication list sent home with the patient at time of discharge  and instruct patient/caregiver as needed. Frequency may be adjusted depending on start of care date.    Notify MD if SBP > 160 or < 90; DBP > 90 or < 50; HR > 120 or < 50; Temp > 101; Weight gain >3lbs in 1 day or 5lbs in 1 week.    LABS:  SN to perform labs: CMP/ CBC / INR in 1 week    HOME INFUSION THERAPY:  5 mcg/kg/min at 83.6KG  SN to perform Infusion Therapy/Central Line Care.  Review Central Line Care & Central Line Flush with patient.    Administer (drug and dose):  **For questions or concerns, please call [phone] and ask for Pre-Heart transplant clinic, M-F 8-5. After hours, weekends, call [phone] and ask for the Heart Transplant Cardiologist on call.**    Central line care:  Scrub the Hub: Prior to accessing the  line, always perform a 30 second alcohol scrub  Each lumen of the central line is to be flushed at least daily with 10 mL Normal Saline and 3 mL Heparin flush (100 units/mL)  Skilled Nurse (SN) may draw blood from IV access  Blood Draw Procedure:   - Aspirate at least 5 mL of blood   - Discard   - Obtain specimen   - Change posiflow cap   - Flush with 20 mL Normal Saline followed by a                 3-5 mL Heparin flush (100 units/mL)  Central :   - Sterile dressing changes are done weekly and as needed.   - Use chlor-hexadine scrub to cleanse site, apply Biopatch to insertion site,       apply securement device dressing   - Posi-flow caps are changed weekly and after EVERY lab draw.   - If sterile gauze is under dressing to control oozing,                 dressing change must be performed every 24 hours until gauze is not needed.    CONSULTS: Home health,     Physical Therapy to evaluate and treat. Evaluate for home safety and equipment needs; Establish/upgrade home exercise program. Perform / instruct on therapeutic exercises, gait training, transfer training, and Range of Motion.    Occupational Therapy to evaluate and treat. Evaluate home environment for safety and equipment needs. Perform/Instruct on transfers, ADL training, ROM, and therapeutic exercises.    Speech Therapy  to evaluate and treat for:  Language  Swallowing  Cognition                                              to evaluate for community resources/long-range planning.     Aide to provide assistance with personal care, ADLs, and vital signs    Send initial Home Health orders to [facility] attending physician on call.  Send follow up questions to [phone] or fax:                     Pre Transplant:   [phone]        Post Transplant: [phone]        Heart Failure:      [phone]

## 2019-04-18 NOTE — PROGRESS NOTES
[facility]  Heart Transplant  Progress Note    Patient Name: Jerry Solis  MRN: [id number]  Admission Date: 4/15/2019  Hospital Length of Stay: 3 days  Attending Physician: Mela Nails MD  Primary Care Provider: Primary Doctor No  Principal Problem:Acute on chronic combined systolic and diastolic CHF, NYHA class 3    Subjective:     Interval History:  Having productive cough, which is worse then his baseline, did not sleep well. Net even last 24 hours. Weight at baseline 189lbs down 6kg since hospital stay. Will dc Lasix ggt, switch to IVP once bag completed.      Continuous Infusions:  Scheduled Meds:   amoxicillin  500 mg Oral Q12H    apixaban  5 mg Oral BID    cetirizine  5 mg Oral Daily    clopidogrel  75 mg Oral Daily    ferrous sulfate  325 mg Oral BID    fluticasone-umeclidin-vilanter  1 puff Inhalation QHS    furosemide  80 mg Intravenous TID    insulin aspart U-100  3 Units Subcutaneous TIDWM    levothyroxine  25 mcg Oral Before breakfast    magnesium oxide  400 mg Oral Once    magnesium sulfate IVPB  3 g Intravenous Once    multivitamin  1 tablet Oral Daily    omega-3 acid ethyl esters  2 g Oral BID    pantoprazole  40 mg Oral Daily    potassium chloride SA  20 mEq Oral Daily    rOPINIRole  0.5 mg Oral QHS    rosuvastatin  20 mg Oral QHS    tamsulosin  0.4 mg Oral Daily     PRN Meds:acetaminophen, albuterol, dextrose 50%, dextrose 50%, glucagon (human recombinant), glucose, glucose, insulin aspart U-100, sodium chloride 0.9%    Review of patient's allergies indicates:   Allergen Reactions    Adhesive Other (See Comments)     blisters    Codeine Other (See Comments)     Blurred vision    Latex Hives    Ace inhibitors     Lipitor [atorvastatin] Other (See Comments)     Muscle spasms    Xanax [alprazolam] Hallucinations     Objective:     Vital Signs (Most Recent):  Temp: 98 °F (36.7 °C) (04/18/19 0351)  Pulse: 84 (04/18/19 0700)  Resp: 18 (04/18/19 0351)  BP:  (!) 96/52 (04/18/19 0351)  SpO2: (!) 93 % (04/18/19 0351) Vital Signs (24h Range):  Temp:  [97.8 °F (36.6 °C)-98.9 °F (37.2 °C)] 98 °F (36.7 °C)  Pulse:  [78-86] 84  Resp:  [16-18] 18  SpO2:  [93 %-97 %] 93 %  BP: ()/(51-55) 96/52     Patient Vitals for the past 72 hrs (Last 3 readings):   Weight   04/18/19 0400 83.6 kg (184 lb 4.9 oz)   04/17/19 0412 84.4 kg (186 lb 1.1 oz)   04/16/19 0421 88.1 kg (194 lb 3.6 oz)     Body mass index is 25 kg/m².      Intake/Output Summary (Last 24 hours) at 4/18/2019 1048  Last data filed at 4/18/2019 0400  Gross per 24 hour   Intake 1077.83 ml   Output 1275 ml   Net -197.17 ml       Hemodynamic Parameters:           Physical Exam   Constitutional: He appears well-developed and well-nourished. No distress.   HENT:   Head: Normocephalic and atraumatic.   Neck: Normal range of motion. No JVD present.   Cardiovascular: Normal rate, regular rhythm, normal heart sounds and intact distal pulses. Exam reveals no gallop and no friction rub.   No murmur heard.  Pulmonary/Chest: Effort normal. No stridor. No respiratory distress. He has no wheezes. He has rales. He exhibits no tenderness.   Abdominal: Soft. Bowel sounds are normal. He exhibits no distension. There is no tenderness.   Musculoskeletal: Normal range of motion. He exhibits no edema.   Skin: Skin is warm and dry. Capillary refill takes less than 2 seconds. He is not diaphoretic.   Psychiatric: He has a normal mood and affect.   Nursing note and vitals reviewed.      Significant Labs:  CBC:  Recent Labs   Lab 04/16/19  0536 04/17/19  0357 04/18/19  0342   WBC 5.92 6.23 7.52   RBC 3.07* 3.18* 3.42*   HGB 9.0* 9.3* 9.9*   HCT 27.5* 28.6* 30.3*   PLT 82* 79* 84*   MCV 90 90 89   MCH 29.3 29.2 28.9   MCHC 32.7 32.5 32.7     BNP:  Recent Labs   Lab 04/15/19  0932 04/16/19 2032   BNP 1,185* 1,185*     CMP:  Recent Labs   Lab 04/16/19  0536  04/17/19  0357 04/18/19  0022 04/18/19  0342   *   < > 225* 242* 257*   CALCIUM 8.9    < > 9.0 9.2 9.4   ALBUMIN 3.1*  --  3.1*  --  3.5   PROT 6.4  --  6.4  --  7.2      < > 136 137 134*   K 4.1   < > 3.9 4.0 3.9   CO2 21*   < > 21* 24 21*      < > 101 99 97   BUN 84*   < > 84* 79* 84*   CREATININE 2.8*   < > 2.7* 2.9* 2.8*   ALKPHOS 108  --  103  --  113   ALT 11  --  10  --  9*   AST 15  --  15  --  15   BILITOT 0.6  --  0.6  --  0.9    < > = values in this interval not displayed.      Coagulation:   No results for input(s): PT, INR, APTT in the last 168 hours.  LDH:  No results for input(s): LDH in the last 72 hours.  Microbiology:  Microbiology Results (last 7 days)     ** No results found for the last 168 hours. **          I have reviewed all pertinent labs within the past 24 hours.    Estimated Creatinine Clearance: 24.6 mL/min (A) (based on SCr of 2.8 mg/dL (H)).    Diagnostic Results:  I have reviewed all pertinent imaging results/findings within the past 24 hours.    Assessment and Plan:     No notes on file    * Acute on chronic combined systolic and diastolic CHF, NYHA class 3  NICM  -Patient reports dry weight is approx 189lbs  -Last 2D Echo 4/15/19: LVEF 20%, LVEDD 6.44 cm  S/p Cardiomems implant. Enrolled in our GUIDE-HF study.   -GDMT with Holding home dose of Entresto due to TREY.      - Possible DC in AM   -2g Na dietary restriction, 1500 mL fluid restriction, strict I/Os  -Current diuretic regimen: Dc Lasix ggt, switch to IVP 80mg TID. Plan to switch in AM to PO if continues with good UOP    Acute kidney injury  -Creatinine on admit was 2.9  -Baseline appears 1.7-2.0  -Likely secondary to cardiorenal.  Will monitor response to diuresis     Endocarditis  -E faecalis infectious endocarditis.   -RICARDO showed possible vegetation on RV lead. Blood culture results from [facility] of 1/2/19 - E. Faecalis.  Culture from RV lead positive for E faecalis.  Had ICD removed on 1/9/19 and new device placed 1/29/19.   -Was sent home on ampicillin and ceftriaxone for 6 weeks course EOT  2/21/19.  -On chronic abx with Amoxicillin      A-fib  -[id number] of 5 on Eliquis  -Admit ECG is paced rhythm. Underlying may be atrial fib    Type 2 diabetes mellitus with complication  -Coverage with ISS        Dorys Robles MD  Heart Transplant  [facility]

## 2019-04-18 NOTE — PROGRESS NOTES
D/C:    SW to pt's room for possible weekend D/C. Pt reports in agreement with plan to D/C home over weekend if medically cleared to do so. Pt reports dtr assists him at home, and HH is not needed. Pt reports dtr will provide transport home at time of D/C. Pt reports coping well at this time. SW providing psychosocial and counseling support, education, assistance, resources, and D/C planning as indicated. SW remains available.

## 2019-04-18 NOTE — PLAN OF CARE
Problem: Adult Inpatient Plan of Care  Goal: Plan of Care Review  Outcome: Ongoing (interventions implemented as appropriate)  Pt remain free from falls, injury, trauma. VS stable. , 1 Unit SSI given per order. Lasix gtt continuing to infuse at prescribed rate. Fall precautions reviewed and maintained. Plan to diurese with Lasix and Diuril . On Abx Amoxicillin for endocarditis. POC reviewed with pt. Pt verbalize understanding. Will continue to monitor.

## 2019-04-18 NOTE — PROGRESS NOTES
Received call from Dr. Garcia stating that he placed an order for Furosemide 80mg IVP three times daily. Verbally ordered at this time to continue Lasix infusion until iv bag runs out prior to starting Lasix IVP order.

## 2019-04-18 NOTE — SUBJECTIVE & OBJECTIVE
Interval History:  Having productive cough, which is worse then his baseline, did not sleep well. Net even last 24 hours. Weight at baseline 189lbs down 6kg since hospital stay. Will dc Lasix ggt, switch to IVP once bag completed.      Continuous Infusions:  Scheduled Meds:   amoxicillin  500 mg Oral Q12H    apixaban  5 mg Oral BID    cetirizine  5 mg Oral Daily    clopidogrel  75 mg Oral Daily    ferrous sulfate  325 mg Oral BID    fluticasone-umeclidin-vilanter  1 puff Inhalation QHS    furosemide  80 mg Intravenous TID    insulin aspart U-100  3 Units Subcutaneous TIDWM    levothyroxine  25 mcg Oral Before breakfast    magnesium oxide  400 mg Oral Once    magnesium sulfate IVPB  3 g Intravenous Once    multivitamin  1 tablet Oral Daily    omega-3 acid ethyl esters  2 g Oral BID    pantoprazole  40 mg Oral Daily    potassium chloride SA  20 mEq Oral Daily    rOPINIRole  0.5 mg Oral QHS    rosuvastatin  20 mg Oral QHS    tamsulosin  0.4 mg Oral Daily     PRN Meds:acetaminophen, albuterol, dextrose 50%, dextrose 50%, glucagon (human recombinant), glucose, glucose, insulin aspart U-100, sodium chloride 0.9%    Review of patient's allergies indicates:   Allergen Reactions    Adhesive Other (See Comments)     blisters    Codeine Other (See Comments)     Blurred vision    Latex Hives    Ace inhibitors     Lipitor [atorvastatin] Other (See Comments)     Muscle spasms    Xanax [alprazolam] Hallucinations     Objective:     Vital Signs (Most Recent):  Temp: 98 °F (36.7 °C) (04/18/19 0351)  Pulse: 84 (04/18/19 0700)  Resp: 18 (04/18/19 0351)  BP: (!) 96/52 (04/18/19 0351)  SpO2: (!) 93 % (04/18/19 0351) Vital Signs (24h Range):  Temp:  [97.8 °F (36.6 °C)-98.9 °F (37.2 °C)] 98 °F (36.7 °C)  Pulse:  [78-86] 84  Resp:  [16-18] 18  SpO2:  [93 %-97 %] 93 %  BP: ()/(51-55) 96/52     Patient Vitals for the past 72 hrs (Last 3 readings):   Weight   04/18/19 0400 83.6 kg (184 lb 4.9 oz)   04/17/19 0410  84.4 kg (186 lb 1.1 oz)   04/16/19 0421 88.1 kg (194 lb 3.6 oz)     Body mass index is 25 kg/m².      Intake/Output Summary (Last 24 hours) at 4/18/2019 1048  Last data filed at 4/18/2019 0400  Gross per 24 hour   Intake 1077.83 ml   Output 1275 ml   Net -197.17 ml       Hemodynamic Parameters:           Physical Exam   Constitutional: He appears well-developed and well-nourished. No distress.   HENT:   Head: Normocephalic and atraumatic.   Neck: Normal range of motion. No JVD present.   Cardiovascular: Normal rate, regular rhythm, normal heart sounds and intact distal pulses. Exam reveals no gallop and no friction rub.   No murmur heard.  Pulmonary/Chest: Effort normal. No stridor. No respiratory distress. He has no wheezes. He has rales. He exhibits no tenderness.   Abdominal: Soft. Bowel sounds are normal. He exhibits no distension. There is no tenderness.   Musculoskeletal: Normal range of motion. He exhibits no edema.   Skin: Skin is warm and dry. Capillary refill takes less than 2 seconds. He is not diaphoretic.   Psychiatric: He has a normal mood and affect.   Nursing note and vitals reviewed.      Significant Labs:  CBC:  Recent Labs   Lab 04/16/19  0536 04/17/19  0357 04/18/19  0342   WBC 5.92 6.23 7.52   RBC 3.07* 3.18* 3.42*   HGB 9.0* 9.3* 9.9*   HCT 27.5* 28.6* 30.3*   PLT 82* 79* 84*   MCV 90 90 89   MCH 29.3 29.2 28.9   MCHC 32.7 32.5 32.7     BNP:  Recent Labs   Lab 04/15/19  0932 04/16/19  2032   BNP 1,185* 1,185*     CMP:  Recent Labs   Lab 04/16/19  0536  04/17/19  0357 04/18/19  0022 04/18/19  0342   *   < > 225* 242* 257*   CALCIUM 8.9   < > 9.0 9.2 9.4   ALBUMIN 3.1*  --  3.1*  --  3.5   PROT 6.4  --  6.4  --  7.2      < > 136 137 134*   K 4.1   < > 3.9 4.0 3.9   CO2 21*   < > 21* 24 21*      < > 101 99 97   BUN 84*   < > 84* 79* 84*   CREATININE 2.8*   < > 2.7* 2.9* 2.8*   ALKPHOS 108  --  103  --  113   ALT 11  --  10  --  9*   AST 15  --  15  --  15   BILITOT 0.6  --  0.6   --  0.9    < > = values in this interval not displayed.      Coagulation:   No results for input(s): PT, INR, APTT in the last 168 hours.  LDH:  No results for input(s): LDH in the last 72 hours.  Microbiology:  Microbiology Results (last 7 days)     ** No results found for the last 168 hours. **          I have reviewed all pertinent labs within the past 24 hours.    Estimated Creatinine Clearance: 24.6 mL/min (A) (based on SCr of 2.8 mg/dL (H)).    Diagnostic Results:  I have reviewed all pertinent imaging results/findings within the past 24 hours.

## 2019-04-18 NOTE — ASSESSMENT & PLAN NOTE
NICM  -Patient reports dry weight is approx 189lbs  -Last 2D Echo 4/15/19: LVEF 20%, LVEDD 6.44 cm  S/p Cardiomems implant. Enrolled in our GUIDE-HF study.   -GDMT with Holding home dose of Entresto due to TREY.      - Possible DC in AM   -2g Na dietary restriction, 1500 mL fluid restriction, strict I/Os  -Current diuretic regimen: Dc Lasix ggt, switch to IVP 80mg TID. Plan to switch in AM to PO if continues with good UOP

## 2019-04-18 NOTE — PROGRESS NOTES
K 3.9, Mag 1.7. Notified Chente PAULINO with [facility]. Chente PAULINO ordered 400mg po magnesium replacement. Will continue to monitor.

## 2019-04-18 NOTE — PROGRESS NOTES
Multiple 4 beat runs VT. NADN. Notified Chente PAULINO with [facility]. Chente PAULINO stated will place orders as appropriate. Will continue to monitor.

## 2019-04-19 NOTE — PLAN OF CARE
Problem: Adult Inpatient Plan of Care  Goal: Plan of Care Review  Outcome: Ongoing (interventions implemented as appropriate)  Pt remain free from falls, injury, trauma. VS stable. No gtts initiated/maintained. Fall precautions reviewed and maintained. Plan to diurese with Lasix. POC reviewed with pt and spouse. Pt and spouse verbalize understanding. Will continue to monitor.

## 2019-04-19 NOTE — PLAN OF CARE
Problem: Adult Inpatient Plan of Care  Goal: Patient-Specific Goal (Individualization)  Outcome: Ongoing (interventions implemented as appropriate)  Pt started on PO bumex today, output being watched for possible DC tomorrow. Wife at bedside very helpful. BG remains erratic even with scheduled 3u plus sliding scale coverage. Pt spent most of the day in bed, refusing chair. No complaints of pain or SOB, no falls. VSS.

## 2019-04-19 NOTE — ASSESSMENT & PLAN NOTE
-Creatinine on admit was 2.9 -> 2.7  -Baseline appears 1.7-2.0  -Likely secondary to cardiorenal.  Will monitor response to diuresis

## 2019-04-19 NOTE — PROGRESS NOTES
[facility]  Heart Transplant  Progress Note    Patient Name: Jerry Solis  MRN: [id number]  Admission Date: 4/15/2019  Hospital Length of Stay: 4 days  Attending Physician: Mela Nails MD  Primary Care Provider: Primary Doctor No  Principal Problem:Acute on chronic combined systolic and diastolic CHF, NYHA class 3    Subjective:     Interval History:   I/Os not accurate +800cc, however negative 2Kg / 24 hours on Lasix 80mg TID, continues weight loss with transition. Plan to start PTA Bumex 2mg BID and dc in AM     Continuous Infusions:  Scheduled Meds:   amoxicillin  500 mg Oral Q12H    apixaban  5 mg Oral BID    bumetanide  2 mg Oral BID    cetirizine  5 mg Oral Daily    clopidogrel  75 mg Oral Daily    ferrous sulfate  325 mg Oral BID    fluticasone-umeclidin-vilanter  1 puff Inhalation QHS    insulin aspart U-100  3 Units Subcutaneous TIDWM    levothyroxine  25 mcg Oral Before breakfast    multivitamin  1 tablet Oral Daily    omega-3 acid ethyl esters  2 g Oral BID    pantoprazole  40 mg Oral Daily    potassium chloride SA  40 mEq Oral Daily    rOPINIRole  0.5 mg Oral QHS    rosuvastatin  20 mg Oral QHS    tamsulosin  0.4 mg Oral Daily     PRN Meds:acetaminophen, albuterol, dextrose 50%, dextrose 50%, glucagon (human recombinant), glucose, glucose, insulin aspart U-100, sodium chloride 0.9%    Review of patient's allergies indicates:   Allergen Reactions    Adhesive Other (See Comments)     blisters    Codeine Other (See Comments)     Blurred vision    Latex Hives    Ace inhibitors     Lipitor [atorvastatin] Other (See Comments)     Muscle spasms    Xanax [alprazolam] Hallucinations     Objective:     Vital Signs (Most Recent):  Temp: 99.5 °F (37.5 °C) (04/19/19 0829)  Pulse: 81 (04/19/19 0829)  Resp: 18 (04/19/19 0829)  BP: (!) 98/55 (04/19/19 0829)  SpO2: (!) 93 % (04/19/19 0829) Vital Signs (24h Range):  Temp:  [97.5 °F (36.4 °C)-99.5 °F (37.5 °C)] 99.5 °F (37.5  °C)  Pulse:  [71-89] 81  Resp:  [16-20] 18  SpO2:  [92 %-95 %] 93 %  BP: (90-99)/(51-55) 98/55     Patient Vitals for the past 72 hrs (Last 3 readings):   Weight   04/19/19 0505 81.6 kg (179 lb 14.3 oz)   04/18/19 0400 83.6 kg (184 lb 4.9 oz)   04/17/19 0412 84.4 kg (186 lb 1.1 oz)     Body mass index is 24.4 kg/m².      Intake/Output Summary (Last 24 hours) at 4/19/2019 0927  Last data filed at 4/19/2019 0400  Gross per 24 hour   Intake 1042 ml   Output 300 ml   Net 742 ml       Hemodynamic Parameters:         Physical Exam   Constitutional: He appears well-developed and well-nourished. No distress.   HENT:   Head: Normocephalic and atraumatic.   Neck: Normal range of motion. No JVD present.   Cardiovascular: Normal rate, regular rhythm, normal heart sounds and intact distal pulses. Exam reveals no gallop and no friction rub.   No murmur heard.  Pulmonary/Chest: Effort normal. No stridor. No respiratory distress. He has no wheezes. He has no rales. He exhibits no tenderness.   Abdominal: Soft. Bowel sounds are normal. He exhibits no distension. There is no tenderness.   Musculoskeletal: Normal range of motion. He exhibits no edema.   Skin: Skin is warm and dry. Capillary refill takes less than 2 seconds. He is not diaphoretic.   Psychiatric: He has a normal mood and affect.   Nursing note and vitals reviewed.      Significant Labs:  CBC:  Recent Labs   Lab 04/17/19  0357 04/18/19  0342 04/19/19  0401   WBC 6.23 7.52 5.90   RBC 3.18* 3.42* 3.35*   HGB 9.3* 9.9* 9.7*   HCT 28.6* 30.3* 29.1*   PLT 79* 84* 78*   MCV 90 89 87   MCH 29.2 28.9 29.0   MCHC 32.5 32.7 33.3     BNP:  Recent Labs   Lab 04/15/19  0932 04/16/19  2032   BNP 1,185* 1,185*     CMP:  Recent Labs   Lab 04/17/19  0357  04/18/19  0342 04/18/19  1500 04/19/19  0401   *   < > 257* 270* 250*   CALCIUM 9.0   < > 9.4 9.1 9.1   ALBUMIN 3.1*  --  3.5  --  3.1*   PROT 6.4  --  7.2  --  6.3      < > 134* 136 136   K 3.9   < > 3.9 3.9 3.4*   CO2 21*    < > 21* 26 24      < > 97 98 99   BUN 84*   < > 84* 81* 87*   CREATININE 2.7*   < > 2.8* 2.8* 2.7*   ALKPHOS 103  --  113  --  98   ALT 10  --  9*  --  9*   AST 15  --  15  --  15   BILITOT 0.6  --  0.9  --  0.7    < > = values in this interval not displayed.      Coagulation:   No results for input(s): PT, INR, APTT in the last 168 hours.  LDH:  No results for input(s): LDH in the last 72 hours.  Microbiology:  Microbiology Results (last 7 days)     ** No results found for the last 168 hours. **          I have reviewed all pertinent labs within the past 24 hours.    Estimated Creatinine Clearance: 25.5 mL/min (A) (based on SCr of 2.7 mg/dL (H)).    Diagnostic Results:  I have reviewed all pertinent imaging results/findings within the past 24 hours.    Assessment and Plan:     No notes on file    * Acute on chronic combined systolic and diastolic CHF, NYHA class 3  NICM  Patient reports dry weight is approx 189lbs  Last 2D Echo 4/15/19: LVEF 20%, LVEDD 6.44 cm  S/p Cardiomems implant. Enrolled in our GUIDE-HF study.       Possible DC in AM   Current diuretic regimen: DcIVP 80mg TID, start PTA Bumex 2mg BID. May need Metolazone 5mg   GDMT with Holding home dose of Entresto due to TREY. Will start as outpatient   -2g Na dietary restriction, 1500 mL fluid restriction, strict I/Os      Acute kidney injury  -Creatinine on admit was 2.9 -> 2.7  -Baseline appears 1.7-2.0  -Likely secondary to cardiorenal.  Will monitor response to diuresis     Endocarditis  -E faecalis infectious endocarditis.   -RICARDO showed possible vegetation on RV lead. [facility] - E. Faecalis.  Culture from RV lead positive for E faecalis.  Had ICD removed on 1/9/19 and new device placed 1/29/19.   -Was sent home on ampicillin and ceftriaxone for 6 weeks course EOT 2/21/19.  -On chronic abx with Amoxicillin      A-fib  -[id number] of 5 on Eliquis  -Admit ECG is paced rhythm. Underlying may be atrial fib    Type  2 diabetes mellitus with complication  -Coverage with ISS        Droys Robels MD  Heart Transplant  [facility]

## 2019-04-19 NOTE — SUBJECTIVE & OBJECTIVE
Interval History:   I/Os not accurate +800cc, however negative 2Kg / 24 hours on Lasix 80mg TID, continues weight loss with transition. Plan to start PTA Bumex 2mg BID and dc in AM     Continuous Infusions:  Scheduled Meds:   amoxicillin  500 mg Oral Q12H    apixaban  5 mg Oral BID    bumetanide  2 mg Oral BID    cetirizine  5 mg Oral Daily    clopidogrel  75 mg Oral Daily    ferrous sulfate  325 mg Oral BID    fluticasone-umeclidin-vilanter  1 puff Inhalation QHS    insulin aspart U-100  3 Units Subcutaneous TIDWM    levothyroxine  25 mcg Oral Before breakfast    multivitamin  1 tablet Oral Daily    omega-3 acid ethyl esters  2 g Oral BID    pantoprazole  40 mg Oral Daily    potassium chloride SA  40 mEq Oral Daily    rOPINIRole  0.5 mg Oral QHS    rosuvastatin  20 mg Oral QHS    tamsulosin  0.4 mg Oral Daily     PRN Meds:acetaminophen, albuterol, dextrose 50%, dextrose 50%, glucagon (human recombinant), glucose, glucose, insulin aspart U-100, sodium chloride 0.9%    Review of patient's allergies indicates:   Allergen Reactions    Adhesive Other (See Comments)     blisters    Codeine Other (See Comments)     Blurred vision    Latex Hives    Ace inhibitors     Lipitor [atorvastatin] Other (See Comments)     Muscle spasms    Xanax [alprazolam] Hallucinations     Objective:     Vital Signs (Most Recent):  Temp: 99.5 °F (37.5 °C) (04/19/19 0829)  Pulse: 81 (04/19/19 0829)  Resp: 18 (04/19/19 0829)  BP: (!) 98/55 (04/19/19 0829)  SpO2: (!) 93 % (04/19/19 0829) Vital Signs (24h Range):  Temp:  [97.5 °F (36.4 °C)-99.5 °F (37.5 °C)] 99.5 °F (37.5 °C)  Pulse:  [71-89] 81  Resp:  [16-20] 18  SpO2:  [92 %-95 %] 93 %  BP: (90-99)/(51-55) 98/55     Patient Vitals for the past 72 hrs (Last 3 readings):   Weight   04/19/19 0505 81.6 kg (179 lb 14.3 oz)   04/18/19 0400 83.6 kg (184 lb 4.9 oz)   04/17/19 0412 84.4 kg (186 lb 1.1 oz)     Body mass index is 24.4 kg/m².      Intake/Output Summary (Last 24 hours)  at 4/19/2019 0927  Last data filed at 4/19/2019 0400  Gross per 24 hour   Intake 1042 ml   Output 300 ml   Net 742 ml       Hemodynamic Parameters:         Physical Exam   Constitutional: He appears well-developed and well-nourished. No distress.   HENT:   Head: Normocephalic and atraumatic.   Neck: Normal range of motion. No JVD present.   Cardiovascular: Normal rate, regular rhythm, normal heart sounds and intact distal pulses. Exam reveals no gallop and no friction rub.   No murmur heard.  Pulmonary/Chest: Effort normal. No stridor. No respiratory distress. He has no wheezes. He has no rales. He exhibits no tenderness.   Abdominal: Soft. Bowel sounds are normal. He exhibits no distension. There is no tenderness.   Musculoskeletal: Normal range of motion. He exhibits no edema.   Skin: Skin is warm and dry. Capillary refill takes less than 2 seconds. He is not diaphoretic.   Psychiatric: He has a normal mood and affect.   Nursing note and vitals reviewed.      Significant Labs:  CBC:  Recent Labs   Lab 04/17/19  0357 04/18/19  0342 04/19/19  0401   WBC 6.23 7.52 5.90   RBC 3.18* 3.42* 3.35*   HGB 9.3* 9.9* 9.7*   HCT 28.6* 30.3* 29.1*   PLT 79* 84* 78*   MCV 90 89 87   MCH 29.2 28.9 29.0   MCHC 32.5 32.7 33.3     BNP:  Recent Labs   Lab 04/15/19  0932 04/16/19  2032   BNP 1,185* 1,185*     CMP:  Recent Labs   Lab 04/17/19  0357  04/18/19  0342 04/18/19  1500 04/19/19  0401   *   < > 257* 270* 250*   CALCIUM 9.0   < > 9.4 9.1 9.1   ALBUMIN 3.1*  --  3.5  --  3.1*   PROT 6.4  --  7.2  --  6.3      < > 134* 136 136   K 3.9   < > 3.9 3.9 3.4*   CO2 21*   < > 21* 26 24      < > 97 98 99   BUN 84*   < > 84* 81* 87*   CREATININE 2.7*   < > 2.8* 2.8* 2.7*   ALKPHOS 103  --  113  --  98   ALT 10  --  9*  --  9*   AST 15  --  15  --  15   BILITOT 0.6  --  0.9  --  0.7    < > = values in this interval not displayed.      Coagulation:   No results for input(s): PT, INR, APTT in the last 168 hours.  LDH:  No  results for input(s): LDH in the last 72 hours.  Microbiology:  Microbiology Results (last 7 days)     ** No results found for the last 168 hours. **          I have reviewed all pertinent labs within the past 24 hours.    Estimated Creatinine Clearance: 25.5 mL/min (A) (based on SCr of 2.7 mg/dL (H)).    Diagnostic Results:  I have reviewed all pertinent imaging results/findings within the past 24 hours.

## 2019-04-19 NOTE — ASSESSMENT & PLAN NOTE
NICM  Patient reports dry weight is approx 189lbs  Last 2D Echo 4/15/19: LVEF 20%, LVEDD 6.44 cm  S/p Cardiomems implant. Enrolled in our GUIDE-HF study.       Possible DC in AM   Current diuretic regimen: DcIVP 80mg TID, start PTA Bumex 2mg BID. May need Metolazone 5mg   GDMT with Holding home dose of Entresto due to TREY. Will start as outpatient   -2g Na dietary restriction, 1500 mL fluid restriction, strict I/Os

## 2019-04-19 NOTE — PLAN OF CARE
HTS Night Float     Called by nurse to inform his glycemia has been on the 200-300s despite current insulin regimen which includes ISS and pre-meal (lower than home dose), not long acting insulin.     Plan:  -Gave 3 U aspart once  -Increased pre-meal to 6U  -Placed endocrine consult  -Did not schedule pre-meal as renal function during this admission seems to be worse than in previous months    Discussed w Dr Nails

## 2019-04-20 NOTE — HOSPITAL COURSE
Pt was admitted with acute decompensated heart failure. He was started on lasix gtt at 20 and diuresed a total of 2.5 L per the chart however this may not be accurate. He feels much better has been walking around with decreased shortness of breath. Additionally his entresto has been stopped due to his CKD and can be started back as an outpatient if is kidney function seems to improve. He will be discharged on bumex 2 mg BID and he will need to follow up with heart failure clinic upon discharge.

## 2019-04-20 NOTE — HPI
"Reason for Consult: Management of T2DM, Hyperglycemia     Surgical Procedure and Date: Lead extraction 1/9/19    Diabetes diagnosis year: approximately 10 years ago    Lab Results   Component Value Date    HGBA1C 9.1 (H) 04/15/2019       Home Diabetes Medications:  ***    How often checking glucose at home? {Blank single:24973::"One","1-3 x day",">4 x day"}   BG readings on regimen: ***  Hypoglycemia on the regimen?  {YES NO:72535}  Missed doses on regimen?  {YES NO:64470}    Diabetes Complications include: Hyperglycemia, Hypoglycemia , Diabetic retinopathy , Diabetic cataract  and Diabetic peripheral neuropathy     Complicating diabetes co morbidities: CHF and History of CVA      HPI:   Patient is a 78 y.o. male with a diagnosis of afib, TREY, DM2, and CHF.  Patient admitted on 4/15/19 with weight gain related to fluid volume status and elevated creatinine.  Of note, patient was recently discharged after a prolonged, complex hospital stay from [facility].  Hospital course included infected lead extraction of ICD, long term antibiotics, TREY secondary to ATN/hypotension requiring epi and Miesha, and successful  reimplantation of BiV ICD 1/29/19.  EP is considering repeat RICARDO/DCCV A fib at some point.  Patient was ultimately discharged with PICC line and long term antibiotics.  Patient's DM managed by PCP, Dr. Hdz.  Endocrine consulted for DM/BG management.  "

## 2019-04-20 NOTE — PLAN OF CARE
Problem: Adult Inpatient Plan of Care  Goal: Plan of Care Review  Outcome: Outcome(s) achieved Date Met: 04/20/19  Pt stable for discharge and was given verbal and written instruction to take home. Pt and wife v/u.

## 2019-04-20 NOTE — PLAN OF CARE
Patient remains free from falls and injuries through out shift. Patient AAO and VSS. Patient denies chest pain and SOB. Patient's spouse is present at bedside. Patient transition from IVP lasix to PO Bumex 2mg BID. Patient is blood glucose check ACHS, last . Patient given SSI 1unit. K and Mg WNL. POC is for possible d/c home this morning. Plan of care reviewed with patient. Patient verbalizes understanding of plan.  Will continue to monitor.

## 2019-04-20 NOTE — HPI
Mr. Solis is a very pleasant 76y/o male with HFrEF, ICMP (EF=20%), NYHA class III symptoms  S/p cardioMEMS implant (enrolled in GUIDE-HF study) who comes for a pos hospital discharge follow-up visit. He was recently discharged from our service after being treated for infective endocarditis. 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 Blood cultures remained negative here. Post op course was complicated by TREY likely due to ATN/hypotension. Pt had to be transferred to [facility] and started on Epi and Miesha, which were weaned off as kidneys improved. Pt underwent successful reimplantation of BiV ICD 1/29/19. EP is considering repeat RICARDO/DCCV A fib at some point. Will perform DFT as an outpatient. ID's final recommendations were Ampicillin and Ceftriaxone for 6 weeks post extraction.  Estimated end date 2/21/19. PICC  was placed for home IV Abx. Pt d/c'ed home on IV Abx via PICC with Home Health care. I saw him lat home in clinic after his hospital discharge at that time he required a dose of IV lasix. Since then he states his weight has gone up and his local cardiologist prescribed him Metolazone daily for a full week. Today he still reports NYHA class III symptoms. His weight is actually the same when compared to last clinic visit. His BUN and creat are significantly elevated today (83/2.9) from 29/1.4.

## 2019-04-20 NOTE — NURSING
Pt stable sitting on side of bed with wife at side. Pt in NAD and talking with staff and spouse. VSS and pt denies pain. PIV removed, pressure held at site and hemostasis obtain. Pt given written discharge and questions or concerns were address. Pt and spouse v/u. Pt discharged home to self care and to F/U with MD for post discharge appt.

## 2019-04-20 NOTE — DISCHARGE SUMMARY
[facility]  Heart Transplant  Discharge Summary      Patient Name: Jerry Solis  MRN: [id number]  Admission Date: 4/15/2019  Hospital Length of Stay: 5 days  Discharge Date and Time: 04/20/2019 7:31 AM  Attending Physician: Mela Nails MD   Discharging Provider: Becky Whitlock MD  Primary Care Provider: Primary Doctor No     HPI: Mr. Solis is a very pleasant 76y/o male with HFrEF, ICMP (EF=20%), NYHA class III symptoms  S/p cardioMEMS implant (enrolled in GUIDE-HF study) who comes for a pos hospital discharge follow-up visit. He was recently discharged from our service after being treated for infective endocarditis. 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 Blood cultures remained negative here. Post op course was complicated by TREY likely due to ATN/hypotension. Pt had to be transferred to [facility] and started on Epi and Miesha, which were weaned off as kidneys improved. Pt underwent successful reimplantation of BiV ICD 1/29/19. EP is considering repeat RICARDO/DCCV A fib at some point. Will perform DFT as an outpatient. ID's final recommendations were Ampicillin and Ceftriaxone for 6 weeks post extraction.  Estimated end date 2/21/19. PICC  was placed for home IV Abx. Pt d/c'ed home on IV Abx via PICC with Home Health care. I saw him lat home in clinic after his hospital discharge at that time he required a dose of IV lasix. Since then he states his weight has gone up and his local cardiologist prescribed him Metolazone daily for a full week. Today he still reports NYHA class III symptoms. His weight is actually the same when compared to last clinic visit. His BUN and creat are significantly elevated today (83/2.9) from 29/1.4.        * No surgery found *     Hospital Course: Pt was admitted with acute decompensated heart failure. He was started on  "lasix gtt at 20 and diuresed a total of 2.5 L per the chart however this may not be accurate. He feels much better has been walking around with decreased shortness of breath. Additionally his entresto has been stopped due to his CKD and can be started back as an outpatient if is kidney function seems to improve. He will be discharged on bumex 2 mg BID and he will need to follow up with heart failure clinic upon discharge.     Consults (From admission, onward)        Status Ordering Provider     Inpatient consult to Endocrinology  Once     Provider:  (Not yet assigned)    Acknowledged JAVIER RAMSEY            Pending Diagnostic Studies:     None        Final Active Diagnoses:    Diagnosis Date Noted POA    PRINCIPAL PROBLEM:  Acute on chronic combined systolic and diastolic CHF, NYHA class 3 [I50.43] 05/18/2018 Yes    Acute kidney injury [N17.9] 04/15/2019 Yes    Endocarditis [I38]  Yes    A-fib [I48.91] 04/06/2018 Yes    Type 2 diabetes mellitus with complication [E11.8] 03/22/2018 Yes      Problems Resolved During this Admission:      Discharged Condition: good    Disposition: Home or Self Care    Follow Up:    Patient Instructions:   No discharge procedures on file.  Medications:  Reconciled Home Medications:      Medication List      CONTINUE taking these medications    amoxicillin 500 MG capsule  Commonly known as:  AMOXIL  Take 1 capsule (500 mg total) by mouth every 8 (eight) hours.     apixaban 5 mg Tab  Commonly known as:  ELIQUIS  Take 1 tablet (5 mg total) by mouth 2 (two) times daily.     BASAGLAR KWIKPEN U-100 INSULIN glargine 100 units/mL (3mL) SubQ pen  Generic drug:  insulin  Inject 6 Units into the skin once daily.     BD ULTRA-FINE SHORT PEN NEEDLE 31 gauge x 5/16" Ndle  Generic drug:  pen needle, diabetic  1 each by Misc.(Non-Drug; Combo Route) route 4 (four) times daily.     bumetanide 1 MG tablet  Commonly known as:  BUMEX  Take 2 tablets (2 mg total) by mouth 2 (two) times " daily.     clopidogrel 75 mg tablet  Commonly known as:  PLAVIX  Take 1 tablet (75 mg total) by mouth once daily.     ferrous sulfate 325 (65 FE) MG EC tablet  Take 1 tablet (325 mg total) by mouth 2 (two) times daily.     NovoLOG Flexpen U-100 Insulin 100 unit/mL Inpn pen  Generic drug:  insulin aspart U-100  Inject 10 Units into the skin 3 (three) times daily with meals. With low dose sliding scale     omega-3 acid ethyl esters 1 gram capsule  Commonly known as:  LOVAZA  Take 2 g by mouth 2 (two) times daily.     rosuvastatin 20 MG tablet  Commonly known as:  CRESTOR  Take 1 tablet (20 mg total) by mouth every evening.     VENTOLIN HFA 90 mcg/actuation inhaler  Generic drug:  albuterol  Inhale 2 puffs into the lungs every 4 (four) hours as needed.        STOP taking these medications    ENTRESTO 24-26 mg per tablet  Generic drug:  sacubitril-valsartan     sacubitril-valsartan 24-26 mg per tablet  Commonly known as:  ENTRESTO        ASK your doctor about these medications    levothyroxine 25 MCG tablet  Commonly known as:  SYNTHROID  Take 1 tablet by mouth once daily.     loratadine 10 mg tablet  Commonly known as:  CLARITIN  Take 10 mg by mouth once daily.     multivitamin per tablet  Commonly known as:  THERAGRAN  Take 1 tablet by mouth once daily.     omeprazole 40 MG capsule  Commonly known as:  PRILOSEC  Take 40 mg by mouth once daily.     potassium chloride SA 20 MEQ tablet  Commonly known as:  KLOR-CON M20  Take 1 tablet (20 mEq total) by mouth once daily.     rOPINIRole 0.5 MG tablet  Commonly known as:  REQUIP  Take 0.5 mg by mouth.     tamsulosin 0.4 mg Cap  Commonly known as:  FLOMAX  Take 1 capsule (0.4 mg total) by mouth once daily.     TRELEGY ELLIPTA 100-62.5-25 mcg Dsdv  Generic drug:  fluticasone-umeclidin-vilanter  once daily.            Becky Whitlock MD  Heart Transplant  [facility]

## 2019-04-20 NOTE — SUBJECTIVE & OBJECTIVE
"Interval HPI:   Overnight events:  Eating:   {Blank single:19197::"100%","50%","<25%","NPO"}  Nausea: {YES NO:22321}  Hypoglycemia and intervention: {YES NO:21898}  Fever: {YES NO:22321}  TPN and/or TF: {YES (DEF)/NO:39294}  If yes, type of TF/TPN and rate: ***    PMH, PSH, FH, SH updated and reviewed     ROS:  Unable to obtain due to: {Blank single:19197::"Sedation,Intubation,Altered mental status,Critical illness,Reviewed ROS from note dated ***"}    Review of Systems    {Select Subjective/Objective Type:113812392}     PHYSICAL EXAMINATION:  Vitals:    04/20/19 0816   BP: (!) 111/57   Pulse: 80   Resp: 20   Temp: 96.1 °F (35.6 °C)     Body mass index is 24.4 kg/m².    Physical Exam  "

## 2019-04-23 NOTE — PATIENT INSTRUCTIONS
Left- or Right- Side Congestive Heart Failure (CHF)    The heart is a large muscle. It is a pump that circulates blood throughout the body. Blood carries oxygen to all of the organs, including the brain, muscles, and skin. After your body takes the oxygen out of the blood, the blood returns to the heart. The right side of the heart collects the blood from the body and pumps it to the lungs. In the lungs, it gets fresh oxygen and gives up carbon dioxide. The oxygen-rich blood from the lungs then returns to the left side of the heart, where it is pumped back out to the rest of your body, starting the process all over.  Congestive heart failure (CHF) occurs when the heart muscle is weakened. This affects the pumping action of the heart. Heart failure can affect the right side of the heart or the left side. But heart failure may affect not only the right side of the heart or only the left side. Although it may have started on one side, it can and often eventually does affect both sides.  Right-side heart failure  When the right side of the heart is weakened, it cant handle the blood it is getting from the rest of the body. This blood returns to the heart through veins. When too much pressure builds up in the veins, fluid leaks out into the tissues. Gravity then causes that fluid to move to those parts of the body that are the lowest. So one of the first symptoms of right-side CHF can include swelling in the feet and ankles. If the condition gets worse, the swelling can even go up past the knees. Sometimes it gets so severe, the liver can get congested as well.  Left-side heart failure  When the left side of the heart is weakened, it cant handle the blood it gets from the lungs. Pressure then builds up in the veins of the lungs, causing fluid to leak into the lung tissues. This may cause CHF and pulmonary edema. This causes you to feel short of breath, weak, or dizzy. These symptoms are often worse with exertion,  such as when climbing stairs or walking up hills. Lying with your head flat is uncomfortable and can make your breathing worse. This may make sleeping difficult. You may need to use extra pillows to elevate your upper body to sleep well. The same is true when just resting during the daytime.  There are many causes of heart failure including:  · Coronary artery disease  · Past heart attack (also known as acute myocardial infarction, or AMI)  · High blood pressure  · Damaged heart valve  · Diabetes  · Obesity  · Cigarette smoking  · Alcohol abuse  Heart failure is a chronic condition. There is no cure. The purpose of medical treatment is to improve the pumping action of the heart. The main way to do this is to remove excess water from the body. A number of medicines can help reach this goal, improve symptoms, and prevent the heart from becoming weaker. Sometimes, heart failure can become so severe that a device is placed in the heart to help with pumping. Another major goal is to better treat the causes of heart failure, such as diabetes and high blood pressure, by making changes in your lifestyle and maximizing medical control when needed.  Home care  Follow these guidelines when caring for yourself at home:  · Check your weight every day. This is very important because a sudden increase in weight gain could mean worsening heart failure. Keep these things in mind:  ¨ Use the same scale every day.  ¨ Weigh yourself at the same time every day.  ¨ Make sure the scale is on a hard floor surface, not on a rug or carpet.  ¨ Keep a record of your weight every day so your healthcare provider can see it. If you are not given a log sheet for this, keep a separate journal for this purpose.   · Cut back on the amount of salt (sodium) you eat. Follow your healthcare provider's recommendation on how much salt or sodium you should have each day.  ¨ Avoid high-salt foods. These include olives, pickles, smoked meats, salted potato  chips, and most prepared foods.  ¨ Don't add salt to your food at the table. Use only small amounts of salt when cooking.  ¨ Read the labels carefully on food packages to learn how much salt or sodium is in each serving in the package. Remember, a can or package of food may contain more than 1 serving. So if you eat all the food in the package, you may be getting more salt than you think.  · Follow your healthcare provider's recommendations about how much fluid you should have. Be aware that some foods, such as soup, pudding, and juicy fruits like oranges or melons, contain liquid. You'll need to count the liquid in those foods as part of your daily fluid intake. Your provider can help you with this.  · Stop smoking.  · Cut back on how much alcohol you drink.  · Lose weight if you are overweight. The excess weight adds a lot of stress on the workload of the heart.  · Stay active. Talk with your provider about an exercise program that is safe for your heart.  · Keep your feet elevated to reduce swelling. Ask your provider about support hose as a preventive treatment for daytime leg swelling.  Besides taking your medicine as instructed, an important part of treatment is lifestyle changes. These include diet, physical activity, stopping smoking, and weight control.  Improve your diet by including more fresh foods, cutting back on how much sugar and saturated fat you eat, and eating fewer processed foods and less salt.  Follow-up care  Follow up with your healthcare provider, or as advised.  Make sure to keep any appointments that were made for you. These can help better control your congestive heart failure. You will need to follow up with your provider on a routine basis to make sure your heart failure is well managed.  If an X-ray, ECG, or other tests were done, you will be told of any new findings that may affect your care.  Call 911  Call 911 if you:  · Become severely short of breath  · Feel lightheaded, or feel  like you might pass out or faint  · Have chest pain or discomfort that is different than usual, the medicines your doctor told you to use for this don't help, or the pain lasts longer than 10 to 15 minutes  · You suddenly develop a rapid heart rate  When to seek medical advice  The following may be signs that your heart failure is getting worse. Call your healthcare provider right away if any of these happen:  · Sudden weight gain. This means 3 or more pounds in one day, or 5 or more pounds in 1 week.  · Trouble breathing not related to being active  · New or increased swelling of your legs or ankles  · Swelling or pain in your abdomen  · Breathing trouble at night. This means waking up short of breath or needing more pillows to breathe.  · Frequent coughing that doesnt go away  · Feeling much more tired than usual  Date Last Reviewed: 1/4/2016  © 9523-1021 [employer]. [address] [place] [address], [place] [address]. All rights reserved. This information is not intended as a substitute for professional medical care. Always follow your healthcare professional's instructions.

## 2019-04-26 NOTE — PROGRESS NOTES
Subjective:     HPI:  Mr. Solis is a very pleasant 76y/o male with HFrEF, ICMP (EF=20%), NYHA class III symptoms  S/p cardioMEMS implant (enrolled in GUIDE-HF study) who comes for a pos hospital discharge follow-up visit. Pt was admitted with acute decompensated heart failure. He was started on lasix gtt at 20 and diuresed a total of 2.5 L per the chart however this may not be accurate. He feels much better has been walking around with decreased shortness of breath. Additionally his entresto has been stopped due to his CKD and can be started back as an outpatient if is kidney function seems to improve. Today he feels good. Still reports NYHA class III symptoms. No PND or orthopnea. His weight today is 187 (at goal).     Past Medical History:   Diagnosis Date    Chronic systolic congestive heart failure 3/22/2018    Coronary artery disease involving native coronary artery of native heart without angina pectoris 3/22/2018    ICD (implantable cardioverter-defibrillator) in place 3/22/2018    Ischemic cardiomyopathy 3/22/2018    Mixed hyperlipidemia 3/22/2018    Type 2 diabetes mellitus with complication 3/22/2018    VT (ventricular tachycardia) 3/22/2018     Past Surgical History:   Procedure Laterality Date    CARDIOVERSION N/A 1/14/2019    Performed by [facility]ley [facility] at [facility] EP [facility]    ECHOCARDIOGRAM,TRANSESOPHAGEAL N/A 1/14/2019    Performed by [facility] at [facility] EP [facility]    ECHOCARDIOGRAM,TRANSESOPHAGEAL N/A 1/9/2019    Performed by [facility] Provider at [facility] EP [facility]    EXTRACTION, ELECTRODE LEAD Left 1/9/2019    Performed by Werner Boggs MD at [facility]    HEART CATH-RIGHT Right 4/5/2018    Performed by Elizabeth Wise MD at [facility] CATH LAB    INSERTION, ICD, BIVENTRICULAR Left 1/29/2019    Performed by Werner Boggs MD at [facility] EP [facility]    Insertion, Pacemaker, Temporary Transvenous  1/9/2019    Performed by Werner Boggs MD at [facility] EP LAB       Review of Systems  "  Constitution: Negative. Negative for chills, decreased appetite, diaphoresis, fever, malaise/fatigue, night sweats, weight gain and weight loss.   Eyes: Negative.    Cardiovascular: Positive for dyspnea on exertion. Negative for chest pain, claudication, cyanosis, irregular heartbeat, leg swelling, near-syncope, orthopnea, palpitations, paroxysmal nocturnal dyspnea and syncope.   Respiratory: Negative for cough, hemoptysis and shortness of breath.    Endocrine: Negative.    Hematologic/Lymphatic: Negative.    Skin: Negative for color change, dry skin and nail changes.   Musculoskeletal: Negative.    Gastrointestinal: Negative.    Genitourinary: Negative.    Neurological: Negative for weakness.       Objective:   Blood pressure (!) 92/50, pulse (!) 59, height 6' 1" (1.854 m), weight 85.4 kg (188 lb 4.4 oz).body mass index is 24.84 kg/m².  Physical Exam   Constitutional: He appears well-developed.   BP (!) 92/50 (BP Location: Right arm, Patient Position: Sitting, BP Method: Medium (Automatic))   Pulse (!) 59   Ht 6' 1" (1.854 m)   Wt 85.4 kg (188 lb 4.4 oz)   BMI 24.84 kg/m²      HENT:   Head: Normocephalic.   Neck: No JVD present. Carotid bruit is not present.   Cardiovascular: Regular rhythm, normal heart sounds and normal pulses. PMI is displaced.   No murmur heard.  Pulmonary/Chest: Effort normal and breath sounds normal. No respiratory distress. He has no wheezes. He has no rales.   Abdominal: Soft. Bowel sounds are normal. He exhibits no distension. There is no tenderness. There is no rebound.   Musculoskeletal: He exhibits edema.   Trace edema   Neurological: He is alert.   Skin: Skin is warm.   Vitals reviewed.      Labs:    Chemistry        Component Value Date/Time     04/26/2019 1019    K 4.2 04/26/2019 1019     04/26/2019 1019    CO2 26 04/26/2019 1019    BUN 78 (H) 04/26/2019 1019    CREATININE 2.3 (H) 04/26/2019 1019     (H) 04/26/2019 1019        Component Value Date/Time    " CALCIUM 9.4 04/26/2019 1019    ALKPHOS 149 (H) 04/26/2019 1019    AST 31 04/26/2019 1019    ALT 20 04/26/2019 1019    BILITOT 0.9 04/26/2019 1019    ESTGFRAFRICA 30.7 (A) 04/26/2019 1019    EGFRNONAA 26.6 (A) 04/26/2019 1019          Magnesium   Date Value Ref Range Status   04/20/2019 1.9 1.6 - 2.6 mg/dL Final     Lab Results   Component Value Date    WBC 5.92 04/20/2019    HGB 9.4 (L) 04/20/2019    HCT 28.4 (L) 04/20/2019    PLT 79 (L) 04/20/2019     Lab Results   Component Value Date    INR 1.2 02/06/2019    INR 1.2 02/05/2019    INR 1.4 (H) 02/04/2019     BNP   Date Value Ref Range Status   04/26/2019 1,967 (H) 0 - 99 pg/mL Final     Comment:     Values of less than 100 pg/ml are consistent with non-CHF populations.   04/16/2019 1,185 (H) 0 - 99 pg/mL Final     Comment:     Values of less than 100 pg/ml are consistent with non-CHF populations.   04/15/2019 1,185 (H) 0 - 99 pg/mL Final     Comment:     Values of less than 100 pg/ml are consistent with non-CHF populations.       Assessment:      1. Chronic systolic congestive heart failure    2. Ischemic cardiomyopathy    3. Biventricular implantable cardioverter-defibrillator (ICD) in situ        Plan:   Clinically stable. NYHA class III symptoms  Creatinine continues to improve but still not at baseline. I woudl like to recheck a BMP next week and decide if we can safely resume his Entresto.   Recommend 2 gram sodium restriction and 1500cc fluid restriction.  Encourage physical activity with graded exercise program.  Requested patient to weigh themselves daily, and to notify us if their weight increases by more than 3 lbs in 1 day or 5 lbs in 1 week.   Repeat BMP on may 6  RTC in 3 months with labs     Mela Nails MD

## 2019-04-26 NOTE — PROGRESS NOTES
Mr. Solis is a patient of Dr. Boggs.      Subjective:   Patient ID:  Jerry Solis is a 78 y.o. male who presents for follow-up of Cardiomyopathy  .     HPI:    Mr. Solis is a 78 y.o. male with HFrEF, ICD, cardiomems, CAD, chronic AF, COPD, DM, hypothyroid, HLD, CVA here for follow up after device extraction and implantation.    Background:    78 M with PMH HFrEF/ICM s/p ICD S/p cardioMEMS implant (enrolled in GUIDE-HF study), CAD ([facility] 3/18 with patent stents in proximal OM and ostial to mid RCA) AF on eliquis and digoxin, COPD, DM, hypothyroidism, HLD, prior CVA transferred to [facility] for evaluation of possible RV lead infection. The patient developed a GI illness with nausea, vomiting and diarrhea prior to his initial presentation to the hospital. He was febrile and was admitted with sepsis at [facility]. On initial presentation BP was 78/44, Creatinine 2.63, was started on pressors and transferred to [facility] for a higher level of care. He was resuscitated with IV fluids at [facility] as he was thought to have volume depletion. Blood cultures grew G + cocci and the patient was treated with Vanc + Cefepime. He was net +6L and EF was found to be 30-35%. RICARDO with possible RV lead echodensity suggestive of vegetation.     Pt underwent ICD extraction on 1/9/19. Patient had 2 vegetations on RV lead which is most likely source of bacteremia. Lead tip and ICD pocket cultured with RV lead +ve for Enteroccocus. Blood cultures remained negative here. Post op course was complicated by TREY likely due to ATN/hypotension. Pt had to be transferred to [facility] and started on Epi and Miesha, which were weaned off as kidneys improved.     He has a history of persistent AF (per chart was once on sotalol but this was discontinued by local cardiologist). Underwent unsuccessful RICARDO/DCCV 1/14/2019.    Pt underwent successful reimplantation of BiV ICD 1/29/19. EP is considering repeat RICARDO/DCCV A fib at some point. Will perform DFT as  an outpatient. ID's final recommendations were Ampicillin and Ceftriaxone for 6 weeks post extraction. Pt d/c'ed home on IV Abx via PICC with Home Health care.     Update (04/26/2019):    He was hospitalized 4/15/2019 with CHF exacerbation. Feels better today. Still fatigued. No significant edema. Chronic CORDOBA. Denies palpitations, CP, syncope. Has diarrhea from antibiotics.     He is currently taking eliquis 5mg BID for stroke prophylaxis and denies significant bleeding episodes. He was recently taken off digoxin. Kidney function is decreased but stable, with a creatinine of 2.5 on 4/20/2019.    Device Interrogation (4/26/2019) reveals an intrinsic AF with stable lead and device function.  He paces 23% in the RA and 88% in the BiV. AF burden 96%. PVC burden ~3%. Estimated battery longevity 4 years. AMS vida rate increased from 80 to 85bpm. LV output decreased from 6V to 4V. RV output decreased from 3.5V to 2V. Mode changed from DDD to VVI.     I have personally reviewed the patient's EKG today, which shows AF with V pacing at 62bpm. QRS is 170ms.    Recent Cardiac Tests:    2D Echo (4/15/2019):  · Severe left atrial enlargement.  · Moderate left ventricular enlargement.  · Eccentric left ventricular hypertrophy.  · Severely decreased left ventricular systolic function. The estimated ejection fraction is 20%  · Segmental wall motion abnormalities.  · Left ventricular diastolic dysfunction.  · Moderate right ventricular enlargement.  · Moderately reduced right ventricular systolic function.  · Moderate tricuspid regurgitation.  · Mild mitral regurgitation.  · Intermediate central venous pressure (8 mm Hg).  · The estimated PA systolic pressure is 58 mm Hg  · Pulmonary hypertension present.    Current Outpatient Medications   Medication Sig    amoxicillin (AMOXIL) 500 MG capsule Take 1 capsule (500 mg total) by mouth every 8 (eight) hours.    apixaban (ELIQUIS) 5 mg Tab Take 1 tablet (5 mg total) by mouth 2 (two)  "times daily.    bumetanide (BUMEX) 1 MG tablet Take 2 tablets (2 mg total) by mouth 2 (two) times daily.    clopidogrel (PLAVIX) 75 mg tablet Take 1 tablet (75 mg total) by mouth once daily.    ferrous sulfate 325 (65 FE) MG EC tablet Take 1 tablet (325 mg total) by mouth 2 (two) times daily.    insulin (LANTUS SOLOSTAR U-100 INSULIN) glargine 100 units/mL (3mL) SubQ pen Inject 6 Units into the skin once daily.    insulin aspart U-100 (NOVOLOG FLEXPEN U-100 INSULIN) 100 unit/mL InPn pen Inject 10 Units into the skin 3 (three) times daily with meals. With low dose sliding scale    levothyroxine (SYNTHROID) 25 MCG tablet Take 1 tablet by mouth once daily.    loratadine (CLARITIN) 10 mg tablet Take 10 mg by mouth once daily.    multivitamin (THERAGRAN) per tablet Take 1 tablet by mouth once daily.    omega-3 acid ethyl esters (LOVAZA) 1 gram capsule Take 2 g by mouth 2 (two) times daily.    omeprazole (PRILOSEC) 40 MG capsule Take 40 mg by mouth once daily.    pen needle, diabetic (LITE TOUCH INSULIN PEN NEEDLES) 31 gauge x 5/16" Ndle 1 each by Misc.(Non-Drug; Combo Route) route 4 (four) times daily.    potassium chloride SA (KLOR-CON M20) 20 MEQ tablet Take 3 tablets (60 mEq total) by mouth once daily.    rOPINIRole (REQUIP) 0.5 MG tablet Take 0.5 mg by mouth.     rosuvastatin (CRESTOR) 20 MG tablet Take 1 tablet (20 mg total) by mouth every evening.    tamsulosin (FLOMAX) 0.4 mg Cap Take 1 capsule (0.4 mg total) by mouth once daily.    TRELEGY ELLIPTA 100-62.5-25 mcg DsDv once daily.     VENTOLIN HFA 90 mcg/actuation inhaler Inhale 2 puffs into the lungs every 4 (four) hours as needed.      No current facility-administered medications for this visit.      Review of Systems   Constitution: Positive for malaise/fatigue.   Cardiovascular: Positive for dyspnea on exertion. Negative for chest pain, irregular heartbeat, leg swelling and palpitations.   Respiratory: Negative for shortness of breath.  " "  Hematologic/Lymphatic: Negative for bleeding problem.   Skin: Negative for rash.   Musculoskeletal: Negative for myalgias.   Gastrointestinal: Negative for hematemesis, hematochezia and nausea.   Genitourinary: Negative for hematuria.   Neurological: Negative for light-headedness.   Psychiatric/Behavioral: Negative for altered mental status.   Allergic/Immunologic: Negative for persistent infections.     Objective:        /60   Pulse 62   Ht 6' 1" (1.854 m)   Wt 87 kg (191 lb 12.8 oz)   BMI 25.30 kg/m²     Physical Exam   Constitutional: He is oriented to person, place, and time. He appears well-developed and well-nourished.   HENT:   Head: Normocephalic.   Nose: Nose normal.   Eyes: Pupils are equal, round, and reactive to light.   Cardiovascular: Normal rate, regular rhythm, S1 normal and S2 normal.   No murmur heard.  Pulses:       Radial pulses are 2+ on the right side, and 2+ on the left side.   Pulmonary/Chest: Breath sounds normal. No respiratory distress.   Device to RUCW.   Abdominal: Normal appearance.   Musculoskeletal: Normal range of motion. He exhibits no edema.   Neurological: He is alert and oriented to person, place, and time.   Skin: Skin is warm and dry. No erythema.   Psychiatric: He has a normal mood and affect. His speech is normal and behavior is normal.   Nursing note and vitals reviewed.    Lab Results   Component Value Date     04/26/2019    K 4.2 04/26/2019    MG 1.9 04/20/2019    BUN 78 (H) 04/26/2019    CREATININE 2.3 (H) 04/26/2019    ALT 20 04/26/2019    AST 31 04/26/2019    HGB 9.4 (L) 04/20/2019    HCT 28.4 (L) 04/20/2019    HCT 27 (L) 01/09/2019    TSH 2.459 04/06/2018       Recent Labs   Lab 02/03/19  0605 02/04/19  0649 02/05/19  0521 02/06/19  0338   INR 1.7 H 1.4 H 1.2 1.2         Assessment:     1. Persistent atrial fibrillation    2. VT (ventricular tachycardia)    3. Biventricular implantable cardioverter-defibrillator (ICD) in situ    4. Ischemic " cardiomyopathy      Plan:     In summary, Mr. Solis is a 78 y.o. male with HFrEF, ICD, cardiomems, CAD, chronic AF, COPD, DM, hypothyroid, HLD, CVA here for follow up after device extraction and implantation.  Device and leads functioning appropriately. BP pacing 86% and patient is in persistent AF- AMS base rate increased and mode changed to VVI. Case discussed with Dr. Boggs. Will also restart metoprolol 25mg (patient requests starting with half tablet 1st due to low BP). Per chart, plan is to attempt RICARDO/DCCV again once patient is hemodynamically stable. DTS also due (not completed during implantation because patient had been on pressors). Will schedule both procedures same time. Advised patient to start probiotics for diarrhea due to abx.     Start taking half a tablet of metoprolol succinate (12.5mg) daily. Can move to full tablet if blood pressure is ok.  Start taking probiotics until antibiotics are finished.   Continue other medications.  Schedule DFTs and RICARDO/DCCV.  RTC as scheduled following procedures.    *A copy of this note has been sent to Dr. Boggs*    Follow up as scheduled following procedure.    ------------------------------------------------------------------    COURT Mata, NP-C  Cardiac Electrophysiology

## 2019-04-26 NOTE — PATIENT INSTRUCTIONS
Start taking probiotics.   Start taking half a tablet of metoprolol succinate (12.5mg) daily. Can move to full tablet if blood pressure is ok.  Continue other medications.  Schedule DFTs and RICARDO/DCCV.

## 2019-04-26 NOTE — PROGRESS NOTES
Study: GUIDE-HF  Sponsor: [employer]  Follow-up Visit: Month 12 Visit  Date of Visit: 4/26/2019     Patient wishes to continue in study: Yes  All study protocol required CRFs completed: Yes     Mr. Solis is here for the Month 12 follow up visit. EQ-5D-5L and KCCQ-12 questionnaires completed prior to all other study procedures per protocol. Physical exam and NYHA assessment performed per Dr. Nails. Heart failure medications reviewed and confirmed. Labs performed per protocol. 6 minute juarez walk test conducted by CRC per protocol. Patient walked a total of 180 meters, however, the patient stopped after 4 minute and 20 seconds due to fatigue and bilateral leg cramping. Vital signs remained stable at patient's baseline (Before: BP 124/69 HR 55 SaO2 95% RA; After: BP 122/69 HR 60 SaO2 97% RA). Patient has had hospitalizations and ED visits with associated adverse events since previous study follow-up which have all been reported to [facility] per protocol requirements. Patient has been compliant with daily pressure transmissions. Patient encouraged to continue taking daily pressure measurements. Patient instructed that all study follow-up has been completed at this time. Following the completion of all study required procedures, patient notified that he was assigned to the control group during his trial participation. Patient and patient's family notified that at this time the patient's pressure information will become available for the HF team to begin treatment and management with the use of CardioMEMS pressure readings. Complete understanding verbalized. All questions answered to patient's satisfaction. GUIDE-HF Request for Viewing PA Pressure Data and Request for [email or web address] Transfer forms submitted to [employer] at study completion. Study completion entered into Epic and On[employer].

## 2019-04-26 NOTE — PROGRESS NOTES
CARDIOVERSION EDUCATION CHECK LIST     5/6/19 @ 8 AM   Report to Cardiology Waiting Room on 3rd floor of the hospital  · Do not eat or drink anything after 12 mn on the night before your procedure.  · Please do not wear makeup (especially mascara) when arriving for your procedure.    Medications:  · Take half dose of Lantus night prior, and no insulin in AM (Lantus or insulin aspart).   · You may take your usual morning medications with a sip of water    Directions to the Cardiology Waiting Room  If you park in the Parking Garage:  Take elevators to the 2nd floor  Walk up ramp and turn right by Gold Elevators  Take elevator to the 3rd floor  Upon exiting the elevator, turn away from the clinic areas  Walk long juarez around to front of hospital to area with windows overlooking [facility]  Check in at Reception Desk  OR  If family is dropping you off:  Have them drop you off at the front of the Hospital  (Near the ER, where all the flags are hung).  Take the E elevators to the 3rd floor.  Check in at the Reception Desk in the waiting room.    · YOU WILL BE GOING HOME THE SAME DAY AS YOUR PROCEDURE  · YOU WILL NEED SOMEONE TO DRIVE YOU HOME AFTER YOUR PROCEDURE   · YOUR PAIN DURING YOUR PROCEDURE WILL BE MANAGED BY THE ANESTHESIA TEAM      Any need to reschedule or cancel procedures, or any questions regarding your procedure should be addressed directly with the Arrhythmia Department Nurses at the following phone number: [phone]    THE ABOVE INSTRUCTIONS WERE GIVEN TO THE PATIENT VERBALLY AND THEY VERBALIZED UNDERSTANDING. THEY DO NOT REQUIRE ANY SPECIAL NEEDS AND DO NOT HAVE ANY LEARNING BARRIERS.

## 2019-04-29 NOTE — TELEPHONE ENCOUNTER
Spoke with patient provided potential date for EP procedure. Patient in agreement. Will sent instructions via portal and mail.   Will get lab work at [facility] in [facility].  Josiah GOOD CCM

## 2019-04-30 NOTE — PLAN OF CARE
How Severe Is It?: moderate [facility]   Heart Transplant/VAD Clinic   [address] [place] [address]   [phone] after hours          [phone] fax     VAD HOME  HEALTH ORDERS      Admit to Home Health    Diagnosis:   Patient Active Problem List   Diagnosis    Acute on chronic combined systolic and diastolic CHF, NYHA class 3    Ischemic cardiomyopathy    VT (ventricular tachycardia)    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    Type 2 diabetes mellitus with complication    A-fib    TREY (acute kidney injury)    CHF (congestive heart failure)    Hypokalemia    Bacteremia    Endocarditis    ICD (implantable cardioverter-defibrillator) in place    Infection associated with cardiac device    Junctional bradycardia    Dysuria    Bacteremia due to Enterococcus    Moderate malnutrition    Leukocytosis       Patient is homebound due to:  NYHA Class IV HF. S/P LVAD placement.     Diet: Low Fat, Low cholesterol, 2Gm Na, Coumadin restrictions.    Acitivities: No Swimming, bathing, vacuuming, contact sports.    Fresh implants= Sternal Precautions    Nursing:   SN to complete comprehensive assessment including routine vital signs. Instruct on disease process and s/s of complications to report to MD. Review/verify medication list sent home with the patient at time of discharge  and instruct patient/caregiver as needed. Frequency may be adjusted depending on start of care date.    **LVAD driveline exit site dressing change is to be completed per LVAD patient/caregiver only**.    Notify MD if:  SBP > 120 or < 80;   MAP > 80 or < 65;   HR > 120 or < 60;   Temp > 101;   Weight gain >3lbs in 1 day or 5lbs in 1 week.    LABS:  SN to perform labs: PT/INR per Coumadin clinic [phone].   Follow up INR date: Monday, 2/4/19  No Finger Sticks    Weekly CBC, CMP, ESR and CRP starting Monday, 2/4/19, with results faxed to ID clinic    HOME INFUSION THERAPY:     SN to perform Infusion Therapy/Central Line Care.  Review Central Line Care & Central Line Flush with patient.    Administer (drug and dose): Meropenem 2 grams IV every 8 hours    Central line care:  Scrub the Hub: Prior to accessing the line, always perform a 30 second alcohol scrub  Each lumen of the central line is to be flushed at least daily with 10 mL Normal Saline and 3 mL Heparin flush (100 units/mL)  Skilled Nurse (SN) may draw blood from IV access  Blood Draw Procedure:   - Aspirate at least 5 mL of blood   - Discard   - Obtain specimen   - Change posiflow cap   - Flush with 20 mL Normal Saline followed by a                 3-5 mL Heparin flush (100 units/mL)  Central :   - Sterile dressing changes are done weekly and as needed.   - Use chlor-hexadine scrub to cleanse site, apply Biopatch to insertion site,       apply securement device dressing   - Posi-flow caps are changed weekly and after EVERY lab draw.   - If sterile gauze is under dressing to control oozing,                 dressing change must be performed every 24 hours until gauze is not needed.        Send initial Home Health orders to [facility] attending physician on call.  Send follow up questions to [facility] clinic MD [phone] or [phone].     Is This A New Presentation, Or A Follow-Up?: Follow Up Bullous Pemphigoid Additional History: Pt is still on the Prednisone.

## 2019-04-30 NOTE — PROGRESS NOTES
Spoke with Shwetha in RICARDO- okay to proceed with RICARDO/DCCv and DFT on 5-6-19 in RICARDO room.      Josiah GOOD CCM

## 2019-05-02 NOTE — PROGRESS NOTES
05/02/19 1500   OTHER   Transmission Date 05/02/19   Transmission Type  Maintenance   PAS 54   SUSIE 34   PAD  21   Medication Adjustments  No

## 2019-05-02 NOTE — TELEPHONE ENCOUNTER
Spoke to PATIENT'S WIFE-    CONFIRMED procedure arrival time of 5-6-19 7AM FOR RICARDO/DCCV/DFT WITH   Reiterated instructions including:  -Directions to check in desk  -NPO after midnight night prior to procedure  -High importance of HOLDING NOVOLOG ON DAY OF PROCEDURE AND TAKING ONLY HALF NORMAL DOSE OF LANTUS ON THE NIGHT BEFORE THE PROCEDURE   -Confirmed compliance of ELIQUIS BID  -Pre-procedure LABS BUN 94, CREATNINE 3.32, RBC3.49, HGB 10.2, HCT 30.6-- NOTIFIED -- MD WOULD LIKE TO RE-DRAW CBC AND BMP ON ADMIT- ORDERS SIGN AND HELD TO BE RE-DRAWN STAT UPON ADMIT.       Pt verbalizes understanding of above and appreciates call.    Josiah GOOD CCM

## 2019-05-06 PROBLEM — I50.22 CHRONIC SYSTOLIC CHF (CONGESTIVE HEART FAILURE), NYHA CLASS 3: Status: ACTIVE | Noted: 2018-05-18

## 2019-05-06 PROBLEM — R10.9 ABDOMINAL PAIN: Status: ACTIVE | Noted: 2019-01-01

## 2019-05-06 NOTE — H&P
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 Cr up to 3.3 from baseline ~2.2 and WBC count up to 17. Procedure was cancelled for above reason. He is accompanied by his wife. He states that he has been having abdominal pain and vomiting for the last few weeks. He saw outside MD who told him that he had gallstones. He had episode of diarrhea this morning and has only been able to tolerate toast, crackers, and sips of water. Denies CP, palpitations, syncope, presyncope, fevers, PND. Cardiomems measurements yesterday with PA 57/19(33) which seems to be below his baseline.     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    Ischemic cardiomyopathy 3/22/2018    Mixed hyperlipidemia 3/22/2018    Type 2 diabetes mellitus with complication 3/22/2018    VT (ventricular tachycardia) 3/22/2018       Past Surgical History:   Procedure Laterality Date    CARDIOVERSION N/A 1/14/2019    Performed by [facility] MD at [facility] EP LAB    ECHOCARDIOGRAM,TRANSESOPHAGEAL N/A 1/14/2019    Performed by [facility]ley MD at [facility] EP LAB     ECHOCARDIOGRAM,TRANSESOPHAGEAL N/A 1/9/2019    Performed by [facility] Diagnostic Provider at [facility] EP LAB    EXTRACTION, ELECTRODE LEAD Left 1/9/2019    Performed by Werner Boggs MD at [facility] EP LAB    HEART CATH-RIGHT Right 4/5/2018    Performed by Elizabeth Wise MD at [facility] CATH LAB    INSERTION, ICD, BIVENTRICULAR Left 1/29/2019    Performed by Werner Boggs MD at [facility] EP LAB    Insertion, Pacemaker, Temporary Transvenous  1/9/2019    Performed by Werner Boggs MD at [facility] EP LAB       Review of patient's allergies indicates:   Allergen Reactions    Adhesive Other (See Comments)     blisters    Codeine Other (See Comments)     Blurred vision    Latex Hives    Ace inhibitors     Lipitor [atorvastatin] Other (See Comments)     Muscle spasms    Xanax [alprazolam] Hallucinations       No current facility-administered medications for this encounter.      Family History     None        Tobacco Use    Smoking status: Former Smoker    Smokeless tobacco: Former User   Substance and Sexual Activity    Alcohol use: Yes     Comment: former drinker    Drug use: No    Sexual activity: Not on file     Review of Systems   Constitutional: Negative.    HENT: Negative.    Eyes: Negative.    Respiratory: Negative.    Cardiovascular: Negative.    Gastrointestinal: Positive for abdominal distention, abdominal pain, diarrhea, nausea and vomiting.   Genitourinary: Negative.    Neurological: Negative.    Psychiatric/Behavioral: Negative.    All other systems reviewed and are negative.    Objective:     Vital Signs (Most Recent):  Temp: 96.3 °F (35.7 °C) (05/06/19 0745)  Pulse: 60 (05/06/19 0745)  Resp: 18 (05/06/19 0745)  BP: (!) 110/56 (05/06/19 0746)  SpO2: 99 % (05/06/19 0745) Vital Signs (24h Range):  Temp:  [96.3 °F (35.7 °C)] 96.3 °F (35.7 °C)  Pulse:  [60] 60  Resp:  [18] 18  SpO2:  [99 %] 99 %  BP: (110-113)/(56-58) 110/56     Patient Vitals for the past 72 hrs (Last 3 readings):   Weight   05/06/19 0745 84.4 kg  (186 lb)     Body mass index is 24.54 kg/m².    No intake or output data in the 24 hours ending 05/06/19 1216    Physical Exam   Constitutional: He is oriented to person, place, and time. He appears well-developed and well-nourished.   HENT:   Head: Normocephalic.   Eyes: Pupils are equal, round, and reactive to light.   Neck: Normal range of motion. Neck supple. No JVD present.   Cardiovascular: Normal rate and regular rhythm.   Murmur heard.  Pulmonary/Chest: Effort normal and breath sounds normal.   Abdominal: Bowel sounds are normal. He exhibits distension.   Musculoskeletal: Normal range of motion.   Neurological: He is alert and oriented to person, place, and time.   Skin: Skin is warm and dry.   Psychiatric: He has a normal mood and affect. His behavior is normal.   Nursing note and vitals reviewed.    Significant Labs:  CBC:  Recent Labs   Lab 05/06/19  0721   WBC 17.57*   RBC 3.72*   HGB 10.9*   HCT 31.9*   PLT 90*   MCV 86   MCH 29.3   MCHC 34.2     BNP:  No results for input(s): BNP in the last 168 hours.    Invalid input(s): BNPTRIAGELBLO  CMP:  Recent Labs   Lab 05/06/19  0705 05/06/19  0721   * 212*   CALCIUM 9.4 9.3   * 133*   K 4.2 4.5   CO2 20* 18*   CL 99 99   BUN 95* 96*   CREATININE 3.3* 3.3*      Coagulation:   No results for input(s): PT, INR, APTT in the last 168 hours.  LDH:  No results for input(s): LDH in the last 72 hours.  Microbiology:  Microbiology Results (last 7 days)     Procedure Component Value Units Date/Time    Blood culture [[id number]] Collected:  05/06/19 1214    Order Status:  Sent Specimen:  Blood Updated:  05/06/19 1214    Blood culture [[id number]] Collected:  05/06/19 1214    Order Status:  Sent Specimen:  Blood Updated:  05/06/19 1214    Blood culture [[id number]]     Order Status:  Canceled Specimen:  Blood     Blood culture [[id number]]     Order Status:  Canceled Specimen:  Blood         I have reviewed all pertinent labs within the past 24  hours.    Diagnostic Results:  I have reviewed all pertinent imaging results/findings within the past 24 hours.    Assessment/Plan:     * TREY (acute kidney injury)  -Hemodynamics yesterday with PAD below baseline.  -Will hold diuretics in light of poor PO intake/vomiting/diarrhea.  -Will get UA    Leukocytosis  -Pt with recent ICD extraction secondary to enterococcus.  -Blood cultures X 2. UA with reflex culture.  -Will check Stool studies.   -Will kenn get abd u/s as below.     Abdominal pain  -PT reports he was diagnosed with gallstones at OSH.  -Will get U/S here.  -Add on hepatic profile, amylase/lipase.    A-fib  -RICARDO/DCCV on hold until current issues resolve.  -Continue metoprolol, apixiban.   -Will need to hold apixiban if any procedures needed.     Chronic systolic CHF (congestive heart failure), NYHA class 3  -PAD below baseline yesterday on review.  -Holding diuretics.  -Continue metoprolol.        Chris Merchant NP  Heart Transplant  [facility]

## 2019-05-06 NOTE — PLAN OF CARE
Patient had green stool and slight blood in sputum. Will get CT chest abdomen/ pelvis without contrast. Stool studies ordered by am team. Respiratory culture.     D/W Dr. Horne

## 2019-05-06 NOTE — HPI
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 Cr up to 3.3 from baseline ~2.2 and WBC count up to 17. Procedure was cancelled for above reason. He is accompanied by his wife. He states that he has been having abdominal pain and vomiting for the last few weeks. He saw outside MD who told him that he had gallstones. He had episode of diarrhea this morning and has only been able to tolerate toast, crackers, and sips of water. Denies CP, palpitations, syncope, presyncope, fevers, PND. Cardiomems measurements yesterday with PA 57/19(33) which seems to be below his baseline.

## 2019-05-06 NOTE — ASSESSMENT & PLAN NOTE
-Pt with recent ICD extraction secondary to enterococcus.  -Blood cultures X 2. UA with reflex culture.  -Will check Stool studies.   -Will kenn get abd u/s as below.

## 2019-05-06 NOTE — PLAN OF CARE
Problem: Adult Inpatient Plan of Care  Goal: Plan of Care Review  Outcome: Ongoing (interventions implemented as appropriate)  Pt is MARIA G CUMMINS, Ox4. Calm, cooperative. Free of falls, trauma, and injuries. Skin intact. Pt educated on treatment plan. Pt demonstrates and verbalizes understanding. VSS. Blood cultures done. UA sent to lab. Pending BM to send stool to lab. BG monitored ACHS. Monitor for N/V/D. Plan of care reviewed with pt.

## 2019-05-06 NOTE — NURSING TRANSFER
Nursing Transfer Note      5/6/2019     Transfer To: 323 from ultrasound    Transfer via stretcher    Transported by escort    Medicines sent: none    Chart send with patient: Yes    Notified: spouse at bedside    Patient reassessed at: 05/06/19 1200    Report called to FLORENTINO Albarado. Patient leaving for ultrasound now. Ultrasound notified of assigned room. Family brought to 323.

## 2019-05-06 NOTE — ASSESSMENT & PLAN NOTE
-RICARDO/DCCV on hold until current issues resolve.  -Continue metoprolol, apixiban.   -Will need to hold apixiban if any procedures needed.

## 2019-05-06 NOTE — SUBJECTIVE & OBJECTIVE
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    Ischemic cardiomyopathy 3/22/2018    Mixed hyperlipidemia 3/22/2018    Type 2 diabetes mellitus with complication 3/22/2018    VT (ventricular tachycardia) 3/22/2018       Past Surgical History:   Procedure Laterality Date    CARDIOVERSION N/A 1/14/2019    Performed by [facility] EP LAB    ECHOCARDIOGRAM,TRANSESOPHAGEAL N/A 1/14/2019    Performed by [facility]ley MD at [facility] EP LAB    ECHOCARDIOGRAM,TRANSESOPHAGEAL N/A 1/9/2019    Performed by [facility] Diagnostic Provider at [facility] EP LAB    EXTRACTION, ELECTRODE LEAD Left 1/9/2019    Performed by Werner Boggs MD at [facility] EP LAB    HEART CATH-RIGHT Right 4/5/2018    Performed by Elizabeth Wise MD at [facility] CATH LAB    INSERTION, ICD, BIVENTRICULAR Left 1/29/2019    Performed by Werner Boggs MD at [facility] EP LAB    Insertion, Pacemaker, Temporary Transvenous  1/9/2019    Performed by Werner Boggs MD at [facility] EP LAB       Review of patient's allergies indicates:   Allergen Reactions    Adhesive Other (See Comments)     blisters    Codeine Other (See Comments)     Blurred vision    Latex Hives    Ace inhibitors     Lipitor [atorvastatin] Other (See Comments)     Muscle spasms    Xanax [alprazolam] Hallucinations       No current facility-administered medications for this encounter.      Family History     None        Tobacco Use    Smoking status: Former Smoker    Smokeless tobacco: Former User   Substance and Sexual Activity    Alcohol use: Yes     Comment: former drinker    Drug use: No    Sexual activity: Not on file     Review of Systems   Constitutional: Negative.     HENT: Negative.    Eyes: Negative.    Respiratory: Negative.    Cardiovascular: Negative.    Gastrointestinal: Positive for abdominal distention, abdominal pain, diarrhea, nausea and vomiting.   Genitourinary: Negative.    Neurological: Negative.    Psychiatric/Behavioral: Negative.    All other systems reviewed and are negative.    Objective:     Vital Signs (Most Recent):  Temp: 96.3 °F (35.7 °C) (05/06/19 0745)  Pulse: 60 (05/06/19 0745)  Resp: 18 (05/06/19 0745)  BP: (!) 110/56 (05/06/19 0746)  SpO2: 99 % (05/06/19 0745) Vital Signs (24h Range):  Temp:  [96.3 °F (35.7 °C)] 96.3 °F (35.7 °C)  Pulse:  [60] 60  Resp:  [18] 18  SpO2:  [99 %] 99 %  BP: (110-113)/(56-58) 110/56     Patient Vitals for the past 72 hrs (Last 3 readings):   Weight   05/06/19 0745 84.4 kg (186 lb)     Body mass index is 24.54 kg/m².    No intake or output data in the 24 hours ending 05/06/19 1216    Physical Exam   Constitutional: He is oriented to person, place, and time. He appears well-developed and well-nourished.   HENT:   Head: Normocephalic.   Eyes: Pupils are equal, round, and reactive to light.   Neck: Normal range of motion. Neck supple. No JVD present.   Cardiovascular: Normal rate and regular rhythm.   Murmur heard.  Pulmonary/Chest: Effort normal and breath sounds normal.   Abdominal: Bowel sounds are normal. He exhibits distension.   Musculoskeletal: Normal range of motion.   Neurological: He is alert and oriented to person, place, and time.   Skin: Skin is warm and dry.   Psychiatric: He has a normal mood and affect. His behavior is normal.   Nursing note and vitals reviewed.    Significant Labs:  CBC:  Recent Labs   Lab 05/06/19  0721   WBC 17.57*   RBC 3.72*   HGB 10.9*   HCT 31.9*   PLT 90*   MCV 86   MCH 29.3   MCHC 34.2     BNP:  No results for input(s): BNP in the last 168 hours.    Invalid input(s): BNPTRIAGELBLO  CMP:  Recent Labs   Lab 05/06/19  0705 05/06/19  0721   * 212*   CALCIUM 9.4 9.3   * 133*    K 4.2 4.5   CO2 20* 18*   CL 99 99   BUN 95* 96*   CREATININE 3.3* 3.3*      Coagulation:   No results for input(s): PT, INR, APTT in the last 168 hours.  LDH:  No results for input(s): LDH in the last 72 hours.  Microbiology:  Microbiology Results (last 7 days)     Procedure Component Value Units Date/Time    Blood culture [[id number]] Collected:  05/06/19 1214    Order Status:  Sent Specimen:  Blood Updated:  05/06/19 1214    Blood culture [[id number]] Collected:  05/06/19 1214    Order Status:  Sent Specimen:  Blood Updated:  05/06/19 1214    Blood culture [[id number]]     Order Status:  Canceled Specimen:  Blood     Blood culture [[id number]]     Order Status:  Canceled Specimen:  Blood         I have reviewed all pertinent labs within the past 24 hours.    Diagnostic Results:  I have reviewed all pertinent imaging results/findings within the past 24 hours.

## 2019-05-06 NOTE — HPI
Jerry Solis is a 78 y.o. 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    Dysphagia or odynophagia:  No  Liver Disease, esophageal disease, or known varices:  No  Upper GI Bleeding: No  Snoring:  No  Sleep Apnea:  No  Prior neck surgery or radiation:  No  History of anesthetic difficulties:  No  Family history of anesthetic difficulties:  No  Last oral intake:  12 hours ago  Able to move neck in all directions:  Yes  Anticoagulation/Antiplatelets:Eliquis

## 2019-05-06 NOTE — ASSESSMENT & PLAN NOTE
-Hemodynamics yesterday with PAD below baseline.  -Will hold diuretics in light of poor PO intake/vomiting/diarrhea.  -Will get UA

## 2019-05-06 NOTE — ASSESSMENT & PLAN NOTE
-PT reports he was diagnosed with gallstones at OSH.  -Will get U/S here.  -Add on hepatic profile, amylase/lipase.

## 2019-05-06 NOTE — HPI
78 year old male with PMH HFrEF/ICM s/p ICD S/p cardioMEMS implant (enrolled in GUIDE-HF study), CAD ([facility] 3/18 with patent stents in proximal OM and ostial to mid RCA), AF, COPD, DM, hypothyroidism, HLD, prior CVA 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 The patient has not been feeling well for the past few weeks, his main concern is he had been feeling very weak. He has also had a cough during this period with sputum production and experienced hemoptysis on the day of admission. He reports occasional chills and appetite loss. He denies fever, night sweats, sick contacts or any inflammation/drainage/color change of his device pocket. He denies chest pain, CORDOBA beyond baseline, orthopnea or PND. He reports occasional peripheral edema and irregular heart beats but has not experienced syncope.

## 2019-05-06 NOTE — SUBJECTIVE & OBJECTIVE
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    Ischemic cardiomyopathy 3/22/2018    Mixed hyperlipidemia 3/22/2018    Type 2 diabetes mellitus with complication 3/22/2018    VT (ventricular tachycardia) 3/22/2018       Past Surgical History:   Procedure Laterality Date    CARDIOVERSION N/A 1/14/2019    Performed by Ryan Henley MD at [facility] EP LAB    ECHOCARDIOGRAM,TRANSESOPHAGEAL N/A 1/14/2019    Performed by Ryan Henley MD at [facility] EP LAB    ECHOCARDIOGRAM,TRANSESOPHAGEAL N/A 1/9/2019    Performed by [facility] Diagnostic Provider at [facility] EP LAB    EXTRACTION, ELECTRODE LEAD Left 1/9/2019    Performed by Werner Boggs MD at [facility] EP LAB    HEART CATH-RIGHT Right 4/5/2018    Performed by Elizabeth Wise MD at [facility] CATH LAB    INSERTION, ICD, BIVENTRICULAR Left 1/29/2019    Performed by Werner Boggs MD at [facility] EP [facility]    Insertion, Pacemaker, Temporary Transvenous  1/9/2019    Performed by Werner Boggs MD at [facility] EP LAB       Review of patient's allergies indicates:   Allergen Reactions    Adhesive Other (See Comments)     blisters    Codeine Other (See Comments)     Blurred vision    Latex Hives    Ace inhibitors     Lipitor [atorvastatin] Other (See Comments)     Muscle spasms    Xanax [alprazolam] Hallucinations       No current facility-administered medications on file prior to encounter.      Current Outpatient Medications on File Prior to Encounter   Medication Sig    amoxicillin (AMOXIL) 500 MG capsule Take 1 capsule (500 mg total) by mouth every 8 (eight) hours.    apixaban (ELIQUIS) 5 mg Tab Take 1 tablet (5 mg total) by mouth 2 (two) times daily.    bumetanide (BUMEX) 1  "MG tablet Take 2 tablets (2 mg total) by mouth 2 (two) times daily.    clopidogrel (PLAVIX) 75 mg tablet Take 1 tablet (75 mg total) by mouth once daily.    ferrous sulfate 325 (65 FE) MG EC tablet Take 1 tablet (325 mg total) by mouth 2 (two) times daily.    insulin (LANTUS SOLOSTAR U-100 INSULIN) glargine 100 units/mL (3mL) SubQ pen Inject 6 Units into the skin once daily.    insulin aspart U-100 (NOVOLOG FLEXPEN U-100 INSULIN) 100 unit/mL InPn pen Inject 10 Units into the skin 3 (three) times daily with meals. With low dose sliding scale    levothyroxine (SYNTHROID) 25 MCG tablet Take 1 tablet by mouth once daily.    loratadine (CLARITIN) 10 mg tablet Take 10 mg by mouth once daily.    metoprolol succinate (TOPROL-XL) 25 MG 24 hr tablet Take 1 tablet (25 mg total) by mouth once daily.    multivitamin (THERAGRAN) per tablet Take 1 tablet by mouth once daily.    omega-3 acid ethyl esters (LOVAZA) 1 gram capsule Take 2 g by mouth 2 (two) times daily.    omeprazole (PRILOSEC) 40 MG capsule Take 40 mg by mouth once daily.    potassium chloride SA (KLOR-CON M20) 20 MEQ tablet Take 3 tablets (60 mEq total) by mouth once daily. (Patient taking differently: Take 40 mEq by mouth once daily. )    rOPINIRole (REQUIP) 0.5 MG tablet Take 0.5 mg by mouth.     rosuvastatin (CRESTOR) 20 MG tablet Take 1 tablet (20 mg total) by mouth every evening.    tamsulosin (FLOMAX) 0.4 mg Cap Take 1 capsule (0.4 mg total) by mouth once daily.    TRELEGY ELLIPTA 100-62.5-25 mcg DsDv every evening.     VENTOLIN HFA 90 mcg/actuation inhaler Inhale 2 puffs into the lungs every 4 (four) hours as needed.     pen needle, diabetic (LITE TOUCH INSULIN PEN NEEDLES) 31 gauge x 5/16" Ndle 1 each by Misc.(Non-Drug; Combo Route) route 4 (four) times daily.     Family History     None        Tobacco Use    Smoking status: Former Smoker    Smokeless tobacco: Former User   Substance and Sexual Activity    Alcohol use: Yes     Comment: " former drinker    Drug use: No    Sexual activity: Not on file     Review of Systems   All other systems reviewed and are negative.    Objective:     Vital Signs (Most Recent):  Temp: 96.3 °F (35.7 °C) (05/06/19 0745)  Pulse: 60 (05/06/19 0745)  Resp: 18 (05/06/19 0745)  BP: (!) 110/56 (05/06/19 0746)  SpO2: 99 % (05/06/19 0745) Vital Signs (24h Range):  Temp:  [96.3 °F (35.7 °C)] 96.3 °F (35.7 °C)  Pulse:  [60] 60  Resp:  [18] 18  SpO2:  [99 %] 99 %  BP: (110-113)/(56-58) 110/56     Weight: 84.4 kg (186 lb)  Body mass index is 24.54 kg/m².    SpO2: 99 %  O2 Device (Oxygen Therapy): room air    No intake or output data in the 24 hours ending 05/06/19 0901    Lines/Drains/Airways     Peripheral Intravenous Line                 Peripheral IV - Single Lumen 05/06/19 0754 20 G Right Forearm less than 1 day                Physical Exam  General: alert, awake and oriented x 3  Eyes:PERRL.   Neck:no JVD   Lungs:  clear to auscultation bilaterally   Cardiovascular: Heart: regular rate and rhythm, S1, S2 normal, no murmur, click, rub or gallop.   Chest Wall: no tenderness.   Pulses-2+ radial, femoral, DP, PT b/l  Extremities: no cyanosis or edema.   Abdomen/Rectal: Abdomen: soft, non-tender non-distented; bowel sounds normal  Neurologic: Normal strength and tone. No focal numbness or weakness  Significant Labs:   CMP   Recent Labs   Lab 05/06/19  0705 05/06/19 0721   * 133*   K 4.2 4.5   CL 99 99   CO2 20* 18*   * 212*   BUN 95* 96*   CREATININE 3.3* 3.3*   CALCIUM 9.4 9.3   ANIONGAP 14 16   ESTGFRAFRICA 19.9* 19.9*   EGFRNONAA 17.2* 17.2*   , CBC   Recent Labs   Lab 05/06/19 0721   WBC 17.57*   HGB 10.9*   HCT 31.9*   PLT 90*    and INR No results for input(s): INR, PROTIME in the last 48 hours.    Significant Imaging: Echocardiogram:   2D echo with color flow doppler:   Results for orders placed or performed during the hospital encounter of 04/05/18   2D Echo w/ Color Flow Doppler   Result Value Ref Range     QEF 20 (A) 55 - 65    Mitral Valve Regurgitation MODERATE (A)     Aortic Valve Regurgitation TRIVIAL     Est. PA Systolic Pressure 96 (A)     Tricuspid Valve Regurgitation MODERATE (A)     and Transthoracic echo (TTE) complete (Cupid Only):   Results for orders placed or performed during the hospital encounter of 04/15/19   Transthoracic echo (TTE) complete (Cupid Only)   Result Value Ref Range    Ascending aorta 2.94 cm    STJ 2.95 cm    AV mean gradient 2.43 mmHg    Ao peak rod 0.97 m/s    Ao VTI 19.75 cm    IVS 1.07 0.6 - 1.1 cm    LA size 4.23 cm    Left Atrium Major Axis 6.85 cm    Left Atrium Minor Axis 5.20 cm    LVIDD 6.44 (A) 3.5 - 6.0 cm    LVIDS 5.93 (A) 2.1 - 4.0 cm    LVOT diameter 2.06 cm    LVOT peak VTI 10.06 cm    PW 1.05 0.6 - 1.1 cm    RA Major Axis 5.79 cm    RA Width 4.98 cm    RVDD 4.18 cm    Sinus 3.01 cm    TAPSE 1.37 cm    TR Max Rod 3.52 m/s    LA WIDTH 5.10 cm    LV Diastolic Volume 211.56 mL    LV Systolic Volume 175.05 mL    LVOT peak rod 0.4 m/s    FS 8 %    LA volume 108.41 cm3    LV mass 300.23 g    Left Ventricle Relative Wall Thickness 0.33 cm    AV valve area 1.70 cm2    AV Velocity Ratio 0.41     AV index (prosthetic) 0.51     LVOT area 3.33 cm2    LVOT stroke volume 33.51 cm3    AV peak gradient 3.76 mmHg    LV Systolic Volume Index 82.5 mL/m2    LV Diastolic Volume Index 99.73 mL/m2    LA Volume Index 51.1 mL/m2    LV Mass Index 141.5 g/m2    Triscuspid Valve Regurgitation Peak Gradient 49.56 mmHg    BSA 2.14 m2    Right Atrial Pressure (from IVC) 8 mmHg    TV rest pulmonary artery pressure 58 mmHg

## 2019-05-07 PROBLEM — E03.9 HYPOTHYROID: Status: ACTIVE | Noted: 2019-01-01

## 2019-05-07 PROBLEM — N17.9 ACUTE KIDNEY INJURY: Status: RESOLVED | Noted: 2019-01-01 | Resolved: 2019-01-01

## 2019-05-07 NOTE — PROGRESS NOTES
Pharmacokinetic Initial Assessment: IV Vancomycin    Assessment/Plan:    Initiate intravenous vancomycin with no loading dose. Initiated at 1000mg IV x 1 in the setting of TREY. Plan to follow daily randoms and redose when <15.     Desired empiric serum trough concentration is 10 to 20 mcg/mL.  Draw vancomycin random level on 5.8 at 1200.  Pharmacy will continue to follow and monitor vancomycin.      Thank you for the consult,   Dede Beach     Patient brief summary:  Jerry Solis is a 78 y.o. male initiated on antimicrobial therapy with IV Vancomycin for treatment of suspected lower respiratory infection. History of ICD lead extraction with enterococcus. Sputum cultures with many GPCs and moderate GNR.     Drug Allergies:   Review of patient's allergies indicates:   Allergen Reactions    Adhesive Other (See Comments)     blisters    Codeine Other (See Comments)     Blurred vision    Latex Hives    Ace inhibitors     Lipitor [atorvastatin] Other (See Comments)     Muscle spasms    Xanax [alprazolam] Hallucinations       Actual Body Weight:   84.7kg    Renal Function:   Estimated Creatinine Clearance: 22.2 mL/min (A) (based on SCr of 3.2 mg/dL (H)).,     Dialysis Method (if applicable): NA     CBC (last 72 hours):  Recent Labs   Lab Result Units 05/06/19  0721 05/07/19  0400   WBC K/uL 17.57* 13.67*   Hemoglobin g/dL 10.9* 10.8*   Hematocrit % 31.9* 30.2*   Platelets K/uL 90* 92*   Gran% % 86.4* 83.1*   Lymph% % 3.8* 6.1*   Mono% % 8.9 10.2   Eosinophil% % 0.0 0.0   Basophil% % 0.3 0.2   Differential Method  Automated Automated       Metabolic Panel (last 72 hours):  Recent Labs   Lab Result Units 05/06/19  0705 05/06/19  0721 05/06/19  1214 05/06/19  1502 05/07/19  0400   Sodium mmol/L 133* 133*  --   --  129*   Potassium mmol/L 4.2 4.5  --   --  4.0   Chloride mmol/L 99 99  --   --  99   CO2 mmol/L 20* 18*  --   --  18*   Glucose mg/dL 210* 212*  --   --  191*   Glucose, UA   --   --   --  Negative  --     BUN, Bld mg/dL 95* 96*  --   --  97*   Creatinine mg/dL 3.3* 3.3*  --   --  3.2*   Albumin g/dL  --   --  3.0*  --  2.7*   Total Bilirubin mg/dL  --   --  1.3*  --  0.8   Alkaline Phosphatase U/L  --   --  125  --  121   AST U/L  --   --  22  --  18   ALT U/L  --   --  18  --  15   Magnesium mg/dL  --   --   --   --  2.2       Drug levels (last 3 results):  No results for input(s): VANCOMYCINRA, VANCOMYCINPE, VANCOMYCINTR in the last 72 hours.    Microbiologic Results:  Microbiology Results (last 7 days)     Procedure Component Value Units Date/Time    Clostridium difficile EIA [[id number]]     Order Status:  No result Specimen:  Stool     Culture, Respiratory with Gram Stain [[id number]] Collected:  05/06/19 2334    Order Status:  Completed Specimen:  Respiratory from Sputum Updated:  05/07/19 0455     Gram Stain (Respiratory) <10 epithelial cells per low power field.     Gram Stain (Respiratory) Rare WBC's     Gram Stain (Respiratory) Many Gram positive cocci     Gram Stain (Respiratory) Moderate Gram negative rods    E. coli 0157 antigen [[id number]] Collected:  05/06/19 1830    Order Status:  No result Specimen:  Stool Updated:  05/06/19 2054    Stool culture [[id number]] Collected:  05/06/19 1830    Order Status:  Sent Specimen:  Stool Updated:  05/06/19 2054    Blood culture [[id number]] Collected:  05/06/19 1214    Order Status:  Completed Specimen:  Blood Updated:  05/06/19 1915     Blood Culture, Routine No Growth to date    Blood culture [[id number]] Collected:  05/06/19 1214    Order Status:  Completed Specimen:  Blood Updated:  05/06/19 1915     Blood Culture, Routine No Growth to date    Blood culture [[id number]]     Order Status:  Canceled Specimen:  Blood     Blood culture [[id number]]     Order Status:  Canceled Specimen:  Blood

## 2019-05-07 NOTE — SUBJECTIVE & OBJECTIVE
Interval History: Feeling about the same. Breathing at baseline.     Scheduled Meds:   cetirizine  10 mg Oral Daily    clopidogrel  75 mg Oral Daily    ferrous sulfate  325 mg Oral BID    insulin aspart U-100  10 Units Subcutaneous TID WM    insulin detemir U-100  6 Units Subcutaneous QHS    levothyroxine  50 mcg Oral Daily    metoprolol succinate  25 mg Oral Daily    multivitamin  1 tablet Oral Daily    omega-3 acid ethyl esters  2 g Oral BID    pantoprazole  40 mg Oral Daily    rosuvastatin  20 mg Oral QHS    tamsulosin  0.4 mg Oral Daily     PRN Meds:albuterol, dextrose 50%, dextrose 50%, glucose, glucose, insulin aspart U-100, ondansetron, sodium chloride 0.9%    Review of patient's allergies indicates:   Allergen Reactions    Adhesive Other (See Comments)     blisters    Codeine Other (See Comments)     Blurred vision    Latex Hives    Ace inhibitors     Lipitor [atorvastatin] Other (See Comments)     Muscle spasms    Xanax [alprazolam] Hallucinations     Objective:     Vital Signs (Most Recent):  Temp: 96 °F (35.6 °C) (05/07/19 0751)  Pulse: 60 (05/07/19 1000)  Resp: 18 (05/07/19 0751)  BP: (!) 100/59 (05/07/19 0751)  SpO2: 96 % (05/07/19 0751) Vital Signs (24h Range):  Temp:  [96 °F (35.6 °C)-98.1 °F (36.7 °C)] 96 °F (35.6 °C)  Pulse:  [55-60] 60  Resp:  [16-18] 18  SpO2:  [94 %-99 %] 96 %  BP: ()/(56-68) 100/59     Patient Vitals for the past 72 hrs (Last 3 readings):   Weight   05/07/19 0518 84.7 kg (186 lb 11.7 oz)   05/06/19 1409 83.1 kg (183 lb 3.2 oz)   05/06/19 0745 84.4 kg (186 lb)     Body mass index is 24.64 kg/m².      Intake/Output Summary (Last 24 hours) at 5/7/2019 1127  Last data filed at 5/7/2019 0500  Gross per 24 hour   Intake 900 ml   Output 300 ml   Net 600 ml        Telemetry: afib 60s.  Physical Exam   Constitutional: He is oriented to person, place, and time. He appears well-developed and well-nourished.   HENT:   Head: Normocephalic.   Eyes: Pupils are equal,  round, and reactive to light.   Neck: Normal range of motion. Neck supple. JVD present.   Cardiovascular: Normal rate and regular rhythm.   Murmur heard.  Pulmonary/Chest: Effort normal and breath sounds normal.   Abdominal: Soft. Bowel sounds are normal. He exhibits distension.   Musculoskeletal: Normal range of motion.   Neurological: He is alert and oriented to person, place, and time.   Skin: Skin is warm and dry.   Psychiatric: He has a normal mood and affect. His behavior is normal.   Nursing note and vitals reviewed.      Significant Labs:  CBC:  Recent Labs   Lab 05/06/19  0721 05/07/19  0400   WBC 17.57* 13.67*   RBC 3.72* 3.57*   HGB 10.9* 10.8*   HCT 31.9* 30.2*   PLT 90* 92*   MCV 86 85   MCH 29.3 30.3   MCHC 34.2 35.8     BNP:  Recent Labs   Lab 05/06/19  1214   BNP 1,793*     CMP:  Recent Labs   Lab 05/06/19  0705 05/06/19  0721 05/06/19  1214 05/07/19  0400   * 212*  --  191*   CALCIUM 9.4 9.3  --  8.4*   ALBUMIN  --   --  3.0* 2.7*   PROT  --   --  6.5 6.1   * 133*  --  129*   K 4.2 4.5  --  4.0   CO2 20* 18*  --  18*   CL 99 99  --  99   BUN 95* 96*  --  97*   CREATININE 3.3* 3.3*  --  3.2*   ALKPHOS  --   --  125 121   ALT  --   --  18 15   AST  --   --  22 18   BILITOT  --   --  1.3* 0.8      Coagulation:   No results for input(s): PT, INR, APTT in the last 168 hours.  LDH:  No results for input(s): LDH in the last 72 hours.  Microbiology:  Microbiology Results (last 7 days)     Procedure Component Value Units Date/Time    Clostridium difficile EIA [[id number]]     Order Status:  No result Specimen:  Stool     Culture, Respiratory with Gram Stain [[id number]] Collected:  05/06/19 7747    Order Status:  Completed Specimen:  Respiratory from Sputum Updated:  05/07/19 3707     Gram Stain (Respiratory) <10 epithelial cells per low power field.     Gram Stain (Respiratory) Rare WBC's     Gram Stain (Respiratory) Many Gram positive cocci     Gram Stain (Respiratory) Moderate Gram negative  rods    E. coli 0157 antigen [[id number]] Collected:  05/06/19 1830    Order Status:  No result Specimen:  Stool Updated:  05/06/19 2054    Stool culture [[id number]] Collected:  05/06/19 1830    Order Status:  Sent Specimen:  Stool Updated:  05/06/19 2054    Blood culture [[id number]] Collected:  05/06/19 1214    Order Status:  Completed Specimen:  Blood Updated:  05/06/19 1915     Blood Culture, Routine No Growth to date    Blood culture [[id number]] Collected:  05/06/19 1214    Order Status:  Completed Specimen:  Blood Updated:  05/06/19 1915     Blood Culture, Routine No Growth to date    Blood culture [[id number]]     Order Status:  Canceled Specimen:  Blood     Blood culture [[id number]]     Order Status:  Canceled Specimen:  Blood         I have reviewed all pertinent labs within the past 24 hours.    Estimated Creatinine Clearance: 22.2 mL/min (A) (based on SCr of 3.2 mg/dL (H)).    Diagnostic Results:  I have reviewed and interpreted all pertinent imaging results/findings within the past 24 hours.

## 2019-05-07 NOTE — ASSESSMENT & PLAN NOTE
-RICARDO/DCCV on hold until current issues resolve.  -Continue metoprolol.   -Hold apixiban in light of some hemoptysis overnight and need for possible procedure.

## 2019-05-07 NOTE — CARE UPDATE
"RAPID RESPONSE NURSE PROACTIVE ROUNDING NOTE     Time of Visit: 4:39P    Admit Date: 2019  LOS: 1  Code Status: Full Code   Date of Visit: 2019  : 1940  Age: 78 y.o.  Sex: male  Race: White  Bed: 323/323 A:   MRN: [id number]  Was the patient discharged from an ICU this admission? no   Was the patient discharged from a PACU within last 24 hours?  no  Did the patient receive conscious sedation/general anesthesia in last 24 hours?  no  Was the patient in the ED within the past 24 hours?  no  Was the patient started on NIPPV within the past 24 hours?  no  Attending Physician: Pricilla Horne MD  Primary Service: Networked reference to record PCT     ASSESSMENT     Abnormal Vital Signs: BP (!) 104/57 (BP Location: Left arm, Patient Position: Lying)   Pulse 60   Temp 97.4 °F (36.3 °C) (Oral)   Resp 18   Ht 6' 1" (1.854 m)   Wt 84.7 kg (186 lb 11.7 oz)   SpO2 (!) 94%   BMI 24.64 kg/m²    Clinical Issues: Circulatory    Brief episode of bradycardia. Pt HR 34. Pt AAO on arrival to room. VSS at this time.   S. Ramu NP at bedside.      INTERVENTIONS/ RECOMMENDATIONS     Monitor HR closely.     Discussed plan of care with RNVerena.    PHYSICIAN ESCALATION     Yes/No  yes    Orders received and case discussed with Chris Guallpa NP.    Disposition: Remain in room 323A.    FOLLOW-UP     Call back the Rapid Response Nurse, Diana Scott RN at [phone] for additional questions or concerns.            "

## 2019-05-07 NOTE — CONSULTS
[facility]  Cardiac Electrophysiology  Consult Note    Admission Date: 5/6/2019  Code Status: Full Code   Attending Provider: Pricilla Horne MD  Consulting Provider: Valentin Farah MD  Principal Problem:TREY (acute kidney injury)    Inpatient consult to Electrophysiology  Consult performed by: Valentin Farah MD  Consult ordered by: Chris Merchant NP        Subjective:     Chief Complaint:  AFib     HPI:   78 year old male with PMH HFrEF/ICM s/p ICD S/p cardioMEMS implant (enrolled in GUIDE-HF study), CAD ([facility] 3/18 with patent stents in proximal OM and ostial to mid RCA), AF, COPD, DM, hypothyroidism, HLD, prior CVA 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 The patient has not been feeling well for the past few weeks, his main concern is he had been feeling very weak. He has also had a cough during this period with sputum production and experienced hemoptysis on the day of admission. He reports occasional chills and appetite loss. He denies fever, night sweats, sick contacts or any inflammation/drainage/color change of his device pocket. He denies chest pain, CORDOBA beyond baseline, orthopnea or PND. He reports occasional peripheral edema and irregular heart beats but has not experienced syncope.      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    Ischemic cardiomyopathy 3/22/2018    Mixed hyperlipidemia 3/22/2018    Type 2 diabetes mellitus with complication 3/22/2018    VT (ventricular tachycardia) 3/22/2018       Past Surgical  History:   Procedure Laterality Date    CARDIOVERSION N/A 1/14/2019    Performed by Ryan Henley MD at [facility] EP LAB    ECHOCARDIOGRAM,TRANSESOPHAGEAL N/A 1/14/2019    Performed by Ryan Henley MD at [facility] EP LAB    ECHOCARDIOGRAM,TRANSESOPHAGEAL N/A 1/9/2019    Performed by [facility] Diagnostic Provider at [facility] EP LAB    EXTRACTION, ELECTRODE LEAD Left 1/9/2019    Performed by Werner Boggs MD at [facility] EP LAB    HEART CATH-RIGHT Right 4/5/2018    Performed by Elizabeth Wise MD at [facility] CATH LAB    INSERTION, ICD, BIVENTRICULAR Left 1/29/2019    Performed by Werner Boggs MD at [facility] EP LAB    Insertion, Pacemaker, Temporary Transvenous  1/9/2019    Performed by Werner Boggs MD at [facility] EP LAB       Review of patient's allergies indicates:   Allergen Reactions    Adhesive Other (See Comments)     blisters    Codeine Other (See Comments)     Blurred vision    Latex Hives    Ace inhibitors     Lipitor [atorvastatin] Other (See Comments)     Muscle spasms    Xanax [alprazolam] Hallucinations       No current facility-administered medications on file prior to encounter.      Current Outpatient Medications on File Prior to Encounter   Medication Sig    amoxicillin (AMOXIL) 500 MG capsule Take 1 capsule (500 mg total) by mouth every 8 (eight) hours.    apixaban (ELIQUIS) 5 mg Tab Take 1 tablet (5 mg total) by mouth 2 (two) times daily.    bumetanide (BUMEX) 1 MG tablet Take 2 tablets (2 mg total) by mouth 2 (two) times daily.    clopidogrel (PLAVIX) 75 mg tablet Take 1 tablet (75 mg total) by mouth once daily.    ferrous sulfate 325 (65 FE) MG EC tablet Take 1 tablet (325 mg total) by mouth 2 (two) times daily.    insulin (LANTUS SOLOSTAR U-100 INSULIN) glargine 100 units/mL (3mL) SubQ pen Inject 6 Units into the skin once daily.    insulin aspart U-100 (NOVOLOG FLEXPEN U-100 INSULIN) 100 unit/mL InPn pen Inject 10 Units into the skin 3 (three) times daily with meals. With low dose sliding scale  "   levothyroxine (SYNTHROID) 25 MCG tablet Take 1 tablet by mouth once daily.    loratadine (CLARITIN) 10 mg tablet Take 10 mg by mouth once daily.    metoprolol succinate (TOPROL-XL) 25 MG 24 hr tablet Take 1 tablet (25 mg total) by mouth once daily.    multivitamin (THERAGRAN) per tablet Take 1 tablet by mouth once daily.    omega-3 acid ethyl esters (LOVAZA) 1 gram capsule Take 2 g by mouth 2 (two) times daily.    omeprazole (PRILOSEC) 40 MG capsule Take 40 mg by mouth once daily.    potassium chloride SA (KLOR-CON M20) 20 MEQ tablet Take 3 tablets (60 mEq total) by mouth once daily. (Patient taking differently: Take 40 mEq by mouth once daily. )    rOPINIRole (REQUIP) 0.5 MG tablet Take 0.5 mg by mouth.     rosuvastatin (CRESTOR) 20 MG tablet Take 1 tablet (20 mg total) by mouth every evening.    tamsulosin (FLOMAX) 0.4 mg Cap Take 1 capsule (0.4 mg total) by mouth once daily.    TRELEGY ELLIPTA 100-62.5-25 mcg DsDv every evening.     VENTOLIN HFA 90 mcg/actuation inhaler Inhale 2 puffs into the lungs every 4 (four) hours as needed.     pen needle, diabetic (LITE TOUCH INSULIN PEN NEEDLES) 31 gauge x 5/16" Ndle 1 each by Misc.(Non-Drug; Combo Route) route 4 (four) times daily.     Family History     None        Tobacco Use    Smoking status: Former Smoker    Smokeless tobacco: Former User   Substance and Sexual Activity    Alcohol use: Yes     Comment: former drinker    Drug use: No    Sexual activity: Not on file     Review of Systems   Constitution: Positive for chills and malaise/fatigue. Negative for fever and weight gain.   HENT: Negative.    Eyes: Negative.    Cardiovascular: Negative.    Respiratory: Positive for cough and hemoptysis.    Endocrine: Negative.    Hematologic/Lymphatic: Negative.    Skin: Negative.    Musculoskeletal: Negative.    Gastrointestinal: Negative.    Genitourinary: Negative.    Neurological: Negative.    Psychiatric/Behavioral: Negative.      Objective:     Vital " Signs (Most Recent):  Temp: 98.1 °F (36.7 °C) (05/07/19 0518)  Pulse: 60 (05/07/19 0518)  Resp: 16 (05/07/19 0518)  BP: (!) 98/58 (05/07/19 0518)  SpO2: 95 % (05/07/19 0518) Vital Signs (24h Range):  Temp:  [96.3 °F (35.7 °C)-98.1 °F (36.7 °C)] 98.1 °F (36.7 °C)  Pulse:  [58-60] 60  Resp:  [16-18] 16  SpO2:  [94 %-99 %] 95 %  BP: ()/(56-68) 98/58       Weight: 84.7 kg (186 lb 11.7 oz)  Body mass index is 24.64 kg/m².    SpO2: 95 %  O2 Device (Oxygen Therapy): room air    Physical Exam   Constitutional: He is oriented to person, place, and time. He appears well-developed and well-nourished. No distress.   HENT:   Head: Normocephalic and atraumatic.   Neck: JVD present.   Cardiovascular: Normal rate, regular rhythm, normal heart sounds and intact distal pulses. Exam reveals no gallop and no friction rub.   No murmur heard.  Device pocket without warmth, erythema or tenderness   Pulmonary/Chest: Effort normal. No respiratory distress. He has no wheezes.   Dullness in the bilateral lung bases   Abdominal: Soft. Bowel sounds are normal. He exhibits no distension. There is no tenderness.   Musculoskeletal: He exhibits no edema.   Neurological: He is alert and oriented to person, place, and time.   Skin: He is not diaphoretic.       Significant Labs:     Recent Results (from the past 24 hour(s))   Basic metabolic panel    Collection Time: 05/06/19  7:05 AM   Result Value Ref Range    Sodium 133 (L) 136 - 145 mmol/L    Potassium 4.2 3.5 - 5.1 mmol/L    Chloride 99 95 - 110 mmol/L    CO2 20 (L) 23 - 29 mmol/L    Glucose 210 (H) 70 - 110 mg/dL    BUN, Bld 95 (H) 8 - 23 mg/dL    Creatinine 3.3 (H) 0.5 - 1.4 mg/dL    Calcium 9.4 8.7 - 10.5 mg/dL    Anion Gap 14 8 - 16 mmol/L    eGFR if African American 19.9 (A) >60 mL/min/1.73 m^2    eGFR if non  17.2 (A) >60 mL/min/1.73 m^2   CBC auto differential    Collection Time: 05/06/19  7:21 AM   Result Value Ref Range    WBC 17.57 (H) 3.90 - 12.70 K/uL    RBC 3.72  (L) 4.60 - 6.20 M/uL    Hemoglobin 10.9 (L) 14.0 - 18.0 g/dL    Hematocrit 31.9 (L) 40.0 - 54.0 %    Mean Corpuscular Volume 86 82 - 98 fL    Mean Corpuscular Hemoglobin 29.3 27.0 - 31.0 pg    Mean Corpuscular Hemoglobin Conc 34.2 32.0 - 36.0 g/dL    RDW 17.2 (H) 11.5 - 14.5 %    Platelets 90 (L) 150 - 350 K/uL    MPV SEE COMMENT 9.2 - 12.9 fL    Immature Granulocytes 0.6 (H) 0.0 - 0.5 %    Gran # (ANC) 15.2 (H) 1.8 - 7.7 K/uL    Immature Grans (Abs) 0.11 (H) 0.00 - 0.04 K/uL    Lymph # 0.7 (L) 1.0 - 4.8 K/uL    Mono # 1.6 (H) 0.3 - 1.0 K/uL    Eos # 0.0 0.0 - 0.5 K/uL    Baso # 0.05 0.00 - 0.20 K/uL    nRBC 0 0 /100 WBC    Gran% 86.4 (H) 38.0 - 73.0 %    Lymph% 3.8 (L) 18.0 - 48.0 %    Mono% 8.9 4.0 - 15.0 %    Eosinophil% 0.0 0.0 - 8.0 %    Basophil% 0.3 0.0 - 1.9 %    Differential Method Automated    Basic metabolic panel    Collection Time: 05/06/19  7:21 AM   Result Value Ref Range    Sodium 133 (L) 136 - 145 mmol/L    Potassium 4.5 3.5 - 5.1 mmol/L    Chloride 99 95 - 110 mmol/L    CO2 18 (L) 23 - 29 mmol/L    Glucose 212 (H) 70 - 110 mg/dL    BUN, Bld 96 (H) 8 - 23 mg/dL    Creatinine 3.3 (H) 0.5 - 1.4 mg/dL    Calcium 9.3 8.7 - 10.5 mg/dL    Anion Gap 16 8 - 16 mmol/L    eGFR if African American 19.9 (A) >60 mL/min/1.73 m^2    eGFR if non  17.2 (A) >60 mL/min/1.73 m^2   POCT glucose    Collection Time: 05/06/19  7:48 AM   Result Value Ref Range    POCT Glucose 202 (H) 70 - 110 mg/dL   Amylase    Collection Time: 05/06/19 12:14 PM   Result Value Ref Range    Amylase 20 20 - 110 U/L   Brain natriuretic peptide    Collection Time: 05/06/19 12:14 PM   Result Value Ref Range    BNP 1,793 (H) 0 - 99 pg/mL   Hepatic function panel    Collection Time: 05/06/19 12:14 PM   Result Value Ref Range    Total Protein 6.5 6.0 - 8.4 g/dL    Albumin 3.0 (L) 3.5 - 5.2 g/dL    Total Bilirubin 1.3 (H) 0.1 - 1.0 mg/dL    Bilirubin, Direct 0.7 (H) 0.1 - 0.3 mg/dL    AST 22 10 - 40 U/L    ALT 18 10 - 44 U/L     Alkaline Phosphatase 125 55 - 135 U/L   Lipase    Collection Time: 05/06/19 12:14 PM   Result Value Ref Range    Lipase 3 (L) 4 - 60 U/L   Blood culture    Collection Time: 05/06/19 12:14 PM   Result Value Ref Range    Blood Culture, Routine No Growth to date    Blood culture    Collection Time: 05/06/19 12:14 PM   Result Value Ref Range    Blood Culture, Routine No Growth to date    Urinalysis, Reflex to Urine Culture Urine, Clean Catch    Collection Time: 05/06/19  3:02 PM   Result Value Ref Range    Specimen UA Urine, Clean Catch     Color, UA Yellow Yellow, Straw, Pari    Appearance, UA Hazy (A) Clear    pH, UA 5.0 5.0 - 8.0    Specific Gravity, UA 1.010 1.005 - 1.030    Protein, UA Negative Negative    Glucose, UA Negative Negative    Ketones, UA Negative Negative    Bilirubin (UA) Negative Negative    Occult Blood UA 1+ (A) Negative    Nitrite, UA Negative Negative    Leukocytes, UA Negative Negative   Urinalysis Microscopic    Collection Time: 05/06/19  3:02 PM   Result Value Ref Range    RBC, UA 11 (H) 0 - 4 /hpf    Bacteria Rare None-Occ /hpf    Squam Epithel, UA 0 /hpf    Hyaline Casts, UA 33 (A) 0-1/lpf /lpf    RBC Casts, UA 5 (A) None /lpf    Microscopic Comment SEE COMMENT    POCT glucose    Collection Time: 05/06/19  4:49 PM   Result Value Ref Range    POCT Glucose 252 (H) 70 - 110 mg/dL   WBC, Stool    Collection Time: 05/06/19  6:30 PM   Result Value Ref Range    Stool WBC No neutrophils seen No neutrophils seen   POCT glucose    Collection Time: 05/06/19  9:01 PM   Result Value Ref Range    POCT Glucose 268 (H) 70 - 110 mg/dL   Culture, Respiratory with Gram Stain    Collection Time: 05/06/19 11:34 PM   Result Value Ref Range    Gram Stain (Respiratory) <10 epithelial cells per low power field.     Gram Stain (Respiratory) Rare WBC's     Gram Stain (Respiratory) Many Gram positive cocci     Gram Stain (Respiratory) Moderate Gram negative rods    Comprehensive metabolic panel    Collection Time:  05/07/19  4:00 AM   Result Value Ref Range    Sodium 129 (L) 136 - 145 mmol/L    Potassium 4.0 3.5 - 5.1 mmol/L    Chloride 99 95 - 110 mmol/L    CO2 18 (L) 23 - 29 mmol/L    Glucose 191 (H) 70 - 110 mg/dL    BUN, Bld 97 (H) 8 - 23 mg/dL    Creatinine 3.2 (H) 0.5 - 1.4 mg/dL    Calcium 8.4 (L) 8.7 - 10.5 mg/dL    Total Protein 6.1 6.0 - 8.4 g/dL    Albumin 2.7 (L) 3.5 - 5.2 g/dL    Total Bilirubin 0.8 0.1 - 1.0 mg/dL    Alkaline Phosphatase 121 55 - 135 U/L    AST 18 10 - 40 U/L    ALT 15 10 - 44 U/L    Anion Gap 12 8 - 16 mmol/L    eGFR if African American 20.6 (A) >60 mL/min/1.73 m^2    eGFR if non  17.8 (A) >60 mL/min/1.73 m^2   Magnesium    Collection Time: 05/07/19  4:00 AM   Result Value Ref Range    Magnesium 2.2 1.6 - 2.6 mg/dL   CBC auto differential    Collection Time: 05/07/19  4:00 AM   Result Value Ref Range    WBC 13.67 (H) 3.90 - 12.70 K/uL    RBC 3.57 (L) 4.60 - 6.20 M/uL    Hemoglobin 10.8 (L) 14.0 - 18.0 g/dL    Hematocrit 30.2 (L) 40.0 - 54.0 %    Mean Corpuscular Volume 85 82 - 98 fL    Mean Corpuscular Hemoglobin 30.3 27.0 - 31.0 pg    Mean Corpuscular Hemoglobin Conc 35.8 32.0 - 36.0 g/dL    RDW 17.2 (H) 11.5 - 14.5 %    Platelets 92 (L) 150 - 350 K/uL    MPV SEE COMMENT 9.2 - 12.9 fL    Immature Granulocytes 0.4 0.0 - 0.5 %    Gran # (ANC) 11.4 (H) 1.8 - 7.7 K/uL    Immature Grans (Abs) 0.06 (H) 0.00 - 0.04 K/uL    Lymph # 0.8 (L) 1.0 - 4.8 K/uL    Mono # 1.4 (H) 0.3 - 1.0 K/uL    Eos # 0.0 0.0 - 0.5 K/uL    Baso # 0.03 0.00 - 0.20 K/uL    nRBC 0 0 /100 WBC    Gran% 83.1 (H) 38.0 - 73.0 %    Lymph% 6.1 (L) 18.0 - 48.0 %    Mono% 10.2 4.0 - 15.0 %    Eosinophil% 0.0 0.0 - 8.0 %    Basophil% 0.2 0.0 - 1.9 %    Differential Method Automated    TSH    Collection Time: 05/07/19  4:00 AM   Result Value Ref Range    TSH 4.981 (H) 0.400 - 4.000 uIU/mL   T4, free    Collection Time: 05/07/19  4:00 AM   Result Value Ref Range    Free T4 0.70 (L) 0.71 - 1.51 ng/dL         Significant  Imaging:     I have reviewed all pertinent imaging studies from the last 24 hours                Assessment and Plan:     A-fib  CHADS VASc 8  Patient presented for RICARDO/DCCV which was cancelled due to leukocytosis and TREY  Currently rate controlled with toprol 25 mg daily  apixaban held in the setting of anticoagulation  Possible RICARDO/DCCV when acute issues (infection/TREY) have resolved)            Valentin Farah MD  Cardiac Electrophysiology  [facility]

## 2019-05-07 NOTE — ASSESSMENT & PLAN NOTE
-Pt with recent ICD extraction secondary to enterococcus.  -Blood cultures X 2. UA with reflex culture.  -Stool studie/resp cx pending.

## 2019-05-07 NOTE — ASSESSMENT & PLAN NOTE
- Hemodynamics per Merlin on 5/5 with PAD below baseline.  - Holding diuretics in light of poor PO intake/vomiting/diarrhea.

## 2019-05-07 NOTE — PROGRESS NOTES
Admit Note     Met with spouse and daughter to assess patients needs due to pt having difficulty with hearing.  Patient is a 78 y.o.  male, admitted TREY, leukocytosis, abdominal pain, A-fib and congestive heart failure.     Patient admitted from home on 5/6/2019 .  At this time, patient presents as alert and oriented.   At this time, patients caregiver presents as alert and oriented x 4, good eye contact, calm and communicative.      Household/Family Systems (as reported by patients caregiver)     Patient resides with patient's spouse, at    Physical address:  [address] [place] [address]      Mailing address:    [place] [address].        Support system includes spouse, three adult children and extended family.  All of the pt's children live close to the pt in [place].  The pt works with his son, Jerry.    Patient does not have dependents that are need of being cared for.     Patients primary caregiver is self with support from spouse as needed.   Pt's cell:  [phone]  Land line:  [phone]  Mague Solis (spouse) [phone]  Jerry Mcclain  (son) [phone]  Carissa Trejo (daughter) [phone]      During admission, patient's caregiver plans to stay in patient's room.  Confirmed patient and patients caregivers do have access to reliable transportation.    Cognitive Status/Learning     Patients caregiver reports patients reading ability as 12th grade and states patient does have difficulty with seeing, hearing and memory. The pt does not have difficulty with reading, writing, or learning.    Patients caregiver reports patient learns best by one on one and with support from spouse.     Needed: No.   Highest education level: High School (9-12) or GED    Vocation/Disability (as reported by patients caregiver)    Working for Income: yes  If yes, working activity level: Working Full Time with son.  Patient is working full time at his own company.  Pt's spouse reports  her son does electric work in oil fields.   Pt's spouse does not work outside of the home.     Adherence     Patients caregiver reports patient has a high level of adherence to patients health care regimen.  Adherence counseling and education provided.  Patient's caregiver verbalizes understanding.    Substance Use    Patients caregiver reports patients substance usage as the following:    Tobacco: none.  The pt quit smoking in 1999.  Pt used to smoke 2-3 ppd  Alcohol: none, patient denies any use.  Illicit Drugs/Non-prescribed Medications: none, patient denies any use.  Patients caregiver states clear understanding of the potential impact of substance use.  Substance abstinence/cessation counseling, education and resources provided and reviewed.     Services Utilizing/ADLS (as reported by patients caregiver)    Infusion Service: Prior to admission, patient utilizing? no.  Pt has used [facility] in the past and if IV medication is needed at discharge pt's spouse would like to use same company.  [phone]  Home Health: Prior to admission, patient utilizing? no. Pt has used HH in the past. If HH is needed at discharge, pt's spouse would like [facility] to be contacted.  [phone]  DME: Prior to admission, yes pt has a walker and wheelchair. Per spouse pt does not use same.   Pulmonary/Cardiac Rehab: Prior to admission, no  Dialysis:  Prior to admission, no  Transplant Specialty Pharmacy:  Prior to admission, no.    Prior to admission, patients caregiver reports patient was somewhat  independent with ADLS and was driving. Pt's spouse reports the pt was well at last discharge, however the pt has become weak and does not eat much. Pt's spouse assists with pt's ADL needs as indicated.   Patients caregiver reports patient is not able to care for self at this time due to compromised medical condition (as documented in medical record) and physical weakness..  Patients caregiver reports patient indicates a  willingness to care for self once medically cleared to do so.    Insurance/Medications    Insured by   Payor/Plan Subscr  Sex Relation Sub. Ins. ID Effective Group Num   1. MEDICARE - [place]* IVY LUGO 1940 Male  [id number] 10/1/07                                    [address]   2. GENERIC COMME* IVY LUGO 1940 Male  [id number]  1983                                    [address], [place] [address]      Primary Insurance (for UNOS reporting): Public Insurance - Medicare FFS (Fee For Service)  Secondary Insurance (for UNOS reporting): Private Insurance    Patients caregiver reports patient is able to obtain and afford medications at this time and at time of discharge.  Pt's spouse reports cost of monthly meds is about $400.00 per month.    Worker encouraged pt's daughter to look for Rx assistance programs on the internet and discuss needs with pt's pharmacy.     Living Will/Healthcare Power of     Patients caregiver reports patient does not have a LW and/or HCPA.   provided education regarding LW and HCPA and the completion of forms.    Coping/Mental Health (as reported by patients caregiver)    Patient is coping adequately with the aid of  family members.Patients caregiver is coping adequately with the aid of  family members.   Pt's spouse denies mental health needs.  Pt's spouse reports pt was very happy to be discharge on  and was able to arrive on time to a family gathering.  Support provided to spouse due to cost of the [employer] hotel and costs of multiple hospital admissions.  Meal card provided.   Support provided to family.      Discharge Planning (as reported by patients caregiver)    At time of discharge, patient plans to return to patient's home under the care of self and spouse.  Patients family will transport patient.  Per rounds today, expected discharge date has not been medically determined at this time. Patients caretaker verbalizes  understanding and is involved in treatment planning and discharge process.    Additional Concerns    Patient is being followed for needs, education, resources, information, emotional support, supportive counseling, and for supportive and skilled discharge plan of care.  providing ongoing psychosocial support, education, resources and d/c planning as needed.  SW remains available. Patient's caregiver verbalizes understanding and agreement with information reviewed,  availability and how to access available resources as needed.

## 2019-05-07 NOTE — PLAN OF CARE
Problem: Adult Inpatient Plan of Care  Goal: Plan of Care Review  Outcome: Ongoing (interventions implemented as appropriate)  Patient remained free from falls/injury/trauma throughout shift. No complaints of chest pain or SOB. VSS. CT of abd/pelvis w/oral contrast completed and respiratory culture collected. Pending results. Patient still having hemoptysis. Fall risk precautions reviewed and maintained. Plan of care reviewed with patient. Patient verbalized understanding. All questions encouraged and answered. Will continue to monitor.

## 2019-05-07 NOTE — ASSESSMENT & PLAN NOTE
-TSH up, FT4 low.   -Increased levothyroxine to 50mcg daily.  -Will need to recheck TFTs in ~6 weeks.

## 2019-05-07 NOTE — NURSING
Notified HTS on-call MD of pt stool being bright green. Stool sample already ordered. Will send sample and continue to monitor. Also notified MD of pt having small amount of bloody sputum. Chest CT order and sputum sample ordered per MD. Will continue to monitor.

## 2019-05-07 NOTE — PLAN OF CARE
Problem: Adult Inpatient Plan of Care  Goal: Plan of Care Review  Outcome: Ongoing (interventions implemented as appropriate)  Pt is AMARIA G, Ox4. Calm, cooperative. Free of falls, trauma, and injuries. Skin intact. Pt educated on treatment plan. Pt demonstrates and verbalizes understanding. VSS. EP consulted and Cardiac device check pending. IV abx per MAR. C. Diff specimen to be collected but pt has only had 2 soft BMs. Plan of care reviewed with pt.

## 2019-05-07 NOTE — SUBJECTIVE & OBJECTIVE
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    Ischemic cardiomyopathy 3/22/2018    Mixed hyperlipidemia 3/22/2018    Type 2 diabetes mellitus with complication 3/22/2018    VT (ventricular tachycardia) 3/22/2018       Past Surgical History:   Procedure Laterality Date    CARDIOVERSION N/A 1/14/2019    Performed by Ryan Henley MD at [facility] EP LAB    ECHOCARDIOGRAM,TRANSESOPHAGEAL N/A 1/14/2019    Performed by Ryan Henley MD at [facility] EP [facility]    ECHOCARDIOGRAM,TRANSESOPHAGEAL N/A 1/9/2019    Performed by [facility] at [facility] EP [facility]    EXTRACTION, ELECTRODE LEAD Left 1/9/2019    Performed by Werner Boggs MD at [facility] EP [facility]    HEART CATH-RIGHT Right 4/5/2018    Performed by Elizabeth Wise MD at [facility] CATH LAB    INSERTION, ICD, BIVENTRICULAR Left 1/29/2019    Performed by Werner Boggs MD at [facility] EP [facility]    Insertion, Pacemaker, Temporary Transvenous  1/9/2019    Performed by Werner Boggs MD at [facility] EP LAB       Review of patient's allergies indicates:   Allergen Reactions    Adhesive Other (See Comments)     blisters    Codeine Other (See Comments)     Blurred vision    Latex Hives    Ace inhibitors     Lipitor [atorvastatin] Other (See Comments)     Muscle spasms    Xanax [alprazolam] Hallucinations       No current facility-administered medications on file prior to encounter.      Current Outpatient Medications on File Prior to Encounter   Medication Sig    amoxicillin (AMOXIL) 500 MG capsule Take 1 capsule (500 mg total) by mouth every 8 (eight) hours.    apixaban (ELIQUIS) 5 mg Tab Take 1 tablet (5 mg total) by mouth 2 (two) times daily.    bumetanide (BUMEX) 1  "MG tablet Take 2 tablets (2 mg total) by mouth 2 (two) times daily.    clopidogrel (PLAVIX) 75 mg tablet Take 1 tablet (75 mg total) by mouth once daily.    ferrous sulfate 325 (65 FE) MG EC tablet Take 1 tablet (325 mg total) by mouth 2 (two) times daily.    insulin (LANTUS SOLOSTAR U-100 INSULIN) glargine 100 units/mL (3mL) SubQ pen Inject 6 Units into the skin once daily.    insulin aspart U-100 (NOVOLOG FLEXPEN U-100 INSULIN) 100 unit/mL InPn pen Inject 10 Units into the skin 3 (three) times daily with meals. With low dose sliding scale    levothyroxine (SYNTHROID) 25 MCG tablet Take 1 tablet by mouth once daily.    loratadine (CLARITIN) 10 mg tablet Take 10 mg by mouth once daily.    metoprolol succinate (TOPROL-XL) 25 MG 24 hr tablet Take 1 tablet (25 mg total) by mouth once daily.    multivitamin (THERAGRAN) per tablet Take 1 tablet by mouth once daily.    omega-3 acid ethyl esters (LOVAZA) 1 gram capsule Take 2 g by mouth 2 (two) times daily.    omeprazole (PRILOSEC) 40 MG capsule Take 40 mg by mouth once daily.    potassium chloride SA (KLOR-CON M20) 20 MEQ tablet Take 3 tablets (60 mEq total) by mouth once daily. (Patient taking differently: Take 40 mEq by mouth once daily. )    rOPINIRole (REQUIP) 0.5 MG tablet Take 0.5 mg by mouth.     rosuvastatin (CRESTOR) 20 MG tablet Take 1 tablet (20 mg total) by mouth every evening.    tamsulosin (FLOMAX) 0.4 mg Cap Take 1 capsule (0.4 mg total) by mouth once daily.    TRELEGY ELLIPTA 100-62.5-25 mcg DsDv every evening.     VENTOLIN HFA 90 mcg/actuation inhaler Inhale 2 puffs into the lungs every 4 (four) hours as needed.     pen needle, diabetic (LITE TOUCH INSULIN PEN NEEDLES) 31 gauge x 5/16" Ndle 1 each by Misc.(Non-Drug; Combo Route) route 4 (four) times daily.     Family History     None        Tobacco Use    Smoking status: Former Smoker    Smokeless tobacco: Former User   Substance and Sexual Activity    Alcohol use: Yes     Comment: " former drinker    Drug use: No    Sexual activity: Not on file     Review of Systems   Constitution: Positive for chills and malaise/fatigue. Negative for fever and weight gain.   HENT: Negative.    Eyes: Negative.    Cardiovascular: Negative.    Respiratory: Positive for cough and hemoptysis.    Endocrine: Negative.    Hematologic/Lymphatic: Negative.    Skin: Negative.    Musculoskeletal: Negative.    Gastrointestinal: Negative.    Genitourinary: Negative.    Neurological: Negative.    Psychiatric/Behavioral: Negative.      Objective:     Vital Signs (Most Recent):  Temp: 98.1 °F (36.7 °C) (05/07/19 0518)  Pulse: 60 (05/07/19 0518)  Resp: 16 (05/07/19 0518)  BP: (!) 98/58 (05/07/19 0518)  SpO2: 95 % (05/07/19 0518) Vital Signs (24h Range):  Temp:  [96.3 °F (35.7 °C)-98.1 °F (36.7 °C)] 98.1 °F (36.7 °C)  Pulse:  [58-60] 60  Resp:  [16-18] 16  SpO2:  [94 %-99 %] 95 %  BP: ()/(56-68) 98/58       Weight: 84.7 kg (186 lb 11.7 oz)  Body mass index is 24.64 kg/m².    SpO2: 95 %  O2 Device (Oxygen Therapy): room air    Physical Exam   Constitutional: He is oriented to person, place, and time. He appears well-developed and well-nourished. No distress.   HENT:   Head: Normocephalic and atraumatic.   Neck: JVD present.   Cardiovascular: Normal rate, regular rhythm, normal heart sounds and intact distal pulses. Exam reveals no gallop and no friction rub.   No murmur heard.  Device pocket without warmth, erythema or tenderness   Pulmonary/Chest: Effort normal. No respiratory distress. He has no wheezes.   Dullness in the bilateral lung bases   Abdominal: Soft. Bowel sounds are normal. He exhibits no distension. There is no tenderness.   Musculoskeletal: He exhibits no edema.   Neurological: He is alert and oriented to person, place, and time.   Skin: He is not diaphoretic.       Significant Labs:     Recent Results (from the past 24 hour(s))   Basic metabolic panel    Collection Time: 05/06/19  7:05 AM   Result Value  Ref Range    Sodium 133 (L) 136 - 145 mmol/L    Potassium 4.2 3.5 - 5.1 mmol/L    Chloride 99 95 - 110 mmol/L    CO2 20 (L) 23 - 29 mmol/L    Glucose 210 (H) 70 - 110 mg/dL    BUN, Bld 95 (H) 8 - 23 mg/dL    Creatinine 3.3 (H) 0.5 - 1.4 mg/dL    Calcium 9.4 8.7 - 10.5 mg/dL    Anion Gap 14 8 - 16 mmol/L    eGFR if African American 19.9 (A) >60 mL/min/1.73 m^2    eGFR if non  17.2 (A) >60 mL/min/1.73 m^2   CBC auto differential    Collection Time: 05/06/19  7:21 AM   Result Value Ref Range    WBC 17.57 (H) 3.90 - 12.70 K/uL    RBC 3.72 (L) 4.60 - 6.20 M/uL    Hemoglobin 10.9 (L) 14.0 - 18.0 g/dL    Hematocrit 31.9 (L) 40.0 - 54.0 %    Mean Corpuscular Volume 86 82 - 98 fL    Mean Corpuscular Hemoglobin 29.3 27.0 - 31.0 pg    Mean Corpuscular Hemoglobin Conc 34.2 32.0 - 36.0 g/dL    RDW 17.2 (H) 11.5 - 14.5 %    Platelets 90 (L) 150 - 350 K/uL    MPV SEE COMMENT 9.2 - 12.9 fL    Immature Granulocytes 0.6 (H) 0.0 - 0.5 %    Gran # (ANC) 15.2 (H) 1.8 - 7.7 K/uL    Immature Grans (Abs) 0.11 (H) 0.00 - 0.04 K/uL    Lymph # 0.7 (L) 1.0 - 4.8 K/uL    Mono # 1.6 (H) 0.3 - 1.0 K/uL    Eos # 0.0 0.0 - 0.5 K/uL    Baso # 0.05 0.00 - 0.20 K/uL    nRBC 0 0 /100 WBC    Gran% 86.4 (H) 38.0 - 73.0 %    Lymph% 3.8 (L) 18.0 - 48.0 %    Mono% 8.9 4.0 - 15.0 %    Eosinophil% 0.0 0.0 - 8.0 %    Basophil% 0.3 0.0 - 1.9 %    Differential Method Automated    Basic metabolic panel    Collection Time: 05/06/19  7:21 AM   Result Value Ref Range    Sodium 133 (L) 136 - 145 mmol/L    Potassium 4.5 3.5 - 5.1 mmol/L    Chloride 99 95 - 110 mmol/L    CO2 18 (L) 23 - 29 mmol/L    Glucose 212 (H) 70 - 110 mg/dL    BUN, Bld 96 (H) 8 - 23 mg/dL    Creatinine 3.3 (H) 0.5 - 1.4 mg/dL    Calcium 9.3 8.7 - 10.5 mg/dL    Anion Gap 16 8 - 16 mmol/L    eGFR if African American 19.9 (A) >60 mL/min/1.73 m^2    eGFR if non  17.2 (A) >60 mL/min/1.73 m^2   POCT glucose    Collection Time: 05/06/19  7:48 AM   Result Value Ref Range     POCT Glucose 202 (H) 70 - 110 mg/dL   Amylase    Collection Time: 05/06/19 12:14 PM   Result Value Ref Range    Amylase 20 20 - 110 U/L   Brain natriuretic peptide    Collection Time: 05/06/19 12:14 PM   Result Value Ref Range    BNP 1,793 (H) 0 - 99 pg/mL   Hepatic function panel    Collection Time: 05/06/19 12:14 PM   Result Value Ref Range    Total Protein 6.5 6.0 - 8.4 g/dL    Albumin 3.0 (L) 3.5 - 5.2 g/dL    Total Bilirubin 1.3 (H) 0.1 - 1.0 mg/dL    Bilirubin, Direct 0.7 (H) 0.1 - 0.3 mg/dL    AST 22 10 - 40 U/L    ALT 18 10 - 44 U/L    Alkaline Phosphatase 125 55 - 135 U/L   Lipase    Collection Time: 05/06/19 12:14 PM   Result Value Ref Range    Lipase 3 (L) 4 - 60 U/L   Blood culture    Collection Time: 05/06/19 12:14 PM   Result Value Ref Range    Blood Culture, Routine No Growth to date    Blood culture    Collection Time: 05/06/19 12:14 PM   Result Value Ref Range    Blood Culture, Routine No Growth to date    Urinalysis, Reflex to Urine Culture Urine, Clean Catch    Collection Time: 05/06/19  3:02 PM   Result Value Ref Range    Specimen UA Urine, Clean Catch     Color, UA Yellow Yellow, Straw, Pari    Appearance, UA Hazy (A) Clear    pH, UA 5.0 5.0 - 8.0    Specific Gravity, UA 1.010 1.005 - 1.030    Protein, UA Negative Negative    Glucose, UA Negative Negative    Ketones, UA Negative Negative    Bilirubin (UA) Negative Negative    Occult Blood UA 1+ (A) Negative    Nitrite, UA Negative Negative    Leukocytes, UA Negative Negative   Urinalysis Microscopic    Collection Time: 05/06/19  3:02 PM   Result Value Ref Range    RBC, UA 11 (H) 0 - 4 /hpf    Bacteria Rare None-Occ /hpf    Squam Epithel, UA 0 /hpf    Hyaline Casts, UA 33 (A) 0-1/lpf /lpf    RBC Casts, UA 5 (A) None /lpf    Microscopic Comment SEE COMMENT    POCT glucose    Collection Time: 05/06/19  4:49 PM   Result Value Ref Range    POCT Glucose 252 (H) 70 - 110 mg/dL   WBC, Stool    Collection Time: 05/06/19  6:30 PM   Result Value Ref  Range    Stool WBC No neutrophils seen No neutrophils seen   POCT glucose    Collection Time: 05/06/19  9:01 PM   Result Value Ref Range    POCT Glucose 268 (H) 70 - 110 mg/dL   Culture, Respiratory with Gram Stain    Collection Time: 05/06/19 11:34 PM   Result Value Ref Range    Gram Stain (Respiratory) <10 epithelial cells per low power field.     Gram Stain (Respiratory) Rare WBC's     Gram Stain (Respiratory) Many Gram positive cocci     Gram Stain (Respiratory) Moderate Gram negative rods    Comprehensive metabolic panel    Collection Time: 05/07/19  4:00 AM   Result Value Ref Range    Sodium 129 (L) 136 - 145 mmol/L    Potassium 4.0 3.5 - 5.1 mmol/L    Chloride 99 95 - 110 mmol/L    CO2 18 (L) 23 - 29 mmol/L    Glucose 191 (H) 70 - 110 mg/dL    BUN, Bld 97 (H) 8 - 23 mg/dL    Creatinine 3.2 (H) 0.5 - 1.4 mg/dL    Calcium 8.4 (L) 8.7 - 10.5 mg/dL    Total Protein 6.1 6.0 - 8.4 g/dL    Albumin 2.7 (L) 3.5 - 5.2 g/dL    Total Bilirubin 0.8 0.1 - 1.0 mg/dL    Alkaline Phosphatase 121 55 - 135 U/L    AST 18 10 - 40 U/L    ALT 15 10 - 44 U/L    Anion Gap 12 8 - 16 mmol/L    eGFR if African American 20.6 (A) >60 mL/min/1.73 m^2    eGFR if non  17.8 (A) >60 mL/min/1.73 m^2   Magnesium    Collection Time: 05/07/19  4:00 AM   Result Value Ref Range    Magnesium 2.2 1.6 - 2.6 mg/dL   CBC auto differential    Collection Time: 05/07/19  4:00 AM   Result Value Ref Range    WBC 13.67 (H) 3.90 - 12.70 K/uL    RBC 3.57 (L) 4.60 - 6.20 M/uL    Hemoglobin 10.8 (L) 14.0 - 18.0 g/dL    Hematocrit 30.2 (L) 40.0 - 54.0 %    Mean Corpuscular Volume 85 82 - 98 fL    Mean Corpuscular Hemoglobin 30.3 27.0 - 31.0 pg    Mean Corpuscular Hemoglobin Conc 35.8 32.0 - 36.0 g/dL    RDW 17.2 (H) 11.5 - 14.5 %    Platelets 92 (L) 150 - 350 K/uL    MPV SEE COMMENT 9.2 - 12.9 fL    Immature Granulocytes 0.4 0.0 - 0.5 %    Gran # (ANC) 11.4 (H) 1.8 - 7.7 K/uL    Immature Grans (Abs) 0.06 (H) 0.00 - 0.04 K/uL    Lymph # 0.8 (L) 1.0 -  4.8 K/uL    Mono # 1.4 (H) 0.3 - 1.0 K/uL    Eos # 0.0 0.0 - 0.5 K/uL    Baso # 0.03 0.00 - 0.20 K/uL    nRBC 0 0 /100 WBC    Gran% 83.1 (H) 38.0 - 73.0 %    Lymph% 6.1 (L) 18.0 - 48.0 %    Mono% 10.2 4.0 - 15.0 %    Eosinophil% 0.0 0.0 - 8.0 %    Basophil% 0.2 0.0 - 1.9 %    Differential Method Automated    TSH    Collection Time: 05/07/19  4:00 AM   Result Value Ref Range    TSH 4.981 (H) 0.400 - 4.000 uIU/mL   T4, free    Collection Time: 05/07/19  4:00 AM   Result Value Ref Range    Free T4 0.70 (L) 0.71 - 1.51 ng/dL         Significant Imaging:     I have reviewed all pertinent imaging studies from the last 24 hours

## 2019-05-07 NOTE — PROGRESS NOTES
[facility]  Heart Transplant  Progress Note    Patient Name: Jerry Solis  MRN: [id number]  Admission Date: 5/6/2019  Hospital Length of Stay: 1 days  Attending Physician: Pricilla Horne MD  Primary Care Provider: Primary Doctor No  Principal Problem:TREY (acute kidney injury)    Subjective:     Interval History: Feeling about the same. Breathing at baseline.     Scheduled Meds:   cetirizine  10 mg Oral Daily    clopidogrel  75 mg Oral Daily    ferrous sulfate  325 mg Oral BID    insulin aspart U-100  10 Units Subcutaneous TID WM    insulin detemir U-100  6 Units Subcutaneous QHS    levothyroxine  50 mcg Oral Daily    metoprolol succinate  25 mg Oral Daily    multivitamin  1 tablet Oral Daily    omega-3 acid ethyl esters  2 g Oral BID    pantoprazole  40 mg Oral Daily    rosuvastatin  20 mg Oral QHS    tamsulosin  0.4 mg Oral Daily     PRN Meds:albuterol, dextrose 50%, dextrose 50%, glucose, glucose, insulin aspart U-100, ondansetron, sodium chloride 0.9%    Review of patient's allergies indicates:   Allergen Reactions    Adhesive Other (See Comments)     blisters    Codeine Other (See Comments)     Blurred vision    Latex Hives    Ace inhibitors     Lipitor [atorvastatin] Other (See Comments)     Muscle spasms    Xanax [alprazolam] Hallucinations     Objective:     Vital Signs (Most Recent):  Temp: 96 °F (35.6 °C) (05/07/19 0751)  Pulse: 60 (05/07/19 1000)  Resp: 18 (05/07/19 0751)  BP: (!) 100/59 (05/07/19 0751)  SpO2: 96 % (05/07/19 0751) Vital Signs (24h Range):  Temp:  [96 °F (35.6 °C)-98.1 °F (36.7 °C)] 96 °F (35.6 °C)  Pulse:  [55-60] 60  Resp:  [16-18] 18  SpO2:  [94 %-99 %] 96 %  BP: ()/(56-68) 100/59     Patient Vitals for the past 72 hrs (Last 3 readings):   Weight   05/07/19 0518 84.7 kg (186 lb 11.7 oz)   05/06/19 1409 83.1 kg (183 lb 3.2 oz)   05/06/19 0745 84.4 kg (186 lb)     Body mass index is 24.64 kg/m².      Intake/Output Summary (Last 24 hours) at  5/7/2019 1127  Last data filed at 5/7/2019 0500  Gross per 24 hour   Intake 900 ml   Output 300 ml   Net 600 ml        Telemetry: afib 60s.  Physical Exam   Constitutional: He is oriented to person, place, and time. He appears well-developed and well-nourished.   HENT:   Head: Normocephalic.   Eyes: Pupils are equal, round, and reactive to light.   Neck: Normal range of motion. Neck supple. JVD present.   Cardiovascular: Normal rate and regular rhythm.   Murmur heard.  Pulmonary/Chest: Effort normal and breath sounds normal.   Abdominal: Soft. Bowel sounds are normal. He exhibits distension.   Musculoskeletal: Normal range of motion.   Neurological: He is alert and oriented to person, place, and time.   Skin: Skin is warm and dry.   Psychiatric: He has a normal mood and affect. His behavior is normal.   Nursing note and vitals reviewed.      Significant Labs:  CBC:  Recent Labs   Lab 05/06/19  0721 05/07/19  0400   WBC 17.57* 13.67*   RBC 3.72* 3.57*   HGB 10.9* 10.8*   HCT 31.9* 30.2*   PLT 90* 92*   MCV 86 85   MCH 29.3 30.3   MCHC 34.2 35.8     BNP:  Recent Labs   Lab 05/06/19  1214   BNP 1,793*     CMP:  Recent Labs   Lab 05/06/19  0705 05/06/19  0721 05/06/19  1214 05/07/19  0400   * 212*  --  191*   CALCIUM 9.4 9.3  --  8.4*   ALBUMIN  --   --  3.0* 2.7*   PROT  --   --  6.5 6.1   * 133*  --  129*   K 4.2 4.5  --  4.0   CO2 20* 18*  --  18*   CL 99 99  --  99   BUN 95* 96*  --  97*   CREATININE 3.3* 3.3*  --  3.2*   ALKPHOS  --   --  125 121   ALT  --   --  18 15   AST  --   --  22 18   BILITOT  --   --  1.3* 0.8      Coagulation:   No results for input(s): PT, INR, APTT in the last 168 hours.  LDH:  No results for input(s): LDH in the last 72 hours.  Microbiology:  Microbiology Results (last 7 days)     Procedure Component Value Units Date/Time    Clostridium difficile EIA [[id number]]     Order Status:  No result Specimen:  Stool     Culture, Respiratory with Gram Stain [[id number]] Collected:   05/06/19 2334    Order Status:  Completed Specimen:  Respiratory from Sputum Updated:  05/07/19 0455     Gram Stain (Respiratory) <10 epithelial cells per low power field.     Gram Stain (Respiratory) Rare WBC's     Gram Stain (Respiratory) Many Gram positive cocci     Gram Stain (Respiratory) Moderate Gram negative rods    E. coli 0157 antigen [[id number]] Collected:  05/06/19 1830    Order Status:  No result Specimen:  Stool Updated:  05/06/19 2054    Stool culture [[id number]] Collected:  05/06/19 1830    Order Status:  Sent Specimen:  Stool Updated:  05/06/19 2054    Blood culture [[id number]] Collected:  05/06/19 1214    Order Status:  Completed Specimen:  Blood Updated:  05/06/19 1915     Blood Culture, Routine No Growth to date    Blood culture [[id number]] Collected:  05/06/19 1214    Order Status:  Completed Specimen:  Blood Updated:  05/06/19 1915     Blood Culture, Routine No Growth to date    Blood culture [[id number]]     Order Status:  Canceled Specimen:  Blood     Blood culture [[id number]]     Order Status:  Canceled Specimen:  Blood         I have reviewed all pertinent labs within the past 24 hours.    Estimated Creatinine Clearance: 22.2 mL/min (A) (based on SCr of 3.2 mg/dL (H)).    Diagnostic Results:  I have reviewed and interpreted all pertinent imaging results/findings within the past 24 hours.    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 Cr up to 3.3 from baseline ~2.2 and WBC count up to 17. Procedure was cancelled for above reason. He is accompanied by his wife. He states that he has been having abdominal pain and vomiting for the last few weeks. He saw outside MD who told him that he had  gallstones. He had episode of diarrhea this morning and has only been able to tolerate toast, crackers, and sips of water. Denies CP, palpitations, syncope, presyncope, fevers, PND. Cardiomems measurements yesterday with PA 57/19(33) which seems to be below his baseline.     * TREY (acute kidney injury)  - Hemodynamics per Merlin on 5/5 with PAD below baseline.  - Holding diuretics in light of poor PO intake/vomiting/diarrhea.    Leukocytosis  -Pt with recent ICD extraction secondary to enterococcus.  -Blood cultures X 2. UA with reflex culture.  -Stool studie/resp cx pending.     Abdominal pain  - U/S 5/6 with Cholelithiasis without evidence for acute cholecystitis. Small volume abdominal ascites. Small right pleural effusion. Right simple renal cyst.  - CT chest/abd/pelvis pending.  - Will check Cdiff with diarrhea and recent Abx use.       A-fib  -RICARDO/DCCV on hold until current issues resolve.  -Continue metoprolol.   -Hold apixiban in light of some hemoptysis overnight and need for possible procedure.     Chronic systolic CHF (congestive heart failure), NYHA class 3  -PAD below baseline yesterday on review.  -Holding diuretics.  -Continue metoprolol.      Chris Merchant NP  Heart Transplant  [facility]

## 2019-05-07 NOTE — PROGRESS NOTES
05/07/19 1631   Vital Signs   Pulse (!) 34     Chris, FARIDEH notified of bradycardia. Pt found awake but sleepy, not symptomatic. EP paged per NP. NP to bedside. VSS.     Pacer failing to pace. EP consult placed. Electrolytes checked. Atropine PRN ordered. Device check ordered.

## 2019-05-07 NOTE — ASSESSMENT & PLAN NOTE
- U/S 5/6 with Cholelithiasis without evidence for acute cholecystitis. Small volume abdominal ascites. Small right pleural effusion. Right simple renal cyst.  - CT chest/abd/pelvis pending.  - Will check Cdiff with diarrhea and recent Abx use.

## 2019-05-07 NOTE — ASSESSMENT & PLAN NOTE
CHADS VASc 8  Patient presented for RICARDO/DCCV which was cancelled due to leukocytosis and TREY  Currently rate controlled with toprol 25 mg daily  apixaban held in the setting of anticoagulation  Possible RICARDO/DCCV when acute issues (infection/TREY) have resolved)

## 2019-05-08 NOTE — ASSESSMENT & PLAN NOTE
CHADS VASc 8  Patient presented for RICARDO/DCCV which was cancelled due to leukocytosis and TREY  Currently rate controlled with toprol 25 mg daily  apixaban held in the setting of hemoptysis  Possible RICARDO/DCCV when acute issues (infection/TREY) have resolved

## 2019-05-08 NOTE — SUBJECTIVE & OBJECTIVE
Interval History: WBC improved, diarrhea improved     Continuous Infusions:  Scheduled Meds:   cetirizine  10 mg Oral Daily    clopidogrel  75 mg Oral Daily    ferrous sulfate  325 mg Oral BID    insulin aspart U-100  10 Units Subcutaneous TID WM    insulin detemir U-100  6 Units Subcutaneous QHS    levothyroxine  50 mcg Oral Daily    multivitamin  1 tablet Oral Daily    omega-3 acid ethyl esters  2 g Oral BID    pantoprazole  40 mg Oral Daily    piperacillin-tazobactam (ZOSYN) IVPB  4.5 g Intravenous Q8H    rosuvastatin  20 mg Oral QHS    tamsulosin  0.4 mg Oral Daily     PRN Meds:albuterol, atropine, dextrose 50%, dextrose 50%, glucose, glucose, insulin aspart U-100, ondansetron, sodium chloride 0.9%    Review of patient's allergies indicates:   Allergen Reactions    Adhesive Other (See Comments)     blisters    Codeine Other (See Comments)     Blurred vision    Latex Hives    Ace inhibitors     Lipitor [atorvastatin] Other (See Comments)     Muscle spasms    Xanax [alprazolam] Hallucinations     Objective:     Vital Signs (Most Recent):  Temp: 97.6 °F (36.4 °C) (05/08/19 0738)  Pulse: 60 (05/08/19 0738)  Resp: 18 (05/08/19 0738)  BP: (!) 100/56 (05/08/19 0738)  SpO2: (!) 93 % (05/08/19 0738) Vital Signs (24h Range):  Temp:  [97.4 °F (36.3 °C)-97.9 °F (36.6 °C)] 97.6 °F (36.4 °C)  Pulse:  [34-65] 60  Resp:  [16-18] 18  SpO2:  [93 %-95 %] 93 %  BP: ()/(55-65) 100/56     Patient Vitals for the past 72 hrs (Last 3 readings):   Weight   05/08/19 0503 84.2 kg (185 lb 11.8 oz)   05/07/19 0518 84.7 kg (186 lb 11.7 oz)   05/06/19 1409 83.1 kg (183 lb 3.2 oz)     Body mass index is 24.51 kg/m².      Intake/Output Summary (Last 24 hours) at 5/8/2019 0956  Last data filed at 5/8/2019 0500  Gross per 24 hour   Intake 480 ml   Output 750 ml   Net -270 ml       Hemodynamic Parameters:       Physical Exam   Constitutional: He is oriented to person, place, and time. He appears well-developed and  well-nourished.   HENT:   Head: Normocephalic.   Eyes: Pupils are equal, round, and reactive to light.   Neck: Normal range of motion. Neck supple. JVD present.   Cardiovascular: Normal rate and regular rhythm.   Murmur heard.  Pulmonary/Chest: Effort normal and breath sounds normal.   Abdominal: Soft. Bowel sounds are normal. He exhibits distension.   Musculoskeletal: Normal range of motion.   Neurological: He is alert and oriented to person, place, and time.   Skin: Skin is warm and dry.   Psychiatric: He has a normal mood and affect. His behavior is normal.   Nursing note and vitals reviewed.      Significant Labs:  CBC:  Recent Labs   Lab 05/06/19  0721 05/07/19  0400 05/08/19  0513   WBC 17.57* 13.67* 11.21   RBC 3.72* 3.57* 3.64*   HGB 10.9* 10.8* 10.7*   HCT 31.9* 30.2* 31.9*   PLT 90* 92* 91*   MCV 86 85 88   MCH 29.3 30.3 29.4   MCHC 34.2 35.8 33.5     BNP:  Recent Labs   Lab 05/06/19  1214   BNP 1,793*     CMP:  Recent Labs   Lab 05/06/19  1214 05/07/19  0400 05/07/19  1716 05/08/19  0513   GLU  --  191* 99 78   CALCIUM  --  8.4* 8.7 8.4*   ALBUMIN 3.0* 2.7*  --  2.7*   PROT 6.5 6.1  --  6.2   NA  --  129* 132* 133*   K  --  4.0 3.8 3.6   CO2  --  18* 18* 20*   CL  --  99 101 101   BUN  --  97* 96* 97*   CREATININE  --  3.2* 3.0* 3.2*   ALKPHOS 125 121  --  122   ALT 18 15  --  15   AST 22 18  --  24   BILITOT 1.3* 0.8  --  0.9      Coagulation:   No results for input(s): PT, INR, APTT in the last 168 hours.  LDH:  No results for input(s): LDH in the last 72 hours.  Microbiology:  Microbiology Results (last 7 days)     Procedure Component Value Units Date/Time    E. coli 0157 antigen [[id number]] Collected:  05/06/19 1830    Order Status:  Completed Specimen:  Stool Updated:  05/08/19 0949     Shiga Toxin 1 E.coli Negative     Shiga Toxin 2 E.coli Negative    Culture, Respiratory with Gram Stain [[id number]] Collected:  05/06/19 2334    Order Status:  Completed Specimen:  Respiratory from Sputum Updated:   05/08/19 0753     Respiratory Culture Normal respiratory pradeep     Gram Stain (Respiratory) <10 epithelial cells per low power field.     Gram Stain (Respiratory) Rare WBC's     Gram Stain (Respiratory) Many Gram positive cocci     Gram Stain (Respiratory) Moderate Gram negative rods    Blood culture [[id number]] Collected:  05/06/19 1214    Order Status:  Completed Specimen:  Blood Updated:  05/07/19 1412     Blood Culture, Routine No Growth to date     Blood Culture, Routine No Growth to date    Blood culture [[id number]] Collected:  05/06/19 1214    Order Status:  Completed Specimen:  Blood Updated:  05/07/19 1412     Blood Culture, Routine No Growth to date     Blood Culture, Routine No Growth to date    Clostridium difficile EIA [[id number]]     Order Status:  Canceled Specimen:  Stool     Stool culture [[id number]] Collected:  05/06/19 1830    Order Status:  Sent Specimen:  Stool Updated:  05/06/19 2054    Blood culture [[id number]]     Order Status:  Canceled Specimen:  Blood     Blood culture [[id number]]     Order Status:  Canceled Specimen:  Blood           I have reviewed all pertinent labs within the past 24 hours.    Estimated Creatinine Clearance: 22.2 mL/min (A) (based on SCr of 3.2 mg/dL (H)).    Diagnostic Results:  I have reviewed and interpreted all pertinent imaging results/findings within the past 24 hours.

## 2019-05-08 NOTE — SUBJECTIVE & OBJECTIVE
Interval History: Patient reports feeling symptomatically improved this morning; he feels stronger and more energetic. He reports mild continued abdominal discomfort. He does not report chest pain, dyspnea, fever, chills or night sweats.    Review of Systems   Constitution: Negative.   HENT: Negative.    Eyes: Negative.    Cardiovascular: Negative.    Respiratory: Positive for cough.    Endocrine: Negative.    Hematologic/Lymphatic: Negative.    Skin: Negative.    Musculoskeletal: Negative.    Gastrointestinal: Positive for abdominal pain.   Genitourinary: Negative.    Neurological: Negative.    Psychiatric/Behavioral: Negative.      Objective:     Vital Signs (Most Recent):  Temp: 97.6 °F (36.4 °C) (05/08/19 0503)  Pulse: (!) 59 (05/08/19 0503)  Resp: 18 (05/08/19 0503)  BP: (!) 103/56 (05/08/19 0503)  SpO2: (!) 94 % (05/08/19 0503) Vital Signs (24h Range):  Temp:  [96 °F (35.6 °C)-97.9 °F (36.6 °C)] 97.6 °F (36.4 °C)  Pulse:  [34-62] 59  Resp:  [16-18] 18  SpO2:  [94 %-96 %] 94 %  BP: ()/(55-65) 103/56     Weight: 84.2 kg (185 lb 11.8 oz)  Body mass index is 24.51 kg/m².     SpO2: (!) 94 %  O2 Device (Oxygen Therapy): room air    Physical Exam   Constitutional: He is oriented to person, place, and time. He appears well-developed and well-nourished. No distress.   HENT:   Head: Normocephalic and atraumatic.   Neck: No JVD present.   Cardiovascular: Normal rate, regular rhythm, normal heart sounds and intact distal pulses. Exam reveals no gallop and no friction rub.   No murmur heard.  Device pocket without signs of infection   Pulmonary/Chest: Effort normal and breath sounds normal. No respiratory distress. He has no wheezes. He has no rales.   Abdominal: Soft. Bowel sounds are normal. He exhibits no distension. There is no tenderness.   Musculoskeletal: He exhibits no edema.   Neurological: He is alert and oriented to person, place, and time.   Skin: He is not diaphoretic.       Significant Labs:     Recent  Results (from the past 24 hour(s))   POCT glucose    Collection Time: 05/07/19  7:53 AM   Result Value Ref Range    POCT Glucose 169 (H) 70 - 110 mg/dL   POCT glucose    Collection Time: 05/07/19 11:23 AM   Result Value Ref Range    POCT Glucose 150 (H) 70 - 110 mg/dL   Vancomycin, random    Collection Time: 05/07/19 12:20 PM   Result Value Ref Range    Vancomycin, Random <1.1 Not established ug/mL   POCT glucose    Collection Time: 05/07/19  4:44 PM   Result Value Ref Range    POCT Glucose 121 (H) 70 - 110 mg/dL   Magnesium    Collection Time: 05/07/19  5:16 PM   Result Value Ref Range    Magnesium 2.5 1.6 - 2.6 mg/dL   Basic metabolic panel    Collection Time: 05/07/19  5:16 PM   Result Value Ref Range    Sodium 132 (L) 136 - 145 mmol/L    Potassium 3.8 3.5 - 5.1 mmol/L    Chloride 101 95 - 110 mmol/L    CO2 18 (L) 23 - 29 mmol/L    Glucose 99 70 - 110 mg/dL    BUN, Bld 96 (H) 8 - 23 mg/dL    Creatinine 3.0 (H) 0.5 - 1.4 mg/dL    Calcium 8.7 8.7 - 10.5 mg/dL    Anion Gap 13 8 - 16 mmol/L    eGFR if African American 22.3 (A) >60 mL/min/1.73 m^2    eGFR if non  19.3 (A) >60 mL/min/1.73 m^2   POCT glucose    Collection Time: 05/07/19  9:53 PM   Result Value Ref Range    POCT Glucose 66 (L) 70 - 110 mg/dL   POCT glucose    Collection Time: 05/08/19 12:14 AM   Result Value Ref Range    POCT Glucose 121 (H) 70 - 110 mg/dL   Comprehensive metabolic panel    Collection Time: 05/08/19  5:13 AM   Result Value Ref Range    Sodium 133 (L) 136 - 145 mmol/L    Potassium 3.6 3.5 - 5.1 mmol/L    Chloride 101 95 - 110 mmol/L    CO2 20 (L) 23 - 29 mmol/L    Glucose 78 70 - 110 mg/dL    BUN, Bld 97 (H) 8 - 23 mg/dL    Creatinine 3.2 (H) 0.5 - 1.4 mg/dL    Calcium 8.4 (L) 8.7 - 10.5 mg/dL    Total Protein 6.2 6.0 - 8.4 g/dL    Albumin 2.7 (L) 3.5 - 5.2 g/dL    Total Bilirubin 0.9 0.1 - 1.0 mg/dL    Alkaline Phosphatase 122 55 - 135 U/L    AST 24 10 - 40 U/L    ALT 15 10 - 44 U/L    Anion Gap 12 8 - 16 mmol/L    eGFR  if  20.6 (A) >60 mL/min/1.73 m^2    eGFR if non  17.8 (A) >60 mL/min/1.73 m^2   Magnesium    Collection Time: 05/08/19  5:13 AM   Result Value Ref Range    Magnesium 2.3 1.6 - 2.6 mg/dL   CBC auto differential    Collection Time: 05/08/19  5:13 AM   Result Value Ref Range    WBC 11.21 3.90 - 12.70 K/uL    RBC 3.64 (L) 4.60 - 6.20 M/uL    Hemoglobin 10.7 (L) 14.0 - 18.0 g/dL    Hematocrit 31.9 (L) 40.0 - 54.0 %    Mean Corpuscular Volume 88 82 - 98 fL    Mean Corpuscular Hemoglobin 29.4 27.0 - 31.0 pg    Mean Corpuscular Hemoglobin Conc 33.5 32.0 - 36.0 g/dL    RDW 17.0 (H) 11.5 - 14.5 %    Platelets 91 (L) 150 - 350 K/uL    MPV SEE COMMENT 9.2 - 12.9 fL    Immature Granulocytes 0.4 0.0 - 0.5 %    Gran # (ANC) 9.0 (H) 1.8 - 7.7 K/uL    Immature Grans (Abs) 0.04 0.00 - 0.04 K/uL    Lymph # 0.8 (L) 1.0 - 4.8 K/uL    Mono # 1.3 (H) 0.3 - 1.0 K/uL    Eos # 0.0 0.0 - 0.5 K/uL    Baso # 0.03 0.00 - 0.20 K/uL    nRBC 0 0 /100 WBC    Gran% 80.6 (H) 38.0 - 73.0 %    Lymph% 7.0 (L) 18.0 - 48.0 %    Mono% 11.7 4.0 - 15.0 %    Eosinophil% 0.0 0.0 - 8.0 %    Basophil% 0.3 0.0 - 1.9 %    Differential Method Automated          Significant Imaging:     I have reviewed all pertinent imaging studies from the last 24 hours

## 2019-05-08 NOTE — SIGNIFICANT EVENT
Contacted because patient's telemetry was reading HR in the 30's. Review of telemetry reveals a paced rhythm with PVCs in bigeminy and a compensatory delay in pacing. The patient remained asymptomatic and hemodynamically stable. Device interrogated, no alerts, device function stable (see report for full details).    Valentin Farah MD  PGY-IV

## 2019-05-08 NOTE — PLAN OF CARE
Problem: Adult Inpatient Plan of Care  Goal: Patient-Specific Goal (Individualization)  Plan of care discussed with patient. Patient is free of fall/trauma/injury. Denies CP, SOB, or pain/discomfort. IV abx administered per order. Contact precautions maintained - awaiting stool sample.  All questions addressed. Will continue to monitor

## 2019-05-08 NOTE — PROGRESS NOTES
Pharmacokinetic Assessment Follow Up: IV Vancomycin    Vancomycin serum concentration assessment(s):    The random level was drawned correctly and can be used to guide therapy at this time. 7.4    Vancomycin Regimen Plan:  Redose today with 1250mg IV x 1 in setting of TREY. Follow daily random levels, redose when <15.     The pharmacist team will continue to follow and monitor vancomycin.    Thank you for the consult,   Dede Shivamchelsea     Patient brief summary:  Jerry Solis is a 78 y.o. male initiated on antimicrobial therapy with IV Vancomycin for treatment of suspected lower respiratory infection    The patient did not receive a loading dose, followed by the current treatment regimen: random levels with plan to redose when level is less than 15 mcg/mL    Drug Allergies:   Review of patient's allergies indicates:   Allergen Reactions    Adhesive Other (See Comments)     blisters    Codeine Other (See Comments)     Blurred vision    Latex Hives    Ace inhibitors     Lipitor [atorvastatin] Other (See Comments)     Muscle spasms    Xanax [alprazolam] Hallucinations       Actual Body Weight:   84.2 kg    Renal Function:   Estimated Creatinine Clearance: 22.2 mL/min (A) (based on SCr of 3.2 mg/dL (H)).,       CBC (last 72 hours):  Recent Labs   Lab Result Units 05/06/19  0721 05/07/19  0400 05/08/19  0513   WBC K/uL 17.57* 13.67* 11.21   Hemoglobin g/dL 10.9* 10.8* 10.7*   Hematocrit % 31.9* 30.2* 31.9*   Platelets K/uL 90* 92* 91*   Gran% % 86.4* 83.1* 80.6*   Lymph% % 3.8* 6.1* 7.0*   Mono% % 8.9 10.2 11.7   Eosinophil% % 0.0 0.0 0.0   Basophil% % 0.3 0.2 0.3   Differential Method  Automated Automated Automated       Metabolic Panel (last 72 hours):  Recent Labs   Lab Result Units 05/06/19  0705 05/06/19  0721 05/06/19  1214 05/06/19  1502 05/07/19  0400 05/07/19  1716 05/08/19  0513   Sodium mmol/L 133* 133*  --   --  129* 132* 133*   Potassium mmol/L 4.2 4.5  --   --  4.0 3.8 3.6   Chloride mmol/L 99 99  --    --  99 101 101   CO2 mmol/L 20* 18*  --   --  18* 18* 20*   Glucose mg/dL 210* 212*  --   --  191* 99 78   Glucose, UA   --   --   --  Negative  --   --   --    BUN, Bld mg/dL 95* 96*  --   --  97* 96* 97*   Creatinine mg/dL 3.3* 3.3*  --   --  3.2* 3.0* 3.2*   Albumin g/dL  --   --  3.0*  --  2.7*  --  2.7*   Total Bilirubin mg/dL  --   --  1.3*  --  0.8  --  0.9   Alkaline Phosphatase U/L  --   --  125  --  121  --  122   AST U/L  --   --  22  --  18  --  24   ALT U/L  --   --  18  --  15  --  15   Magnesium mg/dL  --   --   --   --  2.2 2.5 2.3       Vancomycin Administrations:  vancomycin given in the last 96 hours                   vancomycin in dextrose 5 % 1 gram/250 mL IVPB 1,000 mg (mg) 1,000 mg New Bag 05/07/19 1435                Drug levels (last 3 results):  Recent Labs   Lab Result Units 05/07/19  1220 05/08/19  1205   Vancomycin, Random ug/mL <1.1 7.4       Microbiologic Results:  Microbiology Results (last 7 days)     Procedure Component Value Units Date/Time    Blood culture [[id number]] Collected:  05/06/19 1214    Order Status:  Completed Specimen:  Blood Updated:  05/08/19 1412     Blood Culture, Routine No Growth to date     Blood Culture, Routine No Growth to date     Blood Culture, Routine No Growth to date    Blood culture [[id number]] Collected:  05/06/19 1214    Order Status:  Completed Specimen:  Blood Updated:  05/08/19 1412     Blood Culture, Routine No Growth to date     Blood Culture, Routine No Growth to date     Blood Culture, Routine No Growth to date    Stool culture [[id number]] Collected:  05/06/19 1830    Order Status:  Completed Specimen:  Stool Updated:  05/08/19 1324     Stool Culture Nothing significant to date    E. coli 0157 antigen [[id number]] Collected:  05/06/19 1830    Order Status:  Completed Specimen:  Stool Updated:  05/08/19 0949     Shiga Toxin 1 E.coli Negative     Shiga Toxin 2 E.coli Negative    Culture, Respiratory with Gram Stain [[id number]] Collected:  05/06/19  2334    Order Status:  Completed Specimen:  Respiratory from Sputum Updated:  05/08/19 0753     Respiratory Culture Normal respiratory pradeep     Gram Stain (Respiratory) <10 epithelial cells per low power field.     Gram Stain (Respiratory) Rare WBC's     Gram Stain (Respiratory) Many Gram positive cocci     Gram Stain (Respiratory) Moderate Gram negative rods    Clostridium difficile EIA [[id number]]     Order Status:  Canceled Specimen:  Stool     Blood culture [[id number]]     Order Status:  Canceled Specimen:  Blood     Blood culture [[id number]]     Order Status:  Canceled Specimen:  Blood

## 2019-05-08 NOTE — ASSESSMENT & PLAN NOTE
- U/S 5/6 with Cholelithiasis without evidence for acute cholecystitis. Small volume abdominal ascites. Small right pleural effusion. Right simple renal cyst.  - CT chest/abd/pelvis done.  - c diff pending

## 2019-05-08 NOTE — PLAN OF CARE
Problem: Physical Therapy Goal  Goal: Physical Therapy Goal  Goals to be met by: 2019     Patient will increase functional independence with mobility by performin. Sit to stand transfer with [place]  2. Gait  x 200 feet with Supervision using no AD.       Outcome: Ongoing (interventions implemented as appropriate)  Pt evaluated and appropriate goals established.

## 2019-05-08 NOTE — NURSING
Notified Dr. Claudia MD of patient's bedtime BG of 66. Per protocol, given 4 glucose tablets. Patient asymptomatic. Also given some rosamaria crackers. MD verbalized it was ok to receive scheduled long acting insulin of 6 units. Will administer and continue to monitor. Will spot check at 0200.

## 2019-05-08 NOTE — PROCEDURES
[facility] [employer] [id number] CRT-D    VVT 60  RV -> LV 20 ms     bpm (monitor)   bpm ATP x 1, 845 V, 890 V, 890 V x 4    Capture  RV 0.25 V @ 0.5 ms  LV 1.5 V @ 1 ms    Sense  A 0.2 mV  RV 6 mV    Lead impedance  A 400 ohms   ohms   ohms  HV 44 ohms  (all values stable)    VT episodes 0  VF episodes 0  ATP delivered 0  Shocks delivered 0    BP 85%  AT/AF burden 98%    Valentin Farah MD  PGY-IV

## 2019-05-08 NOTE — PROGRESS NOTES
[facility]  Heart Transplant  Progress Note    Patient Name: Jerry Solis  MRN: [id number]  Admission Date: 5/6/2019  Hospital Length of Stay: 2 days  Attending Physician: Pricilla Horne MD  Primary Care Provider: Primary Doctor No  Principal Problem:TREY (acute kidney injury)    Subjective:     Interval History: WBC improved, diarrhea improved     Continuous Infusions:  Scheduled Meds:   cetirizine  10 mg Oral Daily    clopidogrel  75 mg Oral Daily    ferrous sulfate  325 mg Oral BID    insulin aspart U-100  10 Units Subcutaneous TID WM    insulin detemir U-100  6 Units Subcutaneous QHS    levothyroxine  50 mcg Oral Daily    multivitamin  1 tablet Oral Daily    omega-3 acid ethyl esters  2 g Oral BID    pantoprazole  40 mg Oral Daily    piperacillin-tazobactam (ZOSYN) IVPB  4.5 g Intravenous Q8H    rosuvastatin  20 mg Oral QHS    tamsulosin  0.4 mg Oral Daily     PRN Meds:albuterol, atropine, dextrose 50%, dextrose 50%, glucose, glucose, insulin aspart U-100, ondansetron, sodium chloride 0.9%    Review of patient's allergies indicates:   Allergen Reactions    Adhesive Other (See Comments)     blisters    Codeine Other (See Comments)     Blurred vision    Latex Hives    Ace inhibitors     Lipitor [atorvastatin] Other (See Comments)     Muscle spasms    Xanax [alprazolam] Hallucinations     Objective:     Vital Signs (Most Recent):  Temp: 97.6 °F (36.4 °C) (05/08/19 0738)  Pulse: 60 (05/08/19 0738)  Resp: 18 (05/08/19 0738)  BP: (!) 100/56 (05/08/19 0738)  SpO2: (!) 93 % (05/08/19 0738) Vital Signs (24h Range):  Temp:  [97.4 °F (36.3 °C)-97.9 °F (36.6 °C)] 97.6 °F (36.4 °C)  Pulse:  [34-65] 60  Resp:  [16-18] 18  SpO2:  [93 %-95 %] 93 %  BP: ()/(55-65) 100/56     Patient Vitals for the past 72 hrs (Last 3 readings):   Weight   05/08/19 0503 84.2 kg (185 lb 11.8 oz)   05/07/19 0518 84.7 kg (186 lb 11.7 oz)   05/06/19 1409 83.1 kg (183 lb 3.2 oz)     Body mass index is 24.51  kg/m².      Intake/Output Summary (Last 24 hours) at 5/8/2019 0956  Last data filed at 5/8/2019 0500  Gross per 24 hour   Intake 480 ml   Output 750 ml   Net -270 ml       Hemodynamic Parameters:       Physical Exam   Constitutional: He is oriented to person, place, and time. He appears well-developed and well-nourished.   HENT:   Head: Normocephalic.   Eyes: Pupils are equal, round, and reactive to light.   Neck: Normal range of motion. Neck supple. JVD present.   Cardiovascular: Normal rate and regular rhythm.   Murmur heard.  Pulmonary/Chest: Effort normal and breath sounds normal.   Abdominal: Soft. Bowel sounds are normal. He exhibits distension.   Musculoskeletal: Normal range of motion.   Neurological: He is alert and oriented to person, place, and time.   Skin: Skin is warm and dry.   Psychiatric: He has a normal mood and affect. His behavior is normal.   Nursing note and vitals reviewed.      Significant Labs:  CBC:  Recent Labs   Lab 05/06/19  0721 05/07/19  0400 05/08/19  0513   WBC 17.57* 13.67* 11.21   RBC 3.72* 3.57* 3.64*   HGB 10.9* 10.8* 10.7*   HCT 31.9* 30.2* 31.9*   PLT 90* 92* 91*   MCV 86 85 88   MCH 29.3 30.3 29.4   MCHC 34.2 35.8 33.5     BNP:  Recent Labs   Lab 05/06/19  1214   BNP 1,793*     CMP:  Recent Labs   Lab 05/06/19  1214 05/07/19  0400 05/07/19  1716 05/08/19  0513   GLU  --  191* 99 78   CALCIUM  --  8.4* 8.7 8.4*   ALBUMIN 3.0* 2.7*  --  2.7*   PROT 6.5 6.1  --  6.2   NA  --  129* 132* 133*   K  --  4.0 3.8 3.6   CO2  --  18* 18* 20*   CL  --  99 101 101   BUN  --  97* 96* 97*   CREATININE  --  3.2* 3.0* 3.2*   ALKPHOS 125 121  --  122   ALT 18 15  --  15   AST 22 18  --  24   BILITOT 1.3* 0.8  --  0.9      Coagulation:   No results for input(s): PT, INR, APTT in the last 168 hours.  LDH:  No results for input(s): LDH in the last 72 hours.  Microbiology:  Microbiology Results (last 7 days)     Procedure Component Value Units Date/Time    E. coli 0157 antigen [[id number]]  Collected:  05/06/19 1830    Order Status:  Completed Specimen:  Stool Updated:  05/08/19 0949     Shiga Toxin 1 E.coli Negative     Shiga Toxin 2 E.coli Negative    Culture, Respiratory with Gram Stain [[id number]] Collected:  05/06/19 2334    Order Status:  Completed Specimen:  Respiratory from Sputum Updated:  05/08/19 0753     Respiratory Culture Normal respiratory pradeep     Gram Stain (Respiratory) <10 epithelial cells per low power field.     Gram Stain (Respiratory) Rare WBC's     Gram Stain (Respiratory) Many Gram positive cocci     Gram Stain (Respiratory) Moderate Gram negative rods    Blood culture [[id number]] Collected:  05/06/19 1214    Order Status:  Completed Specimen:  Blood Updated:  05/07/19 1412     Blood Culture, Routine No Growth to date     Blood Culture, Routine No Growth to date    Blood culture [[id number]] Collected:  05/06/19 1214    Order Status:  Completed Specimen:  Blood Updated:  05/07/19 1412     Blood Culture, Routine No Growth to date     Blood Culture, Routine No Growth to date    Clostridium difficile EIA [[id number]]     Order Status:  Canceled Specimen:  Stool     Stool culture [[id number]] Collected:  05/06/19 1830    Order Status:  Sent Specimen:  Stool Updated:  05/06/19 2054    Blood culture [[id number]]     Order Status:  Canceled Specimen:  Blood     Blood culture [[id number]]     Order Status:  Canceled Specimen:  Blood           I have reviewed all pertinent labs within the past 24 hours.    Estimated Creatinine Clearance: 22.2 mL/min (A) (based on SCr of 3.2 mg/dL (H)).    Diagnostic Results:  I have reviewed and interpreted all pertinent imaging results/findings within the past 24 hours.    Assessment and Plan:     79 y/o male with HFrEF, ICMP (EF=20%), NYHA class III symptoms, afib, S/p cardioMEMS implant (enrolled in GUIDE-HF study), recently treated for infective endocarditis(S/P ICD lead extraction, lead tip and ICD pocket cultured with RV lead +ve for Enteroccocus) who  comes in for RICARDO DCCV but was found to have TREY and leukocytosis. Cr up to 3.3 from baseline ~2.2 and WBC count up to 17. Procedure was cancelled for above reason. He is accompanied by his wife. He states that he has been having abdominal pain and vomiting for the last few weeks. He saw outside MD who told him that he had gallstones. He had episode of diarrhea this morning and has only been able to tolerate toast, crackers, and sips of water. Denies CP, palpitations, syncope, presyncope, fevers, PND. Cardiomems measurements yesterday with PA 57/19(33) which seems to be below his baseline.     * TREY (acute kidney injury)  - Hemodynamics per Merlin on 5/5 with PAD below baseline.  - Holding diuretics in light of poor PO intake/vomiting/diarrhea.    Hypothyroid  -TSH up, FT4 low.   -Increased levothyroxine to 50mcg daily.  -Will need to recheck TFTs in ~6 weeks.    Abdominal pain  - U/S 5/6 with Cholelithiasis without evidence for acute cholecystitis. Small volume abdominal ascites. Small right pleural effusion. Right simple renal cyst.  - CT chest/abd/pelvis done.  - c diff pending       Leukocytosis  -Pt with recent ICD extraction secondary to enterococcus.  -Blood cultures X 2. UA with reflex culture.  -Stool studie/resp cx pending.   Continue IV abx for PNA    A-fib  -RICARDO/DCCV on hold until current issues resolve.  -Continue metoprolol.   -Hold apixiban in light of some hemoptysis overnight and need for possible procedure.     Chronic systolic CHF (congestive heart failure), NYHA class 3  -PAD below baseline on review.  -Holding diuretics.  -Continue metoprolol.        STAR Purvis  Heart Transplant  [facility]

## 2019-05-08 NOTE — NURSING
Notified Dr. Claudia MD of patient having a 6 beat run of V-tach. Patient asymptomatic. Pending AM labs to see what electrolytes needs to be replaced. Will continue to monitor.

## 2019-05-08 NOTE — NURSING
Notified Dr. Claudia MD of patient becoming bradycardic with HR dropping to 34 on telemetry. Patient did this earlier today according day shift report, however, noted to may not be real as PM is not counting beats or pacing correctly. VSS. Patient is in no distress or symptomatic. Currently sleeping, but easy to arouse. Placed pacer pads at bedside for precaution. Will continue to monitor.

## 2019-05-08 NOTE — PLAN OF CARE
Problem: Adult Inpatient Plan of Care  Goal: Plan of Care Review  Outcome: Ongoing (interventions implemented as appropriate)  Patient remained free from falls/injury/trauma throughout shift. No complaints of chest pain or SOB. VSS. Patient's HR dropped on telemetry throughout night with HR dropping to low as 34. However, patient asymptomatic and pacemaker not pacing properly. Interrogation of the device was performed. Please see MD notes. Pacer pads placed at bedside for precaution. BG 66 at bedtime. Given 4 glucose tablets per protocol. Recheck . Patient on contact for possible C-diff. Unable to collect enough stool as patient's bowel movement was very small. Stool is soft, formed, and green. IV zosyn q 8. Fall risk precautions reviewed and maintained. Plan of care reviewed with patient. Patient verbalized understanding. All questions encouraged and answered. Will continue to monitor.

## 2019-05-08 NOTE — ASSESSMENT & PLAN NOTE
-Pt with recent ICD extraction secondary to enterococcus.  -Blood cultures X 2. UA with reflex culture.  -Stool studie/resp cx pending.   Continue IV abx for PNA

## 2019-05-08 NOTE — PT/OT/SLP EVAL
"Physical Therapy Evaluation    Patient Name:  Jerry Solis   MRN:  [id number]    Recommendations:     Discharge Recommendations:  home   Discharge Equipment Recommendations: none   Barriers to discharge: None    Assessment:     Jerry Solis is a 78 y.o. male admitted with a medical diagnosis of TREY (acute kidney injury).  He presents with the following impairments/functional limitations:  impaired endurance, gait instability, impaired functional mobilty, impaired balance. Pt tolerated activity with mild balance deficits while ambulating requiring stand by assist with occasional contact guard assistance. Pt would continue to benefit from acute skilled therapy intervention to address deficits and progress toward prior level of function in order to safely return home.       Rehab Prognosis: Good; patient would benefit from acute skilled PT services to address these deficits and reach maximum level of function.    Recent Surgery: Procedure(s) (LRB):  Cardioversion or Defibrillation (N/A)  Transesophageal echo (RICARDO) intra-procedure log documentation (N/A) 2 Days Post-Op    Plan:     During this hospitalization, patient to be seen 3 x/week to address the identified rehab impairments via gait training, therapeutic activities, therapeutic exercises, neuromuscular re-education and progress toward the following goals:    · Plan of Care Expires:       Subjective     Chief Complaint: Pt states "I am moving a little slower than usual"   Patient/Family Comments/goals: to get better and return home   Pain/Comfort:  · Pain Rating 1: 0/10  · Pain Rating Post-Intervention 1: 0/10    Patients cultural, spiritual, Islam conflicts given the current situation: no    Living Environment:  Pt lives with spouse in a [facility] with ramp entrance.   Prior to admission, patients level of function was independent with mobility and ADLs.  Equipment used at home: none.  DME owned (not currently used): none.  Upon discharge, patient will have " assistance from spouse.    Objective:     Communicated with RN prior to session.  Patient found supine with telemetry  upon PT entry to room.    General Precautions: Standard, fall   Orthopedic Precautions:N/A   Braces: N/A     Exams:  · Cognitive Exam:  Patient is AAOx4, followed all commands, communicates clearly and fluently  · Gross Motor Coordination:  WFL  · RUE ROM: WFL  · RUE Strength: WFL   · LUE ROM: WFL  · LUE Strength: WFL  · RLE ROM: WFL  · RLE Strength: WFL   · LLE ROM: WFL  · LLE Strength: WFL    Functional Mobility:  · Bed Mobility:     · Supine to Sit: modified independence  · Transfers:     · Sit to Stand:  supervision with no AD  · Gait: Pt ambulated 150 feet with no AD and stand by assistance with occasional contact guard assistance. Pt demo'd decreased steve, decreased step size, narrow CHERYL with increased lateral sway. Pt demo'd occasional LE crossing midline when stepping resulting in excessive lateral sway requiring contact guard assistance to recover. Pt with no LOB, no dizziness, no SOB.       Therapeutic Activities and Exercises:   Pt educated on role of PT/POC. Pt verbalized understanding.   Pt encouraged to ambulate 1-2x/day in hallway with supervision from spouse. Pt encouraged to sit up in chair during the day. Pt agreeable.       AM-PAC 6 CLICK MOBILITY  Total Score:22     Patient left up in chair with all lines intact, call button in reach and RN notified.    GOALS:   Multidisciplinary Problems     Physical Therapy Goals        Problem: Physical Therapy Goal    Goal Priority Disciplines Outcome Goal Variances Interventions   Physical Therapy Goal     PT, PT/OT Ongoing (interventions implemented as appropriate)     Description:  Goals to be met by: 2019     Patient will increase functional independence with mobility by performin. Sit to stand transfer with [place]  2. Gait  x 200 feet with Supervision using no AD.                         History:     Past Medical  History:   Diagnosis Date    Anticoagulant long-term use     Chronic systolic congestive heart failure 3/22/2018    COPD (chronic obstructive pulmonary disease)     Coronary artery disease involving native coronary artery of native heart without angina pectoris 3/22/2018    ICD (implantable cardioverter-defibrillator) in place 3/22/2018    Ischemic cardiomyopathy 3/22/2018    Mixed hyperlipidemia 3/22/2018    Type 2 diabetes mellitus with complication 3/22/2018    VT (ventricular tachycardia) 3/22/2018       Past Surgical History:   Procedure Laterality Date    CARDIOVERSION N/A 1/14/2019    Performed by [facility] MD at [facility] EP [facility]    ECHOCARDIOGRAM,TRANSESOPHAGEAL N/A 1/14/2019    Performed by Ryan Henley MD at [facility] EP LAB    ECHOCARDIOGRAM,TRANSESOPHAGEAL N/A 1/9/2019    Performed by [facility] Diagnostic Provider at [facility] EP LAB    EXTRACTION, ELECTRODE LEAD Left 1/9/2019    Performed by Werner Boggs MD at [facility] EP LAB    HEART CATH-RIGHT Right 4/5/2018    Performed by Elizabeth Wise MD at [facility] CATH LAB    INSERTION, ICD, BIVENTRICULAR Left 1/29/2019    Performed by Werner Boggs MD at [facility] EP LAB    Insertion, Pacemaker, Temporary Transvenous  1/9/2019    Performed by Werner Boggs MD at [facility] EP LAB       Time Tracking:     PT Received On: 05/08/19  PT Start Time: 0829     PT Stop Time: 0853  PT Total Time (min): 24 min     Billable Minutes: Evaluation 10 mins  and Gait Training 14 mins       Cathie Randall, PT  05/08/2019 [place]

## 2019-05-08 NOTE — CARE UPDATE
Rapid Response Nurse Follow-up Note     Followed up with patient for proactive rounding.   No acute issues at this time. Pacemaker interrogated by cardiology, no issues noted. VSS. Patient resting comfortably, wife at bedside. Reviewed plan of care with primary RN, Theresa.   Please call Rapid Response RN, Mohsen Hilton RN with any questions or concerns at [phone].

## 2019-05-09 NOTE — PROGRESS NOTES
[facility]  Heart Transplant  Progress Note    Patient Name: Jerry Solis  MRN: [id number]  Admission Date: 5/6/2019  Hospital Length of Stay: 3 days  Attending Physician: Pricilla Horne MD  Primary Care Provider: Primary Doctor No  Principal Problem:TREY (acute kidney injury)    Subjective:     Interval History: diarrhea resolved, afebrile. Feels abdomen more distended     Continuous Infusions:  Scheduled Meds:   bumetanide  2 mg Oral Daily    cetirizine  10 mg Oral Daily    clopidogrel  75 mg Oral Daily    ferrous sulfate  325 mg Oral BID    insulin aspart U-100  10 Units Subcutaneous TID WM    insulin detemir U-100  6 Units Subcutaneous QHS    levothyroxine  50 mcg Oral Daily    multivitamin  1 tablet Oral Daily    omega-3 acid ethyl esters  2 g Oral BID    pantoprazole  40 mg Oral Daily    rosuvastatin  20 mg Oral QHS    tamsulosin  0.4 mg Oral Daily     PRN Meds:albuterol, atropine, dextrose 50%, dextrose 50%, glucose, glucose, insulin aspart U-100, ondansetron, sodium chloride 0.9%    Review of patient's allergies indicates:   Allergen Reactions    Adhesive Other (See Comments)     blisters    Codeine Other (See Comments)     Blurred vision    Latex Hives    Ace inhibitors     Lipitor [atorvastatin] Other (See Comments)     Muscle spasms    Xanax [alprazolam] Hallucinations     Objective:     Vital Signs (Most Recent):  Temp: 97.6 °F (36.4 °C) (05/09/19 0357)  Pulse: (!) 59 (05/09/19 0836)  Resp: 16 (05/08/19 1943)  BP: (!) 97/58 (05/09/19 0357)  SpO2: 95 % (05/09/19 0357) Vital Signs (24h Range):  Temp:  [96.6 °F (35.9 °C)-97.9 °F (36.6 °C)] 97.6 °F (36.4 °C)  Pulse:  [59-76] 59  Resp:  [16-18] 16  SpO2:  [93 %-96 %] 95 %  BP: ()/(55-60) 97/58     Patient Vitals for the past 72 hrs (Last 3 readings):   Weight   05/09/19 0600 84.2 kg (185 lb 10 oz)   05/08/19 0503 84.2 kg (185 lb 11.8 oz)   05/07/19 0518 84.7 kg (186 lb 11.7 oz)     Body mass index is 24.49  kg/m².      Intake/Output Summary (Last 24 hours) at 5/9/2019 0949  Last data filed at 5/9/2019 0500  Gross per 24 hour   Intake 480 ml   Output 900 ml   Net -420 ml       Hemodynamic Parameters:           Physical Exam   Constitutional: He is oriented to person, place, and time. He appears well-developed and well-nourished.   HENT:   Head: Normocephalic.   Eyes: Pupils are equal, round, and reactive to light.   Neck: Normal range of motion. Neck supple. JVD present.   Cardiovascular: Normal rate and regular rhythm.   Murmur heard.  Pulmonary/Chest: Effort normal and breath sounds normal.   Abdominal: Soft. Bowel sounds are normal. He exhibits distension.   Musculoskeletal: Normal range of motion.   Neurological: He is alert and oriented to person, place, and time.   Skin: Skin is warm and dry.   Psychiatric: He has a normal mood and affect. His behavior is normal.   Nursing note and vitals reviewed.      Significant Labs:  CBC:  Recent Labs   Lab 05/07/19  0400 05/08/19  0513 05/09/19  0352   WBC 13.67* 11.21 11.47   RBC 3.57* 3.64* 3.47*   HGB 10.8* 10.7* 10.1*   HCT 30.2* 31.9* 29.5*   PLT 92* 91* 93*   MCV 85 88 85   MCH 30.3 29.4 29.1   MCHC 35.8 33.5 34.2     BNP:  Recent Labs   Lab 05/06/19  1214   BNP 1,793*     CMP:  Recent Labs   Lab 05/07/19  0400 05/07/19  1716 05/08/19  0513 05/09/19  0352   * 99 78 126*   CALCIUM 8.4* 8.7 8.4* 8.3*   ALBUMIN 2.7*  --  2.7* 2.5*   PROT 6.1  --  6.2 5.9*   * 132* 133* 135*   K 4.0 3.8 3.6 3.7   CO2 18* 18* 20* 16*   CL 99 101 101 104   BUN 97* 96* 97* 88*   CREATININE 3.2* 3.0* 3.2* 3.5*   ALKPHOS 121  --  122 139*   ALT 15  --  15 15   AST 18  --  24 27   BILITOT 0.8  --  0.9 1.0      Coagulation:   No results for input(s): PT, INR, APTT in the last 168 hours.  LDH:  No results for input(s): LDH in the last 72 hours.  Microbiology:  Microbiology Results (last 7 days)     Procedure Component Value Units Date/Time    Culture, Respiratory with Gram Stain  [[id number]] Collected:  05/06/19 2334    Order Status:  Completed Specimen:  Respiratory from Sputum Updated:  05/09/19 0916     Respiratory Culture Normal respiratory pradeep     Respiratory Culture No S aureus or Pseudomonas isolated.     Gram Stain (Respiratory) <10 epithelial cells per low power field.     Gram Stain (Respiratory) Rare WBC's     Gram Stain (Respiratory) Many Gram positive cocci     Gram Stain (Respiratory) Moderate Gram negative rods    Blood culture [[id number]] Collected:  05/06/19 1214    Order Status:  Completed Specimen:  Blood Updated:  05/08/19 1412     Blood Culture, Routine No Growth to date     Blood Culture, Routine No Growth to date     Blood Culture, Routine No Growth to date    Blood culture [[id number]] Collected:  05/06/19 1214    Order Status:  Completed Specimen:  Blood Updated:  05/08/19 1412     Blood Culture, Routine No Growth to date     Blood Culture, Routine No Growth to date     Blood Culture, Routine No Growth to date    Stool culture [[id number]] Collected:  05/06/19 1830    Order Status:  Completed Specimen:  Stool Updated:  05/08/19 1324     Stool Culture Nothing significant to date    E. coli 0157 antigen [[id number]] Collected:  05/06/19 1830    Order Status:  Completed Specimen:  Stool Updated:  05/08/19 0949     Shiga Toxin 1 E.coli Negative     Shiga Toxin 2 E.coli Negative    Clostridium difficile EIA [[id number]]     Order Status:  Canceled Specimen:  Stool     Blood culture [[id number]]     Order Status:  Canceled Specimen:  Blood     Blood culture [[id number]]     Order Status:  Canceled Specimen:  Blood           Specimen (12h ago, onward)    None        I have reviewed all pertinent labs within the past 24 hours.    Estimated Creatinine Clearance: 20.3 mL/min (A) (based on SCr of 3.5 mg/dL (H)).    Diagnostic Results:  I have reviewed and interpreted all pertinent imaging results/findings within the past 24 hours.    Assessment and Plan:     79 y/o male with  HFrEF, ICMP (EF=20%), NYHA class III symptoms, afib, S/p cardioMEMS implant (enrolled in GUIDE-HF study), recently treated for infective endocarditis(S/P ICD lead extraction, lead tip and ICD pocket cultured with RV lead +ve for Enteroccocus) who comes in for RICARDO DCCV but was found to have TREY and leukocytosis. Cr up to 3.3 from baseline ~2.2 and WBC count up to 17. Procedure was cancelled for above reason. He is accompanied by his wife. He states that he has been having abdominal pain and vomiting for the last few weeks. He saw outside MD who told him that he had gallstones. He had episode of diarrhea this morning and has only been able to tolerate toast, crackers, and sips of water. Denies CP, palpitations, syncope, presyncope, fevers, PND. Cardiomems measurements yesterday with PA 57/19(33) which seems to be below his baseline.     * TREY (acute kidney injury)  - Hemodynamics per Merlin on 5/5 with PAD below baseline.  - resume bumex    Hypothyroid  -TSH up, FT4 low.   -Increased levothyroxine to 50mcg daily.  -Will need to recheck TFTs in ~6 weeks.    Abdominal pain  - U/S 5/6 with Cholelithiasis without evidence for acute cholecystitis. Small volume abdominal ascites. Small right pleural effusion. Right simple renal cyst.  - CT chest/abd/pelvis done.  - c diff not sent as diarrhea resolved       Leukocytosis  -Pt with recent ICD extraction secondary to enterococcus.  -Blood cultures X 2. UA with reflex culture.  -Stool studie/resp cx negative  Stop zosyn and start oral abx     A-fib  -RICARDO/DCCV on hold until current issues resolve.  -Continue metoprolol.   -resume apixaban  .     Chronic systolic CHF (congestive heart failure), NYHA class 3  -PAD below baseline on review.  -resume diuretics   -Continue metoprolol.        STAR Purvis  Heart Transplant  [facility]

## 2019-05-09 NOTE — PHYSICIAN QUERY
PT Name: Jerry Solis  MR #: [id number]    Physician Query Form - Atrial Fibrillation Specificity     CDS/: Brandie Peck RN, CCDS             Contact information: [email or web address]     This form is a permanent document in the medical record.     Query Date: May 9, 2019    By submitting this query, we are merely seeking further clarification of documentation. Please utilize your independent clinical judgment when addressing the question(s) below.    The medical record contains the following:   Indicators     Supporting Clinical Findings Location in Medical Record   X Atrial Fibrillation A-Fib 5/6 h/p, 5/7-5/9 prog note   X EKG results Atrial fibrillation 5/6 ekg   X Medication Continue metoprolol, apixiban.   -Will need to hold apixiban if any procedures needed.  5/6 h/p   X Treatment Comes in for RICARDO DCCV but was found to have TREY and leukocytosis.    -RICARDO/DCCV on hold until current issues resolve.     5/6 h/p    Other         Provider, please further specify the Atrial Fibrillation diagnosis.    [  ] Chronic   [ x ] Paroxysmal   [  ] Permanent   [  ] Persistent   [  ] Other (please specify):   [  ] Clinically Undetermined       Please document in your progress notes daily for the duration of treatment until resolved, and include in your discharge summary.

## 2019-05-09 NOTE — PLAN OF CARE
Problem: Adult Inpatient Plan of Care  Goal: Plan of Care Review  Outcome: Ongoing (interventions implemented as appropriate)  Patient remained free from falls/injury/trauma throughout shift. No complaints of chest pain or SOB. VSS. IV zosyn given. Fall risk precautions reviewed and maintained. Plan of care reviewed with patient. Patient verbalized understanding. All questions encouraged and answered. Will continue to monitor.

## 2019-05-09 NOTE — ASSESSMENT & PLAN NOTE
-Pt with recent ICD extraction secondary to enterococcus.  -Blood cultures X 2. UA with reflex culture.  -Stool studie/resp cx negative  Stop zosyn and start oral abx

## 2019-05-09 NOTE — SUBJECTIVE & OBJECTIVE
Interval History: diarrhea resolved, afebrile. Feels abdomen more distended     Continuous Infusions:  Scheduled Meds:   bumetanide  2 mg Oral Daily    cetirizine  10 mg Oral Daily    clopidogrel  75 mg Oral Daily    ferrous sulfate  325 mg Oral BID    insulin aspart U-100  10 Units Subcutaneous TID WM    insulin detemir U-100  6 Units Subcutaneous QHS    levothyroxine  50 mcg Oral Daily    multivitamin  1 tablet Oral Daily    omega-3 acid ethyl esters  2 g Oral BID    pantoprazole  40 mg Oral Daily    rosuvastatin  20 mg Oral QHS    tamsulosin  0.4 mg Oral Daily     PRN Meds:albuterol, atropine, dextrose 50%, dextrose 50%, glucose, glucose, insulin aspart U-100, ondansetron, sodium chloride 0.9%    Review of patient's allergies indicates:   Allergen Reactions    Adhesive Other (See Comments)     blisters    Codeine Other (See Comments)     Blurred vision    Latex Hives    Ace inhibitors     Lipitor [atorvastatin] Other (See Comments)     Muscle spasms    Xanax [alprazolam] Hallucinations     Objective:     Vital Signs (Most Recent):  Temp: 97.6 °F (36.4 °C) (05/09/19 0357)  Pulse: (!) 59 (05/09/19 0836)  Resp: 16 (05/08/19 1943)  BP: (!) 97/58 (05/09/19 0357)  SpO2: 95 % (05/09/19 0357) Vital Signs (24h Range):  Temp:  [96.6 °F (35.9 °C)-97.9 °F (36.6 °C)] 97.6 °F (36.4 °C)  Pulse:  [59-76] 59  Resp:  [16-18] 16  SpO2:  [93 %-96 %] 95 %  BP: ()/(55-60) 97/58     Patient Vitals for the past 72 hrs (Last 3 readings):   Weight   05/09/19 0600 84.2 kg (185 lb 10 oz)   05/08/19 0503 84.2 kg (185 lb 11.8 oz)   05/07/19 0518 84.7 kg (186 lb 11.7 oz)     Body mass index is 24.49 kg/m².      Intake/Output Summary (Last 24 hours) at 5/9/2019 0967  Last data filed at 5/9/2019 0500  Gross per 24 hour   Intake 480 ml   Output 900 ml   Net -420 ml       Hemodynamic Parameters:           Physical Exam   Constitutional: He is oriented to person, place, and time. He appears well-developed and well-nourished.    HENT:   Head: Normocephalic.   Eyes: Pupils are equal, round, and reactive to light.   Neck: Normal range of motion. Neck supple. JVD present.   Cardiovascular: Normal rate and regular rhythm.   Murmur heard.  Pulmonary/Chest: Effort normal and breath sounds normal.   Abdominal: Soft. Bowel sounds are normal. He exhibits distension.   Musculoskeletal: Normal range of motion.   Neurological: He is alert and oriented to person, place, and time.   Skin: Skin is warm and dry.   Psychiatric: He has a normal mood and affect. His behavior is normal.   Nursing note and vitals reviewed.      Significant Labs:  CBC:  Recent Labs   Lab 05/07/19  0400 05/08/19  0513 05/09/19  0352   WBC 13.67* 11.21 11.47   RBC 3.57* 3.64* 3.47*   HGB 10.8* 10.7* 10.1*   HCT 30.2* 31.9* 29.5*   PLT 92* 91* 93*   MCV 85 88 85   MCH 30.3 29.4 29.1   MCHC 35.8 33.5 34.2     BNP:  Recent Labs   Lab 05/06/19  1214   BNP 1,793*     CMP:  Recent Labs   Lab 05/07/19  0400 05/07/19  1716 05/08/19  0513 05/09/19  0352   * 99 78 126*   CALCIUM 8.4* 8.7 8.4* 8.3*   ALBUMIN 2.7*  --  2.7* 2.5*   PROT 6.1  --  6.2 5.9*   * 132* 133* 135*   K 4.0 3.8 3.6 3.7   CO2 18* 18* 20* 16*   CL 99 101 101 104   BUN 97* 96* 97* 88*   CREATININE 3.2* 3.0* 3.2* 3.5*   ALKPHOS 121  --  122 139*   ALT 15  --  15 15   AST 18  --  24 27   BILITOT 0.8  --  0.9 1.0      Coagulation:   No results for input(s): PT, INR, APTT in the last 168 hours.  LDH:  No results for input(s): LDH in the last 72 hours.  Microbiology:  Microbiology Results (last 7 days)     Procedure Component Value Units Date/Time    Culture, Respiratory with Gram Stain [[id number]] Collected:  05/06/19 3884    Order Status:  Completed Specimen:  Respiratory from Sputum Updated:  05/09/19 0916     Respiratory Culture Normal respiratory pradeep     Respiratory Culture No S aureus or Pseudomonas isolated.     Gram Stain (Respiratory) <10 epithelial cells per low power field.     Gram Stain  (Respiratory) Rare WBC's     Gram Stain (Respiratory) Many Gram positive cocci     Gram Stain (Respiratory) Moderate Gram negative rods    Blood culture [[id number]] Collected:  05/06/19 1214    Order Status:  Completed Specimen:  Blood Updated:  05/08/19 1412     Blood Culture, Routine No Growth to date     Blood Culture, Routine No Growth to date     Blood Culture, Routine No Growth to date    Blood culture [[id number]] Collected:  05/06/19 1214    Order Status:  Completed Specimen:  Blood Updated:  05/08/19 1412     Blood Culture, Routine No Growth to date     Blood Culture, Routine No Growth to date     Blood Culture, Routine No Growth to date    Stool culture [[id number]] Collected:  05/06/19 1830    Order Status:  Completed Specimen:  Stool Updated:  05/08/19 1324     Stool Culture Nothing significant to date    E. coli 0157 antigen [[id number]] Collected:  05/06/19 1830    Order Status:  Completed Specimen:  Stool Updated:  05/08/19 0949     Shiga Toxin 1 E.coli Negative     Shiga Toxin 2 E.coli Negative    Clostridium difficile EIA [[id number]]     Order Status:  Canceled Specimen:  Stool     Blood culture [[id number]]     Order Status:  Canceled Specimen:  Blood     Blood culture [[id number]]     Order Status:  Canceled Specimen:  Blood           Specimen (12h ago, onward)    None        I have reviewed all pertinent labs within the past 24 hours.    Estimated Creatinine Clearance: 20.3 mL/min (A) (based on SCr of 3.5 mg/dL (H)).    Diagnostic Results:  I have reviewed and interpreted all pertinent imaging results/findings within the past 24 hours.

## 2019-05-09 NOTE — ASSESSMENT & PLAN NOTE
- U/S 5/6 with Cholelithiasis without evidence for acute cholecystitis. Small volume abdominal ascites. Small right pleural effusion. Right simple renal cyst.  - CT chest/abd/pelvis done.  - c diff not sent as diarrhea resolved

## 2019-05-09 NOTE — PLAN OF CARE
Problem: Adult Inpatient Plan of Care  Goal: Plan of Care Review  Outcome: Ongoing (interventions implemented as appropriate)  Patient free of falls/traumas/injuries. Patient to be transitioned to PO antibiotic therapy. Pipercillin d/c per PA orders. Blood glucose monitoring and scheduled insulin administered. Patient diuresing with Bumex PO daily. Skin clean, dry, and intact. Patient educated on plan of care and verbalized understanding. Patients VS stable and no distress. Will continue to monitor.

## 2019-05-09 NOTE — PROGRESS NOTES
05/09/19 1400   OTHER   Transmission Date 05/09/19   Transmission Type  Maintenance     Patient has not been laying on pillow, as he is currently admitted.

## 2019-05-09 NOTE — PROGRESS NOTES
Pharmacokinetic Assessment Follow Up: IV Vancomycin    Therapy discontinued. Oral levofloxacin started for total of 7 day course of antibiotics.     Thank you.

## 2019-05-10 NOTE — ASSESSMENT & PLAN NOTE
- Hemodynamics per Merlin on 5/5 with PAD below baseline. Pt's son will bring in pillow this weekend so we can follow hemodynamics.   - Resumed bumex.  - Pt with worsening ascites on exam. Will try to get therapeutic paracentesis today.

## 2019-05-10 NOTE — SUBJECTIVE & OBJECTIVE
Interval History: Feels abdomen more distended /tense. No other complaints.     Scheduled Meds:   cetirizine  10 mg Oral Daily    clopidogrel  75 mg Oral Daily    ferrous sulfate  325 mg Oral BID    insulin aspart U-100  10 Units Subcutaneous TID WM    insulin detemir U-100  6 Units Subcutaneous QHS    levoFLOXacin  500 mg Oral Every other day    levothyroxine  50 mcg Oral Before breakfast    multivitamin  1 tablet Oral Daily    omega-3 acid ethyl esters  2 g Oral BID    pantoprazole  40 mg Oral Daily    rosuvastatin  20 mg Oral QHS    tamsulosin  0.4 mg Oral Daily     PRN Meds:albuterol, atropine, dextrose 50%, dextrose 50%, glucose, glucose, insulin aspart U-100, ondansetron, sodium chloride 0.9%    Review of patient's allergies indicates:   Allergen Reactions    Adhesive Other (See Comments)     blisters    Codeine Other (See Comments)     Blurred vision    Latex Hives    Ace inhibitors     Lipitor [atorvastatin] Other (See Comments)     Muscle spasms    Xanax [alprazolam] Hallucinations     Objective:     Vital Signs (Most Recent):  Temp: 98.1 °F (36.7 °C) (05/10/19 0732)  Pulse: 68 (05/10/19 0744)  Resp: 16 (05/10/19 0732)  BP: (!) 117/57 (05/10/19 0732)  SpO2: 97 % (05/10/19 0732) Vital Signs (24h Range):  Temp:  [97.4 °F (36.3 °C)-98.1 °F (36.7 °C)] 98.1 °F (36.7 °C)  Pulse:  [59-68] 68  Resp:  [16-18] 16  SpO2:  [97 %-99 %] 97 %  BP: (105-123)/(50-66) 117/57     Patient Vitals for the past 72 hrs (Last 3 readings):   Weight   05/10/19 0424 82.6 kg (182 lb 3.4 oz)   05/09/19 0600 84.2 kg (185 lb 10 oz)   05/08/19 0503 84.2 kg (185 lb 11.8 oz)     Body mass index is 24.04 kg/m².      Intake/Output Summary (Last 24 hours) at 5/10/2019 0850  Last data filed at 5/10/2019 0500  Gross per 24 hour   Intake 120 ml   Output 2525 ml   Net -2405 ml        Physical Exam   Constitutional: He is oriented to person, place, and time. He appears well-developed and well-nourished.   HENT:   Head:  Normocephalic.   Eyes: Pupils are equal, round, and reactive to light.   Neck: Normal range of motion. Neck supple. JVD present.   Cardiovascular: Normal rate and regular rhythm.   Murmur heard.  Pulmonary/Chest: Effort normal and breath sounds normal.   Abdominal: Soft. Bowel sounds are normal. He exhibits distension.   Musculoskeletal: Normal range of motion.   Neurological: He is alert and oriented to person, place, and time.   Skin: Skin is warm and dry.   Psychiatric: He has a normal mood and affect. His behavior is normal.   Nursing note and vitals reviewed.    Significant Labs:  CBC:  Recent Labs   Lab 05/08/19  0513 05/09/19  0352 05/10/19  0412   WBC 11.21 11.47 9.48   RBC 3.64* 3.47* 3.46*   HGB 10.7* 10.1* 10.2*   HCT 31.9* 29.5* 30.2*   PLT 91* 93* 93*   MCV 88 85 87   MCH 29.4 29.1 29.5   MCHC 33.5 34.2 33.8     BNP:  Recent Labs   Lab 05/06/19  1214   BNP 1,793*     CMP:  Recent Labs   Lab 05/08/19  0513 05/09/19  0352 05/10/19  0412   GLU 78 126* 105   CALCIUM 8.4* 8.3* 8.4*   ALBUMIN 2.7* 2.5* 2.5*   PROT 6.2 5.9* 6.0   * 135* 138   K 3.6 3.7 3.2*   CO2 20* 16* 19*    104 107   BUN 97* 88* 83*   CREATININE 3.2* 3.5* 3.7*   ALKPHOS 122 139* 145*   ALT 15 15 18   AST 24 27 37   BILITOT 0.9 1.0 0.9      Coagulation:   No results for input(s): PT, INR, APTT in the last 168 hours.  LDH:  No results for input(s): LDH in the last 72 hours.  Microbiology:  Microbiology Results (last 7 days)     Procedure Component Value Units Date/Time    Blood culture [[id number]] Collected:  05/06/19 1214    Order Status:  Completed Specimen:  Blood Updated:  05/09/19 1412     Blood Culture, Routine No Growth to date     Blood Culture, Routine No Growth to date     Blood Culture, Routine No Growth to date     Blood Culture, Routine No Growth to date    Blood culture [[id number]] Collected:  05/06/19 1214    Order Status:  Completed Specimen:  Blood Updated:  05/09/19 1412     Blood Culture, Routine No Growth to  date     Blood Culture, Routine No Growth to date     Blood Culture, Routine No Growth to date     Blood Culture, Routine No Growth to date    Stool culture [[id number]] Collected:  05/06/19 1830    Order Status:  Completed Specimen:  Stool Updated:  05/09/19 1315     Stool Culture Nothing significant to date    Culture, Respiratory with Gram Stain [[id number]] Collected:  05/06/19 2334    Order Status:  Completed Specimen:  Respiratory from Sputum Updated:  05/09/19 0916     Respiratory Culture Normal respiratory pradeep     Respiratory Culture No S aureus or Pseudomonas isolated.     Gram Stain (Respiratory) <10 epithelial cells per low power field.     Gram Stain (Respiratory) Rare WBC's     Gram Stain (Respiratory) Many Gram positive cocci     Gram Stain (Respiratory) Moderate Gram negative rods    E. coli 0157 antigen [[id number]] Collected:  05/06/19 1830    Order Status:  Completed Specimen:  Stool Updated:  05/08/19 0949     Shiga Toxin 1 E.coli Negative     Shiga Toxin 2 E.coli Negative    Clostridium difficile EIA [[id number]]     Order Status:  Canceled Specimen:  Stool     Blood culture [[id number]]     Order Status:  Canceled Specimen:  Blood     Blood culture [[id number]]     Order Status:  Canceled Specimen:  Blood           Specimen (12h ago, onward)    None        I have reviewed all pertinent labs within the past 24 hours.    Estimated Creatinine Clearance: 19.2 mL/min (A) (based on SCr of 3.7 mg/dL (H)).    Diagnostic Results:  I have reviewed and interpreted all pertinent imaging results/findings within the past 24 hours.

## 2019-05-10 NOTE — PROGRESS NOTES
[facility]  Heart Transplant  Progress Note    Patient Name: Jerry Solis  MRN: [id number]  Admission Date: 5/6/2019  Hospital Length of Stay: 4 days  Attending Physician: Pricilla Horne MD  Primary Care Provider: Primary Doctor No  Principal Problem:TREY (acute kidney injury)    Subjective:     Interval History: Feels abdomen more distended /tense. No other complaints.     Scheduled Meds:   cetirizine  10 mg Oral Daily    clopidogrel  75 mg Oral Daily    ferrous sulfate  325 mg Oral BID    insulin aspart U-100  10 Units Subcutaneous TID WM    insulin detemir U-100  6 Units Subcutaneous QHS    levoFLOXacin  500 mg Oral Every other day    levothyroxine  50 mcg Oral Before breakfast    multivitamin  1 tablet Oral Daily    omega-3 acid ethyl esters  2 g Oral BID    pantoprazole  40 mg Oral Daily    rosuvastatin  20 mg Oral QHS    tamsulosin  0.4 mg Oral Daily     PRN Meds:albuterol, atropine, dextrose 50%, dextrose 50%, glucose, glucose, insulin aspart U-100, ondansetron, sodium chloride 0.9%    Review of patient's allergies indicates:   Allergen Reactions    Adhesive Other (See Comments)     blisters    Codeine Other (See Comments)     Blurred vision    Latex Hives    Ace inhibitors     Lipitor [atorvastatin] Other (See Comments)     Muscle spasms    Xanax [alprazolam] Hallucinations     Objective:     Vital Signs (Most Recent):  Temp: 98.1 °F (36.7 °C) (05/10/19 0732)  Pulse: 68 (05/10/19 0744)  Resp: 16 (05/10/19 0732)  BP: (!) 117/57 (05/10/19 0732)  SpO2: 97 % (05/10/19 0732) Vital Signs (24h Range):  Temp:  [97.4 °F (36.3 °C)-98.1 °F (36.7 °C)] 98.1 °F (36.7 °C)  Pulse:  [59-68] 68  Resp:  [16-18] 16  SpO2:  [97 %-99 %] 97 %  BP: (105-123)/(50-66) 117/57     Patient Vitals for the past 72 hrs (Last 3 readings):   Weight   05/10/19 0424 82.6 kg (182 lb 3.4 oz)   05/09/19 0600 84.2 kg (185 lb 10 oz)   05/08/19 0503 84.2 kg (185 lb 11.8 oz)     Body mass index is 24.04  kg/m².      Intake/Output Summary (Last 24 hours) at 5/10/2019 0850  Last data filed at 5/10/2019 0500  Gross per 24 hour   Intake 120 ml   Output 2525 ml   Net -2405 ml        Physical Exam   Constitutional: He is oriented to person, place, and time. He appears well-developed and well-nourished.   HENT:   Head: Normocephalic.   Eyes: Pupils are equal, round, and reactive to light.   Neck: Normal range of motion. Neck supple. JVD present.   Cardiovascular: Normal rate and regular rhythm.   Murmur heard.  Pulmonary/Chest: Effort normal and breath sounds normal.   Abdominal: Soft. Bowel sounds are normal. He exhibits distension.   Musculoskeletal: Normal range of motion.   Neurological: He is alert and oriented to person, place, and time.   Skin: Skin is warm and dry.   Psychiatric: He has a normal mood and affect. His behavior is normal.   Nursing note and vitals reviewed.    Significant Labs:  CBC:  Recent Labs   Lab 05/08/19  0513 05/09/19  0352 05/10/19  0412   WBC 11.21 11.47 9.48   RBC 3.64* 3.47* 3.46*   HGB 10.7* 10.1* 10.2*   HCT 31.9* 29.5* 30.2*   PLT 91* 93* 93*   MCV 88 85 87   MCH 29.4 29.1 29.5   MCHC 33.5 34.2 33.8     BNP:  Recent Labs   Lab 05/06/19  1214   BNP 1,793*     CMP:  Recent Labs   Lab 05/08/19  0513 05/09/19  0352 05/10/19  0412   GLU 78 126* 105   CALCIUM 8.4* 8.3* 8.4*   ALBUMIN 2.7* 2.5* 2.5*   PROT 6.2 5.9* 6.0   * 135* 138   K 3.6 3.7 3.2*   CO2 20* 16* 19*    104 107   BUN 97* 88* 83*   CREATININE 3.2* 3.5* 3.7*   ALKPHOS 122 139* 145*   ALT 15 15 18   AST 24 27 37   BILITOT 0.9 1.0 0.9      Coagulation:   No results for input(s): PT, INR, APTT in the last 168 hours.  LDH:  No results for input(s): LDH in the last 72 hours.  Microbiology:  Microbiology Results (last 7 days)     Procedure Component Value Units Date/Time    Blood culture [[id number]] Collected:  05/06/19 1214    Order Status:  Completed Specimen:  Blood Updated:  05/09/19 1412     Blood Culture, Routine No  Growth to date     Blood Culture, Routine No Growth to date     Blood Culture, Routine No Growth to date     Blood Culture, Routine No Growth to date    Blood culture [[id number]] Collected:  05/06/19 1214    Order Status:  Completed Specimen:  Blood Updated:  05/09/19 1412     Blood Culture, Routine No Growth to date     Blood Culture, Routine No Growth to date     Blood Culture, Routine No Growth to date     Blood Culture, Routine No Growth to date    Stool culture [[id number]] Collected:  05/06/19 1830    Order Status:  Completed Specimen:  Stool Updated:  05/09/19 1315     Stool Culture Nothing significant to date    Culture, Respiratory with Gram Stain [[id number]] Collected:  05/06/19 2334    Order Status:  Completed Specimen:  Respiratory from Sputum Updated:  05/09/19 0916     Respiratory Culture Normal respiratory pradeep     Respiratory Culture No S aureus or Pseudomonas isolated.     Gram Stain (Respiratory) <10 epithelial cells per low power field.     Gram Stain (Respiratory) Rare WBC's     Gram Stain (Respiratory) Many Gram positive cocci     Gram Stain (Respiratory) Moderate Gram negative rods    E. coli 0157 antigen [[id number]] Collected:  05/06/19 1830    Order Status:  Completed Specimen:  Stool Updated:  05/08/19 0949     Shiga Toxin 1 E.coli Negative     Shiga Toxin 2 E.coli Negative    Clostridium difficile EIA [[id number]]     Order Status:  Canceled Specimen:  Stool     Blood culture [[id number]]     Order Status:  Canceled Specimen:  Blood     Blood culture [[id number]]     Order Status:  Canceled Specimen:  Blood           Specimen (12h ago, onward)    None        I have reviewed all pertinent labs within the past 24 hours.    Estimated Creatinine Clearance: 19.2 mL/min (A) (based on SCr of 3.7 mg/dL (H)).    Diagnostic Results:  I have reviewed and interpreted all pertinent imaging results/findings within the past 24 hours.    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 Cr up to 3.3 from baseline ~2.2 and WBC count up to 17. Procedure was cancelled for above reason. He is accompanied by his wife. He states that he has been having abdominal pain and vomiting for the last few weeks. He saw outside MD who told him that he had gallstones. He had episode of diarrhea this morning and has only been able to tolerate toast, crackers, and sips of water. Denies CP, palpitations, syncope, presyncope, fevers, PND. Cardiomems measurements yesterday with PA 57/19(33) which seems to be below his baseline.     * TREY (acute kidney injury)  - Hemodynamics per Merlin on 5/5 with PAD below baseline. Pt's son will bring in pillow this weekend so we can follow hemodynamics.   - Resumed bumex.  - Pt with worsening ascites on exam. Will try to get therapeutic paracentesis today.     Leukocytosis  -Pt with recent ICD extraction secondary to enterococcus.  -Blood cultures X 2 ngtd.   -Stool studie/resp cx ngtd.  -Transitioned to oral abx.    Abdominal pain  - U/S 5/6 with Cholelithiasis without evidence for acute cholecystitis. Small volume abdominal ascites. Small right pleural effusion. Right simple renal cyst.  - CT chest/abd/pelvis done.  - c diff not sent as diarrhea resolved       A-fib  -RICARDO/DCCV on hold until current issues resolve.  -Continue metoprolol.   -apixaban on hold.    Chronic systolic CHF (congestive heart failure), NYHA class 3  -PAD below baseline on review.  -resume diuretics   -Continue metoprolol.    Hypothyroid  -TSH up, FT4 low.   -Increased levothyroxine to 50mcg daily.  -Will need to recheck TFTs in ~6 weeks.      Chris Merchant NP  Heart Transplant  [facility]

## 2019-05-10 NOTE — ASSESSMENT & PLAN NOTE
-Pt with recent ICD extraction secondary to enterococcus.  -Blood cultures X 2 ngtd.   -Stool studie/resp cx ngtd.  -Transitioned to oral abx.

## 2019-05-10 NOTE — PLAN OF CARE
Problem: Adult Inpatient Plan of Care  Goal: Plan of Care Review  Outcome: Ongoing (interventions implemented as appropriate)  Patient remained free from falls/injury/trauma throughout shift. No complaints of chest pain or SOB. VSS. Fall risk precautions reviewed and maintained. Plan of care reviewed with patient. Patient verbalized understanding. All questions encouraged and answered. Will continue to monitor.

## 2019-05-10 NOTE — PLAN OF CARE
Problem: Adult Inpatient Plan of Care  Goal: Plan of Care Review  Outcome: Ongoing (interventions implemented as appropriate)  Patient free of falls/traumas/injuries. Patient started on PO Levofloxacin today. Blood glucose monitoring and scheduled insulin administered. Bumex D/C per MD orders. Paracentesis scheduled to take place today. Skin clean, dry, and intact. Patient educated on plan of care and verbalized understanding. Patients VS stable and no distress. Will continue to monitor.

## 2019-05-10 NOTE — H&P
Inpatient Radiology Pre-procedure Note    History of Present Illness:  Jerry Solis is a 78 y.o. male with ascites who presents for paracentesis.  Admission H&P reviewed.  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    Ischemic cardiomyopathy 3/22/2018    Mixed hyperlipidemia 3/22/2018    Type 2 diabetes mellitus with complication 3/22/2018    VT (ventricular tachycardia) 3/22/2018     Past Surgical History:   Procedure Laterality Date    CARDIOVERSION N/A 1/14/2019    Performed by Ryan Henley MD at [facility] EP [facility]    ECHOCARDIOGRAM,TRANSESOPHAGEAL N/A 1/14/2019    Performed by Ryan Henley MD at [facility] EP [facility]    ECHOCARDIOGRAM,TRANSESOPHAGEAL N/A 1/9/2019    Performed by [facility] Diagnostic Provider at [facility] EP [facility]    EXTRACTION, ELECTRODE LEAD Left 1/9/2019    Performed by Werner Boggs MD at [facility] EP [facility]    HEART CATH-RIGHT Right 4/5/2018    Performed by Elizabeth Wise MD at [facility] CATH LAB    INSERTION, ICD, BIVENTRICULAR Left 1/29/2019    Performed by Werner Boggs MD at [facility] EP [facility]    Insertion, Pacemaker, Temporary Transvenous  1/9/2019    Performed by Werner Boggs MD at [facility] EP LAB       Review of Systems:   As documented in primary team H&P    Home Meds:   Prior to Admission medications    Medication Sig Start Date End Date Taking? Authorizing Provider   amoxicillin (AMOXIL) 500 MG capsule Take 1 capsule (500 mg total) by mouth every 8 (eight) hours. 2/19/19 2/19/20 Yes Heriberto Ocampo MD   apixaban (ELIQUIS) 5 mg Tab Take 1 tablet (5 mg total) by mouth 2 (two) times daily. 2/6/19  Yes Candy Forman PA-C   bumetanide (BUMEX) 1 MG tablet Take 2 tablets (2 mg total)  by mouth 2 (two) times daily. 4/20/19  Yes Becky Whitlock MD   clopidogrel (PLAVIX) 75 mg tablet Take 1 tablet (75 mg total) by mouth once daily. 8/17/18  Yes Mela Nails MD   ferrous sulfate 325 (65 FE) MG EC tablet Take 1 tablet (325 mg total) by mouth 2 (two) times daily. 2/6/19  Yes Candy Forman PA-C   insulin (LANTUS SOLOSTAR U-100 INSULIN) glargine 100 units/mL (3mL) SubQ pen Inject 6 Units into the skin once daily. 2/6/19 2/6/20 Yes Candy Forman PA-C   insulin aspart U-100 (NOVOLOG FLEXPEN U-100 INSULIN) 100 unit/mL InPn pen Inject 10 Units into the skin 3 (three) times daily with meals. With low dose sliding scale 2/6/19 2/6/20 Yes Candy Forman PA-C   levothyroxine (SYNTHROID) 25 MCG tablet Take 1 tablet by mouth once daily. 2/28/18  Yes Historical Provider, MD   loratadine (CLARITIN) 10 mg tablet Take 10 mg by mouth once daily.   Yes Historical Provider, MD   metoprolol succinate (TOPROL-XL) 25 MG 24 hr tablet Take 1 tablet (25 mg total) by mouth once daily. 4/26/19  Yes Mag Mccracken NP   multivitamin (THERAGRAN) per tablet Take 1 tablet by mouth once daily.   Yes Historical Provider, MD   omega-3 acid ethyl esters (LOVAZA) 1 gram capsule Take 2 g by mouth 2 (two) times daily.   Yes Historical Provider, MD   omeprazole (PRILOSEC) 40 MG capsule Take 40 mg by mouth once daily.   Yes Historical Provider, MD   potassium chloride SA (KLOR-CON M20) 20 MEQ tablet Take 3 tablets (60 mEq total) by mouth once daily.  Patient taking differently: Take 40 mEq by mouth once daily.  4/20/19  Yes Becky Whitlock MD   rOPINIRole (REQUIP) 0.5 MG tablet Take 0.5 mg by mouth.  2/12/19  Yes Historical Provider, MD   rosuvastatin (CRESTOR) 20 MG tablet Take 1 tablet (20 mg total) by mouth every evening. 8/17/18  Yes Mela Nails MD   tamsulosin (FLOMAX) 0.4 mg Cap Take 1 capsule (0.4 mg total) by mouth once daily. 2/7/19 2/7/20 Yes RANDY Lechuga  "100-62.5-25 mcg DsDv every evening.  10/9/18  Yes Historical Provider, MD   VENTOLIN HFA 90 mcg/actuation inhaler Inhale 2 puffs into the lungs every 4 (four) hours as needed.  10/9/18  Yes Historical Provider, MD   pen needle, diabetic (LITE TOUCH INSULIN PEN NEEDLES) 31 gauge x 5/16" Ndle 1 each by Misc.(Non-Drug; Combo Route) route 4 (four) times daily. 2/6/19   Candy Forman PA-C     Scheduled Meds:    cetirizine  10 mg Oral Daily    clopidogrel  75 mg Oral Daily    ferrous sulfate  325 mg Oral BID    insulin aspart U-100  10 Units Subcutaneous TID WM    insulin detemir U-100  6 Units Subcutaneous QHS    levoFLOXacin  500 mg Oral Every other day    levothyroxine  50 mcg Oral Before breakfast    metoprolol succinate  25 mg Oral Daily    multivitamin  1 tablet Oral Daily    omega-3 acid ethyl esters  2 g Oral BID    pantoprazole  40 mg Oral Daily    rosuvastatin  20 mg Oral QHS    tamsulosin  0.4 mg Oral Daily     Continuous Infusions:   PRN Meds:albuterol, atropine, dextrose 50%, dextrose 50%, glucose, glucose, insulin aspart U-100, ondansetron, sodium chloride 0.9%  Anticoagulants/Antiplatelets: Plavix and Eliquis    Allergies:   Review of patient's allergies indicates:   Allergen Reactions    Adhesive Other (See Comments)     blisters    Codeine Other (See Comments)     Blurred vision    Latex Hives    Ace inhibitors     Lipitor [atorvastatin] Other (See Comments)     Muscle spasms    Xanax [alprazolam] Hallucinations     Sedation Hx: have not been any systemic reactions    Labs:  No results for input(s): INR in the last 168 hours.    Invalid input(s):  PT,  PTT    Recent Labs   Lab 05/10/19  0412   WBC 9.48   HGB 10.2*   HCT 30.2*   MCV 87   PLT 93*      Recent Labs   Lab 05/06/19  1214  05/10/19  0412   GLU  --    < > 105   NA  --    < > 138   K  --    < > 3.2*   CL  --    < > 107   CO2  --    < > 19*   BUN  --    < > 83*   CREATININE  --    < > 3.7*   CALCIUM  --    < > 8.4*   MG  " --    < > 2.3   ALT 18   < > 18   AST 22   < > 37   ALBUMIN 3.0*   < > 2.5*   BILITOT 1.3*   < > 0.9   BILIDIR 0.7*  --   --     < > = values in this interval not displayed.         Vitals:  Temp: 97.5 °F (36.4 °C) (05/10/19 1540)  Pulse: 60 (05/10/19 1540)  Resp: 18 (05/10/19 1540)  BP: 113/61 (05/10/19 1540)  SpO2: (!) 94 % (05/10/19 1540)     Physical Exam:  ASA: 3  Mallampati: na    General: no acute distress  Mental Status: alert and oriented to person, place and time  HEENT: normocephalic, atraumatic  Chest: unlabored breathing  Heart: regular heart rate  Abdomen: distended  Extremity: moves all extremities    Plan: US guided paracentesis  Sedation Plan: local only    Alex Herndon  Radiology PGY-5

## 2019-05-10 NOTE — PROCEDURES
Radiology Post-Procedure Note    Pre Op Diagnosis: Ascites  Post Op Diagnosis: Same    Procedure: Paracentesis    Procedure performed by: Ryan Harp; Alex Herndon (resident)    Written Informed Consent Obtained: Yes  Specimen Removed: YES alexis fluid; see dictation for volume removed  Estimated Blood Loss: Minimal    Findings:   Successful paracentesis.  Albumin administered PRN per protocol.    Patient tolerated procedure well.    Alex Herndon  Radiology PGY-5

## 2019-05-10 NOTE — PROGRESS NOTES
Paracentesis complete, 3200 MLs removed.  100ml 25% Albumin administered per protocol. Dressing applied to LLQ puncture site, dressing clean dry and intact. Report called to inpatient nurse Susan.

## 2019-05-10 NOTE — PROGRESS NOTES
D/C note:    SW to pt's room for possible weekend D/C. Pt and wife aaox4, calm, and pleasant. Pt and wife report in agreement with plan to d/C this weekend once medically cleared. Pt and wife report no needs and no needs identified. SW providing psychosocial and counseling support, education, assistance, resources, and D/C planning as indicated. SW remains available.

## 2019-05-11 NOTE — PLAN OF CARE
Problem: Adult Inpatient Plan of Care  Goal: Plan of Care Review  Outcome: Ongoing (interventions implemented as appropriate)  Pt free from falls and injury during shift. K+ 3.3, replacement administered. BUN/Cr 71/3.5, trending down. Blood glucose monitored, scheduled insulin administered. Possible d/c tomorrow. VSS, pt voiced no complaints. POC updated with pt, will continue to monitor.

## 2019-05-11 NOTE — PLAN OF CARE
Problem: Adult Inpatient Plan of Care  Goal: Plan of Care Review  Outcome: Ongoing (interventions implemented as appropriate)  Patient free from falls and injury throughout shift. Patient AAO x4; VSS. Patient denies chest pain and SOB. LLQ paracentesis completed 5/10; 3200cc fluid removed. RICARDO on hold until leukocytosis and TREY are resolved. Levofloxacin 500mg every other day continues. Plan of care reviewed with patient. Patient verbalizes understanding. Will continue to monitor.

## 2019-05-11 NOTE — PROGRESS NOTES
[facility]  Heart Transplant  Progress Note    Patient Name: Jerry Solis  MRN: [id number]  Admission Date: 5/6/2019  Hospital Length of Stay: 5 days  Attending Physician: Pricilla Horne MD  Primary Care Provider: Primary Doctor No  Principal Problem:TREY (acute kidney injury)    Subjective:     Interval History: Feeling better after paracentesis.     Scheduled Meds:   cetirizine  10 mg Oral Daily    clopidogrel  75 mg Oral Daily    ferrous sulfate  325 mg Oral BID    insulin aspart U-100  10 Units Subcutaneous TID WM    insulin detemir U-100  6 Units Subcutaneous QHS    levoFLOXacin  500 mg Oral Every other day    levothyroxine  50 mcg Oral Before breakfast    metoprolol succinate  25 mg Oral Daily    multivitamin  1 tablet Oral Daily    omega-3 acid ethyl esters  2 g Oral BID    pantoprazole  40 mg Oral Daily    rosuvastatin  20 mg Oral QHS    tamsulosin  0.4 mg Oral Daily     PRN Meds:albuterol, atropine, dextrose 50%, dextrose 50%, glucose, glucose, insulin aspart U-100, ondansetron, sodium chloride 0.9%    Review of patient's allergies indicates:   Allergen Reactions    Adhesive Other (See Comments)     blisters    Codeine Other (See Comments)     Blurred vision    Latex Hives    Ace inhibitors     Lipitor [atorvastatin] Other (See Comments)     Muscle spasms    Xanax [alprazolam] Hallucinations     Objective:     Vital Signs (Most Recent):  Temp: 97.5 °F (36.4 °C) (05/11/19 0735)  Pulse: 62 (05/11/19 0912)  Resp: 18 (05/11/19 0735)  BP: (!) 110/57 (05/11/19 0735)  SpO2: (!) 92 % (05/11/19 0735) Vital Signs (24h Range):  Temp:  [97.4 °F (36.3 °C)-97.6 °F (36.4 °C)] 97.5 °F (36.4 °C)  Pulse:  [57-78] 62  Resp:  [16-18] 18  SpO2:  [92 %-98 %] 92 %  BP: ()/(56-61) 110/57     Patient Vitals for the past 72 hrs (Last 3 readings):   Weight   05/11/19 0412 78.8 kg (173 lb 9.8 oz)   05/10/19 0424 82.6 kg (182 lb 3.4 oz)   05/09/19 0600 84.2 kg (185 lb 10 oz)     Body mass  index is 22.91 kg/m².      Intake/Output Summary (Last 24 hours) at 5/11/2019 0920  Last data filed at 5/11/2019 0500  Gross per 24 hour   Intake 777 ml   Output 5175 ml   Net -4398 ml        Physical Exam   Constitutional: He is oriented to person, place, and time. He appears well-developed and well-nourished.   HENT:   Head: Normocephalic.   Eyes: Pupils are equal, round, and reactive to light.   Neck: Normal range of motion. Neck supple.   Cardiovascular: Normal rate and regular rhythm.   Murmur heard.  Pulmonary/Chest: Effort normal and breath sounds normal.   Abdominal: Soft. Bowel sounds are normal.   Musculoskeletal: Normal range of motion.   Neurological: He is alert and oriented to person, place, and time.   Skin: Skin is warm and dry.   Psychiatric: He has a normal mood and affect. His behavior is normal.   Nursing note and vitals reviewed.    Significant Labs:  CBC:  Recent Labs   Lab 05/09/19  0352 05/10/19  0412 05/11/19  0429   WBC 11.47 9.48 8.60   RBC 3.47* 3.46* 3.49*   HGB 10.1* 10.2* 10.2*   HCT 29.5* 30.2* 30.0*   PLT 93* 93* 98*   MCV 85 87 86   MCH 29.1 29.5 29.2   MCHC 34.2 33.8 34.0     BNP:  Recent Labs   Lab 05/06/19  1214   BNP 1,793*     CMP:  Recent Labs   Lab 05/09/19  0352 05/10/19  0412 05/11/19  0429   * 105 163*   CALCIUM 8.3* 8.4* 8.4*   ALBUMIN 2.5* 2.5* 2.5*   PROT 5.9* 6.0 5.9*   * 138 139   K 3.7 3.2* 3.3*   CO2 16* 19* 20*    107 107   BUN 88* 83* 71*   CREATININE 3.5* 3.7* 3.5*   ALKPHOS 139* 145* 158*   ALT 15 18 19   AST 27 37 39   BILITOT 1.0 0.9 0.9      Coagulation:   No results for input(s): PT, INR, APTT in the last 168 hours.  LDH:  No results for input(s): LDH in the last 72 hours.  Microbiology:  Microbiology Results (last 7 days)     Procedure Component Value Units Date/Time    Stool culture [[id number]] Collected:  05/06/19 1830    Order Status:  Completed Specimen:  Stool Updated:  05/10/19 1506     Stool Culture No  Salmonella,Shigella,Vibrio,Campylobacter,Yersinia isolated.    Blood culture [[id number]] Collected:  05/06/19 1214    Order Status:  Completed Specimen:  Blood Updated:  05/10/19 1412     Blood Culture, Routine No Growth to date     Blood Culture, Routine No Growth to date     Blood Culture, Routine No Growth to date     Blood Culture, Routine No Growth to date     Blood Culture, Routine No Growth to date    Blood culture [[id number]] Collected:  05/06/19 1214    Order Status:  Completed Specimen:  Blood Updated:  05/10/19 1412     Blood Culture, Routine No Growth to date     Blood Culture, Routine No Growth to date     Blood Culture, Routine No Growth to date     Blood Culture, Routine No Growth to date     Blood Culture, Routine No Growth to date    Culture, Respiratory with Gram Stain [[id number]] Collected:  05/06/19 2334    Order Status:  Completed Specimen:  Respiratory from Sputum Updated:  05/09/19 0916     Respiratory Culture Normal respiratory pradeep     Respiratory Culture No S aureus or Pseudomonas isolated.     Gram Stain (Respiratory) <10 epithelial cells per low power field.     Gram Stain (Respiratory) Rare WBC's     Gram Stain (Respiratory) Many Gram positive cocci     Gram Stain (Respiratory) Moderate Gram negative rods    E. coli 0157 antigen [[id number]] Collected:  05/06/19 1830    Order Status:  Completed Specimen:  Stool Updated:  05/08/19 0949     Shiga Toxin 1 E.coli Negative     Shiga Toxin 2 E.coli Negative    Clostridium difficile EIA [[id number]]     Order Status:  Canceled Specimen:  Stool     Blood culture [[id number]]     Order Status:  Canceled Specimen:  Blood     Blood culture [[id number]]     Order Status:  Canceled Specimen:  Blood           Specimen (12h ago, onward)    None        I have reviewed all pertinent labs within the past 24 hours.    Estimated Creatinine Clearance: 20 mL/min (A) (based on SCr of 3.5 mg/dL (H)).    Diagnostic Results:  I have reviewed and interpreted all  pertinent imaging results/findings within the past 24 hours.    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 Cr up to 3.3 from baseline ~2.2 and WBC count up to 17. Procedure was cancelled for above reason. He is accompanied by his wife. He states that he has been having abdominal pain and vomiting for the last few weeks. He saw outside MD who told him that he had gallstones. He had episode of diarrhea this morning and has only been able to tolerate toast, crackers, and sips of water. Denies CP, palpitations, syncope, presyncope, fevers, PND. Cardiomems measurements yesterday with PA 57/19(33) which seems to be below his baseline.     * TREY (acute kidney injury)  -  Improving today.  - Hemodynamics per Merlin on 5/5 with PAD below baseline. Pt's son will bring in pillow this weekend so we can follow hemodynamics.   - Resumed bumex.    Leukocytosis  -Pt with recent ICD extraction secondary to enterococcus.  -Blood cultures X 2 ngtd.   -Stool studie/resp cx ngtd.  -Transitioned to oral abx.    Chronic systolic CHF (congestive heart failure), NYHA class 3  -PAD below baseline on review. Will review hemodynamics when cardiomems pillow arrives from home.   -resumed diuretics  -Continue metoprolol.    Abdominal pain  - U/S 5/6 with Cholelithiasis without evidence for acute cholecystitis. Small volume abdominal ascites. Small right pleural effusion. Right simple renal cyst.  - CT chest/abd/pelvis done.  - c diff not sent as diarrhea resolved       A-fib  -RICARDO/DCCV on hold until current issues resolve.  -Continue metoprolol.   -apixaban on hold.    Hypothyroid  -TSH up, FT4 low.   -Increased levothyroxine to 50mcg  daily.  -Will need to recheck TFTs in ~6 weeks.      Chris Merchant NP  Heart Transplant  [facility]

## 2019-05-11 NOTE — ASSESSMENT & PLAN NOTE
-PAD below baseline on review. Will review hemodynamics when cardiomems pillow arrives from home.   -resumed diuretics  -Continue metoprolol.

## 2019-05-11 NOTE — ASSESSMENT & PLAN NOTE
-  Improving today.  - Hemodynamics per Merlin on 5/5 with PAD below baseline. Pt's son will bring in pillow this weekend so we can follow hemodynamics.   - Resumed bumex.

## 2019-05-11 NOTE — SUBJECTIVE & OBJECTIVE
Interval History: Feeling better after paracentesis.     Scheduled Meds:   cetirizine  10 mg Oral Daily    clopidogrel  75 mg Oral Daily    ferrous sulfate  325 mg Oral BID    insulin aspart U-100  10 Units Subcutaneous TID WM    insulin detemir U-100  6 Units Subcutaneous QHS    levoFLOXacin  500 mg Oral Every other day    levothyroxine  50 mcg Oral Before breakfast    metoprolol succinate  25 mg Oral Daily    multivitamin  1 tablet Oral Daily    omega-3 acid ethyl esters  2 g Oral BID    pantoprazole  40 mg Oral Daily    rosuvastatin  20 mg Oral QHS    tamsulosin  0.4 mg Oral Daily     PRN Meds:albuterol, atropine, dextrose 50%, dextrose 50%, glucose, glucose, insulin aspart U-100, ondansetron, sodium chloride 0.9%    Review of patient's allergies indicates:   Allergen Reactions    Adhesive Other (See Comments)     blisters    Codeine Other (See Comments)     Blurred vision    Latex Hives    Ace inhibitors     Lipitor [atorvastatin] Other (See Comments)     Muscle spasms    Xanax [alprazolam] Hallucinations     Objective:     Vital Signs (Most Recent):  Temp: 97.5 °F (36.4 °C) (05/11/19 0735)  Pulse: 62 (05/11/19 0912)  Resp: 18 (05/11/19 0735)  BP: (!) 110/57 (05/11/19 0735)  SpO2: (!) 92 % (05/11/19 0735) Vital Signs (24h Range):  Temp:  [97.4 °F (36.3 °C)-97.6 °F (36.4 °C)] 97.5 °F (36.4 °C)  Pulse:  [57-78] 62  Resp:  [16-18] 18  SpO2:  [92 %-98 %] 92 %  BP: ()/(56-61) 110/57     Patient Vitals for the past 72 hrs (Last 3 readings):   Weight   05/11/19 0412 78.8 kg (173 lb 9.8 oz)   05/10/19 0424 82.6 kg (182 lb 3.4 oz)   05/09/19 0600 84.2 kg (185 lb 10 oz)     Body mass index is 22.91 kg/m².      Intake/Output Summary (Last 24 hours) at 5/11/2019 0920  Last data filed at 5/11/2019 0500  Gross per 24 hour   Intake 777 ml   Output 5175 ml   Net -4398 ml        Physical Exam   Constitutional: He is oriented to person, place, and time. He appears well-developed and well-nourished.    HENT:   Head: Normocephalic.   Eyes: Pupils are equal, round, and reactive to light.   Neck: Normal range of motion. Neck supple.   Cardiovascular: Normal rate and regular rhythm.   Murmur heard.  Pulmonary/Chest: Effort normal and breath sounds normal.   Abdominal: Soft. Bowel sounds are normal.   Musculoskeletal: Normal range of motion.   Neurological: He is alert and oriented to person, place, and time.   Skin: Skin is warm and dry.   Psychiatric: He has a normal mood and affect. His behavior is normal.   Nursing note and vitals reviewed.    Significant Labs:  CBC:  Recent Labs   Lab 05/09/19  0352 05/10/19  0412 05/11/19  0429   WBC 11.47 9.48 8.60   RBC 3.47* 3.46* 3.49*   HGB 10.1* 10.2* 10.2*   HCT 29.5* 30.2* 30.0*   PLT 93* 93* 98*   MCV 85 87 86   MCH 29.1 29.5 29.2   MCHC 34.2 33.8 34.0     BNP:  Recent Labs   Lab 05/06/19  1214   BNP 1,793*     CMP:  Recent Labs   Lab 05/09/19  0352 05/10/19  0412 05/11/19  0429   * 105 163*   CALCIUM 8.3* 8.4* 8.4*   ALBUMIN 2.5* 2.5* 2.5*   PROT 5.9* 6.0 5.9*   * 138 139   K 3.7 3.2* 3.3*   CO2 16* 19* 20*    107 107   BUN 88* 83* 71*   CREATININE 3.5* 3.7* 3.5*   ALKPHOS 139* 145* 158*   ALT 15 18 19   AST 27 37 39   BILITOT 1.0 0.9 0.9      Coagulation:   No results for input(s): PT, INR, APTT in the last 168 hours.  LDH:  No results for input(s): LDH in the last 72 hours.  Microbiology:  Microbiology Results (last 7 days)     Procedure Component Value Units Date/Time    Stool culture [[id number]] Collected:  05/06/19 1830    Order Status:  Completed Specimen:  Stool Updated:  05/10/19 1506     Stool Culture No Salmonella,Shigella,Vibrio,Campylobacter,Yersinia isolated.    Blood culture [[id number]] Collected:  05/06/19 1214    Order Status:  Completed Specimen:  Blood Updated:  05/10/19 1412     Blood Culture, Routine No Growth to date     Blood Culture, Routine No Growth to date     Blood Culture, Routine No Growth to date     Blood Culture,  Routine No Growth to date     Blood Culture, Routine No Growth to date    Blood culture [[id number]] Collected:  05/06/19 1214    Order Status:  Completed Specimen:  Blood Updated:  05/10/19 1412     Blood Culture, Routine No Growth to date     Blood Culture, Routine No Growth to date     Blood Culture, Routine No Growth to date     Blood Culture, Routine No Growth to date     Blood Culture, Routine No Growth to date    Culture, Respiratory with Gram Stain [[id number]] Collected:  05/06/19 2334    Order Status:  Completed Specimen:  Respiratory from Sputum Updated:  05/09/19 0916     Respiratory Culture Normal respiratory pradeep     Respiratory Culture No S aureus or Pseudomonas isolated.     Gram Stain (Respiratory) <10 epithelial cells per low power field.     Gram Stain (Respiratory) Rare WBC's     Gram Stain (Respiratory) Many Gram positive cocci     Gram Stain (Respiratory) Moderate Gram negative rods    E. coli 0157 antigen [[id number]] Collected:  05/06/19 1830    Order Status:  Completed Specimen:  Stool Updated:  05/08/19 0949     Shiga Toxin 1 E.coli Negative     Shiga Toxin 2 E.coli Negative    Clostridium difficile EIA [[id number]]     Order Status:  Canceled Specimen:  Stool     Blood culture [[id number]]     Order Status:  Canceled Specimen:  Blood     Blood culture [[id number]]     Order Status:  Canceled Specimen:  Blood           Specimen (12h ago, onward)    None        I have reviewed all pertinent labs within the past 24 hours.    Estimated Creatinine Clearance: 20 mL/min (A) (based on SCr of 3.5 mg/dL (H)).    Diagnostic Results:  I have reviewed and interpreted all pertinent imaging results/findings within the past 24 hours.

## 2019-05-12 NOTE — DISCHARGE SUMMARY
[facility]  Heart Transplant  Discharge Summary      Patient Name: Jerry Solis  MRN: [id number]  Admission Date: 5/6/2019  Hospital Length of Stay: 6 days  Discharge Date and Time: 05/12/2019 6:34 PM  Attending Physician: Linda att. 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 Cr up to 3.3 from baseline ~2.2 and WBC count up to 17. Procedure was cancelled for above reason. He is accompanied by his wife. He states that he has been having abdominal pain and vomiting for the last few weeks. He saw outside MD who told him that he had gallstones. He had episode of diarrhea this morning and has only been able to tolerate toast, crackers, and sips of water. Denies CP, palpitations, syncope, presyncope, fevers, PND. Cardiomems measurements yesterday with PA 57/19(33) which seems to be below his baseline.     Procedure(s) (LRB):  Cardioversion or Defibrillation (N/A)  Transesophageal echo (RICARDO) intra-procedure log documentation (N/A)     Hospital Course: Pt was admitted and diuretics initially held. He was pan cultured. CXR, abd u/s, and CT chest/abd/pelvis were performed which revealed likely pna. He was started on IV Abx with improvement in his respiratory symptoms. Thyroid function was tested and Ft4 found to be low so synthroid was increased as well. He was transitioned to oral Abx. His GI symptoms also improved and stool studies were negative. His Cr remained elevated and he continued to exhibit significant ascites, so INV Radiology was consulted and paracentesis was performed(3.2L removed). His diuretics were  restarted and his Cr started to trend down. He was discharged home with plan for lab check locally next week and clinic f/u within 2 weeks. He will need repeat TFTs in 4-6 weeks as well with increased synthroid dose. Once his current issues resolve, we will revisit RICARDO/DCCV.    Consults (From admission, onward)        Status Ordering Provider     Inpatient consult to Electrophysiology  Once     Provider:  (Not yet assigned)    Completed SHERWIN TAY          Pending Diagnostic Studies:     Procedure Component Value Units Date/Time    IR Paracentesis with Imaging [[id number]] Resulted:  05/10/19 1737    Order Status:  Sent Lab Status:  In process Updated:  05/10/19 1740        Final Active Diagnoses:    Diagnosis Date Noted POA    PRINCIPAL PROBLEM:  TREY (acute kidney injury) [N17.9] 04/07/2018 Yes    Leukocytosis [D72.829] 01/25/2019 Yes    Chronic systolic CHF (congestive heart failure), NYHA class 3 [I50.22] 05/18/2018 Yes    Abdominal pain [R10.9] 05/06/2019 Yes    A-fib [I48.91] 04/06/2018 Yes    Abnormal EKG [R94.31]  Yes    Acute on chronic combined systolic and diastolic CHF, NYHA class 3 [I50.43]  Yes    Hypothyroid [E03.9] 05/07/2019 Yes      Problems Resolved During this Admission:    Diagnosis Date Noted Date Resolved POA    Acute kidney injury [N17.9] 04/15/2019 05/07/2019 Yes      Discharged Condition: good    Disposition: Home or Self Care    Patient Instructions:   No discharge procedures on file.  Medications:  Reconciled Home Medications:      Medication List      START taking these medications    levoFLOXacin 500 MG tablet  Commonly known as:  LEVAQUIN  Take 1 tablet (500 mg total) by mouth every other day. for 3 days  Start taking on:  5/14/2019     levothyroxine 50 mcg Cap  Commonly known as:  TIROSINT  Take 50 mcg by mouth once daily.  Replaces:  levothyroxine 25 MCG tablet        CHANGE how you take these medications    bumetanide 1 MG tablet  Commonly known as:  BUMEX  Take 2  "tablets (2 mg total) by mouth once daily.  What changed:  when to take this        CONTINUE taking these medications    apixaban 5 mg Tab  Commonly known as:  ELIQUIS  Take 1 tablet (5 mg total) by mouth 2 (two) times daily.     BASAGLAR KWIKPEN U-100 INSULIN glargine 100 units/mL (3mL) SubQ pen  Generic drug:  insulin  Inject 6 Units into the skin once daily.     BD ULTRA-FINE SHORT PEN NEEDLE 31 gauge x 5/16" Ndle  Generic drug:  pen needle, diabetic  1 each by Misc.(Non-Drug; Combo Route) route 4 (four) times daily.     clopidogrel 75 mg tablet  Commonly known as:  PLAVIX  Take 1 tablet (75 mg total) by mouth once daily.     ferrous sulfate 325 (65 FE) MG EC tablet  Take 1 tablet (325 mg total) by mouth 2 (two) times daily.     loratadine 10 mg tablet  Commonly known as:  CLARITIN  Take 10 mg by mouth once daily.     metoprolol succinate 25 MG 24 hr tablet  Commonly known as:  TOPROL-XL  Take 1 tablet (25 mg total) by mouth once daily.     multivitamin per tablet  Commonly known as:  THERAGRAN  Take 1 tablet by mouth once daily.     NovoLOG Flexpen U-100 Insulin 100 unit/mL (3 mL) Inpn pen  Generic drug:  insulin aspart U-100  Inject 10 Units into the skin 3 (three) times daily with meals. With low dose sliding scale     omega-3 acid ethyl esters 1 gram capsule  Commonly known as:  LOVAZA  Take 2 g by mouth 2 (two) times daily.     omeprazole 40 MG capsule  Commonly known as:  PRILOSEC  Take 40 mg by mouth once daily.     rOPINIRole 0.5 MG tablet  Commonly known as:  REQUIP  Take 0.5 mg by mouth.     rosuvastatin 20 MG tablet  Commonly known as:  CRESTOR  Take 1 tablet (20 mg total) by mouth every evening.     tamsulosin 0.4 mg Cap  Commonly known as:  FLOMAX  Take 1 capsule (0.4 mg total) by mouth once daily.     TRELEGY ELLIPTA 100-62.5-25 mcg Dsdv  Generic drug:  fluticasone-umeclidin-vilanter  every evening.     VENTOLIN HFA 90 mcg/actuation inhaler  Generic drug:  albuterol  Inhale 2 puffs into the lungs " every 4 (four) hours as needed.        STOP taking these medications    amoxicillin 500 MG capsule  Commonly known as:  AMOXIL     levothyroxine 25 MCG tablet  Commonly known as:  SYNTHROID  Replaced by:  levothyroxine 50 mcg Cap        ASK your doctor about these medications    potassium chloride SA 20 MEQ tablet  Commonly known as:  KLOR-CON M20  Take 2 tablets (40 mEq total) by mouth once daily.            Chris Merchant NP  Heart Transplant  [facility]

## 2019-05-12 NOTE — NURSING
Patient dc per md orders. IV and tele removed. Spouse at beside, patient verbalized complete understanding of home care instructions and follow up care. Pamphlet on HF home care instructions given to patient. VSS, pt voiced no complaints. Awaiting escort.

## 2019-05-12 NOTE — PLAN OF CARE
Problem: Adult Inpatient Plan of Care  Goal: Plan of Care Review  Outcome: Ongoing (interventions implemented as appropriate)  Patient free from falls and injury throughout shift. Patient AAO x4; VSS. Patient denies chest pain and SOB. Currently holding diuretics. K+ 3.3 with 5/11/19 morning labs; patient received 60mEq replacement. Patient had a 9 beat run of VT just before midnight and continues to experience bigeminy; patient remained asymptomatic. Per Dr. Baeza, recheck morning electrolytes with labs on 5/12/19. Possible d/c 5/12/19. Plan of care reviewed with patient. Patient verbalizes understanding. Will continue to monitor.

## 2019-05-12 NOTE — PROGRESS NOTES
05/11/19 3803   Notification   Staff Notified  Patient Nurse   Reason for Notification Frequency/Ectopy  (V Tach 9 beats)   Staff Response Will Assess Patient, Continuing to Monitor     Called [facility] and spoke with Dr. Baeza regarding a 9 beat run of VT at 23:12:07. Patient has been exhibiting intermittent ectopy throughout the evening including PVCs and bigeminy. Patient remained asymptomatic and denied feeling palpitations or chest pain. Patient's potassium was 3.3 with morning labs and 60meq replacement was administered. Per Dr. Baeza, continue to monitor and check electrolyte levels with 0400 labs. No additional orders.

## 2019-05-13 NOTE — PHYSICIAN QUERY
"PT Name: Jerry Solis  MR #: [id number]    Physician Query Form - Heart  Condition Clarification     CDS/: Brandie Peck RN, CCDS             Contact information: [email or web address]  This form is a permanent document in the medical record.     Query Date: May 13, 2019    By submitting this query, we are merely seeking further clarification of documentation. Please utilize your independent clinical judgment when addressing the question(s) below.    The medical record contains the following   Indicators     Supporting Clinical Findings Location in Medical Record   X BNP 1,793 5/6 lab   X EF Ef 20% 5/6 h/p, 5/7-5/11 prog notes, 5/12 d/c summary    Radiology findings      Echo Results      "Ascites" documented     X "SOB" or "CORDOBA" documented No c/o chest pain or SOB 5/7 nurse note    "Hypoxia" documented     X Heart Failure documented HFrEF, ICMP (EF=20%), NYHA class III symptoms    Chronic systolic CHF      Acute on chronic combined systolic and diastolic CHF, 5/6 h/p, 5/7-5/11 prog notes, 5/12 d/c summary    5/6 h/p, 5/7-5/11 prog notes, 5/12 d/c summary    5/12 d/c summary    "Edema" documented     X Diuretics/Meds Bumetanide 2 mg po daily Mar-Start: 05/09/19 1045 End: 05/10/19 0848    Treatment:     X Other:  Pt was admitted and diuretics initially held     5/12 d/csummary   Heart failure (HF) can be acute, chronic or both. It is generally further specificed as systolic, diastolic, or combined. Lastly, it is important to identify an underlying etiology if known or suspected.     Common clues to acute exacerbation:  Rapidly progressive symptoms (w/in 2 weeks of presentation), using IV diuretics to treat, using supplemental O2, pulmonary edema on Xray, MI w/in 4 weeks, and/or BNP >500    Systolic Heart Failure: is defined as chart documentation of a left ventricular ejection fraction (LVEF) less than 40%     Diastolic Heart Failure: is defined as a left ventricular ejection fraction (LVEF) greater than 40%  "  +      Evidence of diastolic dysfunction on echocardiography OR    Right heart catheterization wedge pressure above 12 mm Hg OR    Left heart catheterization left ventricular end diastolic pressure 18 mm Hg or above.    References: *American Heart Association    The clinical guidelines noted below are only system guidelines, and do not replace the providers clinical judgment.     Provider, please specify the diagnosis associated with above clinical findings      [   ] Chronic Systolic Heart Failure - Pre-existing systolic HF diagnosis.  EF < 40%  without  acute HF symptoms documented    [   ] Acute on Chronic Combined Systolic and Diastolic Heart Failure                   [ x  ] Chronic Combined Systolic and Diastolic Heart Failure    [   ] Other Type of Heart Failure (please specify type): _________________________    [   ] Other (please specify): ___________________________________  [   ] Clinically Undetermined                          Please document in your progress notes daily for the duration of treatment until resolved and include in your discharge summary.

## 2019-05-14 NOTE — PATIENT INSTRUCTIONS
Left- or Right- Side Congestive Heart Failure (CHF)    The heart is a large muscle. It is a pump that circulates blood throughout the body. Blood carries oxygen to all of the organs, including the brain, muscles, and skin. After your body takes the oxygen out of the blood, the blood returns to the heart. The right side of the heart collects the blood from the body and pumps it to the lungs. In the lungs, it gets fresh oxygen and gives up carbon dioxide. The oxygen-rich blood from the lungs then returns to the left side of the heart, where it is pumped back out to the rest of your body, starting the process all over.  Congestive heart failure (CHF) occurs when the heart muscle is weakened. This affects the pumping action of the heart. Heart failure can affect the right side of the heart or the left side. But heart failure may affect not only the right side of the heart or only the left side. Although it may have started on one side, it can and often eventually does affect both sides.  Right-side heart failure  When the right side of the heart is weakened, it cant handle the blood it is getting from the rest of the body. This blood returns to the heart through veins. When too much pressure builds up in the veins, fluid leaks out into the tissues. Gravity then causes that fluid to move to those parts of the body that are the lowest. So one of the first symptoms of right-side CHF can include swelling in the feet and ankles. If the condition gets worse, the swelling can even go up past the knees. Sometimes it gets so severe, the liver can get congested as well.  Left-side heart failure  When the left side of the heart is weakened, it cant handle the blood it gets from the lungs. Pressure then builds up in the veins of the lungs, causing fluid to leak into the lung tissues. This may cause CHF and pulmonary edema. This causes you to feel short of breath, weak, or dizzy. These symptoms are often worse with exertion,  such as when climbing stairs or walking up hills. Lying with your head flat is uncomfortable and can make your breathing worse. This may make sleeping difficult. You may need to use extra pillows to elevate your upper body to sleep well. The same is true when just resting during the daytime.  There are many causes of heart failure including:  · Coronary artery disease  · Past heart attack (also known as acute myocardial infarction, or AMI)  · High blood pressure  · Damaged heart valve  · Diabetes  · Obesity  · Cigarette smoking  · Alcohol abuse  Heart failure is a chronic condition. There is no cure. The purpose of medical treatment is to improve the pumping action of the heart. The main way to do this is to remove excess water from the body. A number of medicines can help reach this goal, improve symptoms, and prevent the heart from becoming weaker. Sometimes, heart failure can become so severe that a device is placed in the heart to help with pumping. Another major goal is to better treat the causes of heart failure, such as diabetes and high blood pressure, by making changes in your lifestyle and maximizing medical control when needed.  Home care  Follow these guidelines when caring for yourself at home:  · Check your weight every day. This is very important because a sudden increase in weight gain could mean worsening heart failure. Keep these things in mind:  ¨ Use the same scale every day.  ¨ Weigh yourself at the same time every day.  ¨ Make sure the scale is on a hard floor surface, not on a rug or carpet.  ¨ Keep a record of your weight every day so your healthcare provider can see it. If you are not given a log sheet for this, keep a separate journal for this purpose.   · Cut back on the amount of salt (sodium) you eat. Follow your healthcare provider's recommendation on how much salt or sodium you should have each day.  ¨ Avoid high-salt foods. These include olives, pickles, smoked meats, salted potato  chips, and most prepared foods.  ¨ Don't add salt to your food at the table. Use only small amounts of salt when cooking.  ¨ Read the labels carefully on food packages to learn how much salt or sodium is in each serving in the package. Remember, a can or package of food may contain more than 1 serving. So if you eat all the food in the package, you may be getting more salt than you think.  · Follow your healthcare provider's recommendations about how much fluid you should have. Be aware that some foods, such as soup, pudding, and juicy fruits like oranges or melons, contain liquid. You'll need to count the liquid in those foods as part of your daily fluid intake. Your provider can help you with this.  · Stop smoking.  · Cut back on how much alcohol you drink.  · Lose weight if you are overweight. The excess weight adds a lot of stress on the workload of the heart.  · Stay active. Talk with your provider about an exercise program that is safe for your heart.  · Keep your feet elevated to reduce swelling. Ask your provider about support hose as a preventive treatment for daytime leg swelling.  Besides taking your medicine as instructed, an important part of treatment is lifestyle changes. These include diet, physical activity, stopping smoking, and weight control.  Improve your diet by including more fresh foods, cutting back on how much sugar and saturated fat you eat, and eating fewer processed foods and less salt.  Follow-up care  Follow up with your healthcare provider, or as advised.  Make sure to keep any appointments that were made for you. These can help better control your congestive heart failure. You will need to follow up with your provider on a routine basis to make sure your heart failure is well managed.  If an X-ray, ECG, or other tests were done, you will be told of any new findings that may affect your care.  Call 911  Call 911 if you:  · Become severely short of breath  · Feel lightheaded, or feel  like you might pass out or faint  · Have chest pain or discomfort that is different than usual, the medicines your doctor told you to use for this don't help, or the pain lasts longer than 10 to 15 minutes  · You suddenly develop a rapid heart rate  When to seek medical advice  The following may be signs that your heart failure is getting worse. Call your healthcare provider right away if any of these happen:  · Sudden weight gain. This means 3 or more pounds in one day, or 5 or more pounds in 1 week.  · Trouble breathing not related to being active  · New or increased swelling of your legs or ankles  · Swelling or pain in your abdomen  · Breathing trouble at night. This means waking up short of breath or needing more pillows to breathe.  · Frequent coughing that doesnt go away  · Feeling much more tired than usual  Date Last Reviewed: 1/4/2016  © 9763-1854 [employer]. [address] [place] [address], [place] [address]. All rights reserved. This information is not intended as a substitute for professional medical care. Always follow your healthcare professional's instructions.

## 2019-05-15 NOTE — PROGRESS NOTES
05/15/19 1100   OTHER   Transmission Date 05/15/19   Transmission Type  Maintenance   PAS 58   SUSIE 36   PAD  22   Medication Adjustments  No

## 2019-05-20 NOTE — TELEPHONE ENCOUNTER
5/20/19 - Received call from patient's wife stating patient has gained 10 lbs in a week.  He is still weak and not able to get up and move around much.  Wife also states patient is still in A - Fib.  Weight today 182 lb.  Wife confirmed patient takes Bumex 2 mg daily and Potassium Chloride 40 meq daily.  Patient has a hospital follow up appt this Friday, 5/24/19.  Labs 5/16 K+ 4.4, Cr - 3.5.  Discussed/Reviewed with NELIA Nails M.D.  VORB: NELIA Nails M.D./JV Gibson RN: Increase Bumex to 2 mg twice a day, Potassium Chloride 40 meq twice a day and repeat BMP, BNP Thursday.  Above instructions given to patient's wife.  Wife wrote all instructions down and repeated everything back correctly.  Lab orders sent to [facility] and phone review entered.

## 2019-05-23 NOTE — TELEPHONE ENCOUNTER
5/23/19 1250 - Received lab results from today Cr - 2.2, K+ 5.2.  Patient takes Potassium Chloride 20 meq two tablets by mouth twice a day.  Discussed/Reveiwed with NELIA Nails M.D.  EZEKIEL: NELIA Nails M.D./JV Gibson RN: Hold Potassium and repeat BMP STAT tomorrow.  Attempted to call patient on both phone numbers listed with no answer.  Left v-mail on cell phone to contact our office.    [id number] - Called patient and spoke with patient and wife.  Instructed patient of K+ level and asked if patient took his Potassium today.  Patient stated he did.  Instructed patient to hold K+ tomorrow and we will get labs for 1230, before his 1330 appt with Dr. Horne.  Patient and wife repeated instructions back correctly and verbalized understanding.

## 2019-05-24 NOTE — PATIENT INSTRUCTIONS
Increase bumex to 2 mg in AM and 2mg in afternoon.    We will contact you to let you know what potassium dose we would like you to take.

## 2019-05-29 NOTE — NURSING
Pts wife called for labs results. Informed wife bun/cr 58/2.6 . Wife says pts genitals have swelled and pt can hardly walk., and pt is much more sob.  I instructed wife to bring pt to nearest ED. I then notified Dr Nails who agreed. Wife gave pt phone and pt said he was going to go if he felt any worse but he was going try to make it to his appt on 6/14. I informed pt how important it is that he go to the ED to get checked out and treated fr this fluid build, and that I could hear that he's sob over the phone. Pt said he's go to [facility]. .

## 2019-05-29 NOTE — TELEPHONE ENCOUNTER
"Pts wt today is 193lbs up 3 lbs since Sunday. Wife reports pt still has "lots of swelling in his belly too". Pt is taking bumex 2mg in am 2mg in pm.    "

## 2019-05-29 NOTE — PROGRESS NOTES
05/29/19 1200   OTHER   Transmission Date 05/29/19   Transmission Type  Maintenance   PAS 59   SUSIE 38   PAD  24   Medication Adjustments  No

## 2019-06-04 NOTE — PROGRESS NOTES
Subjective:     HPI:  Mr. Solis is a very pleasant 79 y/o male with HFrEF, ICMP (EF=20%), NYHA class III symptoms  S/p cardioMEMS implant (enrolled in GUIDE-HF study) who comes following hospital admission. Patient has been doing worse from a functional standpoint, increased CORDOBA, weakness, weight loss. Has continued to be in afib, is waiting for possible DCCV. renal function has been getting worse as well, making it harder to monitor. cardiomems now registering data that shows better volume status than patient feels and is doing, and additionally on exam. NYHA FC III.  This last admission he had his abdomen tapped for paracentesis.     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    Ischemic cardiomyopathy 3/22/2018    Mixed hyperlipidemia 3/22/2018    Type 2 diabetes mellitus with complication 3/22/2018    VT (ventricular tachycardia) 3/22/2018     Past Surgical History:   Procedure Laterality Date    CARDIOVERSION N/A 1/14/2019    Performed by Ryan Henley MD at [facility] EP LAB    Cardioversion or Defibrillation N/A 5/6/2019    Performed by Werner Boggs MD at [facility]    ECHOCARDIOGRAM,TRANSESOPHAGEAL N/A 1/14/2019    Performed by Ryan Henley MD at [facility]    ECHOCARDIOGRAM,TRANSESOPHAGEAL N/A 1/9/2019    Performed by [facility] Diagnostic Provider at [facility]    EXTRACTION, ELECTRODE LEAD Left 1/9/2019    Performed by Werner Boggs MD at [facility]    HEART CATH-RIGHT Right 4/5/2018    Performed by Elizabeth Wise MD at [facility] CATH LAB    INSERTION, ICD, BIVENTRICULAR Left 1/29/2019    Performed by Werner Boggs MD at [facility]    Insertion, Pacemaker, Temporary Transvenous   "1/9/2019    Performed by [facility] at [facility] EP [facility]    Transesophageal echo (RICARDO) intra-procedure log documentation N/A 5/6/2019    Performed by [facility] Diagnostic Provider at [facility] EP LAB       Review of Systems   Constitution: Negative. Negative for chills, decreased appetite, diaphoresis, fever, malaise/fatigue, night sweats, weight gain and weight loss.   Eyes: Negative.    Cardiovascular: Positive for dyspnea on exertion. Negative for chest pain, claudication, cyanosis, irregular heartbeat, leg swelling, near-syncope, orthopnea, palpitations, paroxysmal nocturnal dyspnea and syncope.   Respiratory: Negative for cough, hemoptysis and shortness of breath.    Endocrine: Negative.    Hematologic/Lymphatic: Negative.    Skin: Negative for color change, dry skin and nail changes.   Musculoskeletal: Negative.    Gastrointestinal: Negative.    Genitourinary: Negative.    Neurological: Negative for weakness.       Objective:   Blood pressure (!) 101/51, pulse (!) 59, height 6' 1" (1.854 m), weight 89 kg (196 lb 3.4 oz).body mass index is 25.89 kg/m².  Physical Exam   Constitutional: He is oriented to person, place, and time. He appears well-developed and well-nourished. No distress.        HENT:   Head: Normocephalic and atraumatic.   Nose: Nose normal.   Mouth/Throat: Oropharynx is clear and moist. No oropharyngeal exudate.   Eyes: Pupils are equal, round, and reactive to light. Conjunctivae and EOM are normal. No scleral icterus.   Neck: Normal range of motion. Neck supple. JVD (14cm H2O) present. Carotid bruit is not present. No tracheal deviation present. No thyromegaly present.   Cardiovascular: Normal rate, regular rhythm, S1 normal, S2 normal, normal heart sounds and normal pulses. PMI is displaced. Exam reveals no gallop and no friction rub.   No murmur heard.  Pulmonary/Chest: Effort normal and breath sounds normal. No respiratory distress. He has no wheezes. He has no rales. He exhibits no tenderness. "   Abdominal: Soft. Bowel sounds are normal. He exhibits no distension and no mass. There is no tenderness. There is no rebound and no guarding.   Musculoskeletal: Normal range of motion. He exhibits edema. He exhibits no tenderness.   Trace edema   Neurological: He is alert and oriented to person, place, and time. Coordination normal.   Skin: Skin is warm and dry. No rash noted. He is not diaphoretic. No erythema. No pallor.   Psychiatric: He has a normal mood and affect. His behavior is normal. Judgment and thought content normal.   Nursing note and vitals reviewed.      Labs:    Chemistry        Component Value Date/Time     05/24/2019 1247    K 4.3 05/24/2019 1247     05/24/2019 1247    CO2 23 05/24/2019 1247    BUN 63 (H) 05/24/2019 1247    CREATININE 2.4 (H) 05/24/2019 1247     (H) 05/24/2019 1247        Component Value Date/Time    CALCIUM 9.0 05/24/2019 1247    ALKPHOS 181 (H) 05/12/2019 0526    AST 49 (H) 05/12/2019 0526    ALT 25 05/12/2019 0526    BILITOT 0.7 05/12/2019 0526    ESTGFRAFRICA 29.2 (A) 05/24/2019 1247    EGFRNONAA 25.3 (A) 05/24/2019 1247          Magnesium   Date Value Ref Range Status   05/12/2019 2.2 1.6 - 2.6 mg/dL Final     Lab Results   Component Value Date    WBC 7.67 05/12/2019    HGB 10.3 (L) 05/12/2019    HCT 30.7 (L) 05/12/2019     (L) 05/12/2019     Lab Results   Component Value Date    INR 1.2 02/06/2019    INR 1.2 02/05/2019    INR 1.4 (H) 02/04/2019     BNP   Date Value Ref Range Status   05/24/2019 2,387 (H) 0 - 99 pg/mL Final     Comment:     Values of less than 100 pg/ml are consistent with non-CHF populations.   05/06/2019 1,793 (H) 0 - 99 pg/mL Final     Comment:     Values of less than 100 pg/ml are consistent with non-CHF populations.   04/26/2019 1,967 (H) 0 - 99 pg/mL Final     Comment:     Values of less than 100 pg/ml are consistent with non-CHF populations.       Assessment:      1. Moderate malnutrition    2. Chronic systolic congestive  heart failure    3. Acute on chronic combined systolic and diastolic CHF, NYHA class 3    4. Ischemic cardiomyopathy    5. 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 Discussed with Dr. Nails, seems they may need to re-calibrate his device as when they have been reading his readings it appears his PAD has been stable for him. He will look into doing this.   In the meantime will have him increase bumex to 2mg po BID.   Repeat labs in 1 week.   Recommend 2 gram sodium restriction and 1500cc fluid restriction.  Encourage physical activity with graded exercise program.  Requested patient to weigh themselves daily, and to notify us if their weight increases by more than 3 lbs in 1 day or 5 lbs in 1 week.  I did discuss hospice/palliative care consult however patient's wife states she feels he will do much better after his afib is cardioverted and does not think he is ready for palliation. His daughter however was tearful and understood where we were coming from. Went ahead and made the palliative care appointment, will see if they keep it. Follow up in 2 weeks with Jaqui.

## 2019-06-12 NOTE — PROGRESS NOTES
Subjective:     HPI:  Mr. Solis is a very pleasant 78y/o male with HFrEF, ICMP (EF=20%), NYHA class III symptoms  S/p cardioMEMS implant (enrolled in GUIDE-HF study) who comes for a pos hospital discharge follow-up visit. Unfortunately, he had a fall and has a large ecchymoses on his face. Today he feels tired and weak. Also reports poor appetite. Still reports NYHA class III symptoms. No PND or orthopnea. His weight today is down to177 (goal is 187).       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    Ischemic cardiomyopathy 3/22/2018    Mixed hyperlipidemia 3/22/2018    Type 2 diabetes mellitus with complication 3/22/2018    VT (ventricular tachycardia) 3/22/2018     Past Surgical History:   Procedure Laterality Date    CARDIOVERSION N/A 1/14/2019    Performed by Ryan Henley MD at [facility] EP [facility]    Cardioversion or Defibrillation N/A 5/6/2019    Performed by Werner Boggs MD at [facility] EP [facility]    ECHOCARDIOGRAM,TRANSESOPHAGEAL N/A 1/14/2019    Performed by Ryan Henley MD at [facility] EP LAB    ECHOCARDIOGRAM,TRANSESOPHAGEAL N/A 1/9/2019    Performed by [facility] Diagnostic Provider at [facility] EP LAB    EXTRACTION, ELECTRODE LEAD Left 1/9/2019    Performed by Werner Boggs MD at [facility] EP LAB    HEART CATH-RIGHT Right 4/5/2018    Performed by Elizabeth Wise MD at [facility] CATH LAB    INSERTION, ICD, BIVENTRICULAR Left 1/29/2019    Performed by Werner Boggs MD at [facility] EP LAB    Insertion, Pacemaker, Temporary Transvenous  1/9/2019    Performed by Werner Boggs MD at [facility] EP LAB    Transesophageal echo (RICARDO) intra-procedure log documentation N/A 5/6/2019    Performed by [facility] Diagnostic Provider at  "[facility] LAB       Review of Systems   Constitution: Positive for malaise/fatigue and weight loss. Negative for chills, decreased appetite, diaphoresis, fever, night sweats and weight gain.   Eyes: Negative.    Cardiovascular: Positive for dyspnea on exertion. Negative for chest pain, claudication, cyanosis, irregular heartbeat, leg swelling, near-syncope, orthopnea, palpitations, paroxysmal nocturnal dyspnea and syncope.   Respiratory: Negative for cough, hemoptysis and shortness of breath.    Endocrine: Negative.    Hematologic/Lymphatic: Negative.    Skin: Negative for color change, dry skin and nail changes.   Musculoskeletal: Negative.    Gastrointestinal: Positive for anorexia.   Genitourinary: Negative.    Neurological: Negative for weakness.       Objective:   Blood pressure 105/62, pulse (!) 59, height 6' 1" (1.854 m), weight 80.4 kg (177 lb 4 oz).body mass index is 23.39 kg/m².  Physical Exam   Constitutional: He appears well-developed.   /62 (BP Location: Left arm, Patient Position: Sitting, BP Method: Small (Automatic))   Pulse (!) 59   Ht 6' 1" (1.854 m)   Wt 80.4 kg (177 lb 4 oz)   BMI 23.39 kg/m²      HENT:   Head: Normocephalic.   Neck: No JVD present. Carotid bruit is not present.   Cardiovascular: Regular rhythm, normal heart sounds and normal pulses. PMI is displaced.   No murmur heard.  Pulmonary/Chest: Effort normal and breath sounds normal. No respiratory distress. He has no wheezes. He has no rales.   Abdominal: Soft. Bowel sounds are normal. He exhibits no distension. There is no tenderness. There is no rebound.   Musculoskeletal: He exhibits no edema.   Neurological: He is alert.   Skin: Skin is warm.   Vitals reviewed.      Labs:    Chemistry        Component Value Date/Time     05/24/2019 1247    K 4.3 05/24/2019 1247     05/24/2019 1247    CO2 23 05/24/2019 1247    BUN 63 (H) 05/24/2019 1247    CREATININE 2.4 (H) 05/24/2019 1247     (H) 05/24/2019 1247      "   Component Value Date/Time    CALCIUM 9.0 05/24/2019 1247    ALKPHOS 181 (H) 05/12/2019 0526    AST 49 (H) 05/12/2019 0526    ALT 25 05/12/2019 0526    BILITOT 0.7 05/12/2019 0526    ESTGFRAFRICA 29.2 (A) 05/24/2019 1247    EGFRNONAA 25.3 (A) 05/24/2019 1247          Magnesium   Date Value Ref Range Status   05/12/2019 2.2 1.6 - 2.6 mg/dL Final     Lab Results   Component Value Date    WBC 7.67 05/12/2019    HGB 10.3 (L) 05/12/2019    HCT 30.7 (L) 05/12/2019     (L) 05/12/2019     Lab Results   Component Value Date    INR 1.2 02/06/2019    INR 1.2 02/05/2019    INR 1.4 (H) 02/04/2019     BNP   Date Value Ref Range Status   05/24/2019 2,387 (H) 0 - 99 pg/mL Final     Comment:     Values of less than 100 pg/ml are consistent with non-CHF populations.   05/06/2019 1,793 (H) 0 - 99 pg/mL Final     Comment:     Values of less than 100 pg/ml are consistent with non-CHF populations.   04/26/2019 1,967 (H) 0 - 99 pg/mL Final     Comment:     Values of less than 100 pg/ml are consistent with non-CHF populations.       Assessment:      1. Chronic systolic congestive heart failure    2. Ischemic cardiomyopathy    3. Biventricular ICD (implantable cardioverter-defibrillator) in place        Plan:   From a cardiac standpoint he remains stable. NYHA class III symptoms  I am worried about his weight loss and poor appetite.    Recommend 2 gram sodium restriction and 1500cc fluid restriction.  Encourage physical activity with graded exercise program.  Requested patient to weigh themselves daily, and to notify us if their weight increases by more than 3 lbs in 1 day or 5 lbs in 1 week.   RTC in 3 months with labs      Mela Nails MD

## 2019-06-12 NOTE — PROGRESS NOTES
[facility]  Palliative Medicine  Clinic Consult Note    Patient Name: Jerry Solis  MRN: [id number]  Primary Care Physician: Primary Doctor No  Principal Problem:No chief complaint on file.      Assessment/Plan:     Palliative Care Encounter / Goals of care discussion:     Narrative:   Jerry Solis is a 78 y.o. male patient who presents today with his wife and son and daughter in law. He has a history of CHF, EF ~20%, NYHA class III, S/P cardio MEMS inplant. He has been noted to be in AF, underwent past DCCV but had recurrence. He has impaired renal function as well. He has been admitted several times this year, in February for over a month for an ICD lead infection that led to sepsis and bacteremia, in about May for fluid retention and ascites and had to be hospitalized again for fluid overload just recently. He has lost weight and has been more and more challenged performing physical activities lately.   A repeat DC Cardioversion hs been discussed.     I am being asked to discuss goals of care and advanced care planning with the pt. And family.     1: Pain / Physical symptom Assessment:   - pain assesment: denies any pain currently   - dyspnea assessment: dyspnea when performing physical activities   - anxiety assessment: anxious at times   - depression assessment: denies    2: Social Background:   -  with 3 children and several grand and great grand children   - lives with his wife in the back of his house. Two of their children and family live in the same house, one son lives down the street   - very involved with the neighborhood and the local [employer]. Have lots of support locally.    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    - they live close to [place], [place]     3: Palliative care meeting participants:    Met with wife, son  "and daughter in law    4: Goals of care Discussion details:   Palliative care introduced.     Reviewed understanding of disease and prognostic awareness. Mr. Edwards understands that his heart is weak and has noted weight loss and loss of strength.    He is hopeful that the D/C Cardioversion will help his heart beat better and him to get stronger   He has noted poor appetite and weight loss lately, even though he has been challenged with fluid retention as well.       We talked about "what the future holds" and he tells me that he is worried that he will die. He gets tearful and admits he has been thinking a lot about this.   He is worried he will not be able to go fishing, which he loves. He loves the garden and being active.    He expressed that he has been planning to do a living will.   We talked about the "what if's". We discussed scenarios where he needed CPR or mechanical ventilation.   He tells me he would not want ACLS or mechanical ventilation. He would not want artificial feeding or dialysis.       He is planning to visit with his PCP Dr. Hdz soon to complete advance directives and will discuss our conversation with him as well.    I provided him with advance care planning brochure and asked to return upon his follow up with Dr. Nails to discuss details further.      Answered questions of son and wife regarding end of life care and hospice.       5: Goals of care Decisions / Recommendations / Plan:   Discussed advance directive in detail. Pt. Expresses wishes for no aggressive care including no CPR or mechanical ventilation, however wants to discuss with PCP.     6: Follow up plans:    With next appointment with CHF.     Thank you for your consult. I will follow along with you. Please call [phone] with questions.       Subjective:     Reason for consult:  Advance care planning    Outpatient Providers:  Primary Doctor Linda (Primary Care Physician)  Dr. Nails    HPI:   Jerry Solis is a 78 y.o. " male patient who presents today with his wife and son and daughter in law. He has a history of CHF, EF ~20%, NYHA class III, S/P cardio MEMS inplant. He has been noted to be in AF, underwent past DCCV but had recurrence. He has impaired renal function as well. He has been admitted several times this year, in February for over a month for an ICD lead infection that led to sepsis and bacteremia, in about May for fluid retention and ascites and had to be hospitalized again for fluid overload just recently. He has lost weight and has been more and more challenged performing physical activities lately.   A repeat DC Cardioversion hs been discussed.    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    Ischemic cardiomyopathy 3/22/2018    Mixed hyperlipidemia 3/22/2018    Type 2 diabetes mellitus with complication 3/22/2018    VT (ventricular tachycardia) 3/22/2018       Past Surgical History:   Procedure Laterality Date    CARDIOVERSION N/A 1/14/2019    Performed by [facility] MD at [facility] EP [facility]    Cardioversion or Defibrillation N/A 5/6/2019    Performed by Werner Boggs MD at [facility] EP [facility]    ECHOCARDIOGRAM,TRANSESOPHAGEAL N/A 1/14/2019    Performed by Ryan [facility]ley MD at [facility] EP [facility]    ECHOCARDIOGRAM,TRANSESOPHAGEAL N/A 1/9/2019    Performed by [facility] Diagnostic Provider at [facility] EP LAB    EXTRACTION, ELECTRODE LEAD Left 1/9/2019    Performed by Werner Boggs MD at [facility] EP LAB    HEART CATH-RIGHT Right 4/5/2018    Performed by Elizabeth Wise MD at [facility] CATH LAB    INSERTION, ICD, BIVENTRICULAR Left 1/29/2019    Performed by Werner Boggs MD at [facility] EP LAB    Insertion, Pacemaker, Temporary  Transvenous  1/9/2019    Performed by Werner [facility]gs MD at [facility] EP [facility]    Transesophageal echo (RICARDO) intra-procedure log documentation N/A 5/6/2019    Performed by [facility] Diagnostic Provider at [facility] EP LAB        Family History     None          Tobacco Use    Smoking status: Former Smoker    Smokeless tobacco: Former User   Substance and Sexual Activity    Alcohol use: Yes     Comment: former drinker    Drug use: No    Sexual activity: Not on file       Review of patient's allergies indicates:   Allergen Reactions    Adhesive Other (See Comments)     blisters    Codeine Other (See Comments)     Blurred vision    Latex Hives    Ace inhibitors     Lipitor [atorvastatin] Other (See Comments)     Muscle spasms    Xanax [alprazolam] Hallucinations         Review of Symptoms    Review of Systems   Constitutional: Positive for activity change, appetite change, fatigue and unexpected weight change.   Respiratory: Positive for shortness of breath. Negative for wheezing.    Cardiovascular: Positive for leg swelling. Negative for chest pain.   Gastrointestinal: Positive for abdominal distention.   Musculoskeletal: Positive for gait problem.   Neurological: Positive for dizziness.         Objective:     Vital Signs (Most Recent):  There were no vitals taken for this visit.      There is no height or weight on file to calculate BMI.  Bowel Management Plan (BMP): No    Pain Assessment: denies    OME in 24 hours:     Performance Status: 70    ECOG Performance Status Grade: 2 - Ambulates, capable of self care only    Physical Exam      Significant Imaging: I have reviewed all pertinent imaging results/findings within the past 24 hours.    Advanced Directives::  Living Will: No  LaPOST: No  Do Not Resuscitate Status: No  Medical Power of : No, wife Siria next of kin    Decision-Making Capacity: Patient answered questions, Family answered questions      > 50% of 65 min visit spent in chart review, face to  face discussion of goals of care,  symptom assessment, coordination of care and emotional support.    Nicola Carr MD  Palliative Medicine  [facility]

## 2019-06-13 NOTE — TELEPHONE ENCOUNTER
Received call this am from [facility] with repeat bmp critical K of 2.5. Notified Dr Nails.  Spoke to pt and pt denied any cp , palpitations or chest discomfort. Informed pt of complication of low potassium. Instructed pt to have someone bring him to nearest ED. Pt verbalized understanding and agreed to to go ED.

## 2019-06-14 NOTE — PROGRESS NOTES
06/14/19 1600   OTHER   Transmission Date 06/14/19   Transmission Type  Maintenance   PAS 50   SUSIE 29   PAD  17   Medication Adjustments  No

## 2019-06-27 NOTE — PROGRESS NOTES
06/27/19 1400   OTHER   Transmission Date 06/27/19   Transmission Type  Maintenance   PAS 52   SUSIE 31   PAD  18   Medication Adjustments  No

## 2019-07-10 NOTE — PROGRESS NOTES
07/3/19 1400   OTHER   Transmission Date 07/03/19   Transmission Type  Maintenance   PAS 49   SUSIE 28   PAD  15   Medication Adjustments  No

## 2019-07-23 NOTE — PROGRESS NOTES
07/23/19 1100   OTHER   Transmission Date 07/21/19   Transmission Type  Maintenance   PAS 5   SUSIE 34   PAD  19   Medication Adjustments  No

## 2019-08-07 NOTE — PROGRESS NOTES
Subjective:     HPI:  Mr. Solis is a very pleasant 78y/o male with HFrEF, ICMP (EF=20%), NYHA class III symptoms  S/p cardioMEMS implant (enrolled in GUIDE-HF study) who comes for a  Urgent visit. Unfortunately, he had a fall and has a large ecchymoses on his body. Was admitted for dehydration 2 weeks ago. Given IVF. Since hospital discharge has been more dyspneic with 12 LBS weight gain since his last visit with me.  Still reports NYHA class III symptoms. Also PND or orthopnea. His weight today is down to 189.         08/07/19    OTHER   Transmission Date 08/07/19   Transmission Type  Maintenance   PAS 55   SUSIE 36   PAD  22   Medication Adjustments  No         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    Ischemic cardiomyopathy 3/22/2018    Mixed hyperlipidemia 3/22/2018    Type 2 diabetes mellitus with complication 3/22/2018    VT (ventricular tachycardia) 3/22/2018     Past Surgical History:   Procedure Laterality Date    CARDIOVERSION N/A 1/14/2019    Performed by Ryan Henley MD at [facility] EP LAB    Cardioversion or Defibrillation N/A 5/6/2019    Performed by Werner Boggs MD at [facility] EP LAB    ECHOCARDIOGRAM,TRANSESOPHAGEAL N/A 1/14/2019    Performed by Ryan [facility]ley MD at [facility] EP LAB    ECHOCARDIOGRAM,TRANSESOPHAGEAL N/A 1/9/2019    Performed by [facility] Diagnostic Provider at [facility] EP LAB    EXTRACTION, ELECTRODE LEAD Left 1/9/2019    Performed by Werner Boggs MD at [facility] EP LAB    HEART CATH-RIGHT Right 4/5/2018    Performed by Elizabeth Wise MD at [facility] CATH LAB    INSERTION, ICD, BIVENTRICULAR Left 1/29/2019    Performed by Werner Boggs MD at [facility] EP LAB    Insertion,  "Pacemaker, Temporary Transvenous  1/9/2019    [facility]    Transesophageal echo (RICARDO) intra-procedure log documentation N/A 5/6/2019    Performed by [facility] Diagnostic Provider at [facility] EP LAB       Review of Systems   Constitution: Positive for weight gain. Negative for chills, decreased appetite, diaphoresis, fever, malaise/fatigue, night sweats and weight loss.   Eyes: Negative.    Cardiovascular: Positive for dyspnea on exertion, leg swelling, orthopnea and paroxysmal nocturnal dyspnea. Negative for chest pain, claudication, cyanosis, irregular heartbeat, near-syncope, palpitations and syncope.   Respiratory: Negative for cough, hemoptysis and shortness of breath.    Endocrine: Negative.    Hematologic/Lymphatic: Negative.    Skin: Negative for color change, dry skin and nail changes.   Musculoskeletal: Negative.    Gastrointestinal: Negative.    Genitourinary: Negative.    Neurological: Negative for weakness.       Objective:   Blood pressure (!) 94/57, pulse 60, height 6' 1" (1.854 m), weight 85.9 kg (189 lb 6 oz).body mass index is unknown because there is no height or weight on file.  Physical Exam   Constitutional: He appears well-developed.   There were no vitals taken for this visit.     HENT:   Head: Normocephalic.   Neck: JVD present. Carotid bruit is not present.   Cardiovascular: Regular rhythm and normal heart sounds. PMI is displaced.   No murmur heard.  Pulmonary/Chest: Effort normal and breath sounds normal. No respiratory distress. He has no wheezes. He has no rales.   Abdominal: Soft. Bowel sounds are normal. He exhibits distension. There is no tenderness. There is no rebound.   Musculoskeletal: He exhibits edema.   Neurological: He is alert.   Skin: Skin is warm.   Vitals reviewed.      Labs:    Chemistry        Component Value Date/Time     06/12/2019 0838    K 2.5 (LL) 06/12/2019 0838    CL 88 (L) 06/12/2019 0838    CO2 36 (H) 06/12/2019 0838    BUN 68 " (H) 06/12/2019 0838    CREATININE 3.4 (H) 06/12/2019 0838     06/12/2019 0838        Component Value Date/Time    CALCIUM 9.2 06/12/2019 0838    ALKPHOS 181 (H) 05/12/2019 0526    AST 49 (H) 05/12/2019 0526    ALT 25 05/12/2019 0526    BILITOT 0.7 05/12/2019 0526    ESTGFRAFRICA 19.2 (A) 06/12/2019 0838    EGFRNONAA 16.6 (A) 06/12/2019 0838          Magnesium   Date Value Ref Range Status   05/12/2019 2.2 1.6 - 2.6 mg/dL Final     Lab Results   Component Value Date    WBC 7.67 05/12/2019    HGB 10.3 (L) 05/12/2019    HCT 30.7 (L) 05/12/2019     (L) 05/12/2019     Lab Results   Component Value Date    INR 1.2 02/06/2019    INR 1.2 02/05/2019    INR 1.4 (H) 02/04/2019     BNP   Date Value Ref Range Status   06/12/2019 2,117 (H) 0 - 99 pg/mL Final     Comment:     Values of less than 100 pg/ml are consistent with non-CHF populations.   05/24/2019 2,387 (H) 0 - 99 pg/mL Final     Comment:     Values of less than 100 pg/ml are consistent with non-CHF populations.   05/06/2019 1,793 (H) 0 - 99 pg/mL Final     Comment:     Values of less than 100 pg/ml are consistent with non-CHF populations.       Assessment:      1. Chronic systolic CHF (congestive heart failure), NYHA class 3    2. Ischemic cardiomyopathy    3. Paroxysmal atrial fibrillation        Plan:   NYHA class III symptoms with volume overload on exam  Will give Lasix 40 mg IV in clinicwith extra K 50 meq  I am worried about his weight loss and poor appetite.    Recommend 2 gram sodium restriction and 1500cc fluid restriction.  Encourage physical activity with graded exercise program.  Requested patient to weigh themselves daily, and to notify us if their weight increases by more than 3 lbs in 1 day or 5 lbs in 1 week.   RTC in 1 months with labs      Mela Nails MD

## 2019-08-09 NOTE — TELEPHONE ENCOUNTER
1130 am:  F/U on today's nurse lab phone review:  REc'ed BMP and pBNP lab results from today's collection:  K+:  2.6      Cr:  3.8    Na:  136     BUN: 21     ProBNP: 82215  Notified Dr. Nails of results  VORB: Dr. Castro/Bethanie, RN:  Today only: pt to take 60 meq of Potassium 3x/day  ( pts usual dose is 20 meq TID. Repeat BMP and PBNP Monday 8/12/19)   1145 am Called pt to review instructions  He asked I call his wife. Spoke with wife ; informed of low potassium level and of CR ( as she inquired).  She confirmed he usually takes 20 meq K+ 3x/day (1) in the am (1) at lunch and (1) at supper and so far has taken 1 tablet today. Instructed her to have him take 3 tablets at lunch, 3 at supper and 2 at bedtime today only and tomorrow resume 1 tablet 3x/day   Informed her of need for repeat lab work Monday 8/12-she is in agreement with this and I will ask  MA to set this up with her  Wife repeated potassium instructions back to me correctly.

## 2019-08-12 NOTE — PROCEDURES
08/12/19 0700   OTHER   Transmission Date 08/07/19   Transmission Type  Maintenance   PAS 55   SUSIE 36   PAD  22   Medication Adjustments  No     Hemodynamics reviewed in [email or web address], no changes at this time. Will continue to monitor.

## 2019-08-30 NOTE — TELEPHONE ENCOUNTER
1225 pm : F/U on routine weekly labs  receied BMP and ProBNP results from today's collectons  Na/K+:  136/3.3      Bun/Cr:  76 3.94      ProBNP:  95275  Will compare to last weeks and review with a provider.   1255 pm:  Dr. Nails out today. Called and reviewed labs with Rolan NP :today's labs: Cr 3.94    -( has been 3.6 last 2 weeks)  K+:  3.3  ProBNP:  89545- see his cardiomems  Note form 8/29  TORB: Rolan, /FLORENTINO Kovacs. Starting this afternoon decrease Bumex to 1 mg BID -Monday 9/3/19  Repeat BMP/BNP Tuesday 9/3/19   Continue K+ 20 meq TID pt to take once extra dose of 20 meq today only  1:00 pm: Called and gave these instructions to pts wife and she voiced understanding and agreement to all including lab appt 9/3/19.  Did first confirm with wife pt has been taking Bumex 2 mg  twice daily

## 2019-09-04 NOTE — TELEPHONE ENCOUNTER
Received pts routine labs that were drawn 9/3/19 by [facility]. K 2.7. Spoke to pts son who relayed my conversation to patient. I could hear pt speaking. Pt reports feeling well, and denies wt gain unusual swelling, denies sob. Pt states he doesn't know what meds hes taking, so I offered to call pts wife. Pt agreed. Mrs. Solis says pt was 184lbs, and pts taking bumex 1mg bid, K 20meq tid. Mrs Calero also says that about a week and a half ago pt developed a blister on his legs about the sign of a dime on the back of one of his legs( didn't recall which leg) and that the blister anayeli and drained clear fluid.  Per Dr Nails I instructed to take 60meq of extra potassium today and tomorrow with labs on 9/6/19.    Pt has been seen since this note by Dr Nails.

## 2019-09-06 NOTE — TELEPHONE ENCOUNTER
1230 pm:  F/U on today's nurse lab phone review from lab collecton today:  9/6/19 am:  K+ improved to 3.9    Cr up some to 4.08  ( from recent labs was 3.8 and prior to this 3.94)  Called and spoke with wife   Notes not documented yet, however co worker, MARCE Bhatia spoke with ptt wife yesterday pm as well as MD( she is out of the office today)  I called and spoke with wife at this time:   Wife told me since the end of August he has been taking Bumex 1 mg twice daily and K+ 20 meq (1) tablet 3x daily ( but past 2 days he took (2) tablets 3x's daily.    Wife also informed me he has swelling and blisters on his legs ( discussed with other nurse yesterday)  However wife says wts is the same 180   Informed her of improved K+ and kidney function just a little higher ( worse)  Placed call to Dr. Nails to review-NA, Left message for Dr. Nails.  Will follow.    1:15 pm:  Informed Dr. Nails of all as outlined above.  Dr. Nails does not wish to make any changes to medical management at this time  Stated he will review cardiomems readings and dependent on that may or may not make changes  1:20 pm: called and informed wife of Dr. Hooker plan.  She told me he has not done the Cardiomems for the past 2 days but she will have him do it today when he wakes up from his nap  I informed Dr. Nails of all.

## 2019-09-13 NOTE — PROGRESS NOTES
09/13/19 1100   OTHER   Transmission Date 09/13/19   Transmission Type  Maintenance   PAS 50   SUSIE 30   PAD  16   Medication Adjustments  No

## 2019-09-13 NOTE — PROGRESS NOTES
Subjective:     HPI:  Mr. Solis is a very pleasant 78y/o male with HFrEF, ICMP (EF=20%), NYHA class III symptoms  S/p cardioMEMS implant (enrolled in GUIDE-HF study) who comes for a  Urgent visit. Unfortunately, he had a fall and has a large ecchymoses on his body. Was admitted for dehydration 2 weeks ago. Given IVF. Since hospital discharge has been more dyspneic with 12 LBS weight gain since his last visit with me.  Today eh feels worse than last time I saw him. His family reports poor appetite and extreme weakness. Hes been essentially wheelchair bound. Still reports NYHA class III symptoms. Also PND or orthopnea.       09/11/19    OTHER   Transmission Date 09/11/19   Transmission Type  Maintenance   PAS 50   SUSIE 30   PAD  16   Medication Adjustments  No            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    Ischemic cardiomyopathy 3/22/2018    Mixed hyperlipidemia 3/22/2018    Type 2 diabetes mellitus with complication 3/22/2018    VT (ventricular tachycardia) 3/22/2018     Past Surgical History:   Procedure Laterality Date    CARDIOVERSION N/A 1/14/2019    Performed by [facility] MD at [facility] EP LAB    Cardioversion or Defibrillation N/A 5/6/2019    Performed by Werner Boggs MD at [facility] EP LAB    ECHOCARDIOGRAM,TRANSESOPHAGEAL N/A 1/14/2019    Performed by Ryan [facility]ley MD at [facility] EP LAB    ECHOCARDIOGRAM,TRANSESOPHAGEAL N/A 1/9/2019    Performed by [facility] Diagnostic Provider at [facility] EP LAB    EXTRACTION, ELECTRODE LEAD Left 1/9/2019    Performed by Werner Boggs MD at [facility] EP LAB    HEART CATH-RIGHT Right 4/5/2018    Performed by Elizabeth Wise MD at [facility] CATH LAB     "INSERTION, ICD, BIVENTRICULAR Left 1/29/2019    Performed by [facility] at [facility] EP LAB    Insertion, Pacemaker, Temporary Transvenous  1/9/2019    Performed by Werner Boggs MD at [facility] EP LAB    Transesophageal echo (RICARDO) intra-procedure log documentation N/A 5/6/2019    Performed by [facility] Diagnostic Provider at [facility] EP LAB       Review of Systems   Constitution: Positive for decreased appetite and malaise/fatigue. Negative for chills, diaphoresis, fever, night sweats, weight gain and weight loss.   Eyes: Negative.    Cardiovascular: Positive for dyspnea on exertion, leg swelling, orthopnea and paroxysmal nocturnal dyspnea. Negative for chest pain, claudication, cyanosis, irregular heartbeat, near-syncope, palpitations and syncope.   Respiratory: Negative for cough, hemoptysis and shortness of breath.    Endocrine: Negative.    Hematologic/Lymphatic: Negative.    Skin: Negative for color change, dry skin and nail changes.   Musculoskeletal: Negative.    Gastrointestinal: Negative.    Genitourinary: Negative.    Neurological: Negative for weakness.       Objective:   Blood pressure (!) 94/50, height 6' 1" (1.854 m), weight 85.3 kg (188 lb).body mass index is 24.8 kg/m².  Physical Exam   Constitutional:   BP (!) 94/50 (BP Location: Right arm, Patient Position: Sitting, BP Method: Medium (Manual))   Ht 6' 1" (1.854 m)   Wt 85.3 kg (188 lb)   BMI 24.80 kg/m²   Cachectic, ill looking   HENT:   Head: Normocephalic.   Neck: JVD present. Carotid bruit is not present.   Cardiovascular: Regular rhythm and normal heart sounds. PMI is displaced.   No murmur heard.  Pulmonary/Chest: Effort normal and breath sounds normal. No respiratory distress. He has no wheezes. He has no rales.   Abdominal: Soft. Bowel sounds are normal. He exhibits distension. There is no tenderness. There is no rebound.   Musculoskeletal: He exhibits edema.   Neurological: He is alert.   Skin: Skin is warm.   Vitals reviewed.      Labs:    " Chemistry        Component Value Date/Time     (L) 09/13/2019 1144    K 5.7 (H) 09/13/2019 1144    CL 94 (L) 09/13/2019 1144    CO2 18 (L) 09/13/2019 1144     (H) 09/13/2019 1144    CREATININE 4.6 (H) 09/13/2019 1144     (H) 09/13/2019 1144        Component Value Date/Time    CALCIUM 8.2 (L) 09/13/2019 1144    ALKPHOS 129 09/13/2019 1144    AST 43 (H) 09/13/2019 1144    ALT 21 09/13/2019 1144    BILITOT 0.9 09/13/2019 1144    ESTGFRAFRICA 13.3 (A) 09/13/2019 1144    EGFRNONAA 11.5 (A) 09/13/2019 1144          Magnesium   Date Value Ref Range Status   05/12/2019 2.2 1.6 - 2.6 mg/dL Final     Lab Results   Component Value Date    WBC 7.67 05/12/2019    HGB 10.3 (L) 05/12/2019    HCT 30.7 (L) 05/12/2019     (L) 05/12/2019     Lab Results   Component Value Date    INR 1.2 02/06/2019    INR 1.2 02/05/2019    INR 1.4 (H) 02/04/2019     BNP   Date Value Ref Range Status   06/12/2019 2,117 (H) 0 - 99 pg/mL Final     Comment:     Values of less than 100 pg/ml are consistent with non-CHF populations.   05/24/2019 2,387 (H) 0 - 99 pg/mL Final     Comment:     Values of less than 100 pg/ml are consistent with non-CHF populations.   05/06/2019 1,793 (H) 0 - 99 pg/mL Final     Comment:     Values of less than 100 pg/ml are consistent with non-CHF populations.       Assessment:      1. Chronic combined systolic and diastolic congestive heart failure    2. Ischemic cardiomyopathy    3. Coronary artery disease involving native coronary artery of native heart without angina pectoris    4. ICD (implantable cardioverter-defibrillator) in place        Plan:     NYHA class IV symptoms with cardiac cachexia. Not responding to Bumex. Will switch to Torsemide.   His PA diastolic pressure Is WNL but this may not reflect his volume status as his cardiac inde/output is likely very low due to RV failure.  Had a long discussion with his family about his poor prognosis. Family wants hospice care which I agree  with.  Recommend 2 gram sodium restriction and 1500cc fluid restriction.  Encourage physical activity with graded exercise program.  Requested patient to weigh themselves daily, and to notify us if their weight increases by more than 3 lbs in 1 day or 5 lbs in 1 week.      Mela Nails MD

## 2019-09-14 NOTE — PT/OT/SLP PROGRESS
"Physical Therapy Treatment    Patient Name:  Jerry Solis   MRN:  [id number]    Recommendations:     Discharge Recommendations:  (HHPT)   Discharge Equipment Recommendations: (TBD)   Barriers to discharge: None    Assessment:     Jerry Solis is a 78 y.o. 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  He presents with the following impairments/functional limitations:  weakness, impaired endurance, gait instability, impaired functional mobilty, impaired balance, impaired cardiopulmonary response to activity. Pt tolerated session with VSS throughout. Pt with improved mobility reflected by decreased assistance required for all transfers and gait training. Pt encouraged to sit up in chair for all meals and as long as tolerated throughout the day. Pt is progressing toward goals and would continue to benefit from acute skilled therapy intervention to address deficits and progress toward prior level of function.       Rehab Prognosis: Good; patient would benefit from acute skilled PT services to address these deficits and reach maximum level of function.    Recent Surgery: Procedure(s) (LRB):  CARDIOVERSION (N/A)  ECHOCARDIOGRAM,TRANSESOPHAGEAL (N/A) 11 Days Post-Op    Plan:     During this hospitalization, patient to be seen 4 x/week to address the identified rehab impairments via gait training, therapeutic activities, therapeutic exercises, neuromuscular re-education and progress toward the following goals:    · Plan of Care Expires:  02/20/19    Subjective     Chief Complaint: Pt stated "It feels so good to be up and moving"   Patient/Family Comments/goals: to get better and return home   Pain/Comfort:  · Pain Rating 1: 0/10  · Pain Rating Post-Intervention 1: 0/10      Objective:     Communicated with RN prior to session.  Patient found HOB elevated, with  telemetry, pulse ox (continuous), blood pressure cuff, oxygen, PICC line, central line, gtz " catheter, arterial line(nitric)  upon PT entry to room.     General Precautions: Standard, fall   Orthopedic Precautions:N/A   Braces: N/A     Functional Mobility:  · Bed Mobility:     · Supine to Sit: contact guard assistance  · Transfers:     · Sit to Stand:  contact guard assistance with no AD  · Gait: Pt ambulated 6 steps to transfer from bed to chair with SBA and no AD. Pt demo'd small step size, increased lateral sway, increased time to perform. Pt with mild dizziness upon standing, resolved with rest and hand pumps.       AM-PAC 6 CLICK MOBILITY  Turning over in bed (including adjusting bedclothes, sheets and blankets)?: 4  Sitting down on and standing up from a chair with arms (e.g., wheelchair, bedside commode, etc.): 3  Moving from lying on back to sitting on the side of the bed?: 4  Moving to and from a bed to a chair (including a wheelchair)?: 3  Need to walk in hospital room?: 3  Climbing 3-5 steps with a railing?: 2  Basic Mobility Total Score: 19       Therapeutic Activities and Exercises:   Pt educated on role of PT/POC. Pt verbalized understanding.   While sitting EOB, pt performed 10x B LAQ, 10x seated marching.   Pt educated on seated exercises to perform 50x/day. Exercises included bilateral LAQ, marching, ankle DF/PF  Pt encouraged to only perform OOB mobility with assistance from nursing/therapy. Pt verbalized understanding.       Patient left up in chair with all lines intact, call button in reach and RN notified..    GOALS:   Multidisciplinary Problems     Physical Therapy Goals        Problem: Physical Therapy Goal    Goal Priority Disciplines Outcome Goal Variances Interventions   Physical Therapy Goal     PT, PT/OT Ongoing (interventions implemented as appropriate)     Description:  Goals to be met by: 19    Patient will increase functional independence with mobility by performin. Supine to sit with Modified [place], with HOB flat   2. Sit to supine with Modified  [place], with HOB flat   3. Sit to stand transfer with Supervision  4. Gait  x 100 feet with Supervision with or without using least restrictive AD.   5. Lower extremity exercise program x20 reps per handout, with supervision                      Time Tracking:     PT Received On: 01/25/19  PT Start Time: 1418     PT Stop Time: 1441  PT Total Time (min): 23 min     Billable Minutes: Gait Training 15 mins  and Therapeutic Exercise 8 mins     Treatment Type: Treatment  PT/PTA: PT     PTA Visit Number: 0     Cathie Randall, PT  01/25/2019   You can access the FollowMyHealth Patient Portal offered by [facility] by registering at the following website: [email or web address]. By joining "[employer]"’s FollowMyHealth portal, you will also be able to view your health information using other applications (apps) compatible with our system.

## 2019-09-16 NOTE — PROGRESS NOTES
Mr. Solis is a very pleasant 76y/o male with  Stage D HFrEF, ICMP (EF=20%) who is currently undergoing  Hospice care.  Recommend to turn off his ICD.      Mela Nails MD

## 2022-01-22 NOTE — ASSESSMENT & PLAN NOTE
- See above  - Continue Plavix, Crestor. Not on ASA as he is also on Eliquis   ABDOMINAL PAIN/NAUSEA

## 2022-06-30 NOTE — TELEPHONE ENCOUNTER
6/14/18 - Received labs ordered from clinic visit 6/11/18.  Patient was restarted on Entresto 21/24 mg BID with BMP, BNP scheduled for today.  Cr - 2.7 which is up from Cr - 1.9 6/11/18.  Discussed/Reviewed with NELIA Nails M.D.  VORB: NELIA Nails M.D./JV Gibson RN: Stop Entresto and repeat BMP, BNP Monday.  Called and instructed patient of above.  Patient repeated order to stop Entresto and will have labs done at [facility] Monday.  Order faxed over to [facility] and phone review entered.   30-Jun-2022 08:33:07

## 2023-11-30 NOTE — Clinical Note
----- Message from Levon Maciel sent at 11/30/2023  9:37 AM CST -----  Regarding: Returning a Missed Call  Contact: Kenneth Phillip  Returning a Missed Call    Caller: Kenneth Phillip       Returning call to: Lesly       Caller can be reached @: [phone] (mobile)       or     [phone] (home)        Nature of the call: [facility]. Requesting a call back.      Atrial lead successfully extracted

## 2024-01-02 NOTE — ASSESSMENT & PLAN NOTE
-ICM  -Last 2D Echo 1/7/19: LVEF 20%, LVEDD 7.24 cm  -Diuresing with IV Lasix infusion.  Still appears volume overloaded on exam.  Will continue for now  -GDMT with Digoxin, Entresto  -2g Na dietary restriction, 1500 mL fluid restriction, strict I/Os     No

## 2024-02-18 NOTE — NURSING
Spoke with Dr. Davidson regarding SBP < 90 and HR low 40's. Per nursing communication, notify MD if SBP < 90. Arterial line in place and monitoring NIBP pressures. Orders received for Atropine x 1 and Dopamine infusion, seel flowsheets for details.    Home

## 2024-10-23 NOTE — SUBJECTIVE & OBJECTIVE
Continued Stay Note  [facility]     Patient Name: Riky Moon  MRN: [id number]  Today's Date: 10/23/2024    Admit Date: 10/22/2024    Plan: Return to [facility] Living apartment with wife and do onsite PT. Family transport.   Discharge Plan       Row Name 10/23/24 1612       Plan    Plan Return to [facility] Independent Living apartment with wife and do onsite PT. Family transport.    Patient/Family in Agreement with Plan yes    Plan Comments Met with pt at bedside. Explained roll of . Face sheet and pharmacy verified. Pt lives with his spouse in an independent living apartment at [facility]. Only service they receive is supper daily. Pt states his wife has dementia and he is her primary caregiver. States his wife is mobile and just has issues with memory and fidgeting. There are no steps to enter home. Home DME includes a glucometer, CPAP, INR meter, and Rollator.  States he uses his rollator when out in community but is independent.  Pt is independent with ADLs. Pt has been to [facility] for Rehab and has used [facility]. Pt states he does not want HH or SNF at D/C. States he wants to do PT onsite at [facility]. Ambulated 50' CGA with a RW with PT today.  Pt's PCP is Marco Carrillo MD. Enrolled in Meds to Bed. Pt drives himself to appointments. At discharge, family will transport. Explained that CCP would follow to assess for discharge needs.  Cornelio Zhao RN-BC                   Discharge Codes    No documentation.                 Expected Discharge Date and Time       Expected Discharge Date Expected Discharge Time    Oct 25, 2024               Cornelio Zhao RN     Interval History: Device placed yesterday without complications    Tele: V-pacing with intermittent PVC/junctional beats, HR 80s. NSVT x1    ROS  Objective:     Vital Signs (Most Recent):  Temp: 97.4 °F (36.3 °C) (01/30/19 0700)  Pulse: 80 (01/30/19 0906)  Resp: 18 (01/30/19 0906)  BP: (!) 110/55 (01/30/19 0906)  SpO2: 97 % (01/30/19 0906) Vital Signs (24h Range):  Temp:  [96.5 °F (35.8 °C)-97.6 °F (36.4 °C)] 97.4 °F (36.3 °C)  Pulse:  [80-87] 80  Resp:  [12-26] 18  SpO2:  [97 %-100 %] 97 %  BP: (105-121)/(53-64) 110/55  Arterial Line BP: ()/(43-51) 102/51     Weight: 83.5 kg (184 lb)  Body mass index is 23.62 kg/m².     SpO2: 97 %  O2 Device (Oxygen Therapy): nasal cannula    Physical Exam   Constitutional: He is oriented to person, place, and time. He appears well-developed and well-nourished. No distress.   Looking well   HENT:   Head: Normocephalic and atraumatic.   Mouth/Throat: Oropharynx is clear and moist.   Eyes: Conjunctivae and EOM are normal. Pupils are equal, round, and reactive to light. No scleral icterus.   Neck: Normal range of motion. Neck supple. No JVD present.   Cardiovascular: Normal rate, normal heart sounds and intact distal pulses. An irregular rhythm present.   No murmur heard.  Pulses:       Radial pulses are 2+ on the right side, and 2+ on the left side.   Pulmonary/Chest: Effort normal and breath sounds normal. No respiratory distress.   Symmetrical expansion  On NC  Right chest with intact dressing, no signs of hematoma   Abdominal: Soft. Bowel sounds are normal. There is no hepatosplenomegaly. There is no tenderness.   Musculoskeletal: Normal range of motion.   Neurological: He is alert and oriented to person, place, and time. No cranial nerve deficit.   Skin: Skin is warm and dry. No rash noted. He is not diaphoretic.   Psychiatric: He has a normal mood and affect. Judgment and thought content normal.       Significant Labs:   EP:   Recent Labs   Lab 01/29/19  2544  01/29/19  1853 01/30/19  0330     --  142   K 3.4* 3.4* 3.8   CL 96  --  94*   CO2 35*  --  35*   *  --  245*   BUN 54*  --  50*   CREATININE 2.0*  --  1.9*   CALCIUM 8.6*  --  9.0   PROT 6.5  --  6.4   ALBUMIN 2.2*  --  2.2*   BILITOT 0.7  --  0.9   ALKPHOS 152*  --  144*   AST 26  --  24   ALT 16  --  15   ANIONGAP 11  --  13   ESTGFRAFRICA 36.4*  --  38.7*   EGFRNONAA 31.5*  --  33.5*   WBC 13.55*  --  13.51*   HGB 7.3*  --  7.6*   HCT 25.1*  --  25.8*     --  211   INR 2.6*  --  2.6*

## (undated) DEVICE — SHEATH TIGHTRAL SUB-C 11FR

## (undated) DEVICE — ELECTRODE PAD DEFIB STERILE

## (undated) DEVICE — DRESSING AQUACEL AG 3.5X10IN

## (undated) DEVICE — GAUZE SPONGE 4X4 12PLY

## (undated) DEVICE — PAD HEARTSTART DEFIB ADULT

## (undated) DEVICE — INTRODUCER HEMOSTASIS 7.5F

## (undated) DEVICE — SLING SWATHE UNIVERSAL FOAM

## (undated) DEVICE — KIT WRENCH

## (undated) DEVICE — BLADE PLASMA WIDE SPATULA TIP

## (undated) DEVICE — SHEATH SAFE 8FR

## (undated) DEVICE — STYLET 52CM

## (undated) DEVICE — NDL PERCUTANEOUS ENTRYBSDN 18

## (undated) DEVICE — SHEATH SAFESHEATH II ULTRA 6FR

## (undated) DEVICE — Device

## (undated) DEVICE — DRESSING AQUACEL FOAM 5 X 5

## (undated) DEVICE — CATH BLLN COR SINUS VENOGRAPHY

## (undated) DEVICE — LINE PRESSURE MONITORING 96IN

## (undated) DEVICE — CUTTER LEAD

## (undated) DEVICE — PACK PACER PERMANENT

## (undated) DEVICE — KIT SHEATH GLIDELIGHT LSR 14FR

## (undated) DEVICE — CATH ELECTRD DECAPOLAR 6F

## (undated) DEVICE — BANDAGE ELASTOPLAST 3INX5YDS

## (undated) DEVICE — ELECTRODE POLYHESIVEPRE-ATTACH

## (undated) DEVICE — WIRE GUIDE WHOLEY MOD J .035

## (undated) DEVICE — DEVICE COMPR SAFEGUARD 24CM

## (undated) DEVICE — CATH AL2 PRO-FLO XT 6FR 110CM

## (undated) DEVICE — COVER PROBE US 5.5X58L NON LTX

## (undated) DEVICE — DEVICE LEAD EXTRACTION 2 65CM

## (undated) DEVICE — CATH RESPONSE QPLR JSN 6F 120

## (undated) DEVICE — KIT BRIDGE ACCESSORY

## (undated) DEVICE — CATH BRIDGE OCCL BLLN 80CM

## (undated) DEVICE — ADHESIVE DERMABOND ADVANCED

## (undated) DEVICE — STYLET 58CM

## (undated) DEVICE — DEVICE LEAD EXTRACTION 1 65CM

## (undated) DEVICE — SHEATH SAFE ULTRA 9FR

## (undated) DEVICE — STYLET 65CM

## (undated) DEVICE — PLUG IS4/DF4 PORT

## (undated) DEVICE — DRAPE INCISE IOBAN 2 23X17IN

## (undated) DEVICE — KIT LEAD LOCKING DEVICE ACC

## (undated) DEVICE — WIRE WHISPER MS 014 X 190

## (undated) DEVICE — GUIDEWIRD CPS COURIER FIRM

## (undated) DEVICE — EXTENDER LEAD BULLDOG 70CM

## (undated) DEVICE — SLING AND SWATHE UNIV ADLT